# Patient Record
Sex: FEMALE | Race: BLACK OR AFRICAN AMERICAN | NOT HISPANIC OR LATINO | Employment: OTHER | URBAN - METROPOLITAN AREA
[De-identification: names, ages, dates, MRNs, and addresses within clinical notes are randomized per-mention and may not be internally consistent; named-entity substitution may affect disease eponyms.]

---

## 2017-01-16 ENCOUNTER — OFFICE VISIT (OUTPATIENT)
Dept: PULMONOLOGY | Facility: CLINIC | Age: 67
End: 2017-01-16
Payer: MEDICARE

## 2017-01-16 ENCOUNTER — ANTI-COAG VISIT (OUTPATIENT)
Dept: CARDIOLOGY | Facility: CLINIC | Age: 67
End: 2017-01-16
Payer: MEDICARE

## 2017-01-16 ENCOUNTER — LAB VISIT (OUTPATIENT)
Dept: LAB | Facility: HOSPITAL | Age: 67
End: 2017-01-16
Attending: INTERNAL MEDICINE
Payer: MEDICARE

## 2017-01-16 VITALS
SYSTOLIC BLOOD PRESSURE: 164 MMHG | HEART RATE: 50 BPM | OXYGEN SATURATION: 99 % | WEIGHT: 187.63 LBS | HEIGHT: 66 IN | RESPIRATION RATE: 20 BRPM | DIASTOLIC BLOOD PRESSURE: 82 MMHG | BODY MASS INDEX: 30.16 KG/M2

## 2017-01-16 DIAGNOSIS — E78.5 HYPERLIPIDEMIA, UNSPECIFIED HYPERLIPIDEMIA TYPE: ICD-10-CM

## 2017-01-16 DIAGNOSIS — Z79.01 LONG TERM (CURRENT) USE OF ANTICOAGULANTS: Primary | ICD-10-CM

## 2017-01-16 DIAGNOSIS — G47.33 OSA ON CPAP: Primary | ICD-10-CM

## 2017-01-16 DIAGNOSIS — Z79.01 ANTICOAGULATED ON COUMADIN: ICD-10-CM

## 2017-01-16 LAB
ALBUMIN SERPL BCP-MCNC: 3.6 G/DL
ALP SERPL-CCNC: 43 U/L
ALT SERPL W/O P-5'-P-CCNC: 13 U/L
ANION GAP SERPL CALC-SCNC: 6 MMOL/L
AST SERPL-CCNC: 17 U/L
BILIRUB SERPL-MCNC: 0.6 MG/DL
BUN SERPL-MCNC: 23 MG/DL
CALCIUM SERPL-MCNC: 9.3 MG/DL
CHLORIDE SERPL-SCNC: 105 MMOL/L
CHOLEST/HDLC SERPL: 2.8 {RATIO}
CO2 SERPL-SCNC: 31 MMOL/L
CREAT SERPL-MCNC: 0.8 MG/DL
CTP QC/QA: ABNORMAL
EST. GFR  (AFRICAN AMERICAN): >60 ML/MIN/1.73 M^2
EST. GFR  (NON AFRICAN AMERICAN): >60 ML/MIN/1.73 M^2
GLUCOSE SERPL-MCNC: 106 MG/DL
HDL/CHOLESTEROL RATIO: 35.8 %
HDLC SERPL-MCNC: 162 MG/DL
HDLC SERPL-MCNC: 58 MG/DL
INR PPP: 1.3 (ref 2–3)
LDLC SERPL CALC-MCNC: 91.4 MG/DL
NONHDLC SERPL-MCNC: 104 MG/DL
POTASSIUM SERPL-SCNC: 4.4 MMOL/L
PROT SERPL-MCNC: 7 G/DL
SODIUM SERPL-SCNC: 142 MMOL/L
TRIGL SERPL-MCNC: 63 MG/DL

## 2017-01-16 PROCEDURE — 99999 PR PBB SHADOW E&M-EST. PATIENT-LVL III: CPT | Mod: PBBFAC,,, | Performed by: INTERNAL MEDICINE

## 2017-01-16 PROCEDURE — 3077F SYST BP >= 140 MM HG: CPT | Mod: S$GLB,,, | Performed by: INTERNAL MEDICINE

## 2017-01-16 PROCEDURE — 36415 COLL VENOUS BLD VENIPUNCTURE: CPT | Mod: PO

## 2017-01-16 PROCEDURE — 3079F DIAST BP 80-89 MM HG: CPT | Mod: S$GLB,,, | Performed by: INTERNAL MEDICINE

## 2017-01-16 PROCEDURE — 99211 OFF/OP EST MAY X REQ PHY/QHP: CPT | Mod: 25,S$GLB,,

## 2017-01-16 PROCEDURE — 80053 COMPREHEN METABOLIC PANEL: CPT

## 2017-01-16 PROCEDURE — 85610 PROTHROMBIN TIME: CPT | Mod: QW,S$GLB,,

## 2017-01-16 PROCEDURE — 80061 LIPID PANEL: CPT

## 2017-01-16 PROCEDURE — 99214 OFFICE O/P EST MOD 30 MIN: CPT | Mod: S$GLB,,, | Performed by: INTERNAL MEDICINE

## 2017-01-16 PROCEDURE — 1159F MED LIST DOCD IN RCRD: CPT | Mod: S$GLB,,, | Performed by: INTERNAL MEDICINE

## 2017-01-16 NOTE — MR AVS SNAPSHOT
[facility]  [address] [facility] [place] [address]  Phone: [phone]  Fax: [phone]                  Alyx Weir   2017 10:00 AM   Anti-coag visit    Description:  Female : 1950   Provider:  Fatou Ferguson PharmD   Department:  [facility] - Coumadin           Diagnoses this Visit        Comments    Long term (current) use of anticoagulants    -  Primary     Anticoagulated on Coumadin                To Do List           Future Appointments        Provider Department Dept Phone    2017 8:40 AM LABORATORY, [facility] [phone]    2017 9:00 AM Kyler Tillman MD [facility] Pul[facility] [phone]    2017 10:00 AM Fatou Ferguson PharmD [facility] [phone]    2017 9:15 AM Fatou Ferguson PharmD [facility] [phone]    3/30/2017 8:00 AM Carl Clemons MD [facility] [phone]      Goals (5 Years of Data)     None      [facility]     [facility] -  Assistance  Registered nurses in the [facility] provide clinical advisement, health education, appointment booking, and other advisory services.  Call for this free service at [phone].             Medications           Message regarding Medications     Verify the changes and/or additions to your medication regime listed below are the same as discussed with your clinician today.  If any of these changes or additions are incorrect, please notify your healthcare provider.             Verify that the below list of medications is an accurate representation of the medications you are currently taking.  If none reported, the list may be blank. If incorrect, please contact your healthcare provider. Carry this list with you in case of emergency.           Current Medications     biotin 300 mcg Tab Take 1 tablet by mouth once daily.    blood sugar diagnostic (ACCU-CHEK SMARTVIEW TEST STRIP) Strp 1  strip by Misc.(Non-Drug; Combo Route) route once daily.    calcium-magnesium 300-300 mg Tab Take 1 tablet by mouth Once daily as needed.    carvedilol (COREG) 6.25 MG tablet Take 1 tablet (6.25 mg total) by mouth 2 (two) times daily with meals.    diclofenac sodium 1 % Gel Apply 2 g topically daily as needed.    gabapentin (NEURONTIN) 100 MG capsule Take 1 capsule (100 mg total) by mouth 3 (three) times daily.    hydrALAZINE (APRESOLINE) 25 MG tablet Take 1 tablet (25 mg total) by mouth 3 (three) times daily.    lancets 31 gauge Misc 1 lancet by Misc.(Non-Drug; Combo Route) route once daily.    olmesartan-amlodipin-hcthiazid (TRIBENZOR) 40-10-25 mg Tab Take 1 tablet by mouth once daily.    pravastatin (PRAVACHOL) 40 MG tablet Take 1 tablet (40 mg total) by mouth once daily.    tizanidine (ZANAFLEX) 2 MG tablet Take 1-2 tablets (2-4 mg total) by mouth nightly as needed.    tramadol (ULTRAM) 50 mg tablet Take 2 tablets by mouth 4 (four) times daily as needed.    vitamin D 1000 units Tab Take 185 mg by mouth once daily.    warfarin (COUMADIN) 10 MG tablet TAKE ONE TABLET BY MOUTH IN THE EVENING ON  MONDAY,  WED,  AND  FRIDAY  AND  ONE-HALF  TABLET  ON  TUE,THURS,  SAT,  AND  SUNDAY           Clinical Reference Information           Allergies as of 1/16/2017     Erythromycin    Amlodipine    Diazepam    Iodine    Meperidine    Morphine    Primidone      Immunizations Administered on Date of Encounter - 1/16/2017     None      Orders Placed During Today's Visit      Normal Orders This Visit    POCT PT/INR          1/16/2017  8:33 AM - Jabier BirminghamD      Component Results     Component Value Flag Ref Range Units Status    INR 1.3 (A) 2.0 - 3.0  Final     Acceptable           December 2016 Details    Sun Mon Tue Wed Thu Fri Sat         1               2               3                 4               5               6               7               8               9               10                  11               12               13               14               15               16               17      5 mg           18      5 mg         19   4.3   Hold   See details      20      Hold         21      5 mg         22      5 mg         23      5 mg         24      5 mg           25      5 mg         26      5 mg         27      5 mg         28   1.9   5 mg   See details      29      5 mg         30      5 mg         31      5 mg          Date Details   12/19 INR: 4.3      12/28 Last INR check   INR: 1.9                 January 2017 Details    Sun Mon Tue Wed Thu Fri Sat     1      5 mg         2      5 mg         3      5 mg         4      5 mg         5      5 mg         6      5 mg         7      5 mg           8      5 mg         9      5 mg         10      5 mg         11      5 mg         12      5 mg         13      5 mg         14      5 mg           15      5 mg         16   1.3   10 mg   See details      17      5 mg         18      5 mg         19      5 mg         20      5 mg         21      5 mg           22      5 mg         23            24               25               26               27               28                 29               30               31                    Date Details   01/16 This INR check   INR: 1.3       Date of next INR:  1/23/2017               How to take your warfarin dose     To take:  5 mg Take 0.5 of a 10 mg tablet.    To take:  10 mg Take 1 of the 10 mg tablets.           Anticoagulation Summary as of 1/16/2017     Maintenance plan 10 mg (10 mg x 1) on Mon; 5 mg (10 mg x 0.5) all other days    Full instructions 10 mg on Mon; 5 mg all other days    Next INR check 1/23/2017      Anticoagulation Episode Summary     Comments       Patient Findings     Negatives Signs/symptoms of thrombosis, Signs/symptoms of bleeding, Laboratory test error suspected, Change in health, Change in alcohol use, Change in activity, Upcoming invasive procedure, Emergency department  visit, Upcoming dental procedure, Missed doses, Extra doses, Change in medications, Change in diet/appetite, Hospital admission, Bruising, Other complaints

## 2017-01-16 NOTE — MR AVS SNAPSHOT
[facility] - [facility]  [address] [place] [address]  Phone: [phone]  Fax: [phone]                  Alyx Weir   2017 9:00 AM   Office Visit    Description:  Female : 1950   Provider:  Kyler Tillman MD   Department:  [facility] - Pul[facility]           Reason for Visit     Sleep Apnea           Diagnoses this Visit        Comments    VIRGIL on CPAP    -  Primary            To Do List           Future Appointments        Provider Department Dept Phone    2017 10:00 AM Jabier BirminghamD [facility] - [facility] [phone]    2017 9:15 AM [facility] [phone]    3/30/2017 8:00 AM Carl Clemons MD [facility] [phone]      Goals (5 Years of Data)     None      Follow-Up and Disposition     Return in about 1 year (around 2018) for CPAP download.      [facility]     [facility] On Call Nurse [facility] - 24/7 Assistance  Registered nurses in the [facility] On Call Center provide clinical advisement, health education, appointment booking, and other advisory services.  Call for this free service at [phone].             Medications           Message regarding Medications     Verify the changes and/or additions to your medication regime listed below are the same as discussed with your clinician today.  If any of these changes or additions are incorrect, please notify your healthcare provider.             Verify that the below list of medications is an accurate representation of the medications you are currently taking.  If none reported, the list may be blank. If incorrect, please contact your healthcare provider. Carry this list with you in case of emergency.           Current Medications     biotin 300 mcg Tab Take 1 tablet by mouth once daily.    blood sugar diagnostic (ACCU-CHEK SMARTVIEW TEST STRIP) Strp 1 strip by Misc.(Non-Drug; Combo Route) route once daily.    calcium-magnesium  "300-300 mg Tab Take 1 tablet by mouth Once daily as needed.    carvedilol (COREG) 6.25 MG tablet Take 1 tablet (6.25 mg total) by mouth 2 (two) times daily with meals.    diclofenac sodium 1 % Gel Apply 2 g topically daily as needed.    gabapentin (NEURONTIN) 100 MG capsule Take 1 capsule (100 mg total) by mouth 3 (three) times daily.    hydrALAZINE (APRESOLINE) 25 MG tablet Take 1 tablet (25 mg total) by mouth 3 (three) times daily.    lancets 31 gauge Misc 1 lancet by Misc.(Non-Drug; Combo Route) route once daily.    olmesartan-amlodipin-hcthiazid (TRIBENZOR) 40-10-25 mg Tab Take 1 tablet by mouth once daily.    pravastatin (PRAVACHOL) 40 MG tablet Take 1 tablet (40 mg total) by mouth once daily.    tizanidine (ZANAFLEX) 2 MG tablet Take 1-2 tablets (2-4 mg total) by mouth nightly as needed.    tramadol (ULTRAM) 50 mg tablet Take 2 tablets by mouth 4 (four) times daily as needed.    vitamin D 1000 units Tab Take 185 mg by mouth once daily.    warfarin (COUMADIN) 10 MG tablet TAKE ONE TABLET BY MOUTH IN THE EVENING ON  MONDAY,  WED,  AND  FRIDAY  AND  ONE-HALF  TABLET  ON  TUE,THURS,  SAT,  AND  SUNDAY           Clinical Reference Information           Vital Signs - Last Recorded  Most recent update: 1/16/2017  9:03 AM by Tish Doran LPN    BP Pulse Resp Ht Wt SpO2    (!) 164/82 (!) 50 20 5' 6" (1.676 m) 85.1 kg (187 lb 9.8 oz) 99%    BMI                30.28 kg/m2          Blood Pressure          Most Recent Value    BP  (!)  164/82      Allergies as of 1/16/2017     Erythromycin    Amlodipine    Diazepam    Iodine    Meperidine    Morphine    Primidone      Immunizations Administered on Date of Encounter - 1/16/2017     None      Orders Placed During Today's Visit      Normal Orders This Visit    CPAP/BIPAP SUPPLIES       "

## 2017-01-16 NOTE — PROGRESS NOTES
INR is sub-therapeutic. Patient denies missed doses. Reports high vitamin k on yesterday but small serving. Will increase total weekly dose to 10mg on Mondays and 5mg all other days.

## 2017-01-26 ENCOUNTER — TELEPHONE (OUTPATIENT)
Dept: FAMILY MEDICINE | Facility: CLINIC | Age: 67
End: 2017-01-26

## 2017-01-26 NOTE — TELEPHONE ENCOUNTER
----- Message from Aby Mathis sent at 1/26/2017 11:32 AM CST -----  Would like to speak to nurse about diabetic supplies. Please call back at [phone]. Thanks//cdb

## 2017-01-26 NOTE — TELEPHONE ENCOUNTER
----- Message from Mariela Khan sent at 1/26/2017  3:53 PM CST -----  Patient is returning nurse's call/please call patient back at [phone]

## 2017-01-30 ENCOUNTER — ANTI-COAG VISIT (OUTPATIENT)
Dept: CARDIOLOGY | Facility: CLINIC | Age: 67
End: 2017-01-30
Payer: MEDICARE

## 2017-01-30 DIAGNOSIS — Z79.01 ANTICOAGULATED ON COUMADIN: ICD-10-CM

## 2017-01-30 DIAGNOSIS — Z79.01 LONG TERM (CURRENT) USE OF ANTICOAGULANTS: Primary | ICD-10-CM

## 2017-01-30 LAB
CTP QC/QA: YES
INR PPP: 2.3 (ref 2–3)

## 2017-01-30 PROCEDURE — 85610 PROTHROMBIN TIME: CPT | Mod: QW,S$GLB,,

## 2017-01-30 NOTE — MR AVS SNAPSHOT
[facility] - [facility]  [address] [place] [address]  Phone: [phone]  Fax: [phone]                  Alyx Weir   2017 9:15 AM   Anti-coag visit    Description:  Female : 1950   Provider:  Fatou Ferguson PharmD   Department:  [facility] - [facility]           Diagnoses this Visit        Comments    Long term (current) use of anticoagulants    -  Primary     Anticoagulated on Coumadin                To Do List           Future Appointments        Provider Department Dept Phone    2017 9:15 AM Fatou Ferguson, [facility] - [facility] [phone]    3/30/2017 8:00 AM Carl Clemons MD [facility] [phone]    1/15/2018 10:40 AM Sunshine Luther NP '[facility] - [facility] Services [phone]      Goals (5 Years of Data)     None      [facility]     [facility] On Call Nurse Care Line -  Assistance  Registered nurses in the [facility] On Call Center provide clinical advisement, health education, appointment booking, and other advisory services.  Call for this free service at [phone].             Medications           Message regarding Medications     Verify the changes and/or additions to your medication regime listed below are the same as discussed with your clinician today.  If any of these changes or additions are incorrect, please notify your healthcare provider.             Verify that the below list of medications is an accurate representation of the medications you are currently taking.  If none reported, the list may be blank. If incorrect, please contact your healthcare provider. Carry this list with you in case of emergency.           Current Medications     biotin 300 mcg Tab Take 1 tablet by mouth once daily.    blood sugar diagnostic (ACCU-CHEK SMARTVIEW TEST STRIP) Strp 1 strip by Misc.(Non-Drug; Combo Route) route once daily.    calcium-magnesium 300-300 mg Tab Take 1 tablet by mouth Once daily as needed.    carvedilol (COREG)  6.25 MG tablet Take 1 tablet (6.25 mg total) by mouth 2 (two) times daily with meals.    diclofenac sodium 1 % Gel Apply 2 g topically daily as needed.    gabapentin (NEURONTIN) 100 MG capsule Take 1 capsule (100 mg total) by mouth 3 (three) times daily.    hydrALAZINE (APRESOLINE) 25 MG tablet Take 1 tablet (25 mg total) by mouth 3 (three) times daily.    lancets 31 gauge Misc 1 lancet by Misc.(Non-Drug; Combo Route) route once daily.    olmesartan-amlodipin-hcthiazid (TRIBENZOR) 40-10-25 mg Tab Take 1 tablet by mouth once daily.    pravastatin (PRAVACHOL) 40 MG tablet Take 1 tablet (40 mg total) by mouth once daily.    tizanidine (ZANAFLEX) 2 MG tablet Take 1-2 tablets (2-4 mg total) by mouth nightly as needed.    tramadol (ULTRAM) 50 mg tablet Take 2 tablets by mouth 4 (four) times daily as needed.    vitamin D 1000 units Tab Take 185 mg by mouth once daily.    warfarin (COUMADIN) 10 MG tablet TAKE ONE TABLET BY MOUTH IN THE EVENING ON  MONDAY,  WED,  AND  FRIDAY  AND  ONE-HALF  TABLET  ON  TUE,THURS,  SAT,  AND  SUNDAY           Clinical Reference Information           Allergies as of 1/30/2017     Erythromycin    Amlodipine    Diazepam    Iodine    Meperidine    Morphine    Primidone      Immunizations Administered on Date of Encounter - 1/30/2017     None      Orders Placed During Today's Visit      Normal Orders This Visit    POCT INR          1/30/2017  9:17 AM - Jabier BirminghamD      Component Results     Component Value Flag Ref Range Units Status    INR 2.3  2.0 - 3.0  Final     Acceptable Yes    Final      December 2016 Details    Sun Mon Tue Wed Thu Fri Sat         1               2               3                 4               5               6               7               8               9               10                 11               12               13               14               15               16               17                 18               19                20               21               22               23               24                 25               26               27               28               29               30               31      5 mg          Date Details   No additional details              January 2017 Details    Sun Mon Tue Wed Thu Fri Sat     1      5 mg         2      5 mg         3      5 mg         4      5 mg         5      5 mg         6      5 mg         7      5 mg           8      5 mg         9      5 mg         10      5 mg         11      5 mg         12      5 mg         13      5 mg         14      5 mg           15      5 mg         16   1.3   10 mg   See details      17      5 mg         18      5 mg         19      5 mg         20      5 mg         21      5 mg           22      5 mg         23      10 mg         24      5 mg         25      5 mg         26      5 mg         27      5 mg         28      5 mg           29      5 mg         30   2.3   10 mg   See details      31      5 mg              Date Details   01/16 Last INR check   INR: 1.3      01/30 This INR check   INR: 2.3                     How to take your warfarin dose     To take:  5 mg Take 0.5 of a 10 mg tablet.    To take:  10 mg Take 1 of the 10 mg tablets.           February 2017 Details    Sun Mon Tue Wed Thu Fri Sat        1      5 mg         2      5 mg         3      5 mg         4      5 mg           5      5 mg         6      10 mg         7      5 mg         8      5 mg         9      5 mg         10      5 mg         11      5 mg           12      5 mg         13      10 mg         14      5 mg         15      5 mg         16      5 mg         17      5 mg         18      5 mg           19      5 mg         20            21               22               23               24               25                 26               27               28                    Date Details   No additional details    Date of next INR:  2/20/2017         How to take  your warfarin dose     To take:  5 mg Take 0.5 of a 10 mg tablet.    To take:  10 mg Take 1 of the 10 mg tablets.           Anticoagulation Summary as of 1/30/2017     Maintenance plan 10 mg (10 mg x 1) on Mon; 5 mg (10 mg x 0.5) all other days    Full instructions 10 mg on Mon; 5 mg all other days    Next INR check 2/20/2017      Anticoagulation Episode Summary     Comments       Patient Findings     Negatives Signs/symptoms of thrombosis, Signs/symptoms of bleeding, Laboratory test error suspected, Change in health, Change in alcohol use, Change in activity, Upcoming invasive procedure, Emergency department visit, Upcoming dental procedure, Missed doses, Extra doses, Change in medications, Change in diet/appetite, Hospital admission, Bruising, Other complaints

## 2017-02-09 ENCOUNTER — OFFICE VISIT (OUTPATIENT)
Dept: FAMILY MEDICINE | Facility: CLINIC | Age: 67
End: 2017-02-09
Payer: MEDICARE

## 2017-02-09 ENCOUNTER — HOSPITAL ENCOUNTER (OUTPATIENT)
Dept: RADIOLOGY | Facility: HOSPITAL | Age: 67
Discharge: HOME | End: 2017-02-09
Attending: INTERNAL MEDICINE
Payer: MEDICARE

## 2017-02-09 VITALS
DIASTOLIC BLOOD PRESSURE: 74 MMHG | HEART RATE: 70 BPM | OXYGEN SATURATION: 100 % | TEMPERATURE: 97 F | WEIGHT: 190.69 LBS | SYSTOLIC BLOOD PRESSURE: 116 MMHG | HEIGHT: 65 IN | BODY MASS INDEX: 31.77 KG/M2

## 2017-02-09 DIAGNOSIS — R51 HEADACHE(784.0): Primary | ICD-10-CM

## 2017-02-09 DIAGNOSIS — E11.65 TYPE 2 DIABETES MELLITUS WITH HYPERGLYCEMIA, WITHOUT LONG-TERM CURRENT USE OF INSULIN: Chronic | ICD-10-CM

## 2017-02-09 DIAGNOSIS — R51 HEADACHE(784.0): ICD-10-CM

## 2017-02-09 DIAGNOSIS — R51.9 SEVERE FRONTAL HEADACHES: ICD-10-CM

## 2017-02-09 PROCEDURE — 3044F HG A1C LEVEL LT 7.0%: CPT | Mod: S$GLB,,, | Performed by: INTERNAL MEDICINE

## 2017-02-09 PROCEDURE — 99999 PR PBB SHADOW E&M-EST. PATIENT-LVL III: CPT | Mod: PBBFAC,,, | Performed by: INTERNAL MEDICINE

## 2017-02-09 PROCEDURE — 70210 X-RAY EXAM OF SINUSES: CPT | Mod: TC,PO

## 2017-02-09 PROCEDURE — 3078F DIAST BP <80 MM HG: CPT | Mod: S$GLB,,, | Performed by: INTERNAL MEDICINE

## 2017-02-09 PROCEDURE — 70210 X-RAY EXAM OF SINUSES: CPT | Mod: 26,,, | Performed by: RADIOLOGY

## 2017-02-09 PROCEDURE — 3060F POS MICROALBUMINURIA REV: CPT | Mod: S$GLB,,, | Performed by: INTERNAL MEDICINE

## 2017-02-09 PROCEDURE — 99213 OFFICE O/P EST LOW 20 MIN: CPT | Mod: S$GLB,,, | Performed by: INTERNAL MEDICINE

## 2017-02-09 PROCEDURE — 3074F SYST BP LT 130 MM HG: CPT | Mod: S$GLB,,, | Performed by: INTERNAL MEDICINE

## 2017-02-09 NOTE — PROGRESS NOTES
Subjective:       Patient ID: Alyx Weir is a 66 y.o. female.    Chief Complaint: Headache and Medication Refill  -headache for 2 days-------frontal and across nose-------better today----------no sinus congestion------   HPI  Review of Systems   Constitutional: Negative for chills and fever.   HENT: Negative for congestion, postnasal drip, rhinorrhea, sinus pressure and sore throat.    Respiratory: Negative for apnea, cough, choking, chest tightness, shortness of breath, wheezing and stridor.    Cardiovascular: Negative for chest pain, palpitations and leg swelling.   Gastrointestinal: Negative for abdominal pain, nausea and vomiting.   Genitourinary: Negative.    Musculoskeletal: Negative for back pain.   Neurological: Negative.    Psychiatric/Behavioral: Negative for agitation, behavioral problems and confusion.       Objective:      Physical Exam   Constitutional: She is oriented to person, place, and time. She appears well-developed and well-nourished. No distress.   HENT:   Head: Normocephalic and atraumatic.   Neck: Normal range of motion. Neck supple. Carotid bruit is not present.   Cardiovascular: Normal rate, regular rhythm, normal heart sounds and intact distal pulses.    Pulmonary/Chest: Effort normal and breath sounds normal. No respiratory distress. She has no wheezes. She has no rales. She exhibits no tenderness.   Abdominal: Soft. Bowel sounds are normal. She exhibits no distension. There is no tenderness. There is no rebound and no guarding.   Musculoskeletal: Normal range of motion. She exhibits no edema or tenderness.   Neurological: She is alert and oriented to person, place, and time.   Skin: Skin is warm and dry. No rash noted. She is not diaphoretic. No erythema. No pallor.   Psychiatric: She has a normal mood and affect. Her behavior is normal. Judgment and thought content normal.   Nursing note and vitals reviewed.      Assessment:       1. Headache(784.0)        Plan:         xray sinus.     Tramadol  mg bid prn-call if persists.

## 2017-02-09 NOTE — MR AVS SNAPSHOT
[facility]  [address] [facility] [address]  Phone: [phone]                  Alyx Weir   2017 2:00 PM   Office Visit    Description:  Female : 1950   Provider:  Carl Clemons MD   Department:  [facility]           Reason for Visit     Headache     Medication Refill           Diagnoses this Visit        Comments    Headache(784.0)    -  Primary            To Do List           Future Appointments        Provider Department Dept Phone    2017  2:45 PM J[facility] XR1 [facility] [phone]    2017 9:15 AM Fatou Ferguson, PharmD [facility] [phone]    3/30/2017 8:00 AM Carl Clemons MD [facility] [phone]    1/15/2018 10:40 AM Sunshine Luther NP [facility] [phone]      Goals (5 Years of Data)     None      Follow-Up and Disposition     Return if symptoms worsen or fail to improve.       These Medications        Disp Refills Start End    lancets 31 gauge Misc 100 each 3 2017     1 lancet by Misc.(Non-Drug; Combo Route) route once daily. - Misc.(Non-Drug; Combo Route)    Pharmacy: [facility] [address] [place] [address] [address] [facility] Ph #: [phone]       blood sugar diagnostic (ACCU-CHEK SMARTVIEW TEST STRIP) Strp 100 each 3 2017     1 strip by Misc.(Non-Drug; Combo Route) route once daily. - Misc.(Non-Drug; Combo Route)    Pharmacy: [facility] [address] [place] [address] [address] [facility] Ph #: [phone]         [facility] On Call     [facility] On Call Nurse Care Line -  Assistance  Registered nurses in the [facility] provide clinical advisement, health education, appointment booking, and other advisory services.  Call for this free service at [phone].             Medications           Message regarding Medications     Verify the changes and/or additions to your medication  regime listed below are the same as discussed with your clinician today.  If any of these changes or additions are incorrect, please notify your healthcare provider.             Verify that the below list of medications is an accurate representation of the medications you are currently taking.  If none reported, the list may be blank. If incorrect, please contact your healthcare provider. Carry this list with you in case of emergency.           Current Medications     biotin 300 mcg Tab Take 1 tablet by mouth once daily.    blood sugar diagnostic (ACCU-CHEK SMARTVIEW TEST STRIP) Strp 1 strip by Misc.(Non-Drug; Combo Route) route once daily.    calcium-magnesium 300-300 mg Tab Take 1 tablet by mouth Once daily as needed.    carvedilol (COREG) 6.25 MG tablet Take 1 tablet (6.25 mg total) by mouth 2 (two) times daily with meals.    diclofenac sodium 1 % Gel Apply 2 g topically daily as needed.    gabapentin (NEURONTIN) 100 MG capsule Take 1 capsule (100 mg total) by mouth 3 (three) times daily.    hydrALAZINE (APRESOLINE) 25 MG tablet Take 1 tablet (25 mg total) by mouth 3 (three) times daily.    lancets 31 gauge Misc 1 lancet by Misc.(Non-Drug; Combo Route) route once daily.    olmesartan-amlodipin-hcthiazid (TRIBENZOR) 40-10-25 mg Tab Take 1 tablet by mouth once daily.    pravastatin (PRAVACHOL) 40 MG tablet Take 1 tablet (40 mg total) by mouth once daily.    tizanidine (ZANAFLEX) 2 MG tablet Take 1-2 tablets (2-4 mg total) by mouth nightly as needed.    tramadol (ULTRAM) 50 mg tablet Take 2 tablets by mouth 4 (four) times daily as needed.    vitamin D 1000 units Tab Take 185 mg by mouth once daily.    warfarin (COUMADIN) 10 MG tablet TAKE ONE TABLET BY MOUTH IN THE EVENING ON  MONDAY,  WED,  AND  FRIDAY  AND  ONE-HALF  TABLET  ON  TUE,THURS,  SAT,  AND  SUNDAY           Clinical Reference Information           Your Vitals Were     BP Pulse Temp Height    116/74 (BP Location: Left arm, Patient Position: Sitting, BP  "Method: Manual) 70 97.2 °F (36.2 °C) (Tympanic) 5' 5" (1.651 m)    Weight SpO2 BMI    86.5 kg (190 lb 11.2 oz) 100% 31.73 kg/m2      Blood Pressure          Most Recent Value    BP  116/74      Allergies as of 2/9/2017     Erythromycin    Amlodipine    Diazepam    Iodine    Meperidine    Morphine    Primidone      Immunizations Administered on Date of Encounter - 2/9/2017     None      Orders Placed During Today's Visit     Future Labs/Procedures Expected by Expires    X-Ray Sinuses Ltd Less Than 3 Views  2/9/2017 2/9/2018      Language Assistance Services     ATTENTION: Language assistance services are available, free of charge. Please call [phone].      ATENCIÓN: Si taylorla neda, tiene a lainez disposición servicios gratuitos de asistencia lingüística. Llame al [phone].     BELKIS Ý: N?u b?n nói Ti?ng Vi?t, có các d?ch v? h? tr? ngôn ng? mi?n phí dành cho b?n. G?i s? [phone].         [facility] complies with applicable Federal civil rights laws and does not discriminate on the basis of race, color, national origin, age, disability, or sex.        "

## 2017-02-20 ENCOUNTER — ANTI-COAG VISIT (OUTPATIENT)
Dept: CARDIOLOGY | Facility: CLINIC | Age: 67
End: 2017-02-20
Payer: MEDICARE

## 2017-02-20 DIAGNOSIS — Z79.01 ANTICOAGULATED ON COUMADIN: ICD-10-CM

## 2017-02-20 DIAGNOSIS — Z79.01 LONG TERM (CURRENT) USE OF ANTICOAGULANTS: Primary | ICD-10-CM

## 2017-02-20 LAB — INR PPP: 2.5 (ref 2–3)

## 2017-02-20 PROCEDURE — 99211 OFF/OP EST MAY X REQ PHY/QHP: CPT | Mod: 25,S$GLB,,

## 2017-02-20 PROCEDURE — 85610 PROTHROMBIN TIME: CPT | Mod: QW,S$GLB,,

## 2017-02-20 NOTE — PROGRESS NOTES
INR remains therapeutic. Patient recently prescribed Tramadol  mg bid prn. No other changes noted. Continue dose and diet until follow-up.

## 2017-02-20 NOTE — MR AVS SNAPSHOT
[facility]  [address] [address]  Phone: [phone]  Fax: [phone]                  Alyx Weir   2017 9:15 AM   Anti-coag visit    Description:  Female : 1950   Provider:  Fatou Ferguson PharmD   Department:  [facility]           Diagnoses this Visit        Comments    Long term (current) use of anticoagulants    -  Primary     Anticoagulated on Coumadin                To Do List           Future Appointments        Provider Department Dept Phone    3/20/2017 9:00 AM Fatou Ferguson, PharmD [facility] [phone]    3/30/2017 8:00 AM Carl Clemons MD [facility] [phone]    1/15/2018 10:40 AM Sunshine Luther NP [facility] [phone]      Goals (5 Years of Data)     None      [facility]     [facility] On Call Nurse Care Line -  Assistance  Registered nurses in the [facility] On Call Center provide clinical advisement, health education, appointment booking, and other advisory services.  Call for this free service at [phone].             Medications           Message regarding Medications     Verify the changes and/or additions to your medication regime listed below are the same as discussed with your clinician today.  If any of these changes or additions are incorrect, please notify your healthcare provider.             Verify that the below list of medications is an accurate representation of the medications you are currently taking.  If none reported, the list may be blank. If incorrect, please contact your healthcare provider. Carry this list with you in case of emergency.           Current Medications     biotin 300 mcg Tab Take 1 tablet by mouth once daily.    blood sugar diagnostic (ACCU-CHEK SMARTVIEW TEST STRIP) Strp 1 strip by Misc.(Non-Drug; Combo Route) route once daily.    blood sugar diagnostic Strp 1 each by Misc.(Non-Drug; Combo Route) route 2 (two) times daily as  needed.    calcium-magnesium 300-300 mg Tab Take 1 tablet by mouth Once daily as needed.    carvedilol (COREG) 6.25 MG tablet Take 1 tablet (6.25 mg total) by mouth 2 (two) times daily with meals.    diclofenac sodium 1 % Gel Apply 2 g topically daily as needed.    gabapentin (NEURONTIN) 100 MG capsule Take 1 capsule (100 mg total) by mouth 3 (three) times daily.    hydrALAZINE (APRESOLINE) 25 MG tablet Take 1 tablet (25 mg total) by mouth 3 (three) times daily.    lancets 31 gauge Misc 1 lancet by Misc.(Non-Drug; Combo Route) route once daily.    olmesartan-amlodipin-hcthiazid (TRIBENZOR) 40-10-25 mg Tab Take 1 tablet by mouth once daily.    pravastatin (PRAVACHOL) 40 MG tablet Take 1 tablet (40 mg total) by mouth once daily.    tizanidine (ZANAFLEX) 2 MG tablet Take 1-2 tablets (2-4 mg total) by mouth nightly as needed.    tramadol (ULTRAM) 50 mg tablet Take 2 tablets by mouth 4 (four) times daily as needed.    vitamin D 1000 units Tab Take 185 mg by mouth once daily.    warfarin (COUMADIN) 10 MG tablet TAKE ONE TABLET BY MOUTH IN THE EVENING ON  MONDAY,  WED,  AND  FRIDAY  AND  ONE-HALF  TABLET  ON  TUE,THURS,  SAT,  AND  SUNDAY           Clinical Reference Information           Allergies as of 2/20/2017     Erythromycin    Amlodipine    Diazepam    Iodine    Meperidine    Morphine    Primidone      Immunizations Administered on Date of Encounter - 2/20/2017     None      Orders Placed During Today's Visit      Normal Orders This Visit    POCT INR          2/20/2017  9:14 AM - Fatou Ferguson, PharmD      Component Results     Component Value Flag Ref Range Units Status    INR 2.5  2.0 - 3.0  Final      January 2017 Details    Sun Mon Tue Wed Thu Fri Sat     1               2               3               4               5               6               7                 8               9               10               11               12               13               14                 15               16                17               18               19               20               21      5 mg           22      5 mg         23      10 mg         24      5 mg         25      5 mg         26      5 mg         27      5 mg         28      5 mg           29      5 mg         30   2.3   10 mg   See details      31      5 mg              Date Details   01/30 Last INR check   INR: 2.3                 February 2017 Details    Sun Mon Tue Wed Thu Fri Sat        1      5 mg         2      5 mg         3      5 mg         4      5 mg           5      5 mg         6      10 mg         7      5 mg         8      5 mg         9      5 mg         10      5 mg         11      5 mg           12      5 mg         13      10 mg         14      5 mg         15      5 mg         16      5 mg         17      5 mg         18      5 mg           19      5 mg         20   2.5   10 mg   See details      21      5 mg         22      5 mg         23      5 mg         24      5 mg         25      5 mg           26      5 mg         27      10 mg         28      5 mg              Date Details   02/20 This INR check   INR: 2.5                     How to take your warfarin dose     To take:  5 mg Take 0.5 of a 10 mg tablet.    To take:  10 mg Take 1 of the 10 mg tablets.           March 2017 Details    Sun Mon Tue Wed Thu Fri Sat        1      5 mg         2      5 mg         3      5 mg         4      5 mg           5      5 mg         6      10 mg         7      5 mg         8      5 mg         9      5 mg         10      5 mg         11      5 mg           12      5 mg         13      10 mg         14      5 mg         15      5 mg         16      5 mg         17      5 mg         18      5 mg           19      5 mg         20            21               22               23               24               25                 26               27               28               29               30               31                 Date Details   No  additional details    Date of next INR:  3/20/2017         How to take your warfarin dose     To take:  5 mg Take 0.5 of a 10 mg tablet.    To take:  10 mg Take 1 of the 10 mg tablets.           Anticoagulation Summary as of 2/20/2017     Maintenance plan 10 mg (10 mg x 1) on Mon; 5 mg (10 mg x 0.5) all other days    Full instructions 10 mg on Mon; 5 mg all other days    Next INR check 3/20/2017      Anticoagulation Episode Summary     Comments       Patient Findings     Negatives Signs/symptoms of thrombosis, Signs/symptoms of bleeding, Laboratory test error suspected, Change in health, Change in alcohol use, Change in activity, Upcoming invasive procedure, Emergency department visit, Upcoming dental procedure, Missed doses, Extra doses, Change in medications, Change in diet/appetite, Hospital admission, Bruising, Other complaints      Language Assistance Services     ATTENTION: Language assistance services are available, free of charge. Please call [phone].      ATENCIÓN: Si habla neda, tiene a lainez disposición servicios gratuitos de asistencia lingüística. Llame al [phone].     CHÚ Ý: N?u b?n nói Ti?ng Vi?t, có các d?ch v? h? tr? ngôn ng? mi?n phí dành cho b?n. G?i s? [phone].         [facility] complies with applicable Federal civil rights laws and does not discriminate on the basis of race, color, national origin, age, disability, or sex.

## 2017-03-09 RX ORDER — LANCETS
1 EACH MISCELLANEOUS 2 TIMES DAILY PRN
Qty: 110 EACH | Refills: 12 | Status: SHIPPED | OUTPATIENT
Start: 2017-03-09 | End: 2018-01-12

## 2017-03-09 NOTE — TELEPHONE ENCOUNTER
----- Message from Katie Thacker sent at 3/9/2017  3:52 PM CST -----  Contact: Pt   Pt called and stated she needed to speak to the nurse. She stated she received the wrong diabetic supplies for her machine. She can be reached at [phone].    Thanks,  TF

## 2017-03-09 NOTE — TELEPHONE ENCOUNTER
Patient has a One Touch Verio Machine. She needs strips and lancets to [employer] Mail order 90 day supply.

## 2017-03-20 ENCOUNTER — ANTI-COAG VISIT (OUTPATIENT)
Dept: CARDIOLOGY | Facility: CLINIC | Age: 67
End: 2017-03-20
Payer: MEDICARE

## 2017-03-20 DIAGNOSIS — Z79.01 LONG TERM (CURRENT) USE OF ANTICOAGULANTS: Primary | ICD-10-CM

## 2017-03-20 LAB — INR PPP: 1.6 (ref 2–3)

## 2017-03-20 PROCEDURE — 99211 OFF/OP EST MAY X REQ PHY/QHP: CPT | Mod: 25,S$GLB,,

## 2017-03-20 PROCEDURE — 85610 PROTHROMBIN TIME: CPT | Mod: QW,S$GLB,,

## 2017-03-20 NOTE — PROGRESS NOTES
INR is sub-therapeutic. Patient reports possible missed doses. Will increase this week's dose then re-challenge dose until follow-up.

## 2017-03-20 NOTE — MR AVS SNAPSHOT
[facility]  [address] [address]  Phone: [phone]  Fax: [phone]                  Alyx Weir   3/20/2017 9:00 AM   Anti-coag visit    Description:  Female : 1950   Provider:  Fatou Ferguson, PharmBLANQUITA   Department:  [facility]           Diagnoses this Visit        Comments    Long term (current) use of anticoagulants    -  Primary            To Do List           Future Appointments        Provider Department Dept Phone    3/30/2017 8:00 AM Carl Clemons MD [facility] [phone]    4/3/2017 9:30 AM Fatou Ferguson PharmD [facility] [place] [phone]    2017 4:00 PM Patrick Mayo Sr., MD [facility] [phone]    1/15/2018 10:40 AM Sunshine Luther NP [facility] [phone]      Goals (5 Years of Data)     None      [facility] On [facility]     [facility] On Call Nurse Care Line -  Assistance  Registered nurses in the [facility] On Call Center provide clinical advisement, health education, appointment booking, and other advisory services.  Call for this free service at [phone].             Medications           Message regarding Medications     Verify the changes and/or additions to your medication regime listed below are the same as discussed with your clinician today.  If any of these changes or additions are incorrect, please notify your healthcare provider.             Verify that the below list of medications is an accurate representation of the medications you are currently taking.  If none reported, the list may be blank. If incorrect, please contact your healthcare provider. Carry this list with you in case of emergency.           Current Medications     biotin 300 mcg Tab Take 1 tablet by mouth once daily.    blood sugar diagnostic Strp 1 each by Misc.(Non-Drug; Combo Route) route 2 (two) times daily as needed.    blood sugar diagnostic Strp 1 each by Misc.(Non-Drug; Combo Route) route 2  (two) times daily as needed. ONE TOUCH VERIO MACHINE    calcium-magnesium 300-300 mg Tab Take 1 tablet by mouth Once daily as needed.    carvedilol (COREG) 6.25 MG tablet Take 1 tablet (6.25 mg total) by mouth 2 (two) times daily with meals.    diclofenac sodium 1 % Gel Apply 2 g topically daily as needed.    gabapentin (NEURONTIN) 100 MG capsule Take 1 capsule (100 mg total) by mouth 3 (three) times daily.    hydrALAZINE (APRESOLINE) 25 MG tablet Take 1 tablet (25 mg total) by mouth 3 (three) times daily.    lancets Misc 1 each by Misc.(Non-Drug; Combo Route) route 2 (two) times daily as needed.    olmesartan-amlodipin-hcthiazid (TRIBENZOR) 40-10-25 mg Tab Take 1 tablet by mouth once daily.    pravastatin (PRAVACHOL) 40 MG tablet Take 1 tablet (40 mg total) by mouth once daily.    tizanidine (ZANAFLEX) 2 MG tablet Take 1-2 tablets (2-4 mg total) by mouth nightly as needed.    tramadol (ULTRAM) 50 mg tablet Take 2 tablets by mouth 4 (four) times daily as needed.    vitamin D 1000 units Tab Take 185 mg by mouth once daily.    warfarin (COUMADIN) 10 MG tablet TAKE ONE TABLET BY MOUTH IN THE EVENING ON  MONDAY,  WED,  AND  FRIDAY  AND  ONE-HALF  TABLET  ON  TUE,THURS,  SAT,  AND  SUNDAY           Clinical Reference Information           Allergies as of 3/20/2017     Erythromycin    Amlodipine    Diazepam    Iodine    Meperidine    Morphine    Primidone      Immunizations Administered on Date of Encounter - 3/20/2017     None      Orders Placed During Today's Visit      Normal Orders This Visit    POCT INR          3/20/2017  9:11 AM - Fatou Ferguson, PharmD      Component Results     Component Value Flag Ref Range Units Status    INR 1.6 (A) 2.0 - 3.0  Final      February 2017 Details    Sun Mon Tue Wed Thu Fri Sat        1               2               3               4                 5               6               7               8               9               10               11                 12                13               14               15               16               17               18      5 mg           19      5 mg         20   2.5   10 mg   See details      21      5 mg         22      5 mg         23      5 mg         24      5 mg         25      5 mg           26      5 mg         27      10 mg         28      5 mg              Date Details   02/20 Last INR check   INR: 2.5                 March 2017 Details    Sun Mon Tue Wed Thu Fri Sat        1      5 mg         2      5 mg         3      5 mg         4      5 mg           5      5 mg         6      10 mg         7      5 mg         8      5 mg         9      5 mg         10      5 mg         11      5 mg           12      5 mg         13      10 mg         14      5 mg         15      5 mg         16      5 mg         17      5 mg         18      5 mg           19      5 mg         20   1.6   10 mg   See details      21      10 mg         22      5 mg         23      5 mg         24      5 mg         25      5 mg           26      5 mg         27      10 mg         28      5 mg         29      5 mg         30      5 mg         31      5 mg           Date Details   03/20 This INR check   INR: 1.6                     How to take your warfarin dose     To take:  5 mg Take 0.5 of a 10 mg tablet.    To take:  10 mg Take 1 of the 10 mg tablets.           April 2017 Details    Sun Mon Tue Wed Thu Fri Sat           1      5 mg           2      5 mg         3            4               5               6               7               8                 9               10               11               12               13               14               15                 16               17               18               19               20               21               22                 23               24               25               26               27               28               29                 30                      Date Details   No additional  details    Date of next INR:  4/3/2017         How to take your warfarin dose     To take:  5 mg Take 0.5 of a 10 mg tablet.    To take:  10 mg Take 1 of the 10 mg tablets.           Anticoagulation Summary as of 3/20/2017     Maintenance plan 10 mg (10 mg x 1) on Mon; 5 mg (10 mg x 0.5) all other days    Full instructions 3/21: 10 mg; Otherwise 10 mg on Mon; 5 mg all other days    Next INR check 4/3/2017      Anticoagulation Episode Summary     Comments       Patient Findings     Negatives Signs/symptoms of thrombosis, Signs/symptoms of bleeding, Laboratory test error suspected, Change in health, Change in alcohol use, Change in activity, Upcoming invasive procedure, Emergency department visit, Upcoming dental procedure, Missed doses, Extra doses, Change in medications, Change in diet/appetite, Hospital admission, Bruising, Other complaints      Language Assistance Services     ATTENTION: Language assistance services are available, free of charge. Please call [phone].      ATENCIÓN: Si habla neda, tiene a lainez disposición servicios gratuitos de asistencia lingüística. Llame al [phone].     CHÚ Ý: N?u b?n nói Ti?ng Vi?t, có các d?ch v? h? tr? ngôn ng? mi?n phí dành cho b?n. G?i s? [phone].         [facility] complies with applicable Federal civil rights laws and does not discriminate on the basis of race, color, national origin, age, disability, or sex.

## 2017-04-03 ENCOUNTER — LAB VISIT (OUTPATIENT)
Dept: LAB | Facility: HOSPITAL | Age: 67
End: 2017-04-03
Payer: MEDICARE

## 2017-04-03 ENCOUNTER — OFFICE VISIT (OUTPATIENT)
Dept: FAMILY MEDICINE | Facility: CLINIC | Age: 67
End: 2017-04-03
Payer: MEDICARE

## 2017-04-03 VITALS
BODY MASS INDEX: 31.77 KG/M2 | HEIGHT: 65 IN | SYSTOLIC BLOOD PRESSURE: 138 MMHG | RESPIRATION RATE: 18 BRPM | TEMPERATURE: 96 F | OXYGEN SATURATION: 99 % | WEIGHT: 190.69 LBS | DIASTOLIC BLOOD PRESSURE: 88 MMHG | HEART RATE: 50 BPM

## 2017-04-03 DIAGNOSIS — D68.51 HETEROZYGOUS FACTOR V LEIDEN MUTATION: Chronic | ICD-10-CM

## 2017-04-03 DIAGNOSIS — Z12.31 ENCOUNTER FOR SCREENING MAMMOGRAM FOR BREAST CANCER: ICD-10-CM

## 2017-04-03 DIAGNOSIS — I10 ESSENTIAL HYPERTENSION: Chronic | ICD-10-CM

## 2017-04-03 DIAGNOSIS — E11.65 TYPE 2 DIABETES MELLITUS WITH HYPERGLYCEMIA, WITHOUT LONG-TERM CURRENT USE OF INSULIN: Chronic | ICD-10-CM

## 2017-04-03 DIAGNOSIS — E78.00 PURE HYPERCHOLESTEROLEMIA WITH TARGET LOW DENSITY LIPOPROTEIN (LDL) CHOLESTEROL LESS THAN 130 MG/DL: Chronic | ICD-10-CM

## 2017-04-03 DIAGNOSIS — G47.33 OSA ON CPAP: Chronic | ICD-10-CM

## 2017-04-03 DIAGNOSIS — Z79.01 ANTICOAGULATED ON COUMADIN: Chronic | ICD-10-CM

## 2017-04-03 DIAGNOSIS — Z79.01 ANTICOAGULATED ON COUMADIN: Primary | Chronic | ICD-10-CM

## 2017-04-03 PROCEDURE — 3044F HG A1C LEVEL LT 7.0%: CPT | Mod: S$GLB,,, | Performed by: INTERNAL MEDICINE

## 2017-04-03 PROCEDURE — 96372 THER/PROPH/DIAG INJ SC/IM: CPT | Mod: S$GLB,,, | Performed by: INTERNAL MEDICINE

## 2017-04-03 PROCEDURE — 1160F RVW MEDS BY RX/DR IN RCRD: CPT | Mod: S$GLB,,, | Performed by: INTERNAL MEDICINE

## 2017-04-03 PROCEDURE — 83036 HEMOGLOBIN GLYCOSYLATED A1C: CPT

## 2017-04-03 PROCEDURE — 3075F SYST BP GE 130 - 139MM HG: CPT | Mod: S$GLB,,, | Performed by: INTERNAL MEDICINE

## 2017-04-03 PROCEDURE — 99215 OFFICE O/P EST HI 40 MIN: CPT | Mod: 25,S$GLB,, | Performed by: INTERNAL MEDICINE

## 2017-04-03 PROCEDURE — 36415 COLL VENOUS BLD VENIPUNCTURE: CPT | Mod: PO

## 2017-04-03 PROCEDURE — 99999 PR PBB SHADOW E&M-EST. PATIENT-LVL IV: CPT | Mod: PBBFAC,,, | Performed by: INTERNAL MEDICINE

## 2017-04-03 PROCEDURE — 3060F POS MICROALBUMINURIA REV: CPT | Mod: S$GLB,,, | Performed by: INTERNAL MEDICINE

## 2017-04-03 PROCEDURE — 3079F DIAST BP 80-89 MM HG: CPT | Mod: S$GLB,,, | Performed by: INTERNAL MEDICINE

## 2017-04-03 RX ORDER — BETAMETHASONE SODIUM PHOSPHATE AND BETAMETHASONE ACETATE 3; 3 MG/ML; MG/ML
6 INJECTION, SUSPENSION INTRA-ARTICULAR; INTRALESIONAL; INTRAMUSCULAR; SOFT TISSUE
Status: COMPLETED | OUTPATIENT
Start: 2017-04-03 | End: 2017-04-03

## 2017-04-03 RX ADMIN — BETAMETHASONE SODIUM PHOSPHATE AND BETAMETHASONE ACETATE 6 MG: 3; 3 INJECTION, SUSPENSION INTRA-ARTICULAR; INTRALESIONAL; INTRAMUSCULAR; SOFT TISSUE at 09:04

## 2017-04-03 NOTE — MR AVS SNAPSHOT
[facility]  [address] [facility] [address]  Phone: [phone]                  Alyx Weir   4/3/2017 9:00 AM   Office Visit    Description:  Female : 1950   Provider:  Carl Clemons MD   Department:  [facility]           Reason for Visit     Follow-up     Sinus Problem     Hypertension     Hyperlipidemia     Diabetes     Sleep Apnea     Anticoagulation           Diagnoses this Visit        Comments    Anticoagulated on Coumadin    -  Primary     Essential hypertension         Type 2 diabetes mellitus with hyperglycemia, without long-term current use of insulin         Heterozygous factor V Leiden mutation         VIRGIL on CPAP         Pure hypercholesterolemia with target low density lipoprotein (LDL) cholesterol less than 130 mg/dL         Encounter for screening mammogram for breast cancer                To Do List           Future Appointments        Provider Department Dept Phone    4/3/2017 11:10 AM LABORATORY, [address] [facility] [phone]    2017 9:15 AM Jabier BirminghamD [facility] - Coumadin [phone]    2017 4:00 PM Patrick Mayo Sr., MD [facility] - Orthopedics [phone]    2017 9:45 AM [facility] MAMMO1-SCR [facility] [phone]    2017 9:00 AM Carl Clemons MD [facility] [phone]      Goals (5 Years of Data)     None      Follow-Up and Disposition     Return in about 4 months (around 8/3/2017).      [facility]     [facility] -  Assistance  Unless otherwise directed by your provider, please contact [facility], our nurse care line that is available for  assistance.     Registered nurses in the [facility] provide: appointment scheduling, clinical advisement, health education, and other advisory services.  Call: [phone] (toll free)               Medications            Message regarding Medications     Verify the changes and/or additions to your medication regime listed below are the same as discussed with your clinician today.  If any of these changes or additions are incorrect, please notify your healthcare provider.        These medications were administered today        Dose Freq    betamethasone acetate-betamethasone sodium phosphate injection 6 mg 6 mg [facility] 1 time    Sig: Inject 1 mL (6 mg total) into the muscle one time.    Class: Normal    Route: Intramuscular           Verify that the below list of medications is an accurate representation of the medications you are currently taking.  If none reported, the list may be blank. If incorrect, please contact your healthcare provider. Carry this list with you in case of emergency.           Current Medications     biotin 300 mcg Tab Take 1 tablet by mouth once daily.    blood sugar diagnostic Strp 1 each by Misc.(Non-Drug; Combo Route) route 2 (two) times daily as needed.    blood sugar diagnostic Strp 1 each by Misc.(Non-Drug; Combo Route) route 2 (two) times daily as needed. ONE TOUCH VERIO MACHINE    calcium-magnesium 300-300 mg Tab Take 1 tablet by mouth Once daily as needed.    carvedilol (COREG) 6.25 MG tablet Take 1 tablet (6.25 mg total) by mouth 2 (two) times daily with meals.    diclofenac sodium 1 % Gel Apply 2 g topically daily as needed.    gabapentin (NEURONTIN) 100 MG capsule Take 1 capsule (100 mg total) by mouth 3 (three) times daily.    hydrALAZINE (APRESOLINE) 25 MG tablet Take 1 tablet (25 mg total) by mouth 3 (three) times daily.    lancets Misc 1 each by Misc.(Non-Drug; Combo Route) route 2 (two) times daily as needed.    olmesartan-amlodipin-hcthiazid (TRIBENZOR) 40-10-25 mg Tab Take 1 tablet by mouth once daily.    pravastatin (PRAVACHOL) 40 MG tablet Take 1 tablet (40 mg total) by mouth once daily.    tizanidine (ZANAFLEX) 2 MG tablet Take 1-2 tablets (2-4 mg total) by mouth nightly as needed.  "   tramadol (ULTRAM) 50 mg tablet Take 2 tablets by mouth 4 (four) times daily as needed.    vitamin D 1000 units Tab Take 185 mg by mouth once daily.    warfarin (COUMADIN) 10 MG tablet TAKE ONE TABLET BY MOUTH IN THE EVENING ON  MONDAY,  WED,  AND  FRIDAY  AND  ONE-HALF  TABLET  ON  TUE,THURS,  SAT,  AND  SUNDAY           Clinical Reference Information           Your Vitals Were     BP Pulse Temp Resp    138/88 (BP Location: Right arm, Patient Position: Sitting, BP Method: Manual) 50 96.3 °F (35.7 °C) (Tympanic) 18    Height Weight SpO2 BMI    5' 5" (1.651 m) 86.5 kg (190 lb 11.2 oz) 99% 31.73 kg/m2      Blood Pressure          Most Recent Value    BP  138/88      Allergies as of 4/3/2017     Erythromycin    Amlodipine    Diazepam    Iodine    Meperidine    Morphine    Primidone      Immunizations Administered on Date of Encounter - 4/3/2017     None      Orders Placed During Today's Visit     Future Labs/Procedures Expected by Expires    Hemoglobin A1c  4/3/2017 6/2/2018    Mammo Digital Screening Bilat with CAD  4/3/2017 6/4/2018      Administrations This Visit     betamethasone acetate-betamethasone sodium phosphate injection 6 mg     Admin Date Action Dose Route Administered By             04/03/2017 Given 6 mg Intramuscular Katelynn Chavez LPN                      Language Assistance Services     ATTENTION: Language assistance services are available, free of charge. Please call [phone].      ATENCIÓN: Si habla español, tiene a lainez disposición servicios gratuitos de asistencia lingüística. Llame al [phone].     [facility] Ý: N?u b?n nói Ti?ng Vi?t, có các d?ch v? h? tr? ngôn ng? mi?n phí dành cho b?n. G?i s? [phone].         [facility] complies with applicable Federal civil rights laws and does not discriminate on the basis of race, color, national origin, age, disability, or sex.        "

## 2017-04-03 NOTE — PROGRESS NOTES
Subjective:       Patient ID: Alyx Weir is a 67 y.o. female.    Chief Complaint: Follow-up; Sinus Problem; Hypertension; Hyperlipidemia; Diabetes; Sleep Apnea; and Anticoagulation    Sinus Problem   Associated symptoms include congestion. Pertinent negatives include no chills, coughing, diaphoresis, ear pain, headaches, neck pain, shortness of breath, sinus pressure, sneezing or sore throat.   Hypertension   Pertinent negatives include no chest pain, headaches, neck pain, palpitations or shortness of breath. Past treatments include beta blockers, central alpha agonists, calcium channel blockers, angiotensin blockers and diuretics. The current treatment provides significant improvement.   Hyperlipidemia   Pertinent negatives include no chest pain, myalgias or shortness of breath. Current antihyperlipidemic treatment includes statins, exercise and diet change. The current treatment provides significant improvement of lipids.   Diabetes   She presents for her follow-up diabetic visit. She has type 2 diabetes mellitus. Her disease course has been stable. Pertinent negatives for hypoglycemia include no confusion, dizziness, headaches, nervousness/anxiousness, pallor, seizures, speech difficulty or tremors. Pertinent negatives for diabetes include no chest pain, no fatigue, no polydipsia, no polyphagia, no polyuria and no weakness. Symptoms are stable. Current diabetic treatment includes diet. She is compliant with treatment most of the time.     Review of Systems   Constitutional: Negative for activity change, appetite change, chills, diaphoresis, fatigue, fever and unexpected weight change.   HENT: Positive for congestion, postnasal drip and rhinorrhea. Negative for drooling, ear discharge, ear pain, facial swelling, hearing loss, mouth sores, nosebleeds, sinus pressure, sneezing, sore throat, tinnitus, trouble swallowing and voice change.    Eyes: Negative for photophobia, redness and visual disturbance.    Respiratory: Negative for apnea, cough, choking, chest tightness, shortness of breath and wheezing.    Cardiovascular: Negative for chest pain, palpitations and leg swelling.   Gastrointestinal: Negative for abdominal distention, abdominal pain, blood in stool, constipation, diarrhea, nausea and vomiting.   Endocrine: Negative for cold intolerance, heat intolerance, polydipsia, polyphagia and polyuria.   Genitourinary: Negative for decreased urine volume, difficulty urinating, dysuria, flank pain, frequency, genital sores, hematuria and urgency.   Musculoskeletal: Negative for arthralgias, back pain, gait problem, joint swelling, myalgias, neck pain and neck stiffness.   Skin: Negative for color change, pallor, rash and wound.   Allergic/Immunologic: Negative for food allergies and immunocompromised state.   Neurological: Negative for dizziness, tremors, seizures, syncope, speech difficulty, weakness, light-headedness, numbness and headaches.   Hematological: Negative for adenopathy. Does not bruise/bleed easily.   Psychiatric/Behavioral: Negative for agitation, behavioral problems, confusion, decreased concentration, dysphoric mood, hallucinations, self-injury, sleep disturbance and suicidal ideas. The patient is not nervous/anxious and is not hyperactive.    All other systems reviewed and are negative.      Objective:      Physical Exam   Constitutional: She is oriented to person, place, and time. She appears well-developed and well-nourished. No distress.   HENT:   Head: Normocephalic and atraumatic.   Eyes: No scleral icterus.   Neck: Normal range of motion. Neck supple. No JVD present. Carotid bruit is not present. No tracheal deviation present. No thyromegaly present.   Cardiovascular: Normal rate, regular rhythm, normal heart sounds and intact distal pulses.    Pulmonary/Chest: Effort normal and breath sounds normal. No respiratory distress. She has no wheezes. She has no rales. She exhibits no tenderness.    Abdominal: Soft. Bowel sounds are normal. She exhibits no distension. There is no tenderness. There is no rebound and no guarding.   Musculoskeletal: Normal range of motion. She exhibits no edema or tenderness.   Lymphadenopathy:     She has no cervical adenopathy.   Neurological: She is alert and oriented to person, place, and time. No cranial nerve deficit. Coordination normal.   Skin: Skin is warm and dry. No rash noted. She is not diaphoretic. No erythema. No pallor.   Psychiatric: She has a normal mood and affect. Her behavior is normal. Judgment and thought content normal.   Nursing note and vitals reviewed.      Assessment:       1. Anticoagulated on Coumadin    2. Essential hypertension    3. Type 2 diabetes mellitus with hyperglycemia, without long-term current use of insulin    4. Heterozygous factor V Leiden mutation    5. VIRGIL on CPAP    6. Pure hypercholesterolemia with target low density lipoprotein (LDL) cholesterol less than 130 mg/dL    7. Encounter for screening mammogram for breast cancer        Plan:        stable-continue meds.            Notes/labs reviewed.                     Check hga1c,mmg.                  6 mg celestone im now-call if persists.            F/u prn or 4 months.

## 2017-04-04 LAB
ESTIMATED AVG GLUCOSE: 140 MG/DL
HBA1C MFR BLD HPLC: 6.5 %

## 2017-04-05 ENCOUNTER — ANTI-COAG VISIT (OUTPATIENT)
Dept: CARDIOLOGY | Facility: CLINIC | Age: 67
End: 2017-04-05
Payer: MEDICARE

## 2017-04-05 DIAGNOSIS — Z79.01 LONG TERM (CURRENT) USE OF ANTICOAGULANTS: Primary | ICD-10-CM

## 2017-04-05 LAB — INR PPP: 2.8 (ref 2–3)

## 2017-04-05 PROCEDURE — 99211 OFF/OP EST MAY X REQ PHY/QHP: CPT | Mod: 25,S$GLB,,

## 2017-04-05 PROCEDURE — 85610 PROTHROMBIN TIME: CPT | Mod: QW,S$GLB,,

## 2017-04-05 NOTE — MR AVS SNAPSHOT
[facility]  [address] [place] [place] [address]  Phone: [phone]  Fax: [phone]                  Alyx Weir   2017 9:15 AM   Anti-coag visit    Description:  Female : 1950   Provider:  Fatou Ferguson PharmD   Department:  [facility]           Diagnoses this Visit        Comments    Long term (current) use of anticoagulants    -  Primary            To Do List           Future Appointments        Provider Department Dept Phone    2017 9:15 AM Fatou Ferguson PharmD [facility] - Coumadin [phone]    2017 4:00 PM Patrick Mayo Sr., MD [facility] Orthopedics [phone]    2017 9:45 AM RADHA HARRISO1-SCR [facility] [phone]    2017 9:00 AM Fat[facility] Ferguson PharmD [facility] [phone]    2017 9:00 AM Carl Clemons MD [facility] [phone]      Goals (5 Years of Data)     None      [facility]     [facility] Care [facility] -  Assistance  Unless otherwise directed by your provider, please contact [facility], our nurse care line that is available for  assistance.     Registered nurses in the [facility] provide: appointment scheduling, clinical advisement, health education, and other advisory services.  Call: [phone] (toll free)               Medications           Message regarding Medications     Verify the changes and/or additions to your medication regime listed below are the same as discussed with your clinician today.  If any of these changes or additions are incorrect, please notify your healthcare provider.             Verify that the below list of medications is an accurate representation of the medications you are currently taking.  If none reported, the list may be blank. If incorrect, please contact your healthcare provider. Carry this list with you in case of emergency.           Current Medications     biotin 300 mcg Tab Take 1 tablet by  mouth once daily.    blood sugar diagnostic Strp 1 each by Misc.(Non-Drug; Combo Route) route 2 (two) times daily as needed.    blood sugar diagnostic Strp 1 each by Misc.(Non-Drug; Combo Route) route 2 (two) times daily as needed. ONE TOUCH VERIO MACHINE    calcium-magnesium 300-300 mg Tab Take 1 tablet by mouth Once daily as needed.    carvedilol (COREG) 6.25 MG tablet Take 1 tablet (6.25 mg total) by mouth 2 (two) times daily with meals.    diclofenac sodium 1 % Gel Apply 2 g topically daily as needed.    gabapentin (NEURONTIN) 100 MG capsule Take 1 capsule (100 mg total) by mouth 3 (three) times daily.    hydrALAZINE (APRESOLINE) 25 MG tablet Take 1 tablet (25 mg total) by mouth 3 (three) times daily.    lancets Misc 1 each by Misc.(Non-Drug; Combo Route) route 2 (two) times daily as needed.    olmesartan-amlodipin-hcthiazid (TRIBENZOR) 40-10-25 mg Tab Take 1 tablet by mouth once daily.    pravastatin (PRAVACHOL) 40 MG tablet Take 1 tablet (40 mg total) by mouth once daily.    tizanidine (ZANAFLEX) 2 MG tablet Take 1-2 tablets (2-4 mg total) by mouth nightly as needed.    tramadol (ULTRAM) 50 mg tablet Take 2 tablets by mouth 4 (four) times daily as needed.    vitamin D 1000 units Tab Take 185 mg by mouth once daily.    warfarin (COUMADIN) 10 MG tablet TAKE ONE TABLET BY MOUTH IN THE EVENING ON  MONDAY,  WED,  AND  FRIDAY  AND  ONE-HALF  TABLET  ON  TUE,THURS,  SAT,  AND  SUNDAY           Clinical Reference Information           Allergies as of 4/5/2017     Erythromycin    Amlodipine    Diazepam    Iodine    Meperidine    Morphine    Primidone      Immunizations Administered on Date of Encounter - 4/5/2017     None      Orders Placed During Today's Visit      Normal Orders This Visit    POCT INR          4/5/2017  9:00 AM - Fatou Ferguson, PharmD      Component Results     Component Value Flag Ref Range Units Status    INR 2.8  2.0 - 3.0  Final      March 2017 Details    Sun Mon Tue Wed Thu Fri Sat        1                2               3               4                 5               6      10 mg         7      5 mg         8      5 mg         9      5 mg         10      5 mg         11      5 mg           12      5 mg         13      10 mg         14      5 mg         15      5 mg         16      5 mg         17      5 mg         18      5 mg           19      5 mg         20   1.6   10 mg   See details      21      10 mg         22      5 mg         23      5 mg         24      5 mg         25      5 mg           26      5 mg         27      10 mg         28      5 mg         29      5 mg         30      5 mg         31      5 mg           Date Details   03/20 Last INR check   INR: 1.6                 April 2017 Details    Sun Mon Tue Wed Thu Fri Sat           1      5 mg           2      5 mg         3      10 mg         4      5 mg         5   2.8   5 mg   See details      6      5 mg         7      5 mg         8      5 mg           9      5 mg         10      10 mg         11      5 mg         12      5 mg         13      5 mg         14      5 mg         15      5 mg           16      5 mg         17      10 mg         18      5 mg         19      5 mg         20      5 mg         21      5 mg         22      5 mg           23      5 mg         24      10 mg         25      5 mg         26      5 mg         27      5 mg         28      5 mg         29      5 mg           30      5 mg                Date Details   04/05 This INR check   INR: 2.8                     How to take your warfarin dose     To take:  5 mg Take 0.5 of a 10 mg tablet.    To take:  10 mg Take 1 of the 10 mg tablets.           May 2017 Details    Sun Mon Tue Wed Thu Fri Sat      1      10 mg         2      5 mg         3            4               5               6                 7               8               9               10               11               12               13                 14               15               16                17               18               19               20                 21               22               23               24               25               26               27                 28               29               30               31                   Date Details   No additional details    Date of next INR:  5/3/2017         How to take your warfarin dose     To take:  5 mg Take 0.5 of a 10 mg tablet.    To take:  10 mg Take 1 of the 10 mg tablets.           Anticoagulation Summary as of 4/5/2017     Maintenance plan 10 mg (10 mg x 1) on Mon; 5 mg (10 mg x 0.5) all other days    Full instructions 10 mg on Mon; 5 mg all other days    Next INR check 5/3/2017      Anticoagulation Episode Summary     Comments       Patient Findings     Positives Change in health, Change in medications    Negatives Signs/symptoms of thrombosis, Signs/symptoms of bleeding, Laboratory test error suspected, Change in alcohol use, Change in activity, Upcoming invasive procedure, Emergency department visit, Upcoming dental procedure, Missed doses, Extra doses, Change in diet/appetite, Hospital admission, Bruising, Other complaints      Language Assistance Services     ATTENTION: Language assistance services are available, free of charge. Please call [phone].      ATENCIÓN: Si habla neda, tiene a lainez disposición servicios gratuitos de asistencia lingüística. Llame al [phone].     CHÚ Ý: N?u b?n nói Ti?ng Vi?t, có các d?ch v? h? tr? ngôn ng? mi?n phí dành cho b?n. G?i s? [phone].         [facility] complies with applicable Federal civil rights laws and does not discriminate on the basis of race, color, national origin, age, disability, or sex.

## 2017-04-06 ENCOUNTER — OFFICE VISIT (OUTPATIENT)
Dept: ORTHOPEDICS | Facility: CLINIC | Age: 67
End: 2017-04-06
Payer: MEDICARE

## 2017-04-06 ENCOUNTER — HOSPITAL ENCOUNTER (OUTPATIENT)
Dept: RADIOLOGY | Facility: HOSPITAL | Age: 67
Discharge: HOME | End: 2017-04-06
Attending: ORTHOPAEDIC SURGERY
Payer: MEDICARE

## 2017-04-06 VITALS — WEIGHT: 190.69 LBS | BODY MASS INDEX: 31.77 KG/M2 | HEIGHT: 65 IN

## 2017-04-06 DIAGNOSIS — M25.461 KNEE EFFUSION, RIGHT: ICD-10-CM

## 2017-04-06 DIAGNOSIS — M94.261 CHONDROMALACIA, RIGHT KNEE: Primary | ICD-10-CM

## 2017-04-06 DIAGNOSIS — M25.561 ACUTE PAIN OF BOTH KNEES: Primary | ICD-10-CM

## 2017-04-06 DIAGNOSIS — M67.361 TRANSIENT SYNOVITIS, RIGHT KNEE: ICD-10-CM

## 2017-04-06 DIAGNOSIS — M25.562 ACUTE PAIN OF BOTH KNEES: Primary | ICD-10-CM

## 2017-04-06 DIAGNOSIS — M94.261 CHONDROMALACIA, RIGHT KNEE: ICD-10-CM

## 2017-04-06 PROCEDURE — 1160F RVW MEDS BY RX/DR IN RCRD: CPT | Mod: S$GLB,,, | Performed by: ORTHOPAEDIC SURGERY

## 2017-04-06 PROCEDURE — 99999 PR PBB SHADOW E&M-EST. PATIENT-LVL II: CPT | Mod: PBBFAC,,, | Performed by: ORTHOPAEDIC SURGERY

## 2017-04-06 PROCEDURE — 99204 OFFICE O/P NEW MOD 45 MIN: CPT | Mod: S$GLB,,, | Performed by: ORTHOPAEDIC SURGERY

## 2017-04-06 PROCEDURE — 73560 X-RAY EXAM OF KNEE 1 OR 2: CPT | Mod: TC,PO,RT

## 2017-04-06 PROCEDURE — 73560 X-RAY EXAM OF KNEE 1 OR 2: CPT | Mod: 26,59,RT, | Performed by: RADIOLOGY

## 2017-04-06 PROCEDURE — 73562 X-RAY EXAM OF KNEE 3: CPT | Mod: 26,59,RT, | Performed by: RADIOLOGY

## 2017-04-06 RX ORDER — METHYLPREDNISOLONE ACETATE 80 MG/ML
80 INJECTION, SUSPENSION INTRA-ARTICULAR; INTRALESIONAL; INTRAMUSCULAR; SOFT TISSUE
Status: COMPLETED | OUTPATIENT
Start: 2017-04-06 | End: 2017-04-06

## 2017-04-06 RX ADMIN — METHYLPREDNISOLONE ACETATE 80 MG: 80 INJECTION, SUSPENSION INTRA-ARTICULAR; INTRALESIONAL; INTRAMUSCULAR; SOFT TISSUE at 06:04

## 2017-04-06 NOTE — PROGRESS NOTES
CC:This is a 67-year-old female that complains of right knee pain.    HPI:The patient states that her right knee has been giving out on her for 2 months.             The pain is rated a 5 out of 10 and interferes with activities of daily living.    PMH:    Past Medical History:   Diagnosis Date    Arthritis     Asthma     Clotting disorder     Coronary artery disease     Deep vein thrombosis     Degenerative disc disease     Diabetes mellitus type I 2011    BS last tested x 1 month not sure what it was.    Heterozygous factor V Leiden mutation     Hx of colonic polyps 11/13/2015    Hyperlipidemia     Hypertension     VIRGIL (obstructive sleep apnea)     Stenosis of right carotid artery 12/13/2016       PSH:    Past Surgical History:   Procedure Laterality Date    BACK SURGERY      bladder cyst removal      BLADDER SURGERY      CARDIAC CATHETERIZATION  03/2013    mild CAD    COLONOSCOPY N/A 11/13/2015    Procedure: COLONOSCOPY;  Surgeon: Jas Umanzor III, MD;  Location: [facility];  Service: Endoscopy;  Laterality: N/A;    HYSTERECTOMY      LUMBAR SPINE SURGERY      bone spurs removed    OOPHORECTOMY         Family Hx:    Family History   Problem Relation Age of Onset    Heart disease Mother     Hypertension Mother     Cataracts Mother     Hypertension Sister     Glaucoma Sister     Hypertension Brother     Diabetes Father     Diabetes Paternal Uncle     Hypertension Sister     Diabetes Sister     Hypertension Sister     Diabetes Sister     Breast cancer Neg Hx     Colon cancer Neg Hx     Ovarian cancer Neg Hx        Allergy:    Review of patient's allergies indicates:   Allergen Reactions    Erythromycin Edema     Angioedema to throat    Amlodipine Other (See Comments)     Headaches    Diazepam Hives    Iodine Hives    Meperidine Hives    Morphine Itching    Primidone Other (See Comments)       Medication:    Current Outpatient Prescriptions:     biotin 300 mcg Tab, Take 1  tablet by mouth once daily., Disp: , Rfl:     blood sugar diagnostic Strp, 1 each by Misc.(Non-Drug; Combo Route) route 2 (two) times daily as needed., Disp: 300 strip, Rfl: 2    blood sugar diagnostic Strp, 1 each by Misc.(Non-Drug; Combo Route) route 2 (two) times daily as needed. ONE TOUCH VERIO MACHINE, Disp: 100 each, Rfl: 12    calcium-magnesium 300-300 mg Tab, Take 1 tablet by mouth Once daily as needed., Disp: , Rfl:     carvedilol (COREG) 6.25 MG tablet, Take 1 tablet (6.25 mg total) by mouth 2 (two) times daily with meals., Disp: 180 tablet, Rfl: 2    diclofenac sodium 1 % Gel, Apply 2 g topically daily as needed., Disp: 3 Tube, Rfl: 2    gabapentin (NEURONTIN) 100 MG capsule, Take 1 capsule (100 mg total) by mouth 3 (three) times daily., Disp: 90 capsule, Rfl: 2    hydrALAZINE (APRESOLINE) 25 MG tablet, Take 1 tablet (25 mg total) by mouth 3 (three) times daily., Disp: 270 tablet, Rfl: 2    lancets Misc, 1 each by Misc.(Non-Drug; Combo Route) route 2 (two) times daily as needed., Disp: 110 each, Rfl: 12    olmesartan-amlodipin-hcthiazid (TRIBENZOR) 40-10-25 mg Tab, Take 1 tablet by mouth once daily., Disp: 90 tablet, Rfl: 2    pravastatin (PRAVACHOL) 40 MG tablet, Take 1 tablet (40 mg total) by mouth once daily., Disp: 90 tablet, Rfl: 2    vitamin D 1000 units Tab, Take 185 mg by mouth once daily., Disp: , Rfl:     warfarin (COUMADIN) 10 MG tablet, TAKE ONE TABLET BY MOUTH IN THE EVENING ON  MONDAY,  WED,  AND  FRIDAY  AND  ONE-HALF  TABLET  ON  TUE,THURS,  SAT,  AND  SUNDAY, Disp: 90 tablet, Rfl: 3    tizanidine (ZANAFLEX) 2 MG tablet, Take 1-2 tablets (2-4 mg total) by mouth nightly as needed., Disp: 60 tablet, Rfl: 2    tramadol (ULTRAM) 50 mg tablet, Take 2 tablets by mouth 4 (four) times daily as needed., Disp: , Rfl:     Social History:    Social History     Social History    Marital status:      Spouse name: N/A    Number of children: N/A    Years of education: N/A  "    Occupational History    Not on file.     Social History Main Topics    Smoking status: Never Smoker    Smokeless tobacco: Never Used    Alcohol use No    Drug use: No    Sexual activity: No     Other Topics Concern    Not on file     Social History Narrative       Vitals:   Ht 5' 5" (1.651 m)  Wt 86.5 kg (190 lb 11.2 oz)  BMI 31.73 kg/m2     ROS:  GENERAL: No fever, chills, fatigability or weight loss.  SKIN: No rashes, itching or changes in color or texture of skin.  HEAD: No headaches or recent head trauma.  EYES: Visual acuity fine. No photophobia, ocular pain or diplopia.  EARS: Denies ear pain, discharge or vertigo.  NOSE: No loss of smell, no epistaxis or postnasal drip.  MOUTH & THROAT: No hoarseness or change in voice. No excessive gum bleeding.  NODES: Denies swollen glands.  CHEST: Denies AVALOS, cyanosis, wheezing, cough and sputum production.  CARDIOVASCULAR: Denies chest pain, PND, orthopnea or reduced exercise tolerance.  ABDOMEN: Appetite fine. No weight loss. Denies diarrhea, abdominal pain, hematemesis or blood in stool.  URINARY: No flank pain, dysuria or hematuria.  PERIPHERAL VASCULAR: No claudication or cyanosis.  NEUROLOGIC: No history of seizures, paralysis, alteration of gait or coordination.  MUSCULOSKELETAL: See HPI    PE:  APPEARANCE: Well nourished, well developed, in no acute distress.   HEAD: Normocephalic, atraumatic.  EYES: PERRL. EOMI.   EARS: TM's intact. Light reflex normal. No retraction or perforation.   NOSE: Mucosa pink. Airway clear.  MOUTH & THROAT: No tonsillar enlargement. No pharyngeal erythema or exudate. No stridor.  NECK: Supple.   NODES: No cervical, axillary or inguinal lymph node enlargement.  CHEST: Lungs clear to auscultation.  CARDIOVASCULAR: Normal S1, S2. No rubs, murmurs or gallops.  ABDOMEN: Bowel sounds normal. Not distended. Soft. No tenderness or masses.  NEUROLOGIC: Cranial Nerves: II-XII grossly intact, also see MUSCULOSKELETAL  MUSCULOSKELETAL: "            Right  Knee Exam-abnormal    Gait-abnormal  Muscle Appearance:abnormal  Grooming:normal  Spine Alignment-normal  Muscle Atrophy-Positive  Deformities-Negative  Tenderness-Positive  Paresthesias-Negative  Range of Motion         Ext-normal, 0 degrees         Flex-abnormal  Muscle Strength-abnormal  Sensation-normal  Reflexes-normal  Crepitus-Positive                                Swelling-Negative  Effusion- Positive                                Edema-Negative  Lachman-Negative                                Erythema-Negative  [facility]'s-Positive                              Apley Grind-Positive  Patellar Comp-Positive                         Alignment-normal/symmetric  Patellar Apprehension-Negative              Synovial fullness-Positive  Passive Patellar Tilt-normal  Patellar Tracking-normal   Patellar Glide-normal  Q-Angle at 90 degrees-normal  Patellar Grind-abnormal  Q-Avth-Mgilvofx  Fatigue-Negative                                     HS Tightness-Negative  Tests on Exam, No ligamentous laxity  Neurovascular Status-normal+2 DP and PT artery pulses  Skin-normal    Assessment:           Diagnosis:              1.Knee chondromalacia, synovitis and effusion                   Diagnostic Studies  MRI-No  X-Ray-Yes, Right knee  EMG/NCV-No  Arthrogram-No  Bone Scan-No  CT Scan-No  Doppler-No  ESR-No  CRP-No  CBC with Diff-No   Rheumatoid/Arthritis Panel-No      Plan:                                                 1. PT-yes                                                 2.OT-no                                          3.NSAID-yes                                        4. Narcotics-no                                     5. Wound care-No                                 6. Rest-no                                           7. Surgery-no                                         8. CURLY Hose-no                                    9. Anticoagulation therapy-no               10. Elevation-no                                      11. Crutches-no                                    12. Walker-no             13. Cane no                        14. Referral-no                                     15.Injection-no                            16. Splint   /    Cast   /   Cast Shoe-Yes              17. RICE-none            18. Follow up- 3 weeks

## 2017-04-06 NOTE — PATIENT INSTRUCTIONS
How Your Knee Works  A healthy knee bends easily and rotates slightly. The joint absorbs stress and moves smoothly. This allows you to walk, squat, and turn without pain.    A healthy knee  The knee is a hinge joint, formed where the thighbone (femur) and the shinbone (tibia) meet. It is the largest joint in the body. The joint is covered with smooth tissue and powered by large muscles. When all the parts listed below are healthy, a knee should move easily:  · Cartilage is a layer of smooth tissue. It covers the ends of the thighbone and shinbone. It also lines the back side of the kneecap. Healthy cartilage absorbs stress and allows the knee to bend easily.  · Muscles power the knee and leg for movement.  · Tendons attach the muscles to the bones.  · Ligaments are bands of tissue that connect bones and brace the joint.  · Bones that make up your knee joint include your thighbone (femur), shinbone (tibia), and kneecap (patella).  · Menisci are 2 wedge shaped pieces of cartilage that absorb shock between the thighbone and shinbone.  Date Last Reviewed: 9/20/2015  © 9233-4303 [employer]. [address] [place] [address], [place] [address]. All rights reserved. This information is not intended as a substitute for professional medical care. Always follow your healthcare professional's instructions.

## 2017-04-06 NOTE — MR AVS SNAPSHOT
[facility]  [address] [place] [address]  Phone: [phone]  Fax: [phone]                  Alyx Weir   2017 4:00 PM   Office Visit    Description:  Female : 1950   Provider:  Patrick Mayo Sr., MD   Department[facility]  [facility]           Reason for Visit     Right Knee - Pain           Diagnoses this Visit        Comments    Chondromalacia, right knee    -  Primary     Knee effusion, right         Transient synovitis, right knee                To Do List           Future Appointments        Provider Department Dept Phone    2017  6:15 PM SUM XR2 [facility] [phone]    2017 9:45 AM SUM [id number]-SCR [facility] [phone]    2017 9:00 AM Fatou Ferguson, PharmD [facility] [phone]    2017 11:15 AM Patrick Mayo Sr., MD [facility] [phone]    2017 9:00 AM Carl Clemons MD [facility] [phone]      Goals (5 Years of Data)     None      Follow-Up and Disposition     Return in about 3 months (around 2017) for Right knee.    Follow-up and Disposition History      [facility]     [facility] -  Assistance  Unless otherwise directed by your provider, please contact [facility], our nurse care line that is available for  assistance.     Registered nurses in the [facility] provide: appointment scheduling, clinical advisement, health education, and other advisory services.  Call: [phone] (toll free)               Medications           Message regarding Medications     Verify the changes and/or additions to your medication regime listed below are the same as discussed with your clinician today.  If any of these changes or additions are incorrect, please notify your healthcare provider.        These medications were administered today        Dose Freq    methylPREDNISolone acetate injection 80 mg 80  mg Clinic/HOD 1 time    Sig: Inject 1 mL (80 mg total) into the muscle one time.    Class: Normal    Route: Intramuscular           Verify that the below list of medications is an accurate representation of the medications you are currently taking.  If none reported, the list may be blank. If incorrect, please contact your healthcare provider. Carry this list with you in case of emergency.           Current Medications     biotin 300 mcg Tab Take 1 tablet by mouth once daily.    blood sugar diagnostic Strp 1 each by Misc.(Non-Drug; Combo Route) route 2 (two) times daily as needed.    blood sugar diagnostic Strp 1 each by Misc.(Non-Drug; Combo Route) route 2 (two) times daily as needed. ONE TOUCH VERIO MACHINE    calcium-magnesium 300-300 mg Tab Take 1 tablet by mouth Once daily as needed.    carvedilol (COREG) 6.25 MG tablet Take 1 tablet (6.25 mg total) by mouth 2 (two) times daily with meals.    diclofenac sodium 1 % Gel Apply 2 g topically daily as needed.    gabapentin (NEURONTIN) 100 MG capsule Take 1 capsule (100 mg total) by mouth 3 (three) times daily.    hydrALAZINE (APRESOLINE) 25 MG tablet Take 1 tablet (25 mg total) by mouth 3 (three) times daily.    lancets Misc 1 each by Misc.(Non-Drug; Combo Route) route 2 (two) times daily as needed.    olmesartan-amlodipin-hcthiazid (TRIBENZOR) 40-10-25 mg Tab Take 1 tablet by mouth once daily.    pravastatin (PRAVACHOL) 40 MG tablet Take 1 tablet (40 mg total) by mouth once daily.    vitamin D 1000 units Tab Take 185 mg by mouth once daily.    warfarin (COUMADIN) 10 MG tablet TAKE ONE TABLET BY MOUTH IN THE EVENING ON  MONDAY,  WED,  AND  FRIDAY  AND  ONE-HALF  TABLET  ON  TUE,THURS,  SAT,  AND  SUNDAY    tizanidine (ZANAFLEX) 2 MG tablet Take 1-2 tablets (2-4 mg total) by mouth nightly as needed.    tramadol (ULTRAM) 50 mg tablet Take 2 tablets by mouth 4 (four) times daily as needed.           Clinical Reference Information           Your Vitals Were     Height  "Weight BMI          5' 5" (1.651 m) 86.5 kg (190 lb 11.2 oz) 31.73 kg/m2        Allergies as of 4/6/2017     Erythromycin    Amlodipine    Diazepam    Iodine    Meperidine    Morphine    Primidone      Immunizations Administered on Date of Encounter - 4/6/2017     None      Orders Placed During Today's Visit     Future Labs/Procedures Expected by Expires    X-ray Knee Ortho Right  4/6/2017 4/6/2018      Administrations This Visit     methylPREDNISolone acetate injection 80 mg     Admin Date Action Dose Route Administered By             04/06/2017 Given 80 mg Intramuscular Patrick Mayo Sr., MD                      Instructions      How Your Knee Works  A healthy knee bends easily and rotates slightly. The joint absorbs stress and moves smoothly. This allows you to walk, squat, and turn without pain.    A healthy knee  The knee is a hinge joint, formed where the thighbone (femur) and the shinbone (tibia) meet. It is the largest joint in the body. The joint is covered with smooth tissue and powered by large muscles. When all the parts listed below are healthy, a knee should move easily:  · Cartilage is a layer of smooth tissue. It covers the ends of the thighbone and shinbone. It also lines the back side of the kneecap. Healthy cartilage absorbs stress and allows the knee to bend easily.  · Muscles power the knee and leg for movement.  · Tendons attach the muscles to the bones.  · Ligaments are bands of tissue that connect bones and brace the joint.  · Bones that make up your knee joint include your thighbone (femur), shinbone (tibia), and kneecap (patella).  · Menisci are 2 wedge shaped pieces of cartilage that absorb shock between the thighbone and shinbone.  Date Last Reviewed: 9/20/2015  © 0395-2144 [employer]. [address] [place] [address], [place] [address]. All rights reserved. This information is not intended as a substitute for professional medical care. Always follow your healthcare professional's " instructions.             Language Assistance Services     ATTENTION: Language assistance services are available, free of charge. Please call [phone].      ATENCIÓN: Si habla neda, tiene a lainez disposición servicios gratuitos de asistencia lingüística. Llame al [phone].     CHÚ Ý: N?u b?n nói Ti?ng Vi?t, có các d?ch v? h? tr? ngôn ng? mi?n phí dành cho b?n. G?i s? [phone].         [facility] complies with applicable Federal civil rights laws and does not discriminate on the basis of race, color, national origin, age, disability, or sex.

## 2017-04-17 DIAGNOSIS — M79.2 NEURITIS: ICD-10-CM

## 2017-04-20 RX ORDER — GABAPENTIN 100 MG/1
CAPSULE ORAL
Qty: 90 CAPSULE | Refills: 11 | Status: SHIPPED | OUTPATIENT
Start: 2017-04-20 | End: 2017-09-20 | Stop reason: SDUPTHER

## 2017-05-01 ENCOUNTER — HOSPITAL ENCOUNTER (OUTPATIENT)
Dept: RADIOLOGY | Facility: HOSPITAL | Age: 67
Discharge: HOME | End: 2017-05-01
Attending: INTERNAL MEDICINE
Payer: MEDICARE

## 2017-05-01 ENCOUNTER — ANTI-COAG VISIT (OUTPATIENT)
Dept: CARDIOLOGY | Facility: CLINIC | Age: 67
End: 2017-05-01
Payer: MEDICARE

## 2017-05-01 DIAGNOSIS — Z12.31 ENCOUNTER FOR SCREENING MAMMOGRAM FOR BREAST CANCER: ICD-10-CM

## 2017-05-01 DIAGNOSIS — I10 ESSENTIAL HYPERTENSION: Chronic | ICD-10-CM

## 2017-05-01 DIAGNOSIS — E78.00 PURE HYPERCHOLESTEROLEMIA WITH TARGET LOW DENSITY LIPOPROTEIN (LDL) CHOLESTEROL LESS THAN 130 MG/DL: Chronic | ICD-10-CM

## 2017-05-01 DIAGNOSIS — D68.51 HETEROZYGOUS FACTOR V LEIDEN MUTATION: Chronic | ICD-10-CM

## 2017-05-01 DIAGNOSIS — G47.33 OSA ON CPAP: Chronic | ICD-10-CM

## 2017-05-01 DIAGNOSIS — Z79.01 ANTICOAGULATED ON COUMADIN: Chronic | ICD-10-CM

## 2017-05-01 DIAGNOSIS — Z79.01 LONG TERM (CURRENT) USE OF ANTICOAGULANTS: Primary | ICD-10-CM

## 2017-05-01 DIAGNOSIS — E11.65 TYPE 2 DIABETES MELLITUS WITH HYPERGLYCEMIA, WITHOUT LONG-TERM CURRENT USE OF INSULIN: Chronic | ICD-10-CM

## 2017-05-01 LAB — INR PPP: 1.4 (ref 2–3)

## 2017-05-01 PROCEDURE — 77063 BREAST TOMOSYNTHESIS BI: CPT | Mod: 26,,, | Performed by: RADIOLOGY

## 2017-05-01 PROCEDURE — 85610 PROTHROMBIN TIME: CPT | Mod: QW,S$GLB,,

## 2017-05-01 PROCEDURE — 77067 SCR MAMMO BI INCL CAD: CPT | Mod: 26,,, | Performed by: RADIOLOGY

## 2017-05-01 PROCEDURE — 99211 OFF/OP EST MAY X REQ PHY/QHP: CPT | Mod: 25,S$GLB,,

## 2017-05-01 NOTE — MR AVS SNAPSHOT
[facility]  [address] [place] [address]  Phone: [phone]  Fax: [phone]                  Alyx Weir   2017 9:00 AM   Anti-coag visit    Description:  Female : 1950   Provider:  Fatou Ferguson PharmD   Department:  [facility]           Diagnoses this Visit        Comments    Long term (current) use of anticoagulants    -  Primary            To Do List           Future Appointments        Provider Department Dept Phone    2017 9:15 AM Fatou Ferguson PharmD [facility] [phone]    2017 10:40 AM Mauro Knutson MD [facility] [phone]    2017 11:15 AM Patrick Mayo Sr., MD [facility] [phone]    2017 9:00 AM Carl Clemons MD [facility] [phone]    1/15/2018 10:40 AM Sunshine Luther NP [facility] [phone]      Goals (5 Years of Data)     None      [facility]     [facility] -  Assistance  Unless otherwise directed by your provider, please contact [facility], our nurse care line that is available for  assistance.     Registered nurses in the [facility] provide: appointment scheduling, clinical advisement, health education, and other advisory services.  Call: [phone] (toll free)               Medications           Message regarding Medications     Verify the changes and/or additions to your medication regime listed below are the same as discussed with your clinician today.  If any of these changes or additions are incorrect, please notify your healthcare provider.             Verify that the below list of medications is an accurate representation of the medications you are currently taking.  If none reported, the list may be blank. If incorrect, please contact your healthcare provider. Carry this list with you in case of emergency.           Current Medications     biotin 300 mcg Tab Take 1 tablet by  mouth once daily.    blood sugar diagnostic Strp 1 each by Misc.(Non-Drug; Combo Route) route 2 (two) times daily as needed.    blood sugar diagnostic Strp 1 each by Misc.(Non-Drug; Combo Route) route 2 (two) times daily as needed. ONE TOUCH VERIO MACHINE    calcium-magnesium 300-300 mg Tab Take 1 tablet by mouth Once daily as needed.    carvedilol (COREG) 6.25 MG tablet Take 1 tablet (6.25 mg total) by mouth 2 (two) times daily with meals.    diclofenac sodium 1 % Gel Apply 2 g topically daily as needed.    gabapentin (NEURONTIN) 100 MG capsule TAKE ONE CAPSULE BY MOUTH THREE TIMES DAILY    hydrALAZINE (APRESOLINE) 25 MG tablet Take 1 tablet (25 mg total) by mouth 3 (three) times daily.    lancets Misc 1 each by Misc.(Non-Drug; Combo Route) route 2 (two) times daily as needed.    olmesartan-amlodipin-hcthiazid (TRIBENZOR) 40-10-25 mg Tab Take 1 tablet by mouth once daily.    pravastatin (PRAVACHOL) 40 MG tablet Take 1 tablet (40 mg total) by mouth once daily.    tizanidine (ZANAFLEX) 2 MG tablet Take 1-2 tablets (2-4 mg total) by mouth nightly as needed.    tramadol (ULTRAM) 50 mg tablet Take 2 tablets by mouth 4 (four) times daily as needed.    vitamin D 1000 units Tab Take 185 mg by mouth once daily.    warfarin (COUMADIN) 10 MG tablet TAKE ONE TABLET BY MOUTH IN THE EVENING ON  MONDAY,  WED,  AND  FRIDAY  AND  ONE-HALF  TABLET  ON  TUE,THURS,  SAT,  AND  SUNDAY           Clinical Reference Information           Allergies as of 5/1/2017     Erythromycin    Amlodipine    Diazepam    Iodine    Meperidine    Morphine    Primidone      Immunizations Administered on Date of Encounter - 5/1/2017     None      Orders Placed During Today's Visit      Normal Orders This Visit    POCT INR          5/1/2017  8:57 AM - Jabier BirminghamD      Component Results     Component Value Flag Ref Range Units Status    INR 1.4 (A) 2.0 - 3.0  Final      April 2017 Details    Sun Mon Tue Wed Thu Fri Sat           1      5 mg            2      5 mg         3      10 mg         4      5 mg         5   2.8   5 mg   See details      6      5 mg         7      5 mg         8      5 mg           9      5 mg         10      10 mg         11      5 mg         12      5 mg         13      5 mg         14      5 mg         15      5 mg           16      5 mg         17      10 mg         18      5 mg         19      5 mg         20      5 mg         21      5 mg         22      5 mg           23      5 mg         24      10 mg         25      5 mg         26      5 mg         27      5 mg         28      5 mg         29      5 mg           30      5 mg                Date Details   04/05 Last INR check   INR: 2.8                 May 2017 Details    Sun Mon Tue Wed Thu Fri Sat      1   1.4   10 mg   See details      2      10 mg         3      5 mg         4      5 mg         5      5 mg         6      5 mg           7      5 mg         8            9               10               11               12               13                 14               15               16               17               18               19               20                 21               22               23               24               25               26               27                 28               29               30               31                   Date Details   05/01 This INR check   INR: 1.4       Date of next INR:  5/8/2017               How to take your warfarin dose     To take:  5 mg Take 0.5 of a 10 mg tablet.    To take:  10 mg Take 1 of the 10 mg tablets.           Anticoagulation Summary as of 5/1/2017     Maintenance plan 10 mg (10 mg x 1) on Mon; 5 mg (10 mg x 0.5) all other days    Full instructions 5/2: 10 mg; Otherwise 10 mg on Mon; 5 mg all other days    Next INR check 5/8/2017      Anticoagulation Episode Summary     Comments       Language Assistance Services     ATTENTION: Language assistance services are available, free of charge. Please  call [phone].      ATENCIÓN: Si habla español, tiene a lainez disposición servicios gratuitos de asistencia lingüística. Llame al [phone].     CHÚ Ý: N?u b?n nói Ti?ng Vi?t, có các d?ch v? h? tr? ngôn ng? mi?n phí dành cho b?n. G?i s? [phone].         [facility] complies with applicable Federal civil rights laws and does not discriminate on the basis of race, color, national origin, age, disability, or sex.

## 2017-05-01 NOTE — PROGRESS NOTES
INR is sub-therapeutic. High vitamin k on last week, possible missed dose per patient. Will increase this week's dose then resume dose and diet until follow-up. Repeat INR in 1 week.

## 2017-05-08 ENCOUNTER — ANTI-COAG VISIT (OUTPATIENT)
Dept: CARDIOLOGY | Facility: CLINIC | Age: 67
End: 2017-05-08
Payer: MEDICARE

## 2017-05-08 ENCOUNTER — TELEPHONE (OUTPATIENT)
Dept: FAMILY MEDICINE | Facility: CLINIC | Age: 67
End: 2017-05-08

## 2017-05-08 DIAGNOSIS — Z79.01 LONG TERM (CURRENT) USE OF ANTICOAGULANTS: Primary | ICD-10-CM

## 2017-05-08 LAB — INR PPP: 1.6 (ref 2–3)

## 2017-05-08 PROCEDURE — 99211 OFF/OP EST MAY X REQ PHY/QHP: CPT | Mod: 25,S$GLB,,

## 2017-05-08 PROCEDURE — 85610 PROTHROMBIN TIME: CPT | Mod: QW,S$GLB,,

## 2017-05-08 NOTE — MR AVS SNAPSHOT
[facility]  [address] [place] [address]  Phone: [phone]  Fax: [phone]                  Alyx Weir   2017 9:15 AM   Anti-coag visit    Description:  Female : 1950   Provider:  Fatou Ferguson PharmD   Department:  [facility]           Diagnoses this Visit        Comments    Long term (current) use of anticoagulants    -  Primary            To Do List           Future Appointments        Provider Department Dept Phone    2017 10:00 AM Jabier BirminghamD [facility] [phone]    2017 10:40 AM Mauro Knutson MD [facility] Cardiology [phone]    2017 11:15 AM Patrick Mayo Sr., MD [facility] Orthopedics [phone]    2017 9:00 AM Carl Clemons MD [facility] [phone]    1/15/2018 10:40 AM Sunshine Luther NP [facility] [phone]      Goals (5 Years of Data)     None      [facility]     [facility] Care [facility] -  Assistance  Unless otherwise directed by your provider, please contact [facility], our nurse care line that is available for  assistance.     Registered nurses in the [facility] provide: appointment scheduling, clinical advisement, health education, and other advisory services.  Call: [phone] (toll free)               Medications           Message regarding Medications     Verify the changes and/or additions to your medication regime listed below are the same as discussed with your clinician today.  If any of these changes or additions are incorrect, please notify your healthcare provider.             Verify that the below list of medications is an accurate representation of the medications you are currently taking.  If none reported, the list may be blank. If incorrect, please contact your healthcare provider. Carry this list with you in case of emergency.           Current Medications     biotin 300 mcg Tab Take 1 tablet by  mouth once daily.    blood sugar diagnostic Strp 1 each by Misc.(Non-Drug; Combo Route) route 2 (two) times daily as needed.    blood sugar diagnostic Strp 1 each by Misc.(Non-Drug; Combo Route) route 2 (two) times daily as needed. ONE TOUCH VERIO MACHINE    calcium-magnesium 300-300 mg Tab Take 1 tablet by mouth Once daily as needed.    carvedilol (COREG) 6.25 MG tablet Take 1 tablet (6.25 mg total) by mouth 2 (two) times daily with meals.    diclofenac sodium 1 % Gel Apply 2 g topically daily as needed.    gabapentin (NEURONTIN) 100 MG capsule TAKE ONE CAPSULE BY MOUTH THREE TIMES DAILY    hydrALAZINE (APRESOLINE) 25 MG tablet Take 1 tablet (25 mg total) by mouth 3 (three) times daily.    lancets Misc 1 each by Misc.(Non-Drug; Combo Route) route 2 (two) times daily as needed.    olmesartan-amlodipin-hcthiazid (TRIBENZOR) 40-10-25 mg Tab Take 1 tablet by mouth once daily.    pravastatin (PRAVACHOL) 40 MG tablet Take 1 tablet (40 mg total) by mouth once daily.    tizanidine (ZANAFLEX) 2 MG tablet Take 1-2 tablets (2-4 mg total) by mouth nightly as needed.    tramadol (ULTRAM) 50 mg tablet Take 2 tablets by mouth 4 (four) times daily as needed.    vitamin D 1000 units Tab Take 185 mg by mouth once daily.    warfarin (COUMADIN) 10 MG tablet TAKE ONE TABLET BY MOUTH IN THE EVENING ON  MONDAY,  WED,  AND  FRIDAY  AND  ONE-HALF  TABLET  ON  TUE,THURS,  SAT,  AND  SUNDAY           Clinical Reference Information           Allergies as of 5/8/2017     Erythromycin    Amlodipine    Diazepam    Iodine    Meperidine    Morphine    Primidone      Immunizations Administered on Date of Encounter - 5/8/2017     None      Orders Placed During Today's Visit      Normal Orders This Visit    POCT INR          5/8/2017  9:28 AM - Jabier BirminghamD      Component Results     Component Value Flag Ref Range Units Status    INR 1.6 (A) 2.0 - 3.0  Final      April 2017 Details    Sun Mon Tue Wed Thu Fri Sat           1                  2               3               4               5               6               7               8      5 mg           9      5 mg         10      10 mg         11      5 mg         12      5 mg         13      5 mg         14      5 mg         15      5 mg           16      5 mg         17      10 mg         18      5 mg         19      5 mg         20      5 mg         21      5 mg         22      5 mg           23      5 mg         24      10 mg         25      5 mg         26      5 mg         27      5 mg         28      5 mg         29      5 mg           30      5 mg                Date Details   No additional details              May 2017 Details    Sun Mon Tue Wed Thu Fri Sat      1   1.4   10 mg   See details      2      10 mg         3      5 mg         4      5 mg         5      5 mg         6      5 mg           7      5 mg         8   1.6   10 mg   See details      9      5 mg         10      10 mg         11      5 mg         12      5 mg         13      5 mg           14      5 mg         15      10 mg         16      5 mg         17      10 mg         18      5 mg         19            20                 21               22               23               24               25               26               27                 28               29               30               31                   Date Details   05/01 Last INR check   INR: 1.4      05/08 This INR check   INR: 1.6       Date of next INR:  5/19/2017               How to take your warfarin dose     To take:  5 mg Take 0.5 of a 10 mg tablet.    To take:  10 mg Take 1 of the 10 mg tablets.           Anticoagulation Summary as of 5/8/2017     Maintenance plan 10 mg (10 mg x 1) on Mon, Wed; 5 mg (10 mg x 0.5) all other days    Full instructions 10 mg on Mon, Wed; 5 mg all other days    Next INR check 5/19/2017      Anticoagulation Episode Summary     Comments       Language Assistance Services     ATTENTION: Language assistance services are  available, free of charge. Please call [phone].      ATENCIÓN: Si habla neda, tiene a lainez disposición servicios gratuitos de asistencia lingüística. Llame al [phone].     CHÚ Ý: N?u b?n nói Ti?ng Vi?t, có các d?ch v? h? tr? ngôn ng? mi?n phí dành cho b?n. G?i s? [phone].         [facility] complies with applicable Federal civil rights laws and does not discriminate on the basis of race, color, national origin, age, disability, or sex.

## 2017-05-08 NOTE — PROGRESS NOTES
INR remains sub-therapeutic. Patient confirms dose and compliance but has history of non-compliance. Will begin 10mg on Mondays and Wednesdays and 5mg all other days. Repeat INR in 2 weeks.

## 2017-05-08 NOTE — TELEPHONE ENCOUNTER
----- Message from Nita Correa MA sent at 5/8/2017  1:44 PM CDT -----  Contact: pt      ----- Message -----     From: Shavonne Vargas     Sent: 5/8/2017   1:20 PM       To: [facility] Staff    Pt requests nurse to call her at [phone] (home) regarding explanation of results from mammogram.

## 2017-05-15 ENCOUNTER — OFFICE VISIT (OUTPATIENT)
Dept: FAMILY MEDICINE | Facility: CLINIC | Age: 67
End: 2017-05-15
Payer: MEDICARE

## 2017-05-15 ENCOUNTER — HOSPITAL ENCOUNTER (OUTPATIENT)
Dept: RADIOLOGY | Facility: HOSPITAL | Age: 67
Discharge: HOME | End: 2017-05-15
Attending: INTERNAL MEDICINE
Payer: MEDICARE

## 2017-05-15 VITALS
RESPIRATION RATE: 18 BRPM | HEART RATE: 74 BPM | TEMPERATURE: 98 F | BODY MASS INDEX: 30.23 KG/M2 | OXYGEN SATURATION: 98 % | HEIGHT: 65 IN | WEIGHT: 181.44 LBS | DIASTOLIC BLOOD PRESSURE: 88 MMHG | SYSTOLIC BLOOD PRESSURE: 126 MMHG

## 2017-05-15 DIAGNOSIS — R05.9 COUGH: ICD-10-CM

## 2017-05-15 DIAGNOSIS — R05.9 COUGH: Primary | ICD-10-CM

## 2017-05-15 PROCEDURE — 3079F DIAST BP 80-89 MM HG: CPT | Mod: S$GLB,,, | Performed by: INTERNAL MEDICINE

## 2017-05-15 PROCEDURE — 1160F RVW MEDS BY RX/DR IN RCRD: CPT | Mod: S$GLB,,, | Performed by: INTERNAL MEDICINE

## 2017-05-15 PROCEDURE — 99999 PR PBB SHADOW E&M-EST. PATIENT-LVL IV: CPT | Mod: PBBFAC,,, | Performed by: INTERNAL MEDICINE

## 2017-05-15 PROCEDURE — 71020 XR CHEST PA AND LATERAL: CPT | Mod: 26,,, | Performed by: RADIOLOGY

## 2017-05-15 PROCEDURE — 99213 OFFICE O/P EST LOW 20 MIN: CPT | Mod: 25,S$GLB,, | Performed by: INTERNAL MEDICINE

## 2017-05-15 PROCEDURE — 96372 THER/PROPH/DIAG INJ SC/IM: CPT | Mod: S$GLB,,, | Performed by: INTERNAL MEDICINE

## 2017-05-15 PROCEDURE — 71020 XR CHEST PA AND LATERAL: CPT | Mod: TC,PO

## 2017-05-15 PROCEDURE — 3074F SYST BP LT 130 MM HG: CPT | Mod: S$GLB,,, | Performed by: INTERNAL MEDICINE

## 2017-05-15 RX ORDER — METHYLPREDNISOLONE ACETATE 80 MG/ML
40 INJECTION, SUSPENSION INTRA-ARTICULAR; INTRALESIONAL; INTRAMUSCULAR; SOFT TISSUE
Status: COMPLETED | OUTPATIENT
Start: 2017-05-15 | End: 2017-05-15

## 2017-05-15 RX ORDER — BETAMETHASONE SODIUM PHOSPHATE AND BETAMETHASONE ACETATE 3; 3 MG/ML; MG/ML
6 INJECTION, SUSPENSION INTRA-ARTICULAR; INTRALESIONAL; INTRAMUSCULAR; SOFT TISSUE
Status: DISCONTINUED | OUTPATIENT
Start: 2017-05-15 | End: 2017-05-15

## 2017-05-15 RX ORDER — HYDROCODONE BITARTRATE AND HOMATROPINE METHYLBROMIDE ORAL SOLUTION 5; 1.5 MG/5ML; MG/5ML
5 LIQUID ORAL EVERY 4 HOURS PRN
Qty: 120 ML | Refills: 0 | Status: SHIPPED | OUTPATIENT
Start: 2017-05-15 | End: 2017-05-22

## 2017-05-15 RX ADMIN — METHYLPREDNISOLONE ACETATE 40 MG: 80 INJECTION, SUSPENSION INTRA-ARTICULAR; INTRALESIONAL; INTRAMUSCULAR; SOFT TISSUE at 02:05

## 2017-05-15 NOTE — PROGRESS NOTES
Subjective:       Patient ID: Alyx Weir is a 67 y.o. female.    Chief Complaint: Cough; Headache; and Itching  -3 days cough-------headache-=-=  HPI  Review of Systems   Constitutional: Negative for chills and fever.   HENT: Positive for congestion and rhinorrhea. Negative for sore throat.    Respiratory: Positive for cough. Negative for apnea, choking, chest tightness, shortness of breath, wheezing and stridor.    Cardiovascular: Negative for chest pain, palpitations and leg swelling.   Gastrointestinal: Negative for abdominal pain and nausea.   Genitourinary: Negative.    Neurological: Negative.    Psychiatric/Behavioral: Negative for agitation, behavioral problems and confusion.       Objective:      Physical Exam   Constitutional: She is oriented to person, place, and time. She appears well-developed and well-nourished. No distress.   HENT:   Head: Normocephalic and atraumatic.   Mouth/Throat: No oropharyngeal exudate.   Neck: Normal range of motion. Neck supple. Carotid bruit is not present.   Cardiovascular: Normal rate, regular rhythm, normal heart sounds and intact distal pulses.    Pulmonary/Chest: Effort normal and breath sounds normal. No respiratory distress. She has no wheezes. She has no rales. She exhibits no tenderness.   Abdominal: Soft. Bowel sounds are normal. She exhibits no distension. There is no tenderness. There is no rebound and no guarding.   Musculoskeletal: Normal range of motion. She exhibits no edema or tenderness.   Neurological: She is alert and oriented to person, place, and time.   Skin: Skin is warm and dry. No rash noted. She is not diaphoretic. No erythema. No pallor.   Psychiatric: She has a normal mood and affect. Her behavior is normal. Judgment and thought content normal.   Nursing note and vitals reviewed.      Assessment:       1. Cough        Plan:        cxr.           40 mg depo medrol im now, hycodan cough med prn---call if persists.

## 2017-05-15 NOTE — MR AVS SNAPSHOT
[facility]  [address] [address]  Phone: [phone]                  Alyx Weir   5/15/2017 1:30 PM   Office Visit    Description:  Female : 1950   Provider:  Carl Clemons MD   Department:  [facility]           Reason for Visit     Cough     Headache     Itching           Diagnoses this Visit        Comments    Cough    -  Primary            To Do List           Future Appointments        Provider Department Dept Phone    5/15/2017  3:00 PM [facility] XR1 [facility] [phone]    2017 10:00 AM Fatou Ferguson, PharmD [facility] [phone]    2017 10:40 AM Mauro Knutson MD [facility] - Cardiology [phone]    2017 11:15 AM Patrick Mayo Sr., MD [facility] - Orthopedics [phone]    2017 9:00 AM Carl Clemons MD [facility] [phone]      Goals (5 Years of Data)     None      Follow-Up and Disposition     Return if symptoms worsen or fail to improve.       These Medications        Disp Refills Start End    hydrocodone-homatropine 5-1.5 mg/5 ml (HYCODAN) 5-1.5 mg/5 mL Syrp 120 mL 0 5/15/2017     Take 5 mLs by mouth every 4 (four) hours as needed. - Oral    Pharmacy: [facility] [address] [place] [address] [address] [facility] #: [phone]         [facility]     [facility] Care [facility] -  Assistance  Unless otherwise directed by your provider, please contact [facility], our nurse care line that is available for  assistance.     Registered nurses in the [facility] provide: appointment scheduling, clinical advisement, health education, and other advisory services.  Call: [phone] (toll free)               Medications           Message regarding Medications     Verify the changes and/or additions to your medication regime listed below are the same as discussed with your clinician today.  If any of  these changes or additions are incorrect, please notify your healthcare provider.        START taking these NEW medications        Refills    hydrocodone-homatropine 5-1.5 mg/5 ml (HYCODAN) 5-1.5 mg/5 mL Syrp 0    Sig: Take 5 mLs by mouth every 4 (four) hours as needed.    Class: Normal    Route: Oral      These medications were administered today        Dose Freq    methylPREDNISolone acetate injection 40 mg 40 mg Clinic/HOD 1 time    Sig: Inject 0.5 mLs (40 mg total) into the muscle one time.    Class: Normal    Route: Intramuscular           Verify that the below list of medications is an accurate representation of the medications you are currently taking.  If none reported, the list may be blank. If incorrect, please contact your healthcare provider. Carry this list with you in case of emergency.           Current Medications     biotin 300 mcg Tab Take 1 tablet by mouth once daily.    blood sugar diagnostic Strp 1 each by Misc.(Non-Drug; Combo Route) route 2 (two) times daily as needed.    blood sugar diagnostic Strp 1 each by Misc.(Non-Drug; Combo Route) route 2 (two) times daily as needed. ONE TOUCH VERIO MACHINE    calcium-magnesium 300-300 mg Tab Take 1 tablet by mouth Once daily as needed.    carvedilol (COREG) 6.25 MG tablet Take 1 tablet (6.25 mg total) by mouth 2 (two) times daily with meals.    diclofenac sodium 1 % Gel Apply 2 g topically daily as needed.    gabapentin (NEURONTIN) 100 MG capsule TAKE ONE CAPSULE BY MOUTH THREE TIMES DAILY    hydrALAZINE (APRESOLINE) 25 MG tablet Take 1 tablet (25 mg total) by mouth 3 (three) times daily.    lancets Misc 1 each by Misc.(Non-Drug; Combo Route) route 2 (two) times daily as needed.    olmesartan-amlodipin-hcthiazid (TRIBENZOR) 40-10-25 mg Tab Take 1 tablet by mouth once daily.    pravastatin (PRAVACHOL) 40 MG tablet Take 1 tablet (40 mg total) by mouth once daily.    tizanidine (ZANAFLEX) 2 MG tablet Take 1-2 tablets (2-4 mg total) by mouth nightly as  "needed.    tramadol (ULTRAM) 50 mg tablet Take 2 tablets by mouth 4 (four) times daily as needed.    vitamin D 1000 units Tab Take 185 mg by mouth once daily.    warfarin (COUMADIN) 10 MG tablet TAKE ONE TABLET BY MOUTH IN THE EVENING ON  MONDAY,  WED,  AND  FRIDAY  AND  ONE-HALF  TABLET  ON  TUE,THURS,  SAT,  AND  SUNDAY    hydrocodone-homatropine 5-1.5 mg/5 ml (HYCODAN) 5-1.5 mg/5 mL Syrp Take 5 mLs by mouth every 4 (four) hours as needed.           Clinical Reference Information           Your Vitals Were     BP Pulse Temp Resp Height Weight    126/88 (BP Location: Right arm, Patient Position: Sitting, BP Method: Manual) 74 98.1 °F (36.7 °C) (Tympanic) 18 5' 5" (1.651 m) 82.3 kg (181 lb 7 oz)    SpO2 BMI             98% 30.19 kg/m2         Blood Pressure          Most Recent Value    BP  126/88      Allergies as of 5/15/2017     Erythromycin    Amlodipine    Diazepam    Iodine    Meperidine    Morphine    Primidone      Immunizations Administered on Date of Encounter - 5/15/2017     None      Orders Placed During Today's Visit     Future Labs/Procedures Expected by Expires    X-Ray Chest PA And Lateral  5/15/2017 5/15/2018      Administrations This Visit     methylPREDNISolone acetate injection 40 mg     Admin Date Action Dose Route Administered By             05/15/2017 Given 40 mg Intramuscular Katelynn Chavez LPN                      Language Assistance Services     ATTENTION: Language assistance services are available, free of charge. Please call [phone].      ATENCIÓN: Si habla jammieañol, tiene a lainez disposición servicios gratuitos de asistencia lingüística. Llame al [phone].     [facility] Ý: N?u b?n nói Ti?ng Vi?t, có các d?ch v? h? tr? ngôn ng? mi?n phí dành cho b?n. G?i s? [phone].         [facility] complies with applicable Federal civil rights laws and does not discriminate on the basis of race, color, national origin, age, disability, or sex.        "

## 2017-05-19 ENCOUNTER — ANTI-COAG VISIT (OUTPATIENT)
Dept: CARDIOLOGY | Facility: CLINIC | Age: 67
End: 2017-05-19
Payer: MEDICARE

## 2017-05-19 DIAGNOSIS — Z79.01 LONG TERM (CURRENT) USE OF ANTICOAGULANTS: Primary | ICD-10-CM

## 2017-05-19 LAB — INR PPP: 2 (ref 2–3)

## 2017-05-19 PROCEDURE — 85610 PROTHROMBIN TIME: CPT | Mod: QW,S$GLB,,

## 2017-05-19 PROCEDURE — 99211 OFF/OP EST MAY X REQ PHY/QHP: CPT | Mod: 25,S$GLB,,

## 2017-05-19 NOTE — MR AVS SNAPSHOT
[facility]  [address] [address]  Phone: [phone]  Fax: [phone]                  Alyx Weir   2017 10:00 AM   Anti-coag visit    Description:  Female : 1950   Provider:  Fatou Ferguson PharmD   Department:  [facility]           Diagnoses this Visit        Comments    Long term (current) use of anticoagulants    -  Primary            To Do List           Future Appointments        Provider Department Dept Phone    2017 10:40 AM Mauro Knutson MD [facility] - Cardiology [phone]    2017 9:15 AM Fatou Ferguson PharmD [facility] - Coumadin [phone]    2017 11:15 AM Patrick Mayo Sr., MD [facility] - Orthopedics [phone]    2017 9:00 AM Carl Clemons MD [facility] [phone]    1/15/2018 10:40 AM Sunshine Luther NP [facility] Pulmonary Services [phone]      Goals (5 Years of Data)     None      [facility] On [facility]     [facility] On Call Nurse Care Line -  Assistance  Unless otherwise directed by your provider, please contact [facility], our nurse care line that is available for  assistance.     Registered nurses in the [facility] provide: appointment scheduling, clinical advisement, health education, and other advisory services.  Call: [phone] (toll free)               Medications           Message regarding Medications     Verify the changes and/or additions to your medication regime listed below are the same as discussed with your clinician today.  If any of these changes or additions are incorrect, please notify your healthcare provider.             Verify that the below list of medications is an accurate representation of the medications you are currently taking.  If none reported, the list may be blank. If incorrect, please contact your healthcare provider. Carry this list with you in case of emergency.           Current Medications     biotin 300 mcg Tab Take  1 tablet by mouth once daily.    blood sugar diagnostic Strp 1 each by Misc.(Non-Drug; Combo Route) route 2 (two) times daily as needed.    blood sugar diagnostic Strp 1 each by Misc.(Non-Drug; Combo Route) route 2 (two) times daily as needed. ONE TOUCH VERIO MACHINE    calcium-magnesium 300-300 mg Tab Take 1 tablet by mouth Once daily as needed.    carvedilol (COREG) 6.25 MG tablet Take 1 tablet (6.25 mg total) by mouth 2 (two) times daily with meals.    diclofenac sodium 1 % Gel Apply 2 g topically daily as needed.    gabapentin (NEURONTIN) 100 MG capsule TAKE ONE CAPSULE BY MOUTH THREE TIMES DAILY    hydrALAZINE (APRESOLINE) 25 MG tablet Take 1 tablet (25 mg total) by mouth 3 (three) times daily.    hydrocodone-homatropine 5-1.5 mg/5 ml (HYCODAN) 5-1.5 mg/5 mL Syrp Take 5 mLs by mouth every 4 (four) hours as needed.    lancets Misc 1 each by Misc.(Non-Drug; Combo Route) route 2 (two) times daily as needed.    olmesartan-amlodipin-hcthiazid (TRIBENZOR) 40-10-25 mg Tab Take 1 tablet by mouth once daily.    pravastatin (PRAVACHOL) 40 MG tablet Take 1 tablet (40 mg total) by mouth once daily.    tizanidine (ZANAFLEX) 2 MG tablet Take 1-2 tablets (2-4 mg total) by mouth nightly as needed.    tramadol (ULTRAM) 50 mg tablet Take 2 tablets by mouth 4 (four) times daily as needed.    vitamin D 1000 units Tab Take 185 mg by mouth once daily.    warfarin (COUMADIN) 10 MG tablet TAKE ONE TABLET BY MOUTH IN THE EVENING ON  MONDAY,  WED,  AND  FRIDAY  AND  ONE-HALF  TABLET  ON  TUE,THURS,  SAT,  AND  SUNDAY           Clinical Reference Information           Allergies as of 5/19/2017     Erythromycin    Amlodipine    Diazepam    Iodine    Meperidine    Morphine    Primidone      Immunizations Administered on Date of Encounter - 5/19/2017     None      Orders Placed During Today's Visit      Normal Orders This Visit    POCT INR          5/19/2017 10:02 AM - Jabier BirminghamD      Component Results     Component Value  Flag Ref Range Units Status    INR 2.0  2.0 - 3.0  Final      April 2017 Details    Sun Mon Tue Wed Thu Fri Sat           1                 2               3               4               5               6               7               8                 9               10               11               12               13               14               15                 16               17               18               19      5 mg         20      5 mg         21      5 mg         22      5 mg           23      5 mg         24      10 mg         25      5 mg         26      5 mg         27      5 mg         28      5 mg         29      5 mg           30      5 mg                Date Details   No additional details              May 2017 Details    Sun Mon Tue Wed Thu Fri Sat      1   1.4   10 mg   See details      2      10 mg         3      5 mg         4      5 mg         5      5 mg         6      5 mg           7      5 mg         8   1.6   10 mg   See details      9      5 mg         10      10 mg         11      5 mg         12      5 mg         13      5 mg           14      5 mg         15      10 mg         16      5 mg         17      10 mg         18      5 mg         19   2.0   5 mg   See details      20      5 mg           21      5 mg         22      10 mg         23      5 mg         24      10 mg         25      5 mg         26      5 mg         27      5 mg           28      5 mg         29      10 mg         30      5 mg         31      10 mg             Date Details   05/01 INR: 1.4      05/08 Last INR check   INR: 1.6      05/19 This INR check   INR: 2.0                     How to take your warfarin dose     To take:  5 mg Take 0.5 of a 10 mg tablet.    To take:  10 mg Take 1 of the 10 mg tablets.           June 2017 Details    Sun Mon Tue Wed Thu Fri Sat         1      5 mg         2      5 mg         3      5 mg           4      5 mg         5      10 mg         6      5 mg         7      10  mg         8      5 mg         9      5 mg         10      5 mg           11      5 mg         12            13               14               15               16               17                 18               19               20               21               22               23               24                 25               26               27               28               29               30                 Date Details   No additional details    Date of next INR:  6/12/2017         How to take your warfarin dose     To take:  5 mg Take 0.5 of a 10 mg tablet.    To take:  10 mg Take 1 of the 10 mg tablets.           Anticoagulation Summary as of 5/19/2017     Maintenance plan 10 mg (10 mg x 1) on Mon, Wed; 5 mg (10 mg x 0.5) all other days    Full instructions 10 mg on Mon, Wed; 5 mg all other days    Next INR check 6/12/2017      Anticoagulation Episode Summary     Comments       Language Assistance Services     ATTENTION: Language assistance services are available, free of charge. Please call [phone].      ATENCIÓN: Si win red, tiene a lainez disposición servicios gratuitos de asistencia lingüística. Llame al [phone].     [facility] Ý: N?u b?n nói Ti?ng Vi?t, có các d?ch v? h? tr? ngôn ng? mi?n phí dành cho b?n. G?i s? [phone].         [facility] complies with applicable Federal civil rights laws and does not discriminate on the basis of race, color, national origin, age, disability, or sex.

## 2017-05-19 NOTE — PROGRESS NOTES
INR is now therapeutic. Patient received depo medrol IM on 5/15 and now taking hycodan for cough. Will continue dose and diet until follow-up.

## 2017-05-22 ENCOUNTER — TELEPHONE (OUTPATIENT)
Dept: FAMILY MEDICINE | Facility: CLINIC | Age: 67
End: 2017-05-22

## 2017-05-22 RX ORDER — HYDROCODONE POLISTIREX AND CHLORPHENIRAMINE POLISTIREX 10; 8 MG/5ML; MG/5ML
5 SUSPENSION, EXTENDED RELEASE ORAL EVERY 12 HOURS PRN
Qty: 115 ML | Refills: 0 | Status: SHIPPED | OUTPATIENT
Start: 2017-05-22 | End: 2017-05-22 | Stop reason: SDUPTHER

## 2017-05-22 RX ORDER — HYDROCODONE POLISTIREX AND CHLORPHENIRAMINE POLISTIREX 10; 8 MG/5ML; MG/5ML
5 SUSPENSION, EXTENDED RELEASE ORAL EVERY 12 HOURS PRN
Qty: 115 ML | Refills: 0 | Status: SHIPPED | OUTPATIENT
Start: 2017-05-22 | End: 2017-06-01

## 2017-05-22 NOTE — TELEPHONE ENCOUNTER
Patient states that medication is making her coughing the cough medication is just making her tired and has not energy, and weak. Patient states has not be to work in over a week. Patient states is coughing bur nothing is coming up. Would like to get a cough medicine with the codeine. Please advise

## 2017-05-22 NOTE — TELEPHONE ENCOUNTER
----- Message from Una Phillips sent at 5/22/2017  7:07 AM CDT -----  Contact: pt   Pt was seen on 05/15, and is still having coughing issues,, medication is making her weak,, pt says she doesn't think the cough medication is working,, please call to discuss [phone]

## 2017-05-22 NOTE — TELEPHONE ENCOUNTER
----- Message from Keila Mckeon sent at 5/22/2017  4:10 PM CDT -----  Contact: [facility] pharmacy   Call caller regarding the TUSSIONEX that was move up to a C2 drug and it need to be E script or hand deliver.      ...[phone]

## 2017-05-23 ENCOUNTER — TELEPHONE (OUTPATIENT)
Dept: FAMILY MEDICINE | Facility: CLINIC | Age: 67
End: 2017-05-23

## 2017-05-23 NOTE — TELEPHONE ENCOUNTER
States she can't take a multi vitamin because of the coumadin she's on, due to the vitamin K. She's on a vitamin d. States she was taking B12 but was told to stop.

## 2017-05-23 NOTE — TELEPHONE ENCOUNTER
----- Message from Joann Pace sent at 5/23/2017 10:27 AM CDT -----  Contact: Alyx Weir   Patient requesting a vitamin, states she has no energy. [facility]. Last office visit 05/15/17.  05/23/17elr

## 2017-06-12 ENCOUNTER — ANTI-COAG VISIT (OUTPATIENT)
Dept: CARDIOLOGY | Facility: CLINIC | Age: 67
End: 2017-06-12
Payer: MEDICARE

## 2017-06-12 DIAGNOSIS — Z79.01 LONG TERM (CURRENT) USE OF ANTICOAGULANTS: Primary | ICD-10-CM

## 2017-06-12 LAB — INR PPP: 3 (ref 2–3)

## 2017-06-12 PROCEDURE — 99211 OFF/OP EST MAY X REQ PHY/QHP: CPT | Mod: 25,S$GLB,,

## 2017-06-12 PROCEDURE — 85610 PROTHROMBIN TIME: CPT | Mod: QW,S$GLB,,

## 2017-06-12 NOTE — PROGRESS NOTES
INR remains therapeutic. Using tussionex prn for cough symptoms. No other changes noted.  Continue dose and diet until follow-up.

## 2017-07-10 ENCOUNTER — ANTI-COAG VISIT (OUTPATIENT)
Dept: CARDIOLOGY | Facility: CLINIC | Age: 67
End: 2017-07-10
Payer: MEDICARE

## 2017-07-10 DIAGNOSIS — Z79.01 LONG TERM (CURRENT) USE OF ANTICOAGULANTS: Primary | ICD-10-CM

## 2017-07-10 LAB — INR PPP: 2.7 (ref 2–3)

## 2017-07-10 PROCEDURE — 85610 PROTHROMBIN TIME: CPT | Mod: QW,S$GLB,,

## 2017-08-07 ENCOUNTER — ANTI-COAG VISIT (OUTPATIENT)
Dept: CARDIOLOGY | Facility: CLINIC | Age: 67
End: 2017-08-07
Payer: MEDICARE

## 2017-08-07 DIAGNOSIS — Z79.01 LONG TERM (CURRENT) USE OF ANTICOAGULANTS: Primary | ICD-10-CM

## 2017-08-07 LAB — INR PPP: 3.2 (ref 2–3)

## 2017-08-07 PROCEDURE — 99211 OFF/OP EST MAY X REQ PHY/QHP: CPT | Mod: 25,S$GLB,,

## 2017-08-07 PROCEDURE — 85610 PROTHROMBIN TIME: CPT | Mod: QW,S$GLB,,

## 2017-08-07 NOTE — PROGRESS NOTES
INR is supra-therapeutic. New bruising, no other signs/symptoms of bleeding. Patient reports work stress, no other changes noted. Will lower tonight's dose only then re-challenge dose and diet until follow-up.

## 2017-08-30 ENCOUNTER — ANTI-COAG VISIT (OUTPATIENT)
Dept: CARDIOLOGY | Facility: CLINIC | Age: 67
End: 2017-08-30
Payer: MEDICARE

## 2017-08-30 DIAGNOSIS — Z79.01 LONG TERM (CURRENT) USE OF ANTICOAGULANTS: Primary | ICD-10-CM

## 2017-08-30 LAB — INR PPP: 2.9 (ref 2–3)

## 2017-08-30 PROCEDURE — 85610 PROTHROMBIN TIME: CPT | Mod: QW,S$GLB,,

## 2017-09-19 DIAGNOSIS — Z79.01 MONITORING FOR ANTICOAGULANT USE: ICD-10-CM

## 2017-09-19 DIAGNOSIS — Z51.81 MONITORING FOR ANTICOAGULANT USE: ICD-10-CM

## 2017-09-19 RX ORDER — WARFARIN 10 MG/1
TABLET ORAL
Qty: 90 TABLET | Refills: 3 | Status: SHIPPED | OUTPATIENT
Start: 2017-09-19 | End: 2017-09-20 | Stop reason: SDUPTHER

## 2017-09-19 NOTE — TELEPHONE ENCOUNTER
----- Message from Antonina Kennedy sent at 9/19/2017 10:55 AM CDT -----  Contact: Patient  Patient is letting staff know that the pharmacy will be faxing a refill request for Warfarin,please call  Patient back if needed at [phone]. Thank you

## 2017-09-20 DIAGNOSIS — M79.2 NEURITIS: ICD-10-CM

## 2017-09-20 DIAGNOSIS — Z51.81 MONITORING FOR ANTICOAGULANT USE: ICD-10-CM

## 2017-09-20 DIAGNOSIS — Z79.01 MONITORING FOR ANTICOAGULANT USE: ICD-10-CM

## 2017-09-20 RX ORDER — GABAPENTIN 100 MG/1
100 CAPSULE ORAL 3 TIMES DAILY
Qty: 270 CAPSULE | Refills: 3 | Status: SHIPPED | OUTPATIENT
Start: 2017-09-20 | End: 2018-05-08

## 2017-09-20 RX ORDER — WARFARIN 10 MG/1
TABLET ORAL
Qty: 90 TABLET | Refills: 3 | Status: SHIPPED | OUTPATIENT
Start: 2017-09-20 | End: 2018-01-10 | Stop reason: DRUGHIGH

## 2017-09-25 ENCOUNTER — ANTI-COAG VISIT (OUTPATIENT)
Dept: CARDIOLOGY | Facility: CLINIC | Age: 67
End: 2017-09-25
Payer: MEDICARE

## 2017-09-25 DIAGNOSIS — Z79.01 LONG TERM (CURRENT) USE OF ANTICOAGULANTS: Primary | ICD-10-CM

## 2017-09-25 LAB — INR PPP: 3.1 (ref 2–3)

## 2017-09-25 PROCEDURE — 99211 OFF/OP EST MAY X REQ PHY/QHP: CPT | Mod: 25,S$GLB,,

## 2017-09-25 PROCEDURE — 85610 PROTHROMBIN TIME: CPT | Mod: QW,S$GLB,,

## 2017-09-25 NOTE — PROGRESS NOTES
INR today is supra-therapeutic and was on higher end of target range on last month. C/o hip pain. Taking tylenol extra strength and gabapentin for pain management. No other changes noted. Will lower total weekly dose until follow-up.

## 2017-10-03 ENCOUNTER — OFFICE VISIT (OUTPATIENT)
Dept: CARDIOLOGY | Facility: CLINIC | Age: 67
End: 2017-10-03
Payer: MEDICARE

## 2017-10-03 VITALS
SYSTOLIC BLOOD PRESSURE: 120 MMHG | DIASTOLIC BLOOD PRESSURE: 78 MMHG | HEIGHT: 65 IN | HEART RATE: 48 BPM | WEIGHT: 187.38 LBS | BODY MASS INDEX: 31.22 KG/M2

## 2017-10-03 DIAGNOSIS — Z79.01 ANTICOAGULATED ON COUMADIN: Chronic | ICD-10-CM

## 2017-10-03 DIAGNOSIS — I10 ESSENTIAL HYPERTENSION: Chronic | ICD-10-CM

## 2017-10-03 DIAGNOSIS — D68.51 HETEROZYGOUS FACTOR V LEIDEN MUTATION: Chronic | ICD-10-CM

## 2017-10-03 DIAGNOSIS — E78.00 PURE HYPERCHOLESTEROLEMIA WITH TARGET LOW DENSITY LIPOPROTEIN (LDL) CHOLESTEROL LESS THAN 130 MG/DL: Chronic | ICD-10-CM

## 2017-10-03 DIAGNOSIS — I25.10 CORONARY ARTERY DISEASE INVOLVING NATIVE CORONARY ARTERY OF NATIVE HEART WITHOUT ANGINA PECTORIS: Primary | ICD-10-CM

## 2017-10-03 DIAGNOSIS — E13.641: Chronic | ICD-10-CM

## 2017-10-03 PROCEDURE — 99999 PR PBB SHADOW E&M-EST. PATIENT-LVL III: CPT | Mod: PBBFAC,,, | Performed by: INTERNAL MEDICINE

## 2017-10-03 PROCEDURE — 99214 OFFICE O/P EST MOD 30 MIN: CPT | Mod: S$GLB,,, | Performed by: INTERNAL MEDICINE

## 2017-10-03 RX ORDER — ASPIRIN 81 MG/1
81 TABLET ORAL DAILY
Qty: 30 TABLET | Refills: 0
Start: 2017-10-03 | End: 2020-11-10

## 2017-10-03 NOTE — PROGRESS NOTES
Subjective:   Patient ID:  Alyx Weir is a 67 y.o. female who presents for follow up of Hypertension      66 yo female, came in for routine f/u. Saw Dr. Jeter before, and part time work.  Hx of mild CAD, nonobstructive s/p C 03/2013. HTN, HLP, NIDDM, obesity, sleep apnea needs new masl.  Denied chest pain, no sob, no leg swelling, no syncope, no CNS symptoms.  Denied h/o MI.  No smoking/drinking.  No regular exercise.  Patient is compliant with medications.        Past Medical History:   Diagnosis Date    Arthritis     Asthma     Clotting disorder     Coronary artery disease     Deep vein thrombosis     Degenerative disc disease     Diabetes mellitus type I 2011    BS last tested x 1 month not sure what it was.    Heterozygous factor V Leiden mutation     Hx of colonic polyps 11/13/2015    Hyperlipidemia     Hypertension     VIRGIL (obstructive sleep apnea)     Stenosis of right carotid artery 12/13/2016       Past Surgical History:   Procedure Laterality Date    BACK SURGERY      bladder cyst removal      BLADDER SURGERY      CARDIAC CATHETERIZATION  03/2013    mild CAD    COLONOSCOPY N/A 11/13/2015    Procedure: COLONOSCOPY;  Surgeon: Jas Umanzor III, MD;  Location: [facility];  Service: Endoscopy;  Laterality: N/A;    HYSTERECTOMY      LUMBAR SPINE SURGERY      bone spurs removed    OOPHORECTOMY         Social History   Substance Use Topics    Smoking status: Never Smoker    Smokeless tobacco: Never Used    Alcohol use No       Family History   Problem Relation Age of Onset    Heart disease Mother     Hypertension Mother     Cataracts Mother     Hypertension Sister     Glaucoma Sister     Hypertension Brother     Diabetes Father     Diabetes Paternal Uncle     Hypertension Sister     Diabetes Sister     Hypertension Sister     Diabetes Sister     Breast cancer Neg Hx     Colon cancer Neg Hx     Ovarian cancer Neg Hx          Review of Systems   Constitution: Negative for  decreased appetite, diaphoresis, fever, weakness, malaise/fatigue and night sweats.   HENT: Negative for nosebleeds.    Eyes: Negative for blurred vision and double vision.   Cardiovascular: Negative for chest pain, claudication, dyspnea on exertion, irregular heartbeat, leg swelling, near-syncope, orthopnea, palpitations, paroxysmal nocturnal dyspnea and syncope.   Respiratory: Negative for cough, shortness of breath, sleep disturbances due to breathing, snoring, sputum production and wheezing.    Endocrine: Negative for cold intolerance and polyuria.   Hematologic/Lymphatic: Does not bruise/bleed easily.   Skin: Negative for rash.   Musculoskeletal: Negative for back pain, falls, joint pain, joint swelling and neck pain.   Gastrointestinal: Negative for abdominal pain, heartburn, nausea and vomiting.   Genitourinary: Negative for dysuria, frequency and hematuria.   Neurological: Negative for difficulty with concentration, dizziness, focal weakness, headaches, light-headedness, numbness and seizures.   Psychiatric/Behavioral: Negative for depression, memory loss and substance abuse. The patient does not have insomnia.    Allergic/Immunologic: Negative for HIV exposure and hives.       Objective:   Physical Exam   Constitutional: She is oriented to person, place, and time. She appears well-nourished.   HENT:   Head: Normocephalic.   Eyes: Pupils are equal, round, and reactive to light.   Neck: Normal carotid pulses and no JVD present. Carotid bruit is not present. No thyromegaly present.   Cardiovascular: Regular rhythm, normal heart sounds and normal pulses.   No extrasystoles are present. Bradycardia present.  PMI is not displaced.  Exam reveals no gallop and no S3.    No murmur heard.  Pulmonary/Chest: Breath sounds normal. No stridor. No respiratory distress.   Abdominal: Soft. Bowel sounds are normal. There is no tenderness. There is no rebound.   Musculoskeletal: Normal range of motion.   Neurological: She is  alert and oriented to person, place, and time.   Skin: Skin is intact. No rash noted.   Psychiatric: Her behavior is normal.       Lab Results   Component Value Date    CHOL 162 01/16/2017    CHOL 157 05/09/2016    CHOL 166 09/04/2015     Lab Results   Component Value Date    HDL 58 01/16/2017    HDL 64 05/09/2016    HDL 60 09/04/2015     Lab Results   Component Value Date    LDLCALC 91.4 01/16/2017    LDLCALC 84.4 05/09/2016    LDLCALC 98.4 09/04/2015     Lab Results   Component Value Date    TRIG 63 01/16/2017    TRIG 43 05/09/2016    TRIG 38 09/04/2015     Lab Results   Component Value Date    CHOLHDL 35.8 01/16/2017    CHOLHDL 40.8 05/09/2016    CHOLHDL 36.1 09/04/2015       Chemistry        Component Value Date/Time     01/16/2017 0849    K 4.4 01/16/2017 0849     01/16/2017 0849    CO2 31 (H) 01/16/2017 0849    BUN 23 01/16/2017 0849    CREATININE 0.8 01/16/2017 0849     01/16/2017 0849        Component Value Date/Time    CALCIUM 9.3 01/16/2017 0849    ALKPHOS 43 (L) 01/16/2017 0849    AST 17 01/16/2017 0849    ALT 13 01/16/2017 0849    BILITOT 0.6 01/16/2017 0849    ESTGFRAFRICA >60.0 01/16/2017 0849    EGFRNONAA >60.0 01/16/2017 0849          Lab Results   Component Value Date    TSH 0.588 09/09/2015     Lab Results   Component Value Date    INR 3.1 (A) 09/25/2017    INR 2.9 08/30/2017    INR 3.2 (A) 08/07/2017     Lab Results   Component Value Date    WBC 8.27 11/22/2016    HGB 13.0 11/22/2016    HCT 41.1 11/22/2016    MCV 87 11/22/2016     (H) 11/22/2016     BMP  Sodium   Date Value Ref Range Status   01/16/2017 142 136 - 145 mmol/L Final     Potassium   Date Value Ref Range Status   01/16/2017 4.4 3.5 - 5.1 mmol/L Final     Chloride   Date Value Ref Range Status   01/16/2017 105 95 - 110 mmol/L Final     CO2   Date Value Ref Range Status   01/16/2017 31 (H) 23 - 29 mmol/L Final     BUN, Bld   Date Value Ref Range Status   01/16/2017 23 8 - 23 mg/dL Final     Creatinine   Date  Value Ref Range Status   01/16/2017 0.8 0.5 - 1.4 mg/dL Final     Calcium   Date Value Ref Range Status   01/16/2017 9.3 8.7 - 10.5 mg/dL Final     Anion Gap   Date Value Ref Range Status   01/16/2017 6 (L) 8 - 16 mmol/L Final     eGFR if    Date Value Ref Range Status   01/16/2017 >60.0 >60 mL/min/1.73 m^2 Final     eGFR if non    Date Value Ref Range Status   01/16/2017 >60.0 >60 mL/min/1.73 m^2 Final     Comment:     Calculation used to obtain the estimated glomerular filtration  rate (eGFR) is the CKD-EPI equation. Since race is unknown   in our information system, the eGFR values for   -American and Non--American patients are given   for each creatinine result.       CrCl cannot be calculated (Patient's most recent lab result is older than the maximum 7 days allowed.).     Assessment:      1. Coronary artery disease involving native coronary artery of native heart without angina pectoris    2. Essential hypertension    3. Pure hypercholesterolemia with target low density lipoprotein (LDL) cholesterol less than 130 mg/dL    4. Other specified diabetes mellitus with hypoglycemia and coma, without long-term current use of insulin    5. Heterozygous factor V Leiden mutation    6. Anticoagulated on Coumadin      [place], asymptomatic    Plan:   Continue coumadin, coreg, acei, hydralazine and statin  Advise to add ASA 81 mg daily  Continue current meds.  Recommend heart-healthy diet, weight control and regular exercise.  Mine. Risk modification.   RTC in 1 yr    I have reviewed all pertinent labs and cardiac studies. Plans and recommendations have been formulated under my direct supervision. All questions answered and patient voiced understanding. Patient to continue current medications.

## 2017-10-13 DIAGNOSIS — E11.9 TYPE 2 DIABETES MELLITUS WITHOUT COMPLICATION: ICD-10-CM

## 2017-10-18 DIAGNOSIS — I10 ESSENTIAL HYPERTENSION: Chronic | ICD-10-CM

## 2017-10-18 DIAGNOSIS — E78.5 HYPERLIPIDEMIA, UNSPECIFIED HYPERLIPIDEMIA TYPE: ICD-10-CM

## 2017-10-18 RX ORDER — OLMESARTAN MEDOXOMIL, AMLODIPINE AND HYDROCHLOROTHIAZIDE TABLET 40/10/25 MG 40; 10; 25 MG/1; MG/1; MG/1
1 TABLET ORAL DAILY
Qty: 90 TABLET | Refills: 3 | Status: SHIPPED | OUTPATIENT
Start: 2017-10-18 | End: 2019-04-22 | Stop reason: SDUPTHER

## 2017-10-18 RX ORDER — PRAVASTATIN SODIUM 40 MG/1
40 TABLET ORAL DAILY
Qty: 90 TABLET | Refills: 3 | Status: SHIPPED | OUTPATIENT
Start: 2017-10-18 | End: 2020-06-09 | Stop reason: SDUPTHER

## 2017-10-23 ENCOUNTER — ANTI-COAG VISIT (OUTPATIENT)
Dept: CARDIOLOGY | Facility: CLINIC | Age: 67
End: 2017-10-23
Payer: MEDICARE

## 2017-10-23 DIAGNOSIS — Z79.01 LONG-TERM (CURRENT) USE OF ANTICOAGULANTS: Primary | ICD-10-CM

## 2017-10-23 LAB — INR PPP: 1.5 (ref 2–3)

## 2017-10-23 PROCEDURE — 85610 PROTHROMBIN TIME: CPT | Mod: QW,S$GLB,,

## 2017-10-23 PROCEDURE — 99211 OFF/OP EST MAY X REQ PHY/QHP: CPT | Mod: 25,S$GLB,,

## 2017-10-23 NOTE — PROGRESS NOTES
INR is sub-therapeutic today. Reports high vitamin k this weekend. Prescribed aspirin 81mg daily. Will begin today per patient.  No other changes since last visit. Will boost tonight's dose only then re-challenge dose until follow-up. Repeat INR in 2 weeks.

## 2017-11-06 ENCOUNTER — ANTI-COAG VISIT (OUTPATIENT)
Dept: CARDIOLOGY | Facility: CLINIC | Age: 67
End: 2017-11-06
Payer: MEDICARE

## 2017-11-06 DIAGNOSIS — Z79.01 LONG-TERM (CURRENT) USE OF ANTICOAGULANTS: Primary | ICD-10-CM

## 2017-11-06 LAB — INR PPP: 2.1 (ref 2–3)

## 2017-11-06 PROCEDURE — 85610 PROTHROMBIN TIME: CPT | Mod: QW,S$GLB,,

## 2017-11-06 PROCEDURE — 99211 OFF/OP EST MAY X REQ PHY/QHP: CPT | Mod: 25,S$GLB,,

## 2017-11-06 NOTE — PROGRESS NOTES
INR is now therapeutic. This is a quick follow-up after a sub-therapeutic INR on 10/23. Continue dose and diet until follow-up. Repeat INR in 4 weeks.

## 2017-11-27 ENCOUNTER — HOSPITAL ENCOUNTER (OUTPATIENT)
Dept: RADIOLOGY | Facility: HOSPITAL | Age: 67
Discharge: HOME | End: 2017-11-27
Attending: PHYSICIAN ASSISTANT
Payer: MEDICARE

## 2017-11-27 ENCOUNTER — OFFICE VISIT (OUTPATIENT)
Dept: ORTHOPEDICS | Facility: CLINIC | Age: 67
End: 2017-11-27
Payer: MEDICARE

## 2017-11-27 VITALS
HEART RATE: 65 BPM | WEIGHT: 181.19 LBS | DIASTOLIC BLOOD PRESSURE: 79 MMHG | HEIGHT: 65 IN | RESPIRATION RATE: 12 BRPM | SYSTOLIC BLOOD PRESSURE: 135 MMHG | BODY MASS INDEX: 30.19 KG/M2

## 2017-11-27 DIAGNOSIS — E11.8 TYPE 2 DIABETES MELLITUS WITH COMPLICATION, WITHOUT LONG-TERM CURRENT USE OF INSULIN: Chronic | ICD-10-CM

## 2017-11-27 DIAGNOSIS — M25.562 ACUTE PAIN OF BOTH KNEES: ICD-10-CM

## 2017-11-27 DIAGNOSIS — M25.562 ACUTE PAIN OF BOTH KNEES: Primary | ICD-10-CM

## 2017-11-27 DIAGNOSIS — M25.561 ACUTE PAIN OF BOTH KNEES: ICD-10-CM

## 2017-11-27 DIAGNOSIS — M25.561 ACUTE PAIN OF BOTH KNEES: Primary | ICD-10-CM

## 2017-11-27 DIAGNOSIS — M25.561 ACUTE PAIN OF RIGHT KNEE: Primary | ICD-10-CM

## 2017-11-27 DIAGNOSIS — M94.261 CHONDROMALACIA, RIGHT KNEE: ICD-10-CM

## 2017-11-27 PROCEDURE — 20610 DRAIN/INJ JOINT/BURSA W/O US: CPT | Mod: RT,S$GLB,, | Performed by: PHYSICIAN ASSISTANT

## 2017-11-27 PROCEDURE — 99999 PR PBB SHADOW E&M-EST. PATIENT-LVL IV: CPT | Mod: PBBFAC,,, | Performed by: PHYSICIAN ASSISTANT

## 2017-11-27 PROCEDURE — 73564 X-RAY EXAM KNEE 4 OR MORE: CPT | Mod: TC,50,PO

## 2017-11-27 PROCEDURE — 73564 X-RAY EXAM KNEE 4 OR MORE: CPT | Mod: 26,50,, | Performed by: RADIOLOGY

## 2017-11-27 PROCEDURE — 99499 UNLISTED E&M SERVICE: CPT | Mod: S$GLB,,, | Performed by: PHYSICIAN ASSISTANT

## 2017-11-27 PROCEDURE — 99214 OFFICE O/P EST MOD 30 MIN: CPT | Mod: 25,S$GLB,, | Performed by: PHYSICIAN ASSISTANT

## 2017-11-27 RX ORDER — METHYLPREDNISOLONE ACETATE 80 MG/ML
80 INJECTION, SUSPENSION INTRA-ARTICULAR; INTRALESIONAL; INTRAMUSCULAR; SOFT TISSUE
Status: COMPLETED | OUTPATIENT
Start: 2017-11-27 | End: 2017-11-27

## 2017-11-27 RX ADMIN — METHYLPREDNISOLONE ACETATE 80 MG: 80 INJECTION, SUSPENSION INTRA-ARTICULAR; INTRALESIONAL; INTRAMUSCULAR; SOFT TISSUE at 02:11

## 2017-11-27 NOTE — PROGRESS NOTES
Subjective:      Patient ID: Alyx Weir is a 67 y.o. female.    Chief Complaint: Pain of the Right Knee and Pain of the Left Knee      HPI: Alyx Wier  is a 67 y.o. female who c/o Pain of the Right Knee and Pain of the Left Knee   for duration of out one week.  She tells me she helped with inventory at school time.  When she did this, she did a lot of stairs and a lot of walking.  She denies an actual inciting injury, however.  Pain level is 9 out of 10 in severity.  The right knee is most bothersome.  She tells me the left knee is not really giving her too much trouble..  She feels that the right knee gives out on her.  Quality is aching and constant.  Alleviating factors include over-the-counter medications and rest.  Aggravating factors include walking as well as going up and down stairs.  She has intermittent sharp pain.    Review of Systems   Constitution: Negative for fever.   Cardiovascular: Negative for chest pain.   Respiratory: Negative for cough and shortness of breath.    Skin: Negative for rash.   Musculoskeletal: Positive for joint pain and stiffness. Negative for joint swelling.   Gastrointestinal: Positive for heartburn.   Neurological: Negative for headaches and numbness.         Objective:        General    Nursing note and vitals reviewed.  Constitutional: She is oriented to person, place, and time. She appears well-developed and well-nourished.   HENT:   Head: Normocephalic and atraumatic.   Eyes: EOM are normal.   Cardiovascular: Normal rate and regular rhythm.    Pulmonary/Chest: Effort normal.   Abdominal: Soft.   Neurological: She is alert and oriented to person, place, and time.   Psychiatric: She has a normal mood and affect. Her behavior is normal.     General Musculoskeletal Exam   Gait: normal       Right Knee Exam     Inspection   Erythema: absent  Swelling: absent  Effusion: effusion  Deformity: deformity  Bruising: absent    Tenderness   The patient is tender to palpation of the  medial joint line and lateral joint line.    Crepitus   The patient has crepitus of the patella.    Range of Motion   Extension: normal   Flexion:  100 abnormal     Tests   Meniscus   Valerie:  Medial - negative Lateral - negative  Ligament Examination Lachman: normal (-1 to 2mm) PCL-Posterior Drawer: normal (0 to 2mm)     MCL - Valgus: normal (0 to 2mm)  LCL - Varus: normal  Patella   Patellar Apprehension: negative  Patellar Tracking: normal  Patellar Grind: positive    Other   Meniscal Cyst: absent  Popliteal (Baker's) Cyst: absent  Sensation: normal    Comments:  Comp soft, cap refill < 2 sec.    Muscle Strength   Right Lower Extremity   Quadriceps:  5/5   Hamstrin/5     Vascular Exam       Edema  Right Lower Leg: absent            Xray:   Bilateral knees from today images and report were reviewed today.  I agree with the radiologist's interpretation.  Right knee: There is no radiographic evidence of acute osseous, articular, or soft tissue abnormality. Small patellofemoral and medial tibiofemoral compartment marginal osteophytes are present with possible slight joint space loss.  Chondrocalcinosis again noted in the lateral tibiofemoral compartment.  No appreciable change from prior.  Left knee:  There is no radiographic evidence of acute osseous, articular, or soft tissue abnormality. Small patellofemoral and medial tibiofemoral compartment marginal osteophytes present with possible slight joint space loss in the medial tibiofemoral compartment.  No appreciable change from prior.    Assessment:       Encounter Diagnoses   Name Primary?    Acute pain of right knee Yes    Chondromalacia, right knee     Type 2 diabetes mellitus with complication, without long-term current use of insulin           Plan:       Alyx was seen today for pain and pain.    Diagnoses and all orders for this visit:    Acute pain of right knee  -     methylPREDNISolone acetate injection 80 mg; Inject 1 mL (80 mg total) into the  articular space one time.  -     Ambulatory Referral to Physical/Occupational Therapy    Chondromalacia, right knee  -     methylPREDNISolone acetate injection 80 mg; Inject 1 mL (80 mg total) into the articular space one time.  -     Ambulatory Referral to Physical/Occupational Therapy    Type 2 diabetes mellitus with complication, without long-term current use of insulin    Ms. Wisdom comes in today for new problem of the right knee as above.  Although she has been seen in the department in the past, this is a new patient for me.  I have discussed conservative treatment options.  At this time, I recommend a steroid injection as well as physical therapy.  We discussed risks and benefits of a steroid injection.  She wishes to proceed with that today.  Physical therapy will evaluate and treat her knee pain as well.  She should work on VMO strengthening, as well as modalities for pain.  She is on long-term anticoagulation, so she is not a candidate for oral anti-inflammatory medication.  She already has a prescription Voltaren gel.  She can continue to use that.  I will see her back in the office in 1 month.  If she has problems before then, she will notify the office.  She understands that the steroid injection may cause her blood sugars to increase.  Should she have any trouble controlling her blood sugar, she should notify her primary care doctor.  Blood sugar should return to normal for her in 1-2 weeks.  Patient verbalizes understanding and agrees with the above plan.    No Follow-up on file.    Right Knee Injection Report:  After verbal consent was obtained for right knee injection, patient ID, site, and side were verified.  The  right  knee was sterilly prepped in the standard fashion.  A 22-gauge needle was introduced into right knee joint from an anali-lateral site without complication. The right knee was then injected with 20 mg lidocaine plain and 80 mg depomedrol.  A sterile bandaid was applied.  The  patient was informed to apply an ice pack approximately 10min once arriving home and not to do anything strenuous for 24hours.  She was instructed to call if there were any problems. The patient was discharged in stable condition.    The patient understands, chooses and consents to this plan and accepts all   the risks which include but are not limited to the risks mentioned above.     Disclaimer: This note was prepared using a voice recognition system and is likely to have sound alike errors within the text.

## 2017-12-04 ENCOUNTER — ANTI-COAG VISIT (OUTPATIENT)
Dept: CARDIOLOGY | Facility: CLINIC | Age: 67
End: 2017-12-04
Payer: MEDICARE

## 2017-12-04 DIAGNOSIS — Z79.01 LONG-TERM (CURRENT) USE OF ANTICOAGULANTS: Primary | ICD-10-CM

## 2017-12-04 LAB — INR PPP: 3.7 (ref 2–3)

## 2017-12-04 PROCEDURE — 99211 OFF/OP EST MAY X REQ PHY/QHP: CPT | Mod: 25,S$GLB,,

## 2017-12-04 PROCEDURE — 85610 PROTHROMBIN TIME: CPT | Mod: QW,S$GLB,,

## 2017-12-04 NOTE — PROGRESS NOTES
INR is supra-therapeutic. No bleeding issues noted. Recent steroid injection for pain management. No other changes noted. Will hold x1 dose then resume dose and diet until follow-up. Repeat INR in 3 weeks.

## 2017-12-22 ENCOUNTER — OFFICE VISIT (OUTPATIENT)
Dept: URGENT CARE | Facility: CLINIC | Age: 67
End: 2017-12-22
Payer: MEDICARE

## 2017-12-22 VITALS
TEMPERATURE: 98 F | DIASTOLIC BLOOD PRESSURE: 80 MMHG | WEIGHT: 182.88 LBS | SYSTOLIC BLOOD PRESSURE: 130 MMHG | BODY MASS INDEX: 30.43 KG/M2

## 2017-12-22 DIAGNOSIS — R09.82 PND (POST-NASAL DRIP): ICD-10-CM

## 2017-12-22 DIAGNOSIS — J02.9 SORE THROAT: Primary | ICD-10-CM

## 2017-12-22 PROCEDURE — 99214 OFFICE O/P EST MOD 30 MIN: CPT | Mod: S$GLB,,, | Performed by: PHYSICIAN ASSISTANT

## 2017-12-22 PROCEDURE — 99999 PR PBB SHADOW E&M-EST. PATIENT-LVL III: CPT | Mod: PBBFAC,,, | Performed by: PHYSICIAN ASSISTANT

## 2017-12-22 NOTE — PROGRESS NOTES
Subjective:      Patient ID: Alyx Weir is a 67 y.o. female.    Chief Complaint: Sore Throat    Sore Throat    This is a new problem. The current episode started yesterday. Associated symptoms include coughing (dry, tickle). Pertinent negatives include no abdominal pain, congestion, diarrhea, ear pain, headaches, shortness of breath or vomiting.     Review of Systems   Constitutional: Negative for chills, diaphoresis and fever.   HENT: Positive for sore throat (scratchy). Negative for congestion, ear pain, postnasal drip and rhinorrhea.    Respiratory: Positive for cough (dry, tickle). Negative for shortness of breath and wheezing.    Gastrointestinal: Negative for abdominal pain, constipation, diarrhea, nausea and vomiting.   Skin: Negative for rash.   Neurological: Negative for dizziness, light-headedness and headaches.       Objective:   /80 (BP Location: Right arm, Patient Position: Sitting, BP Method: Large (Manual))   Temp 98.2 °F (36.8 °C) (Tympanic)   Wt 83 kg (182 lb 14 oz)   BMI 30.43 kg/m²   Physical Exam   Constitutional: She appears well-developed and well-nourished. She does not appear ill. No distress.   HENT:   Head: Normocephalic and atraumatic.   Right Ear: Tympanic membrane and ear canal normal. Tympanic membrane is not erythematous. No middle ear effusion.   Left Ear: Tympanic membrane and ear canal normal. Tympanic membrane is not erythematous.  No middle ear effusion.   Nose: Nose normal. Right sinus exhibits no maxillary sinus tenderness and no frontal sinus tenderness. Left sinus exhibits no maxillary sinus tenderness and no frontal sinus tenderness.   Mouth/Throat: Uvula is midline and oropharynx is clear and moist. No posterior oropharyngeal erythema.   Signs of PND   Cardiovascular: Normal rate, regular rhythm and normal heart sounds.    No murmur heard.  Pulmonary/Chest: Effort normal and breath sounds normal. No respiratory distress. She has no decreased breath sounds. She  has no wheezes. She has no rhonchi. She has no rales.   Skin: Skin is warm and dry. No rash noted. She is not diaphoretic.   Psychiatric: She has a normal mood and affect. Her speech is normal and behavior is normal. Thought content normal.     Assessment:      1. Sore throat    2. PND (post-nasal drip)       Plan:   Sore throat  -     (Magic mouthwash) 1:1:1 Benadryl 12.5mg/5ml liq, aluminum & magnesium hydroxide-simehticone (Maalox), lidocaine viscous 2%; Swish and spit 15 mLs every 4 (four) hours as needed (throat pain).  Dispense: 240 mL; Refill: 0    PND (post-nasal drip)    Gave handout on sore throat.  Printed AVS and reviewed treatment plan in detail.    Discussed worsening signs/symptoms and when to return to clinic or go to ED.   Patient expresses understanding and agrees with treatment plan.

## 2017-12-22 NOTE — PATIENT INSTRUCTIONS

## 2017-12-27 ENCOUNTER — ANTI-COAG VISIT (OUTPATIENT)
Dept: CARDIOLOGY | Facility: CLINIC | Age: 67
End: 2017-12-27
Payer: MEDICARE

## 2017-12-27 DIAGNOSIS — Z79.01 LONG-TERM (CURRENT) USE OF ANTICOAGULANTS: Primary | ICD-10-CM

## 2017-12-27 LAB — INR PPP: 4.6 (ref 2–3)

## 2017-12-27 PROCEDURE — 85610 PROTHROMBIN TIME: CPT | Mod: QW,S$GLB,,

## 2017-12-27 PROCEDURE — 99211 OFF/OP EST MAY X REQ PHY/QHP: CPT | Mod: 25,S$GLB,,

## 2017-12-27 NOTE — PROGRESS NOTES
INR remains supra-therapeutic and has trended up. No bleeding issues noted. Recently seen in urgent care for sore throat. Using majic mouthwash and cepacol for relief. Will hold x1 dose then lower total weekly dose. Repeat INR in 2 weeks.

## 2018-01-04 ENCOUNTER — PES CALL (OUTPATIENT)
Dept: ADMINISTRATIVE | Facility: CLINIC | Age: 68
End: 2018-01-04

## 2018-01-10 ENCOUNTER — ANTI-COAG VISIT (OUTPATIENT)
Dept: CARDIOLOGY | Facility: CLINIC | Age: 68
End: 2018-01-10
Payer: MEDICARE

## 2018-01-10 DIAGNOSIS — Z79.01 LONG TERM (CURRENT) USE OF ANTICOAGULANTS: Primary | ICD-10-CM

## 2018-01-10 LAB — INR PPP: 3.4 (ref 2–3)

## 2018-01-10 PROCEDURE — 99211 OFF/OP EST MAY X REQ PHY/QHP: CPT | Mod: 25,S$GLB,,

## 2018-01-10 PROCEDURE — 85610 PROTHROMBIN TIME: CPT | Mod: QW,S$GLB,,

## 2018-01-10 RX ORDER — WARFARIN SODIUM 5 MG/1
TABLET ORAL
Qty: 14 TABLET | Refills: 0 | Status: SHIPPED | OUTPATIENT
Start: 2018-01-10 | End: 2018-01-19 | Stop reason: SDUPTHER

## 2018-01-10 NOTE — PROGRESS NOTES
INR remains supra-therapeutic. No bleeding issues noted. Will hold x1 dose then lower total weekly dose. Repeat INR in 3 weeks.

## 2018-01-12 ENCOUNTER — OFFICE VISIT (OUTPATIENT)
Dept: INTERNAL MEDICINE | Facility: CLINIC | Age: 68
End: 2018-01-12
Payer: MEDICARE

## 2018-01-12 VITALS
TEMPERATURE: 98 F | BODY MASS INDEX: 30.49 KG/M2 | HEIGHT: 65 IN | WEIGHT: 183 LBS | SYSTOLIC BLOOD PRESSURE: 166 MMHG | DIASTOLIC BLOOD PRESSURE: 90 MMHG | OXYGEN SATURATION: 98 % | HEART RATE: 54 BPM

## 2018-01-12 DIAGNOSIS — M54.16 CHRONIC LUMBAR RADICULOPATHY: ICD-10-CM

## 2018-01-12 DIAGNOSIS — J44.9 CHRONIC OBSTRUCTIVE PULMONARY DISEASE, UNSPECIFIED COPD TYPE: Chronic | ICD-10-CM

## 2018-01-12 DIAGNOSIS — Z00.00 ENCOUNTER FOR PREVENTIVE HEALTH EXAMINATION: Primary | ICD-10-CM

## 2018-01-12 DIAGNOSIS — D68.51 HETEROZYGOUS FACTOR V LEIDEN MUTATION: Chronic | ICD-10-CM

## 2018-01-12 DIAGNOSIS — I25.10 CORONARY ARTERY DISEASE INVOLVING NATIVE CORONARY ARTERY OF NATIVE HEART WITHOUT ANGINA PECTORIS: ICD-10-CM

## 2018-01-12 DIAGNOSIS — E78.00 PURE HYPERCHOLESTEROLEMIA WITH TARGET LOW DENSITY LIPOPROTEIN (LDL) CHOLESTEROL LESS THAN 130 MG/DL: Chronic | ICD-10-CM

## 2018-01-12 DIAGNOSIS — I65.23 CALCIFICATION OF BOTH CAROTID ARTERIES: ICD-10-CM

## 2018-01-12 DIAGNOSIS — Z79.01 ANTICOAGULATED ON COUMADIN: Chronic | ICD-10-CM

## 2018-01-12 DIAGNOSIS — E66.9 OBESITY (BMI 30.0-34.9): Chronic | ICD-10-CM

## 2018-01-12 DIAGNOSIS — G47.33 OSA ON CPAP: Chronic | ICD-10-CM

## 2018-01-12 DIAGNOSIS — E11.9 TYPE 2 DIABETES MELLITUS WITHOUT COMPLICATION, WITHOUT LONG-TERM CURRENT USE OF INSULIN: ICD-10-CM

## 2018-01-12 DIAGNOSIS — M94.261 CHONDROMALACIA, RIGHT KNEE: ICD-10-CM

## 2018-01-12 DIAGNOSIS — I10 ESSENTIAL HYPERTENSION: Chronic | ICD-10-CM

## 2018-01-12 DIAGNOSIS — I77.1 TORTUOUS AORTA: ICD-10-CM

## 2018-01-12 DIAGNOSIS — I51.89 LEFT VENTRICULAR DIASTOLIC DYSFUNCTION WITH PRESERVED SYSTOLIC FUNCTION: Chronic | ICD-10-CM

## 2018-01-12 PROBLEM — M17.11 PRIMARY OSTEOARTHRITIS OF RIGHT KNEE: Status: ACTIVE | Noted: 2017-04-06

## 2018-01-12 PROCEDURE — 99499 UNLISTED E&M SERVICE: CPT | Mod: S$GLB,,, | Performed by: NURSE PRACTITIONER

## 2018-01-12 PROCEDURE — 99999 PR PBB SHADOW E&M-EST. PATIENT-LVL IV: CPT | Mod: PBBFAC,,, | Performed by: NURSE PRACTITIONER

## 2018-01-12 PROCEDURE — G0439 PPPS, SUBSEQ VISIT: HCPCS | Mod: S$GLB,,, | Performed by: NURSE PRACTITIONER

## 2018-01-12 NOTE — PROGRESS NOTES
"Alyx Weir presented for a  Medicare AWV and comprehensive Health Risk Assessment today. The following components were reviewed and updated:    · Medical history  · Family History  · Social history  · Allergies and Current Medications  · Health Risk Assessment  · Health Maintenance  · Care Team     ** See Completed Assessments for Annual Wellness Visit within the encounter summary.**       The following assessments were completed:  · Living Situation  · CAGE  · Depression Screening  · Timed Get Up and Go  · Whisper Test  · Cognitive Function Screening  · Nutrition Screening  · ADL Screening  · PAQ Screening    Vitals:    01/12/18 1016   BP: (!) 166/90   BP Location: Right arm   Patient Position: Sitting   Pulse: (!) 54   Temp: 97.9 °F (36.6 °C)   TempSrc: Tympanic   SpO2: 98%   Weight: 83 kg (182 lb 15.7 oz)   Height: 5' 5" (1.651 m)     Body mass index is 30.45 kg/m².  Physical Exam   Constitutional: She appears well-developed.   HENT:   Head: Normocephalic and atraumatic.   Eyes: Pupils are equal, round, and reactive to light.   Neck: Carotid bruit is not present.   Cardiovascular: Normal rate, regular rhythm, normal heart sounds, intact distal pulses and normal pulses.  Exam reveals no gallop.    No murmur heard.  Pulmonary/Chest: Effort normal and breath sounds normal.   Abdominal: Soft. Normal appearance and bowel sounds are normal. She exhibits no distension. There is no tenderness.   Musculoskeletal: Normal range of motion. She exhibits no edema or tenderness.   Neurological: She is alert. She exhibits normal muscle tone. Gait normal.   Sensory exam of the feet is normal, tested with the monofilament.      Skin: Skin is warm, dry and intact.   Psychiatric: She has a normal mood and affect. Her speech is normal and behavior is normal. Judgment and thought content normal. Cognition and memory are normal.   Nursing note and vitals reviewed.        Diagnoses and health risks identified today and associated " recommendations/orders:    1. Encounter for preventive health examination    2. Essential hypertension  This problem is currently not controlled. BP elevated today. Pt states compliance on Apresoline,Tribenzior and Coreg daily  .states no dizzy and headaches  Please follow up with your PCP or cardiologist as plan to recheck blood pressure. Pt has appointment with PCP on 1/19/2018    3. Type 2 diabetes mellitus without complication, without long-term current use of insulin  Component      Latest Ref Rng & Units 4/3/2017   Hemoglobin A1C      4.5 - 6.2 % 6.5 (H)   Stable and controlled  Blood sugars- Watching diet and active in PT. Continue current treatment plan as previously prescribed with your PCP    4. Coronary artery disease involving native coronary artery of native heart without angina pectoris  Stable and controlled Denied chest pain, no sob, no leg swelling, no syncope, no CNS symptoms.   10/17/2008  [facility] - Non obstructive CAD.  3/8/2013 [facility] - Non obstructive CAD: The mid LAD has a 30% stenosis. There is SHARRON 3 flow. 30% x   Continue current treatment plan as previously prescribed with your cardiologist     5. Obesity (BMI 30.0-34.9)  Chronic and Ongoing.  BMI 30.45 kg today Per chart 8 lbs loss in the last year. States she is watching diet Continue current treatment plan as previously prescribed with your PCP    6. Left ventricular diastolic dysfunction with preserved systolic function  Echo 3 /8/ 2013 Mild left atrial enlargement.     2 - Eccentric hypertrophy.     3 - Normal left ventricular function (EF 60%).     4 - Diastolic dysfunction.     5 - Intermediate central venous pressure.     6 - Trivial mitral regurgitation.   Stable and controlled on  hypertension medications. Denies chest pain or SOB  Continue current treatment plan as previously prescribed with your cardiologist    7. Anticoagulated on Coumadin  Component      Latest Ref Rng & Units 1/10/2018   Coumadin Monitoring INR      2.0 - 3.0 3.4  (A)   Stable and controlled- hx of Factor V disorder. Slightly elevated INR.  Pt denies acute bleeding, Continue current treatment plan as previously prescribed with the coumadin  Clinic and cardiologist    8. Tortuous aorta  12/13/11  Chest xray showed THE AORTA IS MILDLY TORTUOUS.  Stable and controlled blood pressure and cholesterol. Continue current treatment plan as previously prescribed with your PCP and cardiologist     9. Heterozygous factor V Leiden mutation  Stable and controlled on Coumadin daily. Pt reports history of right leg DVT. Continue current treatment plan as previously prescribed with your hematologist- Dr. Sinha and coumadin clinic    10. Pure hypercholesterolemia with target low density lipoprotein (LDL) cholesterol less than 130 mg/dL  Component      Latest Ref Rng & Units 1/16/2017   Triglycerides      30 - 150 mg/dL 63   HDL      40 - 75 mg/dL 58   LDL Cholesterol      63.0 - 159.0 mg/dL 91.4   HDL/Chol Ratio      20.0 - 50.0 % 35.8   Total Cholesterol/HDL Ratio      2.0 - 5.0 2.8   Non-HDL Cholesterol      mg/dL 104   Stable and controlled on Pravachol. Continue current treatment plan as previously prescribed with your PCP.     11. Calcification of both carotid arteries  11/12/ 2014 There is 0 - 19% right Internal Carotid stenosis. There is 0 - 19% left Internal Carotid stenosis.  Chronic and Stable. Pt asymptomatic  Continue current treatment plan as previously prescribed with your PCP    12. VIRGIL on CPAP  Chronic and Ongoing.  Last sleep study done 5/21/ 2013  Pt states  She not on the mask due to an  unproper fit. Scheduled to see pulmonologist to discuss on 1/15/ 2018    13. Chronic obstructive pulmonary disease, unspecified COPD type  Stable and controlled. No flare up or no current treatment . Pt has appointment with pulmonologist  1/15/ 2018    14. Chronic lumbar radiculopathy  Pt report Hx of lumbar spurs removal Continue current treatment plan as previously prescribed with  physical medicine  Stable and controlled on Gabapentin. Pt states she has started PT x  3 months ago- currently going  2 x week for 1 hour  with pain relief .Continue current treatment plan as previously prescribed with your  pyschial therapy and physical medicine    15. Chondromalacia, right knee  Stable and controlled. Pt states she has started PT x  3 months ago- currently going  2 x week for a  1 hour with pain relief .Continue current treatment plan as previously prescribed with your  physical therapy and physical medicine      _X_Patient is interested, I provided paper work and offered to discuss.I offered to discuss end of life issues, including information on how to make advance directives that the patient could use to name someone who would make medical decisions on their behalf if they became too ill to make themselves.      Pt decline flu and  Prevnar today  Scheduled for a DM eye exam  3/8/2018      Provided Alyx with a 5-10 year written screening schedule and personal prevention plan. Recommendations were developed using the USPSTF age appropriate recommendations. Education, counseling, and referrals were provided as needed. After Visit Summary printed and given to patient which includes a list of additional screenings\tests needed.     1 year follow  Up    Amanda Feliciano NP

## 2018-01-12 NOTE — Clinical Note
Dr. Clemons,your patient was seen today for a HRA visit.  Please review # 1. Pt has an appointment on 1/19/ 2018  I have included a copy of my visit note, feel free to contact me with any questions.  Thank you for allowing me to participate in the care of your patients.  Amanda Feliciano NP

## 2018-01-19 DIAGNOSIS — E11.9 TYPE 2 DIABETES MELLITUS WITHOUT COMPLICATION: ICD-10-CM

## 2018-01-19 RX ORDER — WARFARIN SODIUM 5 MG/1
TABLET ORAL
Qty: 90 TABLET | Refills: 0 | Status: SHIPPED | OUTPATIENT
Start: 2018-01-19 | End: 2018-02-05 | Stop reason: SDUPTHER

## 2018-02-05 ENCOUNTER — ANTI-COAG VISIT (OUTPATIENT)
Dept: CARDIOLOGY | Facility: CLINIC | Age: 68
End: 2018-02-05
Payer: MEDICARE

## 2018-02-05 DIAGNOSIS — Z79.01 LONG TERM (CURRENT) USE OF ANTICOAGULANTS: Primary | ICD-10-CM

## 2018-02-05 LAB — INR PPP: 1.9 (ref 2–3)

## 2018-02-05 PROCEDURE — 85610 PROTHROMBIN TIME: CPT | Mod: QW,S$GLB,,

## 2018-02-05 RX ORDER — WARFARIN SODIUM 5 MG/1
TABLET ORAL
Qty: 90 TABLET | Refills: 0 | Status: SHIPPED | OUTPATIENT
Start: 2018-02-05 | End: 2018-05-18 | Stop reason: SDUPTHER

## 2018-02-05 NOTE — PROGRESS NOTES
INR is slightly sub-therapeutic. Will re-challenge current dosage until follow-up. Patient reports no bleeding or bruising, no new medications and no diet changes.  I reminded the patient to call with any problems, changes or questions before the next visit.

## 2018-02-23 NOTE — PROGRESS NOTES
INR today is therapeutic. Patient reports allergy symptoms and received steroid injection earlier this week and taking zyrtec. Will continue dose and diet until follow-up.  
1-2 drinks

## 2018-03-02 ENCOUNTER — OFFICE VISIT (OUTPATIENT)
Dept: PULMONOLOGY | Facility: CLINIC | Age: 68
End: 2018-03-02
Payer: MEDICARE

## 2018-03-02 VITALS
BODY MASS INDEX: 31.04 KG/M2 | HEIGHT: 65 IN | WEIGHT: 186.31 LBS | HEART RATE: 56 BPM | RESPIRATION RATE: 18 BRPM | OXYGEN SATURATION: 98 % | DIASTOLIC BLOOD PRESSURE: 72 MMHG | SYSTOLIC BLOOD PRESSURE: 128 MMHG

## 2018-03-02 DIAGNOSIS — E66.9 OBESITY (BMI 30.0-34.9): Chronic | ICD-10-CM

## 2018-03-02 DIAGNOSIS — G47.33 OSA ON CPAP: Primary | ICD-10-CM

## 2018-03-02 PROCEDURE — 3074F SYST BP LT 130 MM HG: CPT | Mod: S$GLB,,, | Performed by: NURSE PRACTITIONER

## 2018-03-02 PROCEDURE — 3078F DIAST BP <80 MM HG: CPT | Mod: S$GLB,,, | Performed by: NURSE PRACTITIONER

## 2018-03-02 PROCEDURE — 99999 PR PBB SHADOW E&M-EST. PATIENT-LVL III: CPT | Mod: PBBFAC,,, | Performed by: NURSE PRACTITIONER

## 2018-03-02 PROCEDURE — 99213 OFFICE O/P EST LOW 20 MIN: CPT | Mod: S$GLB,,, | Performed by: NURSE PRACTITIONER
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
## 2018-03-02 NOTE — ASSESSMENT & PLAN NOTE
Benefits, not compliant.  Adherence   Instructed on use of ramp button.  Follow up in 6 weeks for compliance download

## 2018-03-05 ENCOUNTER — ANTI-COAG VISIT (OUTPATIENT)
Dept: CARDIOLOGY | Facility: CLINIC | Age: 68
End: 2018-03-05
Payer: MEDICARE

## 2018-03-05 DIAGNOSIS — Z79.01 LONG TERM (CURRENT) USE OF ANTICOAGULANTS: Primary | ICD-10-CM

## 2018-03-05 LAB — INR PPP: 1.1 (ref 2–3)

## 2018-03-05 PROCEDURE — 99211 OFF/OP EST MAY X REQ PHY/QHP: CPT | Mod: 25,S$GLB,,

## 2018-03-05 PROCEDURE — 85610 PROTHROMBIN TIME: CPT | Mod: QW,S$GLB,,

## 2018-03-05 NOTE — PROGRESS NOTES
INR is very low. Patient confirms dose and compliance. Denies changes in diet. Denies changes in medications. Will increase total weekly dose. Repeat INR in 1 week.

## 2018-03-12 ENCOUNTER — ANTI-COAG VISIT (OUTPATIENT)
Dept: CARDIOLOGY | Facility: CLINIC | Age: 68
End: 2018-03-12
Payer: MEDICARE

## 2018-03-12 DIAGNOSIS — Z79.01 LONG TERM (CURRENT) USE OF ANTICOAGULANTS: Primary | ICD-10-CM

## 2018-03-12 LAB — INR PPP: 1.8 (ref 2–3)

## 2018-03-12 PROCEDURE — 99211 OFF/OP EST MAY X REQ PHY/QHP: CPT | Mod: 25,S$GLB,,

## 2018-03-12 PROCEDURE — 85610 PROTHROMBIN TIME: CPT | Mod: QW,S$GLB,,

## 2018-03-12 NOTE — PROGRESS NOTES
INR remains sub-therapeutic but is trending up. Will begin new dose. Repeat INR in 3 weeks. Patient voiced understanding.

## 2018-04-04 ENCOUNTER — ANTI-COAG VISIT (OUTPATIENT)
Dept: CARDIOLOGY | Facility: CLINIC | Age: 68
End: 2018-04-04
Payer: MEDICARE

## 2018-04-04 DIAGNOSIS — Z79.01 LONG TERM (CURRENT) USE OF ANTICOAGULANTS: Primary | ICD-10-CM

## 2018-04-04 LAB — INR PPP: 1.2 (ref 2–3)

## 2018-04-04 PROCEDURE — 85610 PROTHROMBIN TIME: CPT | Mod: QW,S$GLB,, | Performed by: INTERNAL MEDICINE

## 2018-04-04 PROCEDURE — 99211 OFF/OP EST MAY X REQ PHY/QHP: CPT | Mod: QW,25,S$GLB, | Performed by: INTERNAL MEDICINE

## 2018-04-04 NOTE — PROGRESS NOTES
Patient's INR remains subtherapeutic.  Reports taking 1/2 tablet (2.5 mg) on Wednesday by error.  Instructed patient to take 10 mg today (4/04/2018), then resume schedule of 10 mg on Mondays and 5 mg on other days per dose calendar.  Recheck in 1 week.

## 2018-04-11 ENCOUNTER — ANTI-COAG VISIT (OUTPATIENT)
Dept: CARDIOLOGY | Facility: CLINIC | Age: 68
End: 2018-04-11
Payer: MEDICARE

## 2018-04-11 DIAGNOSIS — Z79.01 LONG TERM (CURRENT) USE OF ANTICOAGULANTS: Primary | ICD-10-CM

## 2018-04-11 LAB — INR PPP: 1.4 (ref 2–3)

## 2018-04-11 PROCEDURE — 99211 OFF/OP EST MAY X REQ PHY/QHP: CPT | Mod: 25,S$GLB,ICN, | Performed by: INTERNAL MEDICINE

## 2018-04-11 PROCEDURE — 85610 PROTHROMBIN TIME: CPT | Mod: QW,S$GLB,, | Performed by: INTERNAL MEDICINE

## 2018-04-11 RX ORDER — WARFARIN 10 MG/1
10 TABLET ORAL
COMMUNITY
End: 2018-05-18 | Stop reason: SDUPTHER

## 2018-04-11 NOTE — PROGRESS NOTES
Patient's INR remain sub therapeutic at 1.4.  Reports no missed doses or diet changes.  Instructed patient to start new dosage of 10 mg on Mon, Wed and Fri, then 5 mg on Tues, Thurs, Sat and Sun.  Recheck in 1 week.

## 2018-04-19 ENCOUNTER — OFFICE VISIT (OUTPATIENT)
Dept: PULMONOLOGY | Facility: CLINIC | Age: 68
End: 2018-04-19
Payer: MEDICARE

## 2018-04-19 ENCOUNTER — ANTI-COAG VISIT (OUTPATIENT)
Dept: CARDIOLOGY | Facility: CLINIC | Age: 68
End: 2018-04-19
Payer: MEDICARE

## 2018-04-19 VITALS
WEIGHT: 191.81 LBS | RESPIRATION RATE: 18 BRPM | HEIGHT: 65 IN | DIASTOLIC BLOOD PRESSURE: 80 MMHG | HEART RATE: 62 BPM | OXYGEN SATURATION: 99 % | BODY MASS INDEX: 31.96 KG/M2 | SYSTOLIC BLOOD PRESSURE: 152 MMHG

## 2018-04-19 DIAGNOSIS — I10 ESSENTIAL HYPERTENSION: Chronic | ICD-10-CM

## 2018-04-19 DIAGNOSIS — Z79.01 LONG TERM (CURRENT) USE OF ANTICOAGULANTS: Primary | ICD-10-CM

## 2018-04-19 DIAGNOSIS — E66.9 OBESITY (BMI 30.0-34.9): ICD-10-CM

## 2018-04-19 DIAGNOSIS — G47.33 OSA ON CPAP: Primary | ICD-10-CM

## 2018-04-19 LAB — INR PPP: 2.1 (ref 2–3)

## 2018-04-19 PROCEDURE — 85610 PROTHROMBIN TIME: CPT | Mod: QW,S$GLB,, | Performed by: INTERNAL MEDICINE

## 2018-04-19 PROCEDURE — 99499 UNLISTED E&M SERVICE: CPT | Mod: S$GLB,,, | Performed by: NURSE PRACTITIONER

## 2018-04-19 PROCEDURE — 99999 PR PBB SHADOW E&M-EST. PATIENT-LVL III: CPT | Mod: PBBFAC,,, | Performed by: NURSE PRACTITIONER

## 2018-04-19 PROCEDURE — 99213 OFFICE O/P EST LOW 20 MIN: CPT | Mod: S$GLB,,, | Performed by: NURSE PRACTITIONER

## 2018-04-19 NOTE — PROGRESS NOTES
Subjective:      Patient ID: Alyx Weir is a 68 y.o. female.    Chief Complaint: kaylee on cpap    HPI: Alyx Weir is here for follow up for KAYLEE and CPAP complaince assessment related to not compliant at yearly follow up on 3/2/2018.    She is on CPAP of 10 cmH2O pressure.  She is compliant with CPAP use. Complaince download today reveals 83.3% of days with greater than 4 hours of device use.   Patient reports benefit from CPAP use and denies snoring and excessive daytime sleepiness.  Patient reports no complaints    Patient present CPAP machine was obtained >=5 years, she is ready for new machine, old machine is heavy and makes a lot of noise. She travels often and would like lighter machine than present.     Previous Report Reviewed: lab reports and office notes     Past Medical History: The following portions of the patient's history were reviewed and updated as appropriate:   She  has a past surgical history that includes Hysterectomy; Back surgery; Bladder surgery; Cardiac catheterization (03/2013); Colonoscopy (N/A, 11/13/2015); Oophorectomy; bladder cyst removal; and Lumbar spine surgery.  Her family history includes Cataracts in her mother; Diabetes in her father, paternal uncle, sister, and sister; Glaucoma in her sister; Heart disease in her mother; Hypertension in her brother, mother, sister, sister, and sister.  She  reports that she has never smoked. She has never used smokeless tobacco. She reports that she does not drink alcohol or use drugs.  She has a current medication list which includes the following prescription(s): aspirin, diclofenac sodium, gabapentin, olmesartan-amlodipin-hcthiazid, pravastatin, tramadol hcl, vitamin d, warfarin, warfarin, carvedilol, and hydralazine.  She is allergic to erythromycin; amlodipine; diazepam; iodine; meperidine; morphine; and primidone..    The following portions of the patient's history were reviewed and updated as appropriate: allergies, current  "medications, past family history, past medical history, past social history, past surgical history and problem list.    Review of Systems   Constitutional: Negative for fever, chills, weight loss, weight gain, activity change, appetite change, fatigue and night sweats.   HENT: Negative for postnasal drip, rhinorrhea, sinus pressure, voice change and congestion.    Eyes: Negative for redness and itching.   Respiratory: Negative for snoring, cough, sputum production, chest tightness, shortness of breath, wheezing, orthopnea, asthma nighttime symptoms, dyspnea on extertion, use of rescue inhaler and somnolence.    Cardiovascular: Negative.  Negative for chest pain, palpitations and leg swelling.   Genitourinary: Negative for difficulty urinating and hematuria.   Endocrine: Negative for cold intolerance and heat intolerance.    Musculoskeletal: Negative for arthralgias, gait problem, joint swelling and myalgias.   Skin: Negative.    Gastrointestinal: Negative for nausea, vomiting, abdominal pain and acid reflux.   Neurological: Negative for dizziness, weakness, light-headedness and headaches.   Hematological: Negative for adenopathy. No excessive bruising.   All other systems reviewed and are negative.     Objective:   BP (!) 152/80 (BP Location: Right arm, Patient Position: Sitting, BP Method: Large (Manual))   Pulse 62   Resp 18   Ht 5' 5" (1.651 m)   Wt 87 kg (191 lb 12.8 oz)   SpO2 99%   BMI 31.92 kg/m²   Physical Exam   Constitutional: She is oriented to person, place, and time. Vital signs are normal. She appears well-developed and well-nourished. She is active and cooperative.  Non-toxic appearance. She does not appear ill. No distress.   HENT:   Head: Normocephalic.   Right Ear: External ear normal.   Left Ear: External ear normal.   Nose: Nose normal.   Mouth/Throat: Oropharynx is clear and moist. No oropharyngeal exudate.   Eyes: Conjunctivae are normal.   Neck: Normal range of motion. Neck supple. "   Cardiovascular: Normal rate, regular rhythm, normal heart sounds and intact distal pulses.    Pulmonary/Chest: Effort normal and breath sounds normal. No stridor.   Abdominal: Soft.   Musculoskeletal: Normal range of motion.   Lymphadenopathy:     She has no cervical adenopathy.   Neurological: She is alert and oriented to person, place, and time.   Skin: Skin is warm and dry.   Psychiatric: She has a normal mood and affect. Her behavior is normal. Judgment and thought content normal.   Vitals reviewed.    Personal Diagnostic Review  CPAP download  CPAP 10 cm  Compliance Summary  3/20/2018 - 4/18/2018 (30 days)  Days with Device Usage 27 days  Days without Device Usage 3 days  Percent Days with Device Usage 90.0%  Cumulative Usage 7 days 2 hrs. 50 mins. 31 secs.  Maximum Usage (1 Day) 9 hrs. 14 mins. 36 secs.  Average Usage (All Days) 5 hrs. 41 mins. 41 secs.  Average Usage (Days Used) 6 hrs. 19 mins. 38 secs.  Minimum Usage (1 Day) 3 hrs. 6 mins. 53 secs.  Percent of Days with Usage >= 4 Hours 83.3%  Percent of Days with Usage < 4 Hours 16.7%  Date Range  Total Blower Time 7 days 3 hrs. 14 mins. 16 secs.  CPAP Summary ([facility])  Average Time in Large Leak Per Day 1 mins. 2 secs.  Average AHI 1.2  CPAP 10.0 cmH2O    Assessment:     1. VIRGIL on CPAP    2. Obesity (BMI 30.0-34.9)    3. Essential hypertension      Orders Placed This Encounter   Procedures    CPAP/BIPAP SUPPLIES     Benefits and compliant.  Yearly supply order  90 day supply. 4 refills.   [facility]: [facility]     Order Specific Question:   Type of mask:     Answer:   Nasal     Order Specific Question:   Headgear?     Answer:   Yes     Order Specific Question:   Tubing?     Answer:   Yes     Order Specific Question:   Humidifier chamber?     Answer:   Yes     Order Specific Question:   Chin strap?     Answer:   Yes     Order Specific Question:   Filters?     Answer:   Yes     Order Specific Question:   Length of need (1-99 months):     Answer:   99     CPAP FOR HOME USE     Present CPAP machine over 5 years old, provided new CPAP machine.    [facility]: [facility]     Order Specific Question:   Type:     Answer:   CPAP     Order Specific Question:   CPAP setting (cmH20):     Answer:   10     Order Specific Question:   Length of need (1-99 months):     Answer:   99     Order Specific Question:   Humidification:     Answer:   Heated     Order Specific Question:   Type of mask:     Answer:   Nasal     Order Specific Question:   Headgear?     Answer:   Yes     Order Specific Question:   Tubing?     Answer:   Yes     Order Specific Question:   Humidifier chamber?     Answer:   Yes     Order Specific Question:   Chin strap?     Answer:   Yes     Order Specific Question:   Filters?     Answer:   Yes     Plan:     Problem List Items Addressed This Visit     Essential hypertension (Chronic)     Not controlled bp reading at this visit with know diagnosis hypertension, followed by Primary Care Provider.  Forgot to take bp medication this morning  Monitor and home over next week daily bring in readings to Primary Care Provider's office.  Advised to schedule follow up for bp recheck in 7-10 days.          Obesity (BMI 30.0-34.9) (Chronic)     Continue to encourage exercise and dietary modification to obtain normal weight.            VIRGIL on CPAP - Primary (Chronic)     Benefits and compliant with CPAP 10 cm  Adherence  Yearly supply order   Order new CPAP machine present over 5 years old.          Relevant Orders    CPAP/BIPAP SUPPLIES    CPAP FOR HOME USE         ([facility] - [facility]  Reviewed therapeutic goals for positive airway pressure therapy CPAP  Ideal is usage 100% of nights for 6 - 8 hours per night. Minimum usage is 70% of night for at least 4 hours per night used. Pateint expressed understanding.     Follow-up in about 9 weeks (around 6/21/2018) for CPAP compliance download after obtaining replacement cpap machine. .

## 2018-04-19 NOTE — ASSESSMENT & PLAN NOTE
Benefits and compliant with CPAP 10 cm  Adherence  Yearly supply order   Order new CPAP machine present over 5 years old.

## 2018-04-19 NOTE — ASSESSMENT & PLAN NOTE
Not controlled bp reading at this visit with know diagnosis hypertension, followed by Primary Care Provider.  Forgot to take bp medication this morning  Monitor and home over next week daily bring in readings to Primary Care Provider's office.  Advised to schedule follow up for bp recheck in 7-10 days.

## 2018-04-19 NOTE — PROGRESS NOTES
Patient's INR is therapeutic at 2.1.  No changes in dose.  Recheck in 3 weeks.  Please call should you have any questions or concerns at [phone] or [phone].

## 2018-05-07 ENCOUNTER — ANTI-COAG VISIT (OUTPATIENT)
Dept: CARDIOLOGY | Facility: CLINIC | Age: 68
End: 2018-05-07
Payer: MEDICARE

## 2018-05-07 ENCOUNTER — OFFICE VISIT (OUTPATIENT)
Dept: OPHTHALMOLOGY | Facility: CLINIC | Age: 68
End: 2018-05-07
Payer: MEDICARE

## 2018-05-07 DIAGNOSIS — H52.4 MYOPIA WITH PRESBYOPIA OF BOTH EYES: ICD-10-CM

## 2018-05-07 DIAGNOSIS — Z13.5 SCREENING FOR GLAUCOMA: ICD-10-CM

## 2018-05-07 DIAGNOSIS — E11.9 TYPE 2 DIABETES MELLITUS WITHOUT RETINOPATHY: Primary | ICD-10-CM

## 2018-05-07 DIAGNOSIS — I10 ESSENTIAL HYPERTENSION: ICD-10-CM

## 2018-05-07 DIAGNOSIS — H52.13 MYOPIA WITH PRESBYOPIA OF BOTH EYES: ICD-10-CM

## 2018-05-07 DIAGNOSIS — Z79.01 LONG TERM (CURRENT) USE OF ANTICOAGULANTS: Primary | ICD-10-CM

## 2018-05-07 LAB — INR PPP: 1.5 (ref 2–3)

## 2018-05-07 PROCEDURE — 85610 PROTHROMBIN TIME: CPT | Mod: QW,S$GLB,, | Performed by: NUCLEAR MEDICINE

## 2018-05-07 PROCEDURE — 92015 DETERMINE REFRACTIVE STATE: CPT | Mod: S$GLB,,, | Performed by: OPTOMETRIST

## 2018-05-07 PROCEDURE — 92014 COMPRE OPH EXAM EST PT 1/>: CPT | Mod: S$GLB,,, | Performed by: OPTOMETRIST

## 2018-05-07 PROCEDURE — 99999 PR PBB SHADOW E&M-EST. PATIENT-LVL II: CPT | Mod: PBBFAC,,, | Performed by: OPTOMETRIST

## 2018-05-07 PROCEDURE — 99499 UNLISTED E&M SERVICE: CPT | Mod: S$GLB,,, | Performed by: OPTOMETRIST

## 2018-05-07 NOTE — PROGRESS NOTES
HPI     Last [facility] exam 07/11/2016  Diabetic/NIDDM  Screening for glaucoma  RE  Hx of Trichiasis  Decreased distance vision  Removes glasses at near     Last edited by Bishop Mckeon MA on 5/7/2018  9:22 AM. (History)            Assessment /Plan     For exam results, see Encounter Report.    Type 2 diabetes mellitus without retinopathy    Essential hypertension    Screening for glaucoma    Myopia with presbyopia of both eyes      No diabetic retinopathy in either eye.  Ho hypertensive retinopathy..  Glaucoma screening negative both eyes.  Updated glasses prescription.  Return to clinic 1 yr.

## 2018-05-07 NOTE — PROGRESS NOTES
Patient's INR is sub therapeutic at 1.5.  Reports no missed dose.  Instructed to decrease Vitamin K in diet.  Patient will maintain regular scheduled dose with an increase of 10 mg on Tuesday - only.  Recheck in 2 weeks.

## 2018-05-08 ENCOUNTER — OFFICE VISIT (OUTPATIENT)
Dept: FAMILY MEDICINE | Facility: CLINIC | Age: 68
End: 2018-05-08
Payer: MEDICARE

## 2018-05-08 ENCOUNTER — LAB VISIT (OUTPATIENT)
Dept: LAB | Facility: HOSPITAL | Age: 68
End: 2018-05-08
Attending: INTERNAL MEDICINE
Payer: MEDICARE

## 2018-05-08 VITALS
SYSTOLIC BLOOD PRESSURE: 138 MMHG | DIASTOLIC BLOOD PRESSURE: 86 MMHG | HEIGHT: 65 IN | TEMPERATURE: 99 F | OXYGEN SATURATION: 99 % | WEIGHT: 186.31 LBS | BODY MASS INDEX: 31.04 KG/M2 | HEART RATE: 56 BPM | RESPIRATION RATE: 18 BRPM

## 2018-05-08 DIAGNOSIS — I10 ESSENTIAL HYPERTENSION: Chronic | ICD-10-CM

## 2018-05-08 DIAGNOSIS — E78.00 PURE HYPERCHOLESTEROLEMIA WITH TARGET LOW DENSITY LIPOPROTEIN (LDL) CHOLESTEROL LESS THAN 130 MG/DL: Chronic | ICD-10-CM

## 2018-05-08 DIAGNOSIS — Z12.31 ENCOUNTER FOR SCREENING MAMMOGRAM FOR BREAST CANCER: ICD-10-CM

## 2018-05-08 DIAGNOSIS — M79.2 NEURITIS: ICD-10-CM

## 2018-05-08 DIAGNOSIS — Z79.01 ANTICOAGULATED ON COUMADIN: Chronic | ICD-10-CM

## 2018-05-08 DIAGNOSIS — G47.33 OSA ON CPAP: Chronic | ICD-10-CM

## 2018-05-08 DIAGNOSIS — M54.16 CHRONIC LUMBAR RADICULOPATHY: ICD-10-CM

## 2018-05-08 DIAGNOSIS — E11.9 TYPE 2 DIABETES MELLITUS WITHOUT COMPLICATION, WITHOUT LONG-TERM CURRENT USE OF INSULIN: Primary | ICD-10-CM

## 2018-05-08 DIAGNOSIS — E11.9 TYPE 2 DIABETES MELLITUS WITHOUT COMPLICATION, WITHOUT LONG-TERM CURRENT USE OF INSULIN: ICD-10-CM

## 2018-05-08 DIAGNOSIS — D68.51 HETEROZYGOUS FACTOR V LEIDEN MUTATION: Chronic | ICD-10-CM

## 2018-05-08 DIAGNOSIS — I25.10 CORONARY ARTERY DISEASE INVOLVING NATIVE CORONARY ARTERY OF NATIVE HEART WITHOUT ANGINA PECTORIS: ICD-10-CM

## 2018-05-08 LAB
ALBUMIN SERPL BCP-MCNC: 3.9 G/DL
ALP SERPL-CCNC: 49 U/L
ALT SERPL W/O P-5'-P-CCNC: 19 U/L
ANION GAP SERPL CALC-SCNC: 11 MMOL/L
ANISOCYTOSIS BLD QL SMEAR: SLIGHT
AST SERPL-CCNC: 21 U/L
BASOPHILS # BLD AUTO: 0.06 K/UL
BASOPHILS NFR BLD: 0.9 %
BILIRUB SERPL-MCNC: 0.9 MG/DL
BUN SERPL-MCNC: 19 MG/DL
BURR CELLS BLD QL SMEAR: ABNORMAL
CALCIUM SERPL-MCNC: 10 MG/DL
CHLORIDE SERPL-SCNC: 104 MMOL/L
CHOLEST SERPL-MCNC: 197 MG/DL
CHOLEST/HDLC SERPL: 2.8 {RATIO}
CO2 SERPL-SCNC: 25 MMOL/L
CREAT SERPL-MCNC: 0.8 MG/DL
DIFFERENTIAL METHOD: ABNORMAL
EOSINOPHIL # BLD AUTO: 0.2 K/UL
EOSINOPHIL NFR BLD: 3.5 %
ERYTHROCYTE [DISTWIDTH] IN BLOOD BY AUTOMATED COUNT: 15.7 %
EST. GFR  (AFRICAN AMERICAN): >60 ML/MIN/1.73 M^2
EST. GFR  (NON AFRICAN AMERICAN): >60 ML/MIN/1.73 M^2
ESTIMATED AVG GLUCOSE: 134 MG/DL
GLUCOSE SERPL-MCNC: 94 MG/DL
HBA1C MFR BLD HPLC: 6.3 %
HCT VFR BLD AUTO: 43.5 %
HDLC SERPL-MCNC: 71 MG/DL
HDLC SERPL: 36 %
HGB BLD-MCNC: 13.7 G/DL
IMM GRANULOCYTES # BLD AUTO: 0.03 K/UL
IMM GRANULOCYTES NFR BLD AUTO: 0.5 %
LDLC SERPL CALC-MCNC: 113 MG/DL
LYMPHOCYTES # BLD AUTO: 1.6 K/UL
LYMPHOCYTES NFR BLD: 24.7 %
MCH RBC QN AUTO: 28.2 PG
MCHC RBC AUTO-ENTMCNC: 31.5 G/DL
MCV RBC AUTO: 90 FL
MONOCYTES # BLD AUTO: 0.7 K/UL
MONOCYTES NFR BLD: 11.2 %
NEUTROPHILS # BLD AUTO: 3.9 K/UL
NEUTROPHILS NFR BLD: 59.2 %
NONHDLC SERPL-MCNC: 126 MG/DL
NRBC BLD-RTO: 0 /100 WBC
OVALOCYTES BLD QL SMEAR: ABNORMAL
PLATELET # BLD AUTO: 287 K/UL
PLATELET BLD QL SMEAR: ABNORMAL
PMV BLD AUTO: 10.1 FL
POIKILOCYTOSIS BLD QL SMEAR: SLIGHT
POLYCHROMASIA BLD QL SMEAR: ABNORMAL
POTASSIUM SERPL-SCNC: 4.5 MMOL/L
PROT SERPL-MCNC: 7.8 G/DL
RBC # BLD AUTO: 4.85 M/UL
SODIUM SERPL-SCNC: 140 MMOL/L
TARGETS BLD QL SMEAR: ABNORMAL
TRIGL SERPL-MCNC: 65 MG/DL
WBC # BLD AUTO: 6.51 K/UL

## 2018-05-08 PROCEDURE — 83036 HEMOGLOBIN GLYCOSYLATED A1C: CPT

## 2018-05-08 PROCEDURE — 3075F SYST BP GE 130 - 139MM HG: CPT | Mod: CPTII,S$GLB,, | Performed by: INTERNAL MEDICINE

## 2018-05-08 PROCEDURE — 80061 LIPID PANEL: CPT

## 2018-05-08 PROCEDURE — 99499 UNLISTED E&M SERVICE: CPT | Mod: S$GLB,,, | Performed by: INTERNAL MEDICINE

## 2018-05-08 PROCEDURE — 99215 OFFICE O/P EST HI 40 MIN: CPT | Mod: S$GLB,,, | Performed by: INTERNAL MEDICINE

## 2018-05-08 PROCEDURE — 36415 COLL VENOUS BLD VENIPUNCTURE: CPT | Mod: PO

## 2018-05-08 PROCEDURE — 99999 PR PBB SHADOW E&M-EST. PATIENT-LVL IV: CPT | Mod: PBBFAC,,, | Performed by: INTERNAL MEDICINE

## 2018-05-08 PROCEDURE — 80053 COMPREHEN METABOLIC PANEL: CPT

## 2018-05-08 PROCEDURE — 3079F DIAST BP 80-89 MM HG: CPT | Mod: CPTII,S$GLB,, | Performed by: INTERNAL MEDICINE

## 2018-05-08 PROCEDURE — 85025 COMPLETE CBC W/AUTO DIFF WBC: CPT

## 2018-05-08 RX ORDER — GABAPENTIN 100 MG/1
100 CAPSULE ORAL 3 TIMES DAILY PRN
Qty: 270 CAPSULE | Refills: 3 | Status: SHIPPED | OUTPATIENT
Start: 2018-05-08 | End: 2020-01-28 | Stop reason: SDUPTHER

## 2018-05-08 NOTE — PROGRESS NOTES
Subjective:       Patient ID: Alyx Weir is a 68 y.o. female.    Chief Complaint: Hypertension; Hyperlipidemia; Diabetes; Coronary Artery Disease; and Sleep Apnea    Hypertension   This is a chronic problem. The problem is resistant. Pertinent negatives include no chest pain, headaches, neck pain, palpitations or shortness of breath.   Hyperlipidemia   Associated symptoms include myalgias. Pertinent negatives include no chest pain or shortness of breath. Current antihyperlipidemic treatment includes statins. The current treatment provides significant improvement of lipids.   Diabetes   She presents for her follow-up diabetic visit. She has type 2 diabetes mellitus. Her disease course has been stable. Pertinent negatives for hypoglycemia include no confusion, dizziness, headaches, nervousness/anxiousness, pallor, seizures, speech difficulty or tremors. Pertinent negatives for diabetes include no chest pain, no fatigue, no polydipsia, no polyphagia, no polyuria and no weakness.   Coronary Artery Disease   Presents for follow-up visit. Pertinent negatives include no chest pain, chest tightness, dizziness, leg swelling, palpitations or shortness of breath. Risk factors include hyperlipidemia. The symptoms have been stable.     Past Medical History:   Diagnosis Date    Arm weakness-rotator cuff weakness 11/2/2015    Arthritis     Asthma     Clotting disorder     Coronary artery disease     Deep vein thrombosis     Degenerative disc disease     Diabetes mellitus type I 2011    BS last tested x 1 month not sure what it was.    Heterozygous factor V Leiden mutation     Hx of colonic polyps 11/13/2015    Hyperlipidemia     Hypertension     VIRGIL (obstructive sleep apnea)     Stenosis of right carotid artery 12/13/2016     Past Surgical History:   Procedure Laterality Date    BACK SURGERY      bladder cyst removal      BLADDER SURGERY      CARDIAC CATHETERIZATION  03/2013    mild CAD    COLONOSCOPY N/A  11/13/2015    Procedure: COLONOSCOPY;  Surgeon: Jas Umanzor III, MD;  Location: [facility];  Service: Endoscopy;  Laterality: N/A;    HYSTERECTOMY      LUMBAR SPINE SURGERY      bone spurs removed    OOPHORECTOMY       Family History   Problem Relation Age of Onset    Heart disease Mother     Hypertension Mother     Cataracts Mother     Hypertension Sister     Glaucoma Sister     Hypertension Brother     Diabetes Father     Diabetes Paternal Uncle     Hypertension Sister     Diabetes Sister     Hypertension Sister     Diabetes Sister     Breast cancer Neg Hx     Colon cancer Neg Hx     Ovarian cancer Neg Hx      Social History     Social History    Marital status:      Spouse name: N/A    Number of children: N/A    Years of education: N/A     Occupational History    Not on file.     Social History Main Topics    Smoking status: Never Smoker    Smokeless tobacco: Never Used    Alcohol use No    Drug use: No    Sexual activity: No     Other Topics Concern    Not on file     Social History Narrative    No narrative on file     Review of Systems   Constitutional: Negative for activity change, appetite change, chills, diaphoresis, fatigue, fever and unexpected weight change.   HENT: Negative for congestion, drooling, ear discharge, ear pain, facial swelling, hearing loss, mouth sores, nosebleeds, postnasal drip, rhinorrhea, sinus pressure, sneezing, sore throat, tinnitus, trouble swallowing and voice change.    Eyes: Negative for photophobia, redness and visual disturbance.   Respiratory: Negative for apnea, cough, choking, chest tightness, shortness of breath and wheezing.    Cardiovascular: Negative for chest pain, palpitations and leg swelling.   Gastrointestinal: Negative for abdominal distention, abdominal pain, blood in stool, constipation, diarrhea, nausea and vomiting.   Endocrine: Negative for cold intolerance, heat intolerance, polydipsia, polyphagia and polyuria.    Genitourinary: Negative for decreased urine volume, difficulty urinating, dysuria, flank pain, frequency, hematuria and urgency.   Musculoskeletal: Positive for arthralgias, back pain and myalgias. Negative for gait problem, joint swelling, neck pain and neck stiffness.   Skin: Negative for color change, pallor, rash and wound.   Allergic/Immunologic: Negative for food allergies and immunocompromised state.   Neurological: Negative for dizziness, tremors, seizures, syncope, speech difficulty, weakness, light-headedness, numbness and headaches.   Hematological: Negative for adenopathy. Does not bruise/bleed easily.   Psychiatric/Behavioral: Negative for agitation, behavioral problems, confusion, decreased concentration, dysphoric mood, hallucinations, self-injury, sleep disturbance and suicidal ideas. The patient is not nervous/anxious and is not hyperactive.    All other systems reviewed and are negative.      Objective:      Physical Exam   Constitutional: She is oriented to person, place, and time. She appears well-developed and well-nourished. No distress.   HENT:   Head: Normocephalic and atraumatic.   Eyes: No scleral icterus.   Neck: Normal range of motion. Neck supple. No JVD present. Carotid bruit is not present. No tracheal deviation present. No thyromegaly present.   Cardiovascular: Normal rate, regular rhythm, normal heart sounds and intact distal pulses.    Pulmonary/Chest: Effort normal and breath sounds normal. No respiratory distress. She has no wheezes. She has no rales. She exhibits no tenderness.   Abdominal: Soft. Bowel sounds are normal. She exhibits no distension. There is no tenderness. There is no rebound and no guarding.   Musculoskeletal: Normal range of motion. She exhibits no edema or tenderness.   Lymphadenopathy:     She has no cervical adenopathy.   Neurological: She is alert and oriented to person, place, and time.   Skin: Skin is warm and dry. No rash noted. She is not diaphoretic.  No erythema. No pallor.   Psychiatric: She has a normal mood and affect. Her behavior is normal. Judgment and thought content normal.   Nursing note and vitals reviewed.      CMP  Sodium   Date Value Ref Range Status   01/16/2017 142 136 - 145 mmol/L Final     Potassium   Date Value Ref Range Status   01/16/2017 4.4 3.5 - 5.1 mmol/L Final     Chloride   Date Value Ref Range Status   01/16/2017 105 95 - 110 mmol/L Final     CO2   Date Value Ref Range Status   01/16/2017 31 (H) 23 - 29 mmol/L Final     Glucose   Date Value Ref Range Status   01/16/2017 106 70 - 110 mg/dL Final     BUN, Bld   Date Value Ref Range Status   01/16/2017 23 8 - 23 mg/dL Final     Creatinine   Date Value Ref Range Status   01/16/2017 0.8 0.5 - 1.4 mg/dL Final     Calcium   Date Value Ref Range Status   01/16/2017 9.3 8.7 - 10.5 mg/dL Final     Total Protein   Date Value Ref Range Status   01/16/2017 7.0 6.0 - 8.4 g/dL Final     Albumin   Date Value Ref Range Status   01/16/2017 3.6 3.5 - 5.2 g/dL Final     Total Bilirubin   Date Value Ref Range Status   01/16/2017 0.6 0.1 - 1.0 mg/dL Final     Comment:     For infants and newborns, interpretation of results should be based  on gestational age, weight and in agreement with clinical  observations.  Premature Infant recommended reference ranges:  Up to 24 hours.............<8.0 mg/dL  Up to 48 hours............<12.0 mg/dL  3-5 days..................<15.0 mg/dL  6-29 days.................<15.0 mg/dL       Alkaline Phosphatase   Date Value Ref Range Status   01/16/2017 43 (L) 55 - 135 U/L Final     AST   Date Value Ref Range Status   01/16/2017 17 10 - 40 U/L Final     ALT   Date Value Ref Range Status   01/16/2017 13 10 - 44 U/L Final     Anion Gap   Date Value Ref Range Status   01/16/2017 6 (L) 8 - 16 mmol/L Final     eGFR if    Date Value Ref Range Status   01/16/2017 >60.0 >60 mL/min/1.73 m^2 Final     eGFR if non    Date Value Ref Range Status   01/16/2017  >60.0 >60 mL/min/1.73 m^2 Final     Comment:     Calculation used to obtain the estimated glomerular filtration  rate (eGFR) is the CKD-EPI equation. Since race is unknown   in our information system, the eGFR values for   -American and Non--American patients are given   for each creatinine result.       Lab Results   Component Value Date    WBC 8.27 11/22/2016    HGB 13.0 11/22/2016    HCT 41.1 11/22/2016    MCV 87 11/22/2016     (H) 11/22/2016     Lab Results   Component Value Date    CHOL 162 01/16/2017     Lab Results   Component Value Date    HDL 58 01/16/2017     Lab Results   Component Value Date    LDLCALC 91.4 01/16/2017     Lab Results   Component Value Date    TRIG 63 01/16/2017     Lab Results   Component Value Date    CHOLHDL 35.8 01/16/2017     Lab Results   Component Value Date    TSH 0.588 09/09/2015     Lab Results   Component Value Date    HGBA1C 6.5 (H) 04/03/2017     Assessment:       1. Type 2 diabetes mellitus without complication, without long-term current use of insulin    2. Pure hypercholesterolemia with target low density lipoprotein (LDL) cholesterol less than 130 mg/dL    3. VIRGIL on CPAP    4. Heterozygous factor V Leiden mutation    5. Essential hypertension    6. Coronary artery disease involving native coronary artery of native heart without angina pectoris    7. Chronic lumbar radiculopathy    8. Anticoagulated on Coumadin    9. Neuritis    10. Encounter for screening mammogram for breast cancer        Plan:   Type 2 diabetes mellitus without complication, without long-term current use of insulin  -     Comprehensive metabolic panel; Future; Expected date: 05/08/2018  -     CBC auto differential; Future; Expected date: 05/08/2018  -     Microalbumin/creatinine urine ratio; Future; Expected date: 05/08/2018  -     Hemoglobin A1c; Future; Expected date: 05/08/2018    Pure hypercholesterolemia with target low density lipoprotein (LDL) cholesterol less than 130  mg/dL  -     Lipid panel; Future; Expected date: 05/08/2018    VIRGIL on CPAP    Heterozygous factor V Leiden mutation    Essential hypertension    Coronary artery disease involving native coronary artery of native heart without angina pectoris    Chronic lumbar radiculopathy    Anticoagulated on Coumadin    Neuritis  -     gabapentin (NEURONTIN) 100 MG capsule; Take 1 capsule (100 mg total) by mouth 3 (three) times daily as needed.  Dispense: 270 capsule; Refill: 3    Encounter for screening mammogram for breast cancer  -     Mammo Digital Screening Bilat with CAD; Future; Expected date: 05/08/2018                F/u 6 months.

## 2018-05-18 ENCOUNTER — TELEPHONE (OUTPATIENT)
Dept: CARDIOLOGY | Facility: CLINIC | Age: 68
End: 2018-05-18

## 2018-05-18 DIAGNOSIS — I10 ESSENTIAL HYPERTENSION: Chronic | ICD-10-CM

## 2018-05-18 RX ORDER — DICLOFENAC SODIUM 10 MG/G
2 GEL TOPICAL DAILY PRN
Qty: 3 TUBE | Refills: 2 | Status: SHIPPED | OUTPATIENT
Start: 2018-05-18 | End: 2019-10-07 | Stop reason: SDUPTHER

## 2018-05-18 RX ORDER — CARVEDILOL 6.25 MG/1
6.25 TABLET ORAL 2 TIMES DAILY WITH MEALS
Qty: 180 TABLET | Refills: 2 | Status: SHIPPED | OUTPATIENT
Start: 2018-05-18 | End: 2020-06-09 | Stop reason: SDUPTHER

## 2018-05-18 RX ORDER — WARFARIN SODIUM 5 MG/1
5 TABLET ORAL DAILY
Qty: 90 TABLET | Refills: 3 | Status: SHIPPED | OUTPATIENT
Start: 2018-05-18 | End: 2019-07-31 | Stop reason: SDUPTHER

## 2018-05-18 RX ORDER — WARFARIN 10 MG/1
10 TABLET ORAL
Qty: 36 TABLET | Refills: 3 | Status: SHIPPED | OUTPATIENT
Start: 2018-05-18 | End: 2020-06-22 | Stop reason: SDUPTHER

## 2018-05-18 NOTE — TELEPHONE ENCOUNTER
----- Message from Alondra WILDE Mauricio sent at 5/18/2018 11:17 AM CDT -----  Contact: pt   States she's calling rg needing to have her medicine refilled and called in to her pharm and pt can be reached at [phone]//thanks/dbw     1. What is the name of the medication you are requesting? wafarin   2. What is the dose? 10mg , 5 mg   3. How do you take the medication? Orally, topically, etc? Orally   4. How often do you take this medication? Mon, wed and Fri and 5mg all other days  5. Do you need a 30 day or 90 day supply? 90 days  6. How many refills are you requesting?   7. What is your preferred pharmacy and location of the pharmacy?   8. Who can we contact with further questions? Pt       Fax # to fax the refill - [phone]  [facility] - [place], [place] - [address]   [address]  [facility] [address]  Phone: [phone] Fax: [phone]

## 2018-05-18 NOTE — TELEPHONE ENCOUNTER
----- Message from Alondra Martínez sent at 5/18/2018 11:20 AM CDT -----  Contact: Pt   States she's calling for a refill and can be reached at [phone]//thanks/dbw     1. What is the name of the medication you are requesting? zoltaren gel  2. What is the dose?   3. How do you take the medication? Orally, topically, etc? gel  4. How often do you take this medication? 3 times daily   5. Do you need a 30 day or 90 day supply? 90 days  6. How many refills are you requesting?   7. What is your preferred pharmacy and location of the pharmacy?   8. Who can we contact with further questions? Pt     1. What is the name of the medication you are requesting? coreg  2. What is the dose? Unknown to pt   3. How do you take the medication? Orally, topically, etc? Orally   4. How often do you take this medication? Twice  Daily   5. Do you need a 30 day or 90 day supply? 90 ays  6. How many refills are you requesting?   7. What is your preferred pharmacy and location of the pharmacy?   8. Who can we contact with further questions? Pt     [facility] - [place], [place] - [address]  [address] [address]  Phone: [phone] Fax: [phone]

## 2018-05-21 ENCOUNTER — ANTI-COAG VISIT (OUTPATIENT)
Dept: CARDIOLOGY | Facility: CLINIC | Age: 68
End: 2018-05-21
Payer: MEDICARE

## 2018-05-21 DIAGNOSIS — Z79.01 LONG TERM (CURRENT) USE OF ANTICOAGULANTS: Primary | ICD-10-CM

## 2018-05-21 LAB — INR PPP: 4 (ref 2–3)

## 2018-05-21 PROCEDURE — 85610 PROTHROMBIN TIME: CPT | Mod: QW,S$GLB,, | Performed by: INTERNAL MEDICINE

## 2018-05-21 NOTE — PROGRESS NOTES
Patient's INR is supra therapeutic at 4.0.   Reports no active bleeding at present.  Instructed to hold dose today - only, then start new dose of 10 mg on Wednesdays and Fridays, then 5 mg on all other days.  Recheck in 2 weeks.

## 2018-06-01 ENCOUNTER — HOSPITAL ENCOUNTER (OUTPATIENT)
Dept: RADIOLOGY | Facility: HOSPITAL | Age: 68
Discharge: HOME | End: 2018-06-01
Attending: INTERNAL MEDICINE
Payer: MEDICARE

## 2018-06-01 VITALS — WEIGHT: 186 LBS | HEIGHT: 65 IN | BODY MASS INDEX: 30.99 KG/M2

## 2018-06-01 DIAGNOSIS — Z12.31 ENCOUNTER FOR SCREENING MAMMOGRAM FOR BREAST CANCER: ICD-10-CM

## 2018-06-01 PROCEDURE — 77067 SCR MAMMO BI INCL CAD: CPT | Mod: TC,PO

## 2018-06-01 PROCEDURE — 77063 BREAST TOMOSYNTHESIS BI: CPT | Mod: 26,,, | Performed by: RADIOLOGY

## 2018-06-01 PROCEDURE — 77067 SCR MAMMO BI INCL CAD: CPT | Mod: 26,,, | Performed by: RADIOLOGY

## 2018-06-04 ENCOUNTER — HOSPITAL ENCOUNTER (OUTPATIENT)
Dept: RADIOLOGY | Facility: HOSPITAL | Age: 68
Discharge: HOME | End: 2018-06-04
Attending: PHYSICIAN ASSISTANT
Payer: MEDICARE

## 2018-06-04 ENCOUNTER — ANTI-COAG VISIT (OUTPATIENT)
Dept: CARDIOLOGY | Facility: CLINIC | Age: 68
End: 2018-06-04
Payer: MEDICARE

## 2018-06-04 ENCOUNTER — OFFICE VISIT (OUTPATIENT)
Dept: INTERNAL MEDICINE | Facility: CLINIC | Age: 68
End: 2018-06-04
Payer: MEDICARE

## 2018-06-04 VITALS
BODY MASS INDEX: 31.7 KG/M2 | OXYGEN SATURATION: 99 % | DIASTOLIC BLOOD PRESSURE: 98 MMHG | WEIGHT: 190.25 LBS | HEIGHT: 65 IN | SYSTOLIC BLOOD PRESSURE: 136 MMHG | RESPIRATION RATE: 18 BRPM | HEART RATE: 55 BPM | TEMPERATURE: 98 F

## 2018-06-04 DIAGNOSIS — S93.491A SPRAIN OF ANTERIOR TALOFIBULAR LIGAMENT OF RIGHT ANKLE, INITIAL ENCOUNTER: ICD-10-CM

## 2018-06-04 DIAGNOSIS — S92.354A CLOSED NONDISPLACED FRACTURE OF FIFTH METATARSAL BONE OF RIGHT FOOT, INITIAL ENCOUNTER: Primary | ICD-10-CM

## 2018-06-04 DIAGNOSIS — Z79.01 LONG TERM (CURRENT) USE OF ANTICOAGULANTS: Primary | ICD-10-CM

## 2018-06-04 LAB — INR PPP: 2.4 (ref 2–3)

## 2018-06-04 PROCEDURE — 3075F SYST BP GE 130 - 139MM HG: CPT | Mod: CPTII,S$GLB,, | Performed by: PHYSICIAN ASSISTANT

## 2018-06-04 PROCEDURE — 73630 X-RAY EXAM OF FOOT: CPT | Mod: TC,FY,PO,RT

## 2018-06-04 PROCEDURE — 85610 PROTHROMBIN TIME: CPT | Mod: QW,S$GLB,, | Performed by: INTERNAL MEDICINE

## 2018-06-04 PROCEDURE — 73630 X-RAY EXAM OF FOOT: CPT | Mod: 26,RT,, | Performed by: RADIOLOGY

## 2018-06-04 PROCEDURE — 3080F DIAST BP >= 90 MM HG: CPT | Mod: CPTII,S$GLB,, | Performed by: PHYSICIAN ASSISTANT

## 2018-06-04 PROCEDURE — 99213 OFFICE O/P EST LOW 20 MIN: CPT | Mod: S$GLB,,, | Performed by: PHYSICIAN ASSISTANT

## 2018-06-04 PROCEDURE — 73610 X-RAY EXAM OF ANKLE: CPT | Mod: TC,FY,PO,RT

## 2018-06-04 PROCEDURE — 99999 PR PBB SHADOW E&M-EST. PATIENT-LVL V: CPT | Mod: PBBFAC,,, | Performed by: PHYSICIAN ASSISTANT

## 2018-06-04 PROCEDURE — 73610 X-RAY EXAM OF ANKLE: CPT | Mod: 26,RT,, | Performed by: RADIOLOGY

## 2018-06-04 NOTE — PROGRESS NOTES
Subjective:       Patient ID: Alyx Weir is a 68 y.o.B/ female.    Chief Complaint: Fall (06/01/18 & 06/03/18); Foot Pain (R); and Leg Pain (R)    HPI         She comes in today by herself and has the above problem.  She is ambulatory this morning but is using a 4 footed cane for assistance.  Friday afternoon and evening she was stepping out her back door at her home some remodeling workers had left a block of concrete close to her step.  She ended up spraining her right ankle with medial inversion but did not fall to the ground.  She ended up catching herself on some windows and frames standing up against the house.  Her right ankle swelled almost immediately and she took the weekend elevating her foot and putting an ice pack on it.  She didn't want to go to the ER and was unaware that we had after-hours here.  She's not sure whether she's ever sprained her fractured this ankle before.    Review of Systems    Otherwise negative concerning the ORTHOPEDIC, MUSCULOSKELETAL, RHEUMATOLOGIC system review.    Objective:      Physical Exam    RIGHT FOOT: She has some edema present on the lateral aspect of her foot.  She also points to the area of her fourth and fifth metatarsal head is pain centers.  She doesn't have any ecchymoses but there is a small abrasion present on the lower aspect of her right leg just above the lateral malleolus.  It's about 2 inches long but has no scab just redness.  RIGHT ANKLE: FROM but she has pain with a talofibular ligament in the anterior aspect with medial inversion.  It also hurts with dorsiflexion.  X RAY RIGHT ANKLE: Appears normal with exception of spurs present on the Achilles and also the plantar fascial insertion on her calcaneus.    X RAY RIGHT FOOT: She has a fracture of her fifth metatarsal distally.    Assessment:       1. Sprain of anterior talofibular ligament of right ankle, initial encounter        Plan:     1.  Will provide a postop shoe for her and get her scheduled  him to see Dr. Chu in podiatry.  She has seen her before and likes her.

## 2018-06-04 NOTE — PROGRESS NOTES
Patient's INR is therapeutic at 2.4.  No changes in dose.  Recheck in 3 weeks.  Please call should you have any questions or concerns at [phone] or [phone].

## 2018-06-08 ENCOUNTER — OFFICE VISIT (OUTPATIENT)
Dept: PODIATRY | Facility: CLINIC | Age: 68
End: 2018-06-08
Payer: MEDICARE

## 2018-06-08 VITALS
BODY MASS INDEX: 31.7 KG/M2 | SYSTOLIC BLOOD PRESSURE: 149 MMHG | HEART RATE: 47 BPM | DIASTOLIC BLOOD PRESSURE: 86 MMHG | HEIGHT: 65 IN | WEIGHT: 190.25 LBS

## 2018-06-08 DIAGNOSIS — S92.355A NONDISPLACED FRACTURE OF FIFTH METATARSAL BONE, LEFT FOOT, INITIAL ENCOUNTER FOR CLOSED FRACTURE: Primary | ICD-10-CM

## 2018-06-08 DIAGNOSIS — R60.0 EDEMA OF RIGHT FOOT: ICD-10-CM

## 2018-06-08 PROCEDURE — 3079F DIAST BP 80-89 MM HG: CPT | Mod: CPTII,S$GLB,, | Performed by: PODIATRIST

## 2018-06-08 PROCEDURE — 99999 PR PBB SHADOW E&M-EST. PATIENT-LVL III: CPT | Mod: PBBFAC,,, | Performed by: PODIATRIST

## 2018-06-08 PROCEDURE — 99214 OFFICE O/P EST MOD 30 MIN: CPT | Mod: 25,S$GLB,, | Performed by: PODIATRIST

## 2018-06-08 PROCEDURE — 99499 UNLISTED E&M SERVICE: CPT | Mod: S$GLB,,, | Performed by: PODIATRIST

## 2018-06-08 PROCEDURE — 29580 STRAPPING UNNA BOOT: CPT | Mod: RT,S$GLB,, | Performed by: PODIATRIST

## 2018-06-08 PROCEDURE — 3077F SYST BP >= 140 MM HG: CPT | Mod: CPTII,S$GLB,, | Performed by: PODIATRIST

## 2018-06-08 NOTE — LETTER
June 17, 2018      Tenzin Roper PA-C  [address] [place] [address]           [facility]  [address] [place] [address]  Phone: [phone]  Fax: [phone]          Patient: Alyx Weir   MR Number: [id number]   YOB: 1950   Date of Visit: 6/8/2018       Dear Tenzin Roper:    Thank you for referring Alyx Weir to me for evaluation. Attached you will find relevant portions of my assessment and plan of care.    If you have questions, please do not hesitate to call me. I look forward to following Alyx Weir along with you.    Sincerely,    Bradley Chu DPM    Enclosure  CC:  No Recipients    If you would like to receive this communication electronically, please contact [email or web address] or [phone] to request more information on [employer] Link access.    For providers and/or their staff who would like to refer a patient to [facility], please contact us through our one-stop-shop provider referral line, Darren Mosley, at [phone].    If you feel you have received this communication in error or would no longer like to receive these types of communications, please e-mail [email or web address]

## 2018-06-15 ENCOUNTER — CLINICAL SUPPORT (OUTPATIENT)
Dept: PODIATRY | Facility: CLINIC | Age: 68
End: 2018-06-15
Payer: MEDICARE

## 2018-06-15 VITALS — SYSTOLIC BLOOD PRESSURE: 126 MMHG | HEIGHT: 65 IN | HEART RATE: 65 BPM | DIASTOLIC BLOOD PRESSURE: 74 MMHG

## 2018-06-15 PROCEDURE — 99999 PR PBB SHADOW E&M-EST. PATIENT-LVL II: CPT | Mod: PBBFAC,,,

## 2018-06-18 NOTE — PROGRESS NOTES
Subjective:     Patient ID: Alyx Weir is a 68 y.o. female.    Chief Complaint: Foot Injury (diabetic patient, June 2, 2018 patient stated she fell and a brick possibly fell on her right foot, current pain rated at a 8 )    Alyx is a 68 y.o. female who presents to the podiatry clinic  with complaint of  right foot pain. Onset of the symptoms was a week ago. Precipitating event: stepped wrong and brick landed on foot. Current symptoms include: inability to bear weight. Aggravating factors: any weight bearing. Symptoms have gradually improved. Patient has had no prior foot problems. Evaluation to date: plain films: fracture. Treatment to date: avoidance of offending activity. Patients rates pain 8/10 on pain scale.            Patient Active Problem List   Diagnosis    Heterozygous factor V Leiden mutation    Essential hypertension    Pure hypercholesterolemia with target low density lipoprotein (LDL) cholesterol less than 130 mg/dL    VIRGIL on CPAP    Anticoagulated on Coumadin    Type 2 diabetes mellitus without complication    Obesity (BMI 30.0-34.9)    Chronic lumbar radiculopathy    Coronary artery disease involving native coronary artery without angina pectoris    Left ventricular diastolic dysfunction with preserved systolic function    COPD (chronic obstructive pulmonary disease)    Tortuous aorta    Calcification of both carotid arteries    Chondromalacia, right knee       Medication List with Changes/Refills   Current Medications    ACETAMINOPHEN (TYLENOL ARTHRITIS ORAL)    Take by mouth.    ASPIRIN (ECOTRIN) 81 MG EC TABLET    Take 1 tablet (81 mg total) by mouth once daily.    CALCIUM CARBONATE (TUMS ORAL)    Take by mouth.    CARVEDILOL (COREG) 6.25 MG TABLET    Take 1 tablet (6.25 mg total) by mouth 2 (two) times daily with meals.    DICLOFENAC SODIUM 1 % GEL    Apply 2 g topically daily as needed.    GABAPENTIN (NEURONTIN) 100 MG CAPSULE    Take 1 capsule (100 mg total) by mouth 3 (three)  times daily as needed.    OLMESARTAN-AMLODIPIN-HCTHIAZID (TRIBENZOR) 40-10-25 MG TAB    Take 1 tablet by mouth once daily.    PRAVASTATIN (PRAVACHOL) 40 MG TABLET    Take 1 tablet (40 mg total) by mouth once daily.    TRAMADOL HCL (TRAMADOL ORAL)    Take 50 mg by mouth as needed.    VITAMIN D 1000 UNITS TAB    Take 185 mg by mouth once daily.    WARFARIN (COUMADIN) 10 MG TABLET    Take 1 tablet (10 mg total) by mouth every Mon, Wed, Fri. Or As directed by coumadin clinic    WARFARIN (COUMADIN) 5 MG TABLET    Take 1 tablet (5 mg total) by mouth Daily. As directed by coumadin clinic       Review of patient's allergies indicates:   Allergen Reactions    Erythromycin Edema     Angioedema to throat    Amlodipine Other (See Comments)     Headaches    Diazepam Hives    Iodine Hives    Meperidine Hives    Morphine Itching    Primidone Other (See Comments)       Past Surgical History:   Procedure Laterality Date    BACK SURGERY      bladder cyst removal      BLADDER SURGERY      CARDIAC CATHETERIZATION  03/2013    mild CAD    COLONOSCOPY N/A 11/13/2015    Procedure: COLONOSCOPY;  Surgeon: Jas Umanzor III, MD;  Location: [facility];  Service: Endoscopy;  Laterality: N/A;    HYSTERECTOMY      28 yrs old    LUMBAR SPINE SURGERY      bone spurs removed    OOPHORECTOMY         Family History   Problem Relation Age of Onset    Heart disease Mother     Hypertension Mother     Cataracts Mother     Hypertension Sister     Glaucoma Sister     Hypertension Brother     Diabetes Father     Diabetes Paternal Uncle     Hypertension Sister     Diabetes Sister     Hypertension Sister     Diabetes Sister     Breast cancer Neg Hx     Colon cancer Neg Hx     Ovarian cancer Neg Hx        Social History     Social History    Marital status:      Spouse name: N/A    Number of children: N/A    Years of education: N/A     Occupational History    Not on file.     Social History Main Topics    Smoking  "status: Never Smoker    Smokeless tobacco: Never Used    Alcohol use No    Drug use: No    Sexual activity: No     Other Topics Concern    Not on file     Social History Narrative    Dogs in household, no smokers.       Vitals:    06/08/18 0931   BP: (!) 149/86   Pulse: (!) 47   Weight: 86.3 kg (190 lb 4.1 oz)   Height: 5' 5" (1.651 m)   PainSc:   8       Hemoglobin A1C   Date Value Ref Range Status   05/08/2018 6.3 (H) 4.0 - 5.6 % Final     Comment:     According to ADA guidelines, hemoglobin A1c <7.0% represents  optimal control in non-pregnant diabetic patients. Different  metrics may apply to specific patient populations.   Standards of Medical Care in Diabetes-2016.  For the purpose of screening for the presence of diabetes:  <5.7%     Consistent with the absence of diabetes  5.7-6.4%  Consistent with increasing risk for diabetes   (prediabetes)  >or=6.5%  Consistent with diabetes  Currently, no consensus exists for use of hemoglobin A1c  for diagnosis of diabetes for children.  This Hemoglobin A1c assay has significant interference with fetal   hemoglobin   (HbF). The results are invalid for patients with abnormal amounts of   HbF,   including those with known Hereditary Persistence   of Fetal Hemoglobin. Heterozygous hemoglobin variants (HbAS, HbAC,   HbAD, HbAE, HbA2) do not significantly interfere with this assay;   however, presence of multiple variants in a sample may impact the %   interference.     04/03/2017 6.5 (H) 4.5 - 6.2 % Final     Comment:     According to ADA guidelines, hemoglobin A1C <7.0% represents  optimal control in non-pregnant diabetic patients.  Different  metrics may apply to specific populations.   Standards of Medical Care in Diabetes - 2016.  For the purpose of screening for the presence of diabetes:  <5.7%     Consistent with the absence of diabetes  5.7-6.4%  Consistent with increasing risk for diabetes   (prediabetes)  >or=6.5%  Consistent with diabetes  Currently no " consensus exists for use of hemoglobin A1C  for diagnosis of diabetes for children.     11/22/2016 6.6 (H) 4.5 - 6.2 % Final     Comment:     According to ADA guidelines, hemoglobin A1C <7.0% represents  optimal control in non-pregnant diabetic patients.  Different  metrics may apply to specific populations.   Standards of Medical Care in Diabetes - 2016.  For the purpose of screening for the presence of diabetes:  <5.7%     Consistent with the absence of diabetes  5.7-6.4%  Consistent with increasing risk for diabetes   (prediabetes)  >or=6.5%  Consistent with diabetes  Currently no consensus exists for use of hemoglobin A1C  for diagnosis of diabetes for children.         Review of Systems   Constitutional: Negative for chills and fever.   Respiratory: Negative for shortness of breath.    Cardiovascular: Negative for chest pain, palpitations, orthopnea, claudication and leg swelling.   Gastrointestinal: Negative for diarrhea, nausea and vomiting.   Musculoskeletal: Negative for joint pain.   Skin: Negative for rash.   Neurological: Negative for dizziness, tingling, sensory change, focal weakness and weakness.   Psychiatric/Behavioral: Negative.              Objective:      PHYSICAL EXAM: Apperance: Alert and orient in no distress,well developed, and with good attention to grooming and body habits  Lower Extremity Exam   VASCULAR: Dorsalis pedis pulses 2/4 right and Posterior Tibial pulses 2/4 right. Capillary fill time <4 seconds right. Mild edema observed right. Varicosities absent right. Skin temperature of the lower extremities is warm to warm, proximal to distal. Hair growth WNL right.  DERMATOLOGICAL: No skin rashes, subcutaneous nodules, lesions, or ulcers observed bilateral.  NEUROLOGICAL: Light touch, sharp-dull, proprioception all present and equal bilaterally.    MUSCULOSKELETAL: Muscle strength is 5/5 for foot inverters, everters, plantarflexors, and dorsiflexors. Muscle tone is normal. (+) pain on  palpation of right 5th metatarsal.        TEST RESULTS: Radiographs of right foot taken 6/4/18 reveals there is an acute obliquely oriented fracture involving the distal shaft of the 5th metatarsal.  No significant displacement of the fracture fragments.  The joint spaces appear to be well maintained.  Prominent dorsal and plantar calcaneal enthesophytes noted.          Assessment:       Encounter Diagnoses   Name Primary?    Nondisplaced fracture of fifth metatarsal bone, left foot, initial encounter for closed fracture Yes    Edema of right foot          Plan:   Nondisplaced fracture of fifth metatarsal bone, left foot, initial encounter for closed fracture  -     X-Ray Foot Complete Right; Future; Expected date: 06/08/2018    Edema of right foot      I counseled the patient on her conditions, regarding findings of my examination, my impressions, and usual treatment plan.   Reviewed x-rays from 6/4/18 in exam room with patient.   With patient's permission, unna boot was applied by myself to the right foot and lower leg. Patient instructed to keep dressing dry, clean, and intact.   Patient was fitted with short walking boot and instructed on proper usage. This should be worn daily at all times when ambulating. Patient instructed not to drive with walking boot.   Patient to return in 1 week for unna boot removal.               Bradley Chu DPM  [facility]

## 2018-06-24 NOTE — PROGRESS NOTES
This 69 y/o female presents for left foot soft cast removal. With patient's permission, unna boot from left foot was removed. Minimal swelling noted. Decreased pain on palpation of left ankle/foot. Patient instructed to continue to be heel weightbearing only to left foot. Patient to return in 3 weeks or sooner if needed.

## 2018-06-25 ENCOUNTER — ANTI-COAG VISIT (OUTPATIENT)
Dept: CARDIOLOGY | Facility: CLINIC | Age: 68
End: 2018-06-25
Payer: MEDICARE

## 2018-06-25 DIAGNOSIS — Z79.01 LONG TERM (CURRENT) USE OF ANTICOAGULANTS: Primary | ICD-10-CM

## 2018-06-25 LAB — INR PPP: 5.6 (ref 2–3)

## 2018-06-25 PROCEDURE — 85610 PROTHROMBIN TIME: CPT | Mod: QW,S$GLB,, | Performed by: NUCLEAR MEDICINE

## 2018-06-25 NOTE — PROGRESS NOTES
"Patient's INR is supra therapeutic at 5.6.  Reports a recent right foot injury - "pain".  No signs of any abnormal bleeding at present.  Instructed patient to hold Warfarin dose today and tomorrow, then on Wednesday, 7/27/2018 resume on regular scheduled dose.  Eat a small portion of a dark leafy vegetable today.  Recheck in 1 week.  Advised patient to seek medical attention (ED) for any signs of abnormal bleeding, if needed.  Patient repeated back instructions and voiced understanding.  "

## 2018-07-02 ENCOUNTER — ANTI-COAG VISIT (OUTPATIENT)
Dept: CARDIOLOGY | Facility: CLINIC | Age: 68
End: 2018-07-02
Payer: MEDICARE

## 2018-07-02 DIAGNOSIS — Z79.01 LONG TERM (CURRENT) USE OF ANTICOAGULANTS: Primary | ICD-10-CM

## 2018-07-02 LAB — INR PPP: 3 (ref 2–3)

## 2018-07-02 PROCEDURE — 85610 PROTHROMBIN TIME: CPT | Mod: QW,S$GLB,, | Performed by: NUCLEAR MEDICINE

## 2018-07-02 NOTE — PROGRESS NOTES
Patient's INR is therapeutic at 3.0.   Held Warfarin for two days last week as instructed from a supra therapeutic INR 5.6.  Instructed patient to start new dose of 10 mg on Wednesdays and 5 mg on all other days.  Recheck in 2 weeks.

## 2018-07-12 ENCOUNTER — HOSPITAL ENCOUNTER (OUTPATIENT)
Dept: RADIOLOGY | Facility: HOSPITAL | Age: 68
Discharge: HOME | End: 2018-07-12
Attending: PODIATRIST
Payer: MEDICARE

## 2018-07-12 ENCOUNTER — OFFICE VISIT (OUTPATIENT)
Dept: PODIATRY | Facility: CLINIC | Age: 68
End: 2018-07-12
Payer: MEDICARE

## 2018-07-12 VITALS — SYSTOLIC BLOOD PRESSURE: 154 MMHG | HEART RATE: 49 BPM | DIASTOLIC BLOOD PRESSURE: 89 MMHG | HEIGHT: 65 IN

## 2018-07-12 DIAGNOSIS — S92.354D CLOSED NONDISPLACED FRACTURE OF FIFTH METATARSAL BONE OF RIGHT FOOT WITH ROUTINE HEALING, SUBSEQUENT ENCOUNTER: Primary | ICD-10-CM

## 2018-07-12 DIAGNOSIS — S92.355A NONDISPLACED FRACTURE OF FIFTH METATARSAL BONE, LEFT FOOT, INITIAL ENCOUNTER FOR CLOSED FRACTURE: ICD-10-CM

## 2018-07-12 PROCEDURE — 73630 X-RAY EXAM OF FOOT: CPT | Mod: TC,FY,PO,RT

## 2018-07-12 PROCEDURE — 3079F DIAST BP 80-89 MM HG: CPT | Mod: CPTII,S$GLB,, | Performed by: PODIATRIST

## 2018-07-12 PROCEDURE — 99999 PR PBB SHADOW E&M-EST. PATIENT-LVL III: CPT | Mod: PBBFAC,,, | Performed by: PODIATRIST

## 2018-07-12 PROCEDURE — 73630 X-RAY EXAM OF FOOT: CPT | Mod: 26,RT,, | Performed by: RADIOLOGY

## 2018-07-12 PROCEDURE — 99213 OFFICE O/P EST LOW 20 MIN: CPT | Mod: S$GLB,,, | Performed by: PODIATRIST

## 2018-07-12 PROCEDURE — 3077F SYST BP >= 140 MM HG: CPT | Mod: CPTII,S$GLB,, | Performed by: PODIATRIST

## 2018-07-16 ENCOUNTER — ANTI-COAG VISIT (OUTPATIENT)
Dept: CARDIOLOGY | Facility: CLINIC | Age: 68
End: 2018-07-16
Payer: MEDICARE

## 2018-07-16 DIAGNOSIS — Z79.01 LONG TERM (CURRENT) USE OF ANTICOAGULANTS: Primary | ICD-10-CM

## 2018-07-16 LAB — INR PPP: 2 (ref 2–3)

## 2018-07-16 PROCEDURE — 85610 PROTHROMBIN TIME: CPT | Mod: QW,S$GLB,, | Performed by: INTERNAL MEDICINE

## 2018-07-16 NOTE — PROGRESS NOTES
Patient's INR is therapeutic at 2.0.  No changes in dose.  Recheck in 1 month.  Please call should you have any questions or concerns at [phone] or [phone].

## 2018-07-23 NOTE — PROGRESS NOTES
"Subjective:     Patient ID: Alyx Weir is a 68 y.o. female.    Chief Complaint: Foot Injury (left foot, patient c/o tenderness in her feet but "not as bad as it was")    Alyx is a 68 y.o. female who presents to the podiatry clinic  with complaint of  right foot fracture. Patient states the foot feels better but she still has itching pain at the small toe. Patient states she has been in the boot as instructed.  Precipitating event: stepped wrong and brick landed on foot. Current symptoms include: inability to bear weight. Aggravating factors: any weight bearing. Symptoms have gradually improved. Patient has had no prior foot problems. Evaluation to date: plain films: fracture. Treatment to date: avoidance of offending activity. Patients rates pain 4/10 on pain scale.      Patient Active Problem List   Diagnosis    Heterozygous factor V Leiden mutation    Essential hypertension    Pure hypercholesterolemia with target low density lipoprotein (LDL) cholesterol less than 130 mg/dL    VIRGIL on CPAP    Anticoagulated on Coumadin    Type 2 diabetes mellitus without complication    Obesity (BMI 30.0-34.9)    Chronic lumbar radiculopathy    Coronary artery disease involving native coronary artery without angina pectoris    Left ventricular diastolic dysfunction with preserved systolic function    COPD (chronic obstructive pulmonary disease)    Tortuous aorta    Calcification of both carotid arteries    Chondromalacia, right knee       Medication List with Changes/Refills   Current Medications    ACETAMINOPHEN (TYLENOL ARTHRITIS ORAL)    Take by mouth.    ASPIRIN (ECOTRIN) 81 MG EC TABLET    Take 1 tablet (81 mg total) by mouth once daily.    CALCIUM CARBONATE (TUMS ORAL)    Take by mouth.    CARVEDILOL (COREG) 6.25 MG TABLET    Take 1 tablet (6.25 mg total) by mouth 2 (two) times daily with meals.    DICLOFENAC SODIUM 1 % GEL    Apply 2 g topically daily as needed.    GABAPENTIN (NEURONTIN) 100 MG CAPSULE    " Take 1 capsule (100 mg total) by mouth 3 (three) times daily as needed.    OLMESARTAN-AMLODIPIN-HCTHIAZID (TRIBENZOR) 40-10-25 MG TAB    Take 1 tablet by mouth once daily.    PRAVASTATIN (PRAVACHOL) 40 MG TABLET    Take 1 tablet (40 mg total) by mouth once daily.    TRAMADOL HCL (TRAMADOL ORAL)    Take 50 mg by mouth as needed.    VITAMIN D 1000 UNITS TAB    Take 185 mg by mouth once daily.    WARFARIN (COUMADIN) 10 MG TABLET    Take 1 tablet (10 mg total) by mouth every Mon, Wed, Fri. Or As directed by coumadin clinic    WARFARIN (COUMADIN) 5 MG TABLET    Take 1 tablet (5 mg total) by mouth Daily. As directed by coumadin clinic       Review of patient's allergies indicates:   Allergen Reactions    Erythromycin Edema     Angioedema to throat    Amlodipine Other (See Comments)     Headaches    Diazepam Hives    Iodine Hives    Meperidine Hives    Morphine Itching    Primidone Other (See Comments)       Past Surgical History:   Procedure Laterality Date    BACK SURGERY      bladder cyst removal      BLADDER SURGERY      CARDIAC CATHETERIZATION  03/2013    mild CAD    COLONOSCOPY N/A 11/13/2015    Procedure: COLONOSCOPY;  Surgeon: Jas Umanzor III, MD;  Location: [facility];  Service: Endoscopy;  Laterality: N/A;    HYSTERECTOMY      28 yrs old    LUMBAR SPINE SURGERY      bone spurs removed    OOPHORECTOMY         Family History   Problem Relation Age of Onset    Heart disease Mother     Hypertension Mother     Cataracts Mother     Hypertension Sister     Glaucoma Sister     Hypertension Brother     Diabetes Father     Diabetes Paternal Uncle     Hypertension Sister     Diabetes Sister     Hypertension Sister     Diabetes Sister     Breast cancer Neg Hx     Colon cancer Neg Hx     Ovarian cancer Neg Hx        Social History     Social History    Marital status:      Spouse name: N/A    Number of children: N/A    Years of education: N/A     Occupational History    Not on  "file.     Social History Main Topics    Smoking status: Never Smoker    Smokeless tobacco: Never Used    Alcohol use No    Drug use: No    Sexual activity: No     Other Topics Concern    Not on file     Social History Narrative    Dogs in household, no smokers.       Vitals:    07/12/18 1137   BP: (!) 154/89   Pulse: (!) 49   Height: 5' 5" (1.651 m)   PainSc: 0-No pain       Hemoglobin A1C   Date Value Ref Range Status   05/08/2018 6.3 (H) 4.0 - 5.6 % Final     Comment:     According to ADA guidelines, hemoglobin A1c <7.0% represents  optimal control in non-pregnant diabetic patients. Different  metrics may apply to specific patient populations.   Standards of Medical Care in Diabetes-2016.  For the purpose of screening for the presence of diabetes:  <5.7%     Consistent with the absence of diabetes  5.7-6.4%  Consistent with increasing risk for diabetes   (prediabetes)  >or=6.5%  Consistent with diabetes  Currently, no consensus exists for use of hemoglobin A1c  for diagnosis of diabetes for children.  This Hemoglobin A1c assay has significant interference with fetal   hemoglobin   (HbF). The results are invalid for patients with abnormal amounts of   HbF,   including those with known Hereditary Persistence   of Fetal Hemoglobin. Heterozygous hemoglobin variants (HbAS, HbAC,   HbAD, HbAE, HbA2) do not significantly interfere with this assay;   however, presence of multiple variants in a sample may impact the %   interference.     04/03/2017 6.5 (H) 4.5 - 6.2 % Final     Comment:     According to ADA guidelines, hemoglobin A1C <7.0% represents  optimal control in non-pregnant diabetic patients.  Different  metrics may apply to specific populations.   Standards of Medical Care in Diabetes - 2016.  For the purpose of screening for the presence of diabetes:  <5.7%     Consistent with the absence of diabetes  5.7-6.4%  Consistent with increasing risk for diabetes   (prediabetes)  >or=6.5%  Consistent with " diabetes  Currently no consensus exists for use of hemoglobin A1C  for diagnosis of diabetes for children.     11/22/2016 6.6 (H) 4.5 - 6.2 % Final     Comment:     According to ADA guidelines, hemoglobin A1C <7.0% represents  optimal control in non-pregnant diabetic patients.  Different  metrics may apply to specific populations.   Standards of Medical Care in Diabetes - 2016.  For the purpose of screening for the presence of diabetes:  <5.7%     Consistent with the absence of diabetes  5.7-6.4%  Consistent with increasing risk for diabetes   (prediabetes)  >or=6.5%  Consistent with diabetes  Currently no consensus exists for use of hemoglobin A1C  for diagnosis of diabetes for children.         Review of Systems   Constitutional: Negative for chills and fever.   Respiratory: Negative for shortness of breath.    Cardiovascular: Negative for chest pain, palpitations, orthopnea, claudication and leg swelling.   Gastrointestinal: Negative for diarrhea, nausea and vomiting.   Musculoskeletal: Negative for joint pain.   Skin: Negative for rash.   Neurological: Negative for dizziness, tingling, sensory change, focal weakness and weakness.   Psychiatric/Behavioral: Negative.              Objective:      PHYSICAL EXAM: Apperance: Alert and orient in no distress,well developed, and with good attention to grooming and body habits  Lower Extremity Exam   VASCULAR: Dorsalis pedis pulses 2/4 right and Posterior Tibial pulses 2/4 right. Capillary fill time <4 seconds right. Mild edema observed right. Varicosities absent right. Skin temperature of the lower extremities is warm to warm, proximal to distal. Hair growth WNL right.  DERMATOLOGICAL: No skin rashes, subcutaneous nodules, lesions, or ulcers observed bilateral.  NEUROLOGICAL: Light touch, sharp-dull, proprioception all present and equal bilaterally.    MUSCULOSKELETAL: Muscle strength is 5/5 for foot inverters, everters, plantarflexors, and dorsiflexors. Muscle tone is  normal. (+) pain on palpation of right 5th metatarsal.        TEST RESULTS: Radiographs of right foot reveals again noted is an obliquely oriented and mildly comminuted fracture of the distal 5th metatarsal for shaft.  Fragment positioning and alignment is stable.  Some blurring of the fracture planes and adjacent callus noted consistent with interval healing response.  Prominent dorsal and plantar calcaneal spurs.  No new fracture or dislocation.  Radiographs of right foot taken 6/4/18 reveals there is an acute obliquely oriented fracture involving the distal shaft of the 5th metatarsal.  No significant displacement of the fracture fragments.  The joint spaces appear to be well maintained.  Prominent dorsal and plantar calcaneal enthesophytes noted.          Assessment:       Encounter Diagnosis   Name Primary?    Closed nondisplaced fracture of fifth metatarsal bone of right foot with routine healing, subsequent encounter Yes         Plan:   Closed nondisplaced fracture of fifth metatarsal bone of right foot with routine healing, subsequent encounter  -     X-Ray Foot Complete Right; Future; Expected date: 07/12/2018      I counseled the patient on her conditions, regarding findings of my examination, my impressions, and usual treatment plan.   Reviewed x-rays from today in exam room with patient.   Patient instructed to continue with short walking boot and instructed on proper usage. This should be worn daily at all times when ambulating. Patient instructed not to drive with walking boot.   Patient to return in 1 moth.               Bradley Chu DPM  [facility]

## 2018-08-13 ENCOUNTER — ANTI-COAG VISIT (OUTPATIENT)
Dept: CARDIOLOGY | Facility: CLINIC | Age: 68
End: 2018-08-13
Payer: MEDICARE

## 2018-08-13 DIAGNOSIS — Z79.01 LONG TERM (CURRENT) USE OF ANTICOAGULANTS: Primary | ICD-10-CM

## 2018-08-13 LAB — INR PPP: 1.8 (ref 2–3)

## 2018-08-13 PROCEDURE — 85610 PROTHROMBIN TIME: CPT | Mod: QW,S$GLB,, | Performed by: INTERNAL MEDICINE

## 2018-08-17 ENCOUNTER — OFFICE VISIT (OUTPATIENT)
Dept: PODIATRY | Facility: CLINIC | Age: 68
End: 2018-08-17
Payer: MEDICARE

## 2018-08-17 ENCOUNTER — HOSPITAL ENCOUNTER (OUTPATIENT)
Dept: RADIOLOGY | Facility: HOSPITAL | Age: 68
Discharge: HOME | End: 2018-08-17
Attending: PODIATRIST
Payer: MEDICARE

## 2018-08-17 VITALS
SYSTOLIC BLOOD PRESSURE: 178 MMHG | BODY MASS INDEX: 31.66 KG/M2 | HEART RATE: 46 BPM | DIASTOLIC BLOOD PRESSURE: 76 MMHG | HEIGHT: 65 IN

## 2018-08-17 DIAGNOSIS — E11.49 TYPE II DIABETES MELLITUS WITH NEUROLOGICAL MANIFESTATIONS: ICD-10-CM

## 2018-08-17 DIAGNOSIS — S92.351G DISPLACED FRACTURE OF FIFTH METATARSAL BONE, RIGHT FOOT, SUBSEQUENT ENCOUNTER FOR FRACTURE WITH DELAYED HEALING: Primary | ICD-10-CM

## 2018-08-17 DIAGNOSIS — S92.354D CLOSED NONDISPLACED FRACTURE OF FIFTH METATARSAL BONE OF RIGHT FOOT WITH ROUTINE HEALING, SUBSEQUENT ENCOUNTER: ICD-10-CM

## 2018-08-17 DIAGNOSIS — L84 CORN OR CALLUS: ICD-10-CM

## 2018-08-17 PROCEDURE — 99213 OFFICE O/P EST LOW 20 MIN: CPT | Mod: S$GLB,,, | Performed by: PODIATRIST

## 2018-08-17 PROCEDURE — 3077F SYST BP >= 140 MM HG: CPT | Mod: CPTII,S$GLB,, | Performed by: PODIATRIST

## 2018-08-17 PROCEDURE — 99999 PR PBB SHADOW E&M-EST. PATIENT-LVL III: CPT | Mod: PBBFAC,,, | Performed by: PODIATRIST

## 2018-08-17 PROCEDURE — 3078F DIAST BP <80 MM HG: CPT | Mod: CPTII,S$GLB,, | Performed by: PODIATRIST

## 2018-08-17 PROCEDURE — 73630 X-RAY EXAM OF FOOT: CPT | Mod: 26,RT,, | Performed by: RADIOLOGY

## 2018-08-17 PROCEDURE — 3044F HG A1C LEVEL LT 7.0%: CPT | Mod: CPTII,S$GLB,, | Performed by: PODIATRIST

## 2018-08-17 PROCEDURE — 73630 X-RAY EXAM OF FOOT: CPT | Mod: TC,FY,PO,RT

## 2018-08-17 PROCEDURE — 99499 UNLISTED E&M SERVICE: CPT | Mod: HCNC,S$GLB,, | Performed by: PODIATRIST

## 2018-08-17 NOTE — PROGRESS NOTES
Subjective:     Patient ID: Alyx Weir is a 68 y.o. female.    Chief Complaint: Foot Injury (right foot, patient rated current pain at a 5)    Alyx is a 68 y.o. female who presents to the podiatry clinic  with complaint of  right foot fracture. Patient states the foot feels better but she still has itching pain at the small toe. Patient states she has been in the boot as instructed.  Precipitating event: stepped wrong and brick landed on foot. Current symptoms include: inability to bear weight. Aggravating factors: any weight bearing. Symptoms have gradually improved. Patient has had no prior foot problems. Evaluation to date: plain films: fracture. Treatment to date: avoidance of offending activity. Patients rates pain 5/10 on pain scale.      Patient Active Problem List   Diagnosis    Heterozygous factor V Leiden mutation    Essential hypertension    Pure hypercholesterolemia with target low density lipoprotein (LDL) cholesterol less than 130 mg/dL    VIRGIL on CPAP    Anticoagulated on Coumadin    Type 2 diabetes mellitus without complication    Obesity (BMI 30.0-34.9)    Chronic lumbar radiculopathy    Coronary artery disease involving native coronary artery without angina pectoris    Left ventricular diastolic dysfunction with preserved systolic function    COPD (chronic obstructive pulmonary disease)    Tortuous aorta    Calcification of both carotid arteries    Chondromalacia, right knee       Medication List with Changes/Refills   Current Medications    ACETAMINOPHEN (TYLENOL ARTHRITIS ORAL)    Take by mouth.    ASPIRIN (ECOTRIN) 81 MG EC TABLET    Take 1 tablet (81 mg total) by mouth once daily.    CALCIUM CARBONATE (TUMS ORAL)    Take by mouth.    CARVEDILOL (COREG) 6.25 MG TABLET    Take 1 tablet (6.25 mg total) by mouth 2 (two) times daily with meals.    DICLOFENAC SODIUM 1 % GEL    Apply 2 g topically daily as needed.    GABAPENTIN (NEURONTIN) 100 MG CAPSULE    Take 1 capsule (100 mg  total) by mouth 3 (three) times daily as needed.    OLMESARTAN-AMLODIPIN-HCTHIAZID (TRIBENZOR) 40-10-25 MG TAB    Take 1 tablet by mouth once daily.    PRAVASTATIN (PRAVACHOL) 40 MG TABLET    Take 1 tablet (40 mg total) by mouth once daily.    TRAMADOL HCL (TRAMADOL ORAL)    Take 50 mg by mouth as needed.    VITAMIN D 1000 UNITS TAB    Take 185 mg by mouth once daily.    WARFARIN (COUMADIN) 10 MG TABLET    Take 1 tablet (10 mg total) by mouth every Mon, Wed, Fri. Or As directed by coumadin clinic    WARFARIN (COUMADIN) 5 MG TABLET    Take 1 tablet (5 mg total) by mouth Daily. As directed by coumadin clinic       Review of patient's allergies indicates:   Allergen Reactions    Erythromycin Edema     Angioedema to throat    Amlodipine Other (See Comments)     Headaches    Diazepam Hives    Iodine Hives    Meperidine Hives    Morphine Itching    Primidone Other (See Comments)       Past Surgical History:   Procedure Laterality Date    BACK SURGERY      bladder cyst removal      BLADDER SURGERY      CARDIAC CATHETERIZATION  03/2013    mild CAD    HYSTERECTOMY      28 yrs old    LUMBAR SPINE SURGERY      bone spurs removed    OOPHORECTOMY         Family History   Problem Relation Age of Onset    Heart disease Mother     Hypertension Mother     Cataracts Mother     Hypertension Sister     Glaucoma Sister     Hypertension Brother     Diabetes Father     Diabetes Paternal Uncle     Hypertension Sister     Diabetes Sister     Hypertension Sister     Diabetes Sister     Breast cancer Neg Hx     Colon cancer Neg Hx     Ovarian cancer Neg Hx        Social History     Socioeconomic History    Marital status:      Spouse name: Not on file    Number of children: Not on file    Years of education: Not on file    Highest education level: Not on file   Social Needs    Financial resource strain: Not on file    Food insecurity - worry: Not on file    Food insecurity - inability: Not on file  "   Transportation needs - medical: Not on file    Transportation needs - non-medical: Not on file   Occupational History    Not on file   Tobacco Use    Smoking status: Never Smoker    Smokeless tobacco: Never Used   Substance and Sexual Activity    Alcohol use: No    Drug use: No    Sexual activity: No     Birth control/protection: None   Other Topics Concern    Not on file   Social History Narrative    Dogs in household, no smokers.       Vitals:    08/17/18 1110 08/17/18 1118   BP: (!) 184/87 (!) 178/76   Pulse: (!) 50 (!) 46   Height: 5' 5" (1.651 m)    PainSc:   5        Hemoglobin A1C   Date Value Ref Range Status   05/08/2018 6.3 (H) 4.0 - 5.6 % Final     Comment:     According to ADA guidelines, hemoglobin A1c <7.0% represents  optimal control in non-pregnant diabetic patients. Different  metrics may apply to specific patient populations.   Standards of Medical Care in Diabetes-2016.  For the purpose of screening for the presence of diabetes:  <5.7%     Consistent with the absence of diabetes  5.7-6.4%  Consistent with increasing risk for diabetes   (prediabetes)  >or=6.5%  Consistent with diabetes  Currently, no consensus exists for use of hemoglobin A1c  for diagnosis of diabetes for children.  This Hemoglobin A1c assay has significant interference with fetal   hemoglobin   (HbF). The results are invalid for patients with abnormal amounts of   HbF,   including those with known Hereditary Persistence   of Fetal Hemoglobin. Heterozygous hemoglobin variants (HbAS, HbAC,   HbAD, HbAE, HbA2) do not significantly interfere with this assay;   however, presence of multiple variants in a sample may impact the %   interference.     04/03/2017 6.5 (H) 4.5 - 6.2 % Final     Comment:     According to ADA guidelines, hemoglobin A1C <7.0% represents  optimal control in non-pregnant diabetic patients.  Different  metrics may apply to specific populations.   Standards of Medical Care in Diabetes - 2016.  For the " purpose of screening for the presence of diabetes:  <5.7%     Consistent with the absence of diabetes  5.7-6.4%  Consistent with increasing risk for diabetes   (prediabetes)  >or=6.5%  Consistent with diabetes  Currently no consensus exists for use of hemoglobin A1C  for diagnosis of diabetes for children.     11/22/2016 6.6 (H) 4.5 - 6.2 % Final     Comment:     According to ADA guidelines, hemoglobin A1C <7.0% represents  optimal control in non-pregnant diabetic patients.  Different  metrics may apply to specific populations.   Standards of Medical Care in Diabetes - 2016.  For the purpose of screening for the presence of diabetes:  <5.7%     Consistent with the absence of diabetes  5.7-6.4%  Consistent with increasing risk for diabetes   (prediabetes)  >or=6.5%  Consistent with diabetes  Currently no consensus exists for use of hemoglobin A1C  for diagnosis of diabetes for children.         Review of Systems   Constitutional: Negative for chills and fever.   Respiratory: Negative for shortness of breath.    Cardiovascular: Negative for chest pain, palpitations, orthopnea, claudication and leg swelling.   Gastrointestinal: Negative for diarrhea, nausea and vomiting.   Musculoskeletal: Negative for joint pain.   Skin: Negative for rash.   Neurological: Negative for dizziness, tingling, sensory change, focal weakness and weakness.   Psychiatric/Behavioral: Negative.              Objective:      PHYSICAL EXAM: Apperance: Alert and orient in no distress,well developed, and with good attention to grooming and body habits  Lower Extremity Exam   VASCULAR: Dorsalis pedis pulses 2/4 right and Posterior Tibial pulses 2/4 right. Capillary fill time <4 seconds right. Mild edema observed right. Varicosities absent right. Skin temperature of the lower extremities is warm to warm, proximal to distal. Hair growth WNL right.  DERMATOLOGICAL: No skin rashes, subcutaneous nodules, lesions, or ulcers observed  bilateral.  NEUROLOGICAL: Light touch, sharp-dull, proprioception all present and equal bilaterally.    MUSCULOSKELETAL: Muscle strength is 5/5 for foot inverters, everters, plantarflexors, and dorsiflexors. Muscle tone is normal. (+) pain on palpation of right 5th metatarsal.        TEST RESULTS: Radiographs of right foot reveals comminuted fracture again seen involving the distal shaft of the 5th metatarsal.  There is mild increase in callus when compared to the prior study.  The alignment is stable when compared to the prior exam.  Remaining findings are stable  Radiographs of right foot reveals again noted is an obliquely oriented and mildly comminuted fracture of the distal 5th metatarsal for shaft.  Fragment positioning and alignment is stable.  Some blurring of the fracture planes and adjacent callus noted consistent with interval healing response.  Prominent dorsal and plantar calcaneal spurs.  No new fracture or dislocation.  Radiographs of right foot taken 6/4/18 reveals there is an acute obliquely oriented fracture involving the distal shaft of the 5th metatarsal.  No significant displacement of the fracture fragments.  The joint spaces appear to be well maintained.  Prominent dorsal and plantar calcaneal enthesophytes noted.          Assessment:       Encounter Diagnoses   Name Primary?    Type II diabetes mellitus with neurological manifestations     Corn or callus - Right Foot     Displaced fracture of fifth metatarsal bone, right foot, subsequent encounter for fracture with delayed healing Yes         Plan:   Displaced fracture of fifth metatarsal bone, right foot, subsequent encounter for fracture with delayed healing  -     X-Ray Foot Complete Right; Future; Expected date: 08/17/2018    Type II diabetes mellitus with neurological manifestations  -     DIABETIC SHOES FOR HOME USE    Corn or callus - Right Foot  -     DIABETIC SHOES FOR HOME USE      I counseled the patient on her conditions,  regarding findings of my examination, my impressions, and usual treatment plan.   Reviewed x-rays from today in exam room with patient.   Patient instructed to continue with short walking boot and instructed on proper usage. This should be worn daily at all times when ambulating. Patient instructed not to drive with walking boot.   Prescription written for Diabetic shoes and inserts.   Patient to return in 1 moth.               Bradley Chu DPM  [facility]

## 2018-08-17 NOTE — PATIENT INSTRUCTIONS
[facility]  [address] [place] [address]   Phone: [phone]  Fax: [phone] [address] [phone]

## 2018-09-10 ENCOUNTER — ANTI-COAG VISIT (OUTPATIENT)
Dept: CARDIOLOGY | Facility: CLINIC | Age: 68
End: 2018-09-10
Payer: MEDICARE

## 2018-09-10 DIAGNOSIS — Z79.01 LONG TERM (CURRENT) USE OF ANTICOAGULANTS: Primary | ICD-10-CM

## 2018-09-10 LAB — INR PPP: 1.9 (ref 2–3)

## 2018-09-10 PROCEDURE — 85610 PROTHROMBIN TIME: CPT | Mod: PBBFAC,PO

## 2018-09-10 NOTE — PROGRESS NOTES
Patient's INR is subtherapeutic at 1.9.  Reports no missed doses or diet changes.  Instructed to start new dose of 10 mg on Monday and Wednesday; and 5 mg on all other days per dos calendar given.  Recheck in 4 weeks.

## 2018-09-21 ENCOUNTER — OFFICE VISIT (OUTPATIENT)
Dept: PODIATRY | Facility: CLINIC | Age: 68
End: 2018-09-21
Payer: MEDICARE

## 2018-09-21 ENCOUNTER — HOSPITAL ENCOUNTER (OUTPATIENT)
Dept: RADIOLOGY | Facility: HOSPITAL | Age: 68
Discharge: HOME | End: 2018-09-21
Attending: PODIATRIST
Payer: MEDICARE

## 2018-09-21 VITALS
SYSTOLIC BLOOD PRESSURE: 150 MMHG | BODY MASS INDEX: 31.11 KG/M2 | HEIGHT: 65 IN | DIASTOLIC BLOOD PRESSURE: 81 MMHG | WEIGHT: 186.75 LBS | RESPIRATION RATE: 20 BRPM | HEART RATE: 63 BPM

## 2018-09-21 DIAGNOSIS — S92.351G DISPLACED FRACTURE OF FIFTH METATARSAL BONE, RIGHT FOOT, SUBSEQUENT ENCOUNTER FOR FRACTURE WITH DELAYED HEALING: ICD-10-CM

## 2018-09-21 DIAGNOSIS — S92.354D CLOSED NONDISPLACED FRACTURE OF FIFTH METATARSAL BONE OF RIGHT FOOT WITH ROUTINE HEALING, SUBSEQUENT ENCOUNTER: Primary | ICD-10-CM

## 2018-09-21 DIAGNOSIS — E11.49 TYPE II DIABETES MELLITUS WITH NEUROLOGICAL MANIFESTATIONS: ICD-10-CM

## 2018-09-21 PROCEDURE — 1101F PT FALLS ASSESS-DOCD LE1/YR: CPT | Mod: CPTII,,, | Performed by: PODIATRIST

## 2018-09-21 PROCEDURE — 73630 X-RAY EXAM OF FOOT: CPT | Mod: TC,FY,PO,RT

## 2018-09-21 PROCEDURE — 3079F DIAST BP 80-89 MM HG: CPT | Mod: CPTII,,, | Performed by: PODIATRIST

## 2018-09-21 PROCEDURE — 3077F SYST BP >= 140 MM HG: CPT | Mod: CPTII,,, | Performed by: PODIATRIST

## 2018-09-21 PROCEDURE — 3044F HG A1C LEVEL LT 7.0%: CPT | Mod: CPTII,,, | Performed by: PODIATRIST

## 2018-09-21 PROCEDURE — 99213 OFFICE O/P EST LOW 20 MIN: CPT | Mod: PBBFAC,25,PO | Performed by: PODIATRIST

## 2018-09-21 PROCEDURE — 99499 UNLISTED E&M SERVICE: CPT | Mod: HCNC,S$GLB,, | Performed by: PODIATRIST

## 2018-09-21 PROCEDURE — 99213 OFFICE O/P EST LOW 20 MIN: CPT | Mod: S$PBB,,, | Performed by: PODIATRIST

## 2018-09-21 PROCEDURE — 99999 PR PBB SHADOW E&M-EST. PATIENT-LVL III: CPT | Mod: PBBFAC,,, | Performed by: PODIATRIST

## 2018-09-21 PROCEDURE — 73630 X-RAY EXAM OF FOOT: CPT | Mod: 26,RT,, | Performed by: RADIOLOGY

## 2018-09-21 NOTE — PATIENT INSTRUCTIONS
[facility]  [address] [facility] [place] [address]   Phone: [phone]  Fax: [phone] [address] [phone]

## 2018-09-21 NOTE — PROGRESS NOTES
Subjective:     Patient ID: Alyx Weir is a 68 y.o. female.    Chief Complaint: Follow-up (displaced fracture of 5th matatarsal bone of right foot)    Alyx is a 68 y.o. female who presents to the podiatry clinic  with complaint of  right foot fracture. Patient states the foot feels better but she still has itching pain at the small toe. Patient states she has been in the surgical shoe as instructed.  Precipitating event: stepped wrong and brick landed on foot. Current symptoms include: inability to bear weight. Aggravating factors: any weight bearing. Symptoms have gradually improved. Patient has had no prior foot problems. Evaluation to date: plain films: fracture. Treatment to date: avoidance of offending activity. Patients rates pain 2/10 on pain scale.      Patient Active Problem List   Diagnosis    Heterozygous factor V Leiden mutation    Essential hypertension    Pure hypercholesterolemia with target low density lipoprotein (LDL) cholesterol less than 130 mg/dL    VIRGIL on CPAP    Anticoagulated on Coumadin    Type 2 diabetes mellitus without complication    Obesity (BMI 30.0-34.9)    Chronic lumbar radiculopathy    Coronary artery disease involving native coronary artery without angina pectoris    Left ventricular diastolic dysfunction with preserved systolic function    COPD (chronic obstructive pulmonary disease)    Tortuous aorta    Calcification of both carotid arteries    Chondromalacia, right knee          Medication List           Accurate as of 9/21/18  5:53 PM. If you have any questions, ask your nurse or doctor.               CHANGE how you take these medications    * warfarin 10 MG tablet  Commonly known as:  COUMADIN  Take 1 tablet (10 mg total) by mouth every Mon, Wed, Fri. Or As directed by coumadin clinic  What changed:    · when to take this  · additional instructions     * warfarin 5 MG tablet  Commonly known as:  COUMADIN  Take 1 tablet (5 mg total) by mouth Daily. As  directed by coumadin clinic  What changed:  Another medication with the same name was changed. Make sure you understand how and when to take each.         * This list has 2 medication(s) that are the same as other medications prescribed for you. Read the directions carefully, and ask your doctor or other care provider to review them with you.            CONTINUE taking these medications    aspirin 81 MG EC tablet  Commonly known as:  ECOTRIN  Take 1 tablet (81 mg total) by mouth once daily.     carvedilol 6.25 MG tablet  Commonly known as:  COREG  Take 1 tablet (6.25 mg total) by mouth 2 (two) times daily with meals.     diclofenac sodium 1 % Gel  Commonly known as:  VOLTAREN  Apply 2 g topically daily as needed.     gabapentin 100 MG capsule  Commonly known as:  NEURONTIN  Take 1 capsule (100 mg total) by mouth 3 (three) times daily as needed.     olmesartan-amLODIPin-hcthiazid 40-10-25 mg Tab  Commonly known as:  TRIBENZOR  Take 1 tablet by mouth once daily.     pravastatin 40 MG tablet  Commonly known as:  PRAVACHOL  Take 1 tablet (40 mg total) by mouth once daily.     TRAMADOL ORAL     TUMS ORAL     TYLENOL ARTHRITIS ORAL     vitamin D 1000 units Tab  Commonly known as:  VITAMIN D3            Review of patient's allergies indicates:   Allergen Reactions    Erythromycin Edema     Angioedema to throat    Amlodipine Other (See Comments)     Headaches    Diazepam Hives    Iodine Hives    Meperidine Hives    Morphine Itching    Primidone Other (See Comments)       Past Surgical History:   Procedure Laterality Date    BACK SURGERY      bladder cyst removal      BLADDER SURGERY      CARDIAC CATHETERIZATION  03/2013    mild CAD    COLONOSCOPY N/A 11/13/2015    Procedure: COLONOSCOPY;  Surgeon: Jas Umanzor III, MD;  Location: [facility];  Service: Endoscopy;  Laterality: N/A;    COLONOSCOPY N/A 11/13/2015    Performed by Jas U[facility]zor III, MD at [facility] ENDO    HYSTERECTOMY      28 yrs old    LUMBAR  "SPINE SURGERY      bone spurs removed    OOPHORECTOMY         Family History   Problem Relation Age of Onset    Heart disease Mother     Hypertension Mother     Cataracts Mother     Hypertension Sister     Glaucoma Sister     Hypertension Brother     Diabetes Father     Diabetes Paternal Uncle     Hypertension Sister     Diabetes Sister     Hypertension Sister     Diabetes Sister     Breast cancer Neg Hx     Colon cancer Neg Hx     Ovarian cancer Neg Hx        Social History     Socioeconomic History    Marital status:      Spouse name: Not on file    Number of children: Not on file    Years of education: Not on file    Highest education level: Not on file   Social Needs    Financial resource strain: Not on file    Food insecurity - worry: Not on file    Food insecurity - inability: Not on file    Transportation needs - medical: Not on file    Transportation needs - non-medical: Not on file   Occupational History    Not on file   Tobacco Use    Smoking status: Never Smoker    Smokeless tobacco: Never Used   Substance and Sexual Activity    Alcohol use: No    Drug use: No    Sexual activity: No     Birth control/protection: None   Other Topics Concern    Not on file   Social History Narrative    Dogs in household, no smokers.       Vitals:    09/21/18 0914   BP: (!) 150/81   Pulse: 63   Resp: 20   Weight: 84.7 kg (186 lb 11.7 oz)   Height: 5' 5" (1.651 m)   PainSc: 0-No pain       Hemoglobin A1C   Date Value Ref Range Status   05/08/2018 6.3 (H) 4.0 - 5.6 % Final     Comment:     According to ADA guidelines, hemoglobin A1c <7.0% represents  optimal control in non-pregnant diabetic patients. Different  metrics may apply to specific patient populations.   Standards of Medical Care in Diabetes-2016.  For the purpose of screening for the presence of diabetes:  <5.7%     Consistent with the absence of diabetes  5.7-6.4%  Consistent with increasing risk for diabetes "   (prediabetes)  >or=6.5%  Consistent with diabetes  Currently, no consensus exists for use of hemoglobin A1c  for diagnosis of diabetes for children.  This Hemoglobin A1c assay has significant interference with fetal   hemoglobin   (HbF). The results are invalid for patients with abnormal amounts of   HbF,   including those with known Hereditary Persistence   of Fetal Hemoglobin. Heterozygous hemoglobin variants (HbAS, HbAC,   HbAD, HbAE, HbA2) do not significantly interfere with this assay;   however, presence of multiple variants in a sample may impact the %   interference.     04/03/2017 6.5 (H) 4.5 - 6.2 % Final     Comment:     According to ADA guidelines, hemoglobin A1C <7.0% represents  optimal control in non-pregnant diabetic patients.  Different  metrics may apply to specific populations.   Standards of Medical Care in Diabetes - 2016.  For the purpose of screening for the presence of diabetes:  <5.7%     Consistent with the absence of diabetes  5.7-6.4%  Consistent with increasing risk for diabetes   (prediabetes)  >or=6.5%  Consistent with diabetes  Currently no consensus exists for use of hemoglobin A1C  for diagnosis of diabetes for children.     11/22/2016 6.6 (H) 4.5 - 6.2 % Final     Comment:     According to ADA guidelines, hemoglobin A1C <7.0% represents  optimal control in non-pregnant diabetic patients.  Different  metrics may apply to specific populations.   Standards of Medical Care in Diabetes - 2016.  For the purpose of screening for the presence of diabetes:  <5.7%     Consistent with the absence of diabetes  5.7-6.4%  Consistent with increasing risk for diabetes   (prediabetes)  >or=6.5%  Consistent with diabetes  Currently no consensus exists for use of hemoglobin A1C  for diagnosis of diabetes for children.         Review of Systems   Constitutional: Negative for chills and fever.   Respiratory: Negative for shortness of breath.    Cardiovascular: Negative for chest pain, palpitations,  orthopnea, claudication and leg swelling.   Gastrointestinal: Negative for diarrhea, nausea and vomiting.   Musculoskeletal: Negative for joint pain.   Skin: Negative for rash.   Neurological: Negative for dizziness, tingling, sensory change, focal weakness and weakness.   Psychiatric/Behavioral: Negative.              Objective:      PHYSICAL EXAM: Apperance: Alert and orient in no distress,well developed, and with good attention to grooming and body habits  Lower Extremity Exam   VASCULAR: Dorsalis pedis pulses 2/4 right and Posterior Tibial pulses 2/4 right. Capillary fill time <4 seconds right. Mild edema observed right. Varicosities absent right. Skin temperature of the lower extremities is warm to warm, proximal to distal. Hair growth WNL right.  DERMATOLOGICAL: No skin rashes, subcutaneous nodules, lesions, or ulcers observed bilateral.  NEUROLOGICAL: Light touch, sharp-dull, proprioception all present and equal bilaterally.    MUSCULOSKELETAL: Muscle strength is 5/5 for foot inverters, everters, plantarflexors, and dorsiflexors. Muscle tone is normal. (+) decreased pain on palpation of right 5th metatarsal.        TEST RESULTS: TEST RESULTS: Radiographs of right foot/ankle taken reveals mild interval healing changes present at the distal 5th metatarsal fracture site with overall alignment stable.  No detrimental changes.  Radiographs of right foot reveals comminuted fracture again seen involving the distal shaft of the 5th metatarsal.  There is mild increase in callus when compared to the prior study.  The alignment is stable when compared to the prior exam.  Remaining findings are stable  Radiographs of right foot reveals again noted is an obliquely oriented and mildly comminuted fracture of the distal 5th metatarsal for shaft.  Fragment positioning and alignment is stable.  Some blurring of the fracture planes and adjacent callus noted consistent with interval healing response.  Prominent dorsal and  plantar calcaneal spurs.  No new fracture or dislocation.  Radiographs of right foot taken 6/4/18 reveals there is an acute obliquely oriented fracture involving the distal shaft of the 5th metatarsal.  No significant displacement of the fracture fragments.  The joint spaces appear to be well maintained.  Prominent dorsal and plantar calcaneal enthesophytes noted.          Assessment:       Encounter Diagnoses   Name Primary?    Closed nondisplaced fracture of fifth metatarsal bone of right foot with routine healing, subsequent encounter Yes    Type II diabetes mellitus with neurological manifestations          Plan:   Closed nondisplaced fracture of fifth metatarsal bone of right foot with routine healing, subsequent encounter    Type II diabetes mellitus with neurological manifestations      I counseled the patient on her conditions, regarding findings of my examination, my impressions, and usual treatment plan.   Reviewed x-rays from today in exam room with patient.   Patient instructed to gradually return to tennis shoes to tolerance.    Prescription written for Diabetic shoes and inserts.   Patient to return in 1 month.               Bradley Chu DPM  [facility]

## 2018-09-27 ENCOUNTER — HOSPITAL ENCOUNTER (OUTPATIENT)
Dept: RADIOLOGY | Facility: HOSPITAL | Age: 68
Discharge: HOME | End: 2018-09-27
Attending: INTERNAL MEDICINE
Payer: MEDICARE

## 2018-09-27 ENCOUNTER — OFFICE VISIT (OUTPATIENT)
Dept: FAMILY MEDICINE | Facility: CLINIC | Age: 68
End: 2018-09-27
Payer: MEDICARE

## 2018-09-27 VITALS
BODY MASS INDEX: 30.98 KG/M2 | DIASTOLIC BLOOD PRESSURE: 100 MMHG | TEMPERATURE: 99 F | WEIGHT: 185.94 LBS | OXYGEN SATURATION: 98 % | HEIGHT: 65 IN | HEART RATE: 63 BPM | SYSTOLIC BLOOD PRESSURE: 160 MMHG

## 2018-09-27 DIAGNOSIS — M54.6 ACUTE LEFT-SIDED THORACIC BACK PAIN: ICD-10-CM

## 2018-09-27 DIAGNOSIS — M94.0 COSTOCHONDRITIS: ICD-10-CM

## 2018-09-27 DIAGNOSIS — E11.9 TYPE 2 DIABETES MELLITUS WITHOUT COMPLICATION, WITHOUT LONG-TERM CURRENT USE OF INSULIN: ICD-10-CM

## 2018-09-27 DIAGNOSIS — Z79.01 ANTICOAGULATED ON COUMADIN: Chronic | ICD-10-CM

## 2018-09-27 DIAGNOSIS — D68.51 HETEROZYGOUS FACTOR V LEIDEN MUTATION: Chronic | ICD-10-CM

## 2018-09-27 DIAGNOSIS — I10 ESSENTIAL HYPERTENSION: Primary | Chronic | ICD-10-CM

## 2018-09-27 DIAGNOSIS — E78.00 PURE HYPERCHOLESTEROLEMIA WITH TARGET LOW DENSITY LIPOPROTEIN (LDL) CHOLESTEROL LESS THAN 130 MG/DL: Chronic | ICD-10-CM

## 2018-09-27 PROCEDURE — 99215 OFFICE O/P EST HI 40 MIN: CPT | Mod: PBBFAC,25,PO | Performed by: INTERNAL MEDICINE

## 2018-09-27 PROCEDURE — 71046 X-RAY EXAM CHEST 2 VIEWS: CPT | Mod: TC,FY,PO

## 2018-09-27 PROCEDURE — 1100F PTFALLS ASSESS-DOCD GE2>/YR: CPT | Mod: CPTII,,, | Performed by: INTERNAL MEDICINE

## 2018-09-27 PROCEDURE — 3044F HG A1C LEVEL LT 7.0%: CPT | Mod: CPTII,,, | Performed by: INTERNAL MEDICINE

## 2018-09-27 PROCEDURE — 71046 X-RAY EXAM CHEST 2 VIEWS: CPT | Mod: 26,,, | Performed by: RADIOLOGY

## 2018-09-27 PROCEDURE — 99214 OFFICE O/P EST MOD 30 MIN: CPT | Mod: S$PBB,,, | Performed by: INTERNAL MEDICINE

## 2018-09-27 PROCEDURE — 93010 ELECTROCARDIOGRAM REPORT: CPT | Mod: ,,, | Performed by: INTERNAL MEDICINE

## 2018-09-27 PROCEDURE — 3288F FALL RISK ASSESSMENT DOCD: CPT | Mod: CPTII,,, | Performed by: INTERNAL MEDICINE

## 2018-09-27 PROCEDURE — 99999 PR PBB SHADOW E&M-EST. PATIENT-LVL V: CPT | Mod: PBBFAC,,, | Performed by: INTERNAL MEDICINE

## 2018-09-27 PROCEDURE — 3080F DIAST BP >= 90 MM HG: CPT | Mod: CPTII,,, | Performed by: INTERNAL MEDICINE

## 2018-09-27 PROCEDURE — 3077F SYST BP >= 140 MM HG: CPT | Mod: CPTII,,, | Performed by: INTERNAL MEDICINE

## 2018-09-27 PROCEDURE — 93005 ELECTROCARDIOGRAM TRACING: CPT | Mod: PBBFAC,PO | Performed by: INTERNAL MEDICINE

## 2018-09-27 NOTE — PROGRESS NOTES
Subjective:       Patient ID: Alyx Weir is a 68 y.o. female.    Chief Complaint: Gas    ------1 week-------left back pain under scapula-------now with left anterior chest wall pain------------positional and hurts to touch -------------no rash.      Past Medical History:   Diagnosis Date    Anticoagulated on Coumadin     Arm weakness-rotator cuff weakness 11/2/2015    Arthritis     Asthma     Clotting disorder     Coronary artery disease     Deep vein thrombosis     Degenerative disc disease     Diabetes mellitus type I 2011    BS last tested x 1 month not sure what it was.    Heterozygous factor V Leiden mutation     Hx of colonic polyps 11/13/2015    Hyperlipidemia     Hypertension     VIRGIL (obstructive sleep apnea)     Stenosis of right carotid artery 12/13/2016     Past Surgical History:   Procedure Laterality Date    BACK SURGERY      bladder cyst removal      BLADDER SURGERY      CARDIAC CATHETERIZATION  03/2013    mild CAD    COLONOSCOPY N/A 11/13/2015    Procedure: COLONOSCOPY;  Surgeon: Jas Umanzor III, MD;  Location: [facility];  Service: Endoscopy;  Laterality: N/A;    COLONOSCOPY N/A 11/13/2015    Performed by Jas Umanzor III, MD at [facility] ENDO    HYSTERECTOMY      28 yrs old    LUMBAR SPINE SURGERY      bone spurs removed    OOPHORECTOMY       Family History   Problem Relation Age of Onset    Heart disease Mother     Hypertension Mother     Cataracts Mother     Hypertension Sister     Glaucoma Sister     Hypertension Brother     Diabetes Father     Diabetes Paternal Uncle     Hypertension Sister     Diabetes Sister     Hypertension Sister     Diabetes Sister     Breast cancer Neg Hx     Colon cancer Neg Hx     Ovarian cancer Neg Hx      Social History     Socioeconomic History    Marital status:      Spouse name: Not on file    Number of children: Not on file    Years of education: Not on file    Highest education level: Not on file   Social  Needs    Financial resource strain: Not on file    Food insecurity - worry: Not on file    Food insecurity - inability: Not on file    Transportation needs - medical: Not on file    Transportation needs - non-medical: Not on file   Occupational History    Not on file   Tobacco Use    Smoking status: Never Smoker    Smokeless tobacco: Never Used   Substance and Sexual Activity    Alcohol use: No    Drug use: No    Sexual activity: No     Birth control/protection: None   Other Topics Concern    Not on file   Social History Narrative    Dogs in household, no smokers.     Review of Systems   Constitutional: Negative for activity change, appetite change, chills, diaphoresis, fatigue, fever and unexpected weight change.   HENT: Negative for congestion, drooling, ear discharge, ear pain, facial swelling, hearing loss, mouth sores, nosebleeds, postnasal drip, rhinorrhea, sinus pressure, sneezing, sore throat, tinnitus, trouble swallowing and voice change.    Eyes: Negative for photophobia, redness and visual disturbance.   Respiratory: Negative for apnea, cough, choking, chest tightness, shortness of breath and wheezing.    Cardiovascular: Positive for chest pain. Negative for palpitations and leg swelling.   Gastrointestinal: Negative for abdominal distention, abdominal pain, blood in stool, constipation, diarrhea, nausea and vomiting.   Endocrine: Negative for cold intolerance, heat intolerance, polydipsia, polyphagia and polyuria.   Genitourinary: Negative for decreased urine volume, difficulty urinating, dysuria, flank pain, frequency, hematuria and urgency.   Musculoskeletal: Positive for back pain. Negative for arthralgias, gait problem, joint swelling, myalgias, neck pain and neck stiffness.   Skin: Negative for color change, pallor, rash and wound.   Allergic/Immunologic: Negative for food allergies and immunocompromised state.   Neurological: Negative for dizziness, tremors, seizures, syncope, speech  difficulty, weakness, light-headedness, numbness and headaches.   Hematological: Negative for adenopathy. Does not bruise/bleed easily.   Psychiatric/Behavioral: Negative for agitation, behavioral problems, confusion, decreased concentration, dysphoric mood, hallucinations, self-injury, sleep disturbance and suicidal ideas. The patient is not nervous/anxious and is not hyperactive.    All other systems reviewed and are negative.      Objective:      Physical Exam   Constitutional: She is oriented to person, place, and time. She appears well-developed and well-nourished. No distress.   HENT:   Head: Normocephalic and atraumatic.   Eyes: No scleral icterus.   Neck: Normal range of motion. Neck supple. No JVD present. Carotid bruit is not present. No tracheal deviation present. No thyromegaly present.   Cardiovascular: Normal rate, regular rhythm, normal heart sounds and intact distal pulses.   Pulmonary/Chest: Effort normal and breath sounds normal. No respiratory distress. She has no wheezes. She has no rales. She exhibits no tenderness.   Abdominal: Soft. Bowel sounds are normal. She exhibits no distension. There is no tenderness. There is no rebound and no guarding.   Musculoskeletal: Normal range of motion. She exhibits no edema or tenderness.   Lymphadenopathy:     She has no cervical adenopathy.   Neurological: She is alert and oriented to person, place, and time.   Skin: Skin is warm and dry. No rash noted. She is not diaphoretic. No erythema. No pallor.   Psychiatric: She has a normal mood and affect. Her behavior is normal. Judgment and thought content normal.   Nursing note and vitals reviewed.      CMP  Sodium   Date Value Ref Range Status   05/08/2018 140 136 - 145 mmol/L Final     Potassium   Date Value Ref Range Status   05/08/2018 4.5 3.5 - 5.1 mmol/L Final     Chloride   Date Value Ref Range Status   05/08/2018 104 95 - 110 mmol/L Final     CO2   Date Value Ref Range Status   05/08/2018 25 23 - 29  mmol/L Final     Glucose   Date Value Ref Range Status   05/08/2018 94 70 - 110 mg/dL Final     BUN, Bld   Date Value Ref Range Status   05/08/2018 19 8 - 23 mg/dL Final     Creatinine   Date Value Ref Range Status   05/08/2018 0.8 0.5 - 1.4 mg/dL Final     Calcium   Date Value Ref Range Status   05/08/2018 10.0 8.7 - 10.5 mg/dL Final     Total Protein   Date Value Ref Range Status   05/08/2018 7.8 6.0 - 8.4 g/dL Final     Albumin   Date Value Ref Range Status   05/08/2018 3.9 3.5 - 5.2 g/dL Final     Total Bilirubin   Date Value Ref Range Status   05/08/2018 0.9 0.1 - 1.0 mg/dL Final     Comment:     For infants and newborns, interpretation of results should be based  on gestational age, weight and in agreement with clinical  observations.  Premature Infant recommended reference ranges:  Up to 24 hours.............<8.0 mg/dL  Up to 48 hours............<12.0 mg/dL  3-5 days..................<15.0 mg/dL  6-29 days.................<15.0 mg/dL       Alkaline Phosphatase   Date Value Ref Range Status   05/08/2018 49 (L) 55 - 135 U/L Final     AST   Date Value Ref Range Status   05/08/2018 21 10 - 40 U/L Final     ALT   Date Value Ref Range Status   05/08/2018 19 10 - 44 U/L Final     Anion Gap   Date Value Ref Range Status   05/08/2018 11 8 - 16 mmol/L Final     eGFR if    Date Value Ref Range Status   05/08/2018 >60.0 >60 mL/min/1.73 m^2 Final     eGFR if non    Date Value Ref Range Status   05/08/2018 >60.0 >60 mL/min/1.73 m^2 Final     Comment:     Calculation used to obtain the estimated glomerular filtration  rate (eGFR) is the CKD-EPI equation.        Lab Results   Component Value Date    WBC 6.51 05/08/2018    HGB 13.7 05/08/2018    HCT 43.5 05/08/2018    MCV 90 05/08/2018     05/08/2018     Lab Results   Component Value Date    CHOL 197 05/08/2018     Lab Results   Component Value Date    HDL 71 05/08/2018     Lab Results   Component Value Date    LDLCALC 113.0 05/08/2018      Lab Results   Component Value Date    TRIG 65 05/08/2018     Lab Results   Component Value Date    CHOLHDL 36.0 05/08/2018     Lab Results   Component Value Date    TSH 0.588 09/09/2015     Lab Results   Component Value Date    HGBA1C 6.3 (H) 05/08/2018     Assessment:       1. Essential hypertension    2. Type 2 diabetes mellitus without complication, without long-term current use of insulin    3. Pure hypercholesterolemia with target low density lipoprotein (LDL) cholesterol less than 130 mg/dL    4. Heterozygous factor V Leiden mutation    5. Anticoagulated on Coumadin    6. Acute left-sided thoracic back pain    7. Costochondritis        Plan:   Essential hypertension  -     Comprehensive metabolic panel; Future; Expected date: 09/27/2018  -     CBC auto differential; Future; Expected date: 09/27/2018  -     TSH; Future; Expected date: 09/27/2018    Type 2 diabetes mellitus without complication, without long-term current use of insulin  -     Hemoglobin A1c; Future; Expected date: 09/27/2018    Pure hypercholesterolemia with target low density lipoprotein (LDL) cholesterol less than 130 mg/dL  -     Lipid panel; Future; Expected date: 09/27/2018    Heterozygous factor V Leiden mutation    Anticoagulated on Coumadin    Acute left-sided thoracic back pain  -     EKG 12-lead  -     X-Ray Chest PA And Lateral; Future; Expected date: 09/27/2018  -     Ambulatory consult to Physical Therapy  -     Ambulatory consult to Physical Therapy    Costochondritis  -     EKG 12-lead  -     X-Ray Chest PA And Lateral; Future; Expected date: 09/27/2018  -     Ambulatory consult to Physical Therapy  -     Ambulatory consult to Physical Therapy                 Tramadol prn--------------P.T.-----------------call if persists.                 F/u 1 month.

## 2018-10-08 ENCOUNTER — ANTI-COAG VISIT (OUTPATIENT)
Dept: CARDIOLOGY | Facility: CLINIC | Age: 68
End: 2018-10-08
Payer: MEDICARE

## 2018-10-08 DIAGNOSIS — Z79.01 LONG TERM (CURRENT) USE OF ANTICOAGULANTS: Primary | ICD-10-CM

## 2018-10-08 LAB — INR PPP: 1.4 (ref 2–3)

## 2018-10-08 PROCEDURE — 85610 PROTHROMBIN TIME: CPT | Mod: PBBFAC,PO

## 2018-10-08 PROCEDURE — 99999 PR PBB SHADOW E&M-EST. PATIENT-LVL I: CPT | Mod: PBBFAC,,,

## 2018-10-08 PROCEDURE — 99211 OFF/OP EST MAY X REQ PHY/QHP: CPT | Mod: PBBFAC,PO

## 2018-10-08 NOTE — PROGRESS NOTES
Patient's INR is sub-therapeutic on today at 1.4 . Admits to missing dose x 1.  Patient agrees to increase of 12.5 mg on today, 10 mg on tomorrow (10/9/18), then resume normal dose per dosing calendar. No signs of swelling/clots reported  Follow-up scheduled next Monday, 10/15/18.

## 2018-10-15 ENCOUNTER — ANTI-COAG VISIT (OUTPATIENT)
Dept: CARDIOLOGY | Facility: CLINIC | Age: 68
End: 2018-10-15
Payer: MEDICARE

## 2018-10-15 DIAGNOSIS — Z79.01 LONG TERM (CURRENT) USE OF ANTICOAGULANTS: Primary | ICD-10-CM

## 2018-10-15 LAB — INR PPP: 2.9 (ref 2–3)

## 2018-10-15 PROCEDURE — 85610 PROTHROMBIN TIME: CPT | Mod: PBBFAC,PO

## 2018-11-05 ENCOUNTER — ANTI-COAG VISIT (OUTPATIENT)
Dept: CARDIOLOGY | Facility: CLINIC | Age: 68
End: 2018-11-05

## 2018-11-05 ENCOUNTER — LAB VISIT (OUTPATIENT)
Dept: LAB | Facility: HOSPITAL | Age: 68
End: 2018-11-05
Attending: INTERNAL MEDICINE
Payer: MEDICARE

## 2018-11-05 DIAGNOSIS — Z79.01 LONG TERM (CURRENT) USE OF ANTICOAGULANTS: ICD-10-CM

## 2018-11-05 DIAGNOSIS — Z79.01 LONG TERM (CURRENT) USE OF ANTICOAGULANTS: Primary | ICD-10-CM

## 2018-11-05 LAB
INR PPP: 2.8
PROTHROMBIN TIME: 29.2 SEC

## 2018-11-05 PROCEDURE — 85610 PROTHROMBIN TIME: CPT | Mod: HCNC,PO

## 2018-11-05 PROCEDURE — 36415 COLL VENOUS BLD VENIPUNCTURE: CPT | Mod: HCNC,PO

## 2018-11-05 NOTE — PROGRESS NOTES
Patient's INR is therapeutic at 2.8.  No changes in dose.  Continue 10mg on Mon/Wed and 5mg on all other days of the week.  Recheck in 1 month.  Please call should you have any questions or concerns at [phone] or [phone].

## 2018-11-08 ENCOUNTER — OFFICE VISIT (OUTPATIENT)
Dept: FAMILY MEDICINE | Facility: CLINIC | Age: 68
End: 2018-11-08
Payer: MEDICARE

## 2018-11-08 VITALS
TEMPERATURE: 98 F | RESPIRATION RATE: 16 BRPM | WEIGHT: 188.63 LBS | SYSTOLIC BLOOD PRESSURE: 138 MMHG | DIASTOLIC BLOOD PRESSURE: 86 MMHG | HEIGHT: 65 IN | HEART RATE: 90 BPM | OXYGEN SATURATION: 99 % | BODY MASS INDEX: 31.43 KG/M2

## 2018-11-08 DIAGNOSIS — E78.00 PURE HYPERCHOLESTEROLEMIA WITH TARGET LOW DENSITY LIPOPROTEIN (LDL) CHOLESTEROL LESS THAN 130 MG/DL: Chronic | ICD-10-CM

## 2018-11-08 DIAGNOSIS — E11.9 TYPE 2 DIABETES MELLITUS WITHOUT COMPLICATION, WITHOUT LONG-TERM CURRENT USE OF INSULIN: Primary | ICD-10-CM

## 2018-11-08 DIAGNOSIS — D68.51 HETEROZYGOUS FACTOR V LEIDEN MUTATION: Chronic | ICD-10-CM

## 2018-11-08 DIAGNOSIS — G47.33 OSA ON CPAP: Chronic | ICD-10-CM

## 2018-11-08 DIAGNOSIS — Z79.01 ANTICOAGULATED ON COUMADIN: Chronic | ICD-10-CM

## 2018-11-08 DIAGNOSIS — I25.10 CORONARY ARTERY DISEASE INVOLVING NATIVE CORONARY ARTERY OF NATIVE HEART WITHOUT ANGINA PECTORIS: ICD-10-CM

## 2018-11-08 DIAGNOSIS — I10 ESSENTIAL HYPERTENSION: Chronic | ICD-10-CM

## 2018-11-08 DIAGNOSIS — J02.9 SORE THROAT: ICD-10-CM

## 2018-11-08 LAB
CTP QC/QA: YES
S PYO RRNA THROAT QL PROBE: NEGATIVE

## 2018-11-08 PROCEDURE — 3044F HG A1C LEVEL LT 7.0%: CPT | Mod: CPTII,HCNC,S$GLB, | Performed by: INTERNAL MEDICINE

## 2018-11-08 PROCEDURE — 99999 PR PBB SHADOW E&M-EST. PATIENT-LVL IV: CPT | Mod: PBBFAC,HCNC,, | Performed by: INTERNAL MEDICINE

## 2018-11-08 PROCEDURE — 87880 STREP A ASSAY W/OPTIC: CPT | Mod: QW,HCNC,S$GLB, | Performed by: INTERNAL MEDICINE

## 2018-11-08 PROCEDURE — 1101F PT FALLS ASSESS-DOCD LE1/YR: CPT | Mod: CPTII,HCNC,S$GLB, | Performed by: INTERNAL MEDICINE

## 2018-11-08 PROCEDURE — 3079F DIAST BP 80-89 MM HG: CPT | Mod: CPTII,HCNC,S$GLB, | Performed by: INTERNAL MEDICINE

## 2018-11-08 PROCEDURE — 99215 OFFICE O/P EST HI 40 MIN: CPT | Mod: HCNC,S$GLB,, | Performed by: INTERNAL MEDICINE

## 2018-11-08 PROCEDURE — 3075F SYST BP GE 130 - 139MM HG: CPT | Mod: CPTII,HCNC,S$GLB, | Performed by: INTERNAL MEDICINE

## 2018-11-08 RX ORDER — TRAMADOL HYDROCHLORIDE 50 MG/1
50 TABLET ORAL 2 TIMES DAILY PRN
Qty: 60 TABLET | Refills: 0 | Status: SHIPPED | OUTPATIENT
Start: 2018-11-08 | End: 2020-06-22

## 2018-11-08 NOTE — PROGRESS NOTES
Subjective:       Patient ID: Alyx Weir is a 68 y.o. female.    Chief Complaint: Follow-up; Hypertension; Hyperlipidemia; Diabetes; Anticoagulation; Coronary Artery Disease; and Sleep Apnea    Hypertension   Pertinent negatives include no chest pain, headaches, neck pain, palpitations or shortness of breath.   Hyperlipidemia   Associated symptoms include myalgias. Pertinent negatives include no chest pain or shortness of breath.   Diabetes   Pertinent negatives for hypoglycemia include no confusion, dizziness, headaches, nervousness/anxiousness, pallor, seizures, speech difficulty or tremors. Pertinent negatives for diabetes include no chest pain, no fatigue, no polydipsia, no polyphagia, no polyuria and no weakness.   Coronary Artery Disease   Pertinent negatives include no chest pain, chest tightness, dizziness, leg swelling, palpitations or shortness of breath. Risk factors include hyperlipidemia.     Past Medical History:   Diagnosis Date    Anticoagulated on Coumadin     Arm weakness-rotator cuff weakness 11/2/2015    Arthritis     Asthma     Clotting disorder     Coronary artery disease     Deep vein thrombosis     Degenerative disc disease     Diabetes mellitus type I 2011    BS last tested x 1 month not sure what it was.    Heterozygous factor V Leiden mutation     Hx of colonic polyps 11/13/2015    Hyperlipidemia     Hypertension     VIRGIL (obstructive sleep apnea)     Stenosis of right carotid artery 12/13/2016     Past Surgical History:   Procedure Laterality Date    BACK SURGERY      bladder cyst removal      BLADDER SURGERY      CARDIAC CATHETERIZATION  03/2013    mild CAD    COLONOSCOPY N/A 11/13/2015    Procedure: COLONOSCOPY;  Surgeon: Jas Umanzor III, MD;  Location: [facility];  Service: Endoscopy;  Laterality: N/A;    COLONOSCOPY N/A 11/13/2015    Performed by J[facility]or III, MD at [facility] ENDO    HYSTERECTOMY      28 yrs old    LUMBAR SPINE SURGERY      bone spurs  removed    OOPHORECTOMY       Family History   Problem Relation Age of Onset    Heart disease Mother     Hypertension Mother     Cataracts Mother     Hypertension Sister     Glaucoma Sister     Hypertension Brother     Diabetes Father     Diabetes Paternal Uncle     Hypertension Sister     Diabetes Sister     Hypertension Sister     Diabetes Sister     Breast cancer Neg Hx     Colon cancer Neg Hx     Ovarian cancer Neg Hx      Social History     Socioeconomic History    Marital status:      Spouse name: Not on file    Number of children: Not on file    Years of education: Not on file    Highest education level: Not on file   Social Needs    Financial resource strain: Not on file    Food insecurity - worry: Not on file    Food insecurity - inability: Not on file    Transportation needs - medical: Not on file    Transportation needs - non-medical: Not on file   Occupational History    Not on file   Tobacco Use    Smoking status: Never Smoker    Smokeless tobacco: Never Used   Substance and Sexual Activity    Alcohol use: No    Drug use: No    Sexual activity: No     Birth control/protection: None   Other Topics Concern    Not on file   Social History Narrative    Dogs in household, no smokers.     Review of Systems   Constitutional: Negative for activity change, appetite change, chills, diaphoresis, fatigue, fever and unexpected weight change.   HENT: Positive for sore throat. Negative for congestion, drooling, ear discharge, ear pain, facial swelling, hearing loss, mouth sores, nosebleeds, postnasal drip, rhinorrhea, sinus pressure, sneezing, tinnitus, trouble swallowing and voice change.    Eyes: Negative for photophobia, redness and visual disturbance.   Respiratory: Negative for apnea, cough, choking, chest tightness, shortness of breath and wheezing.    Cardiovascular: Negative for chest pain, palpitations and leg swelling.   Gastrointestinal: Negative for abdominal  distention, abdominal pain, blood in stool, constipation, diarrhea, nausea and vomiting.   Endocrine: Negative for cold intolerance, heat intolerance, polydipsia, polyphagia and polyuria.   Genitourinary: Negative for decreased urine volume, difficulty urinating, dysuria, flank pain, frequency, hematuria and urgency.   Musculoskeletal: Positive for arthralgias, back pain and myalgias. Negative for gait problem, joint swelling, neck pain and neck stiffness.   Skin: Negative for color change, pallor, rash and wound.   Allergic/Immunologic: Negative for food allergies and immunocompromised state.   Neurological: Negative for dizziness, tremors, seizures, syncope, speech difficulty, weakness, light-headedness, numbness and headaches.   Hematological: Negative for adenopathy. Does not bruise/bleed easily.   Psychiatric/Behavioral: Negative for agitation, behavioral problems, confusion, decreased concentration, dysphoric mood, hallucinations, self-injury, sleep disturbance and suicidal ideas. The patient is not nervous/anxious and is not hyperactive.    All other systems reviewed and are negative.      Objective:      Physical Exam   Constitutional: She is oriented to person, place, and time. She appears well-developed and well-nourished. No distress.   HENT:   Head: Normocephalic and atraumatic.   Mouth/Throat: No oropharyngeal exudate.   Neck: Normal range of motion. Neck supple. No JVD present. Carotid bruit is not present. No tracheal deviation present. No thyromegaly present.   Cardiovascular: Normal rate, regular rhythm, normal heart sounds and intact distal pulses.   Pulmonary/Chest: Effort normal and breath sounds normal. No respiratory distress. She has no wheezes. She has no rales. She exhibits no tenderness.   Abdominal: Soft. Bowel sounds are normal. She exhibits no distension. There is no tenderness. There is no rebound and no guarding.   Musculoskeletal: Normal range of motion. She exhibits no edema or  tenderness.   Lymphadenopathy:     She has no cervical adenopathy.   Neurological: She is alert and oriented to person, place, and time.   Skin: Skin is warm and dry. No rash noted. She is not diaphoretic. No erythema. No pallor.   Psychiatric: She has a normal mood and affect. Her behavior is normal. Judgment and thought content normal.   Nursing note and vitals reviewed.      CMP  Sodium   Date Value Ref Range Status   09/27/2018 139 136 - 145 mmol/L Final     Potassium   Date Value Ref Range Status   09/27/2018 4.3 3.5 - 5.1 mmol/L Final     Chloride   Date Value Ref Range Status   09/27/2018 99 95 - 110 mmol/L Final     CO2   Date Value Ref Range Status   09/27/2018 32 (H) 23 - 29 mmol/L Final     Glucose   Date Value Ref Range Status   09/27/2018 95 70 - 110 mg/dL Final     BUN, Bld   Date Value Ref Range Status   09/27/2018 21 8 - 23 mg/dL Final     Creatinine   Date Value Ref Range Status   09/27/2018 0.9 0.5 - 1.4 mg/dL Final     Calcium   Date Value Ref Range Status   09/27/2018 10.2 8.7 - 10.5 mg/dL Final     Total Protein   Date Value Ref Range Status   09/27/2018 8.0 6.0 - 8.4 g/dL Final     Albumin   Date Value Ref Range Status   09/27/2018 4.0 3.5 - 5.2 g/dL Final     Total Bilirubin   Date Value Ref Range Status   09/27/2018 0.8 0.1 - 1.0 mg/dL Final     Comment:     For infants and newborns, interpretation of results should be based  on gestational age, weight and in agreement with clinical  observations.  Premature Infant recommended reference ranges:  Up to 24 hours.............<8.0 mg/dL  Up to 48 hours............<12.0 mg/dL  3-5 days..................<15.0 mg/dL  6-29 days.................<15.0 mg/dL       Alkaline Phosphatase   Date Value Ref Range Status   09/27/2018 50 (L) 55 - 135 U/L Final     AST   Date Value Ref Range Status   09/27/2018 19 10 - 40 U/L Final     ALT   Date Value Ref Range Status   09/27/2018 16 10 - 44 U/L Final     Anion Gap   Date Value Ref Range Status   09/27/2018 8 8  - 16 mmol/L Final     eGFR if    Date Value Ref Range Status   09/27/2018 >60.0 >60 mL/min/1.73 m^2 Final     eGFR if non    Date Value Ref Range Status   09/27/2018 >60.0 >60 mL/min/1.73 m^2 Final     Comment:     Calculation used to obtain the estimated glomerular filtration  rate (eGFR) is the CKD-EPI equation.        Lab Results   Component Value Date    WBC 6.57 09/27/2018    HGB 13.6 09/27/2018    HCT 42.7 09/27/2018    MCV 89 09/27/2018     09/27/2018     Lab Results   Component Value Date    CHOL 220 (H) 09/27/2018     Lab Results   Component Value Date    HDL 80 (H) 09/27/2018     Lab Results   Component Value Date    LDLCALC 124.4 09/27/2018     Lab Results   Component Value Date    TRIG 78 09/27/2018     Lab Results   Component Value Date    CHOLHDL 36.4 09/27/2018     Lab Results   Component Value Date    TSH 0.681 09/27/2018     Lab Results   Component Value Date    HGBA1C 6.0 (H) 09/27/2018     Assessment:       1. Type 2 diabetes mellitus without complication, without long-term current use of insulin    2. Pure hypercholesterolemia with target low density lipoprotein (LDL) cholesterol less than 130 mg/dL    3. VIRGIL on CPAP    4. Heterozygous factor V Leiden mutation    5. Essential hypertension    6. Coronary artery disease involving native coronary artery of native heart without angina pectoris    7. Anticoagulated on Coumadin    8. Sore throat        Plan:   Type 2 diabetes mellitus without complication, without long-term current use of insulin-stable.    Pure hypercholesterolemia with target low density lipoprotein (LDL) cholesterol less than 130 mg/dL-stable.    VIRGIL on CPAP-stable.    Heterozygous factor V Leiden mutation    Essential hypertension-stable.    Coronary artery disease involving native coronary artery of native heart without angina pectoris-stable.    Anticoagulated on Coumadin    Sore throat-call if persists.  -     POCT rapid strep  A------------neg.    Other orders  -     traMADol (ULTRAM) 50 mg tablet; Take 1 tablet (50 mg total) by mouth 2 (two) times daily as needed for Pain.  Dispense: 60 tablet; Refill: 0    F/u 6 months.

## 2018-12-03 ENCOUNTER — TELEPHONE (OUTPATIENT)
Dept: CARDIOLOGY | Facility: CLINIC | Age: 68
End: 2018-12-03

## 2018-12-03 ENCOUNTER — ANTI-COAG VISIT (OUTPATIENT)
Dept: CARDIOLOGY | Facility: CLINIC | Age: 68
End: 2018-12-03
Payer: MEDICARE

## 2018-12-03 DIAGNOSIS — Z79.01 LONG TERM (CURRENT) USE OF ANTICOAGULANTS: Primary | ICD-10-CM

## 2018-12-03 LAB — INR PPP: 1.6 (ref 2–3)

## 2018-12-03 PROCEDURE — 85610 PROTHROMBIN TIME: CPT | Mod: QW,HCNC,S$GLB, | Performed by: INTERNAL MEDICINE

## 2018-12-03 PROCEDURE — 99211 OFF/OP EST MAY X REQ PHY/QHP: CPT | Mod: 25,HCNC,S$GLB, | Performed by: INTERNAL MEDICINE

## 2018-12-03 NOTE — PROGRESS NOTES
Patient's INR is sub-therapeutic at 1.6.  Reports eating a small serving of cabbage on yesterday; normally avoids all foods high in Vitamin K.  Discussed importance of maintaining a diet consistent in Vitamin K intake. No abnormal pain/swelling, weakness, or numbness noted.  Instructions given for patient to 15mg on today; then resume current dose of 10mg on Mondays and Wednesdays and 5mg on all other days of the week.  Patient voiced understanding.  Follow-up in 2 weeks.

## 2018-12-19 ENCOUNTER — ANTI-COAG VISIT (OUTPATIENT)
Dept: CARDIOLOGY | Facility: CLINIC | Age: 68
End: 2018-12-19
Payer: MEDICARE

## 2018-12-19 DIAGNOSIS — Z79.01 LONG TERM (CURRENT) USE OF ANTICOAGULANTS: Primary | ICD-10-CM

## 2018-12-19 LAB — INR PPP: 2.3 (ref 2–3)

## 2018-12-19 PROCEDURE — 85610 PROTHROMBIN TIME: CPT | Mod: QW,HCNC,S$GLB, | Performed by: INTERNAL MEDICINE

## 2018-12-19 NOTE — PROGRESS NOTES
Patient's INR is therapeutic at 2.3.  Reports no current concerns at the time of visit.  Instructed to maintain current dose of Warfarin 10 mg every Monday and Wednesday; and 5 mg on all other days per dosing calendar given.  Recheck on 1/11/2019 - [facility].

## 2019-01-17 ENCOUNTER — OFFICE VISIT (OUTPATIENT)
Dept: INTERNAL MEDICINE | Facility: CLINIC | Age: 69
End: 2019-01-17
Payer: MEDICARE

## 2019-01-17 VITALS
HEART RATE: 64 BPM | TEMPERATURE: 99 F | DIASTOLIC BLOOD PRESSURE: 74 MMHG | BODY MASS INDEX: 30.74 KG/M2 | HEIGHT: 65 IN | SYSTOLIC BLOOD PRESSURE: 106 MMHG | WEIGHT: 184.5 LBS | OXYGEN SATURATION: 97 %

## 2019-01-17 DIAGNOSIS — J06.9 UPPER RESPIRATORY TRACT INFECTION, UNSPECIFIED TYPE: Primary | ICD-10-CM

## 2019-01-17 DIAGNOSIS — R68.89 FLU-LIKE SYMPTOMS: ICD-10-CM

## 2019-01-17 LAB
INFLUENZA A, MOLECULAR: NEGATIVE
INFLUENZA B, MOLECULAR: NEGATIVE
SPECIMEN SOURCE: NORMAL

## 2019-01-17 PROCEDURE — 87502 INFLUENZA DNA AMP PROBE: CPT | Mod: HCNC

## 2019-01-17 PROCEDURE — 99999 PR PBB SHADOW E&M-EST. PATIENT-LVL V: ICD-10-PCS | Mod: PBBFAC,HCNC,, | Performed by: NURSE PRACTITIONER

## 2019-01-17 PROCEDURE — 3078F DIAST BP <80 MM HG: CPT | Mod: CPTII,HCNC,S$GLB, | Performed by: NURSE PRACTITIONER

## 2019-01-17 PROCEDURE — 3074F SYST BP LT 130 MM HG: CPT | Mod: CPTII,HCNC,S$GLB, | Performed by: NURSE PRACTITIONER

## 2019-01-17 PROCEDURE — 99999 PR PBB SHADOW E&M-EST. PATIENT-LVL V: CPT | Mod: PBBFAC,HCNC,, | Performed by: NURSE PRACTITIONER

## 2019-01-17 PROCEDURE — 1101F PT FALLS ASSESS-DOCD LE1/YR: CPT | Mod: CPTII,HCNC,S$GLB, | Performed by: NURSE PRACTITIONER

## 2019-01-17 PROCEDURE — 99214 PR OFFICE/OUTPT VISIT, EST, LEVL IV, 30-39 MIN: ICD-10-PCS | Mod: HCNC,S$GLB,, | Performed by: NURSE PRACTITIONER

## 2019-01-17 PROCEDURE — 1101F PR PT FALLS ASSESS DOC 0-1 FALLS W/OUT INJ PAST YR: ICD-10-PCS | Mod: CPTII,HCNC,S$GLB, | Performed by: NURSE PRACTITIONER

## 2019-01-17 PROCEDURE — 3078F PR MOST RECENT DIASTOLIC BLOOD PRESSURE < 80 MM HG: ICD-10-PCS | Mod: CPTII,HCNC,S$GLB, | Performed by: NURSE PRACTITIONER

## 2019-01-17 PROCEDURE — 99214 OFFICE O/P EST MOD 30 MIN: CPT | Mod: HCNC,S$GLB,, | Performed by: NURSE PRACTITIONER

## 2019-01-17 PROCEDURE — 3074F PR MOST RECENT SYSTOLIC BLOOD PRESSURE < 130 MM HG: ICD-10-PCS | Mod: CPTII,HCNC,S$GLB, | Performed by: NURSE PRACTITIONER

## 2019-01-17 RX ORDER — OSELTAMIVIR PHOSPHATE 75 MG/1
75 CAPSULE ORAL 2 TIMES DAILY
Qty: 10 CAPSULE | Refills: 0 | Status: SHIPPED | OUTPATIENT
Start: 2019-01-17 | End: 2019-01-22

## 2019-01-17 RX ORDER — LORATADINE 10 MG/1
10 TABLET ORAL DAILY
Refills: 0 | COMMUNITY
Start: 2019-01-17 | End: 2020-06-22

## 2019-01-17 RX ORDER — BENZONATATE 100 MG/1
100 CAPSULE ORAL 3 TIMES DAILY PRN
Qty: 30 CAPSULE | Refills: 0 | Status: SHIPPED | OUTPATIENT
Start: 2019-01-17 | End: 2019-01-23

## 2019-01-17 NOTE — PROGRESS NOTES
CC:   Chief Complaint   Patient presents with    Cough    Abdominal Pain    Back Pain     mid when coughing     HPI: This is a new problem.   Alyx Weir is a 68 y.o. female with a complaint of URI.  The current episode started in the past 4 days.   The problem has been gradually worsening.   Associated symptoms included fever, chills, rhinorrhea, cough, body aches, sore throat.    Pertinent negatives include chest pain, dyspnea, wheezing   Treatments tried: aspirin and warm salt/water gargles has been used and this has provided no relief.     Review of patient's allergies indicates:   Allergen Reactions    Erythromycin Edema     Angioedema to throat    Amlodipine Other (See Comments)     Headaches    Diazepam Hives    Iodine Hives    Meperidine Hives    Morphine Itching    Primidone Other (See Comments)       Outpatient Medications Prior to Visit   Medication Sig Dispense Refill    acetaminophen (TYLENOL ARTHRITIS ORAL) Take by mouth.      aspirin (ECOTRIN) 81 MG EC tablet Take 1 tablet (81 mg total) by mouth once daily. 30 tablet 0    calcium carbonate (TUMS ORAL) Take by mouth.      carvedilol (COREG) 6.25 MG tablet Take 1 tablet (6.25 mg total) by mouth 2 (two) times daily with meals. 180 tablet 2    diclofenac sodium 1 % Gel Apply 2 g topically daily as needed. 3 Tube 2    gabapentin (NEURONTIN) 100 MG capsule Take 1 capsule (100 mg total) by mouth 3 (three) times daily as needed. 270 capsule 3    olmesartan-amlodipin-hcthiazid (TRIBENZOR) 40-10-25 mg Tab Take 1 tablet by mouth once daily. 90 tablet 3    pravastatin (PRAVACHOL) 40 MG tablet Take 1 tablet (40 mg total) by mouth once daily. 90 tablet 3    traMADol (ULTRAM) 50 mg tablet Take 1 tablet (50 mg total) by mouth 2 (two) times daily as needed for Pain. 60 tablet 0    vitamin D 1000 units Tab Take 185 mg by mouth once daily.      warfarin (COUMADIN) 10 MG tablet Take 1 tablet (10 mg total) by mouth every Mon, Wed, Fri. Or As  "directed by coumadin clinic (Patient taking differently: Take 10 mg by mouth. On Monday and Wednesday as directed by coumadin clinic) 36 tablet 3    warfarin (COUMADIN) 5 MG tablet Take 1 tablet (5 mg total) by mouth Daily. As directed by coumadin clinic (Patient taking differently: Take 5 mg by mouth Daily. Take 5mg on Sunday, Tuesday, Thursday, Friday, and Saturday as directed by coumadin clinic.) 90 tablet 3     No facility-administered medications prior to visit.         Physical Exam   /74 (BP Location: Left arm, Patient Position: Sitting, BP Method: Large (Manual))   Pulse 64   Temp 99.4 °F (37.4 °C) (Oral)   Ht 5' 5" (1.651 m)   Wt 83.7 kg (184 lb 8.4 oz)   SpO2 97%   BMI 30.71 kg/m²   Constitutional: The patient appears well-developed and well-nourished.   Head: Normocephalic and atraumatic.   Right Ear: Tympanic membrane and ear canal normal. No drainage, swelling or tenderness. Tympanic membrane is not injected, not erythematous and not bulging.   Left Ear: Ear canal normal. No drainage, swelling or tenderness. Tympanic membrane is not injected, not erythematous and not bulging.   Nose: Mucosal edema and rhinorrhea present.   Mouth/Throat: Uvula is midline. Posterior oropharyngeal erythema present. No oropharyngeal exudate.        THE MUCOSA IS BOGGY AND ERYTHEMATOUS.     Cardiovascular: Normal rate, regular rhythm, S1 normal, S2 normal and normal heart sounds.  Exam reveals no gallop, no S3, no S4 and no friction rub.    No murmur heard.  Pulmonary/Chest: Effort normal and breath sounds normal. No stridor. Not tachypneic. No respiratory distress. The patient has no wheezes. The patient has no rhonchi. The patient has no rales.   Skin: The patient is not diaphoretic.     Encounter Diagnoses   Name Primary?    Upper respiratory tract infection, unspecified type Yes    Flu-like symptoms        PLAN:    Alyx was seen today for cough, abdominal pain and back pain.    Diagnoses and all orders " for this visit:    Upper respiratory tract infection, unspecified type  -     benzonatate (TESSALON) 100 MG capsule; Take 1 capsule (100 mg total) by mouth 3 (three) times daily as needed for Cough.  -     loratadine (CLARITIN) 10 mg tablet; Take 1 tablet (10 mg total) by mouth once daily.    Flu-like symptoms  -     oseltamivir (TAMIFLU) 75 MG capsule; Take 1 capsule (75 mg total) by mouth 2 (two) times daily. for 5 days  -     Influenza A & B by Molecular      Medications Ordered This Encounter   Medications    benzonatate (TESSALON) 100 MG capsule     Sig: Take 1 capsule (100 mg total) by mouth 3 (three) times daily as needed for Cough.     Dispense:  30 capsule     Refill:  0    loratadine (CLARITIN) 10 mg tablet     Sig: Take 1 tablet (10 mg total) by mouth once daily.     Refill:  0    oseltamivir (TAMIFLU) 75 MG capsule     Sig: Take 1 capsule (75 mg total) by mouth 2 (two) times daily. for 5 days     Dispense:  10 capsule     Refill:  0     Orders Placed This Encounter   Procedures    Influenza A & B by Molecular     Hydrate and rest.  Take medications as advised.  RTC if symptoms are worsening or changing significantly or if not improved by the end of therapy.

## 2019-01-23 ENCOUNTER — TELEPHONE (OUTPATIENT)
Dept: URGENT CARE | Facility: CLINIC | Age: 69
End: 2019-01-23

## 2019-01-23 DIAGNOSIS — J06.9 UPPER RESPIRATORY TRACT INFECTION, UNSPECIFIED TYPE: Primary | ICD-10-CM

## 2019-01-23 RX ORDER — PROMETHAZINE HYDROCHLORIDE AND DEXTROMETHORPHAN HYDROBROMIDE 6.25; 15 MG/5ML; MG/5ML
5 SYRUP ORAL EVERY 8 HOURS PRN
Qty: 118 ML | Refills: 0 | Status: SHIPPED | OUTPATIENT
Start: 2019-01-23 | End: 2019-02-02

## 2019-01-23 NOTE — TELEPHONE ENCOUNTER
I called and informed the pt that the provider Aida Nguyễn has switched her cough syrup and to stop the Tessalon aslo to not take this cough syrup while taking tramadol . She verbalized understanding and stated she will take tylenol instead for pain.//kah

## 2019-01-23 NOTE — TELEPHONE ENCOUNTER
Pts visit on 1/17/19 she is still having the cough non stop and drainage from nasal cavity . Would like to know if there is anything else you would recommend for her to take ? If so please send Rx to [facility] . Thanks//kah

## 2019-01-23 NOTE — TELEPHONE ENCOUNTER
----- Message from Sindhu Dave sent at 1/23/2019  8:39 AM CST -----  Pt is requesting a call from nurse to f/u on a previous visit.          Please call pt back at [phone]      [facility] on [address]

## 2019-02-04 ENCOUNTER — ANTI-COAG VISIT (OUTPATIENT)
Dept: CARDIOLOGY | Facility: CLINIC | Age: 69
End: 2019-02-04
Payer: MEDICARE

## 2019-02-04 DIAGNOSIS — Z79.01 LONG TERM (CURRENT) USE OF ANTICOAGULANTS: Primary | ICD-10-CM

## 2019-02-04 LAB — INR PPP: 3 (ref 2–3)

## 2019-02-04 PROCEDURE — 85610 PROTHROMBIN TIME: CPT | Mod: QW,HCNC,S$GLB, | Performed by: NUCLEAR MEDICINE

## 2019-02-04 PROCEDURE — 85610 POCT INR: ICD-10-PCS | Mod: QW,HCNC,S$GLB, | Performed by: NUCLEAR MEDICINE

## 2019-02-04 NOTE — PROGRESS NOTES
Patient's INR is therapeutic at 3.0.  Recently taking an OTC cough medication.  Will maintain current dose of Warfarin 10 mg every Monday and Wednesday; and 5 mg on all other days per week.  Dose calendar - given.  Recheck in 1 month.

## 2019-02-09 ENCOUNTER — OFFICE VISIT (OUTPATIENT)
Dept: URGENT CARE | Facility: CLINIC | Age: 69
End: 2019-02-09
Payer: MEDICARE

## 2019-02-09 ENCOUNTER — LAB VISIT (OUTPATIENT)
Dept: LAB | Facility: HOSPITAL | Age: 69
End: 2019-02-09
Attending: PHYSICIAN ASSISTANT
Payer: MEDICARE

## 2019-02-09 VITALS
OXYGEN SATURATION: 99 % | BODY MASS INDEX: 30.74 KG/M2 | HEART RATE: 52 BPM | SYSTOLIC BLOOD PRESSURE: 148 MMHG | WEIGHT: 184.5 LBS | HEIGHT: 65 IN | DIASTOLIC BLOOD PRESSURE: 84 MMHG | TEMPERATURE: 98 F

## 2019-02-09 DIAGNOSIS — M10.9 ACUTE GOUT INVOLVING TOE OF RIGHT FOOT, UNSPECIFIED CAUSE: ICD-10-CM

## 2019-02-09 DIAGNOSIS — M10.9 ACUTE GOUT INVOLVING TOE OF RIGHT FOOT, UNSPECIFIED CAUSE: Primary | ICD-10-CM

## 2019-02-09 LAB — URATE SERPL-MCNC: 7.3 MG/DL

## 2019-02-09 PROCEDURE — 1101F PT FALLS ASSESS-DOCD LE1/YR: CPT | Mod: HCNC,CPTII,S$GLB, | Performed by: PHYSICIAN ASSISTANT

## 2019-02-09 PROCEDURE — 3079F PR MOST RECENT DIASTOLIC BLOOD PRESSURE 80-89 MM HG: ICD-10-PCS | Mod: HCNC,CPTII,S$GLB, | Performed by: PHYSICIAN ASSISTANT

## 2019-02-09 PROCEDURE — 99999 PR PBB SHADOW E&M-EST. PATIENT-LVL V: ICD-10-PCS | Mod: PBBFAC,HCNC,, | Performed by: PHYSICIAN ASSISTANT

## 2019-02-09 PROCEDURE — 99214 PR OFFICE/OUTPT VISIT, EST, LEVL IV, 30-39 MIN: ICD-10-PCS | Mod: HCNC,S$GLB,, | Performed by: PHYSICIAN ASSISTANT

## 2019-02-09 PROCEDURE — 3077F PR MOST RECENT SYSTOLIC BLOOD PRESSURE >= 140 MM HG: ICD-10-PCS | Mod: HCNC,CPTII,S$GLB, | Performed by: PHYSICIAN ASSISTANT

## 2019-02-09 PROCEDURE — 99214 OFFICE O/P EST MOD 30 MIN: CPT | Mod: HCNC,S$GLB,, | Performed by: PHYSICIAN ASSISTANT

## 2019-02-09 PROCEDURE — 3079F DIAST BP 80-89 MM HG: CPT | Mod: HCNC,CPTII,S$GLB, | Performed by: PHYSICIAN ASSISTANT

## 2019-02-09 PROCEDURE — 36415 COLL VENOUS BLD VENIPUNCTURE: CPT | Mod: HCNC

## 2019-02-09 PROCEDURE — 84550 ASSAY OF BLOOD/URIC ACID: CPT | Mod: HCNC

## 2019-02-09 PROCEDURE — 1101F PR PT FALLS ASSESS DOC 0-1 FALLS W/OUT INJ PAST YR: ICD-10-PCS | Mod: HCNC,CPTII,S$GLB, | Performed by: PHYSICIAN ASSISTANT

## 2019-02-09 PROCEDURE — 99999 PR PBB SHADOW E&M-EST. PATIENT-LVL V: CPT | Mod: PBBFAC,HCNC,, | Performed by: PHYSICIAN ASSISTANT

## 2019-02-09 PROCEDURE — 3077F SYST BP >= 140 MM HG: CPT | Mod: HCNC,CPTII,S$GLB, | Performed by: PHYSICIAN ASSISTANT

## 2019-02-09 RX ORDER — COLCHICINE 0.6 MG/1
0.6 TABLET ORAL DAILY
Qty: 3 TABLET | Refills: 0 | Status: SHIPPED | OUTPATIENT
Start: 2019-02-09 | End: 2019-02-11 | Stop reason: SDUPTHER

## 2019-02-09 NOTE — PROGRESS NOTES
"Subjective:      Patient ID: Alyx Weir is a 68 y.o. female.    Chief Complaint: Joint Swelling (left )    Alyx is a 68 year old female who presents to urgent care with redness, pain and stiffness of her left big toe that she first noticed yesterday afternoon.  She denies history of injury.  She has good feeling in her feet.  She has never been diagnosed with gout.  She hasn't recently started any new medications.  She denies fever, chills, nausea, vomiting, shortness of breath.      Review of Systems   Constitutional: Negative for chills and fever.   Respiratory: Negative for cough, shortness of breath and wheezing.    Gastrointestinal: Negative for abdominal pain, diarrhea, nausea and vomiting.   Skin: Positive for rash (redness, tenderness at base of left big toe ).   Neurological: Negative for weakness and numbness.       Objective:   BP (!) 148/84 (BP Location: Right arm, Patient Position: Sitting)   Pulse (!) 52   Temp 98.1 °F (36.7 °C)   Ht 5' 5" (1.651 m)   Wt 83.7 kg (184 lb 8.4 oz)   SpO2 99%   BMI 30.71 kg/m²   Physical Exam   Constitutional: She appears well-developed and well-nourished. No distress.   Cardiovascular: Normal rate and regular rhythm.   Pulmonary/Chest: Effort normal. No respiratory distress.   Musculoskeletal:        Feet:    Erythema and tenderness at base of big toe - no warmth - pain with plantarflexion/dorsiflexion, sensation intact    Skin: Skin is warm and dry. Capillary refill takes less than 2 seconds. She is not diaphoretic.   Psychiatric: She has a normal mood and affect. Her behavior is normal.   Vitals reviewed.    Assessment:      1. Acute gout involving toe of right foot, unspecified cause       Plan:   Acute gout involving toe of right foot, unspecified cause  -     Uric acid; Future; Expected date: 02/09/2019  -     colchicine (COLCRYS) 0.6 mg tablet; Take 1 tablet (0.6 mg total) by mouth once daily. for 1 day  Dispense: 3 tablet; Refill: 0    Gout    -  Uric " acid elevated at 7.3   -  Colchicine 1.2 mg at once, then . 6 mg an hour later for acute flare     -  Follow-up with primary care for long term management     AVS provided and instructions reviewed with patient. Patient was counseled on supportive care and instructed to return or contact primary care provider if condition does not improve or for any new or worsening symptoms.    Tash Levy PA-C   Physician Assistant   [facility]

## 2019-02-09 NOTE — PATIENT INSTRUCTIONS

## 2019-02-11 ENCOUNTER — OFFICE VISIT (OUTPATIENT)
Dept: FAMILY MEDICINE | Facility: CLINIC | Age: 69
End: 2019-02-11
Payer: MEDICARE

## 2019-02-11 ENCOUNTER — TELEPHONE (OUTPATIENT)
Dept: FAMILY MEDICINE | Facility: CLINIC | Age: 69
End: 2019-02-11

## 2019-02-11 VITALS
HEART RATE: 58 BPM | OXYGEN SATURATION: 98 % | BODY MASS INDEX: 30.71 KG/M2 | TEMPERATURE: 98 F | SYSTOLIC BLOOD PRESSURE: 148 MMHG | DIASTOLIC BLOOD PRESSURE: 92 MMHG | WEIGHT: 184.5 LBS

## 2019-02-11 DIAGNOSIS — E79.0 HYPERURICEMIA: ICD-10-CM

## 2019-02-11 DIAGNOSIS — M10.9 ACUTE GOUTY ARTHROPATHY: Primary | ICD-10-CM

## 2019-02-11 PROCEDURE — 99999 PR PBB SHADOW E&M-EST. PATIENT-LVL III: CPT | Mod: PBBFAC,HCNC,, | Performed by: FAMILY MEDICINE

## 2019-02-11 PROCEDURE — 99999 PR PBB SHADOW E&M-EST. PATIENT-LVL III: ICD-10-PCS | Mod: PBBFAC,HCNC,, | Performed by: FAMILY MEDICINE

## 2019-02-11 PROCEDURE — 99499 RISK ADDL DX/OHS AUDIT: ICD-10-PCS | Mod: HCNC,S$GLB,, | Performed by: FAMILY MEDICINE

## 2019-02-11 PROCEDURE — 3077F SYST BP >= 140 MM HG: CPT | Mod: HCNC,CPTII,S$GLB, | Performed by: FAMILY MEDICINE

## 2019-02-11 PROCEDURE — 1101F PT FALLS ASSESS-DOCD LE1/YR: CPT | Mod: HCNC,CPTII,S$GLB, | Performed by: FAMILY MEDICINE

## 2019-02-11 PROCEDURE — 3080F PR MOST RECENT DIASTOLIC BLOOD PRESSURE >= 90 MM HG: ICD-10-PCS | Mod: HCNC,CPTII,S$GLB, | Performed by: FAMILY MEDICINE

## 2019-02-11 PROCEDURE — 96372 THER/PROPH/DIAG INJ SC/IM: CPT | Mod: HCNC,S$GLB,, | Performed by: FAMILY MEDICINE

## 2019-02-11 PROCEDURE — 99213 OFFICE O/P EST LOW 20 MIN: CPT | Mod: 25,HCNC,S$GLB, | Performed by: FAMILY MEDICINE

## 2019-02-11 PROCEDURE — 1101F PR PT FALLS ASSESS DOC 0-1 FALLS W/OUT INJ PAST YR: ICD-10-PCS | Mod: HCNC,CPTII,S$GLB, | Performed by: FAMILY MEDICINE

## 2019-02-11 PROCEDURE — 3080F DIAST BP >= 90 MM HG: CPT | Mod: HCNC,CPTII,S$GLB, | Performed by: FAMILY MEDICINE

## 2019-02-11 PROCEDURE — 99213 PR OFFICE/OUTPT VISIT, EST, LEVL III, 20-29 MIN: ICD-10-PCS | Mod: 25,HCNC,S$GLB, | Performed by: FAMILY MEDICINE

## 2019-02-11 PROCEDURE — 99499 UNLISTED E&M SERVICE: CPT | Mod: HCNC,S$GLB,, | Performed by: FAMILY MEDICINE

## 2019-02-11 PROCEDURE — 96372 PR INJECTION,THERAP/PROPH/DIAG2ST, IM OR SUBCUT: ICD-10-PCS | Mod: HCNC,S$GLB,, | Performed by: FAMILY MEDICINE

## 2019-02-11 PROCEDURE — 3077F PR MOST RECENT SYSTOLIC BLOOD PRESSURE >= 140 MM HG: ICD-10-PCS | Mod: HCNC,CPTII,S$GLB, | Performed by: FAMILY MEDICINE

## 2019-02-11 RX ORDER — PREDNISONE 20 MG/1
20 TABLET ORAL DAILY
Qty: 3 TABLET | Refills: 0 | Status: SHIPPED | OUTPATIENT
Start: 2019-02-11 | End: 2019-02-11

## 2019-02-11 RX ORDER — METHYLPREDNISOLONE ACETATE 80 MG/ML
80 INJECTION, SUSPENSION INTRA-ARTICULAR; INTRALESIONAL; INTRAMUSCULAR; SOFT TISSUE
Status: COMPLETED | OUTPATIENT
Start: 2019-02-11 | End: 2019-02-11

## 2019-02-11 RX ORDER — ALLOPURINOL 100 MG/1
100 TABLET ORAL DAILY
Qty: 30 TABLET | Refills: 5 | Status: SHIPPED | OUTPATIENT
Start: 2019-02-11 | End: 2020-06-15

## 2019-02-11 RX ORDER — COLCHICINE 0.6 MG/1
0.6 TABLET ORAL 2 TIMES DAILY
Qty: 20 TABLET | Refills: 0 | Status: SHIPPED | OUTPATIENT
Start: 2019-02-11 | End: 2019-02-15

## 2019-02-11 RX ADMIN — METHYLPREDNISOLONE ACETATE 80 MG: 80 INJECTION, SUSPENSION INTRA-ARTICULAR; INTRALESIONAL; INTRAMUSCULAR; SOFT TISSUE at 11:02

## 2019-02-11 NOTE — PROGRESS NOTES
CHIEF COMPLAINT:  This is a 69-year-old female complaining of gout in toe of left foot.    SUBJECTIVE:  Patient reports acute onset of swelling and pain in left great toe 2-3 days ago.  She was seen at urgent care clinic on Saturday and told that she had gout.  She was given a prescription for Colcrys 0.6 mg 3 tablets and told to take 2 tablets immediately and 1 tablet in 1 hr.  This is provided her with no relief.  She complains of swelling, redness and warmth.  She has difficulty with ambulation.  Uric acid was drawn and was elevated at 7.3.  Patient has been on diuretic for years for treatment of hypertension.  Patient has type 2 diabetes which is well controlled.  Last A1c was 6% 4 months ago.    ROS:  GENERAL: Patient denies fever, chills, night sweats.  Patient denies weight gain or loss. Patient denies anorexia, fatigue, weakness or swollen glands.  SKIN: Patient denies rash.  LUNGS: Patient denies cough, wheeze or hemoptysis.  CARDIOVASCULAR: Patient denies chest pain, shortness of breath, palpitations, syncope or lower extremity edema.  MUSCULOSKELETAL:  Positive for joint pain, swelling, redness and warmth.  Patient denies neck or back pain.  NEUROLOGIC: Patient denies headache, vertigo, paresthesias, weakness in limb or abnormality of gait    OBJECTIVE:   GENERAL: Well-developed well-nourished overweight black female alert and oriented x3 in no acute distress.  Memory, judgment and cognition without deficit.  SKIN: Clear without rash.  Normal color and tone.  HEENT: Eyes: Clear conjunctivae.  No scleral icterus.    NECK: Supple, normal range of motion.  No lymphadenopathy.  No masses or enlarged thyroid.  No JVD.  Carotids 2+ and equal.  No bruits.  LUNGS: Clear to auscultation.  Normal respiratory effort.  CARDIOVASCULAR: Regular rhythm, normal S1, S2 without murmur, gallop or rub.  EXTREMITIES: Without cyanosis, or clubbing.  Distal pulses 2+ and equal.  Marked tenderness, edema, erythema and warmth of  left first MTP joint   With decreased range of motion due to pain.  NEUROLOGIC:  Motor strength equal bilaterally.  Sensation normal to touch.  Deep tendon reflexes 2+ and equal.  Gait antalgic.  No tremor.      ASSESSMENT:  1. Acute gouty arthropathy    2. Hyperuricemia      PLAN:   1.  Depo-Medrol 80 mg IM.  2.  Take oral prednisone as directed by Dr. Clemons starting tomorrow.  3.  Colcrys 0.6 mg twice daily for 10 days.  4.  Allopurinol 100 mg daily.  5.  Follow-up with PCP in 4-6 weeks with uric acid level.

## 2019-02-11 NOTE — PATIENT INSTRUCTIONS
Eating to Prevent Gout  Gout is a painful form of arthritis caused by an excess of uric acid. This is a waste product made by the body. It builds up in the body and forms crystals that collect in the joints, bringing on a gout attack. Alcohol and certain foods can trigger a gout attack. Below are some guidelines for changing your diet to help you manage gout. Your healthcare provider can work with you to determine the best eating plan for you. Know that diet is only one part of managing gout. Take your medicines as prescribed and follow the other guidelines your healthcare provider has given you.  Foods to limit  Eating too many foods containing purines may increase the levels of uric acid in your body and increase your risk for a gout attack. It may be best to limit these high-purine foods:  · Alcohol (beer, red wine). You may be told to avoid alcohol completely.  · Certain fish (anchovies, sardines, fish roes, herring, tuna, mussels, codfish, scallops, trout, and himanshu)  · Certain meats (red meat, processed meat, rubio, turkey, wild game, and goose)  · Sauces and gravies made with meat  · Organ meats (such as liver, kidneys, sweetbreads, and tripe)  · Legumes (such as dried beans, peas)  · Mushrooms, spinach, asparagus, and cauliflower  · Yeast and yeast extract supplements  Foods to try  Some foods may be helpful for people with gout. You may want to try adding some of the following foods to your diet:  · Dark berries: These include blueberries, blackberries, and cherries. These berries contain chemicals that may lower uric acid.  · Tofu: Tofu, which is made from soy, is a good source of protein. Studies have shown that it may be a better choice than meat for people with gout.  · Omega fatty acids: These acids are found in fatty fish (such as salmon), certain oils (such as flax, olive, or nut oils), or nuts. They may help prevent inflammation due to gout.  The following guidelines are recommended by the  American Medical Association for people with gout. Your diet should be:  · High in fiber, whole grains, fruits, and vegetables.  · Low in protein (15% of calories should come from protein. Choose lean sources such as soy, lean meats, and poultry).  · Low in fat (no more than 30% of calories should come from fat, with only 10% coming from animal fat).   Date Last Reviewed: 6/17/2015  © 4083-7607 [employer], [place]. [address] [place] [address], [place] [address]. All rights reserved. This information is not intended as a substitute for professional medical care. Always follow your healthcare professional's instructions.

## 2019-02-11 NOTE — TELEPHONE ENCOUNTER
Pt states that she went in to the urgent care on Saturday and was told that she has Gout and was giving 3 pills to take and her foot is still swollen and giving her problems. Pt would like to be advised.

## 2019-02-11 NOTE — TELEPHONE ENCOUNTER
Pt informed, pt had appointment 2/11/2019 with  Dr.K Salguero and was told to start prednisone on 2/12/2019

## 2019-02-11 NOTE — TELEPHONE ENCOUNTER
----- Message from Keila Mckeon sent at 2/11/2019  7:46 AM CST -----  Contact: PT   Pt states that she went in to the urgent care on Saturday and was told that she has Gout and was giving 3 pills to take and her foot is still swollen and giving her the blues. Please call pt regarding this matter @ [phone] (home)

## 2019-02-15 ENCOUNTER — TELEPHONE (OUTPATIENT)
Dept: FAMILY MEDICINE | Facility: CLINIC | Age: 69
End: 2019-02-15

## 2019-02-15 RX ORDER — COLCHICINE 0.6 MG/1
0.6 TABLET ORAL DAILY
Qty: 30 TABLET | Refills: 11 | Status: SHIPPED | OUTPATIENT
Start: 2019-02-15 | End: 2019-04-22 | Stop reason: SDUPTHER

## 2019-03-04 ENCOUNTER — ANTI-COAG VISIT (OUTPATIENT)
Dept: CARDIOLOGY | Facility: CLINIC | Age: 69
End: 2019-03-04
Payer: MEDICARE

## 2019-03-04 DIAGNOSIS — Z79.01 LONG TERM (CURRENT) USE OF ANTICOAGULANTS: Primary | ICD-10-CM

## 2019-03-04 LAB — INR PPP: 1.8 (ref 2–3)

## 2019-03-04 PROCEDURE — 85610 POCT INR: ICD-10-PCS | Mod: QW,HCNC,S$GLB, | Performed by: NUCLEAR MEDICINE

## 2019-03-04 PROCEDURE — 85610 PROTHROMBIN TIME: CPT | Mod: QW,HCNC,S$GLB, | Performed by: NUCLEAR MEDICINE

## 2019-03-04 NOTE — PROGRESS NOTES
Patient's INR is sub-therapeutic at 1.8.  Allopurinol discontinued and colchicine started per patient.  No abnormal pain, swelling, SOB, or numbness noted.  Will re-challenge current dose.  Instructions given for patient to continue current dose of 10mg on Monday, Wednesday and 5mg on all other days of the week.  Patient voiced understanding.  Follow-up in 3 weeks.

## 2019-03-25 ENCOUNTER — ANTI-COAG VISIT (OUTPATIENT)
Dept: CARDIOLOGY | Facility: CLINIC | Age: 69
End: 2019-03-25
Payer: MEDICARE

## 2019-03-25 DIAGNOSIS — Z79.01 LONG TERM (CURRENT) USE OF ANTICOAGULANTS: Primary | ICD-10-CM

## 2019-03-25 LAB — INR PPP: 1.9 (ref 2–3)

## 2019-03-25 PROCEDURE — 99211 OFF/OP EST MAY X REQ PHY/QHP: CPT | Mod: 25,HCNC,S$GLB,

## 2019-03-25 PROCEDURE — 99211 PR OFFICE/OUTPT VISIT, EST, LEVL I: ICD-10-PCS | Mod: 25,HCNC,S$GLB,

## 2019-03-25 PROCEDURE — 85610 POCT INR: ICD-10-PCS | Mod: QW,HCNC,S$GLB, | Performed by: INTERNAL MEDICINE

## 2019-03-25 PROCEDURE — 85610 PROTHROMBIN TIME: CPT | Mod: QW,HCNC,S$GLB, | Performed by: INTERNAL MEDICINE

## 2019-03-25 NOTE — PROGRESS NOTES
Patient's INR is sub-therapeutic at 1.9.  No significant changes or missed doses.  No abnormal pain/swelling, SOB or numbness noted.  Instructions given for patient to increase dose of coumadin to 10mg on Mondays, Wednesdays, Fridays and 5mg on all other days of the week.  Patient voiced understanding.  Follow-up in 3 weeks.

## 2019-04-05 DIAGNOSIS — E11.9 TYPE 2 DIABETES MELLITUS WITHOUT COMPLICATION: ICD-10-CM

## 2019-04-15 ENCOUNTER — ANTI-COAG VISIT (OUTPATIENT)
Dept: CARDIOLOGY | Facility: CLINIC | Age: 69
End: 2019-04-15
Payer: MEDICARE

## 2019-04-15 DIAGNOSIS — Z79.01 ANTICOAGULATED ON COUMADIN: Chronic | ICD-10-CM

## 2019-04-15 DIAGNOSIS — Z79.01 LONG TERM (CURRENT) USE OF ANTICOAGULANTS: Primary | ICD-10-CM

## 2019-04-15 LAB — INR PPP: 3.4 (ref 2–3)

## 2019-04-15 PROCEDURE — 93793 ANTICOAG MGMT PT WARFARIN: CPT | Mod: HCNC,S$GLB,,

## 2019-04-15 PROCEDURE — 85610 POCT INR: ICD-10-PCS | Mod: QW,HCNC,S$GLB, | Performed by: INTERNAL MEDICINE

## 2019-04-15 PROCEDURE — 85610 PROTHROMBIN TIME: CPT | Mod: QW,HCNC,S$GLB, | Performed by: INTERNAL MEDICINE

## 2019-04-15 PROCEDURE — 93793 PR ANTICOAGULANT MGMT FOR PT TAKING WARFARIN: ICD-10-PCS | Mod: HCNC,S$GLB,,

## 2019-04-15 NOTE — PROGRESS NOTES
Patient's INR is supra-therapeutic at 3.4.  Reports taking tramadol a few times this past week; educated patient on potential increase in INR. No signs of bleeding noted.  Instructions given for patient to take coumadin 5mg on today; then resume current dose of 10mg on Mondays, Wednesdays, Fridays and 5mg on all other days of the week.  Patient voiced understanding.  Follow-up in 2 weeks.

## 2019-04-22 DIAGNOSIS — I10 ESSENTIAL HYPERTENSION: Chronic | ICD-10-CM

## 2019-04-22 RX ORDER — OLMESARTAN MEDOXOMIL, AMLODIPINE AND HYDROCHLOROTHIAZIDE TABLET 40/10/25 MG 40; 10; 25 MG/1; MG/1; MG/1
1 TABLET ORAL DAILY
Qty: 90 TABLET | Refills: 1 | Status: SHIPPED | OUTPATIENT
Start: 2019-04-22 | End: 2019-07-26

## 2019-04-22 RX ORDER — COLCHICINE 0.6 MG/1
0.6 TABLET ORAL DAILY
Qty: 30 TABLET | Refills: 11 | Status: SHIPPED | OUTPATIENT
Start: 2019-04-22 | End: 2020-06-22

## 2019-04-22 NOTE — TELEPHONE ENCOUNTER
----- Message from Gisella Carlos sent at 4/22/2019  3:47 PM CDT -----  Contact: self  Type:  RX Refill Request    Who Called: Alyx  Refill or New Rx:refill  RX Name and Strength:colchicine (COLCRYS) 0.6 mg tablet  How is the patient currently taking it? (ex. 1XDay):2xday  Is this a 30 day or 90 day RX:30  Preferred Pharmacy with phone number:  [facility] [address]  [address] [facility] [address]  Phone: [phone] Fax: [phone]    Local or Mail Order:local  Ordering Provider:Deonte  Would the patient rather a call back or a response via [facility]? call  Best Call Back Number:[phone]  Additional Information:

## 2019-04-22 NOTE — TELEPHONE ENCOUNTER
----- Message from Gisella Carlos sent at 4/22/2019  3:51 PM CDT -----  Contact: self  Type:  RX Refill Request    Who Called: Alyx  Refill or New Rx:refill  RX Name and Strength:olmesartan-amlodipin-hcthiazid (TRIBENZOR) 40-10-25 mg Tab  How is the patient currently taking it? (ex. 1XDay):1zday  Is this a 30 day or 90 day RX:90  Preferred Pharmacy with phone number:  [facility] - [place], [place] - [address]  [address] [facility] [address]  Phone: [phone] Fax: [phone]    Local or Mail Order:local  Ordering Provider:Eamon  Would the patient rather a call back or a response via [facility]? call  Best Call Back Number:[phone]  Additional Information:

## 2019-04-29 ENCOUNTER — ANTI-COAG VISIT (OUTPATIENT)
Dept: CARDIOLOGY | Facility: CLINIC | Age: 69
End: 2019-04-29
Payer: MEDICARE

## 2019-04-29 DIAGNOSIS — Z79.01 LONG TERM (CURRENT) USE OF ANTICOAGULANTS: Primary | ICD-10-CM

## 2019-04-29 LAB — INR PPP: 1.9 (ref 2–3)

## 2019-04-29 PROCEDURE — 85610 POCT INR: ICD-10-PCS | Mod: QW,HCNC,S$GLB, | Performed by: INTERNAL MEDICINE

## 2019-04-29 PROCEDURE — 85610 PROTHROMBIN TIME: CPT | Mod: QW,HCNC,S$GLB, | Performed by: INTERNAL MEDICINE

## 2019-04-29 PROCEDURE — 93793 PR ANTICOAGULANT MGMT FOR PT TAKING WARFARIN: ICD-10-PCS | Mod: HCNC,S$GLB,,

## 2019-04-29 PROCEDURE — 93793 ANTICOAG MGMT PT WARFARIN: CPT | Mod: HCNC,S$GLB,,

## 2019-04-29 NOTE — PROGRESS NOTES
Patient's INR is sub-therapeutic at 1.9.  No missed doses or significant changes.   No abnormal pain, swelling, SOB  Or weakness noted. Will re-challenge current dose.   Instructions given for patient to take coumadin 10mg on Mondays, Wednesdays, Fridays and 5mg on all other days of the week.  Patient voiced understanding.  Follow-up in 3 weeks.

## 2019-05-20 ENCOUNTER — ANTI-COAG VISIT (OUTPATIENT)
Dept: CARDIOLOGY | Facility: CLINIC | Age: 69
End: 2019-05-20
Payer: MEDICARE

## 2019-05-20 DIAGNOSIS — D68.51 HETEROZYGOUS FACTOR V LEIDEN MUTATION: Chronic | ICD-10-CM

## 2019-05-20 DIAGNOSIS — Z79.01 LONG TERM (CURRENT) USE OF ANTICOAGULANTS: Primary | ICD-10-CM

## 2019-05-20 LAB — INR PPP: 3.7 (ref 2–3)

## 2019-05-20 PROCEDURE — 85610 PROTHROMBIN TIME: CPT | Mod: QW,HCNC,S$GLB, | Performed by: INTERNAL MEDICINE

## 2019-05-20 PROCEDURE — 93793 PR ANTICOAGULANT MGMT FOR PT TAKING WARFARIN: ICD-10-PCS | Mod: HCNC,S$GLB,,

## 2019-05-20 PROCEDURE — 93793 ANTICOAG MGMT PT WARFARIN: CPT | Mod: HCNC,S$GLB,,

## 2019-05-20 PROCEDURE — 85610 POCT INR: ICD-10-PCS | Mod: QW,HCNC,S$GLB, | Performed by: INTERNAL MEDICINE

## 2019-05-20 NOTE — PROGRESS NOTES
Patient's INR is supra-therapeutic at 3.7.  No significant changes or extra doses reported.  No signs of bleeding noted.  Instructions given for patient to hold dose of coumadin on today; then resume current dose of 10mg on Mondays, Wednesdays, Fridays and 5mg on all other days of the week.  Patient voiced understanding.  Follow-up in 3 weeks.

## 2019-05-24 ENCOUNTER — PES CALL (OUTPATIENT)
Dept: ADMINISTRATIVE | Facility: CLINIC | Age: 69
End: 2019-05-24

## 2019-05-28 ENCOUNTER — HOSPITAL ENCOUNTER (OUTPATIENT)
Dept: RADIOLOGY | Facility: HOSPITAL | Age: 69
Discharge: HOME | End: 2019-05-28
Attending: PHYSICIAN ASSISTANT
Payer: MEDICARE

## 2019-05-28 ENCOUNTER — OFFICE VISIT (OUTPATIENT)
Dept: ORTHOPEDICS | Facility: CLINIC | Age: 69
End: 2019-05-28
Payer: MEDICARE

## 2019-05-28 VITALS
BODY MASS INDEX: 30.66 KG/M2 | RESPIRATION RATE: 18 BRPM | HEART RATE: 46 BPM | DIASTOLIC BLOOD PRESSURE: 82 MMHG | WEIGHT: 184 LBS | SYSTOLIC BLOOD PRESSURE: 169 MMHG | HEIGHT: 65 IN

## 2019-05-28 DIAGNOSIS — M25.562 PAIN IN BOTH KNEES, UNSPECIFIED CHRONICITY: Primary | ICD-10-CM

## 2019-05-28 DIAGNOSIS — M94.261 CHONDROMALACIA, RIGHT KNEE: ICD-10-CM

## 2019-05-28 DIAGNOSIS — M25.561 ACUTE PAIN OF RIGHT KNEE: Primary | ICD-10-CM

## 2019-05-28 DIAGNOSIS — M25.561 PAIN IN BOTH KNEES, UNSPECIFIED CHRONICITY: Primary | ICD-10-CM

## 2019-05-28 DIAGNOSIS — M25.562 PAIN IN BOTH KNEES, UNSPECIFIED CHRONICITY: ICD-10-CM

## 2019-05-28 DIAGNOSIS — M25.561 PAIN IN BOTH KNEES, UNSPECIFIED CHRONICITY: ICD-10-CM

## 2019-05-28 PROCEDURE — 3079F DIAST BP 80-89 MM HG: CPT | Mod: HCNC,CPTII,S$GLB, | Performed by: PHYSICIAN ASSISTANT

## 2019-05-28 PROCEDURE — 20610 PR DRAIN/INJECT LARGE JOINT/BURSA: ICD-10-PCS | Mod: HCNC,RT,S$GLB, | Performed by: PHYSICIAN ASSISTANT

## 2019-05-28 PROCEDURE — 99999 PR PBB SHADOW E&M-EST. PATIENT-LVL IV: CPT | Mod: PBBFAC,HCNC,, | Performed by: PHYSICIAN ASSISTANT

## 2019-05-28 PROCEDURE — 99214 OFFICE O/P EST MOD 30 MIN: CPT | Mod: 25,HCNC,S$GLB, | Performed by: PHYSICIAN ASSISTANT

## 2019-05-28 PROCEDURE — 1101F PT FALLS ASSESS-DOCD LE1/YR: CPT | Mod: HCNC,CPTII,S$GLB, | Performed by: PHYSICIAN ASSISTANT

## 2019-05-28 PROCEDURE — 20610 DRAIN/INJ JOINT/BURSA W/O US: CPT | Mod: HCNC,RT,S$GLB, | Performed by: PHYSICIAN ASSISTANT

## 2019-05-28 PROCEDURE — 73564 X-RAY EXAM KNEE 4 OR MORE: CPT | Mod: TC,50,HCNC

## 2019-05-28 PROCEDURE — 99214 PR OFFICE/OUTPT VISIT, EST, LEVL IV, 30-39 MIN: ICD-10-PCS | Mod: 25,HCNC,S$GLB, | Performed by: PHYSICIAN ASSISTANT

## 2019-05-28 PROCEDURE — 99999 PR PBB SHADOW E&M-EST. PATIENT-LVL IV: ICD-10-PCS | Mod: PBBFAC,HCNC,, | Performed by: PHYSICIAN ASSISTANT

## 2019-05-28 PROCEDURE — 3077F SYST BP >= 140 MM HG: CPT | Mod: HCNC,CPTII,S$GLB, | Performed by: PHYSICIAN ASSISTANT

## 2019-05-28 PROCEDURE — 3077F PR MOST RECENT SYSTOLIC BLOOD PRESSURE >= 140 MM HG: ICD-10-PCS | Mod: HCNC,CPTII,S$GLB, | Performed by: PHYSICIAN ASSISTANT

## 2019-05-28 PROCEDURE — 1101F PR PT FALLS ASSESS DOC 0-1 FALLS W/OUT INJ PAST YR: ICD-10-PCS | Mod: HCNC,CPTII,S$GLB, | Performed by: PHYSICIAN ASSISTANT

## 2019-05-28 PROCEDURE — 3079F PR MOST RECENT DIASTOLIC BLOOD PRESSURE 80-89 MM HG: ICD-10-PCS | Mod: HCNC,CPTII,S$GLB, | Performed by: PHYSICIAN ASSISTANT

## 2019-05-28 PROCEDURE — 73564 XR KNEE ORTHO BILAT WITH FLEXION: ICD-10-PCS | Mod: 26,50,HCNC, | Performed by: RADIOLOGY

## 2019-05-28 PROCEDURE — 73564 X-RAY EXAM KNEE 4 OR MORE: CPT | Mod: 26,50,HCNC, | Performed by: RADIOLOGY

## 2019-05-28 RX ORDER — METHYLPREDNISOLONE ACETATE 80 MG/ML
80 INJECTION, SUSPENSION INTRA-ARTICULAR; INTRALESIONAL; INTRAMUSCULAR; SOFT TISSUE ONCE
Status: COMPLETED | OUTPATIENT
Start: 2019-05-28 | End: 2019-05-28

## 2019-05-28 RX ADMIN — METHYLPREDNISOLONE ACETATE 80 MG: 80 INJECTION, SUSPENSION INTRA-ARTICULAR; INTRALESIONAL; INTRAMUSCULAR; SOFT TISSUE at 12:05

## 2019-05-28 NOTE — PROGRESS NOTES
Patient ID: Alyx Weir is a 69 y.o. female.    Chief Complaint: Pain and Swelling of the Right Knee      HPI: Alyx Weir  is a 69 y.o. female who c/o Pain and Swelling of the Right Knee   for duration of years but worse in the last week.  I saw her about a year and half ago for right knee pain. At that time, we had done an intra-articular steroid injection as well as physical therapy.  She responded well to both of those.  Today the pain level is currently 0/10.  However it increases with prolonged weight-bearing and deep flexion. It was particularly worsened while cooking in the kitchen last week.  Quality is a constant ache.  Alleviating factors as above.  Aggravating factors as above. She has tried to old goats cream in the past without relief of symptoms.  She has a trip planned for [place] in about a week and a half.    Past Medical History:   Diagnosis Date    Anticoagulated on Coumadin     Arm weakness-rotator cuff weakness 11/2/2015    Arthritis     Asthma     Clotting disorder     Coronary artery disease     Deep vein thrombosis     Degenerative disc disease     Diabetes mellitus type I 2011    BS last tested x 1 month not sure what it was.    Heterozygous factor V Leiden mutation     Hx of colonic polyps 11/13/2015    Hyperlipidemia     Hypertension     VIRGIL (obstructive sleep apnea)     Stenosis of right carotid artery 12/13/2016     Past Surgical History:   Procedure Laterality Date    BACK SURGERY      bladder cyst removal      BLADDER SURGERY      CARDIAC CATHETERIZATION  03/2013    mild CAD    COLONOSCOPY N/A 11/13/2015    Performed by J[facility] Umanzor [facility], [facility] at [facility] ENDO    HYSTERECTOMY      28 yrs old    LUMBAR SPINE SURGERY      bone spurs removed    OOPHORECTOMY       Family History   Problem Relation Age of Onset    Heart disease Mother     Hypertension Mother     Cataracts Mother     Hypertension Sister     Glaucoma Sister     Hypertension Brother      Diabetes Father     Diabetes Paternal Uncle     Hypertension Sister     Diabetes Sister     Hypertension Sister     Diabetes Sister     Breast cancer Neg Hx     Colon cancer Neg Hx     Ovarian cancer Neg Hx      Social History     Socioeconomic History    Marital status:      Spouse name: Not on file    Number of children: Not on file    Years of education: Not on file    Highest education level: Not on file   Occupational History    Not on file   Social Needs    Financial resource strain: Not on file    Food insecurity:     Worry: Not on file     Inability: Not on file    Transportation needs:     Medical: Not on file     Non-medical: Not on file   Tobacco Use    Smoking status: Never Smoker    Smokeless tobacco: Never Used   Substance and Sexual Activity    Alcohol use: No    Drug use: No    Sexual activity: Never     Birth control/protection: None   Lifestyle    Physical activity:     Days per week: Not on file     Minutes per session: Not on file    Stress: Not on file   Relationships    Social connections:     Talks on phone: Not on file     Gets together: Not on file     Attends Congregational service: Not on file     Active member of club or organization: Not on file     Attends meetings of clubs or organizations: Not on file     Relationship status: Not on file   Other Topics Concern    Not on file   Social History Narrative    Dogs in household, no smokers.     Medication List with Changes/Refills   Current Medications    ACETAMINOPHEN (TYLENOL ARTHRITIS ORAL)    Take by mouth.    ALLOPURINOL (ZYLOPRIM) 100 MG TABLET    Take 1 tablet (100 mg total) by mouth once daily.    ASPIRIN (ECOTRIN) 81 MG EC TABLET    Take 1 tablet (81 mg total) by mouth once daily.    CALCIUM CARBONATE (TUMS ORAL)    Take by mouth.    CARVEDILOL (COREG) 6.25 MG TABLET    Take 1 tablet (6.25 mg total) by mouth 2 (two) times daily with meals.    COLCHICINE (COLCRYS) 0.6 MG TABLET    Take 1 tablet (0.6 mg  total) by mouth once daily.    DICLOFENAC SODIUM 1 % GEL    Apply 2 g topically daily as needed.    GABAPENTIN (NEURONTIN) 100 MG CAPSULE    Take 1 capsule (100 mg total) by mouth 3 (three) times daily as needed.    LORATADINE (CLARITIN) 10 MG TABLET    Take 1 tablet (10 mg total) by mouth once daily.    OLMESARTAN-AMLODIPIN-HCTHIAZID (TRIBENZOR) 40-10-25 MG TAB    Take 1 tablet by mouth once daily.    PRAVASTATIN (PRAVACHOL) 40 MG TABLET    Take 1 tablet (40 mg total) by mouth once daily.    TRAMADOL (ULTRAM) 50 MG TABLET    Take 1 tablet (50 mg total) by mouth 2 (two) times daily as needed for Pain.    VITAMIN D 1000 UNITS TAB    Take 185 mg by mouth once daily.    WARFARIN (COUMADIN) 10 MG TABLET    Take 1 tablet (10 mg total) by mouth every Mon, Wed, Fri. Or As directed by coumadin clinic    WARFARIN (COUMADIN) 5 MG TABLET    Take 1 tablet (5 mg total) by mouth Daily. As directed by coumadin clinic     Review of patient's allergies indicates:   Allergen Reactions    Erythromycin Edema     Angioedema to throat    Amlodipine Other (See Comments)     Headaches    Diazepam Hives    Iodine Hives    Meperidine Hives    Morphine Itching    Primidone Other (See Comments)           Objective:        General    Nursing note and vitals reviewed.  Constitutional: She is oriented to person, place, and time. She appears well-developed and well-nourished.   HENT:   Head: Normocephalic and atraumatic.   Eyes: EOM are normal.   Cardiovascular: Normal rate and regular rhythm.    Pulmonary/Chest: Effort normal.   Abdominal: Soft.   Neurological: She is alert and oriented to person, place, and time.   Psychiatric: She has a normal mood and affect. Her behavior is normal.     General Musculoskeletal Exam   Gait: normal       Right Knee Exam     Inspection   Erythema: absent  Swelling: absent  Effusion: absent  Deformity: absent  Bruising: absent    Tenderness   The patient is tender to palpation of the medial joint line and  lateral joint line.    Crepitus   The patient has crepitus of the patella.    Range of Motion   Extension: normal   Flexion:  120 abnormal     Tests   Meniscus   Valerie:  Medial - negative Lateral - negative  Ligament Examination Lachman: normal (-1 to 2mm) PCL-Posterior Drawer: normal (0 to 2mm)     MCL - Valgus: normal (0 to 2mm)  LCL - Varus: normal  Patella   Patellar apprehension: negative  Patellar Tracking: normal  Patellar Grind: positive    Other   Meniscal Cyst: absent  Popliteal (Baker's) Cyst: absent  Sensation: normal    Comments:  Comp soft, cap refill < 2 sec.    Muscle Strength   Right Lower Extremity   Quadriceps:  5/5   Hamstrin/5     Vascular Exam       Edema  Right Lower Leg: absent            Xray:   Bilateral knees from today images and report were reviewed today.  I agree with the radiologist's interpretation.  Right knee: There is chondrocalcinosis in the lateral compartment.  Medial compartment joint space narrowing is noted.  No effusion.  Tiny patellofemoral osteophytes are seen.  No fracture or dislocation.    Left knee: Medial compartment joint space narrowing is noted.  No fracture or dislocation or joint effusion is seen.  Soft tissues are within normal limits.    Assessment:       Encounter Diagnoses   Name Primary?    Acute pain of right knee Yes    Chondromalacia, right knee           Plan:       Alyx was seen today for pain and swelling.    Diagnoses and all orders for this visit:    Acute pain of right knee  -     methylPREDNISolone acetate injection 80 mg    Chondromalacia, right knee  -     methylPREDNISolone acetate injection 80 mg        Alyx Weir comes in today with the above problems.  This is an exacerbation of an existing problem.  She responded well to physical therapy and an injection in the past.  I will hold off on physical therapy for now in lieu of a home exercise program.  If she does not have significant improvement over the next 2-4 weeks, we can  always reconsider formal therapy.  We have discussed risks and benefits of an injection today.  She wishes to proceed with that.  She may also continue Tylenol over-the-counter.  We will get her fitted with a neoprene hinged knee brace for the right knee which she can use when she is up in active for long periods of time. She verbalizes understanding and agrees with the above plan.    Follow up in about 1 month (around 6/28/2019).    Right Knee Injection Report:  After verbal consent was obtained for right knee injection, patient ID, site, and side were verified.  The  right  knee was sterilly prepped in the standard fashion.  A 22-gauge needle was introduced into right knee joint from an anali-lateral site without complication. The right knee was then injected with 20 mg lidocaine plain and 80 mg depomedrol.  A sterile bandaid was applied.  The patient was informed to apply an ice pack approximately 10min once arriving home and not to do anything strenuous for 24hours.  She was instructed to call if there were any problems. The patient was discharged in stable condition.    The patient understands, chooses and consents to this plan and accepts all   the risks which include but are not limited to the risks mentioned above.     Disclaimer: This note was prepared using a voice recognition system and is likely to have sound alike errors within the text.

## 2019-06-12 ENCOUNTER — ANTI-COAG VISIT (OUTPATIENT)
Dept: CARDIOLOGY | Facility: CLINIC | Age: 69
End: 2019-06-12
Payer: MEDICARE

## 2019-06-12 DIAGNOSIS — Z79.01 LONG TERM (CURRENT) USE OF ANTICOAGULANTS: Primary | ICD-10-CM

## 2019-06-12 DIAGNOSIS — D68.51 HETEROZYGOUS FACTOR V LEIDEN MUTATION: Chronic | ICD-10-CM

## 2019-06-12 LAB — INR PPP: 3.3 (ref 2–3)

## 2019-06-12 PROCEDURE — 85610 PROTHROMBIN TIME: CPT | Mod: QW,HCNC,S$GLB, | Performed by: INTERNAL MEDICINE

## 2019-06-12 PROCEDURE — 85610 POCT INR: ICD-10-PCS | Mod: QW,HCNC,S$GLB, | Performed by: INTERNAL MEDICINE

## 2019-06-12 PROCEDURE — 93793 PR ANTICOAGULANT MGMT FOR PT TAKING WARFARIN: ICD-10-PCS | Mod: HCNC,S$GLB,,

## 2019-06-12 PROCEDURE — 93793 ANTICOAG MGMT PT WARFARIN: CPT | Mod: HCNC,S$GLB,,

## 2019-06-12 NOTE — PROGRESS NOTES
Patient's INR is supra-therapeutic at 3.3.  Mrs. Weir received a methylprednisolone injection on 5/28; no other changes reported.  No signs of bleeding noted; advised patient to seek immediate medical attention if she notices any abnormal bleeding.  Instructions given for patient to lower dose of warfarin to 10mg on Mondays and Fridays; and 5mg on all other days of the week.  Patient voiced understanding.  Recheck on 7/1/19.

## 2019-07-01 ENCOUNTER — ANTI-COAG VISIT (OUTPATIENT)
Dept: CARDIOLOGY | Facility: CLINIC | Age: 69
End: 2019-07-01
Payer: MEDICARE

## 2019-07-01 DIAGNOSIS — D68.51 HETEROZYGOUS FACTOR V LEIDEN MUTATION: Chronic | ICD-10-CM

## 2019-07-01 DIAGNOSIS — Z79.01 LONG TERM (CURRENT) USE OF ANTICOAGULANTS: Primary | ICD-10-CM

## 2019-07-01 LAB — INR PPP: 1.5 (ref 2–3)

## 2019-07-01 PROCEDURE — 93793 PR ANTICOAGULANT MGMT FOR PT TAKING WARFARIN: ICD-10-PCS | Mod: HCNC,S$GLB,,

## 2019-07-01 PROCEDURE — 85610 POCT INR: ICD-10-PCS | Mod: QW,HCNC,S$GLB, | Performed by: INTERNAL MEDICINE

## 2019-07-01 PROCEDURE — 85610 PROTHROMBIN TIME: CPT | Mod: QW,HCNC,S$GLB, | Performed by: INTERNAL MEDICINE

## 2019-07-01 PROCEDURE — 93793 ANTICOAG MGMT PT WARFARIN: CPT | Mod: HCNC,S$GLB,,

## 2019-07-01 NOTE — PROGRESS NOTES
Patient's INR is sub-therapeutic at 1.5.  Mrs. Weir states she missed a dose of warfarin on Saturday as she was caring for her .  Importance of taking medications as prescribed discussed.  No abnormal pain, swelling or weakness noted.  Instructions given for patient to take warfarin 15mg on today; then resume current dose of 10mg on Mondays, Fridays and 5mg on all other days of the week.  Patient voiced understanding.  Follow-up in 2 weeks.

## 2019-07-12 ENCOUNTER — PATIENT OUTREACH (OUTPATIENT)
Dept: ADMINISTRATIVE | Facility: HOSPITAL | Age: 69
End: 2019-07-12

## 2019-07-12 DIAGNOSIS — E11.9 TYPE 2 DIABETES MELLITUS WITHOUT COMPLICATION, WITHOUT LONG-TERM CURRENT USE OF INSULIN: Primary | ICD-10-CM

## 2019-07-12 NOTE — PROGRESS NOTES
PreVisit Chart Audit Perfomed       Tangela BURLESON LPN Care Coordinator  Care Coordination Department  [facility]  [phone]

## 2019-07-19 ENCOUNTER — ANTI-COAG VISIT (OUTPATIENT)
Dept: CARDIOLOGY | Facility: CLINIC | Age: 69
End: 2019-07-19
Payer: MEDICARE

## 2019-07-19 DIAGNOSIS — Z79.01 LONG TERM (CURRENT) USE OF ANTICOAGULANTS: Primary | ICD-10-CM

## 2019-07-19 DIAGNOSIS — D68.51 HETEROZYGOUS FACTOR V LEIDEN MUTATION: Chronic | ICD-10-CM

## 2019-07-19 LAB — INR PPP: 2.2 (ref 2–3)

## 2019-07-19 PROCEDURE — 93793 PR ANTICOAGULANT MGMT FOR PT TAKING WARFARIN: ICD-10-PCS | Mod: HCNC,S$GLB,,

## 2019-07-19 PROCEDURE — 93793 ANTICOAG MGMT PT WARFARIN: CPT | Mod: HCNC,S$GLB,,

## 2019-07-19 PROCEDURE — 85610 PROTHROMBIN TIME: CPT | Mod: QW,HCNC,S$GLB, | Performed by: INTERNAL MEDICINE

## 2019-07-19 PROCEDURE — 85610 POCT INR: ICD-10-PCS | Mod: QW,HCNC,S$GLB, | Performed by: INTERNAL MEDICINE

## 2019-07-19 NOTE — PROGRESS NOTES
Patient's INR is therapeutic at 2.2.  Reports no recent changes.  Instructed to maintain current dose of Warfarin 10 mg every Monday and Friday; and 5 mg on all other days per week.  Dose calendar - given and reviewed.  Recheck in 2 weeks at [facility].

## 2019-07-26 ENCOUNTER — OFFICE VISIT (OUTPATIENT)
Dept: FAMILY MEDICINE | Facility: CLINIC | Age: 69
End: 2019-07-26
Payer: MEDICARE

## 2019-07-26 VITALS
SYSTOLIC BLOOD PRESSURE: 146 MMHG | HEIGHT: 65 IN | DIASTOLIC BLOOD PRESSURE: 81 MMHG | TEMPERATURE: 98 F | HEART RATE: 55 BPM | OXYGEN SATURATION: 97 % | WEIGHT: 185.31 LBS | BODY MASS INDEX: 30.88 KG/M2

## 2019-07-26 DIAGNOSIS — R22.0 SWELLING OF BOTH LIPS: Primary | ICD-10-CM

## 2019-07-26 PROCEDURE — 99213 OFFICE O/P EST LOW 20 MIN: CPT | Mod: HCNC,S$GLB,, | Performed by: INTERNAL MEDICINE

## 2019-07-26 PROCEDURE — 99999 PR PBB SHADOW E&M-EST. PATIENT-LVL IV: CPT | Mod: PBBFAC,HCNC,, | Performed by: INTERNAL MEDICINE

## 2019-07-26 PROCEDURE — 99999 PR PBB SHADOW E&M-EST. PATIENT-LVL IV: ICD-10-PCS | Mod: PBBFAC,HCNC,, | Performed by: INTERNAL MEDICINE

## 2019-07-26 PROCEDURE — 3079F PR MOST RECENT DIASTOLIC BLOOD PRESSURE 80-89 MM HG: ICD-10-PCS | Mod: HCNC,CPTII,S$GLB, | Performed by: INTERNAL MEDICINE

## 2019-07-26 PROCEDURE — 1101F PT FALLS ASSESS-DOCD LE1/YR: CPT | Mod: HCNC,CPTII,S$GLB, | Performed by: INTERNAL MEDICINE

## 2019-07-26 PROCEDURE — 1101F PR PT FALLS ASSESS DOC 0-1 FALLS W/OUT INJ PAST YR: ICD-10-PCS | Mod: HCNC,CPTII,S$GLB, | Performed by: INTERNAL MEDICINE

## 2019-07-26 PROCEDURE — 3077F PR MOST RECENT SYSTOLIC BLOOD PRESSURE >= 140 MM HG: ICD-10-PCS | Mod: HCNC,CPTII,S$GLB, | Performed by: INTERNAL MEDICINE

## 2019-07-26 PROCEDURE — 3077F SYST BP >= 140 MM HG: CPT | Mod: HCNC,CPTII,S$GLB, | Performed by: INTERNAL MEDICINE

## 2019-07-26 PROCEDURE — 99213 PR OFFICE/OUTPT VISIT, EST, LEVL III, 20-29 MIN: ICD-10-PCS | Mod: HCNC,S$GLB,, | Performed by: INTERNAL MEDICINE

## 2019-07-26 PROCEDURE — 3079F DIAST BP 80-89 MM HG: CPT | Mod: HCNC,CPTII,S$GLB, | Performed by: INTERNAL MEDICINE

## 2019-07-26 RX ORDER — AMLODIPINE BESYLATE 10 MG/1
10 TABLET ORAL DAILY
Qty: 90 TABLET | Refills: 3 | Status: SHIPPED | OUTPATIENT
Start: 2019-07-26 | End: 2019-10-01

## 2019-07-26 RX ORDER — HYDROCHLOROTHIAZIDE 25 MG/1
25 TABLET ORAL DAILY
Qty: 90 TABLET | Refills: 3 | Status: SHIPPED | OUTPATIENT
Start: 2019-07-26 | End: 2019-10-01

## 2019-07-26 NOTE — PROGRESS NOTES
Subjective:       Patient ID: Alyx Weir is a 69 y.o. female.    Chief Complaint: Oral Swelling    Swelling of upper and lower lip started yesterday-------with itchy rash------------no chest pin or SOB.-----------took benadryl --better today---------but lips still swollen.    Past Medical History:   Diagnosis Date    Anticoagulated on Coumadin     Arm weakness-rotator cuff weakness 11/2/2015    Arthritis     Asthma     Clotting disorder     Coronary artery disease     Deep vein thrombosis     Degenerative disc disease     Diabetes mellitus type I 2011    BS last tested x 1 month not sure what it was.    Heterozygous factor V Leiden mutation     Hx of colonic polyps 11/13/2015    Hyperlipidemia     Hypertension     VIRGIL (obstructive sleep apnea)     Stenosis of right carotid artery 12/13/2016     Past Surgical History:   Procedure Laterality Date    BACK SURGERY      bladder cyst removal      BLADDER SURGERY      CARDIAC CATHETERIZATION  03/2013    mild CAD    COLONOSCOPY N/A 11/13/2015    Performed by [facility]zor [facility] at [facility] ENDO    HYSTERECTOMY      28 yrs old    LUMBAR SPINE SURGERY      bone spurs removed    OOPHORECTOMY       Family History   Problem Relation Age of Onset    Heart disease Mother     Hypertension Mother     Cataracts Mother     Hypertension Sister     Glaucoma Sister     Hypertension Brother     Diabetes Father     Diabetes Paternal Uncle     Hypertension Sister     Diabetes Sister     Hypertension Sister     Diabetes Sister     Breast cancer Neg Hx     Colon cancer Neg Hx     Ovarian cancer Neg Hx      Social History     Socioeconomic History    Marital status:      Spouse name: Not on file    Number of children: Not on file    Years of education: Not on file    Highest education level: Not on file   Occupational History    Not on file   Social Needs    Financial resource strain: Not on file    Food insecurity:     Worry: Not on file      Inability: Not on file    Transportation needs:     Medical: Not on file     Non-medical: Not on file   Tobacco Use    Smoking status: Never Smoker    Smokeless tobacco: Never Used   Substance and Sexual Activity    Alcohol use: No    Drug use: No    Sexual activity: Never     Birth control/protection: None   Lifestyle    Physical activity:     Days per week: Not on file     Minutes per session: Not on file    Stress: Not on file   Relationships    Social connections:     Talks on phone: Not on file     Gets together: Not on file     Attends Zoroastrianism service: Not on file     Active member of club or organization: Not on file     Attends meetings of clubs or organizations: Not on file     Relationship status: Not on file   Other Topics Concern    Not on file   Social History Narrative    Dogs in household, no smokers.     Review of Systems   Constitutional: Negative for activity change, appetite change, chills, diaphoresis, fatigue, fever and unexpected weight change.   HENT: Negative for congestion, drooling, ear discharge, ear pain, facial swelling, hearing loss, mouth sores, nosebleeds, postnasal drip, rhinorrhea, sinus pressure, sneezing, sore throat, tinnitus, trouble swallowing and voice change.    Eyes: Negative for photophobia, redness and visual disturbance.   Respiratory: Negative for apnea, cough, choking, chest tightness, shortness of breath and wheezing.    Cardiovascular: Negative for chest pain, palpitations and leg swelling.   Gastrointestinal: Negative for abdominal distention, abdominal pain, blood in stool, constipation, diarrhea, nausea and vomiting.   Endocrine: Negative for cold intolerance, heat intolerance, polydipsia, polyphagia and polyuria.   Genitourinary: Negative for decreased urine volume, difficulty urinating, dysuria, frequency, genital sores, hematuria and urgency.   Musculoskeletal: Negative for arthralgias, back pain, gait problem, joint swelling, myalgias, neck pain  and neck stiffness.   Skin: Negative for color change, pallor, rash and wound.   Allergic/Immunologic: Negative for food allergies and immunocompromised state.   Neurological: Negative for dizziness, tremors, seizures, syncope, speech difficulty, weakness, light-headedness, numbness and headaches.   Hematological: Negative for adenopathy. Does not bruise/bleed easily.   Psychiatric/Behavioral: Negative for agitation, behavioral problems, confusion, decreased concentration, dysphoric mood, hallucinations, self-injury, sleep disturbance and suicidal ideas. The patient is not nervous/anxious and is not hyperactive.    All other systems reviewed and are negative.      Objective:      Physical Exam   Constitutional: She is oriented to person, place, and time. She appears well-developed and well-nourished. No distress.   HENT:   Head: Normocephalic and atraumatic.   Mouth/Throat: No oropharyngeal exudate.   Swollen lips--upper and lower   Eyes: No scleral icterus.   Neck: Normal range of motion. Neck supple. No JVD present. Carotid bruit is not present. No tracheal deviation present. No thyromegaly present.   Cardiovascular: Normal rate, regular rhythm, normal heart sounds and intact distal pulses.   Pulmonary/Chest: Effort normal and breath sounds normal. No respiratory distress. She has no wheezes. She has no rales. She exhibits no tenderness.   Abdominal: Soft. Bowel sounds are normal.   Musculoskeletal: Normal range of motion. She exhibits no edema or tenderness.   Lymphadenopathy:     She has no cervical adenopathy.   Neurological: She is alert and oriented to person, place, and time.   Skin: Skin is warm and dry. No rash noted. She is not diaphoretic. No erythema. No pallor.   Psychiatric: She has a normal mood and affect. Her behavior is normal. Judgment and thought content normal.   Nursing note and vitals reviewed.      CMP  Sodium   Date Value Ref Range Status   09/27/2018 139 136 - 145 mmol/L Final      Potassium   Date Value Ref Range Status   09/27/2018 4.3 3.5 - 5.1 mmol/L Final     Chloride   Date Value Ref Range Status   09/27/2018 99 95 - 110 mmol/L Final     CO2   Date Value Ref Range Status   09/27/2018 32 (H) 23 - 29 mmol/L Final     Glucose   Date Value Ref Range Status   09/27/2018 95 70 - 110 mg/dL Final     BUN, Bld   Date Value Ref Range Status   09/27/2018 21 8 - 23 mg/dL Final     Creatinine   Date Value Ref Range Status   09/27/2018 0.9 0.5 - 1.4 mg/dL Final     Calcium   Date Value Ref Range Status   09/27/2018 10.2 8.7 - 10.5 mg/dL Final     Total Protein   Date Value Ref Range Status   09/27/2018 8.0 6.0 - 8.4 g/dL Final     Albumin   Date Value Ref Range Status   09/27/2018 4.0 3.5 - 5.2 g/dL Final     Total Bilirubin   Date Value Ref Range Status   09/27/2018 0.8 0.1 - 1.0 mg/dL Final     Comment:     For infants and newborns, interpretation of results should be based  on gestational age, weight and in agreement with clinical  observations.  Premature Infant recommended reference ranges:  Up to 24 hours.............<8.0 mg/dL  Up to 48 hours............<12.0 mg/dL  3-5 days..................<15.0 mg/dL  6-29 days.................<15.0 mg/dL       Alkaline Phosphatase   Date Value Ref Range Status   09/27/2018 50 (L) 55 - 135 U/L Final     AST   Date Value Ref Range Status   09/27/2018 19 10 - 40 U/L Final     ALT   Date Value Ref Range Status   09/27/2018 16 10 - 44 U/L Final     Anion Gap   Date Value Ref Range Status   09/27/2018 8 8 - 16 mmol/L Final     eGFR if    Date Value Ref Range Status   09/27/2018 >60.0 >60 mL/min/1.73 m^2 Final     eGFR if non    Date Value Ref Range Status   09/27/2018 >60.0 >60 mL/min/1.73 m^2 Final     Comment:     Calculation used to obtain the estimated glomerular filtration  rate (eGFR) is the CKD-EPI equation.        Lab Results   Component Value Date    WBC 6.57 09/27/2018    HGB 13.6 09/27/2018    HCT 42.7 09/27/2018     MCV 89 09/27/2018     09/27/2018     Lab Results   Component Value Date    CHOL 220 (H) 09/27/2018     Lab Results   Component Value Date    HDL 80 (H) 09/27/2018     Lab Results   Component Value Date    LDLCALC 124.4 09/27/2018     Lab Results   Component Value Date    TRIG 78 09/27/2018     Lab Results   Component Value Date    CHOLHDL 36.4 09/27/2018     Lab Results   Component Value Date    TSH 0.681 09/27/2018     Lab Results   Component Value Date    HGBA1C 6.0 (H) 09/27/2018     Assessment:       1. Swelling of both lips        Plan:   Swelling of both lips-----------?2nd ARB-------d/c benicar---------------take benadryl ----------continue amlodipine, hctz, coreg--------follow-    Other orders  -     amLODIPine (NORVASC) 10 MG tablet; Take 1 tablet (10 mg total) by mouth once daily.  Dispense: 90 tablet; Refill: 3  -     hydroCHLOROthiazide (HYDRODIURIL) 25 MG tablet; Take 1 tablet (25 mg total) by mouth once daily.  Dispense: 90 tablet; Refill: 3    F/u 1 week.

## 2019-07-31 ENCOUNTER — ANTI-COAG VISIT (OUTPATIENT)
Dept: CARDIOLOGY | Facility: CLINIC | Age: 69
End: 2019-07-31
Payer: MEDICARE

## 2019-07-31 DIAGNOSIS — Z79.01 LONG TERM (CURRENT) USE OF ANTICOAGULANTS: Primary | ICD-10-CM

## 2019-07-31 DIAGNOSIS — D68.51 HETEROZYGOUS FACTOR V LEIDEN MUTATION: Chronic | ICD-10-CM

## 2019-07-31 LAB — INR PPP: 1.9 (ref 2–3)

## 2019-07-31 PROCEDURE — 93793 ANTICOAG MGMT PT WARFARIN: CPT | Mod: HCNC,S$GLB,,

## 2019-07-31 PROCEDURE — 85610 POCT INR: ICD-10-PCS | Mod: QW,HCNC,S$GLB, | Performed by: NUCLEAR MEDICINE

## 2019-07-31 PROCEDURE — 85610 PROTHROMBIN TIME: CPT | Mod: QW,HCNC,S$GLB, | Performed by: NUCLEAR MEDICINE

## 2019-07-31 PROCEDURE — 93793 PR ANTICOAGULANT MGMT FOR PT TAKING WARFARIN: ICD-10-PCS | Mod: HCNC,S$GLB,,

## 2019-07-31 RX ORDER — WARFARIN SODIUM 5 MG/1
TABLET ORAL
Qty: 90 TABLET | Refills: 3 | Status: SHIPPED | OUTPATIENT
Start: 2019-07-31 | End: 2019-09-25 | Stop reason: SDUPTHER

## 2019-07-31 NOTE — PROGRESS NOTES
Patient's INR is sub-therapeutic at 1.9.  No missed doses or dietary changes reported.  No abnormal pain, swelling or weakness noted.  Instructions given for patient to take warfarin 7.5mg on today; then resume current dose of 10mg on Mondays, Fridays and 5mg on all other days of the week.  Patient voiced understanding.  Follow-up in 2 weeks.

## 2019-08-14 ENCOUNTER — ANTI-COAG VISIT (OUTPATIENT)
Dept: CARDIOLOGY | Facility: CLINIC | Age: 69
End: 2019-08-14
Payer: MEDICARE

## 2019-08-14 DIAGNOSIS — Z79.01 LONG TERM (CURRENT) USE OF ANTICOAGULANTS: Primary | ICD-10-CM

## 2019-08-14 DIAGNOSIS — D68.51 HETEROZYGOUS FACTOR V LEIDEN MUTATION: Chronic | ICD-10-CM

## 2019-08-14 LAB — INR PPP: 1.7 (ref 2–3)

## 2019-08-14 PROCEDURE — 93793 ANTICOAG MGMT PT WARFARIN: CPT | Mod: HCNC,S$GLB,,

## 2019-08-14 PROCEDURE — 93793 PR ANTICOAGULANT MGMT FOR PT TAKING WARFARIN: ICD-10-PCS | Mod: HCNC,S$GLB,,

## 2019-08-14 PROCEDURE — 85610 PROTHROMBIN TIME: CPT | Mod: QW,HCNC,S$GLB, | Performed by: INTERNAL MEDICINE

## 2019-08-14 PROCEDURE — 85610 POCT INR: ICD-10-PCS | Mod: QW,HCNC,S$GLB, | Performed by: INTERNAL MEDICINE

## 2019-08-14 NOTE — PROGRESS NOTES
Patient's INR is sub-therapeutic at 1.7.  No missed doses or dietary changes reported.  No abnormal pain, swelling or weakness noted.  Instructions given for patient to increase dose of warfarin to 10mg on Mondays, Wednesdays and Fridays and 5mg on all other days of the week.  Patient voiced understanding.  Follow-up in 2 weeks.

## 2019-08-28 ENCOUNTER — ANTI-COAG VISIT (OUTPATIENT)
Dept: CARDIOLOGY | Facility: CLINIC | Age: 69
End: 2019-08-28
Payer: MEDICARE

## 2019-08-28 DIAGNOSIS — Z79.01 LONG TERM (CURRENT) USE OF ANTICOAGULANTS: Primary | ICD-10-CM

## 2019-08-28 DIAGNOSIS — D68.51 HETEROZYGOUS FACTOR V LEIDEN MUTATION: Chronic | ICD-10-CM

## 2019-08-28 LAB — INR PPP: 4.4 (ref 2–3)

## 2019-08-28 PROCEDURE — 93793 PR ANTICOAGULANT MGMT FOR PT TAKING WARFARIN: ICD-10-PCS | Mod: HCNC,S$GLB,,

## 2019-08-28 PROCEDURE — 93793 ANTICOAG MGMT PT WARFARIN: CPT | Mod: HCNC,S$GLB,,

## 2019-08-28 PROCEDURE — 85610 PROTHROMBIN TIME: CPT | Mod: QW,HCNC,S$GLB, | Performed by: INTERNAL MEDICINE

## 2019-08-28 PROCEDURE — 85610 POCT INR: ICD-10-PCS | Mod: QW,HCNC,S$GLB, | Performed by: INTERNAL MEDICINE

## 2019-08-28 NOTE — PROGRESS NOTES
Patient's INR is supra-therapeutic at 4.4.  No significant changes or extra doses reported.  No signs of bleeding noted; advised patient to seek immediate medical attention if she notices any abnormal bleeding.  Instructions given for patient to hold dose of warfarin on today; then resume current dose of 10mg on Mondays, Wednesdays, Fridays and 5mg on all other days of the week. Patient voiced understanding.  Recheck on 9/13/19 at [facility].

## 2019-09-11 ENCOUNTER — ANTI-COAG VISIT (OUTPATIENT)
Dept: CARDIOLOGY | Facility: CLINIC | Age: 69
End: 2019-09-11
Payer: MEDICARE

## 2019-09-11 ENCOUNTER — OFFICE VISIT (OUTPATIENT)
Dept: PHYSICAL MEDICINE AND REHAB | Facility: CLINIC | Age: 69
End: 2019-09-11
Payer: MEDICARE

## 2019-09-11 VITALS
SYSTOLIC BLOOD PRESSURE: 159 MMHG | RESPIRATION RATE: 12 BRPM | DIASTOLIC BLOOD PRESSURE: 86 MMHG | HEART RATE: 55 BPM | HEIGHT: 65 IN | WEIGHT: 185 LBS | BODY MASS INDEX: 30.82 KG/M2

## 2019-09-11 DIAGNOSIS — Z79.01 LONG TERM (CURRENT) USE OF ANTICOAGULANTS: Primary | ICD-10-CM

## 2019-09-11 DIAGNOSIS — D68.51 HETEROZYGOUS FACTOR V LEIDEN MUTATION: Chronic | ICD-10-CM

## 2019-09-11 DIAGNOSIS — G57.01 PIRIFORMIS SYNDROME, RIGHT: Primary | ICD-10-CM

## 2019-09-11 LAB — INR PPP: 3.7 (ref 2–3)

## 2019-09-11 PROCEDURE — 99999 PR PBB SHADOW E&M-EST. PATIENT-LVL III: CPT | Mod: PBBFAC,HCNC,, | Performed by: PHYSICAL MEDICINE & REHABILITATION

## 2019-09-11 PROCEDURE — 85610 POCT INR: ICD-10-PCS | Mod: QW,HCNC,S$GLB, | Performed by: INTERNAL MEDICINE

## 2019-09-11 PROCEDURE — 93793 PR ANTICOAGULANT MGMT FOR PT TAKING WARFARIN: ICD-10-PCS | Mod: HCNC,S$GLB,,

## 2019-09-11 PROCEDURE — 3077F SYST BP >= 140 MM HG: CPT | Mod: HCNC,CPTII,S$GLB, | Performed by: PHYSICAL MEDICINE & REHABILITATION

## 2019-09-11 PROCEDURE — 99214 OFFICE O/P EST MOD 30 MIN: CPT | Mod: HCNC,S$GLB,, | Performed by: PHYSICAL MEDICINE & REHABILITATION

## 2019-09-11 PROCEDURE — 3077F PR MOST RECENT SYSTOLIC BLOOD PRESSURE >= 140 MM HG: ICD-10-PCS | Mod: HCNC,CPTII,S$GLB, | Performed by: PHYSICAL MEDICINE & REHABILITATION

## 2019-09-11 PROCEDURE — 3079F PR MOST RECENT DIASTOLIC BLOOD PRESSURE 80-89 MM HG: ICD-10-PCS | Mod: HCNC,CPTII,S$GLB, | Performed by: PHYSICAL MEDICINE & REHABILITATION

## 2019-09-11 PROCEDURE — 99214 PR OFFICE/OUTPT VISIT, EST, LEVL IV, 30-39 MIN: ICD-10-PCS | Mod: HCNC,S$GLB,, | Performed by: PHYSICAL MEDICINE & REHABILITATION

## 2019-09-11 PROCEDURE — 85610 PROTHROMBIN TIME: CPT | Mod: QW,HCNC,S$GLB, | Performed by: INTERNAL MEDICINE

## 2019-09-11 PROCEDURE — 3079F DIAST BP 80-89 MM HG: CPT | Mod: HCNC,CPTII,S$GLB, | Performed by: PHYSICAL MEDICINE & REHABILITATION

## 2019-09-11 PROCEDURE — 99999 PR PBB SHADOW E&M-EST. PATIENT-LVL III: ICD-10-PCS | Mod: PBBFAC,HCNC,, | Performed by: PHYSICAL MEDICINE & REHABILITATION

## 2019-09-11 PROCEDURE — 93793 ANTICOAG MGMT PT WARFARIN: CPT | Mod: HCNC,S$GLB,,

## 2019-09-11 PROCEDURE — 1101F PR PT FALLS ASSESS DOC 0-1 FALLS W/OUT INJ PAST YR: ICD-10-PCS | Mod: HCNC,CPTII,S$GLB, | Performed by: PHYSICAL MEDICINE & REHABILITATION

## 2019-09-11 PROCEDURE — 1101F PT FALLS ASSESS-DOCD LE1/YR: CPT | Mod: HCNC,CPTII,S$GLB, | Performed by: PHYSICAL MEDICINE & REHABILITATION

## 2019-09-11 RX ORDER — METHYLPREDNISOLONE 4 MG/1
TABLET ORAL
Qty: 1 PACKAGE | Refills: 0 | Status: SHIPPED | OUTPATIENT
Start: 2019-09-11 | End: 2019-09-18

## 2019-09-11 NOTE — PATIENT INSTRUCTIONS
Exercises to Strengthen Your Lower Back  Strong lower back and abdominal muscles work together to support your spine. The exercises below will help strengthen the lower back. It is important that you begin exercising slowly and increase levels gradually.  Always begin any exercise program with stretching. If you feel pain while doing any of these exercises, stop and talk to your doctor about a more specific exercise program that better suits your condition.   Low back stretch  The point of stretching is to make you more flexible and increase your range of motion. Stretch only as much as you are able. Stretch slowly. Do not push your stretch to the limit. If at any point you feel pain while stretching, this is your (temporary) limit.  · Lie on your back with your knees bent and both feet on the ground.  · Slowly raise your left knee to your chest as you flatten your lower back against the floor. Hold for 5 seconds.  · Relax and repeat the exercise with your right knee.  · Do 10 of these exercises for each leg.  · Repeat hugging both knees to your chest at the same time.  Building lower back strength  Start your exercise routine with 10 to 30 minutes a day, 1 to 3 times a day.  Initial exercises  Lying on your back:  1. Ankle pumps: Move your foot up and down, towards your head, and then away. Repeat 10 times with each foot.  2. Heel slides: Slowly bend your knee, drawing the heel of your foot towards you. Then slide your heel/foot from you, straightening your knee. Do not lift your foot off the floor (this is not a leg lift).  3. Abdominal contraction: Bend your knees and put your hands on your stomach. Tighten your stomach muscles. Hold for 5 seconds, then relax. Repeat 10 times.  4. Straight leg raise: Bend one leg at the knee and keep the other leg straight. Tighten your stomach muscles. Slowly lift your straight leg 6 to 12 inches off the floor and hold for up to 5 seconds. Repeat 10 times on each  side.  Standin. Wall squats: Stand with your back against the wall. Move your feet about 12 inches away from the wall. Tighten your stomach muscles, and slowly bend your knees until they are at about a 45 degree angle. Do not go down too far. Hold about 5 seconds. Then slowly return to your starting position. Repeat 10 times.  2. Heel raises: Stand facing the wall. Slowly raise the heels of your feet up and down, while keeping your toes on the floor. If you have trouble balancing, you can touch the wall with your hands. Repeat 10 times.  More advanced exercises  When you feel comfortable enough, try these exercises.  1. Kneeling lumbar extension: Begin on your hands and knees. At the same time, raise and straighten your right arm and left leg until they are parallel to the ground. Hold for 2 seconds and come back slowly to a starting position. Repeat with left arm and right leg, alternating 10 times.  2. Prone lumbar extension: Lie face down, arms extended overhead, palms on the floor. At the same time, raise your right arm and left leg as high as comfortably possible. Hold for 10 seconds and slowly return to start. Repeat with left arm and right leg, alternating 10 times. Gradually build up to 20 times. (Advanced: Repeat this exercise raising both arms and both legs a few inches off the floor at the same time. Hold for 5 seconds and release.)  3. Pelvic tilt: Lie on the floor on your back with your knees bent at 90 degrees. Your feet should be flat on the floor. Inhale, exhale, then slowly contract your abdominal muscles bringing your navel toward your spine. Let your pelvis rock back until your lower back is flat on the floor. Hold for 10 seconds while breathing smoothly.  4. Abdominal crunch: Perform a pelvic tilt (above) flattening your lower back against the floor. Holding the tension in your abdominal muscles, take another breath and raise your shoulder blades off the ground (this is not a full sit-up).  Keep your head in line with your body (dont bend your neck forward). Hold for 2 seconds, then slowly lower.  Date Last Reviewed: 6/1/2016  © 4937-6857 [employer]. [address] [place] [address]. All rights reserved. This information is not intended as a substitute for professional medical care. Always follow your healthcare professional's instructions.        Back Safety: Poor Posture Hurts  An unhealthy spine often starts with bad habits. Poor movement patterns and posture problems are common causes of back pain. Disk, bone, nerve, and soft tissue problems can all be affected by poor posture. They can lead to pain, stiffness, and other symptoms.    Poor posture backfires  Poor posture can cause pain. Too much slouching puts increased pressure on the disks. An excessive lumbar curve can overload and inflame the vertebrae. As a result, the back muscles may tighten or spasm to splint and protect the spine. This adds to the pain you feel.    Proper posture: The key to safe movement  Your spine bears your weight throughout the day. This is true whether youre sleeping, standing, or bending. Certain positions strain your spine more than others. But by maintaining proper posture in all positions, you can reduce the stress on your spine.       To improve your standing posture, follow these steps:  · Breathe deeply.  · Relax your shoulders, hips, and ankles. · Think of the ears, shoulders, hips, and ankles as a series of dots. Now, adjust your body to connect the dots in a straight line.  · Tuck your buttocks in just a bit if you need to.      Date Last Reviewed: 10/28/2015  © 4940-0221 [employer]. [address] [place] [address]. All rights reserved. This information is not intended as a substitute for professional medical care. Always follow your healthcare professional's instructions.        Relieving Tension in Your Back  Being relaxed helps keep your mind healthy  and your back ready to move. Take short breaks often. Walk around. Stretch. Switch tasks. Also give the following a try.  Make time to relax. Start by setting aside 5 minutes daily.   Deep breathing    Deep breathing is a simple way to reduce stress. You can do it almost any time you need to relax.  · Inhale slowly through your nose. Let your lungs and stomach expand.  · Hold your breath for 2 to 3 seconds.  · Exhale slowly through your mouth until your lungs feel empty. Repeat 3 to 4 times.  Relieve tension  Muscle tension can create tender spots called trigger points. The tips below may help relieve muscle tension.  · Press the trigger point if you can reach it. If not, lie on a soft tennis ball, or ask a friend to press the spot. Use steady pressure for 10 to 15 seconds. Breathe deeply. Repeat a few times.  · Massage trigger points with ice for 2 to 5 minutes. Press lightly at first. Slowly increase firmness.  Date Last Reviewed: 10/18/2015  © 2802-6403 [employer]. [address] [place] [address]. All rights reserved. This information is not intended as a substitute for professional medical care. Always follow your healthcare professional's instructions.        Caring for Your Back Throughout the Day  Take care of your back throughout the day. You will likely have fewer back problems if you do. Try to warm up before you move. Shift positions often. Also do your best to form healthy habits.    Warm up for the day  Do a few slow, catlike stretches before starting your day. This simple warmup can soften your disks, stretch your back muscles, and help prevent injuries.  Shift positions often  At work and at home, change positions often. This helps keep your body from getting stiff. Stand up or lean back while you sit. If you can, get up and move every 1/2 hour.  Form healthy habits  Here are some suggestions:   · Keep a healthy weight. When you weigh too much, your back is under excess  strain. But losing just a few extra pounds can help a lot.  · Try not to overeat. Learn about serving sizes. The size of a serving depends on the food and the food group. Many foods list serving sizes on the labels.  · Handle minor aches with cold and heat. Apply cold the first 24 to 48 hours. Use heat after that. Always place a thin cloth between your skin and the source of cold or heat.  · Take medicines as directed. This helps keep pain under control. Always read labels, and call your healthcare provider or pharmacist if you have any questions.  Walk each day  A daily walk keeps your back and thigh muscles stretched and strong. This gives your back better support. Be sure to walk with your spines three curves aligned, by keeping your head, hips, and toes connected by a vertical line.   Date Last Reviewed: 10/18/2015  © 7710-8729 [employer]. [address] [place] [address], [place] [address]. All rights reserved. This information is not intended as a substitute for professional medical care. Always follow your healthcare professional's instructions.        Relieving Tension in Your Back  Being relaxed helps keep your mind healthy and your back ready to move. Take short breaks often. Walk around. Stretch. Switch tasks. Also give the following a try.  Make time to relax. Start by setting aside 5 minutes daily.   Deep breathing    Deep breathing is a simple way to reduce stress. You can do it almost any time you need to relax.  · Inhale slowly through your nose. Let your lungs and stomach expand.  · Hold your breath for 2 to 3 seconds.  · Exhale slowly through your mouth until your lungs feel empty. Repeat 3 to 4 times.  Relieve tension  Muscle tension can create tender spots called trigger points. The tips below may help relieve muscle tension.  · Press the trigger point if you can reach it. If not, lie on a soft tennis ball, or ask a friend to press the spot. Use steady pressure for 10 to 15 seconds. Breathe  deeply. Repeat a few times.  · Massage trigger points with ice for 2 to 5 minutes. Press lightly at first. Slowly increase firmness.  Date Last Reviewed: 10/18/2015  © 4971-1246 [employer]. [address] [place] [address]. All rights reserved. This information is not intended as a substitute for professional medical care. Always follow your healthcare professional's instructions.        Back Exercises: Hip Rotator Stretch    To start, lie on your back with your knees bent and feet flat on the floor. Dont press your neck or lower back to the floor. Breathe deeply. You should feel comfortable and relaxed in this position.  · Rest your right ankle on your left knee.  · Place a towel behind your left thigh, and use it to pull the knee toward your chest. Feel the stretch in your buttocks.  · Hold for 30 to 60 seconds. Release.  · Repeat 2 times.  · Switch legs.   · If there is any pain other than stretch in the knee or buttock, stop and contact your healthcare provider.  For your safety, check with your healthcare provider before starting an exercise program.   Date Last Reviewed: 8/16/2015  © 7482-7872 [employer]. [address] [place] [address]. All rights reserved. This information is not intended as a substitute for professional medical care. Always follow your healthcare professional's instructions.        Lumbar Stretch (Flexibility)    1. Lie on your back on the floor, with your knees bent and your feet flat on the floor. Dont press your neck or lower back to the floor.  2. Pull one knee up toward your chest. Clasp your hands under your thigh to help pull.  3. Hold for 30 to 60 seconds. Lower your leg back down to the floor.  4. Repeat 2 times, or as instructed.  5. Switch legs and repeat.  Date Last Reviewed: 3/10/2016  © 2516-8000 [employer]. [address] [place] [address]. All rights reserved. This information is not intended as a  substitute for professional medical care. Always follow your healthcare professional's instructions.        Back Exercises: Hip Lift    To start, lie on your back with your knees bent and feet flat on the floor. Dont press your neck or lower back to the floor. Breathe deeply. You should feel comfortable and relaxed in this position:  · Tighten your abdomen and buttocks.  · Slowly raise your hips upward. Be careful not to arch your back.  · Hold for 5 seconds. Lower your hips to the floor.  · Repeat 10 times.  For your safety, check with your healthcare provider before starting an exercise program.   Date Last Reviewed: 8/16/2015  © 9557-0133 [employer]. [address] [place] [address]. All rights reserved. This information is not intended as a substitute for professional medical care. Always follow your healthcare professional's instructions.        Back Exercises: Lower Back Rotation    To start, lie on your back with your knees bent and feet flat on the floor. Dont press your neck or lower back to the floor. Breathe deeply. You should feel comfortable and relaxed in this position.  · Drop both knees to one side. Turn your head to the other side. Keep your shoulders flat on the floor.  · Do not push through pain.  · Hold for 20 seconds.  · Slowly switch sides.  · Repeat 2 to 5 times.  Date Last Reviewed: 10/11/2015  © 6988-1179 [employer]. [address] [place] [address]. All rights reserved. This information is not intended as a substitute for professional medical care. Always follow your healthcare professional's instructions.        Back Exercises: Side Stretch      To start, sit in a chair with your feet flat on the floor. Shift your weight slightly forward to avoid rounding your back. Relax. Keep your ears, shoulders, and hips aligned:  · Stretch your right arm overhead.  · Slowly bend to the left. Dont twist your torso. Stay within your pain limits.  · Hold  for 20 seconds. Return to starting position.  · Repeat 2 to 5 times. Then, switch to the other side.  Date Last Reviewed: 10/13/2015  © 5269-4309 [employer]. [address] [place] [address], [place] [address]. All rights reserved. This information is not intended as a substitute for professional medical care. Always follow your healthcare professional's instructions.

## 2019-09-11 NOTE — PROGRESS NOTES
Patient's INR continues to be supra-therapeutic at 3.7.  Mrs. Weir has been suffering with sciatica/piriformis.  Patient will be starting a medrol marian on today.  No signs of bleeding noted; advised patient to seek immediate medical attention if she notices any abnormal bleeding.  Warfarin dose will be lowered due to previous elevated readings.  Instructions given for patient to lower dose of warfarin to 5mg on Tuesdays, Thursdays and 7.5mg on all other days of the week.  Patient voiced understanding. Recheck in 1 week.

## 2019-09-11 NOTE — PROGRESS NOTES
PM&R CLINIC NOTE    Chief Complaint   Patient presents with    Back Pain    Hip Pain     HPI: This is a 69 y.o.  female being seen in clinic today for follow up back/right gluteus pain.  She has achy pain that has increased over the past few days with tingling into her legs. She has been doing increased activity at work and notices discomfort sitting for prolonged time    History obtained from patient    Functional History:  Walking: limited  Transfers: Independent  Assistive devices: No  Power mobility: No  Falls: None    Needs help with:  Nothing - all ADLS normal      Review of Systems:     General- denies lethargy, weight change, fever, chills  Head/neck- denies swallowing difficulties  ENT- denies hearing changes  Cardiovascular-denies chest pain  Pulmonary- denies shortness of breath  GI- denies constipation or bowel incontinence  - denies bladder incontinence  Skin- denies wounds or rashes  Musculoskeletal- +weakness, +pain  Neurologic- +numbness and tingling  Psychiatric- denies depressive or psychotic features, denies anxiety  Lymphatic-denies swelling  Endocrine- denies hypoglycemic symptoms/DM history    Physical Examination:  General: Well developed, well nourished female, NAD  HEENT: NCAT EOMI  Pulmonary: Normal respirations    Spinal Examination: CERVICAL  Active ROM is within normal limits   Inspection: No deformity of spinal alignment.  Palpation:   ttp trapezius with tight bands bilaterally    Spinal Examination: LUMBAR or THORACIC  Active ROM is limited in all planes  Inspection: No deformity of spinal alignment.  No palpable olisthesis.  Palpation:  Still Tight paraspinals, ttp at si joints VERY ttp at right Piriformis     Bilateral Upper and Lower Extremities:  Shoulder/Elbow/Wrist/Hand ROM rot cuff weakness bilaterally  Hip/Knee/Ankle ROM wnl   Bilateral Extremities show normal capillary refill.  No signs of cyanosis, rubor, edema, skin changes, or dysvascular changes of appendages.  Nails  appear intact.    Neurological Exam:  Cranial Nerves:  II-XII grossly intact    Manual Muscle Testing: (Motor 5=normal)  RIGHT Lower extremity: Hip flexion 4+/5, Hip Abduction 4+/5, Knee extension 5/5, Knee flexion 5/5, Ankle dorsiflexion 5/5, Extensor hallucis longus 5/5, Ankle plantarflexion 5/5  LEFT Lower extremity:  Hip flexion 4+/5, Hip Abduction 4+/5, Knee extension 5/5, Knee flexion 5/5, Ankle dorsiflexion 5/5, Extensor hallucis longus 5/5, Ankle plantarflexion 5/5    No focal atrophy is noted of either upper or lower extremity.    Sensation: tested to light touch    IMPRESSION/PLAN: This is a 69 y.o.  female with myofascial pain, lumbar DJD/DDD, right piriformis    1. Cont HEP/stretch/exercise-reprinted core and hip strength, stretch  2. Cont gabapentin 100mg bid and increase to 200mg QHS, medrol dose pack  3. Ice/heat modalities, topical agents  4. Fu prn- if not improving, will consider piriformis injection, PT, refer to DANIELLE Tyler M.D.  [facility]

## 2019-09-18 ENCOUNTER — OFFICE VISIT (OUTPATIENT)
Dept: PHYSICAL MEDICINE AND REHAB | Facility: CLINIC | Age: 69
End: 2019-09-18
Payer: MEDICARE

## 2019-09-18 ENCOUNTER — ANTI-COAG VISIT (OUTPATIENT)
Dept: CARDIOLOGY | Facility: CLINIC | Age: 69
End: 2019-09-18
Payer: MEDICARE

## 2019-09-18 VITALS
RESPIRATION RATE: 14 BRPM | BODY MASS INDEX: 30.82 KG/M2 | WEIGHT: 185 LBS | HEART RATE: 70 BPM | DIASTOLIC BLOOD PRESSURE: 76 MMHG | HEIGHT: 65 IN | SYSTOLIC BLOOD PRESSURE: 133 MMHG

## 2019-09-18 DIAGNOSIS — Z79.01 LONG TERM (CURRENT) USE OF ANTICOAGULANTS: Primary | ICD-10-CM

## 2019-09-18 DIAGNOSIS — D68.51 HETEROZYGOUS FACTOR V LEIDEN MUTATION: Chronic | ICD-10-CM

## 2019-09-18 DIAGNOSIS — G57.01 PIRIFORMIS SYNDROME, RIGHT: Primary | ICD-10-CM

## 2019-09-18 LAB — INR PPP: 4.9 (ref 2–3)

## 2019-09-18 PROCEDURE — 99214 OFFICE O/P EST MOD 30 MIN: CPT | Mod: 25,HCNC,S$GLB, | Performed by: PHYSICAL MEDICINE & REHABILITATION

## 2019-09-18 PROCEDURE — 99999 PR PBB SHADOW E&M-EST. PATIENT-LVL III: ICD-10-PCS | Mod: PBBFAC,HCNC,, | Performed by: PHYSICAL MEDICINE & REHABILITATION

## 2019-09-18 PROCEDURE — 3078F PR MOST RECENT DIASTOLIC BLOOD PRESSURE < 80 MM HG: ICD-10-PCS | Mod: HCNC,CPTII,S$GLB, | Performed by: PHYSICAL MEDICINE & REHABILITATION

## 2019-09-18 PROCEDURE — 99214 PR OFFICE/OUTPT VISIT, EST, LEVL IV, 30-39 MIN: ICD-10-PCS | Mod: 25,HCNC,S$GLB, | Performed by: PHYSICAL MEDICINE & REHABILITATION

## 2019-09-18 PROCEDURE — 99999 PR PBB SHADOW E&M-EST. PATIENT-LVL III: CPT | Mod: PBBFAC,HCNC,, | Performed by: PHYSICAL MEDICINE & REHABILITATION

## 2019-09-18 PROCEDURE — 3075F SYST BP GE 130 - 139MM HG: CPT | Mod: HCNC,CPTII,S$GLB, | Performed by: PHYSICAL MEDICINE & REHABILITATION

## 2019-09-18 PROCEDURE — 1101F PT FALLS ASSESS-DOCD LE1/YR: CPT | Mod: HCNC,CPTII,S$GLB, | Performed by: PHYSICAL MEDICINE & REHABILITATION

## 2019-09-18 PROCEDURE — 85610 PROTHROMBIN TIME: CPT | Mod: QW,HCNC,S$GLB, | Performed by: INTERNAL MEDICINE

## 2019-09-18 PROCEDURE — 20552 PR INJECT TRIGGER POINT, 1 OR 2: ICD-10-PCS | Mod: HCNC,S$GLB,, | Performed by: PHYSICAL MEDICINE & REHABILITATION

## 2019-09-18 PROCEDURE — 3075F PR MOST RECENT SYSTOLIC BLOOD PRESS GE 130-139MM HG: ICD-10-PCS | Mod: HCNC,CPTII,S$GLB, | Performed by: PHYSICAL MEDICINE & REHABILITATION

## 2019-09-18 PROCEDURE — 3078F DIAST BP <80 MM HG: CPT | Mod: HCNC,CPTII,S$GLB, | Performed by: PHYSICAL MEDICINE & REHABILITATION

## 2019-09-18 PROCEDURE — 93793 ANTICOAG MGMT PT WARFARIN: CPT | Mod: HCNC,S$GLB,,

## 2019-09-18 PROCEDURE — 93793 PR ANTICOAGULANT MGMT FOR PT TAKING WARFARIN: ICD-10-PCS | Mod: HCNC,S$GLB,,

## 2019-09-18 PROCEDURE — 85610 POCT INR: ICD-10-PCS | Mod: QW,HCNC,S$GLB, | Performed by: INTERNAL MEDICINE

## 2019-09-18 PROCEDURE — 1101F PR PT FALLS ASSESS DOC 0-1 FALLS W/OUT INJ PAST YR: ICD-10-PCS | Mod: HCNC,CPTII,S$GLB, | Performed by: PHYSICAL MEDICINE & REHABILITATION

## 2019-09-18 PROCEDURE — 20552 NJX 1/MLT TRIGGER POINT 1/2: CPT | Mod: HCNC,S$GLB,, | Performed by: PHYSICAL MEDICINE & REHABILITATION

## 2019-09-18 RX ORDER — METHYLPREDNISOLONE ACETATE 40 MG/ML
40 INJECTION, SUSPENSION INTRA-ARTICULAR; INTRALESIONAL; INTRAMUSCULAR; SOFT TISSUE
Status: COMPLETED | OUTPATIENT
Start: 2019-09-18 | End: 2019-09-18

## 2019-09-18 RX ADMIN — METHYLPREDNISOLONE ACETATE 40 MG: 40 INJECTION, SUSPENSION INTRA-ARTICULAR; INTRALESIONAL; INTRAMUSCULAR; SOFT TISSUE at 12:09

## 2019-09-18 NOTE — PROGRESS NOTES
PM&R CLINIC NOTE    Chief Complaint   Patient presents with    Back Pain     lower back pain, to the legs; right side is the worst     HPI: This is a 69 y.o.  female being seen in clinic today for follow up back/right gluteus pain.  She reports some improvement with meds but still having pain in her piriformis.  She would like to try the injection.     History obtained from patient    Functional History:  Walking: limited  Transfers: Independent  Assistive devices: No  Power mobility: No  Falls: None    Needs help with:  Nothing - all ADLS normal      Review of Systems:     General- denies lethargy, weight change, fever, chills  Head/neck- denies swallowing difficulties  ENT- denies hearing changes  Cardiovascular-denies chest pain  Pulmonary- denies shortness of breath  GI- denies constipation or bowel incontinence  - denies bladder incontinence  Skin- denies wounds or rashes  Musculoskeletal- +weakness, +pain  Neurologic- +numbness and tingling  Psychiatric- denies depressive or psychotic features, denies anxiety  Lymphatic-denies swelling  Endocrine- denies hypoglycemic symptoms/DM history    Physical Examination:  General: Well developed, well nourished female, NAD  HEENT: NCAT EOMI  Pulmonary: Normal respirations    Spinal Examination: CERVICAL  Active ROM is within normal limits   Inspection: No deformity of spinal alignment.  Palpation:   ttp trapezius with tight bands bilaterally    Spinal Examination: LUMBAR or THORACIC  Active ROM is limited in all planes  Inspection: No deformity of spinal alignment.  No palpable olisthesis.  Palpation:  Still Tight paraspinals, ttp at si joints still ttp at right Piriformis     Bilateral Upper and Lower Extremities:  Shoulder/Elbow/Wrist/Hand ROM rot cuff weakness bilaterally  Hip/Knee/Ankle ROM wnl   Bilateral Extremities show normal capillary refill.  No signs of cyanosis, rubor, edema, skin changes, or dysvascular changes of appendages.  Nails appear  intact.    Neurological Exam:  Cranial Nerves:  II-XII grossly intact    Manual Muscle Testing: (Motor 5=normal)  RIGHT Lower extremity: Hip flexion 4+/5, Hip Abduction 4+/5, Knee extension 5/5, Knee flexion 5/5, Ankle dorsiflexion 5/5, Extensor hallucis longus 5/5, Ankle plantarflexion 5/5  LEFT Lower extremity:  Hip flexion 4+/5, Hip Abduction 4+/5, Knee extension 5/5, Knee flexion 5/5, Ankle dorsiflexion 5/5, Extensor hallucis longus 5/5, Ankle plantarflexion 5/5    No focal atrophy is noted of either lower extremity.    Sensation: tested to light touch    IMPRESSION/PLAN: This is a 69 y.o.  female with myofascial pain, lumbar DJD/DDD, right piriformis syndrome    1. Cont HEP/stretch/exercise-reprinted core and hip strength, stretch  2. Cont gabapentin 100mg bid and increase to 200mg QHS  3. Ice/heat modalities, topical agents  4. Piriformis injection  5. if not improving, will refer to PT and IPM    Irena Tyler M.D.  [facility]      PROCEDURE NOTE    Diagnosis: Piriformis syndrome  Procedure: Piriformis injection-right     Risks and benefits of procedure explained to patient including risks of infection, bleeding, pain, or damage to surrounding tissues. All questions answered. Informed consent obtained prior to proceeding. Areas marked and prepped in sterile fashion. Using a 25 G 3.5  inch needle, a 3cc mixture of depomedrol 1cc (40mg)and 1% lidocaine was injected into the piriformis muscle. Minimal to no bleeding noted. ER and post injection instructions given.    Irena Tyler M.D.

## 2019-09-18 NOTE — PROGRESS NOTES
Patient's INR remains supra-therapeutic at 4.9.  Medrol completed earlier this week.  No signs of bleeding noted; advised patient to seek immediate medical attention if she notices any abnormal bleeding.  Instructions given for patient to hold dose of warfarin on today; then lower dose to 5mg on Tuesdays, Thursdays, Saturdays and 7.5mg on all other days of the week. Patient voiced understanding.  Recheck in 1 week.

## 2019-09-25 ENCOUNTER — ANTI-COAG VISIT (OUTPATIENT)
Dept: CARDIOLOGY | Facility: CLINIC | Age: 69
End: 2019-09-25
Payer: MEDICARE

## 2019-09-25 DIAGNOSIS — Z79.01 LONG TERM (CURRENT) USE OF ANTICOAGULANTS: Primary | ICD-10-CM

## 2019-09-25 DIAGNOSIS — D68.51 HETEROZYGOUS FACTOR V LEIDEN MUTATION: Chronic | ICD-10-CM

## 2019-09-25 LAB — INR PPP: 3.1 (ref 2–3)

## 2019-09-25 PROCEDURE — 85610 POCT INR: ICD-10-PCS | Mod: QW,HCNC,S$GLB, | Performed by: INTERNAL MEDICINE

## 2019-09-25 PROCEDURE — 93793 ANTICOAG MGMT PT WARFARIN: CPT | Mod: HCNC,S$GLB,,

## 2019-09-25 PROCEDURE — 93793 PR ANTICOAGULANT MGMT FOR PT TAKING WARFARIN: ICD-10-PCS | Mod: HCNC,S$GLB,,

## 2019-09-25 PROCEDURE — 85610 PROTHROMBIN TIME: CPT | Mod: QW,HCNC,S$GLB, | Performed by: INTERNAL MEDICINE

## 2019-09-25 RX ORDER — WARFARIN SODIUM 5 MG/1
TABLET ORAL
Qty: 108 TABLET | Refills: 3 | Status: SHIPPED | OUTPATIENT
Start: 2019-09-25 | End: 2020-08-27

## 2019-09-25 NOTE — PROGRESS NOTES
Patient's INR is supra-therapeutic at 3.1.  Mrs. Weir received a methylprednisolone 40mg injection on 9/18.   No signs of bleeding noted; advised patient to seek immediate medical attention if she notices any abnormal bleeding. Will lower dose slightly as INR has been elevated recently.   Instructions given for patient to lower dose of warfarin to 7.5mg on Mondays, Wednesdays and Fridays; and 5mg on all other days of the week.  Patient voiced understanding.  Recheck INR in 2 weeks.

## 2019-10-01 RX ORDER — OLMESARTAN MEDOXOMIL, AMLODIPINE AND HYDROCHLOROTHIAZIDE TABLET 40/10/25 MG 40; 10; 25 MG/1; MG/1; MG/1
TABLET ORAL
Qty: 90 TABLET | Refills: 3 | Status: SHIPPED | OUTPATIENT
Start: 2019-10-01 | End: 2021-09-03 | Stop reason: SDUPTHER

## 2019-10-07 RX ORDER — DICLOFENAC SODIUM 10 MG/G
GEL TOPICAL
Qty: 200 G | Refills: 2 | Status: SHIPPED | OUTPATIENT
Start: 2019-10-07 | End: 2020-01-28 | Stop reason: SDUPTHER

## 2019-10-09 ENCOUNTER — ANTI-COAG VISIT (OUTPATIENT)
Dept: CARDIOLOGY | Facility: CLINIC | Age: 69
End: 2019-10-09
Payer: MEDICARE

## 2019-10-09 DIAGNOSIS — D68.51 HETEROZYGOUS FACTOR V LEIDEN MUTATION: Chronic | ICD-10-CM

## 2019-10-09 DIAGNOSIS — Z79.01 LONG TERM (CURRENT) USE OF ANTICOAGULANTS: Primary | ICD-10-CM

## 2019-10-09 LAB — INR PPP: 1.7 (ref 2–3)

## 2019-10-09 PROCEDURE — 85610 POCT INR: ICD-10-PCS | Mod: QW,HCNC,S$GLB, | Performed by: INTERNAL MEDICINE

## 2019-10-09 PROCEDURE — 93793 ANTICOAG MGMT PT WARFARIN: CPT | Mod: HCNC,S$GLB,,

## 2019-10-09 PROCEDURE — 85610 PROTHROMBIN TIME: CPT | Mod: QW,HCNC,S$GLB, | Performed by: INTERNAL MEDICINE

## 2019-10-09 PROCEDURE — 93793 PR ANTICOAGULANT MGMT FOR PT TAKING WARFARIN: ICD-10-PCS | Mod: HCNC,S$GLB,,

## 2019-10-09 NOTE — PROGRESS NOTES
Patient's INR is sub-therapeutic at 1.7.  Mrs. Weir reports eating a serving of okra and coleslaw this week.  Consistent discussed, patient agrees to maintain diet of 1 serving of greens per week.  No abnormal pain, swelling or weakness noted.  Instructions given for patient to take warfarin 10mg on today; then increase dose of warfarin to 5mg on Tuesdays, Thursdays, Saturdays and 7.5mg on all other days of the week.  Patient voiced understanding.  Follow-up in 2 weeks.

## 2019-10-17 DIAGNOSIS — E11.9 TYPE 2 DIABETES MELLITUS WITHOUT COMPLICATION: ICD-10-CM

## 2019-10-23 ENCOUNTER — ANTI-COAG VISIT (OUTPATIENT)
Dept: CARDIOLOGY | Facility: CLINIC | Age: 69
End: 2019-10-23
Payer: MEDICARE

## 2019-10-23 DIAGNOSIS — D68.51 HETEROZYGOUS FACTOR V LEIDEN MUTATION: Chronic | ICD-10-CM

## 2019-10-23 DIAGNOSIS — Z79.01 LONG TERM (CURRENT) USE OF ANTICOAGULANTS: Primary | ICD-10-CM

## 2019-10-23 LAB — INR PPP: 2.1 (ref 2–3)

## 2019-10-23 PROCEDURE — 93793 ANTICOAG MGMT PT WARFARIN: CPT | Mod: HCNC,S$GLB,,

## 2019-10-23 PROCEDURE — 93793 PR ANTICOAGULANT MGMT FOR PT TAKING WARFARIN: ICD-10-PCS | Mod: HCNC,S$GLB,,

## 2019-10-23 PROCEDURE — 85610 POCT INR: ICD-10-PCS | Mod: QW,HCNC,S$GLB, | Performed by: INTERNAL MEDICINE

## 2019-10-23 PROCEDURE — 85610 PROTHROMBIN TIME: CPT | Mod: QW,HCNC,S$GLB, | Performed by: INTERNAL MEDICINE

## 2019-11-04 ENCOUNTER — ANTI-COAG VISIT (OUTPATIENT)
Dept: CARDIOLOGY | Facility: CLINIC | Age: 69
End: 2019-11-04
Payer: MEDICARE

## 2019-11-04 DIAGNOSIS — D68.51 HETEROZYGOUS FACTOR V LEIDEN MUTATION: Chronic | ICD-10-CM

## 2019-11-04 DIAGNOSIS — Z79.01 LONG TERM (CURRENT) USE OF ANTICOAGULANTS: Primary | ICD-10-CM

## 2019-11-04 LAB — INR PPP: 1.8 (ref 2–3)

## 2019-11-04 PROCEDURE — 93793 ANTICOAG MGMT PT WARFARIN: CPT | Mod: HCNC,S$GLB,,

## 2019-11-04 PROCEDURE — 85610 POCT INR: ICD-10-PCS | Mod: QW,HCNC,S$GLB, | Performed by: INTERNAL MEDICINE

## 2019-11-04 PROCEDURE — 85610 PROTHROMBIN TIME: CPT | Mod: QW,HCNC,S$GLB, | Performed by: INTERNAL MEDICINE

## 2019-11-04 PROCEDURE — 93793 PR ANTICOAGULANT MGMT FOR PT TAKING WARFARIN: ICD-10-PCS | Mod: HCNC,S$GLB,,

## 2019-11-04 NOTE — PROGRESS NOTES
Patient's INR is sub-therapeutic at 1.8.  No missed doses or dietary changes reported.   No abnormal pain, swelling or weakness noted.  Instructions given for patient to increase dose of warfarin to 5mg on Tuesdays and Thursdays; and 7.5mg on all other days of the week.  Patient voiced understanding.  Follow-up in 2 weeks.

## 2019-11-11 ENCOUNTER — OFFICE VISIT (OUTPATIENT)
Dept: OPHTHALMOLOGY | Facility: CLINIC | Age: 69
End: 2019-11-11
Payer: MEDICARE

## 2019-11-11 DIAGNOSIS — E11.9 TYPE 2 DIABETES MELLITUS WITHOUT RETINOPATHY: Primary | ICD-10-CM

## 2019-11-11 DIAGNOSIS — H52.4 MYOPIA WITH PRESBYOPIA OF BOTH EYES: ICD-10-CM

## 2019-11-11 DIAGNOSIS — Z13.5 SCREENING FOR GLAUCOMA: ICD-10-CM

## 2019-11-11 DIAGNOSIS — H52.13 MYOPIA WITH PRESBYOPIA OF BOTH EYES: ICD-10-CM

## 2019-11-11 DIAGNOSIS — I10 ESSENTIAL HYPERTENSION: ICD-10-CM

## 2019-11-11 PROCEDURE — 99999 PR PBB SHADOW E&M-EST. PATIENT-LVL II: CPT | Mod: PBBFAC,HCNC,, | Performed by: OPTOMETRIST

## 2019-11-11 PROCEDURE — 92014 PR EYE EXAM, EST PATIENT,COMPREHESV: ICD-10-PCS | Mod: HCNC,S$GLB,, | Performed by: OPTOMETRIST

## 2019-11-11 PROCEDURE — 92015 DETERMINE REFRACTIVE STATE: CPT | Mod: HCNC,S$GLB,, | Performed by: OPTOMETRIST

## 2019-11-11 PROCEDURE — 92014 COMPRE OPH EXAM EST PT 1/>: CPT | Mod: HCNC,S$GLB,, | Performed by: OPTOMETRIST

## 2019-11-11 PROCEDURE — 99999 PR PBB SHADOW E&M-EST. PATIENT-LVL II: ICD-10-PCS | Mod: PBBFAC,HCNC,, | Performed by: OPTOMETRIST

## 2019-11-11 PROCEDURE — 92015 PR REFRACTION: ICD-10-PCS | Mod: HCNC,S$GLB,, | Performed by: OPTOMETRIST

## 2019-11-11 NOTE — PROGRESS NOTES
HPI     Diabetic Eye Exam      Additional comments: Yearly              Comments     Last seen by [facility] on 5/7/18 for yearly DM eye exam  Patient here today for yearly eye exam  Has noticeable changes in distance vision since last eye exam  Wears PAL glasses full-time updated 2 years ago  No other complaints  No drops  Lab Results       Component                Value               Date                       HGBA1C                   6.0 (H)             09/27/2018                    Last edited by Corinne Fitzpatrick, PCT on 11/11/2019 10:32 AM.   (History)            Assessment /Plan     For exam results, see Encounter Report.    Type 2 diabetes mellitus without retinopathy    Essential hypertension    Screening for glaucoma    Myopia with presbyopia of both eyes      No diabetic retinopathy in either eye.  Glaucoma screening negative in both eyes.  Updated glasses prescription.  Return to clinic 1 yr.

## 2019-11-18 ENCOUNTER — ANTI-COAG VISIT (OUTPATIENT)
Dept: CARDIOLOGY | Facility: CLINIC | Age: 69
End: 2019-11-18
Payer: MEDICARE

## 2019-11-18 DIAGNOSIS — Z79.01 LONG TERM (CURRENT) USE OF ANTICOAGULANTS: Primary | ICD-10-CM

## 2019-11-18 DIAGNOSIS — D68.51 HETEROZYGOUS FACTOR V LEIDEN MUTATION: Chronic | ICD-10-CM

## 2019-11-18 LAB — INR PPP: 2.2 (ref 2–3)

## 2019-11-18 PROCEDURE — 85610 POCT INR: ICD-10-PCS | Mod: QW,HCNC,S$GLB, | Performed by: INTERNAL MEDICINE

## 2019-11-18 PROCEDURE — 93793 PR ANTICOAGULANT MGMT FOR PT TAKING WARFARIN: ICD-10-PCS | Mod: HCNC,S$GLB,,

## 2019-11-18 PROCEDURE — 85610 PROTHROMBIN TIME: CPT | Mod: QW,HCNC,S$GLB, | Performed by: INTERNAL MEDICINE

## 2019-11-18 PROCEDURE — 93793 ANTICOAG MGMT PT WARFARIN: CPT | Mod: HCNC,S$GLB,,

## 2019-11-18 NOTE — PROGRESS NOTES
Patient's INR is therapeutic at 2.2.  Patient has taken medication as previously instructed.  No other changes reported.  Instructions given to maintain current dose of Warfarin 5 mg every Tuesday and Thursday; and 7.5 mg on all other days per week.  Dose calendar given and reviewed with patient.  Recheck in 1 month.  Patient voiced understanding.

## 2019-11-20 ENCOUNTER — PATIENT OUTREACH (OUTPATIENT)
Dept: ADMINISTRATIVE | Facility: HOSPITAL | Age: 69
End: 2019-11-20

## 2019-11-20 NOTE — PROGRESS NOTES
Pt Scheduled 12/13/2019       Tangela BURLESON LPN Care Coordinator  Care Coordination Department  [facility]  [phone]

## 2019-11-21 ENCOUNTER — HOSPITAL ENCOUNTER (OUTPATIENT)
Dept: RADIOLOGY | Facility: HOSPITAL | Age: 69
Discharge: HOME | End: 2019-11-21
Attending: INTERNAL MEDICINE
Payer: MEDICARE

## 2019-11-21 ENCOUNTER — OFFICE VISIT (OUTPATIENT)
Dept: FAMILY MEDICINE | Facility: CLINIC | Age: 69
End: 2019-11-21
Payer: MEDICARE

## 2019-11-21 VITALS
OXYGEN SATURATION: 98 % | WEIGHT: 180.75 LBS | TEMPERATURE: 98 F | DIASTOLIC BLOOD PRESSURE: 87 MMHG | BODY MASS INDEX: 30.08 KG/M2 | HEART RATE: 60 BPM | SYSTOLIC BLOOD PRESSURE: 160 MMHG

## 2019-11-21 DIAGNOSIS — Z12.31 ENCOUNTER FOR SCREENING MAMMOGRAM FOR BREAST CANCER: ICD-10-CM

## 2019-11-21 DIAGNOSIS — R10.30 LOWER ABDOMINAL PAIN: ICD-10-CM

## 2019-11-21 DIAGNOSIS — E11.9 TYPE 2 DIABETES MELLITUS WITHOUT COMPLICATION, WITHOUT LONG-TERM CURRENT USE OF INSULIN: Primary | ICD-10-CM

## 2019-11-21 DIAGNOSIS — I10 ESSENTIAL HYPERTENSION: Chronic | ICD-10-CM

## 2019-11-21 DIAGNOSIS — E78.00 PURE HYPERCHOLESTEROLEMIA WITH TARGET LOW DENSITY LIPOPROTEIN (LDL) CHOLESTEROL LESS THAN 130 MG/DL: Chronic | ICD-10-CM

## 2019-11-21 DIAGNOSIS — D68.51 HETEROZYGOUS FACTOR V LEIDEN MUTATION: Chronic | ICD-10-CM

## 2019-11-21 DIAGNOSIS — I25.10 CORONARY ARTERY DISEASE INVOLVING NATIVE CORONARY ARTERY OF NATIVE HEART WITHOUT ANGINA PECTORIS: ICD-10-CM

## 2019-11-21 DIAGNOSIS — Z79.01 ANTICOAGULATED ON COUMADIN: Chronic | ICD-10-CM

## 2019-11-21 PROCEDURE — 3077F PR MOST RECENT SYSTOLIC BLOOD PRESSURE >= 140 MM HG: ICD-10-PCS | Mod: HCNC,CPTII,S$GLB, | Performed by: INTERNAL MEDICINE

## 2019-11-21 PROCEDURE — 74019 XR ABDOMEN FLAT AND ERECT: ICD-10-PCS | Mod: 26,HCNC,, | Performed by: RADIOLOGY

## 2019-11-21 PROCEDURE — 99999 PR PBB SHADOW E&M-EST. PATIENT-LVL IV: CPT | Mod: PBBFAC,HCNC,, | Performed by: INTERNAL MEDICINE

## 2019-11-21 PROCEDURE — 74019 RADEX ABDOMEN 2 VIEWS: CPT | Mod: 26,HCNC,, | Performed by: RADIOLOGY

## 2019-11-21 PROCEDURE — 1159F MED LIST DOCD IN RCRD: CPT | Mod: HCNC,S$GLB,, | Performed by: INTERNAL MEDICINE

## 2019-11-21 PROCEDURE — 1101F PT FALLS ASSESS-DOCD LE1/YR: CPT | Mod: HCNC,CPTII,S$GLB, | Performed by: INTERNAL MEDICINE

## 2019-11-21 PROCEDURE — 3079F DIAST BP 80-89 MM HG: CPT | Mod: HCNC,CPTII,S$GLB, | Performed by: INTERNAL MEDICINE

## 2019-11-21 PROCEDURE — 99215 PR OFFICE/OUTPT VISIT, EST, LEVL V, 40-54 MIN: ICD-10-PCS | Mod: HCNC,S$GLB,, | Performed by: INTERNAL MEDICINE

## 2019-11-21 PROCEDURE — 1159F PR MEDICATION LIST DOCUMENTED IN MEDICAL RECORD: ICD-10-PCS | Mod: HCNC,S$GLB,, | Performed by: INTERNAL MEDICINE

## 2019-11-21 PROCEDURE — 99999 PR PBB SHADOW E&M-EST. PATIENT-LVL IV: ICD-10-PCS | Mod: PBBFAC,HCNC,, | Performed by: INTERNAL MEDICINE

## 2019-11-21 PROCEDURE — 3079F PR MOST RECENT DIASTOLIC BLOOD PRESSURE 80-89 MM HG: ICD-10-PCS | Mod: HCNC,CPTII,S$GLB, | Performed by: INTERNAL MEDICINE

## 2019-11-21 PROCEDURE — 74019 RADEX ABDOMEN 2 VIEWS: CPT | Mod: TC,HCNC,FY,PO

## 2019-11-21 PROCEDURE — 1101F PR PT FALLS ASSESS DOC 0-1 FALLS W/OUT INJ PAST YR: ICD-10-PCS | Mod: HCNC,CPTII,S$GLB, | Performed by: INTERNAL MEDICINE

## 2019-11-21 PROCEDURE — 3077F SYST BP >= 140 MM HG: CPT | Mod: HCNC,CPTII,S$GLB, | Performed by: INTERNAL MEDICINE

## 2019-11-21 PROCEDURE — 99215 OFFICE O/P EST HI 40 MIN: CPT | Mod: HCNC,S$GLB,, | Performed by: INTERNAL MEDICINE

## 2019-12-16 ENCOUNTER — ANTI-COAG VISIT (OUTPATIENT)
Dept: CARDIOLOGY | Facility: CLINIC | Age: 69
End: 2019-12-16
Payer: MEDICARE

## 2019-12-16 DIAGNOSIS — Z79.01 LONG TERM (CURRENT) USE OF ANTICOAGULANTS: Primary | ICD-10-CM

## 2019-12-16 DIAGNOSIS — D68.51 HETEROZYGOUS FACTOR V LEIDEN MUTATION: Chronic | ICD-10-CM

## 2019-12-16 LAB — INR PPP: 2.7 (ref 2–3)

## 2019-12-16 PROCEDURE — 93793 ANTICOAG MGMT PT WARFARIN: CPT | Mod: HCNC,S$GLB,,

## 2019-12-16 PROCEDURE — 85610 PROTHROMBIN TIME: CPT | Mod: QW,HCNC,S$GLB, | Performed by: INTERNAL MEDICINE

## 2019-12-16 PROCEDURE — 93793 PR ANTICOAGULANT MGMT FOR PT TAKING WARFARIN: ICD-10-PCS | Mod: HCNC,S$GLB,,

## 2019-12-16 PROCEDURE — 85610 POCT INR: ICD-10-PCS | Mod: QW,HCNC,S$GLB, | Performed by: INTERNAL MEDICINE

## 2019-12-16 NOTE — PROGRESS NOTES
Patient's INR is therapeutic at 2.7.  No recent changes reported.  Instructions were given to maintain current dose of Warfarin 5 mg every Tuesday and Thursday; and 7.5 mg on all other days per week.  Dose calendar given and reviewed with patient.  Recheck in 1 month.  Patient voiced understanding.

## 2019-12-19 ENCOUNTER — HOSPITAL ENCOUNTER (OUTPATIENT)
Dept: RADIOLOGY | Facility: HOSPITAL | Age: 69
Discharge: HOME | End: 2019-12-19
Attending: INTERNAL MEDICINE
Payer: MEDICARE

## 2019-12-19 VITALS — HEIGHT: 65 IN | BODY MASS INDEX: 30.12 KG/M2 | WEIGHT: 180.75 LBS

## 2019-12-19 DIAGNOSIS — Z12.31 ENCOUNTER FOR SCREENING MAMMOGRAM FOR BREAST CANCER: ICD-10-CM

## 2019-12-19 PROCEDURE — 77067 SCR MAMMO BI INCL CAD: CPT | Mod: TC,HCNC

## 2019-12-19 PROCEDURE — 77063 BREAST TOMOSYNTHESIS BI: CPT | Mod: 26,HCNC,, | Performed by: RADIOLOGY

## 2019-12-19 PROCEDURE — 77067 MAMMO DIGITAL SCREENING BILAT WITH TOMOSYNTHESIS_CAD: ICD-10-PCS | Mod: 26,HCNC,, | Performed by: RADIOLOGY

## 2019-12-19 PROCEDURE — 77067 SCR MAMMO BI INCL CAD: CPT | Mod: 26,HCNC,, | Performed by: RADIOLOGY

## 2019-12-19 PROCEDURE — 77063 MAMMO DIGITAL SCREENING BILAT WITH TOMOSYNTHESIS_CAD: ICD-10-PCS | Mod: 26,HCNC,, | Performed by: RADIOLOGY

## 2019-12-23 ENCOUNTER — OFFICE VISIT (OUTPATIENT)
Dept: FAMILY MEDICINE | Facility: CLINIC | Age: 69
End: 2019-12-23
Payer: MEDICARE

## 2019-12-23 VITALS
TEMPERATURE: 98 F | DIASTOLIC BLOOD PRESSURE: 76 MMHG | SYSTOLIC BLOOD PRESSURE: 138 MMHG | HEIGHT: 65 IN | HEART RATE: 58 BPM | WEIGHT: 183.56 LBS | OXYGEN SATURATION: 98 % | BODY MASS INDEX: 30.58 KG/M2 | RESPIRATION RATE: 16 BRPM

## 2019-12-23 DIAGNOSIS — D68.51 HETEROZYGOUS FACTOR V LEIDEN MUTATION: Chronic | ICD-10-CM

## 2019-12-23 DIAGNOSIS — E78.5 DYSLIPIDEMIA: ICD-10-CM

## 2019-12-23 DIAGNOSIS — I10 ESSENTIAL HYPERTENSION: Primary | Chronic | ICD-10-CM

## 2019-12-23 DIAGNOSIS — E11.9 TYPE 2 DIABETES MELLITUS WITHOUT COMPLICATION, WITHOUT LONG-TERM CURRENT USE OF INSULIN: ICD-10-CM

## 2019-12-23 DIAGNOSIS — Z79.01 ANTICOAGULATED ON COUMADIN: Chronic | ICD-10-CM

## 2019-12-23 PROCEDURE — 3044F HG A1C LEVEL LT 7.0%: CPT | Mod: HCNC,CPTII,S$GLB, | Performed by: INTERNAL MEDICINE

## 2019-12-23 PROCEDURE — 99214 OFFICE O/P EST MOD 30 MIN: CPT | Mod: HCNC,S$GLB,, | Performed by: INTERNAL MEDICINE

## 2019-12-23 PROCEDURE — 1125F PR PAIN SEVERITY QUANTIFIED, PAIN PRESENT: ICD-10-PCS | Mod: HCNC,S$GLB,, | Performed by: INTERNAL MEDICINE

## 2019-12-23 PROCEDURE — 1101F PT FALLS ASSESS-DOCD LE1/YR: CPT | Mod: HCNC,CPTII,S$GLB, | Performed by: INTERNAL MEDICINE

## 2019-12-23 PROCEDURE — 3044F PR MOST RECENT HEMOGLOBIN A1C LEVEL <7.0%: ICD-10-PCS | Mod: HCNC,CPTII,S$GLB, | Performed by: INTERNAL MEDICINE

## 2019-12-23 PROCEDURE — 99999 PR PBB SHADOW E&M-EST. PATIENT-LVL IV: ICD-10-PCS | Mod: PBBFAC,HCNC,, | Performed by: INTERNAL MEDICINE

## 2019-12-23 PROCEDURE — 3075F SYST BP GE 130 - 139MM HG: CPT | Mod: HCNC,CPTII,S$GLB, | Performed by: INTERNAL MEDICINE

## 2019-12-23 PROCEDURE — 99999 PR PBB SHADOW E&M-EST. PATIENT-LVL IV: CPT | Mod: PBBFAC,HCNC,, | Performed by: INTERNAL MEDICINE

## 2019-12-23 PROCEDURE — 1125F AMNT PAIN NOTED PAIN PRSNT: CPT | Mod: HCNC,S$GLB,, | Performed by: INTERNAL MEDICINE

## 2019-12-23 PROCEDURE — 99499 UNLISTED E&M SERVICE: CPT | Mod: HCNC,S$GLB,, | Performed by: INTERNAL MEDICINE

## 2019-12-23 PROCEDURE — 3078F DIAST BP <80 MM HG: CPT | Mod: HCNC,CPTII,S$GLB, | Performed by: INTERNAL MEDICINE

## 2019-12-23 PROCEDURE — 3078F PR MOST RECENT DIASTOLIC BLOOD PRESSURE < 80 MM HG: ICD-10-PCS | Mod: HCNC,CPTII,S$GLB, | Performed by: INTERNAL MEDICINE

## 2019-12-23 PROCEDURE — 99214 PR OFFICE/OUTPT VISIT, EST, LEVL IV, 30-39 MIN: ICD-10-PCS | Mod: HCNC,S$GLB,, | Performed by: INTERNAL MEDICINE

## 2019-12-23 PROCEDURE — 1101F PR PT FALLS ASSESS DOC 0-1 FALLS W/OUT INJ PAST YR: ICD-10-PCS | Mod: HCNC,CPTII,S$GLB, | Performed by: INTERNAL MEDICINE

## 2019-12-23 PROCEDURE — 1159F MED LIST DOCD IN RCRD: CPT | Mod: HCNC,S$GLB,, | Performed by: INTERNAL MEDICINE

## 2019-12-23 PROCEDURE — 1159F PR MEDICATION LIST DOCUMENTED IN MEDICAL RECORD: ICD-10-PCS | Mod: HCNC,S$GLB,, | Performed by: INTERNAL MEDICINE

## 2019-12-23 PROCEDURE — 3075F PR MOST RECENT SYSTOLIC BLOOD PRESS GE 130-139MM HG: ICD-10-PCS | Mod: HCNC,CPTII,S$GLB, | Performed by: INTERNAL MEDICINE

## 2019-12-23 PROCEDURE — 99499 RISK ADDL DX/OHS AUDIT: ICD-10-PCS | Mod: HCNC,S$GLB,, | Performed by: INTERNAL MEDICINE

## 2019-12-23 NOTE — PROGRESS NOTES
Subjective:       Patient ID: Alyx Weir is a 69 y.o. female.    Chief Complaint: Follow-up; Hypertension; Hyperlipidemia; and Diabetes    Follow-up   Associated symptoms include arthralgias and myalgias. Pertinent negatives include no abdominal pain, chest pain, chills, congestion, coughing, diaphoresis, fatigue, fever, headaches, joint swelling, nausea, neck pain, numbness, rash, sore throat, vomiting or weakness.   Hypertension   Pertinent negatives include no chest pain, headaches, neck pain, palpitations or shortness of breath.   Hyperlipidemia   Associated symptoms include myalgias. Pertinent negatives include no chest pain or shortness of breath.   Diabetes   Pertinent negatives for hypoglycemia include no confusion, dizziness, headaches, nervousness/anxiousness, pallor, seizures, speech difficulty or tremors. Pertinent negatives for diabetes include no chest pain, no fatigue, no polydipsia, no polyphagia, no polyuria and no weakness.     Past Medical History:   Diagnosis Date    Anticoagulated on Coumadin     Arm weakness-rotator cuff weakness 11/2/2015    Arthritis     Asthma     Clotting disorder     Coronary artery disease     Deep vein thrombosis     Degenerative disc disease     Diabetes mellitus type I 2011    BS last tested x 1 month not sure what it was.    Heterozygous factor V Leiden mutation     Hx of colonic polyps 11/13/2015    Hyperlipidemia     Hypertension     VIRGIL (obstructive sleep apnea)     Stenosis of right carotid artery 12/13/2016     Past Surgical History:   Procedure Laterality Date    BACK SURGERY      bladder cyst removal      BLADDER SURGERY      CARDIAC CATHETERIZATION  03/2013    mild CAD    COLONOSCOPY N/A 11/13/2015    Procedure: COLONOSCOPY;  Surgeon: Jas Umanzor III, MD;  Location: [facility];  Service: Endoscopy;  Laterality: N/A;    HYSTERECTOMY      28 yrs old    LUMBAR SPINE SURGERY      bone spurs removed    OOPHORECTOMY       Family History    Problem Relation Age of Onset    Heart disease Mother     Hypertension Mother     Cataracts Mother     Hypertension Sister     Glaucoma Sister     Hypertension Brother     Diabetes Father     Diabetes Paternal Uncle     Hypertension Sister     Diabetes Sister     Hypertension Sister     Diabetes Sister     Breast cancer Neg Hx     Colon cancer Neg Hx     Ovarian cancer Neg Hx      Social History     Socioeconomic History    Marital status:      Spouse name: Not on file    Number of children: Not on file    Years of education: Not on file    Highest education level: Not on file   Occupational History    Not on file   Social Needs    Financial resource strain: Not on file    Food insecurity:     Worry: Not on file     Inability: Not on file    Transportation needs:     Medical: Not on file     Non-medical: Not on file   Tobacco Use    Smoking status: Never Smoker    Smokeless tobacco: Never Used   Substance and Sexual Activity    Alcohol use: No    Drug use: No    Sexual activity: Never     Birth control/protection: None   Lifestyle    Physical activity:     Days per week: Not on file     Minutes per session: Not on file    Stress: Not on file   Relationships    Social connections:     Talks on phone: Not on file     Gets together: Not on file     Attends [employer] service: Not on file     Active member of club or organization: Not on file     Attends meetings of clubs or organizations: Not on file     Relationship status: Not on file   Other Topics Concern    Not on file   Social History Narrative    Dogs in household, no smokers.     Review of Systems   Constitutional: Negative for activity change, appetite change, chills, diaphoresis, fatigue, fever and unexpected weight change.   HENT: Negative for congestion, drooling, ear discharge, ear pain, facial swelling, hearing loss, mouth sores, nosebleeds, postnasal drip, rhinorrhea, sinus pressure, sneezing, sore throat,  tinnitus, trouble swallowing and voice change.    Eyes: Negative for photophobia, redness and visual disturbance.   Respiratory: Negative for apnea, cough, choking, chest tightness, shortness of breath and wheezing.    Cardiovascular: Negative for chest pain, palpitations and leg swelling.   Gastrointestinal: Negative for abdominal distention, abdominal pain, blood in stool, constipation, diarrhea, nausea and vomiting.   Endocrine: Negative for cold intolerance, heat intolerance, polydipsia, polyphagia and polyuria.   Genitourinary: Negative for decreased urine volume, difficulty urinating, dysuria, flank pain, frequency, genital sores, hematuria and urgency.   Musculoskeletal: Positive for arthralgias, back pain and myalgias. Negative for gait problem, joint swelling, neck pain and neck stiffness.   Skin: Negative for color change, pallor, rash and wound.   Allergic/Immunologic: Negative for food allergies and immunocompromised state.   Neurological: Negative for dizziness, tremors, seizures, syncope, speech difficulty, weakness, light-headedness, numbness and headaches.   Hematological: Negative for adenopathy. Does not bruise/bleed easily.   Psychiatric/Behavioral: Negative for agitation, behavioral problems, confusion, decreased concentration, dysphoric mood, hallucinations, self-injury, sleep disturbance and suicidal ideas. The patient is not nervous/anxious and is not hyperactive.    All other systems reviewed and are negative.      Objective:      Physical Exam   Constitutional: She is oriented to person, place, and time. She appears well-developed and well-nourished. No distress.   HENT:   Head: Normocephalic and atraumatic.   Neck: Normal range of motion. Neck supple. No JVD present. Carotid bruit is not present. No tracheal deviation present. No thyromegaly present.   Cardiovascular: Normal rate, regular rhythm, normal heart sounds and intact distal pulses.   Pulmonary/Chest: Effort normal and breath  sounds normal. No respiratory distress. She has no wheezes. She has no rales. She exhibits no tenderness.   Abdominal: Soft. Bowel sounds are normal. She exhibits no distension. There is no tenderness. There is no rebound and no guarding.   Musculoskeletal: Normal range of motion. She exhibits no edema or tenderness.   Lymphadenopathy:     She has no cervical adenopathy.   Neurological: She is alert and oriented to person, place, and time. Coordination normal.   Skin: Skin is warm and dry. No rash noted. She is not diaphoretic. No erythema. No pallor.   Psychiatric: She has a normal mood and affect. Her behavior is normal. Judgment and thought content normal.   Nursing note and vitals reviewed.      CMP  Sodium   Date Value Ref Range Status   11/21/2019 139 136 - 145 mmol/L Final     Potassium   Date Value Ref Range Status   11/21/2019 3.8 3.5 - 5.1 mmol/L Final     Chloride   Date Value Ref Range Status   11/21/2019 100 95 - 110 mmol/L Final     CO2   Date Value Ref Range Status   11/21/2019 30 (H) 23 - 29 mmol/L Final     Glucose   Date Value Ref Range Status   11/21/2019 87 70 - 110 mg/dL Final     BUN, Bld   Date Value Ref Range Status   11/21/2019 12 8 - 23 mg/dL Final     Creatinine   Date Value Ref Range Status   11/21/2019 0.8 0.5 - 1.4 mg/dL Final     Calcium   Date Value Ref Range Status   11/21/2019 10.1 8.7 - 10.5 mg/dL Final     Total Protein   Date Value Ref Range Status   11/21/2019 7.7 6.0 - 8.4 g/dL Final     Albumin   Date Value Ref Range Status   11/21/2019 4.0 3.5 - 5.2 g/dL Final     Total Bilirubin   Date Value Ref Range Status   11/21/2019 0.9 0.1 - 1.0 mg/dL Final     Comment:     For infants and newborns, interpretation of results should be based  on gestational age, weight and in agreement with clinical  observations.  Premature Infant recommended reference ranges:  Up to 24 hours.............<8.0 mg/dL  Up to 48 hours............<12.0 mg/dL  3-5 days..................<15.0 mg/dL  6-29  days.................<15.0 mg/dL       Alkaline Phosphatase   Date Value Ref Range Status   11/21/2019 49 (L) 55 - 135 U/L Final     AST   Date Value Ref Range Status   11/21/2019 16 10 - 40 U/L Final     ALT   Date Value Ref Range Status   11/21/2019 14 10 - 44 U/L Final     Anion Gap   Date Value Ref Range Status   11/21/2019 9 8 - 16 mmol/L Final     eGFR if    Date Value Ref Range Status   11/21/2019 >60.0 >60 mL/min/1.73 m^2 Final     eGFR if non    Date Value Ref Range Status   11/21/2019 >60.0 >60 mL/min/1.73 m^2 Final     Comment:     Calculation used to obtain the estimated glomerular filtration  rate (eGFR) is the CKD-EPI equation.        Lab Results   Component Value Date    WBC 9.39 11/21/2019    HGB 13.2 11/21/2019    HCT 41.8 11/21/2019    MCV 89 11/21/2019     11/21/2019     Lab Results   Component Value Date    CHOL 206 (H) 11/21/2019     Lab Results   Component Value Date    HDL 74 11/21/2019     Lab Results   Component Value Date    LDLCALC 122.0 11/21/2019     Lab Results   Component Value Date    TRIG 50 11/21/2019     Lab Results   Component Value Date    CHOLHDL 35.9 11/21/2019     Lab Results   Component Value Date    TSH 0.681 09/27/2018     Lab Results   Component Value Date    HGBA1C 6.2 (H) 11/21/2019     Assessment:       1. Essential hypertension    2. Anticoagulated on Coumadin    3. Heterozygous factor V Leiden mutation    4. Type 2 diabetes mellitus without complication, without long-term current use of insulin    5. Dyslipidemia        Plan:   Essential hypertension    Anticoagulated on Coumadin    Heterozygous factor V Leiden mutation    Type 2 diabetes mellitus without complication, without long-term current use of insulin    Dyslipidemia    Other orders  -     Diabetes Digital Medicine (DDMP) Enrollment Order  -     Diabetes Digital Medicine (DDMP): Assign Onboarding Questionnaires  -     Hypertension Digital Medicine (HDMP) Enrollment  Order  -     Hypertension Digital Medicine (HDMP): Assign Onboarding Questionnaires    Stable-----------------------continue current meds, watch diet,exercise.                   F/u 6 months------------------

## 2020-01-03 ENCOUNTER — TELEPHONE (OUTPATIENT)
Dept: PODIATRY | Facility: CLINIC | Age: 70
End: 2020-01-03

## 2020-01-03 NOTE — TELEPHONE ENCOUNTER
Spoke with pt about pain she is having in her ankle I informed her that she has a appt on Monday and make sure she makes it to the appt also to keep icing and elevate her ankle.      Alex Kendrick MA  Podiatry Surgical Department  [facility]               ----- Message from Adali Camarena sent at 1/3/2020 12:59 PM CST -----  Contact: pT  Type:  Patient Returning Call    Who Called:PT  Who Left Message for Patient:ALEX  Does the patient know what this is regarding?:YES  Would the patient rather a call back or a response via [facility]? CALL BACK  Best Call Back Number:[phone]  Additional Information:

## 2020-01-03 NOTE — TELEPHONE ENCOUNTER
Contacted pt was not able to leave a voicemail.Voicemail box not set up.    Faye Kendrick MA  Podiatry Surgical Department  [facility]              ----- Message from Gisella Carlos sent at 1/3/2020 12:43 PM CST -----  Contact: Self  Type:  Needs Medical Advice    Who Called: Alyx  Symptoms (please be specific): (R) foot and ankle pain//bone spurs//gout//can't put weight on foot   How long has patient had these symptoms:  12/30/19  Pharmacy name and phone #:      [id number] - [place] - [address] AT [facility] & [place]  [address] [address]  Phone: [phone] Fax: [phone]      Would the patient rather a call back or a response via [facility]? call  Best Call Back Number: [phone]  Additional Information: patient states that she has been taking her medications but they aren't helping.

## 2020-01-06 ENCOUNTER — HOSPITAL ENCOUNTER (OUTPATIENT)
Dept: RADIOLOGY | Facility: HOSPITAL | Age: 70
Discharge: HOME | End: 2020-01-06
Attending: PODIATRIST
Payer: MEDICARE

## 2020-01-06 ENCOUNTER — OFFICE VISIT (OUTPATIENT)
Dept: PODIATRY | Facility: CLINIC | Age: 70
End: 2020-01-06
Payer: MEDICARE

## 2020-01-06 VITALS
DIASTOLIC BLOOD PRESSURE: 83 MMHG | WEIGHT: 183 LBS | SYSTOLIC BLOOD PRESSURE: 152 MMHG | HEART RATE: 53 BPM | HEIGHT: 65 IN | BODY MASS INDEX: 30.49 KG/M2

## 2020-01-06 DIAGNOSIS — M10.9 ACUTE GOUT OF RIGHT FOOT, UNSPECIFIED CAUSE: Primary | ICD-10-CM

## 2020-01-06 DIAGNOSIS — M79.671 FOOT PAIN, RIGHT: ICD-10-CM

## 2020-01-06 PROCEDURE — 1125F PR PAIN SEVERITY QUANTIFIED, PAIN PRESENT: ICD-10-PCS | Mod: HCNC,S$GLB,, | Performed by: PODIATRIST

## 2020-01-06 PROCEDURE — 73630 X-RAY EXAM OF FOOT: CPT | Mod: TC,HCNC,RT

## 2020-01-06 PROCEDURE — 1125F AMNT PAIN NOTED PAIN PRSNT: CPT | Mod: HCNC,S$GLB,, | Performed by: PODIATRIST

## 2020-01-06 PROCEDURE — 99214 PR OFFICE/OUTPT VISIT, EST, LEVL IV, 30-39 MIN: ICD-10-PCS | Mod: HCNC,S$GLB,, | Performed by: PODIATRIST

## 2020-01-06 PROCEDURE — 3077F SYST BP >= 140 MM HG: CPT | Mod: HCNC,CPTII,S$GLB, | Performed by: PODIATRIST

## 2020-01-06 PROCEDURE — 99999 PR PBB SHADOW E&M-EST. PATIENT-LVL III: CPT | Mod: PBBFAC,HCNC,, | Performed by: PODIATRIST

## 2020-01-06 PROCEDURE — 73630 XR FOOT COMPLETE 3 VIEW RIGHT: ICD-10-PCS | Mod: 26,HCNC,RT, | Performed by: RADIOLOGY

## 2020-01-06 PROCEDURE — 1101F PR PT FALLS ASSESS DOC 0-1 FALLS W/OUT INJ PAST YR: ICD-10-PCS | Mod: HCNC,CPTII,S$GLB, | Performed by: PODIATRIST

## 2020-01-06 PROCEDURE — 3077F PR MOST RECENT SYSTOLIC BLOOD PRESSURE >= 140 MM HG: ICD-10-PCS | Mod: HCNC,CPTII,S$GLB, | Performed by: PODIATRIST

## 2020-01-06 PROCEDURE — 99999 PR PBB SHADOW E&M-EST. PATIENT-LVL III: ICD-10-PCS | Mod: PBBFAC,HCNC,, | Performed by: PODIATRIST

## 2020-01-06 PROCEDURE — 99214 OFFICE O/P EST MOD 30 MIN: CPT | Mod: HCNC,S$GLB,, | Performed by: PODIATRIST

## 2020-01-06 PROCEDURE — 1101F PT FALLS ASSESS-DOCD LE1/YR: CPT | Mod: HCNC,CPTII,S$GLB, | Performed by: PODIATRIST

## 2020-01-06 PROCEDURE — 1159F PR MEDICATION LIST DOCUMENTED IN MEDICAL RECORD: ICD-10-PCS | Mod: HCNC,S$GLB,, | Performed by: PODIATRIST

## 2020-01-06 PROCEDURE — 73630 X-RAY EXAM OF FOOT: CPT | Mod: 26,HCNC,RT, | Performed by: RADIOLOGY

## 2020-01-06 PROCEDURE — 3079F PR MOST RECENT DIASTOLIC BLOOD PRESSURE 80-89 MM HG: ICD-10-PCS | Mod: HCNC,CPTII,S$GLB, | Performed by: PODIATRIST

## 2020-01-06 PROCEDURE — 3079F DIAST BP 80-89 MM HG: CPT | Mod: HCNC,CPTII,S$GLB, | Performed by: PODIATRIST

## 2020-01-06 PROCEDURE — 1159F MED LIST DOCD IN RCRD: CPT | Mod: HCNC,S$GLB,, | Performed by: PODIATRIST

## 2020-01-06 RX ORDER — METHYLPREDNISOLONE 4 MG/1
4 TABLET ORAL DAILY
Qty: 1 PACKAGE | Refills: 0 | Status: SHIPPED | OUTPATIENT
Start: 2020-01-06 | End: 2020-06-22

## 2020-01-06 NOTE — PROGRESS NOTES
Subjective:     Patient ID: Alyx Weir is a 69 y.o. female.    Chief Complaint: Foot Pain (c/o right ankle and heel pain. swelling to right foot. rates pain 8/10. wears slippers. diabetic Pt. last seen 12/24/19 with PCP Dr. Clemons)    HPI: This 69 year old female presents today complaing of painful swollen right  foot.  Patient states pain and swelling just started all of a sudden 1 week ago. Patient denies any injury to the foot. When asked were to indicate where the pain is, patient points to right lateral ankle and midfoot. Patient has no other pedial complaints at this time.     Patient Active Problem List   Diagnosis    Heterozygous factor V Leiden mutation    Essential hypertension    Pure hypercholesterolemia with target low density lipoprotein (LDL) cholesterol less than 130 mg/dL    VIRGIL on CPAP    Anticoagulated on Coumadin    Type 2 diabetes mellitus without complication    Obesity (BMI 30.0-34.9)    Chronic lumbar radiculopathy    Coronary artery disease involving native coronary artery without angina pectoris    Left ventricular diastolic dysfunction with preserved systolic function    COPD (chronic obstructive pulmonary disease)    Tortuous aorta    Calcification of both carotid arteries    Chondromalacia, right knee    Dyslipidemia       Medication List with Changes/Refills   New Medications    METHYLPREDNISOLONE (MEDROL DOSEPACK) 4 MG TABLET    Take 1 tablet (4 mg total) by mouth once daily. Use as instructed on dose pack   Current Medications    ACETAMINOPHEN (TYLENOL ARTHRITIS ORAL)    Take by mouth.    ALLOPURINOL (ZYLOPRIM) 100 MG TABLET    Take 1 tablet (100 mg total) by mouth once daily.    ASPIRIN (ECOTRIN) 81 MG EC TABLET    Take 1 tablet (81 mg total) by mouth once daily.    CALCIUM CARBONATE (TUMS ORAL)    Take by mouth.    CARVEDILOL (COREG) 6.25 MG TABLET    Take 1 tablet (6.25 mg total) by mouth 2 (two) times daily with meals.    COLCHICINE (COLCRYS) 0.6 MG TABLET     Take 1 tablet (0.6 mg total) by mouth once daily.    GABAPENTIN (NEURONTIN) 100 MG CAPSULE    Take 1 capsule (100 mg total) by mouth 3 (three) times daily as needed.    LORATADINE (CLARITIN) 10 MG TABLET    Take 1 tablet (10 mg total) by mouth once daily.    OLMESARTAN-AMLODIPIN-HCTHIAZID 40-10-25 MG TAB    TAKE 1 TABLET EVERY DAY    PRAVASTATIN (PRAVACHOL) 40 MG TABLET    Take 1 tablet (40 mg total) by mouth once daily.    TRAMADOL (ULTRAM) 50 MG TABLET    Take 1 tablet (50 mg total) by mouth 2 (two) times daily as needed for Pain.    VITAMIN D 1000 UNITS TAB    Take 185 mg by mouth once daily.    VOLTAREN 1 % GEL    APPLY 2 GRAMS TOPICALLY DAILY AS NEEDED    WARFARIN (COUMADIN) 10 MG TABLET    Take 1 tablet (10 mg total) by mouth every Mon, Wed, Fri. Or As directed by coumadin clinic    WARFARIN (COUMADIN) 5 MG TABLET    Take 1 and 1/2 tablets (7.5mg) by mouth on Mondays, Wednesdays and Fridays; and 1 tablet (5mg) on all other days of the week.       Review of patient's allergies indicates:   Allergen Reactions    Erythromycin Edema     Angioedema to throat    Amlodipine Other (See Comments)     Headaches    Diazepam Hives    Iodine Hives    Meperidine Hives    Morphine Itching    Primidone Other (See Comments)       Past Surgical History:   Procedure Laterality Date    BACK SURGERY      bladder cyst removal      BLADDER SURGERY      CARDIAC CATHETERIZATION  03/2013    mild CAD    COLONOSCOPY N/A 11/13/2015    Procedure: COLONOSCOPY;  Surgeon: Jas Umanzor III, MD;  Location: [facility];  Service: Endoscopy;  Laterality: N/A;    HYSTERECTOMY      28 yrs old    LUMBAR SPINE SURGERY      bone spurs removed    OOPHORECTOMY         Family History   Problem Relation Age of Onset    Heart disease Mother     Hypertension Mother     Cataracts Mother     Hypertension Sister     Glaucoma Sister     Hypertension Brother     Diabetes Father     Diabetes Paternal Uncle     Hypertension Sister      "Diabetes Sister     Hypertension Sister     Diabetes Sister     Breast cancer Neg Hx     Colon cancer Neg Hx     Ovarian cancer Neg Hx        Social History     Socioeconomic History    Marital status:      Spouse name: Not on file    Number of children: Not on file    Years of education: Not on file    Highest education level: Not on file   Occupational History    Not on file   Social Needs    Financial resource strain: Not on file    Food insecurity:     Worry: Not on file     Inability: Not on file    Transportation needs:     Medical: Not on file     Non-medical: Not on file   Tobacco Use    Smoking status: Never Smoker    Smokeless tobacco: Never Used   Substance and Sexual Activity    Alcohol use: No    Drug use: No    Sexual activity: Never     Birth control/protection: None   Lifestyle    Physical activity:     Days per week: Not on file     Minutes per session: Not on file    Stress: Not on file   Relationships    Social connections:     Talks on phone: Not on file     Gets together: Not on file     Attends [employer] service: Not on file     Active member of club or organization: Not on file     Attends meetings of clubs or organizations: Not on file     Relationship status: Not on file   Other Topics Concern    Not on file   Social History Narrative    Dogs in household, no smokers.       Vitals:    01/06/20 1624   BP: (!) 152/83   Pulse: (!) 53   Weight: 83 kg (183 lb)   Height: 5' 5" (1.651 m)   PainSc:   8   PainLoc: Foot         Review of Systems   Constitutional: Negative for chills and fever.   Respiratory: Negative for shortness of breath.    Cardiovascular: Negative for chest pain, palpitations, orthopnea, claudication and leg swelling.   Gastrointestinal: Negative for diarrhea, nausea and vomiting.   Musculoskeletal: Positive for joint pain (right ankle).   Skin: Negative for rash.   Neurological: Negative for dizziness, tingling, sensory change, focal weakness and " weakness.   Psychiatric/Behavioral: Negative.            Objective:      PHYSICAL EXAM: Apperance: Alert and orient in no distress,well developed, and with good attention to grooming and body habits  Patient presents ambulating in slippers.   LOWER EXTREMITIES EXAM:   VASCULAR: Dorsalis pedis pulses 2/4 bilateral and Posterior Tibial pulses 2/4 bilateral. Capillary fill time <4 seconds bilateral. Moderate edema observed right. Varicosities absent bilateral. Skin temperature of the lower extremities is warm to warm, proximal to distal. Hair growth dim bilateral.  DERMATOLOGICAL: No skin rashes, subcutaneous nodules, lesions, or ulcers observed bilateral. (+) edema, (+) erythema, (+) increased temperature noted to right lateral ankle/posterior heel. Webspaces 1,2,3,4 clean, dry and without evidence of break in skin integrity bilateral.   NEUROLOGICAL: Light touch, sharp-dull, proprioception all present and equal bilaterally.  MUSCULOSKELETAL: Muscle strength is 5/5 for foot inverters, everters, plantarflexors, and dorsiflexors. Muscle tone is normal. Positive pain on palpation of right lateral foot and ankle.         Assessment:       Encounter Diagnoses   Name Primary?    Acute gout of right foot, unspecified cause Yes    Foot pain, right          Plan:   Acute gout of right foot, unspecified cause  -     Cancel: Sedimentation rate; Future; Expected date: 01/06/2020  -     C-reactive protein; Future; Expected date: 01/06/2020  -     Uric acid; Future; Expected date: 01/06/2020    Foot pain, right  -     X-Ray Foot Complete Right; Future; Expected date: 01/06/2020    Other orders  -     methylPREDNISolone (MEDROL DOSEPACK) 4 mg tablet; Take 1 tablet (4 mg total) by mouth once daily. Use as instructed on dose pack  Dispense: 1 Package; Refill: 0      I counseled the patient on her conditions, regarding findings of my examination, my impressions, and usual treatment plan.   I explained to the patient that etiologies  and treatment options for gout including rest, elevation, diet control, NSAID's, long term gout medication, and/or injection therapy.    Prescription written for Colchicine 0.6mg to be taken as prescribed.  Prescription written for Medrol Dosepak to be taken as directed on package. Discussed possible increase in blood sugar with taking steroid medication. Patient advised on the possible elevation of blood pressure sugar and caution to take pills as prescribed and to discontinue use if symptoms arise, patient agreed.  Prescription written for Allopurinol 100mg to be taken as prescribed.   Ordered right foot x-rays, uric acid, crp, and esr testing to be completed today.   Patient was fitted crutches and instructed on proper usage. This should be used daily for a minimum of 2 weeks at all times when ambulating.  Patient to return in 2 weeks or sooner if needed.                 Bradley Chu DPM  [facility]

## 2020-01-07 ENCOUNTER — TELEPHONE (OUTPATIENT)
Dept: PODIATRY | Facility: CLINIC | Age: 70
End: 2020-01-07

## 2020-01-07 DIAGNOSIS — M10.9 ACUTE GOUT OF RIGHT FOOT, UNSPECIFIED CAUSE: Primary | ICD-10-CM

## 2020-01-07 NOTE — TELEPHONE ENCOUNTER
Returned patient phone call. There was no answer. I could not leave a message, there was no voicemail .    ----- Message from Jennyfer Cuenca sent at 1/7/2020 10:26 AM CST -----  Contact: patient  Type:  Needs Medical Advice    Who Called: patient  Symptoms (please be specific): pain gout   How long has patient had these symptoms:  Post yesterday  Pharmacy name and phone #:      [id number] - [place] - [address] [facility]  [address] [address]  Phone: [phone] Fax: [phone]    Would the patient rather a call back or a response via [facility]? Call  Best Call Back Number: [phone]  Additional Information: ALSO CALLING FOR TEST RESULTS. PLEASE CALL TODAY

## 2020-01-13 ENCOUNTER — ANTI-COAG VISIT (OUTPATIENT)
Dept: CARDIOLOGY | Facility: CLINIC | Age: 70
End: 2020-01-13
Payer: MEDICARE

## 2020-01-13 DIAGNOSIS — D68.51 HETEROZYGOUS FACTOR V LEIDEN MUTATION: Chronic | ICD-10-CM

## 2020-01-13 DIAGNOSIS — Z79.01 LONG TERM (CURRENT) USE OF ANTICOAGULANTS: Primary | ICD-10-CM

## 2020-01-13 LAB — INR PPP: 3.5 (ref 2–3)

## 2020-01-13 PROCEDURE — 85610 PROTHROMBIN TIME: CPT | Mod: QW,HCNC,S$GLB, | Performed by: INTERNAL MEDICINE

## 2020-01-13 PROCEDURE — 85610 POCT INR: ICD-10-PCS | Mod: QW,HCNC,S$GLB, | Performed by: INTERNAL MEDICINE

## 2020-01-13 PROCEDURE — 93793 PR ANTICOAGULANT MGMT FOR PT TAKING WARFARIN: ICD-10-PCS | Mod: HCNC,S$GLB,,

## 2020-01-13 PROCEDURE — 93793 ANTICOAG MGMT PT WARFARIN: CPT | Mod: HCNC,S$GLB,,

## 2020-01-13 NOTE — PROGRESS NOTES
"Patient's INR is supra-therapeutic at 3.5.  Mrs. Weir will be completing a medrol marian on tomorrow; and restarted allopurinol 100mg daily and colchicine 0.6mg on 1/6.  She also states she "may" have taken less warfarin than prescribed this past week - she lost her calendar but agrees to follow dosing this week.   No signs of bleeding noted; advised patient to seek immediate medical attention if she notices any abnormal bleeding.  Instructions given for patient to hold dose of warfarin on today; then resume current dose of warfarin 5mg on Tuesdays, Thursdays and 7.5mg on all other days of the week.  Patient will be monitored closely and reminded to contact coumadin clinic with any medication changes.  Patient voiced understanding.  Recheck INR in 1 week.   "

## 2020-01-20 ENCOUNTER — OFFICE VISIT (OUTPATIENT)
Dept: PODIATRY | Facility: CLINIC | Age: 70
End: 2020-01-20
Payer: MEDICARE

## 2020-01-20 ENCOUNTER — ANTI-COAG VISIT (OUTPATIENT)
Dept: CARDIOLOGY | Facility: CLINIC | Age: 70
End: 2020-01-20
Payer: MEDICARE

## 2020-01-20 VITALS
HEIGHT: 65 IN | SYSTOLIC BLOOD PRESSURE: 183 MMHG | WEIGHT: 186.06 LBS | HEART RATE: 50 BPM | DIASTOLIC BLOOD PRESSURE: 88 MMHG | BODY MASS INDEX: 31 KG/M2

## 2020-01-20 DIAGNOSIS — M10.9 ACUTE GOUT OF RIGHT FOOT, UNSPECIFIED CAUSE: Primary | ICD-10-CM

## 2020-01-20 DIAGNOSIS — Z79.01 LONG TERM (CURRENT) USE OF ANTICOAGULANTS: Primary | ICD-10-CM

## 2020-01-20 DIAGNOSIS — E11.49 TYPE II DIABETES MELLITUS WITH NEUROLOGICAL MANIFESTATIONS: ICD-10-CM

## 2020-01-20 DIAGNOSIS — D68.51 HETEROZYGOUS FACTOR V LEIDEN MUTATION: Chronic | ICD-10-CM

## 2020-01-20 LAB — INR PPP: 3.1 (ref 2–3)

## 2020-01-20 PROCEDURE — 99499 UNLISTED E&M SERVICE: CPT | Mod: HCNC,S$GLB,, | Performed by: PODIATRIST

## 2020-01-20 PROCEDURE — 1125F AMNT PAIN NOTED PAIN PRSNT: CPT | Mod: HCNC,S$GLB,, | Performed by: PODIATRIST

## 2020-01-20 PROCEDURE — 3079F PR MOST RECENT DIASTOLIC BLOOD PRESSURE 80-89 MM HG: ICD-10-PCS | Mod: HCNC,CPTII,S$GLB, | Performed by: PODIATRIST

## 2020-01-20 PROCEDURE — 93793 ANTICOAG MGMT PT WARFARIN: CPT | Mod: HCNC,S$GLB,,

## 2020-01-20 PROCEDURE — 3077F PR MOST RECENT SYSTOLIC BLOOD PRESSURE >= 140 MM HG: ICD-10-PCS | Mod: HCNC,CPTII,S$GLB, | Performed by: PODIATRIST

## 2020-01-20 PROCEDURE — 99999 PR PBB SHADOW E&M-EST. PATIENT-LVL III: ICD-10-PCS | Mod: PBBFAC,HCNC,, | Performed by: PODIATRIST

## 2020-01-20 PROCEDURE — 85610 PROTHROMBIN TIME: CPT | Mod: QW,HCNC,S$GLB, | Performed by: NUCLEAR MEDICINE

## 2020-01-20 PROCEDURE — 1125F PR PAIN SEVERITY QUANTIFIED, PAIN PRESENT: ICD-10-PCS | Mod: HCNC,S$GLB,, | Performed by: PODIATRIST

## 2020-01-20 PROCEDURE — 99999 PR PBB SHADOW E&M-EST. PATIENT-LVL III: CPT | Mod: PBBFAC,HCNC,, | Performed by: PODIATRIST

## 2020-01-20 PROCEDURE — 3079F DIAST BP 80-89 MM HG: CPT | Mod: HCNC,CPTII,S$GLB, | Performed by: PODIATRIST

## 2020-01-20 PROCEDURE — 99213 OFFICE O/P EST LOW 20 MIN: CPT | Mod: HCNC,S$GLB,, | Performed by: PODIATRIST

## 2020-01-20 PROCEDURE — 1101F PT FALLS ASSESS-DOCD LE1/YR: CPT | Mod: HCNC,CPTII,S$GLB, | Performed by: PODIATRIST

## 2020-01-20 PROCEDURE — 93793 PR ANTICOAGULANT MGMT FOR PT TAKING WARFARIN: ICD-10-PCS | Mod: HCNC,S$GLB,,

## 2020-01-20 PROCEDURE — 3044F HG A1C LEVEL LT 7.0%: CPT | Mod: HCNC,CPTII,S$GLB, | Performed by: PODIATRIST

## 2020-01-20 PROCEDURE — 1159F MED LIST DOCD IN RCRD: CPT | Mod: HCNC,S$GLB,, | Performed by: PODIATRIST

## 2020-01-20 PROCEDURE — 99499 RISK ADDL DX/OHS AUDIT: ICD-10-PCS | Mod: HCNC,S$GLB,, | Performed by: PODIATRIST

## 2020-01-20 PROCEDURE — 3077F SYST BP >= 140 MM HG: CPT | Mod: HCNC,CPTII,S$GLB, | Performed by: PODIATRIST

## 2020-01-20 PROCEDURE — 3044F PR MOST RECENT HEMOGLOBIN A1C LEVEL <7.0%: ICD-10-PCS | Mod: HCNC,CPTII,S$GLB, | Performed by: PODIATRIST

## 2020-01-20 PROCEDURE — 1159F PR MEDICATION LIST DOCUMENTED IN MEDICAL RECORD: ICD-10-PCS | Mod: HCNC,S$GLB,, | Performed by: PODIATRIST

## 2020-01-20 PROCEDURE — 99213 PR OFFICE/OUTPT VISIT, EST, LEVL III, 20-29 MIN: ICD-10-PCS | Mod: HCNC,S$GLB,, | Performed by: PODIATRIST

## 2020-01-20 PROCEDURE — 85610 POCT INR: ICD-10-PCS | Mod: QW,HCNC,S$GLB, | Performed by: NUCLEAR MEDICINE

## 2020-01-20 PROCEDURE — 1101F PR PT FALLS ASSESS DOC 0-1 FALLS W/OUT INJ PAST YR: ICD-10-PCS | Mod: HCNC,CPTII,S$GLB, | Performed by: PODIATRIST

## 2020-01-20 NOTE — PROGRESS NOTES
Patient's INR has improved but remains supra-therapeutic at 3.1.  Medrol dosepak completed.    No signs of bleeding noted; advised patient to seek immediate medical attention if she notices any abnormal bleeding.  Dose of warfarin will be lowered due to patient reported taking a lower dose previously and recent medication changes.  Patient will be monitored closely.  Instructions given for patient to take warfarin 2.5mg on today; then lower dose of warfarin to 7.5mg on Mondays, Wednesdays, Fridays and 5mg on all other days of the week.  Patient voiced understanding.  Recheck INR on Wednesday, 1/29/2020.

## 2020-01-27 NOTE — PROGRESS NOTES
Subjective:     Patient ID: Alyx Weir is a 69 y.o. female.    Chief Complaint: Follow-up (2 week acute gout follow up, right foot; pt reports right heel pain at 7/10 at present; pt presents wearing tennis shoes. )    Alyx is a 69 y.o. female who presents to the podiatry clinic for follow up right foot pain. Patient states pain is much better but still present in the ankle. Patient states she  Is taking the gout medications as prescribed. Current symptoms include: ability to bear weight, but with some pain. Aggravating factors: walking. Symptoms have gradually improved. Treatment to date: gout medications. Patients rates pain 5/10 on pain scale. Patient has no other pedal complaints at this time.     Patient Active Problem List   Diagnosis    Heterozygous factor V Leiden mutation    Essential hypertension    Pure hypercholesterolemia with target low density lipoprotein (LDL) cholesterol less than 130 mg/dL    VIRGIL on CPAP    Anticoagulated on Coumadin    Type 2 diabetes mellitus without complication    Obesity (BMI 30.0-34.9)    Chronic lumbar radiculopathy    Coronary artery disease involving native coronary artery without angina pectoris    Left ventricular diastolic dysfunction with preserved systolic function    COPD (chronic obstructive pulmonary disease)    Tortuous aorta    Calcification of both carotid arteries    Chondromalacia, right knee    Dyslipidemia       Medication List with Changes/Refills   Current Medications    ACETAMINOPHEN (TYLENOL ARTHRITIS ORAL)    Take by mouth.    ALLOPURINOL (ZYLOPRIM) 100 MG TABLET    Take 1 tablet (100 mg total) by mouth once daily.    ASPIRIN (ECOTRIN) 81 MG EC TABLET    Take 1 tablet (81 mg total) by mouth once daily.    CALCIUM CARBONATE (TUMS ORAL)    Take by mouth.    CARVEDILOL (COREG) 6.25 MG TABLET    Take 1 tablet (6.25 mg total) by mouth 2 (two) times daily with meals.    COLCHICINE (COLCRYS) 0.6 MG TABLET    Take 1 tablet (0.6 mg total) by  mouth once daily.    GABAPENTIN (NEURONTIN) 100 MG CAPSULE    Take 1 capsule (100 mg total) by mouth 3 (three) times daily as needed.    LORATADINE (CLARITIN) 10 MG TABLET    Take 1 tablet (10 mg total) by mouth once daily.    METHYLPREDNISOLONE (MEDROL DOSEPACK) 4 MG TABLET    Take 1 tablet (4 mg total) by mouth once daily. Use as instructed on dose pack    OLMESARTAN-AMLODIPIN-HCTHIAZID 40-10-25 MG TAB    TAKE 1 TABLET EVERY DAY    PRAVASTATIN (PRAVACHOL) 40 MG TABLET    Take 1 tablet (40 mg total) by mouth once daily.    TRAMADOL (ULTRAM) 50 MG TABLET    Take 1 tablet (50 mg total) by mouth 2 (two) times daily as needed for Pain.    VITAMIN D 1000 UNITS TAB    Take 185 mg by mouth once daily.    VOLTAREN 1 % GEL    APPLY 2 GRAMS TOPICALLY DAILY AS NEEDED    WARFARIN (COUMADIN) 10 MG TABLET    Take 1 tablet (10 mg total) by mouth every Mon, Wed, Fri. Or As directed by coumadin clinic    WARFARIN (COUMADIN) 5 MG TABLET    Take 1 and 1/2 tablets (7.5mg) by mouth on Mondays, Wednesdays and Fridays; and 1 tablet (5mg) on all other days of the week.       Review of patient's allergies indicates:   Allergen Reactions    Erythromycin Edema     Angioedema to throat    Amlodipine Other (See Comments)     Headaches    Diazepam Hives    Iodine Hives    Meperidine Hives    Morphine Itching    Primidone Other (See Comments)       Past Surgical History:   Procedure Laterality Date    BACK SURGERY      bladder cyst removal      BLADDER SURGERY      CARDIAC CATHETERIZATION  03/2013    mild CAD    COLONOSCOPY N/A 11/13/2015    Procedure: COLONOSCOPY;  Surgeon: Jas Umanzor III, MD;  Location: [facility];  Service: Endoscopy;  Laterality: N/A;    HYSTERECTOMY      28 yrs old    LUMBAR SPINE SURGERY      bone spurs removed    OOPHORECTOMY         Family History   Problem Relation Age of Onset    Heart disease Mother     Hypertension Mother     Cataracts Mother     Hypertension Sister     Glaucoma Sister      "Hypertension Brother     Diabetes Father     Diabetes Paternal Uncle     Hypertension Sister     Diabetes Sister     Hypertension Sister     Diabetes Sister     Breast cancer Neg Hx     Colon cancer Neg Hx     Ovarian cancer Neg Hx        Social History     Socioeconomic History    Marital status:      Spouse name: Not on file    Number of children: Not on file    Years of education: Not on file    Highest education level: Not on file   Occupational History    Not on file   Social Needs    Financial resource strain: Not on file    Food insecurity:     Worry: Not on file     Inability: Not on file    Transportation needs:     Medical: Not on file     Non-medical: Not on file   Tobacco Use    Smoking status: Never Smoker    Smokeless tobacco: Never Used   Substance and Sexual Activity    Alcohol use: No    Drug use: No    Sexual activity: Never     Birth control/protection: None   Lifestyle    Physical activity:     Days per week: Not on file     Minutes per session: Not on file    Stress: Not on file   Relationships    Social connections:     Talks on phone: Not on file     Gets together: Not on file     Attends [employer]ism service: Not on file     Active member of club or organization: Not on file     Attends meetings of clubs or organizations: Not on file     Relationship status: Not on file   Other Topics Concern    Not on file   Social History Narrative    Dogs in household, no smokers.       Vitals:    01/20/20 0956   BP: (!) 183/88   Pulse: (!) 50   Weight: 84.4 kg (186 lb 1.1 oz)   Height: 5' 5" (1.651 m)   PainSc:   7         Review of Systems   Constitutional: Negative for chills and fever.   Respiratory: Negative for shortness of breath.    Cardiovascular: Negative for chest pain, palpitations, orthopnea, claudication and leg swelling.   Gastrointestinal: Negative for diarrhea, nausea and vomiting.   Musculoskeletal: Negative for joint pain.   Skin: Negative for rash. "   Neurological: Positive for tingling and sensory change.   Psychiatric/Behavioral: Negative.              Objective:      PHYSICAL EXAM: Apperance: Alert and orient in no distress,well developed, and with good attention to grooming and body habits  Patient presents ambulating in tennis shoes.    LOWER EXTREMITIES EXAM:   VASCULAR: Dorsalis pedis pulses 2/4 bilateral and Posterior Tibial pulses 2/4 bilateral. Capillary fill time <4 seconds bilateral. Moderate edema observed right. Varicosities absent bilateral. Skin temperature of the lower extremities is warm to warm, proximal to distal. Hair growth dim bilateral.  DERMATOLOGICAL: No skin rashes, subcutaneous nodules, lesions, or ulcers observed bilateral. (+) decreased edema, (-) erythema, (-) increased temperature noted to right lateral ankle/posterior heel. Webspaces 1,2,3,4 clean, dry and without evidence of break in skin integrity bilateral.   NEUROLOGICAL: Light touch, sharp-dull, proprioception all present and equal bilaterally.  MUSCULOSKELETAL: Muscle strength is 5/5 for foot inverters, everters, plantarflexors, and dorsiflexors. Muscle tone is normal. Negative pain on palpation of right lateral foot and ankle.           Assessment:       Encounter Diagnoses   Name Primary?    Acute gout of right foot, unspecified cause Yes    Type II diabetes mellitus with neurological manifestations          Plan:   Acute gout of right foot, unspecified cause    Type II diabetes mellitus with neurological manifestations      I counseled the patient on her conditions, regarding findings of my examination, my impressions, and usual treatment plan.   Reviewed ankle x-rays and labs with exam room with patient.   Patient to continue gout medication as prescribed.   Patient to return in 2-3 months or sooner as needed.             Bradley Chu DPM  [facility]

## 2020-01-28 ENCOUNTER — OFFICE VISIT (OUTPATIENT)
Dept: PAIN MEDICINE | Facility: CLINIC | Age: 70
End: 2020-01-28
Payer: MEDICARE

## 2020-01-28 ENCOUNTER — HOSPITAL ENCOUNTER (OUTPATIENT)
Dept: RADIOLOGY | Facility: HOSPITAL | Age: 70
Discharge: HOME | End: 2020-01-28
Attending: PAIN MEDICINE
Payer: MEDICARE

## 2020-01-28 VITALS
WEIGHT: 186 LBS | SYSTOLIC BLOOD PRESSURE: 158 MMHG | HEART RATE: 70 BPM | HEIGHT: 65 IN | DIASTOLIC BLOOD PRESSURE: 77 MMHG | BODY MASS INDEX: 30.99 KG/M2

## 2020-01-28 DIAGNOSIS — M43.16 SPONDYLOLISTHESIS OF LUMBAR REGION: ICD-10-CM

## 2020-01-28 DIAGNOSIS — M79.2 NEURITIS: ICD-10-CM

## 2020-01-28 DIAGNOSIS — M54.16 LUMBAR RADICULOPATHY: Primary | ICD-10-CM

## 2020-01-28 PROCEDURE — 99204 PR OFFICE/OUTPT VISIT, NEW, LEVL IV, 45-59 MIN: ICD-10-PCS | Mod: HCNC,S$GLB,, | Performed by: PAIN MEDICINE

## 2020-01-28 PROCEDURE — 72110 X-RAY EXAM L-2 SPINE 4/>VWS: CPT | Mod: 26,HCNC,, | Performed by: RADIOLOGY

## 2020-01-28 PROCEDURE — 3077F PR MOST RECENT SYSTOLIC BLOOD PRESSURE >= 140 MM HG: ICD-10-PCS | Mod: HCNC,CPTII,S$GLB, | Performed by: PAIN MEDICINE

## 2020-01-28 PROCEDURE — 72110 XR LUMBAR SPINE 5 VIEW WITH FLEX AND EXT: ICD-10-PCS | Mod: 26,HCNC,, | Performed by: RADIOLOGY

## 2020-01-28 PROCEDURE — 1159F MED LIST DOCD IN RCRD: CPT | Mod: HCNC,S$GLB,, | Performed by: PAIN MEDICINE

## 2020-01-28 PROCEDURE — 3078F DIAST BP <80 MM HG: CPT | Mod: HCNC,CPTII,S$GLB, | Performed by: PAIN MEDICINE

## 2020-01-28 PROCEDURE — 1101F PR PT FALLS ASSESS DOC 0-1 FALLS W/OUT INJ PAST YR: ICD-10-PCS | Mod: HCNC,CPTII,S$GLB, | Performed by: PAIN MEDICINE

## 2020-01-28 PROCEDURE — 99204 OFFICE O/P NEW MOD 45 MIN: CPT | Mod: HCNC,S$GLB,, | Performed by: PAIN MEDICINE

## 2020-01-28 PROCEDURE — 1101F PT FALLS ASSESS-DOCD LE1/YR: CPT | Mod: HCNC,CPTII,S$GLB, | Performed by: PAIN MEDICINE

## 2020-01-28 PROCEDURE — 3078F PR MOST RECENT DIASTOLIC BLOOD PRESSURE < 80 MM HG: ICD-10-PCS | Mod: HCNC,CPTII,S$GLB, | Performed by: PAIN MEDICINE

## 2020-01-28 PROCEDURE — 72110 X-RAY EXAM L-2 SPINE 4/>VWS: CPT | Mod: TC,HCNC

## 2020-01-28 PROCEDURE — 1125F PR PAIN SEVERITY QUANTIFIED, PAIN PRESENT: ICD-10-PCS | Mod: HCNC,S$GLB,, | Performed by: PAIN MEDICINE

## 2020-01-28 PROCEDURE — 1125F AMNT PAIN NOTED PAIN PRSNT: CPT | Mod: HCNC,S$GLB,, | Performed by: PAIN MEDICINE

## 2020-01-28 PROCEDURE — 1159F PR MEDICATION LIST DOCUMENTED IN MEDICAL RECORD: ICD-10-PCS | Mod: HCNC,S$GLB,, | Performed by: PAIN MEDICINE

## 2020-01-28 PROCEDURE — 99999 PR PBB SHADOW E&M-EST. PATIENT-LVL IV: CPT | Mod: PBBFAC,HCNC,, | Performed by: PAIN MEDICINE

## 2020-01-28 PROCEDURE — 99999 PR PBB SHADOW E&M-EST. PATIENT-LVL IV: ICD-10-PCS | Mod: PBBFAC,HCNC,, | Performed by: PAIN MEDICINE

## 2020-01-28 PROCEDURE — 3077F SYST BP >= 140 MM HG: CPT | Mod: HCNC,CPTII,S$GLB, | Performed by: PAIN MEDICINE

## 2020-01-28 RX ORDER — GABAPENTIN 100 MG/1
CAPSULE ORAL
Qty: 270 CAPSULE | Refills: 3 | Status: SHIPPED | OUTPATIENT
Start: 2020-01-28 | End: 2021-07-29 | Stop reason: SDUPTHER

## 2020-01-28 RX ORDER — DICLOFENAC SODIUM 10 MG/G
GEL TOPICAL
Qty: 200 G | Refills: 2 | Status: SHIPPED | OUTPATIENT
Start: 2020-01-28 | End: 2020-01-29 | Stop reason: SDUPTHER

## 2020-01-28 RX ORDER — BACLOFEN 10 MG/1
10 TABLET ORAL 3 TIMES DAILY PRN
Qty: 30 TABLET | Refills: 3 | Status: SHIPPED | OUTPATIENT
Start: 2020-01-28 | End: 2020-06-09 | Stop reason: SDUPTHER

## 2020-01-28 NOTE — PATIENT INSTRUCTIONS
Will start baclofen 10 mg 3 times daily as needed   -Provide a refill on Voltaren gel   -Provide a  Refill on gabapentin   -provide a referral physical therapy  -Will obtain lumbar flexion-extension x-ray  -Provided referral to physiatry for EMG testing  -Follow up in clinic 4 weeks after the EMG

## 2020-01-28 NOTE — PROGRESS NOTES
Chief Pain Complaint:  Chief Complaint   Patient presents with    Back Pain     This note was created using voice recognition, there may be errors that were missed during proofreading.    History of Present Illness:   This patient is a 69 y.o. female who presents today complaining of the above noted pain/s. The patient describes the pain as follows.  Ms. Weir is new patient clinic with complaints of  Low back pain which radiates into the right lower extremity.  She has been having symptoms for greater than 10 years and she reports falling down a set of stairs approximately 30-40 years ago which started off her symptoms.  Today she rates her pain as an 8/10 describes a throbbing and numbness sensation.  Her pain is worse with sitting for long periods and is improved with heat most for complaints were centered in her axial lumbar spine, bilateral buttocks, right lower leg and foot in the S1 distribution.  She reports having muscle spasms throughout the night which cause significant pain.  She has been physical therapy the past which she has found to be helpful and is currently taking gabapentin 100 mg in the morning and 200 mg at night.  She also uses Voltaren gel which she does find to be helpful.  She denies having bowel bladder difficulties.  She does report having some symptoms in the left leg however they pail in comparison to the right leg. She has undergone 1 epidural steroid injection in the past which was not helpful.    Previous Therapy:  Medications:  Gabapentin, Voltaren gel, tramadol, Tylenol  Injections: Lumbar ASUNCION  Surgeries: None  Physical Therapy: Completed in the Past    Past Surgical History:   Procedure Laterality Date    BACK SURGERY      bladder cyst removal      BLADDER SURGERY      CARDIAC CATHETERIZATION  03/2013    mild CAD    COLONOSCOPY N/A 11/13/2015    Procedure: COLONOSCOPY;  Surgeon: Jas Umanzor III, MD;  Location: [facility];  Service: Endoscopy;  Laterality: N/A;     HYSTERECTOMY      28 yrs old    LUMBAR SPINE SURGERY      bone spurs removed    OOPHORECTOMY         Family History   Problem Relation Age of Onset    Heart disease Mother     Hypertension Mother     Cataracts Mother     Hypertension Sister     Glaucoma Sister     Hypertension Brother     Diabetes Father     Diabetes Paternal Uncle     Hypertension Sister     Diabetes Sister     Hypertension Sister     Diabetes Sister     Breast cancer Neg Hx     Colon cancer Neg Hx     Ovarian cancer Neg Hx        Social History     Socioeconomic History    Marital status:      Spouse name: Not on file    Number of children: Not on file    Years of education: Not on file    Highest education level: Not on file   Occupational History    Not on file   Social Needs    Financial resource strain: Not on file    Food insecurity:     Worry: Not on file     Inability: Not on file    Transportation needs:     Medical: Not on file     Non-medical: Not on file   Tobacco Use    Smoking status: Never Smoker    Smokeless tobacco: Never Used   Substance and Sexual Activity    Alcohol use: No    Drug use: No    Sexual activity: Never     Birth control/protection: None   Lifestyle    Physical activity:     Days per week: Not on file     Minutes per session: Not on file    Stress: Not on file   Relationships    Social connections:     Talks on phone: Not on file     Gets together: Not on file     Attends Che[employer] service: Not on file     Active member of club or organization: Not on file     Attends meetings of clubs or organizations: Not on file     Relationship status: Not on file   Other Topics Concern    Not on file   Social History Narrative    Dogs in household, no smokers.      Imaging / Labs / Studies (reviewed on 1/28/2020):    No results found for this or any previous visit.  Results for orders placed during the hospital encounter of 01/25/16   MRI Lumbar Spine W WO Contrast    Narrative Findings:  No priors.  Multiplanar sequences were obtained of the lumbar spine.  There is grade 1 spondylolisthesis of L4 and L5 with loss of disc height at this level and discogenic changes in the endplates.  The conus ends above the level of L1.  There is desiccation of all lumbar intervertebral discs.  No compression fractures or acute osseous abnormalities are identified.  Sagittal and axial T1-weighted images were acquired before and after intravenous administration of 8 cc of Multihance.  Axial images are acquired through the disc spaces from L1-2 through L5-S1.  The L1-2 disc space is unremarkable.  The L2-3 disc space is likewise unremarkable.  At L3-4 there is facet arthropathy and diffuse bulging of the disc which is creating very mild bilateral foraminal narrowing.  No central canal stenosis.  At L4-5 there is unroofing of the disc due to spondylolisthesis with bilateral pars defects.  There is mild canal and moderate bilateral foraminal narrowing as well as some right-sided lateral recess stenosis due to facet arthropathy.  At L5-S1 there is central bulging of the disc and facet arthropathy with mild canal and moderate bilateral foraminal narrowing.  Following intravenous administration of contrast material, there does not appear to be any abnormal enhancement identified.  There is some patient motion artifact on the postcontrast T1-weighted axial images.    Impression  Grade 1 spondylolisthesis of L4 on L5.  Multilevel degenerative change with areas of acquired canal and foraminal stenosis as discussed above.  Please correlate above findings with the patient's clinical symptoms.     Results for orders placed during the hospital encounter of 06/12/13   X-Ray Lumbar Complete With Flex And Ext    Narrative Findings: Grade 1 spondylolisthesis of L4 on L5 with loss of disk height at L4-5 and L5-S1.  Alignment is stable on flexion-extension views.  Patient is rotated on the oblique views.  No compression fractures or  acute osseous abnormalities are seen.  No   pars defects are identified.    Impression  Grade 1 spondylolisthesis of L4 on L5     Review of Systems:  Review of Systems   Constitutional: Negative for fever.   Eyes: Negative for blurred vision.   Respiratory: Negative for cough and wheezing.    Cardiovascular: Negative for chest pain and orthopnea.   Gastrointestinal: Negative for constipation, diarrhea, nausea and vomiting.   Genitourinary: Negative for dysuria.   Musculoskeletal: Positive for back pain.   Skin: Negative for itching and rash.   Neurological: Negative for weakness.   Endo/Heme/Allergies: Does not bruise/bleed easily.       Physical Exam:  There were no vitals taken for this visit. (reviewed on 2020)\  General    Constitutional: She is oriented to person, place, and time. She appears well-developed and well-nourished.   HENT:   Head: Normocephalic and atraumatic.   Eyes: EOM are normal.   Neck: Neck supple.   Cardiovascular: Normal rate.    Pulmonary/Chest: Effort normal.   Abdominal: She exhibits no distension.   Neurological: She is alert and oriented to person, place, and time. No cranial nerve deficit.   Psychiatric: She has a normal mood and affect.     General Musculoskeletal Exam   Gait: normal     Back (L-Spine & T-Spine) / Neck (C-Spine) Exam     Back (L-Spine & T-Spine) Range of Motion   Extension: normal   Flexion: normal     Spinal Sensation   Right Side Sensation  L-Spine Level: normal  Left Side Sensation  L-Spine Level: normal    Comments:   Positive straight leg raise bilaterally; sensation intact to light touch bilateral lower extremities      Muscle Strength   Right Lower Extremity   Hip Flexion: 5/5   Quadriceps:  5/5   Hamstrin/5   Left Lower Extremity   Hip Flexion: 5/5   Quadriceps:  5/5   Hamstrin/5     Reflexes     Left Side  Quadriceps:  2+  Achilles:  2+  Ankle Clonus:  absent    Right Side   Quadriceps:  2+  Achilles:  2+  Ankle Clonus:   absent      Assessment  Spondylolisthesis  Lumbar Radiculopathy    1. 69 y.o. year old patient with PMH of   Past Medical History:   Diagnosis Date    Anticoagulated on Coumadin     Arm weakness-rotator cuff weakness 11/2/2015    Arthritis     Asthma     Clotting disorder     Coronary artery disease     Deep vein thrombosis     Degenerative disc disease     Diabetes mellitus type I 2011    BS last tested x 1 month not sure what it was.    Heterozygous factor V Leiden mutation     Hx of colonic polyps 11/13/2015    Hyperlipidemia     Hypertension     VIRGIL (obstructive sleep apnea)     Stenosis of right carotid artery 12/13/2016      presenting with pain located  Lumbar spine, right lower extremity  2. Pain Generators / Etiology:   Lumbar radiculopathy, lumbar spondylolisthesis, lumbar spondylosis  3. Failed Meds (E- Effective, NE- Not Effective): Voltaren gel-effective, gabapentin-effective  4. Physical Therapy - Completed in the Past  5. Psychological comorbidities - None  6. Anticoagulants / Antiplatelets: Aspirin 81mg     PLAN:  1. Medications: will increase gabapentin to 100mg in the morning and 300mg in the evening;  Refilled Voltaren gel today and refill gabapentin   2. PT -   Provide a referral for physical therapy  3. Psychological -  none  4. Labs - obtain  none  5. Imaging - obtain  Lumbar flexion-extension x-ray today  6. Interventions - schedule none ; consider right S1 transforaminal epidural steroid injection the future  7. Referrals -  Provide a referral to physiatry for bilateral lower extremity EMG testing  8. Records - none  9. Follow up visit -  Follow up in clinic 4 weeks after the EMG  10. Patient Questions - answered all of the patient's questions regarding diagnosis, therapy, and treatment  11.  This condition does not require this patient to take time off of work    SARAH BETH Pressley MD  Interventional Pain  [facility] [place]

## 2020-01-29 ENCOUNTER — ANTI-COAG VISIT (OUTPATIENT)
Dept: CARDIOLOGY | Facility: CLINIC | Age: 70
End: 2020-01-29
Payer: MEDICARE

## 2020-01-29 DIAGNOSIS — D68.51 HETEROZYGOUS FACTOR V LEIDEN MUTATION: Chronic | ICD-10-CM

## 2020-01-29 DIAGNOSIS — Z79.01 LONG TERM (CURRENT) USE OF ANTICOAGULANTS: Primary | ICD-10-CM

## 2020-01-29 LAB — INR PPP: 3.6 (ref 2–3)

## 2020-01-29 PROCEDURE — 93793 PR ANTICOAGULANT MGMT FOR PT TAKING WARFARIN: ICD-10-PCS | Mod: HCNC,S$GLB,,

## 2020-01-29 PROCEDURE — 93793 ANTICOAG MGMT PT WARFARIN: CPT | Mod: HCNC,S$GLB,,

## 2020-01-29 PROCEDURE — 85610 POCT INR: ICD-10-PCS | Mod: QW,HCNC,S$GLB, | Performed by: INTERNAL MEDICINE

## 2020-01-29 PROCEDURE — 85610 PROTHROMBIN TIME: CPT | Mod: QW,HCNC,S$GLB, | Performed by: INTERNAL MEDICINE

## 2020-01-29 RX ORDER — DICLOFENAC SODIUM 10 MG/G
GEL TOPICAL
Qty: 200 G | Refills: 2 | Status: SHIPPED | OUTPATIENT
Start: 2020-01-29 | End: 2024-03-25

## 2020-01-29 NOTE — PROGRESS NOTES
Patient's INR remains supra-therapeutic at 3.6.  Mrs. Weir states she may have taken an extra 2.5mg of warfarin on Sunday.   She seems unsure of dosing, advised patient to keep calendar handy and julissa off each day to ensure proper dosing instructions are followed.  She agrees to bring calendar during next visit.  No signs of bleeding noted; advised patient to seek immediate medical attention if she notices any abnormal bleeding.  Instructions given for patient to hold dose of warfarin on today; then lower dose of warfarin to 7.5mg on Tuesdays, Thursdays and 5mg on all other days of the week.  Patient voiced understanding to all matters discussed.  Recheck in 1 week.

## 2020-02-05 ENCOUNTER — ANTI-COAG VISIT (OUTPATIENT)
Dept: CARDIOLOGY | Facility: CLINIC | Age: 70
End: 2020-02-05
Payer: MEDICARE

## 2020-02-05 DIAGNOSIS — Z79.01 LONG TERM (CURRENT) USE OF ANTICOAGULANTS: Primary | ICD-10-CM

## 2020-02-05 DIAGNOSIS — D68.51 HETEROZYGOUS FACTOR V LEIDEN MUTATION: Chronic | ICD-10-CM

## 2020-02-05 LAB — INR PPP: 2.1 (ref 2–3)

## 2020-02-05 PROCEDURE — 85610 POCT INR: ICD-10-PCS | Mod: QW,HCNC,S$GLB, | Performed by: INTERNAL MEDICINE

## 2020-02-05 PROCEDURE — 93793 PR ANTICOAGULANT MGMT FOR PT TAKING WARFARIN: ICD-10-PCS | Mod: HCNC,S$GLB,,

## 2020-02-05 PROCEDURE — 93793 ANTICOAG MGMT PT WARFARIN: CPT | Mod: HCNC,S$GLB,,

## 2020-02-05 PROCEDURE — 85610 PROTHROMBIN TIME: CPT | Mod: QW,HCNC,S$GLB, | Performed by: INTERNAL MEDICINE

## 2020-02-05 NOTE — PROGRESS NOTES
Patient's INR is therapeutic at 2.1.  Previous instructions followed - Mrs. Weir brought in last week calendar to verify accurate dosing was taken.  No upcoming procedures or changes reported.  No changes in dose.  Continue current dose of warfarin 7.5mg on Tuesdays and Thursdays; and 5mg on all other days of the week.  Patient voiced understanding.  Recheck INR on 2/11/2020 with labs.  Please call should you have any questions or concerns at [phone] or [phone].

## 2020-02-06 ENCOUNTER — PATIENT MESSAGE (OUTPATIENT)
Dept: PAIN MEDICINE | Facility: CLINIC | Age: 70
End: 2020-02-06

## 2020-02-10 ENCOUNTER — TELEPHONE (OUTPATIENT)
Dept: PHYSICAL MEDICINE AND REHAB | Facility: CLINIC | Age: 70
End: 2020-02-10

## 2020-02-10 NOTE — TELEPHONE ENCOUNTER
----- Message from Alyx Marie sent at 2/10/2020 12:55 PM CST -----  Type:  Needs Medical Advice    Who Called:  Pt  Alyx  Symptoms (please be specific):  Pt is having an EMG tomorrow//has questions   How long has patient had these symptoms:    Pharmacy name and phone #:    Would the patient rather a call back or a response via [facility]?  Call back  Best Call Back Number:  [phone]  Additional Information:  Please call to discuss//diamond/cally

## 2020-02-10 NOTE — TELEPHONE ENCOUNTER
Returned patients phone call regarding questions that she had for her EMG procedure tomorrow. Patient wanted to know if she needed someone with her for the procedure. Advised patient that she could bring someone with her if she wants but she will be able to drive home after the procedure. Patient verbalized understanding.

## 2020-02-11 ENCOUNTER — OFFICE VISIT (OUTPATIENT)
Dept: PHYSICAL MEDICINE AND REHAB | Facility: CLINIC | Age: 70
End: 2020-02-11
Payer: MEDICARE

## 2020-02-11 ENCOUNTER — LAB VISIT (OUTPATIENT)
Dept: LAB | Facility: HOSPITAL | Age: 70
End: 2020-02-11
Attending: INTERNAL MEDICINE
Payer: MEDICARE

## 2020-02-11 ENCOUNTER — ANTI-COAG VISIT (OUTPATIENT)
Dept: CARDIOLOGY | Facility: CLINIC | Age: 70
End: 2020-02-11
Payer: MEDICARE

## 2020-02-11 VITALS
HEART RATE: 68 BPM | SYSTOLIC BLOOD PRESSURE: 156 MMHG | WEIGHT: 186.06 LBS | DIASTOLIC BLOOD PRESSURE: 89 MMHG | HEIGHT: 65 IN | BODY MASS INDEX: 31 KG/M2

## 2020-02-11 DIAGNOSIS — Z79.01 LONG TERM (CURRENT) USE OF ANTICOAGULANTS: ICD-10-CM

## 2020-02-11 DIAGNOSIS — D68.51 HETEROZYGOUS FACTOR V LEIDEN MUTATION: Chronic | ICD-10-CM

## 2020-02-11 DIAGNOSIS — M54.16 LUMBAR RADICULOPATHY: ICD-10-CM

## 2020-02-11 LAB
INR PPP: 2 (ref 0.8–1.2)
PROTHROMBIN TIME: 21.2 SEC (ref 9–12.5)

## 2020-02-11 PROCEDURE — 95886 MUSC TEST DONE W/N TEST COMP: CPT | Mod: HCNC,S$GLB,, | Performed by: PHYSICAL MEDICINE & REHABILITATION

## 2020-02-11 PROCEDURE — 1159F MED LIST DOCD IN RCRD: CPT | Mod: HCNC,S$GLB,, | Performed by: PHYSICAL MEDICINE & REHABILITATION

## 2020-02-11 PROCEDURE — 3079F PR MOST RECENT DIASTOLIC BLOOD PRESSURE 80-89 MM HG: ICD-10-PCS | Mod: HCNC,CPTII,S$GLB, | Performed by: PHYSICAL MEDICINE & REHABILITATION

## 2020-02-11 PROCEDURE — 1101F PR PT FALLS ASSESS DOC 0-1 FALLS W/OUT INJ PAST YR: ICD-10-PCS | Mod: HCNC,CPTII,S$GLB, | Performed by: PHYSICAL MEDICINE & REHABILITATION

## 2020-02-11 PROCEDURE — 99999 PR PBB SHADOW E&M-EST. PATIENT-LVL III: CPT | Mod: PBBFAC,HCNC,, | Performed by: PHYSICAL MEDICINE & REHABILITATION

## 2020-02-11 PROCEDURE — 95909 PR NERVE CONDUCTION STUDY; 5-6 STUDIES: ICD-10-PCS | Mod: HCNC,S$GLB,, | Performed by: PHYSICAL MEDICINE & REHABILITATION

## 2020-02-11 PROCEDURE — 85610 PROTHROMBIN TIME: CPT | Mod: HCNC

## 2020-02-11 PROCEDURE — 36415 COLL VENOUS BLD VENIPUNCTURE: CPT | Mod: HCNC

## 2020-02-11 PROCEDURE — 3077F PR MOST RECENT SYSTOLIC BLOOD PRESSURE >= 140 MM HG: ICD-10-PCS | Mod: HCNC,CPTII,S$GLB, | Performed by: PHYSICAL MEDICINE & REHABILITATION

## 2020-02-11 PROCEDURE — 95886 PR EMG COMPLETE, W/ NERVE CONDUCTION STUDIES, 5+ MUSCLES: ICD-10-PCS | Mod: HCNC,S$GLB,, | Performed by: PHYSICAL MEDICINE & REHABILITATION

## 2020-02-11 PROCEDURE — 1159F PR MEDICATION LIST DOCUMENTED IN MEDICAL RECORD: ICD-10-PCS | Mod: HCNC,S$GLB,, | Performed by: PHYSICAL MEDICINE & REHABILITATION

## 2020-02-11 PROCEDURE — 93793 ANTICOAG MGMT PT WARFARIN: CPT | Mod: S$GLB,,,

## 2020-02-11 PROCEDURE — 99214 PR OFFICE/OUTPT VISIT, EST, LEVL IV, 30-39 MIN: ICD-10-PCS | Mod: HCNC,S$GLB,, | Performed by: PHYSICAL MEDICINE & REHABILITATION

## 2020-02-11 PROCEDURE — 95909 NRV CNDJ TST 5-6 STUDIES: CPT | Mod: HCNC,S$GLB,, | Performed by: PHYSICAL MEDICINE & REHABILITATION

## 2020-02-11 PROCEDURE — 1125F AMNT PAIN NOTED PAIN PRSNT: CPT | Mod: HCNC,S$GLB,, | Performed by: PHYSICAL MEDICINE & REHABILITATION

## 2020-02-11 PROCEDURE — 99999 PR PBB SHADOW E&M-EST. PATIENT-LVL III: ICD-10-PCS | Mod: PBBFAC,HCNC,, | Performed by: PHYSICAL MEDICINE & REHABILITATION

## 2020-02-11 PROCEDURE — 93793 PR ANTICOAGULANT MGMT FOR PT TAKING WARFARIN: ICD-10-PCS | Mod: S$GLB,,,

## 2020-02-11 PROCEDURE — 99214 OFFICE O/P EST MOD 30 MIN: CPT | Mod: HCNC,S$GLB,, | Performed by: PHYSICAL MEDICINE & REHABILITATION

## 2020-02-11 PROCEDURE — 1101F PT FALLS ASSESS-DOCD LE1/YR: CPT | Mod: HCNC,CPTII,S$GLB, | Performed by: PHYSICAL MEDICINE & REHABILITATION

## 2020-02-11 PROCEDURE — 1125F PR PAIN SEVERITY QUANTIFIED, PAIN PRESENT: ICD-10-PCS | Mod: HCNC,S$GLB,, | Performed by: PHYSICAL MEDICINE & REHABILITATION

## 2020-02-11 PROCEDURE — 3077F SYST BP >= 140 MM HG: CPT | Mod: HCNC,CPTII,S$GLB, | Performed by: PHYSICAL MEDICINE & REHABILITATION

## 2020-02-11 PROCEDURE — 3079F DIAST BP 80-89 MM HG: CPT | Mod: HCNC,CPTII,S$GLB, | Performed by: PHYSICAL MEDICINE & REHABILITATION

## 2020-02-11 NOTE — PROGRESS NOTES
PM&R ELECTRODIAGNOSTIC HISTORY & PHYSICAL:    Full Name: Alyx Weir Gender: Female  Patient ID: [id number] YOB: 1950      Visit Date: 2/11/2020 13:20  Age: 70 Years 0 Months Old  Examining Physician: Samantha Bella MD  Referring Physician: Luis Pressley MD  Reason for Referral: Lumbar radiculopathy    HPI: This is a 70 y.o.  female being seen in clinic today for evaluation of Back Pain (Low back pain ) and Foot Pain (Right foot pain)   She is seen as a consult from Dr. Luis Pressley and will receive these results electronically. The problem first began many years ago. Had back surgery approximately 40 years ago. Lately, has increasing back pain with radiation down the right leg and into foot. She initially denies numbness / tingling, but later says it feels like foot has been in ice. She is sent to evaluate for lumbar radiculopathy.     History obtained from patient.    Past family, medical, social, surgical history, and vital signs reviewed in chart.    Review of Systems   Constitutional: Negative for chills, fever and weight loss.   HENT: Negative for hearing loss and sore throat.    Eyes: Negative for blurred vision, photophobia and pain.   Respiratory: Negative for shortness of breath.    Cardiovascular: Negative for chest pain.   Gastrointestinal: Negative for abdominal pain.   Genitourinary: Negative for dysuria.   Skin: Negative for rash.   Neurological: Negative for tingling and headaches.   Endo/Heme/Allergies: Does not bruise/bleed easily.   Psychiatric/Behavioral: Negative for depression.       General    Nursing note and vitals reviewed.  Constitutional: She is oriented to person, place, and time. She appears well-developed and well-nourished.   HENT:   Head: Normocephalic and atraumatic.   Eyes: Conjunctivae and EOM are normal. Pupils are equal, round, and reactive to light.   Neck: Neck supple.   Cardiovascular: Intact distal pulses.    Pulmonary/Chest: Effort normal. No respiratory  distress.   Abdominal: She exhibits no distension.   Neurological: She is alert and oriented to person, place, and time.   Psychiatric: She has a normal mood and affect.         Back (L-Spine & T-Spine) / Neck (C-Spine) Exam     Spinal Sensation   Right Side Sensation  L-Spine Level: normal  Left Side Sensation  L-Spine Level: normal    Back (L-Spine & T-Spine) Tests   Right Side Tests  Straight leg raise:      Sitting SLR: > 70 degrees      Left Side Tests  Straight leg raise:     Sitting SLR: > 70 degrees            Muscle Strength   Right Lower Extremity   Hip Flexion: 5/5   Hip Extensors: 4/5  Quadriceps:  5/5   Hamstrin/5   Anterior tibial:  5/5/5  Gastrocsoleus:  5/5/5  EHL:  5/5  Left Lower Extremity   Hip Flexion: 5/5   Hip Extensors: 4/5  Quadriceps:  5/5   Hamstrin/5   Anterior tibial:  5/5/5   Gastrocsoleus:  5/5/5  EHL:  5/5    Reflexes     Left Side  Quadriceps:  0  Achilles:  0  Ankle Clonus:  absent    Right Side   Quadriceps:  0  Achilles:  0  Ankle Clonus:  absent    Vascular Exam     Right Pulses  Dorsalis Pedis:      2+          Left Pulses  Dorsalis Pedis:      2+                Findings:    SNC      Nerve / Sites Rec. Site Onset Lat Peak Lat Amp Segments Distance Velocity     ms ms µV  mm m/s   R Sural - Ankle (Calf)      Calf Ankle 2.3 2.9 5.9 Calf - Ankle 140 61   R Superficial peroneal - Ankle      1  NR NR NR 1 - G1     L Superficial peroneal - Ankle      1  NR NR NR 1 - G1 100 NR       MNC      Nerve / Sites Muscle Latency Amplitude Duration Rel Amp Segments Distance Lat Diff Velocity     ms mV ms %  mm ms m/s   R Peroneal - EDB      Ankle EDB 4.2 4.1 5.2 100 Ankle - EDB 80        Fib head EDB 11.1 2.0 5.4 50.5 Fib head - Ankle 293 7.0 42      Pop fossa EDB 12.6 1.4 5.8 68.6 Pop fossa - Fib head 70 1.5 48         Pop fossa - Ankle  8.4    R Tibial - AH      Ankle AH 5.9 2.4 3.9 100 Ankle - AH 80     L Tibial - AH      Ankle AH 3.9 5.2 5.8 100 Ankle - AH 80         EMG         EMG  Summary Table     Spontaneous MUAP Recruitment   Muscle IA Fib PSW Fasc Other Dur. Dur Amp Dur Polys Pattern Effort   R. Tibialis anterior N None None None . _NFT_ _NFT_ N N N N .   R. Gastrocnemius (Medial head) N None None None . _NFT_ _NFT_ N N N N .   R. Tibialis posterior N None None None . _NFT_ _NFT_ N N N N .   R. Peroneus longus N None None None . _NFT_ _NFT_ N N N N .   R. Biceps femoris (short head) N None None None . _NFT_ _NFT_ N N N N .   R. Lumbar paraspinals (low) Incr 1+ None None CRD _NFT_ _NFT_            Results:    - Right peroneal motor response was normal.  - Bilateral peroneal sensory responses could not be obtained, likely secondary to swelling around her ankles.  - Bilateral tibial motor responses were normal.   - right sural sensory response was normal.  - Needle EMG examination of the right lower extremity and lumbar paraspinals was normal except for active on chronic denervation changes in the paraspinals. It is possible the lumbar findings were secondary to prior procedures, but an acute process cannot be ruled out.     Interpretation:    1. Possible right lower lumbar radiculopathy, but without lower extremity findings and with previous surgery and procedures, difficult to say if this is an acute process. Recommend correlation with advanced imaging.     2. No evidence of right sural, peroneal or tibial neuropathy. No evidence of diffuse polyneuropathy.       IMPRESSION/PLAN: Alyx is a 70 y.o.  female with:    1. Lumbar radiculopathy  - Nerve conduction test       The findings were discussed with Alyx in detail. We discussed that she at least doesn't likely have significant nerve damage given the normal exam of the lower extremity muscles. She will follow-up with Dr. Pressley for further evaluation and treatment. All of her questions were answered.     Samantha Bella M.D.  Physical Medicine and Rehab

## 2020-02-11 NOTE — LETTER
February 11, 2020      Luis Pressley MD  [id number] [place] [address] [address] [place] [id number]           [facility]  [address] [facility]  [place] [address]  Phone: [phone]  Fax: [phone]          Patient: Alyx Weir   MR Number: [id number]   YOB: 1950   Date of Visit: 2/11/2020       Dear Dr. Luis Pressley:    Thank you for referring Alyx Weir to me for evaluation. Attached you will find relevant portions of my assessment and plan of care.    If you have questions, please do not hesitate to call me. I look forward to following Alyx Weir along with you.    Sincerely,    Samantha Bella MD    Enclosure  CC:  No Recipients    If you would like to receive this communication electronically, please contact [email or web address] or [phone] to request more information on [facility] access.    For providers and/or their staff who would like to refer a patient to [facility], please contact us through our one-stop-shop provider referral line, [facility] , at [phone].    If you feel you have received this communication in error or would no longer like to receive these types of communications, please e-mail [email or web address]

## 2020-02-11 NOTE — PROGRESS NOTES
Patient contacted:  INR is therapeutic at 2.0.  No recent changes reported.  No change in dose.  Instructions were given to maintain current dose of warfarin 7.5 mg every Tuesday and Thursday; and 5 mg on all other days per week.  Recheck on 3/02/2020 @ 0930a in the [facility] ([facility]).  Patient repeated back instructions and voiced understanding.

## 2020-02-28 ENCOUNTER — TELEPHONE (OUTPATIENT)
Dept: PODIATRY | Facility: CLINIC | Age: 70
End: 2020-02-28

## 2020-02-28 NOTE — TELEPHONE ENCOUNTER
Attempted to get patient rescheduled for next available appt.Pt has no voicemail set up, will try again at a later time.

## 2020-03-02 ENCOUNTER — ANTI-COAG VISIT (OUTPATIENT)
Dept: CARDIOLOGY | Facility: CLINIC | Age: 70
End: 2020-03-02
Payer: MEDICARE

## 2020-03-02 DIAGNOSIS — D68.51 HETEROZYGOUS FACTOR V LEIDEN MUTATION: Chronic | ICD-10-CM

## 2020-03-02 DIAGNOSIS — Z79.01 LONG TERM (CURRENT) USE OF ANTICOAGULANTS: Primary | ICD-10-CM

## 2020-03-02 LAB — INR PPP: 1.6 (ref 2–3)

## 2020-03-02 PROCEDURE — 85610 PROTHROMBIN TIME: CPT | Mod: QW,HCNC,S$GLB, | Performed by: INTERNAL MEDICINE

## 2020-03-02 PROCEDURE — 93793 PR ANTICOAGULANT MGMT FOR PT TAKING WARFARIN: ICD-10-PCS | Mod: HCNC,S$GLB,,

## 2020-03-02 PROCEDURE — 85610 POCT INR: ICD-10-PCS | Mod: QW,HCNC,S$GLB, | Performed by: INTERNAL MEDICINE

## 2020-03-02 PROCEDURE — 93793 ANTICOAG MGMT PT WARFARIN: CPT | Mod: HCNC,S$GLB,,

## 2020-03-02 NOTE — PROGRESS NOTES
Patient's INR is sub-therapeutic at 1.6.  No missed doses or significant changes reported.   No abnormal pain, swelling or weakness noted.  Instructions given for patient to take warfarin 7.5mg on today; then resume current dose of warfarin 7.5mg on Tuesdays, Thursdays and 5mg on all other days of the week.  Patient voiced understanding.  Follow-up on 3/11/2020.

## 2020-03-10 ENCOUNTER — PATIENT OUTREACH (OUTPATIENT)
Dept: ADMINISTRATIVE | Facility: OTHER | Age: 70
End: 2020-03-10

## 2020-03-11 ENCOUNTER — OFFICE VISIT (OUTPATIENT)
Dept: PAIN MEDICINE | Facility: CLINIC | Age: 70
End: 2020-03-11
Payer: MEDICARE

## 2020-03-11 ENCOUNTER — ANTI-COAG VISIT (OUTPATIENT)
Dept: CARDIOLOGY | Facility: CLINIC | Age: 70
End: 2020-03-11
Payer: MEDICARE

## 2020-03-11 VITALS
BODY MASS INDEX: 30.34 KG/M2 | SYSTOLIC BLOOD PRESSURE: 126 MMHG | HEIGHT: 65 IN | HEART RATE: 64 BPM | WEIGHT: 182.13 LBS | DIASTOLIC BLOOD PRESSURE: 76 MMHG

## 2020-03-11 DIAGNOSIS — M53.3 SACROILIAC JOINT DYSFUNCTION: ICD-10-CM

## 2020-03-11 DIAGNOSIS — M47.816 LUMBAR SPONDYLOSIS: Primary | ICD-10-CM

## 2020-03-11 DIAGNOSIS — D68.51 HETEROZYGOUS FACTOR V LEIDEN MUTATION: Chronic | ICD-10-CM

## 2020-03-11 DIAGNOSIS — Z79.01 LONG TERM (CURRENT) USE OF ANTICOAGULANTS: Primary | ICD-10-CM

## 2020-03-11 LAB — INR PPP: 2.8 (ref 2–3)

## 2020-03-11 PROCEDURE — 93793 ANTICOAG MGMT PT WARFARIN: CPT | Mod: HCNC,S$GLB,,

## 2020-03-11 PROCEDURE — 99999 PR PBB SHADOW E&M-EST. PATIENT-LVL III: CPT | Mod: PBBFAC,HCNC,, | Performed by: PAIN MEDICINE

## 2020-03-11 PROCEDURE — 85610 POCT INR: ICD-10-PCS | Mod: QW,HCNC,S$GLB, | Performed by: INTERNAL MEDICINE

## 2020-03-11 PROCEDURE — 1101F PT FALLS ASSESS-DOCD LE1/YR: CPT | Mod: HCNC,CPTII,S$GLB, | Performed by: PAIN MEDICINE

## 2020-03-11 PROCEDURE — 1159F PR MEDICATION LIST DOCUMENTED IN MEDICAL RECORD: ICD-10-PCS | Mod: HCNC,S$GLB,, | Performed by: PAIN MEDICINE

## 2020-03-11 PROCEDURE — 1125F AMNT PAIN NOTED PAIN PRSNT: CPT | Mod: HCNC,S$GLB,, | Performed by: PAIN MEDICINE

## 2020-03-11 PROCEDURE — 93793 PR ANTICOAGULANT MGMT FOR PT TAKING WARFARIN: ICD-10-PCS | Mod: HCNC,S$GLB,,

## 2020-03-11 PROCEDURE — 85610 PROTHROMBIN TIME: CPT | Mod: QW,HCNC,S$GLB, | Performed by: INTERNAL MEDICINE

## 2020-03-11 PROCEDURE — 99214 OFFICE O/P EST MOD 30 MIN: CPT | Mod: HCNC,S$GLB,, | Performed by: PAIN MEDICINE

## 2020-03-11 PROCEDURE — 99214 PR OFFICE/OUTPT VISIT, EST, LEVL IV, 30-39 MIN: ICD-10-PCS | Mod: HCNC,S$GLB,, | Performed by: PAIN MEDICINE

## 2020-03-11 PROCEDURE — 1159F MED LIST DOCD IN RCRD: CPT | Mod: HCNC,S$GLB,, | Performed by: PAIN MEDICINE

## 2020-03-11 PROCEDURE — 3074F PR MOST RECENT SYSTOLIC BLOOD PRESSURE < 130 MM HG: ICD-10-PCS | Mod: HCNC,CPTII,S$GLB, | Performed by: PAIN MEDICINE

## 2020-03-11 PROCEDURE — 99999 PR PBB SHADOW E&M-EST. PATIENT-LVL III: ICD-10-PCS | Mod: PBBFAC,HCNC,, | Performed by: PAIN MEDICINE

## 2020-03-11 PROCEDURE — 1125F PR PAIN SEVERITY QUANTIFIED, PAIN PRESENT: ICD-10-PCS | Mod: HCNC,S$GLB,, | Performed by: PAIN MEDICINE

## 2020-03-11 PROCEDURE — 1101F PR PT FALLS ASSESS DOC 0-1 FALLS W/OUT INJ PAST YR: ICD-10-PCS | Mod: HCNC,CPTII,S$GLB, | Performed by: PAIN MEDICINE

## 2020-03-11 PROCEDURE — 3078F PR MOST RECENT DIASTOLIC BLOOD PRESSURE < 80 MM HG: ICD-10-PCS | Mod: HCNC,CPTII,S$GLB, | Performed by: PAIN MEDICINE

## 2020-03-11 PROCEDURE — 3074F SYST BP LT 130 MM HG: CPT | Mod: HCNC,CPTII,S$GLB, | Performed by: PAIN MEDICINE

## 2020-03-11 PROCEDURE — 3078F DIAST BP <80 MM HG: CPT | Mod: HCNC,CPTII,S$GLB, | Performed by: PAIN MEDICINE

## 2020-03-11 NOTE — PATIENT INSTRUCTIONS
-continue physical therapy  -continue gabapentin as prescribed  -continue Voltaren gel massage therapy as tolerated  -follow up in clinic in 8 weeks

## 2020-03-11 NOTE — PROGRESS NOTES
Chief Pain Complaint:  Chief Complaint   Patient presents with    Follow-up     EMG review    Low-back Pain     This note was created using voice recognition, there may be errors that were missed during proofreading.    History of Present Illness:   Ms. Weir returns to clinic for follow-up.  She has been taking gabapentin 100 mg in the morning and 300 mg at night in addition participating physical therapy.  She reports that she started the medications and therapy she his improved 40-50% from where she was previously.  She continues to have right buttock pain which radiates into the right thigh occasionally below the knee.  She has had an epidural steroid injection in the past approximately February 2016 and reports that this was not helpful.  She also uses Voltaren gel in addition to massage therapy and heating pads and ice packs which have been helpful.  She is pleased with the progress that she is making currently.  She denies having symptoms on the left side.    Initial HPI:  This patient is a 70 y.o. female who presents today complaining of the above noted pain/s. The patient describes the pain as follows.  Ms. Weir is new patient clinic with complaints of  Low back pain which radiates into the right lower extremity.  She has been having symptoms for greater than 10 years and she reports falling down a set of stairs approximately 30-40 years ago which started off her symptoms.  Today she rates her pain as an 8/10 describes a throbbing and numbness sensation.  Her pain is worse with sitting for long periods and is improved with heat most for complaints were centered in her axial lumbar spine, bilateral buttocks, right lower leg and foot in the S1 distribution.  She reports having muscle spasms throughout the night which cause significant pain.  She has been physical therapy the past which she has found to be helpful and is currently taking gabapentin 100 mg in the morning and 200 mg at night.  She also uses  Voltaren gel which she does find to be helpful.  She denies having bowel bladder difficulties.  She does report having some symptoms in the left leg however they pail in comparison to the right leg. She has undergone 1 epidural steroid injection in the past which was not helpful.    Previous Therapy:  Medications:  Gabapentin, Voltaren gel, tramadol, Tylenol  Injections: Lumbar ASUNCION  Surgeries: None  Physical Therapy: Completed in the Past    Past Surgical History:   Procedure Laterality Date    BACK SURGERY      bladder cyst removal      BLADDER SURGERY      CARDIAC CATHETERIZATION  03/2013    mild CAD    COLONOSCOPY N/A 11/13/2015    Procedure: COLONOSCOPY;  Surgeon: Jas Umanzor III, MD;  Location: [facility] ENDO;  Service: Endoscopy;  Laterality: N/A;    HYSTERECTOMY      28 yrs old    LUMBAR SPINE SURGERY      bone spurs removed    OOPHORECTOMY         Family History   Problem Relation Age of Onset    Heart disease Mother     Hypertension Mother     Cataracts Mother     Hypertension Sister     Glaucoma Sister     Hypertension Brother     Diabetes Father     Diabetes Paternal Uncle     Hypertension Sister     Diabetes Sister     Hypertension Sister     Diabetes Sister     Breast cancer Neg Hx     Colon cancer Neg Hx     Ovarian cancer Neg Hx        Social History     Socioeconomic History    Marital status:      Spouse name: Not on file    Number of children: Not on file    Years of education: Not on file    Highest education level: Not on file   Occupational History    Not on file   Social Needs    Financial resource strain: Not on file    Food insecurity:     Worry: Not on file     Inability: Not on file    Transportation needs:     Medical: Not on file     Non-medical: Not on file   Tobacco Use    Smoking status: Never Smoker    Smokeless tobacco: Never Used   Substance and Sexual Activity    Alcohol use: No    Drug use: No    Sexual activity: Never     Birth  control/protection: None   Lifestyle    Physical activity:     Days per week: Not on file     Minutes per session: Not on file    Stress: Not on file   Relationships    Social connections:     Talks on phone: Not on file     Gets together: Not on file     Attends [employer] service: Not on file     Active member of club or organization: Not on file     Attends meetings of clubs or organizations: Not on file     Relationship status: Not on file   Other Topics Concern    Not on file   Social History Narrative    Dogs in household, no smokers.      Imaging / Labs / Studies (reviewed on 3/11/2020):    No results found for this or any previous visit.  Results for orders placed during the hospital encounter of 01/25/16   MRI Lumbar Spine W WO Contrast    Narrative Findings: No priors.  Multiplanar sequences were obtained of the lumbar spine.  There is grade 1 spondylolisthesis of L4 and L5 with loss of disc height at this level and discogenic changes in the endplates.  The conus ends above the level of L1.  There is desiccation of all lumbar intervertebral discs.  No compression fractures or acute osseous abnormalities are identified.  Sagittal and axial T1-weighted images were acquired before and after intravenous administration of 8 cc of Multihance.  Axial images are acquired through the disc spaces from L1-2 through L5-S1.  The L1-2 disc space is unremarkable.  The L2-3 disc space is likewise unremarkable.  At L3-4 there is facet arthropathy and diffuse bulging of the disc which is creating very mild bilateral foraminal narrowing.  No central canal stenosis.  At L4-5 there is unroofing of the disc due to spondylolisthesis with bilateral pars defects.  There is mild canal and moderate bilateral foraminal narrowing as well as some right-sided lateral recess stenosis due to facet arthropathy.  At L5-S1 there is central bulging of the disc and facet arthropathy with mild canal and moderate bilateral foraminal  "narrowing.  Following intravenous administration of contrast material, there does not appear to be any abnormal enhancement identified.  There is some patient motion artifact on the postcontrast T1-weighted axial images.    Impression  Grade 1 spondylolisthesis of L4 on L5.  Multilevel degenerative change with areas of acquired canal and foraminal stenosis as discussed above.  Please correlate above findings with the patient's clinical symptoms.     Results for orders placed during the hospital encounter of 06/12/13   X-Ray Lumbar Complete With Flex And Ext    Narrative Findings: Grade 1 spondylolisthesis of L4 on L5 with loss of disk height at L4-5 and L5-S1.  Alignment is stable on flexion-extension views.  Patient is rotated on the oblique views.  No compression fractures or acute osseous abnormalities are seen.  No   pars defects are identified.    Impression  Grade 1 spondylolisthesis of L4 on L5     Review of Systems:  Review of Systems   Constitutional: Negative for fever.   Eyes: Negative for blurred vision.   Respiratory: Negative for cough and wheezing.    Cardiovascular: Negative for chest pain and orthopnea.   Gastrointestinal: Negative for constipation, diarrhea, nausea and vomiting.   Genitourinary: Negative for dysuria.   Musculoskeletal: Positive for back pain.        Right leg pain   Skin: Negative for itching and rash.   Neurological: Negative for weakness.   Endo/Heme/Allergies: Does not bruise/bleed easily.       Physical Exam:  /76   Pulse 64   Ht 5' 5" (1.651 m)   Wt 82.6 kg (182 lb 1.6 oz)   BMI 30.30 kg/m²  (reviewed on 3/11/2020)\  General    Constitutional: She is oriented to person, place, and time. She appears well-developed and well-nourished.   HENT:   Head: Normocephalic and atraumatic.   Eyes: EOM are normal.   Neck: Neck supple.   Cardiovascular: Normal rate.    Pulmonary/Chest: Effort normal.   Abdominal: She exhibits no distension.   Neurological: She is alert and " oriented to person, place, and time. No cranial nerve deficit.   Psychiatric: She has a normal mood and affect.     General Musculoskeletal Exam   Gait: normal     Back (L-Spine & T-Spine) / Neck (C-Spine) Exam     Back (L-Spine & T-Spine) Range of Motion   Extension: normal   Flexion: normal     Spinal Sensation   Right Side Sensation  L-Spine Level: normal  Left Side Sensation  L-Spine Level: normal    Comments:  Positive straight leg raise on the right; negative on the left; minimal tenderness to palpation over bilateral lower lumbar facets; tender palpation over right PSIS; negative tenderness to left PSIS; positive compression, positive Radha's on the right      Muscle Strength   Right Lower Extremity   Hip Flexion: 5/5   Quadriceps:  5/5   Hamstrin/5   Left Lower Extremity   Hip Flexion: 5/5   Quadriceps:  5/5   Hamstrin/5     Reflexes     Left Side  Quadriceps:  2+  Achilles:  2+  Ankle Clonus:  absent    Right Side   Quadriceps:  2+  Achilles:  2+  Ankle Clonus:  absent      Assessment  Spondylolisthesis  Lumbar Radiculopathy    1. 70 y.o. year old patient with PMH of   Past Medical History:   Diagnosis Date    Anticoagulated on Coumadin     Arm weakness-rotator cuff weakness 2015    Arthritis     Asthma     Clotting disorder     Coronary artery disease     Deep vein thrombosis     Degenerative disc disease     Diabetes mellitus type I     BS last tested x 1 month not sure what it was.    Heterozygous factor V Leiden mutation     Hx of colonic polyps 2015    Hyperlipidemia     Hypertension     VIRGIL (obstructive sleep apnea)     Stenosis of right carotid artery 2016      presenting with pain located  Lumbar spine, right lower extremity  2. Pain Generators / Etiology:   Lumbar radiculopathy, lumbar spondylolisthesis, lumbar spondylosis  3. Failed Meds (E- Effective, NE- Not Effective): Voltaren gel-effective, gabapentin-effective  4. Physical Therapy - Completed in  the Past  5. Psychological comorbidities - None  6. Anticoagulants / Antiplatelets: Aspirin 81mg     PLAN:  1. Medications:  Will continue gabapentin 100 mg in the morning and 300 mg at night; continue Voltaren gel  2. PT -  continue physical therapy exercises  3. Psychological -  none  4. Labs - obtain  none  5. Imaging - none; reviewed lumbar x-ray patient today in clinic  6. Interventions - schedule none ; consider right SI joint injection in the future  7. Referrals -  none  8. Records - none  9. Follow up visit -  follow up in clinic in 8 weeks  10. Patient Questions - answered all of the patient's questions regarding diagnosis, therapy, and treatment  11.  This condition does not require this patient to take time off of work    SARAH BETH Pressley MD  Interventional Pain  [facility] [place]

## 2020-03-11 NOTE — PROGRESS NOTES
Patient's INR is therapeutic at 2.8.  Previous instructions followed.  Mrs. Weir states she rarely eats her 1 serving of vitamin K foods - now only eating iceberg salads.  Consistent intake discussed.   No upcoming procedures or other changes reported.  No changes in dose at this time.  Continue current dose of warfarin 7.5mg on Tuesdays and Thursdays; and 5mg on all other days of the week.  Patient voiced understanding.   Recheck INR on 3/23/2020.  Please call should you have any questions or concerns at [phone] or [phone].

## 2020-03-23 ENCOUNTER — LAB VISIT (OUTPATIENT)
Dept: LAB | Facility: HOSPITAL | Age: 70
End: 2020-03-23
Attending: INTERNAL MEDICINE
Payer: MEDICARE

## 2020-03-23 ENCOUNTER — ANTI-COAG VISIT (OUTPATIENT)
Dept: CARDIOLOGY | Facility: CLINIC | Age: 70
End: 2020-03-23
Payer: MEDICARE

## 2020-03-23 DIAGNOSIS — Z79.01 LONG TERM (CURRENT) USE OF ANTICOAGULANTS: ICD-10-CM

## 2020-03-23 DIAGNOSIS — D68.51 HETEROZYGOUS FACTOR V LEIDEN MUTATION: Chronic | ICD-10-CM

## 2020-03-23 LAB
INR PPP: 2.2 (ref 0.8–1.2)
PROTHROMBIN TIME: 22.9 SEC (ref 9–12.5)

## 2020-03-23 PROCEDURE — 93793 ANTICOAG MGMT PT WARFARIN: CPT | Mod: S$GLB,,,

## 2020-03-23 PROCEDURE — 85610 PROTHROMBIN TIME: CPT | Mod: HCNC

## 2020-03-23 PROCEDURE — 93793 PR ANTICOAGULANT MGMT FOR PT TAKING WARFARIN: ICD-10-PCS | Mod: S$GLB,,,

## 2020-03-23 PROCEDURE — 36415 COLL VENOUS BLD VENIPUNCTURE: CPT | Mod: HCNC

## 2020-03-23 NOTE — PROGRESS NOTES
Patient contacted:  INR is therapeutic at 2.2 (Lab).  No recent changes reported.  Instructions were given to maintain current dose of warfarin 7.5 mg every Tuesday and Thursday; and 5 mg on all other days per week.  Advise to keep diet consistent.  Recheck on 4/20/2020 ([facility]).  Patient repeated back instructions and voiced understanding.

## 2020-03-26 ENCOUNTER — TELEPHONE (OUTPATIENT)
Dept: PULMONOLOGY | Facility: CLINIC | Age: 70
End: 2020-03-26

## 2020-03-26 NOTE — TELEPHONE ENCOUNTER
----- Message from Karlos Rios sent at 3/26/2020 11:00 AM CDT -----  Contact: Pt  Type:  Patient Returning Call    Who Called: Alyx Weir  Who Left Message for Patient: ELINA CASTRO   Does the patient know what this is regarding?: Appointment   Would the patient rather a call back or a response via [facility]? Call Back  Best Call Back Number: [phone] (home).

## 2020-03-30 ENCOUNTER — OFFICE VISIT (OUTPATIENT)
Dept: PULMONOLOGY | Facility: CLINIC | Age: 70
End: 2020-03-30
Payer: MEDICARE

## 2020-03-30 ENCOUNTER — PATIENT OUTREACH (OUTPATIENT)
Dept: ADMINISTRATIVE | Facility: OTHER | Age: 70
End: 2020-03-30

## 2020-03-30 VITALS
RESPIRATION RATE: 18 BRPM | OXYGEN SATURATION: 98 % | HEIGHT: 65 IN | HEART RATE: 73 BPM | WEIGHT: 182.75 LBS | BODY MASS INDEX: 30.45 KG/M2

## 2020-03-30 DIAGNOSIS — I10 ESSENTIAL HYPERTENSION: Chronic | ICD-10-CM

## 2020-03-30 DIAGNOSIS — G47.33 OSA ON CPAP: Primary | Chronic | ICD-10-CM

## 2020-03-30 DIAGNOSIS — E66.9 OBESITY (BMI 30.0-34.9): Chronic | ICD-10-CM

## 2020-03-30 DIAGNOSIS — I77.1 TORTUOUS AORTA: ICD-10-CM

## 2020-03-30 DIAGNOSIS — J44.9 CHRONIC OBSTRUCTIVE PULMONARY DISEASE, UNSPECIFIED COPD TYPE: Chronic | ICD-10-CM

## 2020-03-30 PROCEDURE — 99214 PR OFFICE/OUTPT VISIT, EST, LEVL IV, 30-39 MIN: ICD-10-PCS | Mod: HCNC,S$GLB,, | Performed by: NURSE PRACTITIONER

## 2020-03-30 PROCEDURE — 1101F PT FALLS ASSESS-DOCD LE1/YR: CPT | Mod: HCNC,CPTII,S$GLB, | Performed by: NURSE PRACTITIONER

## 2020-03-30 PROCEDURE — 1101F PR PT FALLS ASSESS DOC 0-1 FALLS W/OUT INJ PAST YR: ICD-10-PCS | Mod: HCNC,CPTII,S$GLB, | Performed by: NURSE PRACTITIONER

## 2020-03-30 PROCEDURE — 99999 PR PBB SHADOW E&M-EST. PATIENT-LVL IV: CPT | Mod: PBBFAC,HCNC,, | Performed by: NURSE PRACTITIONER

## 2020-03-30 PROCEDURE — 99999 PR PBB SHADOW E&M-EST. PATIENT-LVL IV: ICD-10-PCS | Mod: PBBFAC,HCNC,, | Performed by: NURSE PRACTITIONER

## 2020-03-30 PROCEDURE — 1159F PR MEDICATION LIST DOCUMENTED IN MEDICAL RECORD: ICD-10-PCS | Mod: HCNC,S$GLB,, | Performed by: NURSE PRACTITIONER

## 2020-03-30 PROCEDURE — 99214 OFFICE O/P EST MOD 30 MIN: CPT | Mod: HCNC,S$GLB,, | Performed by: NURSE PRACTITIONER

## 2020-03-30 PROCEDURE — 1159F MED LIST DOCD IN RCRD: CPT | Mod: HCNC,S$GLB,, | Performed by: NURSE PRACTITIONER

## 2020-03-30 NOTE — ASSESSMENT & PLAN NOTE
Benefits with CPAP 10 cm  Improve Adherence  Yearly supply order   Never obtained new machine  Last used December, unable to fix water reservoir, helped patient and fit old lid on new bottom and now fits in machine, advised to contact [facility] for correct water reservoir.  She does not want new machine, since current is working.   Follow up in 3 months determine if improved compliance

## 2020-03-30 NOTE — PATIENT INSTRUCTIONS
Key components of the Mediterranean diet     The Mediterranean diet emphasizes:     Eating primarily plant-based foods, such as fruits and vegetables, whole grains, legumes and nuts  Replacing butter with healthy fats such as olive oil and canola oil  Using herbs and spices instead of salt to flavor foods  Limiting red meat to no more than a few times a month  Eating fish and poultry at least twice a week  Enjoying meals with family and friends  Drinking red wine in moderation (optional)  Getting plenty of exercise        The Mediterranean diet pyramid            Low-Salt Diet  This diet removes foods that are high in salt. It also limits the amount of salt you use when cooking. It is most often used for people with high blood pressure, edema (fluid retention), and kidney, liver, or heart disease.  Table salt contains the mineral sodium. Your body needs sodium to work normally. But too much sodium can make your health problems worse. Your healthcare provider is recommending a low-salt (also called low-sodium) diet for you. Your total daily allowance of salt is 1,500 to 2,300 milligrams (mg). It is less than 1 teaspoon of table salt. This means you can have only about 500 to 700 mg of sodium at each meal. People with certain health problems should limit salt intake to the lower end of the recommended range.    When you cook, dont add much salt. If you can cook without using salt, even better. Dont add salt to your food at the table.  When shopping, read food labels. Salt is often called sodium on the label. Choose foods that are salt-free, low salt, or very low salt. Note that foods with reduced salt may not lower your salt intake enough.    Beans, potatoes, and pasta  Ok: Dry beans, split peas, lentils, potatoes, rice, macaroni, pasta, spaghetti without added salt  Avoid: Potato chips, tortilla chips, and similar products  Breads and cereals  Ok: Low-sodium breads, rolls, cereals, and cakes; low-salt crackers,  matzo crackers  Avoid: Salted crackers, pretzels, popcorn, Somali toast, pancakes, muffins  Dairy  Ok: Milk, chocolate milk, hot chocolate mix, low-salt cheeses, and yogurt  Avoid: Processed cheese and cheese spreads; Roquefort, Camembert, and cottage cheese; buttermilk, instant breakfast drink  Desserts  Ok: Ice cream, frozen yogurt, juice bars, gelatin, cookies and pies, sugar, honey, jelly, hard candy  Avoid: Most pies, cakes and cookies prepared or processed with salt; instant pudding  Drinks  Ok: Tea, coffee, fizzy (carbonated) drinks, juices  Avoid: Flavored coffees, electrolyte replacement drinks, sports drinks  Meats  Ok: All fresh meat, fish, poultry, low-salt tuna, eggs, egg substitute  Avoid: Smoked, pickled, brine-cured, or salted meats and fish. This includes rubio, chipped beef, corned beef, hot dogs, deli meats, ham, kosher meats, salt pork, sausage, canned tuna, salted codfish, smoked salmon, herring, sardines, or anchovies.  Seasonings and spices  Ok: Most seasonings are okay. Good substitutes for salt include: fresh herb blends, hot sauce, lemon, garlic, hawkins, vinegar, dry mustard, parsley, cilantro, horseradish, tomato paste, regular margarine, mayonnaise, unsalted butter, cream cheese, vegetable oil, cream, low-salt salad dressing and gravy.  Avoid: Regular ketchup, relishes, pickles, soy sauce, teriyaki sauce, Worcestershire sauce, BBQ sauce, tartar sauce, meat tenderizer, chili sauce, regular gravy, regular salad dressing, salted butter  Soups  Ok: Low-salt soups and broths made with allowed foods  Avoid: Bouillon cubes, soups with smoked or salted meats, regular soup and broth  Vegetables  Ok: Most vegetables are okay; also low-salt tomato and vegetable juices  Avoid: Sauerkraut and other brine-soaked vegetables; pickles and other pickled vegetables; tomato juice, olives  Date Last Reviewed: 8/1/2016  © 5551-2699 [employer]. [address] [place] [address], [place] [address]. All rights  reserved. This information is not intended as a substitute for professional medical care. Always follow your healthcare professional's instructions.       DASH Diet: Care Instructions  Your Care Instructions    The DASH diet is an eating plan that can help lower your blood pressure. DASH stands for Dietary Approaches to Stop Hypertension. Hypertension is high blood pressure.  The DASH diet focuses on eating foods that are high in calcium, potassium, and magnesium. These nutrients can lower blood pressure. The foods that are highest in these nutrients are fruits, vegetables, low-fat dairy products, nuts, seeds, and legumes. But taking calcium, potassium, and magnesium supplements instead of eating foods that are high in those nutrients does not have the same effect. The DASH diet also includes whole grains, fish, and poultry.  The DASH diet is one of several lifestyle changes your doctor may recommend to lower your high blood pressure. Your doctor may also want you to decrease the amount of sodium in your diet. Lowering sodium while following the DASH diet can lower blood pressure even further than just the DASH diet alone.  Follow-up care is a key part of your treatment and safety. Be sure to make and go to all appointments, and call your doctor if you are having problems. It's also a good idea to know your test results and keep a list of the medicines you take.  How can you care for yourself at home?  Following the DASH diet  · Eat 4 to 5 servings of fruit each day. A serving is 1 medium-sized piece of fruit, ½ cup chopped or canned fruit, 1/4 cup dried fruit, or 4 ounces (½ cup) of fruit juice. Choose fruit more often than fruit juice.  · Eat 4 to 5 servings of vegetables each day. A serving is 1 cup of lettuce or raw leafy vegetables, ½ cup of chopped or cooked vegetables, or 4 ounces (½ cup) of vegetable juice. Choose vegetables more often than vegetable juice.  · Get 2 to 3 servings of low-fat and fat-free  dairy each day. A serving is 8 ounces of milk, 1 cup of yogurt, or 1 ½ ounces of cheese.  · Eat 6 to 8 servings of grains each day. A serving is 1 slice of bread, 1 ounce of dry cereal, or ½ cup of cooked rice, pasta, or cooked cereal. Try to choose whole-grain products as much as possible.  · Limit lean meat, poultry, and fish to 2 servings each day. A serving is 3 ounces, about the size of a deck of cards.  · Eat 4 to 5 servings of nuts, seeds, and legumes (cooked dried beans, lentils, and split peas) each week. A serving is 1/3 cup of nuts, 2 tablespoons of seeds, or ½ cup of cooked beans or peas.  · Limit fats and oils to 2 to 3 servings each day. A serving is 1 teaspoon of vegetable oil or 2 tablespoons of salad dressing.  · Limit sweets and added sugars to 5 servings or less a week. A serving is 1 tablespoon jelly or jam, ½ cup sorbet, or 1 cup of lemonade.  · Eat less than 2,300 milligrams (mg) of sodium a day. If you limit your sodium to 1,500 mg a day, you can lower your blood pressure even more.  Tips for success  · Start small. Do not try to make dramatic changes to your diet all at once. You might feel that you are missing out on your favorite foods and then be more likely to not follow the plan. Make small changes, and stick with them. Once those changes become habit, add a few more changes.  · Try some of the following:  ¨ Make it a goal to eat a fruit or vegetable at every meal and at snacks. This will make it easy to get the recommended amount of fruits and vegetables each day.  ¨ Try yogurt topped with fruit and nuts for a snack or healthy dessert.  ¨ Add lettuce, tomato, cucumber, and onion to sandwiches.  ¨ Combine a ready-made pizza crust with low-fat mozzarella cheese and lots of vegetable toppings. Try using tomatoes, squash, spinach, broccoli, carrots, cauliflower, and onions.  ¨ Have a variety of cut-up vegetables with a low-fat dip as an appetizer instead of chips and dip.  ¨ Sprinkle  sunflower seeds or chopped almonds over salads. Or try adding chopped walnuts or almonds to cooked vegetables.  ¨ Try some vegetarian meals using beans and peas. Add garbanzo or kidney beans to salads. Make burritos and tacos with mashed lamar beans or black beans.

## 2020-03-30 NOTE — PROGRESS NOTES
Subjective:      Patient ID: Alyx Weir is a 70 y.o. female.    Chief Complaint: Sleep Apnea    HPI: Alyx Weir is here for follow up for VIRGIL with two year CPAP complaince assessment.  She is on CPAP of 10 cmH2O pressure which is optimally controlling sleep apnea with apneic index (AHI) 0.0 events an hour.   She is NOT compliant with CPAP use. Complaince download today reveals 0.0% of days with greater than 4 hours of device use.   Patient reports benefit from CPAP use and denies snoring or excessive daytime sleepiness.  Patient reports complaint of water reservior not fitting in machine. worked with pt in office and exchanged old lid w/bottom of new reservior . Nasal pillows mask is tolerated.   [place] 3    Previous Report Reviewed: lab reports and office notes     Past Medical History: The following portions of the patient's history were reviewed and updated as appropriate:   She  has a past surgical history that includes Back surgery; Bladder surgery; Cardiac catheterization (03/2013); Colonoscopy (N/A, 11/13/2015); Oophorectomy; bladder cyst removal; Lumbar spine surgery; and Hysterectomy.  Her family history includes Cataracts in her mother; Diabetes in her father, paternal uncle, sister, and sister; Glaucoma in her sister; Heart disease in her mother; Hypertension in her brother, mother, sister, sister, and sister.  She  reports that she has never smoked. She has never used smokeless tobacco. She reports that she does not drink alcohol or use drugs.  She has a current medication list which includes the following prescription(s): acetaminophen, allopurinol, calcium carbonate, colchicine, diclofenac sodium, gabapentin, olmesartan-amlodipin-hcthiazid, om 3/e/linol/ala/oleic/gla/lip, pravastatin, tramadol, vitamin d, warfarin, warfarin, aspirin, baclofen, carvedilol, loratadine, and methylprednisolone.  She is allergic to erythromycin; amlodipine; diazepam; iodine; meperidine; morphine; and  "primidone.    The following portions of the patient's history were reviewed and updated as appropriate: allergies, current medications, past family history, past medical history, past social history, past surgical history and problem list.    Review of Systems   Constitutional: Negative for fever, chills, weight loss, weight gain, activity change, appetite change, fatigue and night sweats.   HENT: Negative for postnasal drip, rhinorrhea, sinus pressure, voice change and congestion.    Eyes: Negative for redness and itching.   Respiratory: Negative for snoring, cough, sputum production, chest tightness, shortness of breath, wheezing, orthopnea, asthma nighttime symptoms, dyspnea on extertion, use of rescue inhaler and somnolence.    Cardiovascular: Negative.  Negative for chest pain, palpitations and leg swelling.   Genitourinary: Negative for difficulty urinating and hematuria.   Endocrine: Negative for cold intolerance and heat intolerance.    Musculoskeletal: Negative for arthralgias, gait problem, joint swelling and myalgias.   Skin: Negative.    Gastrointestinal: Negative for nausea, vomiting, abdominal pain and acid reflux.   Neurological: Negative for dizziness, weakness, light-headedness and headaches.   Hematological: Negative for adenopathy. No excessive bruising.   All other systems reviewed and are negative.     Objective:   Pulse 73   Resp 18   Ht 5' 5" (1.651 m)   Wt 82.9 kg (182 lb 12.2 oz)   SpO2 98%   BMI 30.41 kg/m²   Physical Exam   Constitutional: She is oriented to person, place, and time. She appears well-developed and well-nourished. She is active and cooperative.  Non-toxic appearance. She does not appear ill. No distress.   HENT:   Head: Normocephalic.   Right Ear: External ear normal.   Left Ear: External ear normal.   Nose: Nose normal.   Mouth/Throat: Oropharynx is clear and moist. No oropharyngeal exudate.   Eyes: Conjunctivae are normal.   Neck: Normal range of motion. Neck supple. "   Cardiovascular: Normal rate, regular rhythm, normal heart sounds and intact distal pulses.   Pulmonary/Chest: Effort normal and breath sounds normal. No stridor.   Abdominal: Soft.   Musculoskeletal: Normal range of motion.   Lymphadenopathy:     She has no cervical adenopathy.   Neurological: She is alert and oriented to person, place, and time.   Skin: Skin is warm and dry.   Psychiatric: She has a normal mood and affect. Her behavior is normal. Judgment and thought content normal.   Vitals reviewed.      Personal Diagnostic Review  CPAP download  CPAP 10 cm  Compliance Summary  9/4/2019 - 10/3/2019 (30 days)  Days with Device Usage 1 day  Days without Device Usage 29 days  Percent Days with Device Usage 3.3%  Cumulative Usage 2 secs.  Maximum Usage (1 Day) 2 secs.  Average Usage (All Days)  Average Usage (Days Used) 2 secs.  Minimum Usage (1 Day) 2 secs.  Percent of Days with Usage >= 4 Hours 0.0%  Percent of Days with Usage < 4 Hours 100.0%  Date Range  Total Blower Time 2 secs.  CPAP Summary ([facility])  Average Time in Large Leak Per Day 0 secs.  Average AHI 0.0  CPAP 10.0 cmH2O    Assessment:     1. VIRGIL on CPAP    2. Obesity (BMI 30.0-34.9)    3. Tortuous aorta    4. Chronic obstructive pulmonary disease, unspecified COPD type    5. Essential hypertension        Orders Placed This Encounter   Procedures    CPAP/BIPAP SUPPLIES     Benefits  90 day supply. 4 refills  HME: [facility]     Order Specific Question:   Type of mask:     Answer:   Nasal     Order Specific Question:   Headgear?     Answer:   Yes     Order Specific Question:   Tubing?     Answer:   Yes     Order Specific Question:   Humidifier chamber?     Answer:   Yes     Order Specific Question:   Chin strap?     Answer:   Yes     Order Specific Question:   Filters?     Answer:   Yes     Order Specific Question:   Cushions?     Answer:   Yes     Order Specific Question:   Length of need (1-99 months):     Answer:   99     Plan:     Problem  List Items Addressed This Visit     Tortuous aorta     Seen on imaging           VIRGIL on CPAP - Primary (Chronic)     Benefits with CPAP 10 cm  Improve Adherence  Yearly supply order   Never obtained new machine  Last used December, unable to fix water reservoir, helped patient and fit old lid on new bottom and now fits in machine, advised to contact [facility] for correct water reservoir.  She does not want new machine, since current is working.   Follow up in 3 months determine if improved compliance             Relevant Orders    CPAP/BIPAP SUPPLIES    Obesity (BMI 30.0-34.9) (Chronic)     Continue to encourage exercise and dietary modification to obtain normal weight.            Essential hypertension (Chronic)     Controlled, followed by cardiology         COPD (chronic obstructive pulmonary disease) (Chronic)     No cough, no wheezing, no shortness of breath   4/2013 mild obstruction on pft  Patient declines additional testing  Never smoker  Asymptomatic  No inhaler use                (DME) - [facility]  Reviewed therapeutic goals for positive airway pressure therapy CPAP  Ideal is usage 100% of nights for 6 - 8 hours per night. Minimum usage is 70% of night for at least 4 hours per night used.     Follow up in about 3 months (around 6/30/2020) for CPAP 3 mo compliance download not adherent.

## 2020-03-30 NOTE — ASSESSMENT & PLAN NOTE
No cough, no wheezing, no shortness of breath   4/2013 mild obstruction on pft  Patient declines additional testing  Never smoker  Asymptomatic  No inhaler use

## 2020-04-20 ENCOUNTER — ANTI-COAG VISIT (OUTPATIENT)
Dept: CARDIOLOGY | Facility: CLINIC | Age: 70
End: 2020-04-20
Payer: MEDICARE

## 2020-04-20 ENCOUNTER — LAB VISIT (OUTPATIENT)
Dept: LAB | Facility: HOSPITAL | Age: 70
End: 2020-04-20
Attending: INTERNAL MEDICINE
Payer: MEDICARE

## 2020-04-20 DIAGNOSIS — D68.51 HETEROZYGOUS FACTOR V LEIDEN MUTATION: Chronic | ICD-10-CM

## 2020-04-20 DIAGNOSIS — Z79.01 LONG TERM (CURRENT) USE OF ANTICOAGULANTS: ICD-10-CM

## 2020-04-20 LAB
INR PPP: 1.7 (ref 0.8–1.2)
PROTHROMBIN TIME: 17.6 SEC (ref 9–12.5)

## 2020-04-20 PROCEDURE — 36415 COLL VENOUS BLD VENIPUNCTURE: CPT | Mod: HCNC

## 2020-04-20 PROCEDURE — 93793 ANTICOAG MGMT PT WARFARIN: CPT | Mod: S$GLB,,,

## 2020-04-20 PROCEDURE — 93793 PR ANTICOAGULANT MGMT FOR PT TAKING WARFARIN: ICD-10-PCS | Mod: S$GLB,,,

## 2020-04-20 PROCEDURE — 85610 PROTHROMBIN TIME: CPT | Mod: HCNC

## 2020-04-20 NOTE — PROGRESS NOTES
Patient contacted:  INR is subtherapeutic at 1.7.  Reports no missed doses or dietary changes.  No signs or symptoms noted.  Instructions given:  Will boost today's warfarin dose to 7.5 mg - only, then resume current maintenance dose of 7.5 mg every Tuesday and Thursday; and 5 mg on all other days per week.  Recheck on 5/11/2020 ([facility]).  Patient repeated back instructions and voiced understanding.

## 2020-05-04 ENCOUNTER — TELEPHONE (OUTPATIENT)
Dept: PAIN MEDICINE | Facility: CLINIC | Age: 70
End: 2020-05-04

## 2020-05-04 NOTE — TELEPHONE ENCOUNTER
----- Message from Karlos Rios sent at 5/4/2020  4:49 PM CDT -----  Contact: Pt   Pt called in regards to canceling appointment. Pt can be reached at [phone] (tpoc)

## 2020-05-08 ENCOUNTER — PATIENT MESSAGE (OUTPATIENT)
Dept: ADMINISTRATIVE | Facility: CLINIC | Age: 70
End: 2020-05-08

## 2020-05-11 ENCOUNTER — ANTI-COAG VISIT (OUTPATIENT)
Dept: CARDIOLOGY | Facility: CLINIC | Age: 70
End: 2020-05-11
Payer: MEDICARE

## 2020-05-11 ENCOUNTER — LAB VISIT (OUTPATIENT)
Dept: LAB | Facility: HOSPITAL | Age: 70
End: 2020-05-11
Attending: INTERNAL MEDICINE
Payer: MEDICARE

## 2020-05-11 DIAGNOSIS — Z79.01 LONG TERM (CURRENT) USE OF ANTICOAGULANTS: ICD-10-CM

## 2020-05-11 DIAGNOSIS — D68.51 HETEROZYGOUS FACTOR V LEIDEN MUTATION: Chronic | ICD-10-CM

## 2020-05-11 LAB
INR PPP: 1.8 (ref 0.8–1.2)
PROTHROMBIN TIME: 19.5 SEC (ref 9–12.5)

## 2020-05-11 PROCEDURE — 93793 PR ANTICOAGULANT MGMT FOR PT TAKING WARFARIN: ICD-10-PCS | Mod: S$GLB,,,

## 2020-05-11 PROCEDURE — 85610 PROTHROMBIN TIME: CPT | Mod: HCNC

## 2020-05-11 PROCEDURE — 36415 COLL VENOUS BLD VENIPUNCTURE: CPT | Mod: HCNC

## 2020-05-11 PROCEDURE — 93793 ANTICOAG MGMT PT WARFARIN: CPT | Mod: S$GLB,,,

## 2020-05-11 NOTE — PROGRESS NOTES
Patient contacted:  INR is subtherapeutic at 1.8, but trending up to goal.  Previous instructions reported followed.  No other changes noted.  Instructions given:  Will increase dose of warfarin to 7.5 mg every Monday, Wednesday, and Friday; and 5 mg on all other days per week.  Recheck in the [facility] on 5/27/2020 at 1000a.  Patient repeated back instructions and verbalized understanding.

## 2020-05-27 ENCOUNTER — ANTI-COAG VISIT (OUTPATIENT)
Dept: CARDIOLOGY | Facility: CLINIC | Age: 70
End: 2020-05-27
Payer: MEDICARE

## 2020-05-27 ENCOUNTER — PATIENT OUTREACH (OUTPATIENT)
Dept: ADMINISTRATIVE | Facility: OTHER | Age: 70
End: 2020-05-27

## 2020-05-27 DIAGNOSIS — Z79.01 LONG TERM (CURRENT) USE OF ANTICOAGULANTS: Primary | ICD-10-CM

## 2020-05-27 DIAGNOSIS — D68.51 HETEROZYGOUS FACTOR V LEIDEN MUTATION: Chronic | ICD-10-CM

## 2020-05-27 LAB — INR PPP: 2 (ref 2–3)

## 2020-05-27 PROCEDURE — 93793 PR ANTICOAGULANT MGMT FOR PT TAKING WARFARIN: ICD-10-PCS | Mod: HCNC,S$GLB,,

## 2020-05-27 PROCEDURE — 85610 PROTHROMBIN TIME: CPT | Mod: QW,HCNC,S$GLB, | Performed by: NUCLEAR MEDICINE

## 2020-05-27 PROCEDURE — 93793 ANTICOAG MGMT PT WARFARIN: CPT | Mod: HCNC,S$GLB,,

## 2020-05-27 PROCEDURE — 85610 POCT INR: ICD-10-PCS | Mod: QW,HCNC,S$GLB, | Performed by: NUCLEAR MEDICINE

## 2020-05-27 NOTE — PROGRESS NOTES
Patient's INR is therapeutic at 2.0.  Previous instructions were followed.  No other changes reported.  Instructions given:  Maintain current dose of warfarin 7.5 mg every Monday, Wednesday, and Friday; and 5 mg on all other days per week.  Recheck in 3 weeks.  Dose calendar given and reviewed with patient.  Patient verbalized understanding.

## 2020-06-05 ENCOUNTER — TELEPHONE (OUTPATIENT)
Dept: PULMONOLOGY | Facility: CLINIC | Age: 70
End: 2020-06-05

## 2020-06-09 DIAGNOSIS — I10 ESSENTIAL HYPERTENSION: Chronic | ICD-10-CM

## 2020-06-09 DIAGNOSIS — E78.5 HYPERLIPIDEMIA, UNSPECIFIED HYPERLIPIDEMIA TYPE: ICD-10-CM

## 2020-06-09 RX ORDER — PRAVASTATIN SODIUM 40 MG/1
40 TABLET ORAL DAILY
Qty: 90 TABLET | Refills: 3 | Status: SHIPPED | OUTPATIENT
Start: 2020-06-09 | End: 2021-01-27

## 2020-06-09 RX ORDER — BACLOFEN 10 MG/1
10 TABLET ORAL 3 TIMES DAILY PRN
Qty: 270 TABLET | Refills: 3 | Status: SHIPPED | OUTPATIENT
Start: 2020-06-09 | End: 2021-07-29 | Stop reason: SDUPTHER

## 2020-06-09 RX ORDER — CARVEDILOL 6.25 MG/1
6.25 TABLET ORAL 2 TIMES DAILY WITH MEALS
Qty: 180 TABLET | Refills: 2 | Status: SHIPPED | OUTPATIENT
Start: 2020-06-09 | End: 2021-01-14

## 2020-06-09 NOTE — TELEPHONE ENCOUNTER
----- Message from Brooke Crum sent at 6/9/2020 12:39 PM CDT -----  Contact: pt  Type:  RX Refill Request    Who Called: pt  Refill or New Rx:refill  RX Name and Strength:carvedilol 6.25 mg and pravastatin 40 mg  How is the patient currently taking it? (ex. 1XDay):2Xday and 1Xday  Is this a 30 day or 90 day RX:180 day and 90 day  Preferred Pharmacy with phone number:.  [facility] - [place], [place] - [address]  [address] [address]  Phone: [phone] Fax: [phone]  Local or Mail Order:mail order  Ordering Provider:Dr Mujica  Would the patient rather a call back or a response via [facility]? Call back  Best Call Back Number:[phone]  Additional Information: .    Thank you

## 2020-06-10 ENCOUNTER — TELEPHONE (OUTPATIENT)
Dept: PAIN MEDICINE | Facility: CLINIC | Age: 70
End: 2020-06-10

## 2020-06-10 NOTE — TELEPHONE ENCOUNTER
----- Message from Luis Pressley MD sent at 6/9/2020  6:12 PM CDT -----  Contact: pt  Done!  ----- Message -----  From: Agatha Cooney MA  Sent: 6/9/2020   1:22 PM CDT  To: Luis Pressley MD    Please advise  ----- Message -----  From: Brooke Crum  Sent: 6/9/2020  12:42 PM CDT  To: [facility] Staff    Type:  RX Refill Request    Who Called: pt  Refill or New Rx:refill  RX Name and Strength: baclofen 10 mg  How is the patient currently taking it? (ex. 1XDay):3XDay   Is this a 30 day or 90 day RX:30, she would like to get a 90 days, mail order  Preferred Pharmacy with phone number:.  [facility] - [place], [place] - [address]  [address] [address]  Phone: [phone] Fax: [phone]  Local or Mail Order:mail order   Ordering Provider:Dr Pressley  Would the patient rather a call back or a response via [facility]? Call back  Best Call Back Number:[phone]  Additional Information: .    Thank you

## 2020-06-19 ENCOUNTER — ANTI-COAG VISIT (OUTPATIENT)
Dept: CARDIOLOGY | Facility: CLINIC | Age: 70
End: 2020-06-19
Payer: MEDICARE

## 2020-06-19 DIAGNOSIS — Z79.01 LONG TERM (CURRENT) USE OF ANTICOAGULANTS: Primary | ICD-10-CM

## 2020-06-19 DIAGNOSIS — D68.51 HETEROZYGOUS FACTOR V LEIDEN MUTATION: Chronic | ICD-10-CM

## 2020-06-19 LAB — INR PPP: 2.6 (ref 2–3)

## 2020-06-19 PROCEDURE — 85610 POCT INR: ICD-10-PCS | Mod: QW,HCNC,S$GLB, | Performed by: INTERNAL MEDICINE

## 2020-06-19 PROCEDURE — 85610 PROTHROMBIN TIME: CPT | Mod: QW,HCNC,S$GLB, | Performed by: INTERNAL MEDICINE

## 2020-06-19 PROCEDURE — 93793 ANTICOAG MGMT PT WARFARIN: CPT | Mod: HCNC,S$GLB,,

## 2020-06-19 PROCEDURE — 93793 PR ANTICOAGULANT MGMT FOR PT TAKING WARFARIN: ICD-10-PCS | Mod: HCNC,S$GLB,,

## 2020-06-19 NOTE — PROGRESS NOTES
Patient's INR is therapeutic at 2.6.  Reports no recent changes.  Instructions given:  Maintain current dose of warfarin 7.5 mg every Monday, Wednesday, and Friday; and 5 mg on all other days per week.  Dose calendar given and reviewed with patient.  Recheck in 1 month.  Patient verbalized understanding.

## 2020-06-22 ENCOUNTER — LAB VISIT (OUTPATIENT)
Dept: LAB | Facility: HOSPITAL | Age: 70
End: 2020-06-22
Payer: MEDICARE

## 2020-06-22 ENCOUNTER — PATIENT OUTREACH (OUTPATIENT)
Dept: ADMINISTRATIVE | Facility: HOSPITAL | Age: 70
End: 2020-06-22

## 2020-06-22 ENCOUNTER — OFFICE VISIT (OUTPATIENT)
Dept: FAMILY MEDICINE | Facility: CLINIC | Age: 70
End: 2020-06-22
Payer: MEDICARE

## 2020-06-22 VITALS
OXYGEN SATURATION: 98 % | DIASTOLIC BLOOD PRESSURE: 88 MMHG | SYSTOLIC BLOOD PRESSURE: 130 MMHG | HEART RATE: 65 BPM | BODY MASS INDEX: 30.71 KG/M2 | HEIGHT: 65 IN | TEMPERATURE: 98 F | WEIGHT: 184.31 LBS

## 2020-06-22 DIAGNOSIS — E11.9 TYPE 2 DIABETES MELLITUS WITHOUT COMPLICATION, WITHOUT LONG-TERM CURRENT USE OF INSULIN: ICD-10-CM

## 2020-06-22 DIAGNOSIS — I10 ESSENTIAL HYPERTENSION: Primary | ICD-10-CM

## 2020-06-22 DIAGNOSIS — E11.9 TYPE 2 DIABETES MELLITUS WITHOUT COMPLICATION, WITHOUT LONG-TERM CURRENT USE OF INSULIN: Primary | ICD-10-CM

## 2020-06-22 DIAGNOSIS — I10 ESSENTIAL HYPERTENSION: ICD-10-CM

## 2020-06-22 LAB
ALBUMIN SERPL BCP-MCNC: 4 G/DL (ref 3.5–5.2)
ALP SERPL-CCNC: 46 U/L (ref 55–135)
ALT SERPL W/O P-5'-P-CCNC: 22 U/L (ref 10–44)
ANION GAP SERPL CALC-SCNC: 9 MMOL/L (ref 8–16)
AST SERPL-CCNC: 22 U/L (ref 10–40)
BASOPHILS # BLD AUTO: 0.06 K/UL (ref 0–0.2)
BASOPHILS NFR BLD: 0.9 % (ref 0–1.9)
BILIRUB SERPL-MCNC: 0.8 MG/DL (ref 0.1–1)
BUN SERPL-MCNC: 13 MG/DL (ref 8–23)
CALCIUM SERPL-MCNC: 9.5 MG/DL (ref 8.7–10.5)
CHLORIDE SERPL-SCNC: 103 MMOL/L (ref 95–110)
CO2 SERPL-SCNC: 29 MMOL/L (ref 23–29)
CREAT SERPL-MCNC: 0.8 MG/DL (ref 0.5–1.4)
DIFFERENTIAL METHOD: ABNORMAL
EOSINOPHIL # BLD AUTO: 0.3 K/UL (ref 0–0.5)
EOSINOPHIL NFR BLD: 3.7 % (ref 0–8)
ERYTHROCYTE [DISTWIDTH] IN BLOOD BY AUTOMATED COUNT: 16.1 % (ref 11.5–14.5)
EST. GFR  (AFRICAN AMERICAN): >60 ML/MIN/1.73 M^2
EST. GFR  (NON AFRICAN AMERICAN): >60 ML/MIN/1.73 M^2
GLUCOSE SERPL-MCNC: 71 MG/DL (ref 70–110)
HCT VFR BLD AUTO: 40.7 % (ref 37–48.5)
HGB BLD-MCNC: 12.4 G/DL (ref 12–16)
IMM GRANULOCYTES # BLD AUTO: 0.02 K/UL (ref 0–0.04)
IMM GRANULOCYTES NFR BLD AUTO: 0.3 % (ref 0–0.5)
LYMPHOCYTES # BLD AUTO: 2 K/UL (ref 1–4.8)
LYMPHOCYTES NFR BLD: 30 % (ref 18–48)
MCH RBC QN AUTO: 28.1 PG (ref 27–31)
MCHC RBC AUTO-ENTMCNC: 30.5 G/DL (ref 32–36)
MCV RBC AUTO: 92 FL (ref 82–98)
MONOCYTES # BLD AUTO: 0.9 K/UL (ref 0.3–1)
MONOCYTES NFR BLD: 12.8 % (ref 4–15)
NEUTROPHILS # BLD AUTO: 3.5 K/UL (ref 1.8–7.7)
NEUTROPHILS NFR BLD: 52.3 % (ref 38–73)
NRBC BLD-RTO: 0 /100 WBC
PLATELET # BLD AUTO: 252 K/UL (ref 150–350)
PMV BLD AUTO: 10 FL (ref 9.2–12.9)
POTASSIUM SERPL-SCNC: 3.9 MMOL/L (ref 3.5–5.1)
PROT SERPL-MCNC: 7.6 G/DL (ref 6–8.4)
RBC # BLD AUTO: 4.42 M/UL (ref 4–5.4)
SODIUM SERPL-SCNC: 141 MMOL/L (ref 136–145)
WBC # BLD AUTO: 6.71 K/UL (ref 3.9–12.7)

## 2020-06-22 PROCEDURE — 36415 COLL VENOUS BLD VENIPUNCTURE: CPT | Mod: HCNC,PO

## 2020-06-22 PROCEDURE — 3079F DIAST BP 80-89 MM HG: CPT | Mod: HCNC,CPTII,S$GLB, | Performed by: REGISTERED NURSE

## 2020-06-22 PROCEDURE — 82043 UR ALBUMIN QUANTITATIVE: CPT | Mod: HCNC

## 2020-06-22 PROCEDURE — 3008F PR BODY MASS INDEX (BMI) DOCUMENTED: ICD-10-PCS | Mod: HCNC,CPTII,S$GLB, | Performed by: REGISTERED NURSE

## 2020-06-22 PROCEDURE — 3075F PR MOST RECENT SYSTOLIC BLOOD PRESS GE 130-139MM HG: ICD-10-PCS | Mod: HCNC,CPTII,S$GLB, | Performed by: REGISTERED NURSE

## 2020-06-22 PROCEDURE — 3075F SYST BP GE 130 - 139MM HG: CPT | Mod: HCNC,CPTII,S$GLB, | Performed by: REGISTERED NURSE

## 2020-06-22 PROCEDURE — 3044F HG A1C LEVEL LT 7.0%: CPT | Mod: HCNC,CPTII,S$GLB, | Performed by: REGISTERED NURSE

## 2020-06-22 PROCEDURE — 99999 PR PBB SHADOW E&M-EST. PATIENT-LVL IV: CPT | Mod: PBBFAC,HCNC,, | Performed by: REGISTERED NURSE

## 2020-06-22 PROCEDURE — 3079F PR MOST RECENT DIASTOLIC BLOOD PRESSURE 80-89 MM HG: ICD-10-PCS | Mod: HCNC,CPTII,S$GLB, | Performed by: REGISTERED NURSE

## 2020-06-22 PROCEDURE — 99214 PR OFFICE/OUTPT VISIT, EST, LEVL IV, 30-39 MIN: ICD-10-PCS | Mod: HCNC,S$GLB,, | Performed by: REGISTERED NURSE

## 2020-06-22 PROCEDURE — 1159F MED LIST DOCD IN RCRD: CPT | Mod: HCNC,S$GLB,, | Performed by: REGISTERED NURSE

## 2020-06-22 PROCEDURE — 3008F BODY MASS INDEX DOCD: CPT | Mod: HCNC,CPTII,S$GLB, | Performed by: REGISTERED NURSE

## 2020-06-22 PROCEDURE — 85025 COMPLETE CBC W/AUTO DIFF WBC: CPT | Mod: HCNC

## 2020-06-22 PROCEDURE — 99214 OFFICE O/P EST MOD 30 MIN: CPT | Mod: HCNC,S$GLB,, | Performed by: REGISTERED NURSE

## 2020-06-22 PROCEDURE — 80053 COMPREHEN METABOLIC PANEL: CPT | Mod: HCNC

## 2020-06-22 PROCEDURE — 99999 PR PBB SHADOW E&M-EST. PATIENT-LVL IV: ICD-10-PCS | Mod: PBBFAC,HCNC,, | Performed by: REGISTERED NURSE

## 2020-06-22 PROCEDURE — 3044F PR MOST RECENT HEMOGLOBIN A1C LEVEL <7.0%: ICD-10-PCS | Mod: HCNC,CPTII,S$GLB, | Performed by: REGISTERED NURSE

## 2020-06-22 PROCEDURE — 1101F PT FALLS ASSESS-DOCD LE1/YR: CPT | Mod: HCNC,CPTII,S$GLB, | Performed by: REGISTERED NURSE

## 2020-06-22 PROCEDURE — 83036 HEMOGLOBIN GLYCOSYLATED A1C: CPT | Mod: HCNC

## 2020-06-22 PROCEDURE — 1159F PR MEDICATION LIST DOCUMENTED IN MEDICAL RECORD: ICD-10-PCS | Mod: HCNC,S$GLB,, | Performed by: REGISTERED NURSE

## 2020-06-22 PROCEDURE — 1101F PR PT FALLS ASSESS DOC 0-1 FALLS W/OUT INJ PAST YR: ICD-10-PCS | Mod: HCNC,CPTII,S$GLB, | Performed by: REGISTERED NURSE

## 2020-06-22 RX ORDER — LANCETS
EACH MISCELLANEOUS
Qty: 100 EACH | Refills: 2 | Status: SHIPPED | OUTPATIENT
Start: 2020-06-22 | End: 2022-11-30

## 2020-06-22 RX ORDER — INSULIN PUMP SYRINGE, 3 ML
EACH MISCELLANEOUS
Qty: 1 EACH | Refills: 0 | Status: SHIPPED | OUTPATIENT
Start: 2020-06-22 | End: 2021-06-22

## 2020-06-22 NOTE — PROGRESS NOTES
Subjective:       Patient ID: Alyx Weir is a 70 y.o. female.    Chief Complaint   Patient presents with    Follow-up       HPI    Patient here today for routine f/u of chronic medical issues.    HTN --- does not bring in her log, unsure of readings.  Healthy diet, no salt, no exercise.  Weight stable.    DM --- needs machine/supplies --- denies increased urination/thirst, good appetite.  Eye exam last done about 6 mo ago.  No neuropathy.    CAD --- on Coumadin, doing well.  Followed by [facility].  No cp, sob or edema.      She is requesting a letter to exempt her from working at this time.  Employed with [employer].  Expresses concerns due to possible exposure to COVID and in high-risk group.        BP Readings from Last 3 Encounters:   06/22/20 130/88   03/11/20 126/76   02/11/20 (!) 156/89       Lab Results   Component Value Date    HGBA1C 6.2 (H) 11/21/2019         Review of Systems   Constitutional: Negative.    HENT: Negative.    Respiratory: Negative.    Cardiovascular: Negative.    Gastrointestinal: Negative.    Endocrine: Negative for polydipsia, polyphagia and polyuria.   Genitourinary: Negative.    Musculoskeletal: Positive for arthralgias. Negative for gait problem and joint swelling.   Skin: Negative.    Neurological: Negative.          Review of patient's allergies indicates:   Allergen Reactions    Erythromycin Edema     Angioedema to throat    Amlodipine Other (See Comments)     Headaches    Diazepam Hives    Iodine Hives    Meperidine Hives    Morphine Itching    Primidone Other (See Comments)         Patient Active Problem List   Diagnosis    Heterozygous factor V Leiden mutation    Essential hypertension    Pure hypercholesterolemia with target low density lipoprotein (LDL) cholesterol less than 130 mg/dL    VIRGIL on CPAP    Anticoagulated on Coumadin    Type 2 diabetes mellitus without complication    Obesity (BMI 30.0-34.9)    Chronic lumbar radiculopathy    Coronary  artery disease involving native coronary artery without angina pectoris    Left ventricular diastolic dysfunction with preserved systolic function    COPD (chronic obstructive pulmonary disease)    Tortuous aorta    Calcification of both carotid arteries    Chondromalacia, right knee    Dyslipidemia         Current Outpatient Medications:     acetaminophen (TYLENOL ARTHRITIS ORAL), Take by mouth., Disp: , Rfl:     allopurinoL (ZYLOPRIM) 100 MG tablet, TAKE 1 TABLET(100 MG) BY MOUTH EVERY DAY, Disp: 30 tablet, Rfl: 5    aspirin (ECOTRIN) 81 MG EC tablet, Take 1 tablet (81 mg total) by mouth once daily., Disp: 30 tablet, Rfl: 0    baclofen (LIORESAL) 10 MG tablet, Take 1 tablet (10 mg total) by mouth 3 (three) times daily as needed., Disp: 270 tablet, Rfl: 3    carvediloL (COREG) 6.25 MG tablet, Take 1 tablet (6.25 mg total) by mouth 2 (two) times daily with meals., Disp: 180 tablet, Rfl: 2    colchicine (COLCRYS) 0.6 mg tablet, Take 1 tablet (0.6 mg total) by mouth once daily., Disp: 30 tablet, Rfl: 11    diclofenac sodium (VOLTAREN) 1 % Gel, APPLY 2 GRAMS TOPICALLY DAILY AS NEEDED, Disp: 200 g, Rfl: 2    gabapentin (NEURONTIN) 100 MG capsule, 1 tablet in the morning and 3 at night, Disp: 270 capsule, Rfl: 3    olmesartan-amLODIPin-hcthiazid 40-10-25 mg Tab, TAKE 1 TABLET EVERY DAY, Disp: 90 tablet, Rfl: 3    om 3/E/linol/ala/oleic/gla/lip (OMEGA 3-6-9 ORAL), Take 1 capsule by mouth once daily., Disp: , Rfl:     pravastatin (PRAVACHOL) 40 MG tablet, Take 1 tablet (40 mg total) by mouth once daily., Disp: 90 tablet, Rfl: 3    traMADol (ULTRAM) 50 mg tablet, Take 1 tablet (50 mg total) by mouth 2 (two) times daily as needed for Pain., Disp: 60 tablet, Rfl: 0    vitamin D 1000 units Tab, Take 185 mg by mouth once daily., Disp: , Rfl:     warfarin (COUMADIN) 10 MG tablet, Take 1 tablet (10 mg total) by mouth every Mon, Wed, Fri. Or As directed by coumadin clinic (Patient not taking: Reported on  "4/20/2020), Disp: 36 tablet, Rfl: 3    warfarin (COUMADIN) 5 MG tablet, Take 1 and 1/2 tablets (7.5mg) by mouth on Mondays, Wednesdays and Fridays; and 1 tablet (5mg) on all other days of the week. (Patient taking differently: Take 1 and 1/2 tablets (7.5mg) by mouth on Mondays, Wednesdays, and Fridays; and 1 tablet (5mg) on all other days of the week.), Disp: 108 tablet, Rfl: 3        Past medical, surgical, family and social histories have been reviewed today.      Objective:     Vitals:    06/22/20 1352   BP: 130/88   Pulse: 65   Temp: 98.2 °F (36.8 °C)   TempSrc: Oral   SpO2: 98%   Weight: 83.6 kg (184 lb 4.9 oz)   Height: 5' 5" (1.651 m)         Physical Exam  Constitutional:       General: She is not in acute distress.     Appearance: She is not ill-appearing.   HENT:      Head: Normocephalic and atraumatic.   Eyes:      Pupils: Pupils are equal, round, and reactive to light.   Cardiovascular:      Rate and Rhythm: Normal rate and regular rhythm.      Pulses: Normal pulses.      Heart sounds: Normal heart sounds. No murmur. No gallop.    Pulmonary:      Effort: Pulmonary effort is normal.      Breath sounds: Normal breath sounds.   Skin:     General: Skin is warm and dry.      Capillary Refill: Capillary refill takes less than 2 seconds.      Findings: No erythema or rash.   Neurological:      General: No focal deficit present.      Mental Status: She is alert and oriented to person, place, and time.      Sensory: No sensory deficit.      Motor: No weakness.      Gait: Gait normal.   Psychiatric:         Mood and Affect: Mood normal.         Behavior: Behavior normal.         Thought Content: Thought content normal.         Judgment: Judgment normal.           Diagnosis       1. Essential hypertension    2. Type 2 diabetes mellitus without complication, without long-term current use of insulin          Assessment/ Plan     Essential hypertension --- controlled today in office, routine home checks w/ log.  " Healthy diet, exercise.  -     CBC auto differential; Future; Expected date: 06/22/2020  -     Comprehensive metabolic panel; Future; Expected date: 06/22/2020  -     Hemoglobin A1C; Future; Expected date: 06/22/2020  -     Microalbumin/creatinine urine ratio    Type 2 diabetes mellitus without complication, without long-term current use of insulin --- check lab.  Current control unknown, DM supplies ordered for her today.  -     CBC auto differential; Future; Expected date: 06/22/2020  -     Comprehensive metabolic panel; Future; Expected date: 06/22/2020  -     Hemoglobin A1C; Future; Expected date: 06/22/2020  -     Microalbumin/creatinine urine ratio  -     blood-glucose meter kit; To check BG 2 times daily, to use with insurance preferred meter  Dispense: 1 each; Refill: 0  -     lancets Misc; To check BG 2 times daily, to use with insurance preferred meter  Dispense: 100 each; Refill: 2  -     blood sugar diagnostic Strp; To check BG 2 times daily, to use with insurance preferred meter  Dispense: 100 strip; Refill: 2      Letter provided to excuse her from work.  See letter tab for copy.      Follow-up:  Further treatment plan pending lab.  Return prn.      CORY Chaudhari  [facility]

## 2020-06-22 NOTE — PROGRESS NOTES
[employer] report for pt. over due on  urine micro albumin and A1c. Pt has a schedule appt today 06-. I made a note to staff to have overdue labs completed, I also notified LPN-CC to put order in for labs.

## 2020-06-23 LAB
ESTIMATED AVG GLUCOSE: 128 MG/DL (ref 68–131)
HBA1C MFR BLD HPLC: 6.1 % (ref 4–5.6)

## 2020-06-24 ENCOUNTER — PATIENT OUTREACH (OUTPATIENT)
Dept: ADMINISTRATIVE | Facility: OTHER | Age: 70
End: 2020-06-24

## 2020-06-24 LAB
ALBUMIN/CREAT UR: 22.4 UG/MG (ref 0–30)
CREAT UR-MCNC: 67 MG/DL (ref 15–325)
MICROALBUMIN UR DL<=1MG/L-MCNC: 15 UG/ML

## 2020-06-25 NOTE — ASSESSMENT & PLAN NOTE
Continue to encourage exercise and dietary modification to obtain normal weight.      Noted. Is there a projection for f/u telephone visit or repeat labs?

## 2020-06-29 ENCOUNTER — OFFICE VISIT (OUTPATIENT)
Dept: PULMONOLOGY | Facility: CLINIC | Age: 70
End: 2020-06-29
Payer: MEDICARE

## 2020-06-29 VITALS
HEIGHT: 65 IN | WEIGHT: 176 LBS | BODY MASS INDEX: 29.32 KG/M2 | OXYGEN SATURATION: 98 % | HEART RATE: 52 BPM | SYSTOLIC BLOOD PRESSURE: 132 MMHG | RESPIRATION RATE: 18 BRPM | DIASTOLIC BLOOD PRESSURE: 70 MMHG

## 2020-06-29 DIAGNOSIS — I77.1 TORTUOUS AORTA: ICD-10-CM

## 2020-06-29 DIAGNOSIS — G47.33 OSA ON CPAP: Primary | Chronic | ICD-10-CM

## 2020-06-29 DIAGNOSIS — J44.9 CHRONIC OBSTRUCTIVE PULMONARY DISEASE, UNSPECIFIED COPD TYPE: Chronic | ICD-10-CM

## 2020-06-29 DIAGNOSIS — E11.9 TYPE 2 DIABETES MELLITUS WITHOUT COMPLICATION, WITHOUT LONG-TERM CURRENT USE OF INSULIN: ICD-10-CM

## 2020-06-29 DIAGNOSIS — E66.3 OVERWEIGHT WITH BODY MASS INDEX (BMI) 25.0-29.9: ICD-10-CM

## 2020-06-29 DIAGNOSIS — I65.23 CALCIFICATION OF BOTH CAROTID ARTERIES: ICD-10-CM

## 2020-06-29 PROCEDURE — 99999 PR PBB SHADOW E&M-EST. PATIENT-LVL IV: ICD-10-PCS | Mod: PBBFAC,HCNC,, | Performed by: NURSE PRACTITIONER

## 2020-06-29 PROCEDURE — 3075F PR MOST RECENT SYSTOLIC BLOOD PRESS GE 130-139MM HG: ICD-10-PCS | Mod: HCNC,CPTII,S$GLB, | Performed by: NURSE PRACTITIONER

## 2020-06-29 PROCEDURE — 99214 PR OFFICE/OUTPT VISIT, EST, LEVL IV, 30-39 MIN: ICD-10-PCS | Mod: HCNC,S$GLB,, | Performed by: NURSE PRACTITIONER

## 2020-06-29 PROCEDURE — 3044F HG A1C LEVEL LT 7.0%: CPT | Mod: HCNC,CPTII,S$GLB, | Performed by: NURSE PRACTITIONER

## 2020-06-29 PROCEDURE — 1101F PR PT FALLS ASSESS DOC 0-1 FALLS W/OUT INJ PAST YR: ICD-10-PCS | Mod: HCNC,CPTII,S$GLB, | Performed by: NURSE PRACTITIONER

## 2020-06-29 PROCEDURE — 99999 PR PBB SHADOW E&M-EST. PATIENT-LVL IV: CPT | Mod: PBBFAC,HCNC,, | Performed by: NURSE PRACTITIONER

## 2020-06-29 PROCEDURE — 3078F DIAST BP <80 MM HG: CPT | Mod: HCNC,CPTII,S$GLB, | Performed by: NURSE PRACTITIONER

## 2020-06-29 PROCEDURE — 99499 RISK ADDL DX/OHS AUDIT: ICD-10-PCS | Mod: HCNC,S$GLB,, | Performed by: NURSE PRACTITIONER

## 2020-06-29 PROCEDURE — 3078F PR MOST RECENT DIASTOLIC BLOOD PRESSURE < 80 MM HG: ICD-10-PCS | Mod: HCNC,CPTII,S$GLB, | Performed by: NURSE PRACTITIONER

## 2020-06-29 PROCEDURE — 1159F MED LIST DOCD IN RCRD: CPT | Mod: HCNC,S$GLB,, | Performed by: NURSE PRACTITIONER

## 2020-06-29 PROCEDURE — 3008F BODY MASS INDEX DOCD: CPT | Mod: HCNC,CPTII,S$GLB, | Performed by: NURSE PRACTITIONER

## 2020-06-29 PROCEDURE — 99214 OFFICE O/P EST MOD 30 MIN: CPT | Mod: HCNC,S$GLB,, | Performed by: NURSE PRACTITIONER

## 2020-06-29 PROCEDURE — 3075F SYST BP GE 130 - 139MM HG: CPT | Mod: HCNC,CPTII,S$GLB, | Performed by: NURSE PRACTITIONER

## 2020-06-29 PROCEDURE — 1101F PT FALLS ASSESS-DOCD LE1/YR: CPT | Mod: HCNC,CPTII,S$GLB, | Performed by: NURSE PRACTITIONER

## 2020-06-29 PROCEDURE — 3008F PR BODY MASS INDEX (BMI) DOCUMENTED: ICD-10-PCS | Mod: HCNC,CPTII,S$GLB, | Performed by: NURSE PRACTITIONER

## 2020-06-29 PROCEDURE — 99499 UNLISTED E&M SERVICE: CPT | Mod: HCNC,S$GLB,, | Performed by: NURSE PRACTITIONER

## 2020-06-29 PROCEDURE — 3044F PR MOST RECENT HEMOGLOBIN A1C LEVEL <7.0%: ICD-10-PCS | Mod: HCNC,CPTII,S$GLB, | Performed by: NURSE PRACTITIONER

## 2020-06-29 PROCEDURE — 1159F PR MEDICATION LIST DOCUMENTED IN MEDICAL RECORD: ICD-10-PCS | Mod: HCNC,S$GLB,, | Performed by: NURSE PRACTITIONER

## 2020-06-29 NOTE — PATIENT INSTRUCTIONS
Key components of the Mediterranean diet     The Mediterranean diet emphasizes:     Eating primarily plant-based foods, such as fruits and vegetables, whole grains, legumes and nuts  Replacing butter with healthy fats such as olive oil and canola oil  Using herbs and spices instead of salt to flavor foods  Limiting red meat to no more than a few times a month  Eating fish and poultry at least twice a week  Enjoying meals with family and friends  Drinking red wine in moderation (optional)  Getting plenty of exercise        The Mediterranean diet pyramid            Low-Salt Diet  This diet removes foods that are high in salt. It also limits the amount of salt you use when cooking. It is most often used for people with high blood pressure, edema (fluid retention), and kidney, liver, or heart disease.  Table salt contains the mineral sodium. Your body needs sodium to work normally. But too much sodium can make your health problems worse. Your healthcare provider is recommending a low-salt (also called low-sodium) diet for you. Your total daily allowance of salt is 1,500 to 2,300 milligrams (mg). It is less than 1 teaspoon of table salt. This means you can have only about 500 to 700 mg of sodium at each meal. People with certain health problems should limit salt intake to the lower end of the recommended range.    When you cook, dont add much salt. If you can cook without using salt, even better. Dont add salt to your food at the table.  When shopping, read food labels. Salt is often called sodium on the label. Choose foods that are salt-free, low salt, or very low salt. Note that foods with reduced salt may not lower your salt intake enough.    Beans, potatoes, and pasta  Ok: Dry beans, split peas, lentils, potatoes, rice, macaroni, pasta, spaghetti without added salt  Avoid: Potato chips, tortilla chips, and similar products  Breads and cereals  Ok: Low-sodium breads, rolls, cereals, and cakes; low-salt crackers,  matzo crackers  Avoid: Salted crackers, pretzels, popcorn, Nigerien toast, pancakes, muffins  Dairy  Ok: Milk, chocolate milk, hot chocolate mix, low-salt cheeses, and yogurt  Avoid: Processed cheese and cheese spreads; Roquefort, Camembert, and cottage cheese; buttermilk, instant breakfast drink  Desserts  Ok: Ice cream, frozen yogurt, juice bars, gelatin, cookies and pies, sugar, honey, jelly, hard candy  Avoid: Most pies, cakes and cookies prepared or processed with salt; instant pudding  Drinks  Ok: Tea, coffee, fizzy (carbonated) drinks, juices  Avoid: Flavored coffees, electrolyte replacement drinks, sports drinks  Meats  Ok: All fresh meat, fish, poultry, low-salt tuna, eggs, egg substitute  Avoid: Smoked, pickled, brine-cured, or salted meats and fish. This includes rubio, chipped beef, corned beef, hot dogs, deli meats, ham, kosher meats, salt pork, sausage, canned tuna, salted codfish, smoked salmon, herring, sardines, or anchovies.  Seasonings and spices  Ok: Most seasonings are okay. Good substitutes for salt include: fresh herb blends, hot sauce, lemon, garlic, hawkins, vinegar, dry mustard, parsley, cilantro, horseradish, tomato paste, regular margarine, mayonnaise, unsalted butter, cream cheese, vegetable oil, cream, low-salt salad dressing and gravy.  Avoid: Regular ketchup, relishes, pickles, soy sauce, teriyaki sauce, Worcestershire sauce, BBQ sauce, tartar sauce, meat tenderizer, chili sauce, regular gravy, regular salad dressing, salted butter  Soups  Ok: Low-salt soups and broths made with allowed foods  Avoid: Bouillon cubes, soups with smoked or salted meats, regular soup and broth  Vegetables  Ok: Most vegetables are okay; also low-salt tomato and vegetable juices  Avoid: Sauerkraut and other brine-soaked vegetables; pickles and other pickled vegetables; tomato juice, olives  Date Last Reviewed: 8/1/2016  © 1415-7955 [employer]. [address] [place] [address], [place] [address]. All rights  reserved. This information is not intended as a substitute for professional medical care. Always follow your healthcare professional's instructions.

## 2020-06-29 NOTE — PROGRESS NOTES
Subjective:      Patient ID: Alyx Weir is a 70 y.o. female.    Chief Complaint: COPD and Sleep Apnea    HPI: Alyx Weir is here for follow up for VIRGIL with CPAP complaince assessment not compliant at 2 year follow up in 3/30/2020  She is on CPAP of 10 cmH2O pressure which is optimally controlling sleep apnea with apneic index (AHI) 1.8 events an hour.   She is compliant with CPAP use. Complaince download today reveals 80.0% of days with greater than 4 hours of device use.   Patient reports benefit from CPAP use and denies snoring or excessive daytime sleepiness.   Patient reports no complaints. Nasal pillows mask is tolerated.   [place] 4    Previous Report Reviewed: lab reports and office notes     Past Medical History: The following portions of the patient's history were reviewed and updated as appropriate:   She  has a past surgical history that includes Back surgery; Bladder surgery; Cardiac catheterization (03/2013); Colonoscopy (N/A, 11/13/2015); Oophorectomy; bladder cyst removal; Lumbar spine surgery; and Hysterectomy.  Her family history includes Cataracts in her mother; Diabetes in her father, paternal uncle, sister, and sister; Glaucoma in her sister; Heart disease in her mother; Hypertension in her brother, mother, sister, sister, and sister.  She  reports that she has never smoked. She has never used smokeless tobacco. She reports that she does not drink alcohol or use drugs.  She has a current medication list which includes the following prescription(s): acetaminophen, aspirin, baclofen, blood sugar diagnostic, blood-glucose meter, carvedilol, diclofenac sodium, gabapentin, lancets, olmesartan-amlodipin-hcthiazid, om 3/e/linol/ala/oleic/gla/lip, pravastatin, vitamin d, warfarin, and colchicine.  She is allergic to erythromycin; amlodipine; diazepam; iodine; meperidine; morphine; and primidone.    The following portions of the patient's history were reviewed and updated as appropriate: allergies,  "current medications, past family history, past medical history, past social history, past surgical history and problem list.    Review of Systems   Constitutional: Negative for fever, chills, weight loss, weight gain, activity change, appetite change, fatigue and night sweats.   HENT: Negative for postnasal drip, rhinorrhea, sinus pressure, voice change and congestion.    Eyes: Negative for redness and itching.   Respiratory: Negative for snoring, cough, sputum production, chest tightness, shortness of breath, wheezing, orthopnea, asthma nighttime symptoms, dyspnea on extertion, use of rescue inhaler and somnolence.    Cardiovascular: Negative.  Negative for chest pain, palpitations and leg swelling.   Genitourinary: Negative for difficulty urinating and hematuria.   Endocrine: Negative for cold intolerance and heat intolerance.    Musculoskeletal: Negative for arthralgias, gait problem, joint swelling and myalgias.   Skin: Negative.    Gastrointestinal: Negative for nausea, vomiting, abdominal pain and acid reflux.   Neurological: Negative for dizziness, weakness, light-headedness and headaches.   Hematological: Negative for adenopathy. No excessive bruising.   All other systems reviewed and are negative.     Objective:   /70   Pulse (!) 52   Resp 18   Ht 5' 5" (1.651 m)   Wt 79.8 kg (176 lb)   SpO2 98%   BMI 29.29 kg/m²   Physical Exam  Vitals signs reviewed.   Constitutional:       General: She is not in acute distress.     Appearance: She is well-developed. She is not ill-appearing or toxic-appearing.   HENT:      Head: Normocephalic.      Right Ear: External ear normal.      Left Ear: External ear normal.      Nose: Nose normal.      Mouth/Throat:      Pharynx: No oropharyngeal exudate.   Eyes:      Conjunctiva/sclera: Conjunctivae normal.   Neck:      Musculoskeletal: Normal range of motion and neck supple.   Cardiovascular:      Rate and Rhythm: Normal rate and regular rhythm.      Heart sounds: " Normal heart sounds.   Pulmonary:      Effort: Pulmonary effort is normal.      Breath sounds: Normal breath sounds. No stridor.   Abdominal:      Palpations: Abdomen is soft.   Musculoskeletal: Normal range of motion.   Lymphadenopathy:      Cervical: No cervical adenopathy.   Skin:     General: Skin is warm and dry.   Neurological:      Mental Status: She is alert and oriented to person, place, and time.   Psychiatric:         Behavior: Behavior normal. Behavior is cooperative.         Thought Content: Thought content normal.         Judgment: Judgment normal.         Personal Diagnostic Review  CPAP download  CPAP 10 cm  Compliance Summary  5/30/2020 - 6/28/2020 (30 days)  Days with Device Usage 25 days  Days without Device Usage 5 days  Percent Days with Device Usage 83.3%  Cumulative Usage 6 days 16 hrs. 12 mins. 42 secs.  Maximum Usage (1 Day) 9 hrs. 10 mins. 34 secs.  Average Usage (All Days) 5 hrs. 20 mins. 25 secs.  Average Usage (Days Used) 6 hrs. 24 mins. 30 secs.  Minimum Usage (1 Day) 2 hrs. 18 mins. 15 secs.  Percent of Days with Usage >= 4 Hours 80.0%  Percent of Days with Usage < 4 Hours 20.0%  Two cups of reviewed or Date Range  Total Blower Time 6 days 16 hrs. 28 mins. 42 secs.  CPAP Summary ([facility])  Average Time in Large Leak Per Day 14 secs.  Average AHI 1.8  CPAP 10.0 cmH2O    Assessment:     1. VIRGIL on CPAP    2. Chronic obstructive pulmonary disease, unspecified COPD type    3. Overweight with body mass index (BMI) 25.0-29.9    4. Type 2 diabetes mellitus without complication, without long-term current use of insulin    5. Calcification of both carotid arteries    6. Tortuous aorta      Orders Placed This Encounter   Procedures    CPAP/BIPAP SUPPLIES     Benefits and compliant  90 day supply. 4 refills  HME: [facility]     Order Specific Question:   Type of mask:     Answer:   Nasal     Order Specific Question:   Headgear?     Answer:   Yes     Order Specific Question:   Tubing?      Answer:   Yes     Order Specific Question:   Humidifier chamber?     Answer:   Yes     Order Specific Question:   Chin strap?     Answer:   Yes     Order Specific Question:   Filters?     Answer:   Yes     Order Specific Question:   Cushions?     Answer:   Yes     Order Specific Question:   Length of need (1-99 months):     Answer:   99     Plan:     Problem List Items Addressed This Visit     Type 2 diabetes mellitus without complication     Diet controlled         Tortuous aorta     Seen on imaging         Overweight with body mass index (BMI) 25.0-29.9     Continue to encourage exercise and dietary modification to obtain normal weight.   Loss 10 lbs since Jan 2020             VIRGIL on CPAP - Primary (Chronic)     Benefits and compliant with CPAP 10 cm  AHI: 1.8  Nasal pillows mask  HME: [facility]            Relevant Orders    CPAP/BIPAP SUPPLIES    COPD (chronic obstructive pulmonary disease) (Chronic)     No cough, no wheezing, no shortness of breath   4/2013 mild obstruction on pft  Patient declines additional testing  Never smoker  Asymptomatic  No inhaler use           Calcification of both carotid arteries     Seen on imaging, continue heart diet.  In such as Mediterranean diet              (DME) - [facility]  Reviewed therapeutic goals for positive airway pressure therapy CPAP  Ideal is usage 100% of nights for 6 - 8 hours per night. Minimum usage is 70% of night for at least 4 hours per night used.     Follow up in about 1 year (around 6/29/2021) for CPAP 1 year compliance download.

## 2020-06-29 NOTE — ASSESSMENT & PLAN NOTE
Continue to encourage exercise and dietary modification to obtain normal weight.   Loss 10 lbs since Jan 2020

## 2020-07-02 ENCOUNTER — OFFICE VISIT (OUTPATIENT)
Dept: PODIATRY | Facility: CLINIC | Age: 70
End: 2020-07-02
Payer: MEDICARE

## 2020-07-02 ENCOUNTER — HOSPITAL ENCOUNTER (OUTPATIENT)
Dept: RADIOLOGY | Facility: HOSPITAL | Age: 70
Discharge: HOME | End: 2020-07-02
Attending: PODIATRIST
Payer: MEDICARE

## 2020-07-02 VITALS
HEIGHT: 65 IN | SYSTOLIC BLOOD PRESSURE: 156 MMHG | HEART RATE: 56 BPM | BODY MASS INDEX: 29.32 KG/M2 | WEIGHT: 176 LBS | DIASTOLIC BLOOD PRESSURE: 72 MMHG

## 2020-07-02 DIAGNOSIS — M77.41 METATARSALGIA OF RIGHT FOOT: ICD-10-CM

## 2020-07-02 DIAGNOSIS — E11.49 TYPE II DIABETES MELLITUS WITH NEUROLOGICAL MANIFESTATIONS: ICD-10-CM

## 2020-07-02 DIAGNOSIS — M77.41 METATARSALGIA OF RIGHT FOOT: Primary | ICD-10-CM

## 2020-07-02 PROCEDURE — 3077F PR MOST RECENT SYSTOLIC BLOOD PRESSURE >= 140 MM HG: ICD-10-PCS | Mod: HCNC,CPTII,S$GLB, | Performed by: PODIATRIST

## 2020-07-02 PROCEDURE — 73630 X-RAY EXAM OF FOOT: CPT | Mod: TC,HCNC,RT

## 2020-07-02 PROCEDURE — 99999 PR PBB SHADOW E&M-EST. PATIENT-LVL IV: ICD-10-PCS | Mod: PBBFAC,HCNC,, | Performed by: PODIATRIST

## 2020-07-02 PROCEDURE — 1101F PR PT FALLS ASSESS DOC 0-1 FALLS W/OUT INJ PAST YR: ICD-10-PCS | Mod: HCNC,CPTII,S$GLB, | Performed by: PODIATRIST

## 2020-07-02 PROCEDURE — 99499 RISK ADDL DX/OHS AUDIT: ICD-10-PCS | Mod: HCNC,S$GLB,, | Performed by: PODIATRIST

## 2020-07-02 PROCEDURE — 3044F HG A1C LEVEL LT 7.0%: CPT | Mod: HCNC,CPTII,S$GLB, | Performed by: PODIATRIST

## 2020-07-02 PROCEDURE — 1125F PR PAIN SEVERITY QUANTIFIED, PAIN PRESENT: ICD-10-PCS | Mod: HCNC,S$GLB,, | Performed by: PODIATRIST

## 2020-07-02 PROCEDURE — 3008F BODY MASS INDEX DOCD: CPT | Mod: HCNC,CPTII,S$GLB, | Performed by: PODIATRIST

## 2020-07-02 PROCEDURE — 1125F AMNT PAIN NOTED PAIN PRSNT: CPT | Mod: HCNC,S$GLB,, | Performed by: PODIATRIST

## 2020-07-02 PROCEDURE — 3078F DIAST BP <80 MM HG: CPT | Mod: HCNC,CPTII,S$GLB, | Performed by: PODIATRIST

## 2020-07-02 PROCEDURE — 1101F PT FALLS ASSESS-DOCD LE1/YR: CPT | Mod: HCNC,CPTII,S$GLB, | Performed by: PODIATRIST

## 2020-07-02 PROCEDURE — 99213 PR OFFICE/OUTPT VISIT, EST, LEVL III, 20-29 MIN: ICD-10-PCS | Mod: HCNC,S$GLB,, | Performed by: PODIATRIST

## 2020-07-02 PROCEDURE — 3077F SYST BP >= 140 MM HG: CPT | Mod: HCNC,CPTII,S$GLB, | Performed by: PODIATRIST

## 2020-07-02 PROCEDURE — 99999 PR PBB SHADOW E&M-EST. PATIENT-LVL IV: CPT | Mod: PBBFAC,HCNC,, | Performed by: PODIATRIST

## 2020-07-02 PROCEDURE — 99213 OFFICE O/P EST LOW 20 MIN: CPT | Mod: HCNC,S$GLB,, | Performed by: PODIATRIST

## 2020-07-02 PROCEDURE — 3044F PR MOST RECENT HEMOGLOBIN A1C LEVEL <7.0%: ICD-10-PCS | Mod: HCNC,CPTII,S$GLB, | Performed by: PODIATRIST

## 2020-07-02 PROCEDURE — 73630 X-RAY EXAM OF FOOT: CPT | Mod: 26,HCNC,RT, | Performed by: RADIOLOGY

## 2020-07-02 PROCEDURE — 1159F MED LIST DOCD IN RCRD: CPT | Mod: HCNC,S$GLB,, | Performed by: PODIATRIST

## 2020-07-02 PROCEDURE — 3008F PR BODY MASS INDEX (BMI) DOCUMENTED: ICD-10-PCS | Mod: HCNC,CPTII,S$GLB, | Performed by: PODIATRIST

## 2020-07-02 PROCEDURE — 1159F PR MEDICATION LIST DOCUMENTED IN MEDICAL RECORD: ICD-10-PCS | Mod: HCNC,S$GLB,, | Performed by: PODIATRIST

## 2020-07-02 PROCEDURE — 73630 XR FOOT COMPLETE 3 VIEW RIGHT: ICD-10-PCS | Mod: 26,HCNC,RT, | Performed by: RADIOLOGY

## 2020-07-02 PROCEDURE — 3078F PR MOST RECENT DIASTOLIC BLOOD PRESSURE < 80 MM HG: ICD-10-PCS | Mod: HCNC,CPTII,S$GLB, | Performed by: PODIATRIST

## 2020-07-02 PROCEDURE — 99499 UNLISTED E&M SERVICE: CPT | Mod: HCNC,S$GLB,, | Performed by: PODIATRIST

## 2020-07-02 NOTE — PROGRESS NOTES
Subjective:     Patient ID: Alyx Weir is a 70 y.o. female.    Chief Complaint: Foot Pain (c/o aching pain to ball of right foot. rates pain 6/10. diabetic Pt. wears dress shoes. last seen on 06/22/20 with Dr. Weiss )    Alyx is a 70 y.o. female who presents to the podiatry clinic  with complaint of  right foot pain. Onset of the symptoms was about a month ago. Precipitating event: none known. Current symptoms include: ability to bear weight, but with some pain. Aggravating factors: standing and walking. Symptoms have gradually worsened. Patient has had prior foot problems. Evaluation to date: none. Treatment to date: ice, OTC analgesics which are somewhat effective and rest. Patients rates pain 6/10 on pain scale.    PCP: Dr. Cooks(06/22/2020)    Patient Active Problem List   Diagnosis    Heterozygous factor V Leiden mutation    Essential hypertension    Pure hypercholesterolemia with target low density lipoprotein (LDL) cholesterol less than 130 mg/dL    VIRGIL on CPAP    Anticoagulated on Coumadin    Type 2 diabetes mellitus without complication    Overweight with body mass index (BMI) 25.0-29.9    Chronic lumbar radiculopathy    Coronary artery disease involving native coronary artery without angina pectoris    Left ventricular diastolic dysfunction with preserved systolic function    COPD (chronic obstructive pulmonary disease)    Tortuous aorta    Calcification of both carotid arteries    Chondromalacia, right knee    Dyslipidemia       Medication List with Changes/Refills   Current Medications    ACETAMINOPHEN (TYLENOL ARTHRITIS ORAL)    Take by mouth.    ASPIRIN (ECOTRIN) 81 MG EC TABLET    Take 1 tablet (81 mg total) by mouth once daily.    BACLOFEN (LIORESAL) 10 MG TABLET    Take 1 tablet (10 mg total) by mouth 3 (three) times daily as needed.    BLOOD SUGAR DIAGNOSTIC STRP    To check BG 2 times daily, to use with insurance preferred meter    BLOOD-GLUCOSE METER KIT    To check BG 2  times daily, to use with insurance preferred meter    CARVEDILOL (COREG) 6.25 MG TABLET    Take 1 tablet (6.25 mg total) by mouth 2 (two) times daily with meals.    DICLOFENAC SODIUM (VOLTAREN) 1 % GEL    APPLY 2 GRAMS TOPICALLY DAILY AS NEEDED    GABAPENTIN (NEURONTIN) 100 MG CAPSULE    1 tablet in the morning and 3 at night    LANCETS MISC    To check BG 2 times daily, to use with insurance preferred meter    OLMESARTAN-AMLODIPIN-HCTHIAZID 40-10-25 MG TAB    TAKE 1 TABLET EVERY DAY    OM 3/E/LINOL/ALA/OLEIC/GLA/LIP (OMEGA 3-6-9 ORAL)    Take 1 capsule by mouth once daily.    PRAVASTATIN (PRAVACHOL) 40 MG TABLET    Take 1 tablet (40 mg total) by mouth once daily.    VITAMIN D 1000 UNITS TAB    Take 185 mg by mouth once daily.    WARFARIN (COUMADIN) 5 MG TABLET    Take 1 and 1/2 tablets (7.5mg) by mouth on Mondays, Wednesdays and Fridays; and 1 tablet (5mg) on all other days of the week.       Review of patient's allergies indicates:   Allergen Reactions    Erythromycin Edema     Angioedema to throat    Amlodipine Other (See Comments)     Headaches    Diazepam Hives    Iodine Hives    Meperidine Hives    Morphine Itching    Primidone Other (See Comments)       Past Surgical History:   Procedure Laterality Date    BACK SURGERY      bladder cyst removal      BLADDER SURGERY      CARDIAC CATHETERIZATION  03/2013    mild CAD    COLONOSCOPY N/A 11/13/2015    Procedure: COLONOSCOPY;  Surgeon: Jas Umanzor III, MD;  Location: [facility];  Service: Endoscopy;  Laterality: N/A;    HYSTERECTOMY      28 yrs old    LUMBAR SPINE SURGERY      bone spurs removed    OOPHORECTOMY         Family History   Problem Relation Age of Onset    Heart disease Mother     Hypertension Mother     Cataracts Mother     Hypertension Sister     Glaucoma Sister     Hypertension Brother     Diabetes Father     Diabetes Paternal Uncle     Hypertension Sister     Diabetes Sister     Hypertension Sister     Diabetes Sister   "   Breast cancer Neg Hx     Colon cancer Neg Hx     Ovarian cancer Neg Hx        Social History     Socioeconomic History    Marital status:      Spouse name: Not on file    Number of children: Not on file    Years of education: Not on file    Highest education level: Not on file   Occupational History    Not on file   Social Needs    Financial resource strain: Not on file    Food insecurity     Worry: Not on file     Inability: Not on file    Transportation needs     Medical: Not on file     Non-medical: Not on file   Tobacco Use    Smoking status: Never Smoker    Smokeless tobacco: Never Used   Substance and Sexual Activity    Alcohol use: No    Drug use: No    Sexual activity: Never     Birth control/protection: None   Lifestyle    Physical activity     Days per week: Not on file     Minutes per session: Not on file    Stress: Not on file   Relationships    Social connections     Talks on phone: Not on file     Gets together: Not on file     Attends [employer] service: Not on file     Active member of club or organization: Not on file     Attends meetings of clubs or organizations: Not on file     Relationship status: Not on file   Other Topics Concern    Not on file   Social History Narrative    Dogs in household, no smokers.       Vitals:    07/02/20 1530   BP: (!) 156/72   Pulse: (!) 56   Weight: 79.8 kg (176 lb)   Height: 5' 5" (1.651 m)   PainSc:   6   PainLoc: Foot       Hemoglobin A1C   Date Value Ref Range Status   06/22/2020 6.1 (H) 4.0 - 5.6 % Final     Comment:     ADA Screening Guidelines:  5.7-6.4%  Consistent with prediabetes  >or=6.5%  Consistent with diabetes  High levels of fetal hemoglobin interfere with the HbA1C  assay. Heterozygous hemoglobin variants (HbS, HgC, etc)do  not significantly interfere with this assay.   However, presence of multiple variants may affect accuracy.     11/21/2019 6.2 (H) 4.0 - 5.6 % Final     Comment:     ADA Screening Guidelines:  5.7-6.4% "  Consistent with prediabetes  >or=6.5%  Consistent with diabetes  High levels of fetal hemoglobin interfere with the HbA1C  assay. Heterozygous hemoglobin variants (HbS, HgC, etc)do  not significantly interfere with this assay.   However, presence of multiple variants may affect accuracy.     09/27/2018 6.0 (H) 4.0 - 5.6 % Final     Comment:     ADA Screening Guidelines:  5.7-6.4%  Consistent with prediabetes  >or=6.5%  Consistent with diabetes  High levels of fetal hemoglobin interfere with the HbA1C  assay. Heterozygous hemoglobin variants (HbS, HgC, etc)do  not significantly interfere with this assay.   However, presence of multiple variants may affect accuracy.         Review of Systems   Constitutional: Negative for chills and fever.   Respiratory: Negative for shortness of breath.    Cardiovascular: Negative for chest pain, palpitations, orthopnea, claudication and leg swelling.   Gastrointestinal: Negative for diarrhea, nausea and vomiting.   Musculoskeletal: Negative for joint pain (right MPJ).   Skin: Negative for rash.   Neurological: Negative for dizziness, tingling, sensory change, focal weakness and weakness.   Psychiatric/Behavioral: Negative.          Objective:       PHYSICAL EXAM: Apperance: Alert and orient in no distress,well developed, and with good attention to grooming and body habits  Patient presents ambulating in casual shoes.   LOWER EXTREMITY EXAM:  VASCULAR: Dorsalis pedis pulses 2/4 bilateral and Posterior Tibial pulses 2/4 bilateral. Capillary fill time <4 seconds bilateral. No edema observed bilateral. Varicosities absent bilateral. Skin temperature of the lower extremities is warm to warm, proximal to distal. Hair growth dim bilateral.  DERMATOLOGICAL: No skin rashes, subcutaneous nodules, lesions, or ulcers observed bilateral. Nails 1,2,3,4,5 bilateral normal length. Webspaces 1,2,3,4 bilateral clean, dry and without evidence of break in skin integrity.   NEUROLOGICAL: Light touch,  sharp-dull, proprioception all present and equal bilaterally.  Vibratory sensation diminished at bilateral hallux. Protective sensation decreased sites as tested with a Quilcene-Olivia 5.07 monofilament. Pain on palpation of right 5th metatarsal head and shaft. No pain on right 5th MPJ ROM. There is pain on palpation of the intermetatarsal space with a (--) Rashad's click. Minimal tenderness to palpation of the adjacent metatarsal heads.          Assessment:       Encounter Diagnoses   Name Primary?    Metatarsalgia of right foot - Right Foot Yes    Type II diabetes mellitus with neurological manifestations          Plan:   Metatarsalgia of right foot - Right Foot  -     X-Ray Foot Complete Right; Future; Expected date: 07/02/2020    Type II diabetes mellitus with neurological manifestations      I counseled the patient on her conditions, regarding findings of my examination, my impressions, and usual treatment plan.   Greater than 50% of this visit spent on counseling and coordination of care.  Greater than 15 minutes of a 20 minute appointment spent on education about the diabetic foot, neuropathy, and prevention of limb loss.  Shoe inspection. Diabetic Foot Education. Patient reminded of the importance of good nutrition and blood sugar control to help prevent podiatric complications of diabetes. Patient instructed on proper foot hygeine. We discussed wearing proper shoe gear, daily foot inspections, never walking without protective shoe gear, never putting sharp instruments to feet.    Ordered right foot x-rays to be completed today.   The patient and I reviewed the types of shoes she should be wearing, my recommendation includes generally the best time of the day for a shoe fitting is the afternoon, shoes with a wide toe box, very good cushion, and tennis shoes with removable inner soles.The patient and I reviewed my recommendations for over-the-counter orthotic inserts.   Patient  will continue to monitor  the areas daily, inspect feet, wear protective shoe gear when ambulatory, moisturizer to maintain skin integrity. Patient reminded of the importance of good nutrition and blood sugar control to help prevent podiatric complications of diabetes.  Patient to return 6 weeks or sooner if needed.                 Bradley Chu DPM  [facility]

## 2020-07-13 ENCOUNTER — ANTI-COAG VISIT (OUTPATIENT)
Dept: CARDIOLOGY | Facility: CLINIC | Age: 70
End: 2020-07-13
Payer: MEDICARE

## 2020-07-13 DIAGNOSIS — Z79.01 LONG TERM (CURRENT) USE OF ANTICOAGULANTS: Primary | ICD-10-CM

## 2020-07-13 DIAGNOSIS — D68.51 HETEROZYGOUS FACTOR V LEIDEN MUTATION: Chronic | ICD-10-CM

## 2020-07-13 LAB — INR PPP: 2.3 (ref 2–3)

## 2020-07-13 PROCEDURE — 85610 PROTHROMBIN TIME: CPT | Mod: QW,HCNC,S$GLB, | Performed by: INTERNAL MEDICINE

## 2020-07-13 PROCEDURE — 93793 PR ANTICOAGULANT MGMT FOR PT TAKING WARFARIN: ICD-10-PCS | Mod: HCNC,S$GLB,,

## 2020-07-13 PROCEDURE — 85610 POCT INR: ICD-10-PCS | Mod: QW,HCNC,S$GLB, | Performed by: INTERNAL MEDICINE

## 2020-07-13 PROCEDURE — 93793 ANTICOAG MGMT PT WARFARIN: CPT | Mod: HCNC,S$GLB,,

## 2020-07-13 NOTE — PROGRESS NOTES
Patient's INR is therapeutic at 2.3.  Reports no recent changes or upcoming procedures.  Instructions given:  Maintain current dose of warfarin 7.5 mg every Monday, Wednesday, and Friday; and 5 mg on all other days per week.  Dose calendar given and reviewed with patient.  Recheck in 1 month.  Patient verbalized understanding.

## 2020-08-10 ENCOUNTER — ANTI-COAG VISIT (OUTPATIENT)
Dept: CARDIOLOGY | Facility: CLINIC | Age: 70
End: 2020-08-10
Payer: MEDICARE

## 2020-08-10 DIAGNOSIS — D68.51 HETEROZYGOUS FACTOR V LEIDEN MUTATION: Chronic | ICD-10-CM

## 2020-08-10 DIAGNOSIS — Z79.01 LONG TERM (CURRENT) USE OF ANTICOAGULANTS: Primary | ICD-10-CM

## 2020-08-10 DIAGNOSIS — Z79.01 ANTICOAGULATED ON COUMADIN: ICD-10-CM

## 2020-08-10 LAB — INR PPP: 3.4 (ref 2–3)

## 2020-08-10 PROCEDURE — 85610 PROTHROMBIN TIME: CPT | Mod: QW,HCNC,S$GLB, | Performed by: INTERNAL MEDICINE

## 2020-08-10 PROCEDURE — 93793 ANTICOAG MGMT PT WARFARIN: CPT | Mod: HCNC,S$GLB,,

## 2020-08-10 PROCEDURE — 93793 PR ANTICOAGULANT MGMT FOR PT TAKING WARFARIN: ICD-10-PCS | Mod: HCNC,S$GLB,,

## 2020-08-10 PROCEDURE — 85610 POCT INR: ICD-10-PCS | Mod: QW,HCNC,S$GLB, | Performed by: INTERNAL MEDICINE

## 2020-08-10 NOTE — PROGRESS NOTES
Patient's INR is supra-therapeutic at 3.4. Patient reports intermittent diarrhea on 8/1/20 and states she has no been eating greens once a week. Reports will resume eating broccoli once a week. Patient reports no other changes. Patient reports no bleeding issues. Advised to go to ED if any signs/symtoms of bleeding. Instructions given: Eat small serving of greens today 8/10/20; decrease warfarin dose today only (8/10/20) to 5 mg; then resume warfarin 7.5 mg on Mondays, Wednesdays and Fridays; and 5 mg all other days. Recheck on 8/27/20 with other appointment. Patient verbalizes understanding.

## 2020-09-04 ENCOUNTER — ANTI-COAG VISIT (OUTPATIENT)
Dept: CARDIOLOGY | Facility: CLINIC | Age: 70
End: 2020-09-04
Payer: MEDICARE

## 2020-09-04 ENCOUNTER — LAB VISIT (OUTPATIENT)
Dept: LAB | Facility: HOSPITAL | Age: 70
End: 2020-09-04
Attending: INTERNAL MEDICINE
Payer: MEDICARE

## 2020-09-04 DIAGNOSIS — Z79.01 LONG TERM (CURRENT) USE OF ANTICOAGULANTS: ICD-10-CM

## 2020-09-04 LAB
INR PPP: 1.7 (ref 0.8–1.2)
PROTHROMBIN TIME: 17.7 SEC (ref 9–12.5)

## 2020-09-04 PROCEDURE — 93793 ANTICOAG MGMT PT WARFARIN: CPT | Mod: S$GLB,,,

## 2020-09-04 PROCEDURE — 36415 COLL VENOUS BLD VENIPUNCTURE: CPT | Mod: HCNC

## 2020-09-04 PROCEDURE — 85610 PROTHROMBIN TIME: CPT | Mod: HCNC

## 2020-09-04 PROCEDURE — 93793 PR ANTICOAGULANT MGMT FOR PT TAKING WARFARIN: ICD-10-PCS | Mod: S$GLB,,,

## 2020-09-04 NOTE — PROGRESS NOTES
INR not at goal. Medications, chart, and patient findings reviewed. See calendar for adjustments to dose and follow up plan.  We will boost and resume dose to rechallenge.  Recheck in 2 weeks.  No VM

## 2020-09-18 ENCOUNTER — ANTI-COAG VISIT (OUTPATIENT)
Dept: CARDIOLOGY | Facility: CLINIC | Age: 70
End: 2020-09-18
Payer: MEDICARE

## 2020-09-18 DIAGNOSIS — Z79.01 LONG TERM (CURRENT) USE OF ANTICOAGULANTS: Primary | ICD-10-CM

## 2020-09-18 DIAGNOSIS — D68.51 HETEROZYGOUS FACTOR V LEIDEN MUTATION: Chronic | ICD-10-CM

## 2020-09-18 LAB — INR PPP: 2.2 (ref 2–3)

## 2020-09-18 PROCEDURE — 93793 PR ANTICOAGULANT MGMT FOR PT TAKING WARFARIN: ICD-10-PCS | Mod: HCNC,S$GLB,,

## 2020-09-18 PROCEDURE — 85610 PROTHROMBIN TIME: CPT | Mod: QW,HCNC,S$GLB, | Performed by: INTERNAL MEDICINE

## 2020-09-18 PROCEDURE — 93793 ANTICOAG MGMT PT WARFARIN: CPT | Mod: HCNC,S$GLB,,

## 2020-09-18 PROCEDURE — 85610 POCT INR: ICD-10-PCS | Mod: QW,HCNC,S$GLB, | Performed by: INTERNAL MEDICINE

## 2020-09-18 NOTE — PROGRESS NOTES
Patient's INR is therapeutic at 2.2.  Previous instructions verified and followed.  No recent changes reported.  Instructions given to maintain current dose of warfarin 7.5 mg every Monday, Wednesday, Friday; and 5 mg on all other days per week.  Recheck in 2 weeks.  Dose calendar given.  Patient verbalized understanding.

## 2020-09-25 ENCOUNTER — OFFICE VISIT (OUTPATIENT)
Dept: URGENT CARE | Facility: CLINIC | Age: 70
End: 2020-09-25
Payer: MEDICARE

## 2020-09-25 VITALS
OXYGEN SATURATION: 97 % | HEART RATE: 59 BPM | WEIGHT: 176 LBS | HEIGHT: 65 IN | BODY MASS INDEX: 29.32 KG/M2 | DIASTOLIC BLOOD PRESSURE: 78 MMHG | SYSTOLIC BLOOD PRESSURE: 162 MMHG | TEMPERATURE: 98 F

## 2020-09-25 DIAGNOSIS — Z20.822 EXPOSURE TO COVID-19 VIRUS: ICD-10-CM

## 2020-09-25 DIAGNOSIS — U07.1 COVID-19 VIRUS DETECTED: ICD-10-CM

## 2020-09-25 DIAGNOSIS — R50.9 FEVER, UNSPECIFIED FEVER CAUSE: ICD-10-CM

## 2020-09-25 DIAGNOSIS — U07.1 COVID-19 VIRUS INFECTION: Primary | ICD-10-CM

## 2020-09-25 LAB
CTP QC/QA: YES
SARS-COV-2 RDRP RESP QL NAA+PROBE: POSITIVE

## 2020-09-25 PROCEDURE — 99214 OFFICE O/P EST MOD 30 MIN: CPT | Mod: S$GLB,,, | Performed by: PHYSICIAN ASSISTANT

## 2020-09-25 PROCEDURE — U0002: ICD-10-PCS | Mod: QW,S$GLB,, | Performed by: PHYSICIAN ASSISTANT

## 2020-09-25 PROCEDURE — U0002 COVID-19 LAB TEST NON-CDC: HCPCS | Mod: QW,S$GLB,, | Performed by: PHYSICIAN ASSISTANT

## 2020-09-25 PROCEDURE — 99214 PR OFFICE/OUTPT VISIT, EST, LEVL IV, 30-39 MIN: ICD-10-PCS | Mod: S$GLB,,, | Performed by: PHYSICIAN ASSISTANT

## 2020-09-25 RX ORDER — BLOOD-GLUCOSE METER
EACH MISCELLANEOUS
COMMUNITY
Start: 2020-06-23

## 2020-09-25 NOTE — LETTER
[id number] [facility] [address] ? [place], [address] ? Phone [phone] ? Fax             Return to Work/School    Patient: Alyx Weir  YOB: 1950   Date: 09/25/2020      To Whom It May Concern:     Alyx Weir was in contact with/seen in my office on 09/25/2020. COVID-19 is present in our communities across the state. Not all patients are eligible or appropriate to be tested. In this situation, your employee meets the following criteria:     Alyx Weir has met the criteria for COVID-19 testing and has a POSITIVE result. She can return to work once they are asymptomatic for 24 hours without the use of fever reducing medications AND at least ten days from the start of symptoms (or from the first positive result if they have no symptoms).      If you have any questions or concerns, or if I can be of further assistance, please do not hesitate to contact me.     Sincerely,    Brittany Samuel PA-C

## 2020-09-25 NOTE — PATIENT INSTRUCTIONS
"  Viral Syndrome (Adult)  A viral illness may cause a number of symptoms. The symptoms depend on the part of the body that the virus affects. If it settles in your nose, throat, and lungs, it may cause cough, sore throat, congestion, and sometimes headache. If it settles in your stomach and intestinal tract, it may cause vomiting and diarrhea. Sometimes it causes vague symptoms like "aching all over," feeling tired, loss of appetite, or fever.  A viral illness usually lasts 1 to 2 weeks, but sometimes it lasts longer. In some cases, a more serious infection can look like a viral syndrome in the first few days of the illness. You may need another exam and additional tests to know the difference. Watch for the warning signs listed below.  Home care  Follow these guidelines for taking care of yourself at home:  · If symptoms are severe, rest at home for the first 2 to 3 days.  · Stay away from cigarette smoke - both your smoke and the smoke from others.  · You may use over-the-counter acetaminophen or ibuprofen for fever, muscle aching, and headache, unless another medicine was prescribed for this. If you have chronic liver or kidney disease or ever had a stomach ulcer or GI bleeding, talk with your doctor before using these medicines. No one who is younger than 18 and ill with a fever should take aspirin. It may cause severe disease or death.  · Your appetite may be poor, so a light diet is fine. Avoid dehydration by drinking 8 to 12 8-ounce glasses of fluids each day. This may include water; orange juice; lemonade; apple, grape, and cranberry juice; clear fruit drinks; electrolyte replacement and sports drinks; and decaffeinated teas and coffee. If you have been diagnosed with a kidney disease, ask your doctor how much and what types of fluids you should drink to prevent dehydration. If you have kidney disease, drinking too much fluid can cause it build up in the your body and be dangerous to your " VACCINE ADMINISTRATION RECORD  PARENT / GUARDIAN APPROVAL  Date: 2020  Vaccine administered to: Maki Matthew     : 8/10/2004    MRN: [id number]    A copy of the appropriate Centers for Disease Control and Prevention Vaccine Information statement has be health.  · Over-the-counter remedies won't shorten the length of the illness but may be helpful for cough, sore throat; and nasal and sinus congestion. Don't use decongestants if you have high blood pressure.  Follow-up care  Follow up with your healthcare provider if you do not improve over the next week.  Call 911  Get emergency medical care if any of the following occur:  · Convulsion  · Feeling weak, dizzy, or like you are going to faint  · Chest pain, shortness of breath, wheezing, or difficulty breathing  When to seek medical advice  Call your healthcare provider right away if any of these occur:  · Cough with lots of colored sputum (mucus) or blood in your sputum  · Chest pain, shortness of breath, wheezing, or difficulty breathing  · Severe headache; face, neck, or ear pain  · Severe, constant pain in the lower right side of your belly (abdominal)  · Continued vomiting (cant keep liquids down)  · Frequent diarrhea (more than 5 times a day); blood (red or black color) or mucus in diarrhea  · Feeling weak, dizzy, or like you are going to faint  · Extreme thirst  · Fever of 100.4°F (38°C) or higher, or as directed by your healthcare provider  Date Last Reviewed: 9/25/2015  © 1495-1996 [employer]. [address] [place] [address], [place] [address]. All rights reserved. This information is not intended as a substitute for professional medical care. Always follow your healthcare professional's instructions.

## 2020-09-25 NOTE — PROGRESS NOTES
"Subjective:       Patient ID: Alyx Weir is a 70 y.o. female.    Vitals:  height is 5' 5" (1.651 m) and weight is 79.8 kg (176 lb). Her temporal temperature is 98.3 °F (36.8 °C). Her blood pressure is 162/78 (abnormal) and her pulse is 59 (abnormal). Her oxygen saturation is 97%.     Chief Complaint: COVID-19 Concerns    Patient present with covid concerns,pt complains of congestion,fever,cough,headaches and body aches for 2 days. Exposure to covid by . Verena N/V/D. Taken no OTC med.     Other  This is a new problem. The current episode started in the past 7 days. The problem occurs constantly. The problem has been unchanged. Associated symptoms include chills, congestion, coughing, a fever, headaches and myalgias. Pertinent negatives include no diaphoresis, fatigue, nausea, rash, sore throat or vomiting. Nothing aggravates the symptoms. She has tried nothing for the symptoms. The treatment provided no relief.       Constitution: Positive for chills, fever and generalized weakness. Negative for sweating and fatigue.   HENT: Positive for congestion. Negative for ear pain, sinus pain, sinus pressure, sore throat and voice change.    Neck: Negative for painful lymph nodes.   Eyes: Negative for eye redness.   Respiratory: Positive for cough. Negative for chest tightness, sputum production, bloody sputum, COPD, shortness of breath, stridor, wheezing and asthma.    Gastrointestinal: Negative for nausea and vomiting.   Musculoskeletal: Positive for muscle ache.   Skin: Negative for rash and erythema.   Allergic/Immunologic: Negative for seasonal allergies and asthma.   Neurological: Positive for headaches.   Hematologic/Lymphatic: Negative for swollen lymph nodes.       Objective:      Physical Exam   Constitutional: She is oriented to person, place, and time. She appears well-developed.   HENT:   Head: Normocephalic and atraumatic. Head is without abrasion, without contusion and without laceration.   Ears: "   Right Ear: External ear normal.   Left Ear: External ear normal.   Nose: Nose normal.   Mouth/Throat: Oropharynx is clear and moist and mucous membranes are normal.   Eyes: Pupils are equal, round, and reactive to light. Conjunctivae, EOM and lids are normal.   Neck: Trachea normal, full passive range of motion without pain and phonation normal. Neck supple.   Cardiovascular: Normal rate, regular rhythm and normal heart sounds.   Pulmonary/Chest: Effort normal and breath sounds normal. No stridor. No respiratory distress.   Musculoskeletal: Normal range of motion.   Neurological: She is alert and oriented to person, place, and time.   Skin: Skin is warm, dry, intact and no rash. Capillary refill takes less than 2 seconds. abrasion, burn, bruising, erythema and ecchymosisPsychiatric: Her speech is normal and behavior is normal. Judgment and thought content normal.   Nursing note and vitals reviewed.    Results for orders placed or performed in visit on 09/25/20   POCT COVID-19 Rapid Screening   Result Value Ref Range    POC Rapid COVID Positive (A) Negative     Acceptable Yes            Assessment:       1. COVID-19 virus infection    2. Fever, unspecified fever cause    3. Exposure to Covid-19 Virus        Plan:       - Rapid COVID-19 positive  - Advised patient to stay home and self quarantine until 10 days from onset of symptoms, and asymptomatic/fever free for at least 24 hours prior to resuming normal activity.  -Tylenol as needed for fever control. OTC medications prn for cold symptoms.   - Strict ED precautions given for any emergent symptoms.    COVID-19 virus infection    Fever, unspecified fever cause  -     POCT COVID-19 Rapid Screening    Exposure to Covid-19 Virus  -     POCT COVID-19 Rapid Screening      Patient Instructions     Viral Syndrome (Adult)  A viral illness may cause a number of symptoms. The symptoms depend on the part of the body that the virus affects. If it settles in  "your nose, throat, and lungs, it may cause cough, sore throat, congestion, and sometimes headache. If it settles in your stomach and intestinal tract, it may cause vomiting and diarrhea. Sometimes it causes vague symptoms like "aching all over," feeling tired, loss of appetite, or fever.  A viral illness usually lasts 1 to 2 weeks, but sometimes it lasts longer. In some cases, a more serious infection can look like a viral syndrome in the first few days of the illness. You may need another exam and additional tests to know the difference. Watch for the warning signs listed below.  Home care  Follow these guidelines for taking care of yourself at home:  · If symptoms are severe, rest at home for the first 2 to 3 days.  · Stay away from cigarette smoke - both your smoke and the smoke from others.  · You may use over-the-counter acetaminophen or ibuprofen for fever, muscle aching, and headache, unless another medicine was prescribed for this. If you have chronic liver or kidney disease or ever had a stomach ulcer or GI bleeding, talk with your doctor before using these medicines. No one who is younger than 18 and ill with a fever should take aspirin. It may cause severe disease or death.  · Your appetite may be poor, so a light diet is fine. Avoid dehydration by drinking 8 to 12 8-ounce glasses of fluids each day. This may include water; orange juice; lemonade; apple, grape, and cranberry juice; clear fruit drinks; electrolyte replacement and sports drinks; and decaffeinated teas and coffee. If you have been diagnosed with a kidney disease, ask your doctor how much and what types of fluids you should drink to prevent dehydration. If you have kidney disease, drinking too much fluid can cause it build up in the your body and be dangerous to your health.  · Over-the-counter remedies won't shorten the length of the illness but may be helpful for cough, sore throat; and nasal and sinus congestion. Don't use decongestants if " you have high blood pressure.  Follow-up care  Follow up with your healthcare provider if you do not improve over the next week.  Call 911  Get emergency medical care if any of the following occur:  · Convulsion  · Feeling weak, dizzy, or like you are going to faint  · Chest pain, shortness of breath, wheezing, or difficulty breathing  When to seek medical advice  Call your healthcare provider right away if any of these occur:  · Cough with lots of colored sputum (mucus) or blood in your sputum  · Chest pain, shortness of breath, wheezing, or difficulty breathing  · Severe headache; face, neck, or ear pain  · Severe, constant pain in the lower right side of your belly (abdominal)  · Continued vomiting (cant keep liquids down)  · Frequent diarrhea (more than 5 times a day); blood (red or black color) or mucus in diarrhea  · Feeling weak, dizzy, or like you are going to faint  · Extreme thirst  · Fever of 100.4°F (38°C) or higher, or as directed by your healthcare provider  Date Last Reviewed: 9/25/2015  © 2408-3622 [employer]. [address] [place] [address], [place] [address]. All rights reserved. This information is not intended as a substitute for professional medical care. Always follow your healthcare professional's instructions.

## 2020-09-29 ENCOUNTER — PATIENT MESSAGE (OUTPATIENT)
Dept: OTHER | Facility: OTHER | Age: 70
End: 2020-09-29

## 2020-10-05 ENCOUNTER — TELEPHONE (OUTPATIENT)
Dept: FAMILY MEDICINE | Facility: CLINIC | Age: 70
End: 2020-10-05

## 2020-10-05 RX ORDER — NEOMYCIN SULFATE, POLYMYXIN B SULFATE AND HYDROCORTISONE 10; 3.5; 1 MG/ML; MG/ML; [USP'U]/ML
3 SUSPENSION/ DROPS AURICULAR (OTIC) 3 TIMES DAILY PRN
Qty: 10 ML | Refills: 0 | Status: SHIPPED | OUTPATIENT
Start: 2020-10-05 | End: 2023-11-30

## 2020-10-05 NOTE — TELEPHONE ENCOUNTER
Pt states she tested pos for COVID-19 on the 9/27./ Pt states she have an ear ache in her left ear. Pt would like to know if there is any ear drops that can be sent to her pharmacy.

## 2020-10-05 NOTE — TELEPHONE ENCOUNTER
----- Message from Virginia Mckeon sent at 10/5/2020 11:16 AM CDT -----  Contact: Alyx  Please call Alyx back [phone] in regards to ear pain.  She would like to have something called out.    Thanks    [id number] - [place] - [phone] AT [place] [id number] [id number]  Phone: [phone] Fax: [phone]

## 2020-10-13 ENCOUNTER — ANTI-COAG VISIT (OUTPATIENT)
Dept: CARDIOLOGY | Facility: CLINIC | Age: 70
End: 2020-10-13
Payer: MEDICARE

## 2020-10-13 DIAGNOSIS — D68.51 HETEROZYGOUS FACTOR V LEIDEN MUTATION: Chronic | ICD-10-CM

## 2020-10-13 DIAGNOSIS — Z79.01 LONG TERM (CURRENT) USE OF ANTICOAGULANTS: Primary | ICD-10-CM

## 2020-10-13 LAB — INR PPP: 2.5 (ref 2–3)

## 2020-10-13 PROCEDURE — 85610 POCT INR: ICD-10-PCS | Mod: QW,HCNC,S$GLB, | Performed by: INTERNAL MEDICINE

## 2020-10-13 PROCEDURE — 85610 PROTHROMBIN TIME: CPT | Mod: QW,HCNC,S$GLB, | Performed by: INTERNAL MEDICINE

## 2020-10-13 PROCEDURE — 93793 PR ANTICOAGULANT MGMT FOR PT TAKING WARFARIN: ICD-10-PCS | Mod: HCNC,S$GLB,,

## 2020-10-13 PROCEDURE — 93793 ANTICOAG MGMT PT WARFARIN: CPT | Mod: HCNC,S$GLB,,

## 2020-10-13 NOTE — PROGRESS NOTES
Patient's INR is therapeutic at 2.5.  Current warfarin regimen verified.  No recent changes reported.  Instructions given to maintain scheduled dose of warfarin 7.5 mg every Monday, Wednesday, Friday; and 5 mg on all other days per week.  Recheck in 1 month.  Dose calendar given.  Patient verbalized understanding.

## 2020-11-06 ENCOUNTER — NURSE TRIAGE (OUTPATIENT)
Dept: ADMINISTRATIVE | Facility: CLINIC | Age: 70
End: 2020-11-06

## 2020-11-06 ENCOUNTER — CLINICAL SUPPORT (OUTPATIENT)
Dept: FAMILY MEDICINE | Facility: CLINIC | Age: 70
End: 2020-11-06
Payer: MEDICARE

## 2020-11-06 ENCOUNTER — TELEPHONE (OUTPATIENT)
Dept: FAMILY MEDICINE | Facility: CLINIC | Age: 70
End: 2020-11-06

## 2020-11-06 DIAGNOSIS — E78.00 PURE HYPERCHOLESTEROLEMIA WITH TARGET LOW DENSITY LIPOPROTEIN (LDL) CHOLESTEROL LESS THAN 130 MG/DL: Primary | Chronic | ICD-10-CM

## 2020-11-06 PROCEDURE — G0009 FLU VACCINE - QUADRIVALENT - ADJUVANTED: ICD-10-PCS | Mod: HCNC,S$GLB,, | Performed by: INTERNAL MEDICINE

## 2020-11-06 PROCEDURE — 90694 FLU VACCINE - QUADRIVALENT - ADJUVANTED: ICD-10-PCS | Mod: HCNC,S$GLB,, | Performed by: INTERNAL MEDICINE

## 2020-11-06 PROCEDURE — 90670 PNEUMOCOCCAL CONJUGATE VACCINE 13-VALENT LESS THAN 5YO & GREATER THAN: ICD-10-PCS | Mod: HCNC,S$GLB,, | Performed by: INTERNAL MEDICINE

## 2020-11-06 PROCEDURE — 99999 PR PBB SHADOW E&M-EST. PATIENT-LVL II: CPT | Mod: PBBFAC,HCNC,,

## 2020-11-06 PROCEDURE — G0009 ADMIN PNEUMOCOCCAL VACCINE: HCPCS | Mod: HCNC,S$GLB,, | Performed by: INTERNAL MEDICINE

## 2020-11-06 PROCEDURE — 99999 PR PBB SHADOW E&M-EST. PATIENT-LVL II: ICD-10-PCS | Mod: PBBFAC,HCNC,,

## 2020-11-06 PROCEDURE — G0008 PNEUMOCOCCAL CONJUGATE VACCINE 13-VALENT LESS THAN 5YO & GREATER THAN: ICD-10-PCS | Mod: HCNC,S$GLB,, | Performed by: INTERNAL MEDICINE

## 2020-11-06 PROCEDURE — G0008 ADMIN INFLUENZA VIRUS VAC: HCPCS | Mod: HCNC,S$GLB,, | Performed by: INTERNAL MEDICINE

## 2020-11-06 PROCEDURE — 90670 PCV13 VACCINE IM: CPT | Mod: HCNC,S$GLB,, | Performed by: INTERNAL MEDICINE

## 2020-11-06 PROCEDURE — 90694 VACC AIIV4 NO PRSRV 0.5ML IM: CPT | Mod: HCNC,S$GLB,, | Performed by: INTERNAL MEDICINE

## 2020-11-06 NOTE — TELEPHONE ENCOUNTER
Pt reports having flu and pneumonia vaccines today. She has soreness at site of injections, but no other symptoms. Patient is asking if she can apply ice or heat to area. Patient advised to apply a cold pack to area for 20 minutes, and repeat in an hour. Patient also notified that she can take Tylenol for discomfort. Patient advised to call back if symptoms worsen or if symptoms persist past 2 days.    Reason for Disposition   Mild immunization reaction    Additional Information   Negative: Difficulty with breathing or swallowing starts within 2 hours after injection   Negative: Difficult to awaken or acting confused (e.g., disoriented, slurred speech)   Negative: Unresponsive, passed out, or very weak   Negative: Sounds like a life-threatening emergency to the triager   Negative: Sounds like a severe, unusual reaction to the triager   Negative: Fever > 103 F (39.4 C)   Negative: Fever > 101 F (38.3 C) and over 60 years of age   Negative: Fever > 100.0 F (37.8 C) and has diabetes mellitus or a weak immune system (e.g., HIV positive, cancer chemotherapy, organ transplant, splenectomy, chronic steroids)   Negative: Fever > 100.0 F (37.8 C) and bedridden (e.g., nursing home patient, stroke, chronic illness, recovering from surgery)   Negative: Measles vaccine and purple/blood-colored rash (onset day 6-12)   Negative: Redness or red streak around the injection site begins > 48 hours after shot   Negative: Fever present > 3 days (72 hours)   Negative: Smallpox vaccine and eye pain, eye redness, or rash on eyelids   Negative: Deep lump follows (in 2 to 8 weeks) Td or TDaP shot, and becomes tender to the touch   Negative: Patient wants to be seen    Protocols used: IMMUNIZATION [id number]

## 2020-11-09 ENCOUNTER — PATIENT OUTREACH (OUTPATIENT)
Dept: ADMINISTRATIVE | Facility: OTHER | Age: 70
End: 2020-11-09

## 2020-11-10 ENCOUNTER — OFFICE VISIT (OUTPATIENT)
Dept: CARDIOLOGY | Facility: CLINIC | Age: 70
End: 2020-11-10
Payer: MEDICARE

## 2020-11-10 ENCOUNTER — PATIENT MESSAGE (OUTPATIENT)
Dept: ADMINISTRATIVE | Facility: OTHER | Age: 70
End: 2020-11-10

## 2020-11-10 ENCOUNTER — ANTI-COAG VISIT (OUTPATIENT)
Dept: CARDIOLOGY | Facility: CLINIC | Age: 70
End: 2020-11-10
Payer: MEDICARE

## 2020-11-10 ENCOUNTER — HOSPITAL ENCOUNTER (OUTPATIENT)
Dept: CARDIOLOGY | Facility: HOSPITAL | Age: 70
Discharge: HOME | End: 2020-11-10
Attending: INTERNAL MEDICINE
Payer: MEDICARE

## 2020-11-10 VITALS
SYSTOLIC BLOOD PRESSURE: 159 MMHG | WEIGHT: 176 LBS | DIASTOLIC BLOOD PRESSURE: 80 MMHG | HEART RATE: 66 BPM | BODY MASS INDEX: 29.29 KG/M2 | OXYGEN SATURATION: 98 %

## 2020-11-10 DIAGNOSIS — I65.23 CALCIFICATION OF BOTH CAROTID ARTERIES: ICD-10-CM

## 2020-11-10 DIAGNOSIS — G47.33 OSA ON CPAP: Chronic | ICD-10-CM

## 2020-11-10 DIAGNOSIS — E11.9 TYPE 2 DIABETES MELLITUS WITHOUT COMPLICATION, WITHOUT LONG-TERM CURRENT USE OF INSULIN: ICD-10-CM

## 2020-11-10 DIAGNOSIS — E78.00 PURE HYPERCHOLESTEROLEMIA WITH TARGET LOW DENSITY LIPOPROTEIN (LDL) CHOLESTEROL LESS THAN 130 MG/DL: Chronic | ICD-10-CM

## 2020-11-10 DIAGNOSIS — I25.10 CORONARY ARTERY DISEASE INVOLVING NATIVE CORONARY ARTERY OF NATIVE HEART WITHOUT ANGINA PECTORIS: ICD-10-CM

## 2020-11-10 DIAGNOSIS — I10 ESSENTIAL HYPERTENSION: Primary | Chronic | ICD-10-CM

## 2020-11-10 DIAGNOSIS — E78.5 DYSLIPIDEMIA: ICD-10-CM

## 2020-11-10 DIAGNOSIS — D68.51 HETEROZYGOUS FACTOR V LEIDEN MUTATION: Chronic | ICD-10-CM

## 2020-11-10 DIAGNOSIS — O22.30 DVT (DEEP VEIN THROMBOSIS) IN PREGNANCY: ICD-10-CM

## 2020-11-10 DIAGNOSIS — Z79.01 LONG TERM (CURRENT) USE OF ANTICOAGULANTS: Primary | ICD-10-CM

## 2020-11-10 DIAGNOSIS — I51.89 LEFT VENTRICULAR DIASTOLIC DYSFUNCTION WITH PRESERVED SYSTOLIC FUNCTION: Chronic | ICD-10-CM

## 2020-11-10 LAB — INR PPP: 1.9 (ref 2–3)

## 2020-11-10 PROCEDURE — 93010 EKG 12-LEAD: ICD-10-PCS | Mod: HCNC,,, | Performed by: INTERNAL MEDICINE

## 2020-11-10 PROCEDURE — 99214 PR OFFICE/OUTPT VISIT, EST, LEVL IV, 30-39 MIN: ICD-10-PCS | Mod: HCNC,S$GLB,, | Performed by: INTERNAL MEDICINE

## 2020-11-10 PROCEDURE — 1159F PR MEDICATION LIST DOCUMENTED IN MEDICAL RECORD: ICD-10-PCS | Mod: HCNC,S$GLB,, | Performed by: INTERNAL MEDICINE

## 2020-11-10 PROCEDURE — 99999 PR PBB SHADOW E&M-EST. PATIENT-LVL IV: CPT | Mod: PBBFAC,HCNC,, | Performed by: INTERNAL MEDICINE

## 2020-11-10 PROCEDURE — 3077F PR MOST RECENT SYSTOLIC BLOOD PRESSURE >= 140 MM HG: ICD-10-PCS | Mod: HCNC,CPTII,S$GLB, | Performed by: INTERNAL MEDICINE

## 2020-11-10 PROCEDURE — 93005 ELECTROCARDIOGRAM TRACING: CPT | Mod: HCNC

## 2020-11-10 PROCEDURE — 99499 UNLISTED E&M SERVICE: CPT | Mod: S$GLB,,, | Performed by: INTERNAL MEDICINE

## 2020-11-10 PROCEDURE — 3008F BODY MASS INDEX DOCD: CPT | Mod: HCNC,CPTII,S$GLB, | Performed by: INTERNAL MEDICINE

## 2020-11-10 PROCEDURE — 99214 OFFICE O/P EST MOD 30 MIN: CPT | Mod: HCNC,S$GLB,, | Performed by: INTERNAL MEDICINE

## 2020-11-10 PROCEDURE — 85610 POCT INR: ICD-10-PCS | Mod: QW,HCNC,S$GLB, | Performed by: INTERNAL MEDICINE

## 2020-11-10 PROCEDURE — 3044F HG A1C LEVEL LT 7.0%: CPT | Mod: HCNC,CPTII,S$GLB, | Performed by: INTERNAL MEDICINE

## 2020-11-10 PROCEDURE — 1159F MED LIST DOCD IN RCRD: CPT | Mod: HCNC,S$GLB,, | Performed by: INTERNAL MEDICINE

## 2020-11-10 PROCEDURE — 3077F SYST BP >= 140 MM HG: CPT | Mod: HCNC,CPTII,S$GLB, | Performed by: INTERNAL MEDICINE

## 2020-11-10 PROCEDURE — 3008F PR BODY MASS INDEX (BMI) DOCUMENTED: ICD-10-PCS | Mod: HCNC,CPTII,S$GLB, | Performed by: INTERNAL MEDICINE

## 2020-11-10 PROCEDURE — 93010 ELECTROCARDIOGRAM REPORT: CPT | Mod: HCNC,,, | Performed by: INTERNAL MEDICINE

## 2020-11-10 PROCEDURE — 99499 RISK ADDL DX/OHS AUDIT: ICD-10-PCS | Mod: S$GLB,,, | Performed by: INTERNAL MEDICINE

## 2020-11-10 PROCEDURE — 3079F DIAST BP 80-89 MM HG: CPT | Mod: HCNC,CPTII,S$GLB, | Performed by: INTERNAL MEDICINE

## 2020-11-10 PROCEDURE — 3072F LOW RISK FOR RETINOPATHY: CPT | Mod: HCNC,S$GLB,, | Performed by: INTERNAL MEDICINE

## 2020-11-10 PROCEDURE — 3044F PR MOST RECENT HEMOGLOBIN A1C LEVEL <7.0%: ICD-10-PCS | Mod: HCNC,CPTII,S$GLB, | Performed by: INTERNAL MEDICINE

## 2020-11-10 PROCEDURE — 99999 PR PBB SHADOW E&M-EST. PATIENT-LVL IV: ICD-10-PCS | Mod: PBBFAC,HCNC,, | Performed by: INTERNAL MEDICINE

## 2020-11-10 PROCEDURE — 3079F PR MOST RECENT DIASTOLIC BLOOD PRESSURE 80-89 MM HG: ICD-10-PCS | Mod: HCNC,CPTII,S$GLB, | Performed by: INTERNAL MEDICINE

## 2020-11-10 PROCEDURE — 3072F PR LOW RISK FOR RETINOPATHY: ICD-10-PCS | Mod: HCNC,S$GLB,, | Performed by: INTERNAL MEDICINE

## 2020-11-10 PROCEDURE — 85610 PROTHROMBIN TIME: CPT | Mod: QW,HCNC,S$GLB, | Performed by: INTERNAL MEDICINE

## 2020-11-10 NOTE — PROGRESS NOTES
Subjective:   Patient ID:  Alyx Weir is a 70 y.o. female who presents for evaluation of Consult      69 yo female, CARE REESTABLISH.  PMH of mild CAD, nonobstructive s/p LHC 2013. HTN, HLP, NIDDM, obesity, sleep apnea needs new masl.  PT WAS INFECTED covid 19 (throat itching) IN . [place]   OF COVID 19   AVALOS with fast walking  Denied chest pain, palpitation dizziness leg swelling, no syncope.  Denied h/o MI.  No smoking/drinking.  No regular exercise.  Patient is compliant with medications.   MPI no ischemia   echo normal EF and LVH    carotid US 0-19% lesion  ekg NSR and LVH  Did not take med today and BP high      Past Medical History:   Diagnosis Date    Anticoagulated on Coumadin     Arm weakness-rotator cuff weakness 2015    Arthritis     Asthma     Clotting disorder     Coronary artery disease     Deep vein thrombosis     Degenerative disc disease     Diabetes mellitus type I     BS last tested x 1 month not sure what it was.    Heterozygous factor V Leiden mutation     Hx of colonic polyps 2015    Hyperlipidemia     Hypertension     VIRGIL (obstructive sleep apnea)     Stenosis of right carotid artery 2016       Past Surgical History:   Procedure Laterality Date    BACK SURGERY      bladder cyst removal      BLADDER SURGERY      CARDIAC CATHETERIZATION  2013    mild CAD    COLONOSCOPY N/A 2015    Procedure: COLONOSCOPY;  Surgeon: Jas Umanzor III, MD;  Location: [facility];  Service: Endoscopy;  Laterality: N/A;    HYSTERECTOMY      28 yrs old    LUMBAR SPINE SURGERY      bone spurs removed    OOPHORECTOMY         Social History     Tobacco Use    Smoking status: Never Smoker    Smokeless tobacco: Never Used   Substance Use Topics    Alcohol use: No    Drug use: No       Family History   Problem Relation Age of Onset    Heart disease Mother     Hypertension Mother     Cataracts Mother     Hypertension Sister      Glaucoma Sister     Hypertension Brother     Diabetes Father     Diabetes Paternal Uncle     Hypertension Sister     Diabetes Sister     Hypertension Sister     Diabetes Sister     Breast cancer Neg Hx     Colon cancer Neg Hx     Ovarian cancer Neg Hx        Review of Systems   Constitution: Negative for decreased appetite, diaphoresis, fever, malaise/fatigue and night sweats.   HENT: Negative for nosebleeds.    Eyes: Negative for blurred vision and double vision.   Cardiovascular: Positive for dyspnea on exertion. Negative for chest pain, claudication, irregular heartbeat, leg swelling, near-syncope, orthopnea, palpitations, paroxysmal nocturnal dyspnea and syncope.   Respiratory: Negative for cough, shortness of breath, sleep disturbances due to breathing, snoring, sputum production and wheezing.    Endocrine: Negative for cold intolerance and polyuria.   Hematologic/Lymphatic: Does not bruise/bleed easily.   Skin: Negative for rash.   Musculoskeletal: Negative for back pain, falls, joint pain, joint swelling and neck pain.   Gastrointestinal: Negative for abdominal pain, heartburn, nausea and vomiting.   Genitourinary: Negative for dysuria, frequency and hematuria.   Neurological: Negative for difficulty with concentration, dizziness, focal weakness, headaches, light-headedness, numbness, seizures and weakness.   Psychiatric/Behavioral: Negative for depression, memory loss and substance abuse. The patient does not have insomnia.    Allergic/Immunologic: Negative for HIV exposure and hives.       Objective:   Physical Exam   Constitutional: She is oriented to person, place, and time. She appears well-nourished.   HENT:   Head: Normocephalic.   Eyes: Pupils are equal, round, and reactive to light.   Neck: Normal carotid pulses and no JVD present. Carotid bruit is not present. No thyromegaly present.   Cardiovascular: Regular rhythm, normal heart sounds and normal pulses.  No extrasystoles are present.  Bradycardia present. PMI is not displaced. Exam reveals no gallop and no S3.   No murmur (ESM on left chest ) heard.  Pulmonary/Chest: Breath sounds normal. No stridor. No respiratory distress.   Abdominal: Soft. Bowel sounds are normal. There is no abdominal tenderness. There is no rebound.   Musculoskeletal: Normal range of motion.   Neurological: She is alert and oriented to person, place, and time.   Skin: Skin is intact. No rash noted.   Psychiatric: Her behavior is normal.       Lab Results   Component Value Date    CHOL 206 (H) 11/21/2019    CHOL 220 (H) 09/27/2018    CHOL 197 05/08/2018     Lab Results   Component Value Date    HDL 74 11/21/2019    HDL 80 (H) 09/27/2018    HDL 71 05/08/2018     Lab Results   Component Value Date    LDLCALC 122.0 11/21/2019    LDLCALC 124.4 09/27/2018    LDLCALC 113.0 05/08/2018     Lab Results   Component Value Date    TRIG 50 11/21/2019    TRIG 78 09/27/2018    TRIG 65 05/08/2018     Lab Results   Component Value Date    CHOLHDL 35.9 11/21/2019    CHOLHDL 36.4 09/27/2018    CHOLHDL 36.0 05/08/2018       Chemistry        Component Value Date/Time     06/22/2020 1450    K 3.9 06/22/2020 1450     06/22/2020 1450    CO2 29 06/22/2020 1450    BUN 13 06/22/2020 1450    CREATININE 0.8 06/22/2020 1450    GLU 71 06/22/2020 1450        Component Value Date/Time    CALCIUM 9.5 06/22/2020 1450    ALKPHOS 46 (L) 06/22/2020 1450    AST 22 06/22/2020 1450    ALT 22 06/22/2020 1450    BILITOT 0.8 06/22/2020 1450    ESTGFRAFRICA >60.0 06/22/2020 1450    EGFRNONAA >60.0 06/22/2020 1450          Lab Results   Component Value Date    HGBA1C 6.1 (H) 06/22/2020     Lab Results   Component Value Date    TSH 0.681 09/27/2018     Lab Results   Component Value Date    INR 1.9 (A) 11/10/2020    INR 2.5 10/13/2020    INR 2.2 09/18/2020     Lab Results   Component Value Date    WBC 6.71 06/22/2020    HGB 12.4 06/22/2020    HCT 40.7 06/22/2020    MCV 92 06/22/2020     06/22/2020      BNP  @LABRCNTIP(BNP,BNPTRIAGEBLO)@  CrCl cannot be calculated (Patient's most recent lab result is older than the maximum 7 days allowed.).  No results found in the last 24 hours.  No results found in the last 24 hours.  No results found in the last 24 hours.    Assessment:      1. Essential hypertension    2. Dyslipidemia    3. Left ventricular diastolic dysfunction with preserved systolic function    4. Calcification of both carotid arteries    5. Coronary artery disease involving native coronary artery of native heart without angina pectoris    6. Heterozygous factor V Leiden mutation    7. DVT (deep vein thrombosis) in pregnancy    8. Type 2 diabetes mellitus without complication, without long-term current use of insulin    9. VIRGIL on CPAP        Plan:   Enroll digit hTN program  Continue coumadin Corgeg ARB amlodipine and HCTZ  Echo   DM Rx per PCP  Counseled DASH  Check Lipid profile in 6 months  Recommend heart-healthy diet, weight control and regular exercise.  Mine. Risk modification.   I have reviewed all pertinent labs and cardiac studies independently. Plans and recommendations have been formulated under my direct supervision. All questions answered and patient voiced understanding.   If symptoms persist go to the ED  RTC in 6 months

## 2020-11-10 NOTE — PROGRESS NOTES
Patient's INR is slightly low at 1.9.  Reports no missed doses.  Reports an recent intake of mustard greens.  Current warfarin regimen verified.  Instructions given:  Will boost today's (Tuesday) warfarin dose to 7.5 mg - only, then resume current schedule of 7.5 mg every Monday, Wednesday, Friday; and 5 mg on all other days.  Recheck in 1 month.  Advised to decrease Vitamin K in diet.  Dose calendar given.  Patient verbalized understanding.

## 2020-11-11 DIAGNOSIS — E11.9 TYPE 2 DIABETES MELLITUS: ICD-10-CM

## 2020-11-12 ENCOUNTER — PATIENT OUTREACH (OUTPATIENT)
Dept: ADMINISTRATIVE | Facility: HOSPITAL | Age: 70
End: 2020-11-12

## 2020-11-12 NOTE — PROGRESS NOTES
Working HTN report.     Seen 11/10/20 by cardio with uncontrolled bp, 159/86. Has appt for follow up with pcp 11/17/20.

## 2020-11-16 ENCOUNTER — PATIENT OUTREACH (OUTPATIENT)
Dept: OTHER | Facility: OTHER | Age: 70
End: 2020-11-16

## 2020-11-17 ENCOUNTER — LAB VISIT (OUTPATIENT)
Dept: LAB | Facility: HOSPITAL | Age: 70
End: 2020-11-17
Attending: INTERNAL MEDICINE
Payer: MEDICARE

## 2020-11-17 ENCOUNTER — OFFICE VISIT (OUTPATIENT)
Dept: FAMILY MEDICINE | Facility: CLINIC | Age: 70
End: 2020-11-17
Payer: MEDICARE

## 2020-11-17 VITALS
HEIGHT: 65 IN | BODY MASS INDEX: 29.72 KG/M2 | TEMPERATURE: 97 F | DIASTOLIC BLOOD PRESSURE: 88 MMHG | HEART RATE: 57 BPM | SYSTOLIC BLOOD PRESSURE: 138 MMHG | OXYGEN SATURATION: 98 % | WEIGHT: 178.38 LBS

## 2020-11-17 DIAGNOSIS — E78.00 PURE HYPERCHOLESTEROLEMIA WITH TARGET LOW DENSITY LIPOPROTEIN (LDL) CHOLESTEROL LESS THAN 130 MG/DL: Chronic | ICD-10-CM

## 2020-11-17 DIAGNOSIS — Z12.31 ENCOUNTER FOR SCREENING MAMMOGRAM FOR BREAST CANCER: ICD-10-CM

## 2020-11-17 DIAGNOSIS — I10 ESSENTIAL HYPERTENSION: Chronic | ICD-10-CM

## 2020-11-17 DIAGNOSIS — Z79.01 ANTICOAGULATED ON COUMADIN: Chronic | ICD-10-CM

## 2020-11-17 DIAGNOSIS — E78.5 DYSLIPIDEMIA: ICD-10-CM

## 2020-11-17 DIAGNOSIS — I25.10 CORONARY ARTERY DISEASE INVOLVING NATIVE CORONARY ARTERY OF NATIVE HEART WITHOUT ANGINA PECTORIS: ICD-10-CM

## 2020-11-17 DIAGNOSIS — M85.89 OSTEOPENIA OF MULTIPLE SITES: ICD-10-CM

## 2020-11-17 DIAGNOSIS — E11.9 TYPE 2 DIABETES MELLITUS WITHOUT COMPLICATION, WITHOUT LONG-TERM CURRENT USE OF INSULIN: ICD-10-CM

## 2020-11-17 DIAGNOSIS — D68.51 HETEROZYGOUS FACTOR V LEIDEN MUTATION: Chronic | ICD-10-CM

## 2020-11-17 DIAGNOSIS — E11.9 TYPE 2 DIABETES MELLITUS WITHOUT COMPLICATION, WITHOUT LONG-TERM CURRENT USE OF INSULIN: Primary | ICD-10-CM

## 2020-11-17 PROCEDURE — 1101F PR PT FALLS ASSESS DOC 0-1 FALLS W/OUT INJ PAST YR: ICD-10-PCS | Mod: HCNC,CPTII,S$GLB, | Performed by: INTERNAL MEDICINE

## 2020-11-17 PROCEDURE — 3288F PR FALLS RISK ASSESSMENT DOCUMENTED: ICD-10-PCS | Mod: HCNC,CPTII,S$GLB, | Performed by: INTERNAL MEDICINE

## 2020-11-17 PROCEDURE — 3008F PR BODY MASS INDEX (BMI) DOCUMENTED: ICD-10-PCS | Mod: HCNC,CPTII,S$GLB, | Performed by: INTERNAL MEDICINE

## 2020-11-17 PROCEDURE — 1159F PR MEDICATION LIST DOCUMENTED IN MEDICAL RECORD: ICD-10-PCS | Mod: HCNC,S$GLB,, | Performed by: INTERNAL MEDICINE

## 2020-11-17 PROCEDURE — 3072F PR LOW RISK FOR RETINOPATHY: ICD-10-PCS | Mod: HCNC,S$GLB,, | Performed by: INTERNAL MEDICINE

## 2020-11-17 PROCEDURE — 83036 HEMOGLOBIN GLYCOSYLATED A1C: CPT | Mod: HCNC

## 2020-11-17 PROCEDURE — 99999 PR PBB SHADOW E&M-EST. PATIENT-LVL V: CPT | Mod: PBBFAC,HCNC,, | Performed by: INTERNAL MEDICINE

## 2020-11-17 PROCEDURE — 99215 OFFICE O/P EST HI 40 MIN: CPT | Mod: HCNC,S$GLB,, | Performed by: INTERNAL MEDICINE

## 2020-11-17 PROCEDURE — 1101F PT FALLS ASSESS-DOCD LE1/YR: CPT | Mod: HCNC,CPTII,S$GLB, | Performed by: INTERNAL MEDICINE

## 2020-11-17 PROCEDURE — 3079F DIAST BP 80-89 MM HG: CPT | Mod: HCNC,CPTII,S$GLB, | Performed by: INTERNAL MEDICINE

## 2020-11-17 PROCEDURE — 3075F SYST BP GE 130 - 139MM HG: CPT | Mod: HCNC,CPTII,S$GLB, | Performed by: INTERNAL MEDICINE

## 2020-11-17 PROCEDURE — 3008F BODY MASS INDEX DOCD: CPT | Mod: HCNC,CPTII,S$GLB, | Performed by: INTERNAL MEDICINE

## 2020-11-17 PROCEDURE — 99215 PR OFFICE/OUTPT VISIT, EST, LEVL V, 40-54 MIN: ICD-10-PCS | Mod: HCNC,S$GLB,, | Performed by: INTERNAL MEDICINE

## 2020-11-17 PROCEDURE — 1159F MED LIST DOCD IN RCRD: CPT | Mod: HCNC,S$GLB,, | Performed by: INTERNAL MEDICINE

## 2020-11-17 PROCEDURE — 1126F AMNT PAIN NOTED NONE PRSNT: CPT | Mod: HCNC,S$GLB,, | Performed by: INTERNAL MEDICINE

## 2020-11-17 PROCEDURE — 3072F LOW RISK FOR RETINOPATHY: CPT | Mod: HCNC,S$GLB,, | Performed by: INTERNAL MEDICINE

## 2020-11-17 PROCEDURE — 3079F PR MOST RECENT DIASTOLIC BLOOD PRESSURE 80-89 MM HG: ICD-10-PCS | Mod: HCNC,CPTII,S$GLB, | Performed by: INTERNAL MEDICINE

## 2020-11-17 PROCEDURE — 3075F PR MOST RECENT SYSTOLIC BLOOD PRESS GE 130-139MM HG: ICD-10-PCS | Mod: HCNC,CPTII,S$GLB, | Performed by: INTERNAL MEDICINE

## 2020-11-17 PROCEDURE — 36415 COLL VENOUS BLD VENIPUNCTURE: CPT | Mod: HCNC,PO

## 2020-11-17 PROCEDURE — 99999 PR PBB SHADOW E&M-EST. PATIENT-LVL V: ICD-10-PCS | Mod: PBBFAC,HCNC,, | Performed by: INTERNAL MEDICINE

## 2020-11-17 PROCEDURE — 3288F FALL RISK ASSESSMENT DOCD: CPT | Mod: HCNC,CPTII,S$GLB, | Performed by: INTERNAL MEDICINE

## 2020-11-17 PROCEDURE — 80061 LIPID PANEL: CPT | Mod: HCNC

## 2020-11-17 PROCEDURE — 1126F PR PAIN SEVERITY QUANTIFIED, NO PAIN PRESENT: ICD-10-PCS | Mod: HCNC,S$GLB,, | Performed by: INTERNAL MEDICINE

## 2020-11-17 PROCEDURE — 3044F HG A1C LEVEL LT 7.0%: CPT | Mod: HCNC,CPTII,S$GLB, | Performed by: INTERNAL MEDICINE

## 2020-11-17 PROCEDURE — 3044F PR MOST RECENT HEMOGLOBIN A1C LEVEL <7.0%: ICD-10-PCS | Mod: HCNC,CPTII,S$GLB, | Performed by: INTERNAL MEDICINE

## 2020-11-17 NOTE — PROGRESS NOTES
Subjective:       Patient ID: Alyx Weir is a 70 y.o. female.    Chief Complaint: Hypertension, Hyperlipidemia, Diabetes, and Anticoagulation    Hypertension  Pertinent negatives include no chest pain, headaches, neck pain, palpitations or shortness of breath.   Hyperlipidemia  Associated symptoms include myalgias. Pertinent negatives include no chest pain or shortness of breath.   Diabetes  Pertinent negatives for hypoglycemia include no confusion, dizziness, headaches, nervousness/anxiousness, pallor, seizures, speech difficulty or tremors. Pertinent negatives for diabetes include no chest pain, no fatigue, no polydipsia, no polyphagia, no polyuria and no weakness.     Past Medical History:   Diagnosis Date    Anticoagulated on Coumadin     Arm weakness-rotator cuff weakness 11/2/2015    Arthritis     Asthma     Clotting disorder     Coronary artery disease     Deep vein thrombosis     Degenerative disc disease     Diabetes mellitus type I 2011    BS last tested x 1 month not sure what it was.    Heterozygous factor V Leiden mutation     Hx of colonic polyps 11/13/2015    Hyperlipidemia     Hypertension     VIRGIL (obstructive sleep apnea)     Stenosis of right carotid artery 12/13/2016     Past Surgical History:   Procedure Laterality Date    BACK SURGERY      bladder cyst removal      BLADDER SURGERY      CARDIAC CATHETERIZATION  03/2013    mild CAD    COLONOSCOPY N/A 11/13/2015    Procedure: COLONOSCOPY;  Surgeon: Jas Umanzor III, MD;  Location[facility];  Service: Endoscopy;  Laterality: N/A;    HYSTERECTOMY      28 yrs old    LUMBAR SPINE SURGERY      bone spurs removed    OOPHORECTOMY       Family History   Problem Relation Age of Onset    Heart disease Mother     Hypertension Mother     Cataracts Mother     Hypertension Sister     Glaucoma Sister     Hypertension Brother     Diabetes Father     Diabetes Paternal Uncle     Hypertension Sister     Diabetes Sister      Hypertension Sister     Diabetes Sister     Breast cancer Neg Hx     Colon cancer Neg Hx     Ovarian cancer Neg Hx      Social History     Socioeconomic History    Marital status:      Spouse name: Not on file    Number of children: Not on file    Years of education: Not on file    Highest education level: Not on file   Occupational History    Not on file   Social Needs    Financial resource strain: Not on file    Food insecurity     Worry: Not on file     Inability: Not on file    Transportation needs     Medical: Not on file     Non-medical: Not on file   Tobacco Use    Smoking status: Never Smoker    Smokeless tobacco: Never Used   Substance and Sexual Activity    Alcohol use: No    Drug use: No    Sexual activity: Never     Birth control/protection: None   Lifestyle    Physical activity     Days per week: Not on file     Minutes per session: Not on file    Stress: Not on file   Relationships    Social connections     Talks on phone: Not on file     Gets together: Not on file     Attends [employer] service: Not on file     Active member of club or organization: Not on file     Attends meetings of clubs or organizations: Not on file     Relationship status: Not on file   Other Topics Concern    Not on file   Social History Narrative    Dogs in household, no smokers.     Review of Systems   Constitutional: Negative for activity change, appetite change, chills, diaphoresis, fatigue, fever and unexpected weight change.   HENT: Negative for congestion, drooling, ear discharge, ear pain, facial swelling, hearing loss, mouth sores, nosebleeds, postnasal drip, rhinorrhea, sinus pressure, sneezing, sore throat, tinnitus, trouble swallowing and voice change.    Eyes: Negative for photophobia, redness and visual disturbance.   Respiratory: Negative for apnea, cough, choking, chest tightness, shortness of breath and wheezing.    Cardiovascular: Negative for chest pain, palpitations and leg  swelling.   Gastrointestinal: Negative for abdominal distention, abdominal pain, blood in stool, constipation, diarrhea, nausea and vomiting.   Endocrine: Negative for cold intolerance, heat intolerance, polydipsia, polyphagia and polyuria.   Genitourinary: Negative for decreased urine volume, difficulty urinating, dysuria, flank pain, frequency, genital sores, hematuria and urgency.   Musculoskeletal: Positive for arthralgias and myalgias. Negative for back pain, gait problem, joint swelling, neck pain and neck stiffness.   Skin: Negative for color change, pallor, rash and wound.   Allergic/Immunologic: Negative for food allergies and immunocompromised state.   Neurological: Negative for dizziness, tremors, seizures, syncope, speech difficulty, weakness, light-headedness, numbness and headaches.   Hematological: Negative for adenopathy. Does not bruise/bleed easily.   Psychiatric/Behavioral: Negative for agitation, behavioral problems, confusion, decreased concentration, dysphoric mood, hallucinations, self-injury, sleep disturbance and suicidal ideas. The patient is not nervous/anxious and is not hyperactive.    All other systems reviewed and are negative.      Objective:      Physical Exam  Vitals signs and nursing note reviewed.   Constitutional:       General: She is not in acute distress.     Appearance: She is well-developed. She is not diaphoretic.   HENT:      Head: Normocephalic and atraumatic.      Mouth/Throat:      Pharynx: No oropharyngeal exudate.   Eyes:      General: No scleral icterus.  Neck:      Musculoskeletal: Normal range of motion and neck supple.      Thyroid: No thyromegaly.      Vascular: No carotid bruit or JVD.      Trachea: No tracheal deviation.   Cardiovascular:      Rate and Rhythm: Normal rate and regular rhythm.      Heart sounds: Normal heart sounds.   Pulmonary:      Effort: Pulmonary effort is normal. No respiratory distress.      Breath sounds: Normal breath sounds. No wheezing  or rales.   Chest:      Chest wall: No tenderness.   Abdominal:      General: Bowel sounds are normal. There is no distension.      Palpations: Abdomen is soft.      Tenderness: There is no abdominal tenderness. There is no guarding or rebound.   Musculoskeletal: Normal range of motion.         General: No tenderness.   Lymphadenopathy:      Cervical: No cervical adenopathy.   Skin:     General: Skin is warm and dry.      Coloration: Skin is not pale.      Findings: No erythema or rash.   Neurological:      Mental Status: She is alert and oriented to person, place, and time.      Coordination: Coordination normal.   Psychiatric:         Behavior: Behavior normal.         Thought Content: Thought content normal.         Judgment: Judgment normal.         CMP  Sodium   Date Value Ref Range Status   06/22/2020 141 136 - 145 mmol/L Final     Potassium   Date Value Ref Range Status   06/22/2020 3.9 3.5 - 5.1 mmol/L Final     Chloride   Date Value Ref Range Status   06/22/2020 103 95 - 110 mmol/L Final     CO2   Date Value Ref Range Status   06/22/2020 29 23 - 29 mmol/L Final     Glucose   Date Value Ref Range Status   06/22/2020 71 70 - 110 mg/dL Final     BUN   Date Value Ref Range Status   06/22/2020 13 8 - 23 mg/dL Final     Creatinine   Date Value Ref Range Status   06/22/2020 0.8 0.5 - 1.4 mg/dL Final     Calcium   Date Value Ref Range Status   06/22/2020 9.5 8.7 - 10.5 mg/dL Final     Total Protein   Date Value Ref Range Status   06/22/2020 7.6 6.0 - 8.4 g/dL Final     Albumin   Date Value Ref Range Status   06/22/2020 4.0 3.5 - 5.2 g/dL Final     Total Bilirubin   Date Value Ref Range Status   06/22/2020 0.8 0.1 - 1.0 mg/dL Final     Comment:     For infants and newborns, interpretation of results should be based  on gestational age, weight and in agreement with clinical  observations.  Premature Infant recommended reference ranges:  Up to 24 hours.............<8.0 mg/dL  Up to 48 hours............<12.0 mg/dL  3-5  days..................<15.0 mg/dL  6-29 days.................<15.0 mg/dL       Alkaline Phosphatase   Date Value Ref Range Status   06/22/2020 46 (L) 55 - 135 U/L Final     AST   Date Value Ref Range Status   06/22/2020 22 10 - 40 U/L Final     ALT   Date Value Ref Range Status   06/22/2020 22 10 - 44 U/L Final     Anion Gap   Date Value Ref Range Status   06/22/2020 9 8 - 16 mmol/L Final     eGFR if    Date Value Ref Range Status   06/22/2020 >60.0 >60 mL/min/1.73 m^2 Final     eGFR if non    Date Value Ref Range Status   06/22/2020 >60.0 >60 mL/min/1.73 m^2 Final     Comment:     Calculation used to obtain the estimated glomerular filtration  rate (eGFR) is the CKD-EPI equation.        Lab Results   Component Value Date    WBC 6.71 06/22/2020    HGB 12.4 06/22/2020    HCT 40.7 06/22/2020    MCV 92 06/22/2020     06/22/2020     Lab Results   Component Value Date    CHOL 206 (H) 11/21/2019     Lab Results   Component Value Date    HDL 74 11/21/2019     Lab Results   Component Value Date    LDLCALC 122.0 11/21/2019     Lab Results   Component Value Date    TRIG 50 11/21/2019     Lab Results   Component Value Date    CHOLHDL 35.9 11/21/2019     Lab Results   Component Value Date    TSH 0.681 09/27/2018     Lab Results   Component Value Date    HGBA1C 6.1 (H) 06/22/2020     Assessment:       1. Type 2 diabetes mellitus without complication, without long-term current use of insulin    2. Pure hypercholesterolemia with target low density lipoprotein (LDL) cholesterol less than 130 mg/dL    3. Heterozygous factor V Leiden mutation    4. Essential hypertension    5. Dyslipidemia    6. Coronary artery disease involving native coronary artery of native heart without angina pectoris    7. Anticoagulated on Coumadin    8. Osteopenia of multiple sites    9. Encounter for screening mammogram for breast cancer        Plan:   Type 2 diabetes mellitus without complication, without long-term  current use of insulin  -     Hemoglobin A1C; Future; Expected date: 11/17/2020  -     Ambulatory referral/consult to Optometry; Future; Expected date: 11/24/2020    Pure hypercholesterolemia with target low density lipoprotein (LDL) cholesterol less than 130 mg/dL  -     Lipid Panel; Future; Expected date: 11/17/2020    Heterozygous factor V Leiden mutation    Essential hypertension    Dyslipidemia    Coronary artery disease involving native coronary artery of native heart without angina pectoris    Anticoagulated on Coumadin    Osteopenia of multiple sites  -     DXA Bone Density Spine And Hip; Future; Expected date: 11/17/2020    Encounter for screening mammogram for breast cancer  -     Mammo Digital Screening Bilat w/ Alfred; Future; Expected date: 11/17/2020    Stable----------continue meds, as above-----------f/u 4 months---------------

## 2020-11-18 LAB
CHOLEST SERPL-MCNC: 203 MG/DL (ref 120–199)
CHOLEST/HDLC SERPL: 2.7 {RATIO} (ref 2–5)
ESTIMATED AVG GLUCOSE: 126 MG/DL (ref 68–131)
HBA1C MFR BLD HPLC: 6 % (ref 4–5.6)
HDLC SERPL-MCNC: 75 MG/DL (ref 40–75)
HDLC SERPL: 36.9 % (ref 20–50)
LDLC SERPL CALC-MCNC: 110.6 MG/DL (ref 63–159)
NONHDLC SERPL-MCNC: 128 MG/DL
TRIGL SERPL-MCNC: 87 MG/DL (ref 30–150)

## 2020-12-01 ENCOUNTER — HOSPITAL ENCOUNTER (OUTPATIENT)
Dept: CARDIOLOGY | Facility: HOSPITAL | Age: 70
Discharge: HOME | End: 2020-12-01
Attending: INTERNAL MEDICINE
Payer: MEDICARE

## 2020-12-01 VITALS
HEART RATE: 54 BPM | HEIGHT: 65 IN | WEIGHT: 178 LBS | DIASTOLIC BLOOD PRESSURE: 88 MMHG | SYSTOLIC BLOOD PRESSURE: 138 MMHG | BODY MASS INDEX: 29.66 KG/M2

## 2020-12-01 DIAGNOSIS — I10 ESSENTIAL HYPERTENSION: ICD-10-CM

## 2020-12-01 LAB
AORTIC ROOT ANNULUS: 2.51 CM
BSA FOR ECHO PROCEDURE: 1.92 M2
CV ECHO LV RWT: 0.59 CM
DOP CALC LVOT AREA: 3 CM2
DOP CALC LVOT DIAMETER: 1.97 CM
DOP CALC LVOT PEAK VEL: 1.39 M/S
DOP CALC LVOT STROKE VOLUME: 100.41 CM3
DOP CALC RVOT PEAK VEL: 0.64 M/S
DOP CALC RVOT VTI: 12.85 CM
DOP CALCLVOT PEAK VEL VTI: 32.96 CM
E WAVE DECELERATION TIME: 195.87 MSEC
E/A RATIO: 0.9
E/E' RATIO: 9.88 M/S
ECHO LV POSTERIOR WALL: 1.15 CM (ref 0.6–1.1)
FRACTIONAL SHORTENING: 27 % (ref 28–44)
INTERVENTRICULAR SEPTUM: 0.92 CM (ref 0.6–1.1)
IVRT: 117.98 MSEC
LA MAJOR: 4.08 CM
LA MINOR: 4.31 CM
LA WIDTH: 3.71 CM
LEFT ATRIUM SIZE: 3.27 CM
LEFT ATRIUM VOLUME INDEX: 23 ML/M2
LEFT ATRIUM VOLUME: 43.23 CM3
LEFT INTERNAL DIMENSION IN SYSTOLE: 2.81 CM (ref 2.1–4)
LEFT VENTRICLE DIASTOLIC VOLUME INDEX: 34.38 ML/M2
LEFT VENTRICLE DIASTOLIC VOLUME: 64.72 ML
LEFT VENTRICLE MASS INDEX: 67 G/M2
LEFT VENTRICLE SYSTOLIC VOLUME INDEX: 15.9 ML/M2
LEFT VENTRICLE SYSTOLIC VOLUME: 29.9 ML
LEFT VENTRICULAR INTERNAL DIMENSION IN DIASTOLE: 3.87 CM (ref 3.5–6)
LEFT VENTRICULAR MASS: 126.75 G
LV LATERAL E/E' RATIO: 10.5 M/S
LV SEPTAL E/E' RATIO: 9.33 M/S
MV PEAK A VEL: 0.93 M/S
MV PEAK E VEL: 0.84 M/S
MV STENOSIS PRESSURE HALF TIME: 62.42 MS
MV VALVE AREA P 1/2 METHOD: 3.52 CM2
PULM VEIN S/D RATIO: 1.2
PV MEAN GRADIENT: 0.83 MMHG
PV PEAK D VEL: 0.4 M/S
PV PEAK S VEL: 0.48 M/S
RA MAJOR: 2.71 CM
RA PRESSURE: 3 MMHG
RA WIDTH: 3.03 CM
SINUS: 2.52 CM
STJ: 2.38 CM
TDI LATERAL: 0.08 M/S
TDI SEPTAL: 0.09 M/S
TDI: 0.09 M/S

## 2020-12-01 PROCEDURE — 93306 TTE W/DOPPLER COMPLETE: CPT | Mod: HCNC

## 2020-12-01 PROCEDURE — 93306 ECHO (CUPID ONLY): ICD-10-PCS | Mod: 26,HCNC,, | Performed by: INTERNAL MEDICINE

## 2020-12-01 PROCEDURE — 93306 TTE W/DOPPLER COMPLETE: CPT | Mod: 26,HCNC,, | Performed by: INTERNAL MEDICINE

## 2020-12-04 ENCOUNTER — TELEPHONE (OUTPATIENT)
Dept: CARDIOLOGY | Facility: CLINIC | Age: 70
End: 2020-12-04

## 2020-12-04 NOTE — TELEPHONE ENCOUNTER
Pt was contacted about results:       Echo showed normal EF       Pt verbalized understanding with no questions or concerns.

## 2020-12-08 ENCOUNTER — ANTI-COAG VISIT (OUTPATIENT)
Dept: CARDIOLOGY | Facility: CLINIC | Age: 70
End: 2020-12-08
Payer: MEDICARE

## 2020-12-08 DIAGNOSIS — Z79.01 LONG TERM (CURRENT) USE OF ANTICOAGULANTS: Primary | ICD-10-CM

## 2020-12-08 DIAGNOSIS — D68.51 HETEROZYGOUS FACTOR V LEIDEN MUTATION: Chronic | ICD-10-CM

## 2020-12-08 LAB — INR PPP: 1.8 (ref 2–3)

## 2020-12-08 PROCEDURE — 93793 ANTICOAG MGMT PT WARFARIN: CPT | Mod: HCNC,S$GLB,,

## 2020-12-08 PROCEDURE — 85610 PROTHROMBIN TIME: CPT | Mod: QW,HCNC,S$GLB, | Performed by: INTERNAL MEDICINE

## 2020-12-08 PROCEDURE — 85610 POCT INR: ICD-10-PCS | Mod: QW,HCNC,S$GLB, | Performed by: INTERNAL MEDICINE

## 2020-12-08 PROCEDURE — 93793 PR ANTICOAGULANT MGMT FOR PT TAKING WARFARIN: ICD-10-PCS | Mod: HCNC,S$GLB,,

## 2020-12-08 NOTE — PROGRESS NOTES
Patient's INR is 1.8 - remains subtherapeutic x 2.  Trending down.  Previous instructions were verified and followed.  No other changes reported.  No s/s reported.  Instructions given:  Will increase warfarin dose to 5 mg every Monday, Wednesday, Friday; and 7.5 mg on all other days.  Recheck in 2 weeks.  Dose calendar given and reviewed with patient.  Patient verbalized understanding.

## 2020-12-10 ENCOUNTER — PES CALL (OUTPATIENT)
Dept: ADMINISTRATIVE | Facility: CLINIC | Age: 70
End: 2020-12-10

## 2020-12-22 ENCOUNTER — PATIENT OUTREACH (OUTPATIENT)
Dept: ADMINISTRATIVE | Facility: OTHER | Age: 70
End: 2020-12-22

## 2020-12-23 ENCOUNTER — ANTI-COAG VISIT (OUTPATIENT)
Dept: CARDIOLOGY | Facility: CLINIC | Age: 70
End: 2020-12-23
Payer: MEDICARE

## 2020-12-23 ENCOUNTER — HOSPITAL ENCOUNTER (OUTPATIENT)
Dept: RADIOLOGY | Facility: HOSPITAL | Age: 70
Discharge: HOME | End: 2020-12-23
Attending: INTERNAL MEDICINE
Payer: MEDICARE

## 2020-12-23 ENCOUNTER — OFFICE VISIT (OUTPATIENT)
Dept: OPHTHALMOLOGY | Facility: CLINIC | Age: 70
End: 2020-12-23
Payer: MEDICARE

## 2020-12-23 VITALS — BODY MASS INDEX: 29.65 KG/M2 | WEIGHT: 177.94 LBS | HEIGHT: 65 IN

## 2020-12-23 DIAGNOSIS — Z12.31 ENCOUNTER FOR SCREENING MAMMOGRAM FOR BREAST CANCER: ICD-10-CM

## 2020-12-23 DIAGNOSIS — Z79.01 LONG TERM (CURRENT) USE OF ANTICOAGULANTS: Primary | ICD-10-CM

## 2020-12-23 DIAGNOSIS — H52.7 REFRACTIVE DISORDER: ICD-10-CM

## 2020-12-23 DIAGNOSIS — E11.36 DIABETIC CATARACT OF LEFT EYE: ICD-10-CM

## 2020-12-23 DIAGNOSIS — E11.9 TYPE 2 DIABETES MELLITUS WITHOUT COMPLICATION, WITHOUT LONG-TERM CURRENT USE OF INSULIN: Primary | ICD-10-CM

## 2020-12-23 DIAGNOSIS — O22.30 DVT (DEEP VEIN THROMBOSIS) IN PREGNANCY: ICD-10-CM

## 2020-12-23 DIAGNOSIS — E11.36 DIABETIC CATARACT OF RIGHT EYE: ICD-10-CM

## 2020-12-23 LAB — INR PPP: 3.1 (ref 2–3)

## 2020-12-23 PROCEDURE — 85610 PROTHROMBIN TIME: CPT | Mod: QW,HCNC,S$GLB, | Performed by: INTERNAL MEDICINE

## 2020-12-23 PROCEDURE — 92004 PR EYE EXAM, NEW PATIENT,COMPREHESV: ICD-10-PCS | Mod: HCNC,S$GLB,, | Performed by: STUDENT IN AN ORGANIZED HEALTH CARE EDUCATION/TRAINING PROGRAM

## 2020-12-23 PROCEDURE — 93793 PR ANTICOAGULANT MGMT FOR PT TAKING WARFARIN: ICD-10-PCS | Mod: HCNC,S$GLB,,

## 2020-12-23 PROCEDURE — 92004 COMPRE OPH EXAM NEW PT 1/>: CPT | Mod: HCNC,S$GLB,, | Performed by: STUDENT IN AN ORGANIZED HEALTH CARE EDUCATION/TRAINING PROGRAM

## 2020-12-23 PROCEDURE — 77063 BREAST TOMOSYNTHESIS BI: CPT | Mod: 26,HCNC,, | Performed by: RADIOLOGY

## 2020-12-23 PROCEDURE — 85610 POCT INR: ICD-10-PCS | Mod: QW,HCNC,S$GLB, | Performed by: INTERNAL MEDICINE

## 2020-12-23 PROCEDURE — 2023F PR DILATED RETINAL EXAM W/O EVID OF RETINOPATHY: ICD-10-PCS | Mod: HCNC,S$GLB,, | Performed by: STUDENT IN AN ORGANIZED HEALTH CARE EDUCATION/TRAINING PROGRAM

## 2020-12-23 PROCEDURE — 77063 MAMMO DIGITAL SCREENING BILAT WITH TOMO: ICD-10-PCS | Mod: 26,HCNC,, | Performed by: RADIOLOGY

## 2020-12-23 PROCEDURE — 2023F DILAT RTA XM W/O RTNOPTHY: CPT | Mod: HCNC,S$GLB,, | Performed by: STUDENT IN AN ORGANIZED HEALTH CARE EDUCATION/TRAINING PROGRAM

## 2020-12-23 PROCEDURE — 99999 PR PBB SHADOW E&M-EST. PATIENT-LVL III: ICD-10-PCS | Mod: PBBFAC,HCNC,, | Performed by: STUDENT IN AN ORGANIZED HEALTH CARE EDUCATION/TRAINING PROGRAM

## 2020-12-23 PROCEDURE — 77067 MAMMO DIGITAL SCREENING BILAT WITH TOMO: ICD-10-PCS | Mod: 26,HCNC,, | Performed by: RADIOLOGY

## 2020-12-23 PROCEDURE — 92015 PR REFRACTION: ICD-10-PCS | Mod: HCNC,S$GLB,, | Performed by: STUDENT IN AN ORGANIZED HEALTH CARE EDUCATION/TRAINING PROGRAM

## 2020-12-23 PROCEDURE — 92015 DETERMINE REFRACTIVE STATE: CPT | Mod: HCNC,S$GLB,, | Performed by: STUDENT IN AN ORGANIZED HEALTH CARE EDUCATION/TRAINING PROGRAM

## 2020-12-23 PROCEDURE — 77067 SCR MAMMO BI INCL CAD: CPT | Mod: TC,HCNC

## 2020-12-23 PROCEDURE — 93793 ANTICOAG MGMT PT WARFARIN: CPT | Mod: HCNC,S$GLB,,

## 2020-12-23 PROCEDURE — 99999 PR PBB SHADOW E&M-EST. PATIENT-LVL III: CPT | Mod: PBBFAC,HCNC,, | Performed by: STUDENT IN AN ORGANIZED HEALTH CARE EDUCATION/TRAINING PROGRAM

## 2020-12-23 PROCEDURE — 77067 SCR MAMMO BI INCL CAD: CPT | Mod: 26,HCNC,, | Performed by: RADIOLOGY

## 2020-12-23 NOTE — PROGRESS NOTES
HPI     Diabetic Eye Exam      Additional comments: yearly               Comments     Last exam 11/11/2019   Patient reports for yearly diabetic exam  Reports of blurred vision at distance, over the last few months  Denies any pain//irritation OU    Blood sugar: unknown, but within normal range   HGBA1C: 6.0 (H), 11/17/2020              1. NS OU  2. Pre-DM x2015          Last edited by Amadeo Nunez on 12/23/2020 10:26 AM. (History)            Assessment /Plan     For exam results, see Encounter Report.    Type 2 diabetes mellitus without complication, without long-term current use of insulin  -   - last A1c 6.0*   - Strict blood glucose control  - Annual DFE  - Follow up with PCP  - Stressed importance of DM control to preserve vision      Diabetic cataract of right eye- - NVS, monitor      Diabetic cataract of left eye- see above    Refractive disorder- MRx dispensed      Return to clinic in1 year or PRN

## 2020-12-23 NOTE — PROGRESS NOTES
Health Maintenance Due   Topic Date Due    TETANUS VACCINE  02/11/1968    Shingles Vaccine (2 of 3) 07/25/2014    DEXA SCAN  11/16/2019     Updates were requested from care everywhere.  Chart was reviewed for overdue Proactive [facility] Encounters (CELE) topics (CRS, Breast Cancer Screening, Eye exam)  Health Maintenance has been updated.  LINKS immunization registry triggered.  Immunizations were reconciled.

## 2020-12-23 NOTE — PROGRESS NOTES
Patient's INR is slightly supra-therapeutic at 3.1.  Patient reports followed previous instructions.  Patient reports no changes.  Instructions given:  Eat small serving of greens (1/3-1/2 cup) today 12/23/20;  continue warfarin 5 mg on Wednesdays, Fridays and Mondays; and 7.5 mg all other days.  Recheck in two weeks on 1/8/21 (per pt's request) at [facility]. Patient verbalizes understanding.

## 2020-12-23 NOTE — LETTER
December 23, 2020      Carl Clemons MD  [address] [place] [address]           [facility]  [address] [facility]  [place] [address]  Phone: [phone]  Fax: [phone]          Patient: Alyx Weir   MR Number: [id number]   YOB: 1950   Date of Visit: 12/23/2020       Dear Dr. Carl Clemons:    Thank you for referring Alyx Weir to me for evaluation. Attached you will find relevant portions of my assessment and plan of care.    If you have questions, please do not hesitate to call me. I look forward to following Alyx Weir along with you.    Sincerely,    Angy Crow MD    Enclosure  CC:  No Recipients    If you would like to receive this communication electronically, please contact [email or web address] or [phone] to request more information on [employer] access.    For providers and/or their staff who would like to refer a patient to [facility], please contact us through our one-stop-shop provider referral line, [facility], at [phone].    If you feel you have received this communication in error or would no longer like to receive these types of communications, please e-mail [email or web address]

## 2021-01-05 NOTE — PATIENT INSTRUCTIONS
Counseling and Referral of Other Preventative  (Italic type indicates deductible and co-insurance are waived)    Patient Name: Alyx Weir  Today's Date: 1/12/2018    Health Maintenance       Date Due Completion Date    TETANUS VACCINE 02/11/1970 ---    Eye Exam 07/11/2017 7/11/2016 (Done)    Override on 7/11/2016: Done    Override on 12/4/2014: Done    Hemoglobin A1c 10/03/2017 4/3/2017    Pneumococcal (65+) (1 of 2 - PCV13) 03/05/2018 (Originally 2/11/2017) ---    Lipid Panel 01/16/2018 1/16/2017    Foot Exam 01/12/2019 1/12/2018 (Done)    Override on 1/12/2018: Done    Mammogram 05/01/2019 5/1/2017    DEXA SCAN 11/16/2019 11/16/2016    Colonoscopy 11/13/2025 11/13/2015        No orders of the defined types were placed in this encounter.    The following information is provided to all patients.  This information is to help you find resources for any of the problems found today that may be affecting your health:                Living healthy guide: www.[facility].[place].[facility]      Understanding Diabetes: www.diabetes.org      Eating healthy: www.cdc.gov/healthyweight      CDC home safety checklist: www.cdc.gov/steadi/patient.html      Agency on Aging: www.goea.[place].[facility]      Alcoholics anonymous (AA): www.aa.org      Physical Activity: www.jannette.nih.gov/wl1ppos      Tobacco use: www.quitwithusla.org       Counseling and Referral of Other Preventative  (Italic type indicates deductible and co-insurance are waived)    Patient Name: Alyx Weir  Today's Date: 1/12/2018    Health Maintenance       Date Due Completion Date    TETANUS VACCINE 02/11/1970 ---    Eye Exam 07/11/2017 7/11/2016 (Done)    Override on 7/11/2016: Done    Override on 12/4/2014: Done    Hemoglobin A1c 10/03/2017 4/3/2017    Pneumococcal (65+) (1 of 2 - PCV13) 03/05/2018 (Originally 2/11/2017) ---    Lipid Panel 01/16/2018 1/16/2017    Foot Exam 01/12/2019 1/12/2018 (Done)    Override on 1/12/2018: Done    Mammogram 05/01/2019 5/1/2017    DEXA SCAN  "PULMONARY CRITICAL CARE Progress  NOTE      PATIENT IDENTIFICATION:  Name: Nile Chin  MRN: [id number]  :  1957     Age: 63 y.o.  Sex: male    DATE OF Note:  2021   Referring Physician: Simone Mirza MD                  Subjective:   Feeling better, no new issue, no SOB no chest pain, no nausea or vomiting, no change in bowel habit, no dysuria,  no new  skin rash or itching.      Objective:  tMax 24 hrs: Temp (24hrs), Av.2 °F (36.8 °C), Min:97.7 °F (36.5 °C), Max:98.7 °F (37.1 °C)      Vitals Ranges:   Temp:  [97.7 °F (36.5 °C)-98.7 °F (37.1 °C)] 98.6 °F (37 °C)  Heart Rate:  [70-87] 77  Resp:  [16-18] 16  BP: (139-150)/(83-99) 150/99    Intake and Output Last 3 Shifts:   I/O last 3 completed shifts:  In: 1540 [P.O.:1440; IV Piggyback:100]  Out: -     Exam:  /99 (BP Location: Left arm, Patient Position: Sitting)   Pulse 77   Temp 98.6 °F (37 °C) (Oral)   Resp 16   Ht 177.8 cm (70\")   Wt 114 kg (252 lb)   SpO2 94%   BMI 36.16 kg/m²     General Appearance:     HEENT:  Normocephalic, without obvious abnormality, Conjunctiva/corneas clear,.  Normal external ear canals, Nares normal, no drainage     Neck:  Supple, symmetrical, trachea midline. No JVD.  Lungs /Chest wall:   Bilateral basal rhonchi, respirations unlabored symmetrical wall movement.     Heart:  Regular rate and rhythm, systolic murmur PMI left sternal border  Abdomen: Soft, non-tender, no masses, no organomegaly.    Extremities: Trace edema no clubbing or Cyanosis        Medications:  No current facility-administered medications for this encounter.     Current Outpatient Medications:   •  allopurinol (ZYLOPRIM) 300 MG tablet, Take 300 mg by mouth Daily., Disp: , Rfl:   •  fluticasone (FLONASE) 50 MCG/ACT nasal spray, 1 spray into the nostril(s) as directed by provider As Needed., Disp: , Rfl:   •  lisinopril (PRINIVIL,ZESTRIL) 40 MG tablet, Take 40 mg by mouth Daily., Disp: , Rfl:   •  montelukast (SINGULAIR) 10 MG tablet, " 11/16/2019 11/16/2016    Colonoscopy 11/13/2025 11/13/2015        No orders of the defined types were placed in this encounter.    The following information is provided to all patients.  This information is to help you find resources for any of the problems found today that may be affecting your health:                Living healthy guide: www.[facility].[place].[facility]      Understanding Diabetes: www.diabetes.org      Eating healthy: www.cdc.gov/healthyweight      CDC home safety checklist: www.cdc.gov/steadi/patient.html      Agency on Aging: www.goea.[place].[facility]      Alcoholics anonymous (AA): www.aa.org      Physical Activity: www.jannette.nih.gov/mj1yxtm      Tobacco use: www.quitwithusla.org      Take 10 mg by mouth Every Night., Disp: , Rfl:   •  omeprazole (priLOSEC) 40 MG capsule, Take 40 mg by mouth Daily., Disp: , Rfl:   •  amoxicillin-clavulanate (AUGMENTIN) 875-125 MG per tablet, Take 1 tablet by mouth Every 12 (Twelve) Hours for 13 doses. Indications: Pneumonia, Disp: 13 tablet, Rfl: 0    Data Review:  All labs (24hrs):   Recent Results (from the past 24 hour(s))   Procalcitonin    Collection Time: 01/04/21  4:45 AM    Specimen: Blood   Result Value Ref Range    Procalcitonin 0.24 0.00 - 0.25 ng/mL   Lavender Top    Collection Time: 01/04/21  4:45 AM   Result Value Ref Range    Extra Tube hold for add-on    Ferritin    Collection Time: 01/04/21  4:45 AM    Specimen: Blood   Result Value Ref Range    Ferritin 477.00 (H) 30.00 - 400.00 ng/mL        Imaging:  Stress Test With Myocardial Perfusion One Day  Addendum: Indications   Chest pain     This study was performed under my direct supervision.     Resting ECG   Sinus rhythm.     The patient was injected with Lexiscan intravenously while constantly  monitoring electrocardiogram and vital signs.  Patient did not have any  chest discomfort ST abnormalities or ectopy with injection of Lexiscan.     Cardiolite was used as an imaging agent.     Cardiolite images showed uniform distribution of radionuclide without any  evidence for myocardial ischemia.     Gated SPECT images revealed normal left ventricle size and contractility  with ejection fraction of 53%     Impression   ========   Lexiscan Cardiolite test is negative for myocardial ischemia.     Gated SPECT images revealed normal left ventricular size and contractility  with ejection fraction of 53%  Narrative: Indications  Atrial fibrillation    Indications  Shortness of breath    This study was performed under my direct supervision.    Resting ECG  Atrial fibrillation  The patient was injected with Lexiscan intravenously while constantly   monitoring electrocardiogram and vital signs.  Patient did  not have any   chest discomfort ST abnormalities or ectopy with injection of Lexiscan.    Cardiolite was used as an imaging agent.    Cardiolite images showed uniform distribution of radionuclide without any   evidence for myocardial ischemia.    Gated SPECT images revealed normal left ventricle size and contractility   with ejection fraction of 53%    Impression  ========  Lexiscan Cardiolite test is negative for myocardial ischemia.    Gated SPECT images revealed normal left ventricular size and contractility   with ejection fraction of 53%  Transthoracic Echo Complete With Contrast if Necessary Per Protocol  · Estimated left ventricular EF = 60% Left ventricular systolic function   is normal.     Indications  Chest pain    Technically satisfactory study.  Mitral valve is structurally normal.  Tricuspid valve is structurally normal.  Aortic valve is structurally normal.  Pulmonic valve could not be well visualized.  No evidence for mitral tricuspid or aortic regurgitation is seen by   Doppler study.  Left atrium is normal in size.  Right atrium is normal in size.  Left ventricle is normal in size and contractility with ejection fraction   of 60%.  Right ventricle is normal in size.  Atrial septum is intact.  Aorta is normal.  No pericardial effusion or intracardiac thrombus is seen.    Impression  Structurally and functionally normal cardiac valves.  Left ventricular size and contractility is normal with ejection fraction   of 60%.       ASSESSMENT:  Possible right lower lobe pneumonia rule out aspiration  Obstructive sleep apnea  Past medical history of GERD, pyloric stenosis status post surgery as a child  Thoracic aortic aneurysm  DVT currently not on anticoagulation  Melanoma  Hypertension     COVID-19 test and viral panel negative on 1/2/2021      PLAN:  Can be on oral antibiotic discharge  To new home BiPAP  Bronchodilator  Inhaled corticosteroids  Electrolytes/ glycemic control  DVT and GI  prophylaxis.    Total Critical care time in direct medical management (   ) minutes  Kishan Griffin MD. D, ABSM.     1/4/2021  19:14 EST

## 2021-01-08 ENCOUNTER — ANTI-COAG VISIT (OUTPATIENT)
Dept: CARDIOLOGY | Facility: CLINIC | Age: 71
End: 2021-01-08
Payer: MEDICARE

## 2021-01-08 DIAGNOSIS — Z79.01 LONG TERM (CURRENT) USE OF ANTICOAGULANTS: Primary | ICD-10-CM

## 2021-01-08 DIAGNOSIS — D68.51 HETEROZYGOUS FACTOR V LEIDEN MUTATION: Chronic | ICD-10-CM

## 2021-01-08 DIAGNOSIS — O22.30 DVT (DEEP VEIN THROMBOSIS) IN PREGNANCY: ICD-10-CM

## 2021-01-08 LAB — INR PPP: 1.6 (ref 2–3)

## 2021-01-08 PROCEDURE — 93793 ANTICOAG MGMT PT WARFARIN: CPT | Mod: HCNC,S$GLB,,

## 2021-01-08 PROCEDURE — 85610 PROTHROMBIN TIME: CPT | Mod: QW,HCNC,S$GLB, | Performed by: INTERNAL MEDICINE

## 2021-01-08 PROCEDURE — 85610 POCT INR: ICD-10-PCS | Mod: QW,HCNC,S$GLB, | Performed by: INTERNAL MEDICINE

## 2021-01-08 PROCEDURE — 93793 PR ANTICOAGULANT MGMT FOR PT TAKING WARFARIN: ICD-10-PCS | Mod: HCNC,S$GLB,,

## 2021-01-22 ENCOUNTER — ANTI-COAG VISIT (OUTPATIENT)
Dept: CARDIOLOGY | Facility: CLINIC | Age: 71
End: 2021-01-22
Payer: MEDICARE

## 2021-01-22 DIAGNOSIS — D68.51 HETEROZYGOUS FACTOR V LEIDEN MUTATION: Chronic | ICD-10-CM

## 2021-01-22 DIAGNOSIS — Z79.01 LONG TERM (CURRENT) USE OF ANTICOAGULANTS: Primary | ICD-10-CM

## 2021-01-22 LAB — INR PPP: 2.2 (ref 2–3)

## 2021-01-22 PROCEDURE — 93793 ANTICOAG MGMT PT WARFARIN: CPT | Mod: HCNC,S$GLB,,

## 2021-01-22 PROCEDURE — 85610 POCT INR: ICD-10-PCS | Mod: QW,HCNC,S$GLB, | Performed by: INTERNAL MEDICINE

## 2021-01-22 PROCEDURE — 93793 PR ANTICOAGULANT MGMT FOR PT TAKING WARFARIN: ICD-10-PCS | Mod: HCNC,S$GLB,,

## 2021-01-22 PROCEDURE — 85610 PROTHROMBIN TIME: CPT | Mod: QW,HCNC,S$GLB, | Performed by: INTERNAL MEDICINE

## 2021-02-05 ENCOUNTER — ANTI-COAG VISIT (OUTPATIENT)
Dept: CARDIOLOGY | Facility: CLINIC | Age: 71
End: 2021-02-05
Payer: MEDICARE

## 2021-02-05 DIAGNOSIS — Z79.01 LONG TERM (CURRENT) USE OF ANTICOAGULANTS: Primary | ICD-10-CM

## 2021-02-05 DIAGNOSIS — D68.51 HETEROZYGOUS FACTOR V LEIDEN MUTATION: Chronic | ICD-10-CM

## 2021-02-05 LAB — INR PPP: 2 (ref 2–3)

## 2021-02-05 PROCEDURE — 93793 ANTICOAG MGMT PT WARFARIN: CPT | Mod: S$GLB,,,

## 2021-02-05 PROCEDURE — 85610 PROTHROMBIN TIME: CPT | Mod: QW,S$GLB,, | Performed by: INTERNAL MEDICINE

## 2021-02-05 PROCEDURE — 93793 PR ANTICOAGULANT MGMT FOR PT TAKING WARFARIN: ICD-10-PCS | Mod: S$GLB,,,

## 2021-02-05 PROCEDURE — 85610 POCT INR: ICD-10-PCS | Mod: QW,S$GLB,, | Performed by: INTERNAL MEDICINE

## 2021-02-11 ENCOUNTER — PES CALL (OUTPATIENT)
Dept: ADMINISTRATIVE | Facility: CLINIC | Age: 71
End: 2021-02-11

## 2021-02-12 RX ORDER — ALLOPURINOL 100 MG/1
100 TABLET ORAL DAILY
Qty: 90 TABLET | Refills: 3 | Status: SHIPPED | OUTPATIENT
Start: 2021-02-12 | End: 2021-06-30 | Stop reason: SDUPTHER

## 2021-02-19 ENCOUNTER — TELEPHONE (OUTPATIENT)
Dept: PULMONOLOGY | Facility: CLINIC | Age: 71
End: 2021-02-19

## 2021-02-25 ENCOUNTER — PES CALL (OUTPATIENT)
Dept: ADMINISTRATIVE | Facility: CLINIC | Age: 71
End: 2021-02-25

## 2021-03-02 ENCOUNTER — PATIENT MESSAGE (OUTPATIENT)
Dept: PODIATRY | Facility: CLINIC | Age: 71
End: 2021-03-02

## 2021-03-02 ENCOUNTER — OFFICE VISIT (OUTPATIENT)
Dept: PODIATRY | Facility: CLINIC | Age: 71
End: 2021-03-02
Payer: MEDICARE

## 2021-03-02 VITALS
BODY MASS INDEX: 31.11 KG/M2 | HEART RATE: 65 BPM | WEIGHT: 186.94 LBS | DIASTOLIC BLOOD PRESSURE: 83 MMHG | SYSTOLIC BLOOD PRESSURE: 152 MMHG

## 2021-03-02 DIAGNOSIS — M79.89 PAIN AND SWELLING OF LOWER LEG, LEFT: Primary | ICD-10-CM

## 2021-03-02 DIAGNOSIS — I87.2 VENOUS INSUFFICIENCY OF BOTH LOWER EXTREMITIES: ICD-10-CM

## 2021-03-02 DIAGNOSIS — M79.662 PAIN AND SWELLING OF LOWER LEG, LEFT: Primary | ICD-10-CM

## 2021-03-02 DIAGNOSIS — Z79.01 ANTICOAGULATED ON COUMADIN: ICD-10-CM

## 2021-03-02 DIAGNOSIS — E11.49 TYPE II DIABETES MELLITUS WITH NEUROLOGICAL MANIFESTATIONS: ICD-10-CM

## 2021-03-02 PROCEDURE — 3044F HG A1C LEVEL LT 7.0%: CPT | Mod: CPTII,S$GLB,, | Performed by: PODIATRIST

## 2021-03-02 PROCEDURE — 1125F AMNT PAIN NOTED PAIN PRSNT: CPT | Mod: S$GLB,,, | Performed by: PODIATRIST

## 2021-03-02 PROCEDURE — 3077F SYST BP >= 140 MM HG: CPT | Mod: CPTII,S$GLB,, | Performed by: PODIATRIST

## 2021-03-02 PROCEDURE — 99214 OFFICE O/P EST MOD 30 MIN: CPT | Mod: S$GLB,,, | Performed by: PODIATRIST

## 2021-03-02 PROCEDURE — 3072F LOW RISK FOR RETINOPATHY: CPT | Mod: S$GLB,,, | Performed by: PODIATRIST

## 2021-03-02 PROCEDURE — 99999 PR PBB SHADOW E&M-EST. PATIENT-LVL IV: CPT | Mod: PBBFAC,,, | Performed by: PODIATRIST

## 2021-03-02 PROCEDURE — 1159F PR MEDICATION LIST DOCUMENTED IN MEDICAL RECORD: ICD-10-PCS | Mod: S$GLB,,, | Performed by: PODIATRIST

## 2021-03-02 PROCEDURE — 99999 PR PBB SHADOW E&M-EST. PATIENT-LVL IV: ICD-10-PCS | Mod: PBBFAC,,, | Performed by: PODIATRIST

## 2021-03-02 PROCEDURE — 1101F PT FALLS ASSESS-DOCD LE1/YR: CPT | Mod: CPTII,S$GLB,, | Performed by: PODIATRIST

## 2021-03-02 PROCEDURE — 3044F PR MOST RECENT HEMOGLOBIN A1C LEVEL <7.0%: ICD-10-PCS | Mod: CPTII,S$GLB,, | Performed by: PODIATRIST

## 2021-03-02 PROCEDURE — 3008F PR BODY MASS INDEX (BMI) DOCUMENTED: ICD-10-PCS | Mod: CPTII,S$GLB,, | Performed by: PODIATRIST

## 2021-03-02 PROCEDURE — 3008F BODY MASS INDEX DOCD: CPT | Mod: CPTII,S$GLB,, | Performed by: PODIATRIST

## 2021-03-02 PROCEDURE — 99214 PR OFFICE/OUTPT VISIT, EST, LEVL IV, 30-39 MIN: ICD-10-PCS | Mod: S$GLB,,, | Performed by: PODIATRIST

## 2021-03-02 PROCEDURE — 99499 RISK ADDL DX/OHS AUDIT: ICD-10-PCS | Mod: HCNC,S$GLB,, | Performed by: PODIATRIST

## 2021-03-02 PROCEDURE — 1159F MED LIST DOCD IN RCRD: CPT | Mod: S$GLB,,, | Performed by: PODIATRIST

## 2021-03-02 PROCEDURE — 3288F FALL RISK ASSESSMENT DOCD: CPT | Mod: CPTII,S$GLB,, | Performed by: PODIATRIST

## 2021-03-02 PROCEDURE — 99499 UNLISTED E&M SERVICE: CPT | Mod: HCNC,S$GLB,, | Performed by: PODIATRIST

## 2021-03-02 PROCEDURE — 3079F PR MOST RECENT DIASTOLIC BLOOD PRESSURE 80-89 MM HG: ICD-10-PCS | Mod: CPTII,S$GLB,, | Performed by: PODIATRIST

## 2021-03-02 PROCEDURE — 1101F PR PT FALLS ASSESS DOC 0-1 FALLS W/OUT INJ PAST YR: ICD-10-PCS | Mod: CPTII,S$GLB,, | Performed by: PODIATRIST

## 2021-03-02 PROCEDURE — 3079F DIAST BP 80-89 MM HG: CPT | Mod: CPTII,S$GLB,, | Performed by: PODIATRIST

## 2021-03-02 PROCEDURE — 3072F PR LOW RISK FOR RETINOPATHY: ICD-10-PCS | Mod: S$GLB,,, | Performed by: PODIATRIST

## 2021-03-02 PROCEDURE — 1125F PR PAIN SEVERITY QUANTIFIED, PAIN PRESENT: ICD-10-PCS | Mod: S$GLB,,, | Performed by: PODIATRIST

## 2021-03-02 PROCEDURE — 3288F PR FALLS RISK ASSESSMENT DOCUMENTED: ICD-10-PCS | Mod: CPTII,S$GLB,, | Performed by: PODIATRIST

## 2021-03-02 PROCEDURE — 3077F PR MOST RECENT SYSTOLIC BLOOD PRESSURE >= 140 MM HG: ICD-10-PCS | Mod: CPTII,S$GLB,, | Performed by: PODIATRIST

## 2021-03-02 RX ORDER — METHYLPREDNISOLONE 4 MG/1
TABLET ORAL
Qty: 21 TABLET | Refills: 0 | Status: SHIPPED | OUTPATIENT
Start: 2021-03-02 | End: 2021-03-23

## 2021-03-03 ENCOUNTER — TELEPHONE (OUTPATIENT)
Dept: RADIOLOGY | Facility: HOSPITAL | Age: 71
End: 2021-03-03

## 2021-03-04 ENCOUNTER — ANTI-COAG VISIT (OUTPATIENT)
Dept: CARDIOLOGY | Facility: CLINIC | Age: 71
End: 2021-03-04
Payer: MEDICARE

## 2021-03-04 ENCOUNTER — HOSPITAL ENCOUNTER (OUTPATIENT)
Dept: RADIOLOGY | Facility: HOSPITAL | Age: 71
Discharge: HOME | End: 2021-03-04
Attending: PODIATRIST
Payer: MEDICARE

## 2021-03-04 DIAGNOSIS — M79.662 PAIN AND SWELLING OF LOWER LEG, LEFT: ICD-10-CM

## 2021-03-04 DIAGNOSIS — M79.89 PAIN AND SWELLING OF LOWER LEG, LEFT: ICD-10-CM

## 2021-03-04 DIAGNOSIS — Z79.01 ANTICOAGULATED ON COUMADIN: Primary | ICD-10-CM

## 2021-03-04 DIAGNOSIS — D68.51 HETEROZYGOUS FACTOR V LEIDEN MUTATION: Chronic | ICD-10-CM

## 2021-03-04 DIAGNOSIS — Z79.01 LONG TERM (CURRENT) USE OF ANTICOAGULANTS: ICD-10-CM

## 2021-03-04 PROCEDURE — 93793 PR ANTICOAGULANT MGMT FOR PT TAKING WARFARIN: ICD-10-PCS | Mod: S$GLB,,,

## 2021-03-04 PROCEDURE — 93971 EXTREMITY STUDY: CPT | Mod: TC,LT

## 2021-03-04 PROCEDURE — 93971 EXTREMITY STUDY: CPT | Mod: 26,LT,, | Performed by: RADIOLOGY

## 2021-03-04 PROCEDURE — 93971 US LOWER EXTREMITY VEINS LEFT: ICD-10-PCS | Mod: 26,LT,, | Performed by: RADIOLOGY

## 2021-03-04 PROCEDURE — 93793 ANTICOAG MGMT PT WARFARIN: CPT | Mod: S$GLB,,,

## 2021-03-09 ENCOUNTER — TELEPHONE (OUTPATIENT)
Dept: PODIATRY | Facility: CLINIC | Age: 71
End: 2021-03-09

## 2021-03-10 ENCOUNTER — ANTI-COAG VISIT (OUTPATIENT)
Dept: CARDIOLOGY | Facility: CLINIC | Age: 71
End: 2021-03-10
Payer: MEDICARE

## 2021-03-10 DIAGNOSIS — Z79.01 LONG TERM (CURRENT) USE OF ANTICOAGULANTS: Primary | ICD-10-CM

## 2021-03-10 DIAGNOSIS — D68.51 HETEROZYGOUS FACTOR V LEIDEN MUTATION: Chronic | ICD-10-CM

## 2021-03-10 LAB — INR PPP: 2.4 (ref 2–3)

## 2021-03-10 PROCEDURE — 93793 PR ANTICOAGULANT MGMT FOR PT TAKING WARFARIN: ICD-10-PCS | Mod: S$GLB,,,

## 2021-03-10 PROCEDURE — 85610 PROTHROMBIN TIME: CPT | Mod: QW,S$GLB,, | Performed by: INTERNAL MEDICINE

## 2021-03-10 PROCEDURE — 85610 POCT INR: ICD-10-PCS | Mod: QW,S$GLB,, | Performed by: INTERNAL MEDICINE

## 2021-03-10 PROCEDURE — 93793 ANTICOAG MGMT PT WARFARIN: CPT | Mod: S$GLB,,,

## 2021-03-31 PROCEDURE — 99457 RPM TX MGMT 1ST 20 MIN: CPT | Mod: S$GLB,,, | Performed by: INTERNAL MEDICINE

## 2021-03-31 PROCEDURE — 99457 PR MONITORING, PHYSIOL PARAM, REMOTE, 1ST 20 MINS, PER MONTH: ICD-10-PCS | Mod: S$GLB,,, | Performed by: INTERNAL MEDICINE

## 2021-04-07 ENCOUNTER — PES CALL (OUTPATIENT)
Dept: ADMINISTRATIVE | Facility: CLINIC | Age: 71
End: 2021-04-07

## 2021-04-09 ENCOUNTER — ANTI-COAG VISIT (OUTPATIENT)
Dept: CARDIOLOGY | Facility: CLINIC | Age: 71
End: 2021-04-09
Payer: MEDICARE

## 2021-04-09 DIAGNOSIS — D68.51 HETEROZYGOUS FACTOR V LEIDEN MUTATION: Chronic | ICD-10-CM

## 2021-04-09 DIAGNOSIS — Z79.01 LONG TERM (CURRENT) USE OF ANTICOAGULANTS: Primary | ICD-10-CM

## 2021-04-09 LAB — INR PPP: 1.6 (ref 2–3)

## 2021-04-09 PROCEDURE — 93793 ANTICOAG MGMT PT WARFARIN: CPT | Mod: S$GLB,,,

## 2021-04-09 PROCEDURE — 93793 PR ANTICOAGULANT MGMT FOR PT TAKING WARFARIN: ICD-10-PCS | Mod: S$GLB,,,

## 2021-04-09 PROCEDURE — 85610 POCT INR: ICD-10-PCS | Mod: QW,S$GLB,, | Performed by: INTERNAL MEDICINE

## 2021-04-09 PROCEDURE — 85610 PROTHROMBIN TIME: CPT | Mod: QW,S$GLB,, | Performed by: INTERNAL MEDICINE

## 2021-04-21 ENCOUNTER — ANTI-COAG VISIT (OUTPATIENT)
Dept: CARDIOLOGY | Facility: CLINIC | Age: 71
End: 2021-04-21
Payer: MEDICARE

## 2021-04-21 ENCOUNTER — LAB VISIT (OUTPATIENT)
Dept: LAB | Facility: HOSPITAL | Age: 71
End: 2021-04-21
Attending: INTERNAL MEDICINE
Payer: MEDICARE

## 2021-04-21 ENCOUNTER — OFFICE VISIT (OUTPATIENT)
Dept: FAMILY MEDICINE | Facility: CLINIC | Age: 71
End: 2021-04-21
Payer: MEDICARE

## 2021-04-21 VITALS
TEMPERATURE: 98 F | RESPIRATION RATE: 20 BRPM | DIASTOLIC BLOOD PRESSURE: 96 MMHG | WEIGHT: 184.06 LBS | HEART RATE: 72 BPM | HEIGHT: 65 IN | BODY MASS INDEX: 30.67 KG/M2 | SYSTOLIC BLOOD PRESSURE: 158 MMHG

## 2021-04-21 DIAGNOSIS — I25.10 CORONARY ARTERY DISEASE INVOLVING NATIVE CORONARY ARTERY OF NATIVE HEART WITHOUT ANGINA PECTORIS: ICD-10-CM

## 2021-04-21 DIAGNOSIS — E11.9 TYPE 2 DIABETES MELLITUS WITHOUT COMPLICATION, WITHOUT LONG-TERM CURRENT USE OF INSULIN: ICD-10-CM

## 2021-04-21 DIAGNOSIS — Z79.01 LONG TERM (CURRENT) USE OF ANTICOAGULANTS: ICD-10-CM

## 2021-04-21 DIAGNOSIS — E78.00 PURE HYPERCHOLESTEROLEMIA WITH TARGET LOW DENSITY LIPOPROTEIN (LDL) CHOLESTEROL LESS THAN 130 MG/DL: Chronic | ICD-10-CM

## 2021-04-21 DIAGNOSIS — I10 ESSENTIAL HYPERTENSION: Chronic | ICD-10-CM

## 2021-04-21 DIAGNOSIS — I51.89 LEFT VENTRICULAR DIASTOLIC DYSFUNCTION WITH PRESERVED SYSTOLIC FUNCTION: Chronic | ICD-10-CM

## 2021-04-21 DIAGNOSIS — F32.A MILD DEPRESSION: ICD-10-CM

## 2021-04-21 DIAGNOSIS — I77.1 TORTUOUS AORTA: ICD-10-CM

## 2021-04-21 DIAGNOSIS — G47.33 OSA ON CPAP: Chronic | ICD-10-CM

## 2021-04-21 DIAGNOSIS — Z00.00 ENCOUNTER FOR PREVENTIVE HEALTH EXAMINATION: Primary | ICD-10-CM

## 2021-04-21 DIAGNOSIS — O22.30 DVT (DEEP VEIN THROMBOSIS) IN PREGNANCY: ICD-10-CM

## 2021-04-21 DIAGNOSIS — D68.51 HETEROZYGOUS FACTOR V LEIDEN MUTATION: Chronic | ICD-10-CM

## 2021-04-21 DIAGNOSIS — M54.16 CHRONIC LUMBAR RADICULOPATHY: ICD-10-CM

## 2021-04-21 DIAGNOSIS — J44.9 CHRONIC OBSTRUCTIVE PULMONARY DISEASE, UNSPECIFIED COPD TYPE: Chronic | ICD-10-CM

## 2021-04-21 LAB
INR PPP: 1.7 (ref 0.8–1.2)
PROTHROMBIN TIME: 18.4 SEC (ref 9–12.5)

## 2021-04-21 PROCEDURE — 93793 ANTICOAG MGMT PT WARFARIN: CPT | Mod: S$GLB,,,

## 2021-04-21 PROCEDURE — 99499 UNLISTED E&M SERVICE: CPT | Mod: HCNC,S$GLB,, | Performed by: NURSE PRACTITIONER

## 2021-04-21 PROCEDURE — 3288F FALL RISK ASSESSMENT DOCD: CPT | Mod: CPTII,S$GLB,, | Performed by: NURSE PRACTITIONER

## 2021-04-21 PROCEDURE — 3008F BODY MASS INDEX DOCD: CPT | Mod: CPTII,S$GLB,, | Performed by: NURSE PRACTITIONER

## 2021-04-21 PROCEDURE — G0439 PR MEDICARE ANNUAL WELLNESS SUBSEQUENT VISIT: ICD-10-PCS | Mod: S$GLB,,, | Performed by: NURSE PRACTITIONER

## 2021-04-21 PROCEDURE — 1125F PR PAIN SEVERITY QUANTIFIED, PAIN PRESENT: ICD-10-PCS | Mod: S$GLB,,, | Performed by: NURSE PRACTITIONER

## 2021-04-21 PROCEDURE — 3072F LOW RISK FOR RETINOPATHY: CPT | Mod: S$GLB,,, | Performed by: NURSE PRACTITIONER

## 2021-04-21 PROCEDURE — 99999 PR PBB SHADOW E&M-EST. PATIENT-LVL V: ICD-10-PCS | Mod: PBBFAC,,, | Performed by: NURSE PRACTITIONER

## 2021-04-21 PROCEDURE — 3072F PR LOW RISK FOR RETINOPATHY: ICD-10-PCS | Mod: S$GLB,,, | Performed by: NURSE PRACTITIONER

## 2021-04-21 PROCEDURE — 1125F AMNT PAIN NOTED PAIN PRSNT: CPT | Mod: S$GLB,,, | Performed by: NURSE PRACTITIONER

## 2021-04-21 PROCEDURE — 1101F PT FALLS ASSESS-DOCD LE1/YR: CPT | Mod: CPTII,S$GLB,, | Performed by: NURSE PRACTITIONER

## 2021-04-21 PROCEDURE — 99499 RISK ADDL DX/OHS AUDIT: ICD-10-PCS | Mod: HCNC,S$GLB,, | Performed by: NURSE PRACTITIONER

## 2021-04-21 PROCEDURE — 1101F PR PT FALLS ASSESS DOC 0-1 FALLS W/OUT INJ PAST YR: ICD-10-PCS | Mod: CPTII,S$GLB,, | Performed by: NURSE PRACTITIONER

## 2021-04-21 PROCEDURE — 3288F PR FALLS RISK ASSESSMENT DOCUMENTED: ICD-10-PCS | Mod: CPTII,S$GLB,, | Performed by: NURSE PRACTITIONER

## 2021-04-21 PROCEDURE — 99999 PR PBB SHADOW E&M-EST. PATIENT-LVL V: CPT | Mod: PBBFAC,,, | Performed by: NURSE PRACTITIONER

## 2021-04-21 PROCEDURE — G0439 PPPS, SUBSEQ VISIT: HCPCS | Mod: S$GLB,,, | Performed by: NURSE PRACTITIONER

## 2021-04-21 PROCEDURE — 3008F PR BODY MASS INDEX (BMI) DOCUMENTED: ICD-10-PCS | Mod: CPTII,S$GLB,, | Performed by: NURSE PRACTITIONER

## 2021-04-21 PROCEDURE — 36415 COLL VENOUS BLD VENIPUNCTURE: CPT | Mod: PO | Performed by: INTERNAL MEDICINE

## 2021-04-21 PROCEDURE — 85610 PROTHROMBIN TIME: CPT | Performed by: INTERNAL MEDICINE

## 2021-04-21 PROCEDURE — 93793 PR ANTICOAGULANT MGMT FOR PT TAKING WARFARIN: ICD-10-PCS | Mod: S$GLB,,,

## 2021-04-23 ENCOUNTER — PATIENT OUTREACH (OUTPATIENT)
Dept: ADMINISTRATIVE | Facility: HOSPITAL | Age: 71
End: 2021-04-23

## 2021-05-10 ENCOUNTER — ANTI-COAG VISIT (OUTPATIENT)
Dept: CARDIOLOGY | Facility: CLINIC | Age: 71
End: 2021-05-10
Payer: MEDICARE

## 2021-05-10 DIAGNOSIS — Z79.01 LONG TERM (CURRENT) USE OF ANTICOAGULANTS: Primary | ICD-10-CM

## 2021-05-10 DIAGNOSIS — O22.30 DVT (DEEP VEIN THROMBOSIS) IN PREGNANCY: ICD-10-CM

## 2021-05-10 LAB — INR PPP: 3.4 (ref 2–3)

## 2021-05-10 PROCEDURE — 93793 ANTICOAG MGMT PT WARFARIN: CPT | Mod: S$GLB,,,

## 2021-05-10 PROCEDURE — 85610 PROTHROMBIN TIME: CPT | Mod: QW,S$GLB,, | Performed by: INTERNAL MEDICINE

## 2021-05-10 PROCEDURE — 93793 PR ANTICOAGULANT MGMT FOR PT TAKING WARFARIN: ICD-10-PCS | Mod: S$GLB,,,

## 2021-05-10 PROCEDURE — 85610 POCT INR: ICD-10-PCS | Mod: QW,S$GLB,, | Performed by: INTERNAL MEDICINE

## 2021-05-14 DIAGNOSIS — I10 ESSENTIAL HYPERTENSION: Primary | ICD-10-CM

## 2021-05-17 ENCOUNTER — PATIENT OUTREACH (OUTPATIENT)
Dept: ADMINISTRATIVE | Facility: OTHER | Age: 71
End: 2021-05-17

## 2021-05-27 ENCOUNTER — OFFICE VISIT (OUTPATIENT)
Dept: CARDIOLOGY | Facility: CLINIC | Age: 71
End: 2021-05-27
Payer: MEDICARE

## 2021-05-27 ENCOUNTER — HOSPITAL ENCOUNTER (OUTPATIENT)
Dept: CARDIOLOGY | Facility: HOSPITAL | Age: 71
Discharge: HOME | End: 2021-05-27
Attending: INTERNAL MEDICINE
Payer: MEDICARE

## 2021-05-27 ENCOUNTER — ANTI-COAG VISIT (OUTPATIENT)
Dept: CARDIOLOGY | Facility: CLINIC | Age: 71
End: 2021-05-27
Payer: MEDICARE

## 2021-05-27 VITALS
DIASTOLIC BLOOD PRESSURE: 84 MMHG | BODY MASS INDEX: 30.45 KG/M2 | WEIGHT: 183 LBS | SYSTOLIC BLOOD PRESSURE: 146 MMHG | OXYGEN SATURATION: 100 % | HEART RATE: 53 BPM

## 2021-05-27 DIAGNOSIS — I10 ESSENTIAL HYPERTENSION: Chronic | ICD-10-CM

## 2021-05-27 DIAGNOSIS — D68.51 HETEROZYGOUS FACTOR V LEIDEN MUTATION: Chronic | ICD-10-CM

## 2021-05-27 DIAGNOSIS — O22.30 DVT (DEEP VEIN THROMBOSIS) IN PREGNANCY: ICD-10-CM

## 2021-05-27 DIAGNOSIS — Z79.01 LONG TERM (CURRENT) USE OF ANTICOAGULANTS: ICD-10-CM

## 2021-05-27 DIAGNOSIS — E11.9 TYPE 2 DIABETES MELLITUS WITHOUT COMPLICATION, WITHOUT LONG-TERM CURRENT USE OF INSULIN: ICD-10-CM

## 2021-05-27 DIAGNOSIS — E78.00 PURE HYPERCHOLESTEROLEMIA WITH TARGET LOW DENSITY LIPOPROTEIN (LDL) CHOLESTEROL LESS THAN 130 MG/DL: Chronic | ICD-10-CM

## 2021-05-27 DIAGNOSIS — I10 ESSENTIAL HYPERTENSION: ICD-10-CM

## 2021-05-27 DIAGNOSIS — I25.10 CORONARY ARTERY DISEASE INVOLVING NATIVE CORONARY ARTERY OF NATIVE HEART WITHOUT ANGINA PECTORIS: Primary | ICD-10-CM

## 2021-05-27 DIAGNOSIS — G47.33 OSA ON CPAP: Chronic | ICD-10-CM

## 2021-05-27 PROCEDURE — 99214 OFFICE O/P EST MOD 30 MIN: CPT | Mod: 25,S$GLB,, | Performed by: INTERNAL MEDICINE

## 2021-05-27 PROCEDURE — 93010 ELECTROCARDIOGRAM REPORT: CPT | Mod: ,,, | Performed by: INTERNAL MEDICINE

## 2021-05-27 PROCEDURE — 93010 EKG 12-LEAD: ICD-10-PCS | Mod: ,,, | Performed by: INTERNAL MEDICINE

## 2021-05-27 PROCEDURE — 99214 PR OFFICE/OUTPT VISIT, EST, LEVL IV, 30-39 MIN: ICD-10-PCS | Mod: 25,S$GLB,, | Performed by: INTERNAL MEDICINE

## 2021-05-27 PROCEDURE — 93005 ELECTROCARDIOGRAM TRACING: CPT

## 2021-05-27 PROCEDURE — 1159F PR MEDICATION LIST DOCUMENTED IN MEDICAL RECORD: ICD-10-PCS | Mod: S$GLB,,, | Performed by: INTERNAL MEDICINE

## 2021-05-27 PROCEDURE — 99999 PR PBB SHADOW E&M-EST. PATIENT-LVL III: CPT | Mod: PBBFAC,,, | Performed by: INTERNAL MEDICINE

## 2021-05-27 PROCEDURE — 3008F BODY MASS INDEX DOCD: CPT | Mod: CPTII,S$GLB,, | Performed by: INTERNAL MEDICINE

## 2021-05-27 PROCEDURE — 99999 PR PBB SHADOW E&M-EST. PATIENT-LVL III: ICD-10-PCS | Mod: PBBFAC,,, | Performed by: INTERNAL MEDICINE

## 2021-05-27 PROCEDURE — 1159F MED LIST DOCD IN RCRD: CPT | Mod: S$GLB,,, | Performed by: INTERNAL MEDICINE

## 2021-05-27 PROCEDURE — 3008F PR BODY MASS INDEX (BMI) DOCUMENTED: ICD-10-PCS | Mod: CPTII,S$GLB,, | Performed by: INTERNAL MEDICINE

## 2021-05-27 PROCEDURE — 3072F LOW RISK FOR RETINOPATHY: CPT | Mod: S$GLB,,, | Performed by: INTERNAL MEDICINE

## 2021-05-27 PROCEDURE — 3072F PR LOW RISK FOR RETINOPATHY: ICD-10-PCS | Mod: S$GLB,,, | Performed by: INTERNAL MEDICINE

## 2021-05-27 RX ORDER — SPIRONOLACTONE 25 MG/1
25 TABLET ORAL DAILY
Qty: 30 TABLET | Refills: 11 | Status: SHIPPED | OUTPATIENT
Start: 2021-05-27 | End: 2022-02-18

## 2021-06-08 ENCOUNTER — TELEPHONE (OUTPATIENT)
Dept: FAMILY MEDICINE | Facility: CLINIC | Age: 71
End: 2021-06-08

## 2021-06-11 ENCOUNTER — ANTI-COAG VISIT (OUTPATIENT)
Dept: CARDIOLOGY | Facility: CLINIC | Age: 71
End: 2021-06-11
Payer: MEDICARE

## 2021-06-11 DIAGNOSIS — Z79.01 LONG TERM (CURRENT) USE OF ANTICOAGULANTS: Primary | ICD-10-CM

## 2021-06-11 DIAGNOSIS — D68.51 HETEROZYGOUS FACTOR V LEIDEN MUTATION: Chronic | ICD-10-CM

## 2021-06-11 LAB — INR PPP: 4.4 (ref 2–3)

## 2021-06-11 PROCEDURE — 93793 PR ANTICOAGULANT MGMT FOR PT TAKING WARFARIN: ICD-10-PCS | Mod: S$GLB,,,

## 2021-06-11 PROCEDURE — 85610 POCT INR: ICD-10-PCS | Mod: QW,S$GLB,, | Performed by: INTERNAL MEDICINE

## 2021-06-11 PROCEDURE — 93793 ANTICOAG MGMT PT WARFARIN: CPT | Mod: S$GLB,,,

## 2021-06-11 PROCEDURE — 85610 PROTHROMBIN TIME: CPT | Mod: QW,S$GLB,, | Performed by: INTERNAL MEDICINE

## 2021-06-29 ENCOUNTER — ANTI-COAG VISIT (OUTPATIENT)
Dept: CARDIOLOGY | Facility: CLINIC | Age: 71
End: 2021-06-29
Payer: MEDICARE

## 2021-06-29 DIAGNOSIS — Z79.01 LONG TERM (CURRENT) USE OF ANTICOAGULANTS: Primary | ICD-10-CM

## 2021-06-29 DIAGNOSIS — D68.51 HETEROZYGOUS FACTOR V LEIDEN MUTATION: Chronic | ICD-10-CM

## 2021-06-29 LAB — INR PPP: 1.7 (ref 2–3)

## 2021-06-29 PROCEDURE — 85610 PROTHROMBIN TIME: CPT | Mod: QW,S$GLB,, | Performed by: INTERNAL MEDICINE

## 2021-06-29 PROCEDURE — 93793 ANTICOAG MGMT PT WARFARIN: CPT | Mod: S$GLB,,,

## 2021-06-29 PROCEDURE — 85610 POCT INR: ICD-10-PCS | Mod: QW,S$GLB,, | Performed by: INTERNAL MEDICINE

## 2021-06-29 PROCEDURE — 93793 PR ANTICOAGULANT MGMT FOR PT TAKING WARFARIN: ICD-10-PCS | Mod: S$GLB,,,

## 2021-06-30 ENCOUNTER — OFFICE VISIT (OUTPATIENT)
Dept: FAMILY MEDICINE | Facility: CLINIC | Age: 71
End: 2021-06-30
Payer: MEDICARE

## 2021-06-30 ENCOUNTER — LAB VISIT (OUTPATIENT)
Dept: LAB | Facility: HOSPITAL | Age: 71
End: 2021-06-30
Attending: INTERNAL MEDICINE
Payer: MEDICARE

## 2021-06-30 VITALS
DIASTOLIC BLOOD PRESSURE: 82 MMHG | BODY MASS INDEX: 29.83 KG/M2 | WEIGHT: 179.25 LBS | HEART RATE: 65 BPM | RESPIRATION RATE: 16 BRPM | OXYGEN SATURATION: 99 % | SYSTOLIC BLOOD PRESSURE: 130 MMHG | TEMPERATURE: 98 F

## 2021-06-30 DIAGNOSIS — R73.02 IGT (IMPAIRED GLUCOSE TOLERANCE): ICD-10-CM

## 2021-06-30 DIAGNOSIS — M79.605 PAIN IN BOTH LOWER EXTREMITIES: ICD-10-CM

## 2021-06-30 DIAGNOSIS — I10 ESSENTIAL HYPERTENSION: Chronic | ICD-10-CM

## 2021-06-30 DIAGNOSIS — E11.9 TYPE 2 DIABETES MELLITUS WITHOUT COMPLICATION, WITHOUT LONG-TERM CURRENT USE OF INSULIN: ICD-10-CM

## 2021-06-30 DIAGNOSIS — I10 ESSENTIAL HYPERTENSION: ICD-10-CM

## 2021-06-30 DIAGNOSIS — D68.51 HETEROZYGOUS FACTOR V LEIDEN MUTATION: Chronic | ICD-10-CM

## 2021-06-30 DIAGNOSIS — Z79.01 ANTICOAGULATED ON COUMADIN: Primary | Chronic | ICD-10-CM

## 2021-06-30 DIAGNOSIS — M79.604 PAIN IN BOTH LOWER EXTREMITIES: ICD-10-CM

## 2021-06-30 DIAGNOSIS — E78.5 DYSLIPIDEMIA: ICD-10-CM

## 2021-06-30 LAB
ALBUMIN/CREAT UR: 11 UG/MG (ref 0–30)
BASOPHILS # BLD AUTO: 0.05 K/UL (ref 0–0.2)
BASOPHILS NFR BLD: 0.7 % (ref 0–1.9)
CREAT UR-MCNC: 236 MG/DL (ref 15–325)
DIFFERENTIAL METHOD: ABNORMAL
EOSINOPHIL # BLD AUTO: 0.2 K/UL (ref 0–0.5)
EOSINOPHIL NFR BLD: 3 % (ref 0–8)
ERYTHROCYTE [DISTWIDTH] IN BLOOD BY AUTOMATED COUNT: 16.8 % (ref 11.5–14.5)
HCT VFR BLD AUTO: 38.9 % (ref 37–48.5)
HGB BLD-MCNC: 12.1 G/DL (ref 12–16)
IMM GRANULOCYTES # BLD AUTO: 0.02 K/UL (ref 0–0.04)
IMM GRANULOCYTES NFR BLD AUTO: 0.3 % (ref 0–0.5)
LYMPHOCYTES # BLD AUTO: 1.8 K/UL (ref 1–4.8)
LYMPHOCYTES NFR BLD: 24.5 % (ref 18–48)
MCH RBC QN AUTO: 27.6 PG (ref 27–31)
MCHC RBC AUTO-ENTMCNC: 31.1 G/DL (ref 32–36)
MCV RBC AUTO: 89 FL (ref 82–98)
MICROALBUMIN UR DL<=1MG/L-MCNC: 26 UG/ML
MONOCYTES # BLD AUTO: 0.8 K/UL (ref 0.3–1)
MONOCYTES NFR BLD: 10.5 % (ref 4–15)
NEUTROPHILS # BLD AUTO: 4.5 K/UL (ref 1.8–7.7)
NEUTROPHILS NFR BLD: 61 % (ref 38–73)
NRBC BLD-RTO: 0 /100 WBC
PLATELET # BLD AUTO: 197 K/UL (ref 150–450)
PMV BLD AUTO: 10.7 FL (ref 9.2–12.9)
RBC # BLD AUTO: 4.39 M/UL (ref 4–5.4)
WBC # BLD AUTO: 7.4 K/UL (ref 3.9–12.7)

## 2021-06-30 PROCEDURE — 3072F PR LOW RISK FOR RETINOPATHY: ICD-10-PCS | Mod: S$GLB,,, | Performed by: INTERNAL MEDICINE

## 2021-06-30 PROCEDURE — 84443 ASSAY THYROID STIM HORMONE: CPT | Performed by: INTERNAL MEDICINE

## 2021-06-30 PROCEDURE — 1101F PT FALLS ASSESS-DOCD LE1/YR: CPT | Mod: CPTII,S$GLB,, | Performed by: INTERNAL MEDICINE

## 2021-06-30 PROCEDURE — 36415 COLL VENOUS BLD VENIPUNCTURE: CPT | Mod: PO | Performed by: INTERNAL MEDICINE

## 2021-06-30 PROCEDURE — 3288F FALL RISK ASSESSMENT DOCD: CPT | Mod: CPTII,S$GLB,, | Performed by: INTERNAL MEDICINE

## 2021-06-30 PROCEDURE — 1159F MED LIST DOCD IN RCRD: CPT | Mod: S$GLB,,, | Performed by: INTERNAL MEDICINE

## 2021-06-30 PROCEDURE — 3008F PR BODY MASS INDEX (BMI) DOCUMENTED: ICD-10-PCS | Mod: CPTII,S$GLB,, | Performed by: INTERNAL MEDICINE

## 2021-06-30 PROCEDURE — 85025 COMPLETE CBC W/AUTO DIFF WBC: CPT | Performed by: INTERNAL MEDICINE

## 2021-06-30 PROCEDURE — 99457 RPM TX MGMT 1ST 20 MIN: CPT | Mod: S$GLB,,, | Performed by: INTERNAL MEDICINE

## 2021-06-30 PROCEDURE — 83036 HEMOGLOBIN GLYCOSYLATED A1C: CPT | Performed by: INTERNAL MEDICINE

## 2021-06-30 PROCEDURE — 1125F PR PAIN SEVERITY QUANTIFIED, PAIN PRESENT: ICD-10-PCS | Mod: S$GLB,,, | Performed by: INTERNAL MEDICINE

## 2021-06-30 PROCEDURE — 1101F PR PT FALLS ASSESS DOC 0-1 FALLS W/OUT INJ PAST YR: ICD-10-PCS | Mod: CPTII,S$GLB,, | Performed by: INTERNAL MEDICINE

## 2021-06-30 PROCEDURE — 3288F PR FALLS RISK ASSESSMENT DOCUMENTED: ICD-10-PCS | Mod: CPTII,S$GLB,, | Performed by: INTERNAL MEDICINE

## 2021-06-30 PROCEDURE — 99214 OFFICE O/P EST MOD 30 MIN: CPT | Mod: S$GLB,,, | Performed by: INTERNAL MEDICINE

## 2021-06-30 PROCEDURE — 1159F PR MEDICATION LIST DOCUMENTED IN MEDICAL RECORD: ICD-10-PCS | Mod: S$GLB,,, | Performed by: INTERNAL MEDICINE

## 2021-06-30 PROCEDURE — 3072F LOW RISK FOR RETINOPATHY: CPT | Mod: S$GLB,,, | Performed by: INTERNAL MEDICINE

## 2021-06-30 PROCEDURE — 1125F AMNT PAIN NOTED PAIN PRSNT: CPT | Mod: S$GLB,,, | Performed by: INTERNAL MEDICINE

## 2021-06-30 PROCEDURE — 99999 PR PBB SHADOW E&M-EST. PATIENT-LVL IV: ICD-10-PCS | Mod: PBBFAC,,, | Performed by: INTERNAL MEDICINE

## 2021-06-30 PROCEDURE — 99214 PR OFFICE/OUTPT VISIT, EST, LEVL IV, 30-39 MIN: ICD-10-PCS | Mod: S$GLB,,, | Performed by: INTERNAL MEDICINE

## 2021-06-30 PROCEDURE — 82043 UR ALBUMIN QUANTITATIVE: CPT | Performed by: INTERNAL MEDICINE

## 2021-06-30 PROCEDURE — 82570 ASSAY OF URINE CREATININE: CPT | Performed by: INTERNAL MEDICINE

## 2021-06-30 PROCEDURE — 99457 PR MONITORING, PHYSIOL PARAM, REMOTE, 1ST 20 MINS, PER MONTH: ICD-10-PCS | Mod: S$GLB,,, | Performed by: INTERNAL MEDICINE

## 2021-06-30 PROCEDURE — 80053 COMPREHEN METABOLIC PANEL: CPT | Performed by: INTERNAL MEDICINE

## 2021-06-30 PROCEDURE — 99999 PR PBB SHADOW E&M-EST. PATIENT-LVL IV: CPT | Mod: PBBFAC,,, | Performed by: INTERNAL MEDICINE

## 2021-06-30 PROCEDURE — 3008F BODY MASS INDEX DOCD: CPT | Mod: CPTII,S$GLB,, | Performed by: INTERNAL MEDICINE

## 2021-06-30 RX ORDER — ALLOPURINOL 100 MG/1
100 TABLET ORAL DAILY
Qty: 90 TABLET | Refills: 3 | Status: SHIPPED | OUTPATIENT
Start: 2021-06-30 | End: 2022-03-03

## 2021-07-01 LAB
ALBUMIN SERPL BCP-MCNC: 3.6 G/DL (ref 3.5–5.2)
ALP SERPL-CCNC: 41 U/L (ref 55–135)
ALT SERPL W/O P-5'-P-CCNC: 17 U/L (ref 10–44)
ANION GAP SERPL CALC-SCNC: 11 MMOL/L (ref 8–16)
ANION GAP SERPL CALC-SCNC: 11 MMOL/L (ref 8–16)
AST SERPL-CCNC: 17 U/L (ref 10–40)
BILIRUB SERPL-MCNC: 0.9 MG/DL (ref 0.1–1)
BUN SERPL-MCNC: 15 MG/DL (ref 8–23)
BUN SERPL-MCNC: 15 MG/DL (ref 8–23)
CALCIUM SERPL-MCNC: 9.6 MG/DL (ref 8.7–10.5)
CALCIUM SERPL-MCNC: 9.6 MG/DL (ref 8.7–10.5)
CHLORIDE SERPL-SCNC: 104 MMOL/L (ref 95–110)
CHLORIDE SERPL-SCNC: 104 MMOL/L (ref 95–110)
CO2 SERPL-SCNC: 27 MMOL/L (ref 23–29)
CO2 SERPL-SCNC: 27 MMOL/L (ref 23–29)
CREAT SERPL-MCNC: 0.8 MG/DL (ref 0.5–1.4)
CREAT SERPL-MCNC: 0.8 MG/DL (ref 0.5–1.4)
EST. GFR  (AFRICAN AMERICAN): >60 ML/MIN/1.73 M^2
EST. GFR  (AFRICAN AMERICAN): >60 ML/MIN/1.73 M^2
EST. GFR  (NON AFRICAN AMERICAN): >60 ML/MIN/1.73 M^2
EST. GFR  (NON AFRICAN AMERICAN): >60 ML/MIN/1.73 M^2
ESTIMATED AVG GLUCOSE: 126 MG/DL (ref 68–131)
GLUCOSE SERPL-MCNC: 88 MG/DL (ref 70–110)
GLUCOSE SERPL-MCNC: 88 MG/DL (ref 70–110)
HBA1C MFR BLD: 6 % (ref 4–5.6)
POTASSIUM SERPL-SCNC: 3.8 MMOL/L (ref 3.5–5.1)
POTASSIUM SERPL-SCNC: 3.8 MMOL/L (ref 3.5–5.1)
PROT SERPL-MCNC: 7.3 G/DL (ref 6–8.4)
SODIUM SERPL-SCNC: 142 MMOL/L (ref 136–145)
SODIUM SERPL-SCNC: 142 MMOL/L (ref 136–145)
TSH SERPL DL<=0.005 MIU/L-ACNC: 0.55 UIU/ML (ref 0.4–4)

## 2021-07-02 ENCOUNTER — TELEPHONE (OUTPATIENT)
Dept: RADIOLOGY | Facility: HOSPITAL | Age: 71
End: 2021-07-02

## 2021-07-06 ENCOUNTER — HOSPITAL ENCOUNTER (OUTPATIENT)
Dept: RADIOLOGY | Facility: HOSPITAL | Age: 71
Discharge: HOME | End: 2021-07-06
Attending: INTERNAL MEDICINE
Payer: MEDICARE

## 2021-07-06 DIAGNOSIS — M79.605 PAIN IN BOTH LOWER EXTREMITIES: ICD-10-CM

## 2021-07-06 DIAGNOSIS — M79.604 PAIN IN BOTH LOWER EXTREMITIES: ICD-10-CM

## 2021-07-06 PROCEDURE — 93970 EXTREMITY STUDY: CPT | Mod: TC

## 2021-07-06 PROCEDURE — 93970 EXTREMITY STUDY: CPT | Mod: 26,,, | Performed by: RADIOLOGY

## 2021-07-06 PROCEDURE — 93970 US LOWER EXTREMITY VEINS BILATERAL: ICD-10-PCS | Mod: 26,,, | Performed by: RADIOLOGY

## 2021-07-19 ENCOUNTER — ANTI-COAG VISIT (OUTPATIENT)
Dept: CARDIOLOGY | Facility: CLINIC | Age: 71
End: 2021-07-19
Payer: MEDICARE

## 2021-07-19 DIAGNOSIS — Z79.01 LONG TERM (CURRENT) USE OF ANTICOAGULANTS: Primary | ICD-10-CM

## 2021-07-19 DIAGNOSIS — D68.51 HETEROZYGOUS FACTOR V LEIDEN MUTATION: Chronic | ICD-10-CM

## 2021-07-19 LAB — INR PPP: 1.6 (ref 2–3)

## 2021-07-19 PROCEDURE — 93793 ANTICOAG MGMT PT WARFARIN: CPT | Mod: S$GLB,,,

## 2021-07-19 PROCEDURE — 93793 PR ANTICOAGULANT MGMT FOR PT TAKING WARFARIN: ICD-10-PCS | Mod: S$GLB,,,

## 2021-07-19 PROCEDURE — 85610 POCT INR: ICD-10-PCS | Mod: QW,S$GLB,, | Performed by: INTERNAL MEDICINE

## 2021-07-19 PROCEDURE — 85610 PROTHROMBIN TIME: CPT | Mod: QW,S$GLB,, | Performed by: INTERNAL MEDICINE

## 2021-07-28 DIAGNOSIS — M79.2 NEURITIS: ICD-10-CM

## 2021-07-29 RX ORDER — GABAPENTIN 100 MG/1
CAPSULE ORAL
Qty: 270 CAPSULE | Refills: 3 | Status: SHIPPED | OUTPATIENT
Start: 2021-07-29 | End: 2022-02-22

## 2021-07-29 RX ORDER — BACLOFEN 10 MG/1
10 TABLET ORAL 3 TIMES DAILY PRN
Qty: 270 TABLET | Refills: 3 | Status: SHIPPED | OUTPATIENT
Start: 2021-07-29 | End: 2022-07-07

## 2021-07-29 RX ORDER — METOPROLOL SUCCINATE 50 MG/1
TABLET, EXTENDED RELEASE ORAL
Qty: 90 TABLET | Refills: 3 | Status: SHIPPED | OUTPATIENT
Start: 2021-07-29 | End: 2021-11-21

## 2021-08-06 ENCOUNTER — ANTI-COAG VISIT (OUTPATIENT)
Dept: CARDIOLOGY | Facility: CLINIC | Age: 71
End: 2021-08-06
Payer: MEDICARE

## 2021-08-06 DIAGNOSIS — Z79.01 LONG TERM (CURRENT) USE OF ANTICOAGULANTS: Primary | ICD-10-CM

## 2021-08-06 DIAGNOSIS — D68.51 HETEROZYGOUS FACTOR V LEIDEN MUTATION: Chronic | ICD-10-CM

## 2021-08-06 LAB — INR PPP: 2.9 (ref 2–3)

## 2021-08-06 PROCEDURE — 93793 PR ANTICOAGULANT MGMT FOR PT TAKING WARFARIN: ICD-10-PCS | Mod: S$GLB,,,

## 2021-08-06 PROCEDURE — 85610 POCT INR: ICD-10-PCS | Mod: QW,S$GLB,, | Performed by: INTERNAL MEDICINE

## 2021-08-06 PROCEDURE — 85610 PROTHROMBIN TIME: CPT | Mod: QW,S$GLB,, | Performed by: INTERNAL MEDICINE

## 2021-08-06 PROCEDURE — 93793 ANTICOAG MGMT PT WARFARIN: CPT | Mod: S$GLB,,,

## 2021-08-20 ENCOUNTER — ANTI-COAG VISIT (OUTPATIENT)
Dept: CARDIOLOGY | Facility: CLINIC | Age: 71
End: 2021-08-20
Payer: MEDICARE

## 2021-08-20 DIAGNOSIS — D68.51 HETEROZYGOUS FACTOR V LEIDEN MUTATION: Chronic | ICD-10-CM

## 2021-08-20 DIAGNOSIS — Z79.01 LONG TERM (CURRENT) USE OF ANTICOAGULANTS: Primary | ICD-10-CM

## 2021-08-20 LAB — INR PPP: 4.4 (ref 2–3)

## 2021-08-20 PROCEDURE — 85610 POCT INR: ICD-10-PCS | Mod: QW,S$GLB,, | Performed by: INTERNAL MEDICINE

## 2021-08-20 PROCEDURE — 93793 ANTICOAG MGMT PT WARFARIN: CPT | Mod: S$GLB,,,

## 2021-08-20 PROCEDURE — 85610 PROTHROMBIN TIME: CPT | Mod: QW,S$GLB,, | Performed by: INTERNAL MEDICINE

## 2021-08-20 PROCEDURE — 93793 PR ANTICOAGULANT MGMT FOR PT TAKING WARFARIN: ICD-10-PCS | Mod: S$GLB,,,

## 2021-08-27 ENCOUNTER — IMMUNIZATION (OUTPATIENT)
Dept: PHARMACY | Facility: CLINIC | Age: 71
End: 2021-08-27
Payer: MEDICARE

## 2021-08-27 ENCOUNTER — ANTI-COAG VISIT (OUTPATIENT)
Dept: CARDIOLOGY | Facility: CLINIC | Age: 71
End: 2021-08-27
Payer: MEDICARE

## 2021-08-27 DIAGNOSIS — D68.51 HETEROZYGOUS FACTOR V LEIDEN MUTATION: Chronic | ICD-10-CM

## 2021-08-27 DIAGNOSIS — Z23 NEED FOR VACCINATION: Primary | ICD-10-CM

## 2021-08-27 DIAGNOSIS — Z79.01 LONG TERM (CURRENT) USE OF ANTICOAGULANTS: Primary | ICD-10-CM

## 2021-08-27 LAB — INR PPP: 2.2 (ref 2–3)

## 2021-08-27 PROCEDURE — 93793 ANTICOAG MGMT PT WARFARIN: CPT | Mod: S$GLB,,,

## 2021-08-27 PROCEDURE — 85610 PROTHROMBIN TIME: CPT | Mod: QW,S$GLB,, | Performed by: INTERNAL MEDICINE

## 2021-08-27 PROCEDURE — 93793 PR ANTICOAGULANT MGMT FOR PT TAKING WARFARIN: ICD-10-PCS | Mod: S$GLB,,,

## 2021-08-27 PROCEDURE — 85610 POCT INR: ICD-10-PCS | Mod: QW,S$GLB,, | Performed by: INTERNAL MEDICINE

## 2021-09-03 RX ORDER — OLMESARTAN MEDOXOMIL, AMLODIPINE AND HYDROCHLOROTHIAZIDE TABLET 40/10/25 MG 40; 10; 25 MG/1; MG/1; MG/1
1 TABLET ORAL DAILY
Qty: 90 TABLET | Refills: 3 | Status: SHIPPED | OUTPATIENT
Start: 2021-09-03 | End: 2022-11-09 | Stop reason: SDUPTHER

## 2021-09-17 ENCOUNTER — ANTI-COAG VISIT (OUTPATIENT)
Dept: CARDIOLOGY | Facility: CLINIC | Age: 71
End: 2021-09-17
Payer: MEDICARE

## 2021-09-17 DIAGNOSIS — D68.51 HETEROZYGOUS FACTOR V LEIDEN MUTATION: Chronic | ICD-10-CM

## 2021-09-17 DIAGNOSIS — Z79.01 LONG TERM (CURRENT) USE OF ANTICOAGULANTS: Primary | ICD-10-CM

## 2021-09-17 LAB — INR PPP: 1.4 (ref 2–3)

## 2021-09-17 PROCEDURE — 93793 ANTICOAG MGMT PT WARFARIN: CPT | Mod: HCNC,S$GLB,,

## 2021-09-17 PROCEDURE — 85610 POCT INR: ICD-10-PCS | Mod: QW,HCNC,S$GLB, | Performed by: INTERNAL MEDICINE

## 2021-09-17 PROCEDURE — 93793 PR ANTICOAGULANT MGMT FOR PT TAKING WARFARIN: ICD-10-PCS | Mod: HCNC,S$GLB,,

## 2021-09-17 PROCEDURE — 85610 PROTHROMBIN TIME: CPT | Mod: QW,HCNC,S$GLB, | Performed by: INTERNAL MEDICINE

## 2021-09-24 ENCOUNTER — ANTI-COAG VISIT (OUTPATIENT)
Dept: CARDIOLOGY | Facility: CLINIC | Age: 71
End: 2021-09-24
Payer: MEDICARE

## 2021-09-24 DIAGNOSIS — Z79.01 LONG TERM (CURRENT) USE OF ANTICOAGULANTS: Primary | ICD-10-CM

## 2021-09-24 DIAGNOSIS — D68.51 HETEROZYGOUS FACTOR V LEIDEN MUTATION: Chronic | ICD-10-CM

## 2021-09-24 LAB — INR PPP: 1.8 (ref 2–3)

## 2021-09-24 PROCEDURE — 93793 PR ANTICOAGULANT MGMT FOR PT TAKING WARFARIN: ICD-10-PCS | Mod: HCNC,S$GLB,,

## 2021-09-24 PROCEDURE — 85610 POCT INR: ICD-10-PCS | Mod: QW,HCNC,S$GLB, | Performed by: INTERNAL MEDICINE

## 2021-09-24 PROCEDURE — 85610 PROTHROMBIN TIME: CPT | Mod: QW,HCNC,S$GLB, | Performed by: INTERNAL MEDICINE

## 2021-09-24 PROCEDURE — 93793 ANTICOAG MGMT PT WARFARIN: CPT | Mod: HCNC,S$GLB,,

## 2021-10-08 ENCOUNTER — ANTI-COAG VISIT (OUTPATIENT)
Dept: CARDIOLOGY | Facility: CLINIC | Age: 71
End: 2021-10-08
Payer: MEDICARE

## 2021-10-08 DIAGNOSIS — Z79.01 LONG TERM (CURRENT) USE OF ANTICOAGULANTS: Primary | ICD-10-CM

## 2021-10-08 DIAGNOSIS — D68.51 HETEROZYGOUS FACTOR V LEIDEN MUTATION: Chronic | ICD-10-CM

## 2021-10-08 LAB — INR PPP: 2.4 (ref 2–3)

## 2021-10-08 PROCEDURE — 93793 PR ANTICOAGULANT MGMT FOR PT TAKING WARFARIN: ICD-10-PCS | Mod: HCNC,S$GLB,,

## 2021-10-08 PROCEDURE — 85610 POCT INR: ICD-10-PCS | Mod: QW,HCNC,S$GLB, | Performed by: INTERNAL MEDICINE

## 2021-10-08 PROCEDURE — 93793 ANTICOAG MGMT PT WARFARIN: CPT | Mod: HCNC,S$GLB,,

## 2021-10-08 PROCEDURE — 85610 PROTHROMBIN TIME: CPT | Mod: QW,HCNC,S$GLB, | Performed by: INTERNAL MEDICINE

## 2021-10-21 ENCOUNTER — TELEPHONE (OUTPATIENT)
Dept: CARDIOLOGY | Facility: CLINIC | Age: 71
End: 2021-10-21

## 2021-10-26 ENCOUNTER — ANTI-COAG VISIT (OUTPATIENT)
Dept: CARDIOLOGY | Facility: CLINIC | Age: 71
End: 2021-10-26
Payer: MEDICARE

## 2021-10-26 DIAGNOSIS — Z79.01 LONG TERM (CURRENT) USE OF ANTICOAGULANTS: Primary | ICD-10-CM

## 2021-10-26 DIAGNOSIS — D68.51 HETEROZYGOUS FACTOR V LEIDEN MUTATION: Chronic | ICD-10-CM

## 2021-10-26 LAB — INR PPP: 5 (ref 2–3)

## 2021-10-26 PROCEDURE — 85610 PROTHROMBIN TIME: CPT | Mod: QW,HCNC,S$GLB, | Performed by: INTERNAL MEDICINE

## 2021-10-26 PROCEDURE — 85610 POCT INR: ICD-10-PCS | Mod: QW,HCNC,S$GLB, | Performed by: INTERNAL MEDICINE

## 2021-10-26 PROCEDURE — 93793 ANTICOAG MGMT PT WARFARIN: CPT | Mod: HCNC,S$GLB,,

## 2021-10-26 PROCEDURE — 93793 PR ANTICOAGULANT MGMT FOR PT TAKING WARFARIN: ICD-10-PCS | Mod: HCNC,S$GLB,,

## 2021-10-29 ENCOUNTER — ANTI-COAG VISIT (OUTPATIENT)
Dept: CARDIOLOGY | Facility: CLINIC | Age: 71
End: 2021-10-29
Payer: MEDICARE

## 2021-10-29 DIAGNOSIS — D68.51 HETEROZYGOUS FACTOR V LEIDEN MUTATION: Chronic | ICD-10-CM

## 2021-10-29 DIAGNOSIS — Z79.01 LONG TERM (CURRENT) USE OF ANTICOAGULANTS: Primary | ICD-10-CM

## 2021-10-29 LAB — INR PPP: 2.3 (ref 2–3)

## 2021-10-29 PROCEDURE — 85610 POCT INR: ICD-10-PCS | Mod: QW,HCNC,S$GLB, | Performed by: INTERNAL MEDICINE

## 2021-10-29 PROCEDURE — 85610 PROTHROMBIN TIME: CPT | Mod: QW,HCNC,S$GLB, | Performed by: INTERNAL MEDICINE

## 2021-10-29 PROCEDURE — 93793 ANTICOAG MGMT PT WARFARIN: CPT | Mod: HCNC,S$GLB,,

## 2021-10-29 PROCEDURE — 93793 PR ANTICOAGULANT MGMT FOR PT TAKING WARFARIN: ICD-10-PCS | Mod: HCNC,S$GLB,,

## 2021-11-05 ENCOUNTER — TELEPHONE (OUTPATIENT)
Dept: FAMILY MEDICINE | Facility: CLINIC | Age: 71
End: 2021-11-05
Payer: MEDICARE

## 2021-11-05 ENCOUNTER — ANTI-COAG VISIT (OUTPATIENT)
Dept: CARDIOLOGY | Facility: CLINIC | Age: 71
End: 2021-11-05
Payer: MEDICARE

## 2021-11-05 DIAGNOSIS — Z79.01 LONG TERM (CURRENT) USE OF ANTICOAGULANTS: Primary | ICD-10-CM

## 2021-11-05 DIAGNOSIS — D68.51 HETEROZYGOUS FACTOR V LEIDEN MUTATION: Chronic | ICD-10-CM

## 2021-11-05 LAB — INR PPP: 4.2 (ref 2–3)

## 2021-11-05 PROCEDURE — 93793 ANTICOAG MGMT PT WARFARIN: CPT | Mod: HCNC,S$GLB,,

## 2021-11-05 PROCEDURE — 85610 POCT INR: ICD-10-PCS | Mod: QW,HCNC,S$GLB, | Performed by: INTERNAL MEDICINE

## 2021-11-05 PROCEDURE — 93793 PR ANTICOAGULANT MGMT FOR PT TAKING WARFARIN: ICD-10-PCS | Mod: HCNC,S$GLB,,

## 2021-11-05 PROCEDURE — 85610 PROTHROMBIN TIME: CPT | Mod: QW,HCNC,S$GLB, | Performed by: INTERNAL MEDICINE

## 2021-11-08 ENCOUNTER — TELEPHONE (OUTPATIENT)
Dept: FAMILY MEDICINE | Facility: CLINIC | Age: 71
End: 2021-11-08
Payer: MEDICARE

## 2021-11-15 ENCOUNTER — TELEPHONE (OUTPATIENT)
Dept: FAMILY MEDICINE | Facility: CLINIC | Age: 71
End: 2021-11-15
Payer: MEDICARE

## 2021-11-15 RX ORDER — AMOXICILLIN 875 MG/1
875 TABLET, FILM COATED ORAL 2 TIMES DAILY
Qty: 14 TABLET | Refills: 0 | Status: SHIPPED | OUTPATIENT
Start: 2021-11-15 | End: 2021-11-22

## 2021-11-17 ENCOUNTER — ANTI-COAG VISIT (OUTPATIENT)
Dept: CARDIOLOGY | Facility: CLINIC | Age: 71
End: 2021-11-17
Payer: MEDICARE

## 2021-11-17 DIAGNOSIS — Z79.01 LONG TERM (CURRENT) USE OF ANTICOAGULANTS: Primary | ICD-10-CM

## 2021-11-17 DIAGNOSIS — D68.51 HETEROZYGOUS FACTOR V LEIDEN MUTATION: Chronic | ICD-10-CM

## 2021-11-17 LAB — INR PPP: 2.6 (ref 2–3)

## 2021-11-17 PROCEDURE — 85610 POCT INR: ICD-10-PCS | Mod: QW,HCNC,S$GLB, | Performed by: INTERNAL MEDICINE

## 2021-11-17 PROCEDURE — 93793 PR ANTICOAGULANT MGMT FOR PT TAKING WARFARIN: ICD-10-PCS | Mod: HCNC,S$GLB,,

## 2021-11-17 PROCEDURE — 85610 PROTHROMBIN TIME: CPT | Mod: QW,HCNC,S$GLB, | Performed by: INTERNAL MEDICINE

## 2021-11-17 PROCEDURE — 93793 ANTICOAG MGMT PT WARFARIN: CPT | Mod: HCNC,S$GLB,,

## 2021-11-24 DIAGNOSIS — I10 ESSENTIAL HYPERTENSION: Primary | ICD-10-CM

## 2021-12-03 ENCOUNTER — ANTI-COAG VISIT (OUTPATIENT)
Dept: CARDIOLOGY | Facility: CLINIC | Age: 71
End: 2021-12-03
Payer: MEDICARE

## 2021-12-03 DIAGNOSIS — I10 ESSENTIAL HYPERTENSION: Primary | ICD-10-CM

## 2021-12-03 DIAGNOSIS — D68.51 HETEROZYGOUS FACTOR V LEIDEN MUTATION: Chronic | ICD-10-CM

## 2021-12-03 DIAGNOSIS — Z79.01 LONG TERM (CURRENT) USE OF ANTICOAGULANTS: Primary | ICD-10-CM

## 2021-12-03 LAB — INR PPP: 1.7 (ref 2–3)

## 2021-12-03 PROCEDURE — 93793 PR ANTICOAGULANT MGMT FOR PT TAKING WARFARIN: ICD-10-PCS | Mod: HCNC,S$GLB,,

## 2021-12-03 PROCEDURE — 85610 PROTHROMBIN TIME: CPT | Mod: QW,HCNC,S$GLB, | Performed by: INTERNAL MEDICINE

## 2021-12-03 PROCEDURE — 85610 POCT INR: ICD-10-PCS | Mod: QW,HCNC,S$GLB, | Performed by: INTERNAL MEDICINE

## 2021-12-03 PROCEDURE — 93793 ANTICOAG MGMT PT WARFARIN: CPT | Mod: HCNC,S$GLB,,

## 2021-12-07 ENCOUNTER — PATIENT OUTREACH (OUTPATIENT)
Dept: ADMINISTRATIVE | Facility: OTHER | Age: 71
End: 2021-12-07
Payer: MEDICARE

## 2021-12-07 ENCOUNTER — OFFICE VISIT (OUTPATIENT)
Dept: CARDIOLOGY | Facility: CLINIC | Age: 71
End: 2021-12-07
Payer: MEDICARE

## 2021-12-07 ENCOUNTER — HOSPITAL ENCOUNTER (OUTPATIENT)
Dept: CARDIOLOGY | Facility: HOSPITAL | Age: 71
Discharge: HOME | End: 2021-12-07
Attending: INTERNAL MEDICINE
Payer: MEDICARE

## 2021-12-07 VITALS
DIASTOLIC BLOOD PRESSURE: 78 MMHG | HEART RATE: 53 BPM | OXYGEN SATURATION: 99 % | SYSTOLIC BLOOD PRESSURE: 132 MMHG | BODY MASS INDEX: 29.28 KG/M2 | WEIGHT: 175.94 LBS

## 2021-12-07 DIAGNOSIS — I51.89 LEFT VENTRICULAR DIASTOLIC DYSFUNCTION WITH PRESERVED SYSTOLIC FUNCTION: Chronic | ICD-10-CM

## 2021-12-07 DIAGNOSIS — R25.2 MUSCLE CRAMP: ICD-10-CM

## 2021-12-07 DIAGNOSIS — I10 ESSENTIAL HYPERTENSION: ICD-10-CM

## 2021-12-07 DIAGNOSIS — O22.30 DVT (DEEP VEIN THROMBOSIS) IN PREGNANCY: ICD-10-CM

## 2021-12-07 DIAGNOSIS — I10 ESSENTIAL HYPERTENSION: Chronic | ICD-10-CM

## 2021-12-07 DIAGNOSIS — R00.1 BRADYCARDIA: ICD-10-CM

## 2021-12-07 DIAGNOSIS — G47.33 OSA ON CPAP: Chronic | ICD-10-CM

## 2021-12-07 DIAGNOSIS — E78.00 PURE HYPERCHOLESTEROLEMIA WITH TARGET LOW DENSITY LIPOPROTEIN (LDL) CHOLESTEROL LESS THAN 130 MG/DL: Chronic | ICD-10-CM

## 2021-12-07 DIAGNOSIS — I25.10 CORONARY ARTERY DISEASE INVOLVING NATIVE CORONARY ARTERY OF NATIVE HEART WITHOUT ANGINA PECTORIS: Primary | ICD-10-CM

## 2021-12-07 PROCEDURE — 99999 PR PBB SHADOW E&M-EST. PATIENT-LVL IV: ICD-10-PCS | Mod: PBBFAC,HCNC,, | Performed by: INTERNAL MEDICINE

## 2021-12-07 PROCEDURE — 3061F PR NEG MICROALBUMINURIA RESULT DOCUMENTED/REVIEW: ICD-10-PCS | Mod: HCNC,CPTII,S$GLB, | Performed by: INTERNAL MEDICINE

## 2021-12-07 PROCEDURE — 99214 OFFICE O/P EST MOD 30 MIN: CPT | Mod: HCNC,S$GLB,, | Performed by: INTERNAL MEDICINE

## 2021-12-07 PROCEDURE — 99499 UNLISTED E&M SERVICE: CPT | Mod: S$GLB,,, | Performed by: INTERNAL MEDICINE

## 2021-12-07 PROCEDURE — 3061F NEG MICROALBUMINURIA REV: CPT | Mod: HCNC,CPTII,S$GLB, | Performed by: INTERNAL MEDICINE

## 2021-12-07 PROCEDURE — 99999 PR PBB SHADOW E&M-EST. PATIENT-LVL IV: CPT | Mod: PBBFAC,HCNC,, | Performed by: INTERNAL MEDICINE

## 2021-12-07 PROCEDURE — 99214 PR OFFICE/OUTPT VISIT, EST, LEVL IV, 30-39 MIN: ICD-10-PCS | Mod: HCNC,S$GLB,, | Performed by: INTERNAL MEDICINE

## 2021-12-07 PROCEDURE — 3066F NEPHROPATHY DOC TX: CPT | Mod: HCNC,CPTII,S$GLB, | Performed by: INTERNAL MEDICINE

## 2021-12-07 PROCEDURE — 3066F PR DOCUMENTATION OF TREATMENT FOR NEPHROPATHY: ICD-10-PCS | Mod: HCNC,CPTII,S$GLB, | Performed by: INTERNAL MEDICINE

## 2021-12-07 PROCEDURE — 93010 EKG 12-LEAD: ICD-10-PCS | Mod: HCNC,,, | Performed by: STUDENT IN AN ORGANIZED HEALTH CARE EDUCATION/TRAINING PROGRAM

## 2021-12-07 PROCEDURE — 93005 ELECTROCARDIOGRAM TRACING: CPT | Mod: HCNC

## 2021-12-07 PROCEDURE — 3072F LOW RISK FOR RETINOPATHY: CPT | Mod: HCNC,CPTII,S$GLB, | Performed by: INTERNAL MEDICINE

## 2021-12-07 PROCEDURE — 99499 RISK ADDL DX/OHS AUDIT: ICD-10-PCS | Mod: S$GLB,,, | Performed by: INTERNAL MEDICINE

## 2021-12-07 PROCEDURE — 3072F PR LOW RISK FOR RETINOPATHY: ICD-10-PCS | Mod: HCNC,CPTII,S$GLB, | Performed by: INTERNAL MEDICINE

## 2021-12-07 PROCEDURE — 93010 ELECTROCARDIOGRAM REPORT: CPT | Mod: HCNC,,, | Performed by: STUDENT IN AN ORGANIZED HEALTH CARE EDUCATION/TRAINING PROGRAM

## 2021-12-14 ENCOUNTER — HOSPITAL ENCOUNTER (OUTPATIENT)
Dept: CARDIOLOGY | Facility: HOSPITAL | Age: 71
Discharge: HOME | End: 2021-12-14
Attending: INTERNAL MEDICINE
Payer: MEDICARE

## 2021-12-14 DIAGNOSIS — R00.1 BRADYCARDIA: ICD-10-CM

## 2021-12-14 PROCEDURE — 93226 XTRNL ECG REC<48 HR SCAN A/R: CPT | Mod: HCNC

## 2021-12-14 PROCEDURE — 93227 XTRNL ECG REC<48 HR R&I: CPT | Mod: HCNC,,, | Performed by: INTERNAL MEDICINE

## 2021-12-14 PROCEDURE — 93227 HOLTER MONITOR - 48 HOUR (CUPID ONLY): ICD-10-PCS | Mod: HCNC,,, | Performed by: INTERNAL MEDICINE

## 2021-12-16 ENCOUNTER — ANTI-COAG VISIT (OUTPATIENT)
Dept: CARDIOLOGY | Facility: CLINIC | Age: 71
End: 2021-12-16
Payer: MEDICARE

## 2021-12-16 DIAGNOSIS — Z79.01 LONG TERM (CURRENT) USE OF ANTICOAGULANTS: Primary | ICD-10-CM

## 2021-12-16 DIAGNOSIS — D68.51 HETEROZYGOUS FACTOR V LEIDEN MUTATION: Chronic | ICD-10-CM

## 2021-12-16 LAB
INR PPP: 2.2 (ref 2–3)
OHS CV EVENT MONITOR DAY: 0
OHS CV HOLTER LENGTH DECIMAL HOURS: 47.98
OHS CV HOLTER LENGTH HOURS: 47
OHS CV HOLTER LENGTH MINUTES: 59
OHS CV HOLTER SINUS AVERAGE HR: 63
OHS CV HOLTER SINUS MAX HR: 93
OHS CV HOLTER SINUS MIN HR: 43

## 2021-12-16 PROCEDURE — 85610 POCT INR: ICD-10-PCS | Mod: QW,HCNC,S$GLB, | Performed by: INTERNAL MEDICINE

## 2021-12-16 PROCEDURE — 85610 PROTHROMBIN TIME: CPT | Mod: QW,HCNC,S$GLB, | Performed by: INTERNAL MEDICINE

## 2021-12-16 PROCEDURE — 93793 PR ANTICOAGULANT MGMT FOR PT TAKING WARFARIN: ICD-10-PCS | Mod: HCNC,S$GLB,,

## 2021-12-16 PROCEDURE — 93793 ANTICOAG MGMT PT WARFARIN: CPT | Mod: HCNC,S$GLB,,

## 2021-12-17 ENCOUNTER — TELEPHONE (OUTPATIENT)
Dept: CARDIOLOGY | Facility: CLINIC | Age: 71
End: 2021-12-17
Payer: MEDICARE

## 2021-12-17 ENCOUNTER — TELEPHONE (OUTPATIENT)
Dept: FAMILY MEDICINE | Facility: CLINIC | Age: 71
End: 2021-12-17
Payer: MEDICARE

## 2021-12-17 RX ORDER — TRIAMCINOLONE ACETONIDE 1 MG/G
OINTMENT TOPICAL 2 TIMES DAILY PRN
Qty: 30 G | Refills: 0 | Status: SHIPPED | OUTPATIENT
Start: 2021-12-17 | End: 2024-03-25

## 2021-12-30 ENCOUNTER — OFFICE VISIT (OUTPATIENT)
Dept: FAMILY MEDICINE | Facility: CLINIC | Age: 71
End: 2021-12-30
Payer: MEDICARE

## 2021-12-30 ENCOUNTER — PATIENT MESSAGE (OUTPATIENT)
Dept: CARDIOLOGY | Facility: CLINIC | Age: 71
End: 2021-12-30
Payer: MEDICARE

## 2021-12-30 ENCOUNTER — TELEPHONE (OUTPATIENT)
Dept: PAIN MEDICINE | Facility: CLINIC | Age: 71
End: 2021-12-30
Payer: MEDICARE

## 2021-12-30 DIAGNOSIS — D68.51 HETEROZYGOUS FACTOR V LEIDEN MUTATION: Chronic | ICD-10-CM

## 2021-12-30 DIAGNOSIS — E11.9 TYPE 2 DIABETES MELLITUS WITHOUT COMPLICATION, WITHOUT LONG-TERM CURRENT USE OF INSULIN: ICD-10-CM

## 2021-12-30 DIAGNOSIS — Z79.01 ANTICOAGULATED ON COUMADIN: Chronic | ICD-10-CM

## 2021-12-30 DIAGNOSIS — Z12.31 ENCOUNTER FOR SCREENING MAMMOGRAM FOR BREAST CANCER: ICD-10-CM

## 2021-12-30 DIAGNOSIS — I10 ESSENTIAL HYPERTENSION: Chronic | ICD-10-CM

## 2021-12-30 DIAGNOSIS — M54.16 CHRONIC LUMBAR RADICULOPATHY: Primary | ICD-10-CM

## 2021-12-30 DIAGNOSIS — R35.0 URINE FREQUENCY: ICD-10-CM

## 2021-12-30 DIAGNOSIS — E78.00 PURE HYPERCHOLESTEROLEMIA WITH TARGET LOW DENSITY LIPOPROTEIN (LDL) CHOLESTEROL LESS THAN 130 MG/DL: Chronic | ICD-10-CM

## 2021-12-30 PROCEDURE — 3061F NEG MICROALBUMINURIA REV: CPT | Mod: HCNC,CPTII,95, | Performed by: INTERNAL MEDICINE

## 2021-12-30 PROCEDURE — 3066F PR DOCUMENTATION OF TREATMENT FOR NEPHROPATHY: ICD-10-PCS | Mod: HCNC,CPTII,95, | Performed by: INTERNAL MEDICINE

## 2021-12-30 PROCEDURE — 3061F PR NEG MICROALBUMINURIA RESULT DOCUMENTED/REVIEW: ICD-10-PCS | Mod: HCNC,CPTII,95, | Performed by: INTERNAL MEDICINE

## 2021-12-30 PROCEDURE — 99442 PR PHYSICIAN TELEPHONE EVALUATION 11-20 MIN: CPT | Mod: HCNC,95,, | Performed by: INTERNAL MEDICINE

## 2021-12-30 PROCEDURE — 3066F NEPHROPATHY DOC TX: CPT | Mod: HCNC,CPTII,95, | Performed by: INTERNAL MEDICINE

## 2021-12-30 PROCEDURE — 3044F HG A1C LEVEL LT 7.0%: CPT | Mod: HCNC,CPTII,95, | Performed by: INTERNAL MEDICINE

## 2021-12-30 PROCEDURE — 3072F LOW RISK FOR RETINOPATHY: CPT | Mod: HCNC,CPTII,95, | Performed by: INTERNAL MEDICINE

## 2021-12-30 PROCEDURE — 3072F PR LOW RISK FOR RETINOPATHY: ICD-10-PCS | Mod: HCNC,CPTII,95, | Performed by: INTERNAL MEDICINE

## 2021-12-30 PROCEDURE — 3044F PR MOST RECENT HEMOGLOBIN A1C LEVEL <7.0%: ICD-10-PCS | Mod: HCNC,CPTII,95, | Performed by: INTERNAL MEDICINE

## 2021-12-30 PROCEDURE — 99442 PR PHYSICIAN TELEPHONE EVALUATION 11-20 MIN: ICD-10-PCS | Mod: HCNC,95,, | Performed by: INTERNAL MEDICINE

## 2022-01-14 ENCOUNTER — ANTI-COAG VISIT (OUTPATIENT)
Dept: CARDIOLOGY | Facility: CLINIC | Age: 72
End: 2022-01-14
Payer: MEDICARE

## 2022-01-14 DIAGNOSIS — D68.51 HETEROZYGOUS FACTOR V LEIDEN MUTATION: Chronic | ICD-10-CM

## 2022-01-14 DIAGNOSIS — Z79.01 LONG TERM (CURRENT) USE OF ANTICOAGULANTS: Primary | ICD-10-CM

## 2022-01-14 LAB — INR PPP: 2.8 (ref 2–3)

## 2022-01-14 PROCEDURE — 85610 PROTHROMBIN TIME: CPT | Mod: QW,HCNC,S$GLB, | Performed by: INTERNAL MEDICINE

## 2022-01-14 PROCEDURE — 93793 ANTICOAG MGMT PT WARFARIN: CPT | Mod: HCNC,S$GLB,,

## 2022-01-14 PROCEDURE — 85610 POCT INR: ICD-10-PCS | Mod: QW,HCNC,S$GLB, | Performed by: INTERNAL MEDICINE

## 2022-01-14 PROCEDURE — 93793 PR ANTICOAGULANT MGMT FOR PT TAKING WARFARIN: ICD-10-PCS | Mod: HCNC,S$GLB,,

## 2022-01-14 NOTE — PROGRESS NOTES
INR is therapeutic at 2.8.  Patient reports no recent changes.  Currently taking warfarin 5 mg every Friday and 7.5 mg on all other days.  We will continue same dose of warfarin.  Recheck in 1 month.  Dose calendar given.  Patient verbalized understanding.

## 2022-02-09 ENCOUNTER — CLINICAL SUPPORT (OUTPATIENT)
Dept: FAMILY MEDICINE | Facility: CLINIC | Age: 72
End: 2022-02-09
Payer: MEDICARE

## 2022-02-09 ENCOUNTER — ANTI-COAG VISIT (OUTPATIENT)
Dept: CARDIOLOGY | Facility: CLINIC | Age: 72
End: 2022-02-09
Payer: MEDICARE

## 2022-02-09 VITALS — SYSTOLIC BLOOD PRESSURE: 140 MMHG | DIASTOLIC BLOOD PRESSURE: 90 MMHG

## 2022-02-09 DIAGNOSIS — I10 ESSENTIAL HYPERTENSION: Primary | ICD-10-CM

## 2022-02-09 DIAGNOSIS — Z79.01 LONG TERM (CURRENT) USE OF ANTICOAGULANTS: Primary | ICD-10-CM

## 2022-02-09 DIAGNOSIS — D68.51 HETEROZYGOUS FACTOR V LEIDEN MUTATION: Chronic | ICD-10-CM

## 2022-02-09 LAB — INR PPP: 6.4 (ref 2–3)

## 2022-02-09 PROCEDURE — 85610 PROTHROMBIN TIME: CPT | Mod: QW,HCNC,S$GLB, | Performed by: INTERNAL MEDICINE

## 2022-02-09 PROCEDURE — 85610 POCT INR: ICD-10-PCS | Mod: QW,HCNC,S$GLB, | Performed by: INTERNAL MEDICINE

## 2022-02-09 PROCEDURE — 93793 PR ANTICOAGULANT MGMT FOR PT TAKING WARFARIN: ICD-10-PCS | Mod: HCNC,S$GLB,,

## 2022-02-09 PROCEDURE — 93793 ANTICOAG MGMT PT WARFARIN: CPT | Mod: HCNC,S$GLB,,

## 2022-02-09 NOTE — PROGRESS NOTES
"INR is supratherapeutic at 6.4.  Patient reports no medication or diet changes.  Stated, "maybe stress related - recent death in family".  No bleeding issues reported.  Current warfarin dose verified.  Instructions given:  Hold warfarin dose until next visit on Friday, 2/11/2022 for further instructions.  Eat a serving of a dark leafy vegetable for lunch today.  Bleeding precautions in place - ED for any issues.  Dose calendar given.  Patient verbalized understanding of all medical information given.  " Hernia

## 2022-02-09 NOTE — PROGRESS NOTES
Pt. Came in for requested bp check. Pt. States she took her bp medicine 15 minutes prior. States she had a headache and wanted to be safe. Bp 140/90 to left arm. Offered to recheck in 5 minutes. Pt. Refused. Pt. States shes been under a lot of stress lately and is headed to work.

## 2022-02-11 ENCOUNTER — ANTI-COAG VISIT (OUTPATIENT)
Dept: CARDIOLOGY | Facility: CLINIC | Age: 72
End: 2022-02-11
Payer: MEDICARE

## 2022-02-11 DIAGNOSIS — Z79.01 LONG TERM (CURRENT) USE OF ANTICOAGULANTS: Primary | ICD-10-CM

## 2022-02-11 DIAGNOSIS — Z79.01 ANTICOAGULATED ON COUMADIN: ICD-10-CM

## 2022-02-11 DIAGNOSIS — D68.51 HETEROZYGOUS FACTOR V LEIDEN MUTATION: Chronic | ICD-10-CM

## 2022-02-11 LAB — INR PPP: 3.6 (ref 2–3)

## 2022-02-11 PROCEDURE — 85610 POCT INR: ICD-10-PCS | Mod: QW,HCNC,S$GLB, | Performed by: INTERNAL MEDICINE

## 2022-02-11 PROCEDURE — 93793 PR ANTICOAGULANT MGMT FOR PT TAKING WARFARIN: ICD-10-PCS | Mod: HCNC,S$GLB,,

## 2022-02-11 PROCEDURE — 93793 ANTICOAG MGMT PT WARFARIN: CPT | Mod: HCNC,S$GLB,,

## 2022-02-11 PROCEDURE — 85610 PROTHROMBIN TIME: CPT | Mod: QW,HCNC,S$GLB, | Performed by: INTERNAL MEDICINE

## 2022-02-11 NOTE — PROGRESS NOTES
Patient's INR has dropped to 3.6, but remains supra-therapeutic.  Warfarin dose was held x 2 days as directed.  Remains with no bleeding episodes.  We will hold warfarin dose again today, then have patient to take 7.5 mg (2/12 - 2/14).  Recheck on Tuesday, 2/15/2022.  Bleeding precautions remain in place.  Dose calendar given and reviewed with patient.  Patient verbalized understanding.

## 2022-02-15 ENCOUNTER — ANTI-COAG VISIT (OUTPATIENT)
Dept: CARDIOLOGY | Facility: CLINIC | Age: 72
End: 2022-02-15
Payer: MEDICARE

## 2022-02-15 DIAGNOSIS — D68.51 HETEROZYGOUS FACTOR V LEIDEN MUTATION: Chronic | ICD-10-CM

## 2022-02-15 DIAGNOSIS — Z79.01 LONG TERM (CURRENT) USE OF ANTICOAGULANTS: Primary | ICD-10-CM

## 2022-02-15 LAB — INR PPP: 1.6 (ref 2–3)

## 2022-02-15 PROCEDURE — 85610 POCT INR: ICD-10-PCS | Mod: QW,HCNC,S$GLB, | Performed by: INTERNAL MEDICINE

## 2022-02-15 PROCEDURE — 85610 PROTHROMBIN TIME: CPT | Mod: QW,HCNC,S$GLB, | Performed by: INTERNAL MEDICINE

## 2022-02-15 PROCEDURE — 93793 ANTICOAG MGMT PT WARFARIN: CPT | Mod: HCNC,S$GLB,,

## 2022-02-15 PROCEDURE — 93793 PR ANTICOAGULANT MGMT FOR PT TAKING WARFARIN: ICD-10-PCS | Mod: HCNC,S$GLB,,

## 2022-02-15 NOTE — PROGRESS NOTES
INR is subtherapeutic at 1.6.  Previous warfarin instructions were verified and followed.  No diet changes reported.  No s/s reported.  Will boost today's (Tuesday) dose to 10 mg, then re-challenge current dose of 5 mg every Friday and 7.5 mg on all other days.  Recheck INR in 1 week.  Dose calendar given and reviewed with patient.  Patient verbalized understanding.

## 2022-02-16 ENCOUNTER — HOSPITAL ENCOUNTER (EMERGENCY)
Facility: HOSPITAL | Age: 72
Discharge: HOME | End: 2022-02-17
Attending: EMERGENCY MEDICINE
Payer: MEDICARE

## 2022-02-16 ENCOUNTER — TELEPHONE (OUTPATIENT)
Dept: FAMILY MEDICINE | Facility: CLINIC | Age: 72
End: 2022-02-16

## 2022-02-16 ENCOUNTER — OFFICE VISIT (OUTPATIENT)
Dept: FAMILY MEDICINE | Facility: CLINIC | Age: 72
End: 2022-02-16
Payer: MEDICARE

## 2022-02-16 VITALS
DIASTOLIC BLOOD PRESSURE: 62 MMHG | HEART RATE: 59 BPM | SYSTOLIC BLOOD PRESSURE: 118 MMHG | RESPIRATION RATE: 16 BRPM | TEMPERATURE: 98 F | BODY MASS INDEX: 28.87 KG/M2 | OXYGEN SATURATION: 98 % | WEIGHT: 173.5 LBS

## 2022-02-16 DIAGNOSIS — R10.10 UPPER ABDOMINAL PAIN: ICD-10-CM

## 2022-02-16 DIAGNOSIS — R94.31 ABNORMAL ECG: Primary | ICD-10-CM

## 2022-02-16 DIAGNOSIS — R10.13 EPIGASTRIC PAIN: Primary | ICD-10-CM

## 2022-02-16 DIAGNOSIS — R10.13 EPIGASTRIC PAIN: ICD-10-CM

## 2022-02-16 DIAGNOSIS — R07.2 SUBSTERNAL CHEST PAIN: ICD-10-CM

## 2022-02-16 LAB
ALBUMIN SERPL BCP-MCNC: 3.5 G/DL (ref 3.5–5.2)
ALP SERPL-CCNC: 48 U/L (ref 55–135)
ALT SERPL W/O P-5'-P-CCNC: 10 U/L (ref 10–44)
ANION GAP SERPL CALC-SCNC: 11 MMOL/L (ref 8–16)
AST SERPL-CCNC: 14 U/L (ref 10–40)
BASOPHILS # BLD AUTO: 0.03 K/UL (ref 0–0.2)
BASOPHILS NFR BLD: 0.4 % (ref 0–1.9)
BILIRUB SERPL-MCNC: 0.5 MG/DL (ref 0.1–1)
BILIRUB UR QL STRIP: NEGATIVE
BUN SERPL-MCNC: 16 MG/DL (ref 8–23)
CALCIUM SERPL-MCNC: 9.4 MG/DL (ref 8.7–10.5)
CHLORIDE SERPL-SCNC: 98 MMOL/L (ref 95–110)
CLARITY UR: CLEAR
CO2 SERPL-SCNC: 29 MMOL/L (ref 23–29)
COLOR UR: YELLOW
CREAT SERPL-MCNC: 0.8 MG/DL (ref 0.5–1.4)
DIFFERENTIAL METHOD: ABNORMAL
EOSINOPHIL # BLD AUTO: 0.2 K/UL (ref 0–0.5)
EOSINOPHIL NFR BLD: 2.5 % (ref 0–8)
ERYTHROCYTE [DISTWIDTH] IN BLOOD BY AUTOMATED COUNT: 15.4 % (ref 11.5–14.5)
EST. GFR  (AFRICAN AMERICAN): >60 ML/MIN/1.73 M^2
EST. GFR  (NON AFRICAN AMERICAN): >60 ML/MIN/1.73 M^2
GLUCOSE SERPL-MCNC: 104 MG/DL (ref 70–110)
GLUCOSE UR QL STRIP: NEGATIVE
HCT VFR BLD AUTO: 39.6 % (ref 37–48.5)
HGB BLD-MCNC: 12.9 G/DL (ref 12–16)
HGB UR QL STRIP: NEGATIVE
IMM GRANULOCYTES # BLD AUTO: 0.02 K/UL (ref 0–0.04)
IMM GRANULOCYTES NFR BLD AUTO: 0.3 % (ref 0–0.5)
KETONES UR QL STRIP: NEGATIVE
LEUKOCYTE ESTERASE UR QL STRIP: NEGATIVE
LIPASE SERPL-CCNC: 26 U/L (ref 4–60)
LYMPHOCYTES # BLD AUTO: 1.9 K/UL (ref 1–4.8)
LYMPHOCYTES NFR BLD: 25.4 % (ref 18–48)
MCH RBC QN AUTO: 28.2 PG (ref 27–31)
MCHC RBC AUTO-ENTMCNC: 32.6 G/DL (ref 32–36)
MCV RBC AUTO: 87 FL (ref 82–98)
MONOCYTES # BLD AUTO: 1.1 K/UL (ref 0.3–1)
MONOCYTES NFR BLD: 14.9 % (ref 4–15)
NEUTROPHILS # BLD AUTO: 4.3 K/UL (ref 1.8–7.7)
NEUTROPHILS NFR BLD: 56.5 % (ref 38–73)
NITRITE UR QL STRIP: NEGATIVE
NRBC BLD-RTO: 0 /100 WBC
PH UR STRIP: 6 [PH] (ref 5–8)
PLATELET # BLD AUTO: 330 K/UL (ref 150–450)
PMV BLD AUTO: 9 FL (ref 9.2–12.9)
POTASSIUM SERPL-SCNC: 3.5 MMOL/L (ref 3.5–5.1)
PROT SERPL-MCNC: 7.3 G/DL (ref 6–8.4)
PROT UR QL STRIP: NEGATIVE
RBC # BLD AUTO: 4.58 M/UL (ref 4–5.4)
SODIUM SERPL-SCNC: 138 MMOL/L (ref 136–145)
SP GR UR STRIP: 1.01 (ref 1–1.03)
TROPONIN I SERPL DL<=0.01 NG/ML-MCNC: <0.006 NG/ML (ref 0–0.03)
URN SPEC COLLECT METH UR: NORMAL
UROBILINOGEN UR STRIP-ACNC: NEGATIVE EU/DL
WBC # BLD AUTO: 7.63 K/UL (ref 3.9–12.7)

## 2022-02-16 PROCEDURE — 3008F BODY MASS INDEX DOCD: CPT | Mod: HCNC,CPTII,S$GLB, | Performed by: INTERNAL MEDICINE

## 2022-02-16 PROCEDURE — 99213 OFFICE O/P EST LOW 20 MIN: CPT | Mod: HCNC,S$GLB,, | Performed by: INTERNAL MEDICINE

## 2022-02-16 PROCEDURE — 99999 PR PBB SHADOW E&M-EST. PATIENT-LVL V: CPT | Mod: PBBFAC,HCNC,, | Performed by: INTERNAL MEDICINE

## 2022-02-16 PROCEDURE — 81003 URINALYSIS AUTO W/O SCOPE: CPT | Mod: HCNC | Performed by: NURSE PRACTITIONER

## 2022-02-16 PROCEDURE — 93010 EKG 12-LEAD: ICD-10-PCS | Mod: HCNC,S$GLB,, | Performed by: INTERNAL MEDICINE

## 2022-02-16 PROCEDURE — 3074F SYST BP LT 130 MM HG: CPT | Mod: HCNC,CPTII,S$GLB, | Performed by: INTERNAL MEDICINE

## 2022-02-16 PROCEDURE — 93005 ELECTROCARDIOGRAM TRACING: CPT | Mod: HCNC

## 2022-02-16 PROCEDURE — 99213 PR OFFICE/OUTPT VISIT, EST, LEVL III, 20-29 MIN: ICD-10-PCS | Mod: HCNC,S$GLB,, | Performed by: INTERNAL MEDICINE

## 2022-02-16 PROCEDURE — 83690 ASSAY OF LIPASE: CPT | Mod: HCNC | Performed by: NURSE PRACTITIONER

## 2022-02-16 PROCEDURE — 80053 COMPREHEN METABOLIC PANEL: CPT | Mod: HCNC | Performed by: NURSE PRACTITIONER

## 2022-02-16 PROCEDURE — 99284 EMERGENCY DEPT VISIT MOD MDM: CPT | Mod: 25,HCNC

## 2022-02-16 PROCEDURE — 1125F AMNT PAIN NOTED PAIN PRSNT: CPT | Mod: HCNC,CPTII,S$GLB, | Performed by: INTERNAL MEDICINE

## 2022-02-16 PROCEDURE — 85025 COMPLETE CBC W/AUTO DIFF WBC: CPT | Mod: HCNC | Performed by: NURSE PRACTITIONER

## 2022-02-16 PROCEDURE — 3078F DIAST BP <80 MM HG: CPT | Mod: HCNC,CPTII,S$GLB, | Performed by: INTERNAL MEDICINE

## 2022-02-16 PROCEDURE — 1125F PR PAIN SEVERITY QUANTIFIED, PAIN PRESENT: ICD-10-PCS | Mod: HCNC,CPTII,S$GLB, | Performed by: INTERNAL MEDICINE

## 2022-02-16 PROCEDURE — 84484 ASSAY OF TROPONIN QUANT: CPT | Mod: HCNC | Performed by: NURSE PRACTITIONER

## 2022-02-16 PROCEDURE — 3078F PR MOST RECENT DIASTOLIC BLOOD PRESSURE < 80 MM HG: ICD-10-PCS | Mod: HCNC,CPTII,S$GLB, | Performed by: INTERNAL MEDICINE

## 2022-02-16 PROCEDURE — 93010 ELECTROCARDIOGRAM REPORT: CPT | Mod: HCNC,S$GLB,, | Performed by: INTERNAL MEDICINE

## 2022-02-16 PROCEDURE — 3288F FALL RISK ASSESSMENT DOCD: CPT | Mod: HCNC,CPTII,S$GLB, | Performed by: INTERNAL MEDICINE

## 2022-02-16 PROCEDURE — 1159F MED LIST DOCD IN RCRD: CPT | Mod: HCNC,CPTII,S$GLB, | Performed by: INTERNAL MEDICINE

## 2022-02-16 PROCEDURE — 3074F PR MOST RECENT SYSTOLIC BLOOD PRESSURE < 130 MM HG: ICD-10-PCS | Mod: HCNC,CPTII,S$GLB, | Performed by: INTERNAL MEDICINE

## 2022-02-16 PROCEDURE — 3288F PR FALLS RISK ASSESSMENT DOCUMENTED: ICD-10-PCS | Mod: HCNC,CPTII,S$GLB, | Performed by: INTERNAL MEDICINE

## 2022-02-16 PROCEDURE — 25000003 PHARM REV CODE 250: Mod: HCNC | Performed by: EMERGENCY MEDICINE

## 2022-02-16 PROCEDURE — 1101F PR PT FALLS ASSESS DOC 0-1 FALLS W/OUT INJ PAST YR: ICD-10-PCS | Mod: HCNC,CPTII,S$GLB, | Performed by: INTERNAL MEDICINE

## 2022-02-16 PROCEDURE — 99999 PR PBB SHADOW E&M-EST. PATIENT-LVL V: ICD-10-PCS | Mod: PBBFAC,HCNC,, | Performed by: INTERNAL MEDICINE

## 2022-02-16 PROCEDURE — 3008F PR BODY MASS INDEX (BMI) DOCUMENTED: ICD-10-PCS | Mod: HCNC,CPTII,S$GLB, | Performed by: INTERNAL MEDICINE

## 2022-02-16 PROCEDURE — 1101F PT FALLS ASSESS-DOCD LE1/YR: CPT | Mod: HCNC,CPTII,S$GLB, | Performed by: INTERNAL MEDICINE

## 2022-02-16 PROCEDURE — 1159F PR MEDICATION LIST DOCUMENTED IN MEDICAL RECORD: ICD-10-PCS | Mod: HCNC,CPTII,S$GLB, | Performed by: INTERNAL MEDICINE

## 2022-02-16 RX ORDER — PANTOPRAZOLE SODIUM 40 MG/1
TABLET, DELAYED RELEASE ORAL
Qty: 90 TABLET | Refills: 0 | Status: SHIPPED | OUTPATIENT
Start: 2022-02-16 | End: 2023-01-03 | Stop reason: SDUPTHER

## 2022-02-16 RX ORDER — HYDROGEN PEROXIDE 3 %
20 SOLUTION, NON-ORAL MISCELLANEOUS
COMMUNITY
End: 2022-02-16

## 2022-02-16 RX ORDER — MAG HYDROX/ALUMINUM HYD/SIMETH 200-200-20
30 SUSPENSION, ORAL (FINAL DOSE FORM) ORAL ONCE
Status: COMPLETED | OUTPATIENT
Start: 2022-02-16 | End: 2022-02-16

## 2022-02-16 RX ORDER — PANTOPRAZOLE SODIUM 40 MG/1
40 TABLET, DELAYED RELEASE ORAL DAILY
Qty: 30 TABLET | Refills: 1 | Status: SHIPPED | OUTPATIENT
Start: 2022-02-16 | End: 2022-02-16

## 2022-02-16 RX ORDER — LIDOCAINE HYDROCHLORIDE 20 MG/ML
10 SOLUTION OROPHARYNGEAL ONCE
Status: COMPLETED | OUTPATIENT
Start: 2022-02-16 | End: 2022-02-16

## 2022-02-16 RX ADMIN — LIDOCAINE HYDROCHLORIDE 10 ML: 20 SOLUTION ORAL; TOPICAL at 10:02

## 2022-02-16 RX ADMIN — MAGNESIUM HYDROXIDE/ALUMINUM HYDROXICE/SIMETHICONE 30 ML: 120; 1200; 1200 SUSPENSION ORAL at 10:02

## 2022-02-16 NOTE — TELEPHONE ENCOUNTER
Care Due:                  Date            Visit Type   Department     Provider  --------------------------------------------------------------------------------                                SAME DAY -                              ESTABLISHED   [facility]  Last Visit: 02-      PATIENT      MEDICINE       Carl Clemons                              EP -                              PRIMARY      [facility]  Next Visit: 03-      CARE ([facility])   MEDICINE       Carl Clemons                                                            Last  Test          Frequency    Reason                     Performed    Due Date  --------------------------------------------------------------------------------    Lipid Panel.  12 months..  pravastatin..............  11- 11-    Uric Acid...  12 months..  allopurinoL..............  Not Found    Overdue    Powered by [address] by [employer]. Reference number: [id number].   2/16/2022 3:41:41 PM CST

## 2022-02-16 NOTE — PROGRESS NOTES
Subjective:       Patient ID: Alyx Weir is a 72 y.o. female.    Chief Complaint: Gastroesophageal Reflux    4 days mid epigastric abdominal pain, with some substernal chest made----------worse with eating------feels like food won't go down. Belching and passing gas.          Had diarrhea but that resolved-------------normal bowel movement today---------no black stool or bright blood.          No N/V.              No fever or chills.    Took nexium---no help.        Also , hurts to touch mid epigastric area and sternum------     Gastroesophageal Reflux  She complains of abdominal pain. She reports no choking, no coughing, no nausea or no wheezing.     Past Medical History:   Diagnosis Date    Anticoagulated on Coumadin     Arm weakness-rotator cuff weakness 11/2/2015    Arthritis     Asthma     Clotting disorder     Coronary artery disease     Deep vein thrombosis     Degenerative disc disease     Diabetes mellitus type I 2011    BS last tested x 1 month not sure what it was.    Heterozygous factor V Leiden mutation     Hx of colonic polyps 11/13/2015    Hyperlipidemia     Hypertension     VIRGIL (obstructive sleep apnea)     Stenosis of right carotid artery 12/13/2016     Past Surgical History:   Procedure Laterality Date    BACK SURGERY      bladder cyst removal      BLADDER SURGERY      CARDIAC CATHETERIZATION  03/2013    mild CAD    COLONOSCOPY N/A 11/13/2015    Procedure: COLONOSCOPY;  Surgeon: Jas Umanzor III, MD;  Location: [facility];  Service: Endoscopy;  Laterality: N/A;    HYSTERECTOMY      28 yrs old    LUMBAR SPINE SURGERY      bone spurs removed    OOPHORECTOMY       Family History   Problem Relation Age of Onset    Heart disease Mother     Hypertension Mother     Cataracts Mother     Hypertension Sister     Glaucoma Sister     Hypertension Brother     Diabetes Father     Diabetes Paternal Uncle     Hypertension Sister     Diabetes Sister     Hypertension Sister      Diabetes Sister     Breast cancer Neg Hx     Colon cancer Neg Hx     Ovarian cancer Neg Hx      Social History     Socioeconomic History    Marital status:    Tobacco Use    Smoking status: Never Smoker    Smokeless tobacco: Never Used   Substance and Sexual Activity    Alcohol use: No    Drug use: No    Sexual activity: Never     Birth control/protection: None   Social History Narrative    Dogs in household, no smokers.     Review of Systems   Constitutional: Negative for chills and fever.   HENT: Negative.    Respiratory: Negative for apnea, cough, choking, chest tightness, shortness of breath, wheezing and stridor.    Cardiovascular: Negative for palpitations and leg swelling.   Gastrointestinal: Positive for abdominal pain. Negative for abdominal distention, anal bleeding, blood in stool, constipation, diarrhea, nausea and vomiting.   Genitourinary: Negative for difficulty urinating, dysuria, frequency, hematuria and urgency.   Musculoskeletal: Negative for myalgias and neck pain.   Neurological: Positive for weakness. Negative for dizziness and light-headedness.   Psychiatric/Behavioral: Negative for agitation, behavioral problems and confusion.       Objective:      Physical Exam  Vitals and nursing note reviewed.   Constitutional:       General: She is not in acute distress.     Appearance: Normal appearance. She is well-developed and well-nourished. She is not diaphoretic.   HENT:      Head: Normocephalic and atraumatic.      Mouth/Throat:      Pharynx: No oropharyngeal exudate.   Eyes:      General: No scleral icterus.  Neck:      Thyroid: No thyromegaly.      Vascular: No carotid bruit or JVD.      Trachea: No tracheal deviation.   Cardiovascular:      Rate and Rhythm: Normal rate and regular rhythm.      Pulses: Intact distal pulses.      Heart sounds: Normal heart sounds.   Pulmonary:      Effort: Pulmonary effort is normal. No respiratory distress.      Breath sounds: Normal breath  sounds. No wheezing or rales.   Chest:      Chest wall: No tenderness.   Abdominal:      General: Bowel sounds are normal. There is no distension.      Palpations: Abdomen is soft.      Tenderness: There is abdominal tenderness. There is no guarding or rebound.      Comments: Reproducible tenderness to palpation mid epigastric and sternal area--------   Musculoskeletal:         General: No tenderness or edema. Normal range of motion.      Cervical back: Normal range of motion and neck supple.   Lymphadenopathy:      Cervical: No cervical adenopathy.   Skin:     General: Skin is warm and dry.      Coloration: Skin is not pale.      Findings: No erythema or rash.   Neurological:      Mental Status: She is alert and oriented to person, place, and time.   Psychiatric:         Mood and Affect: Mood and affect normal.         Behavior: Behavior normal.         Thought Content: Thought content normal.         Judgment: Judgment normal.         CMP  Sodium   Date Value Ref Range Status   06/30/2021 142 136 - 145 mmol/L Final   06/30/2021 142 136 - 145 mmol/L Final     Potassium   Date Value Ref Range Status   06/30/2021 3.8 3.5 - 5.1 mmol/L Final   06/30/2021 3.8 3.5 - 5.1 mmol/L Final     Chloride   Date Value Ref Range Status   06/30/2021 104 95 - 110 mmol/L Final   06/30/2021 104 95 - 110 mmol/L Final     CO2   Date Value Ref Range Status   06/30/2021 27 23 - 29 mmol/L Final   06/30/2021 27 23 - 29 mmol/L Final     Glucose   Date Value Ref Range Status   06/30/2021 88 70 - 110 mg/dL Final   06/30/2021 88 70 - 110 mg/dL Final     BUN   Date Value Ref Range Status   06/30/2021 15 8 - 23 mg/dL Final   06/30/2021 15 8 - 23 mg/dL Final     Creatinine   Date Value Ref Range Status   06/30/2021 0.8 0.5 - 1.4 mg/dL Final   06/30/2021 0.8 0.5 - 1.4 mg/dL Final     Calcium   Date Value Ref Range Status   06/30/2021 9.6 8.7 - 10.5 mg/dL Final   06/30/2021 9.6 8.7 - 10.5 mg/dL Final     Total Protein   Date Value Ref Range Status    06/30/2021 7.3 6.0 - 8.4 g/dL Final     Albumin   Date Value Ref Range Status   06/30/2021 3.6 3.5 - 5.2 g/dL Final     Total Bilirubin   Date Value Ref Range Status   06/30/2021 0.9 0.1 - 1.0 mg/dL Final     Comment:     For infants and newborns, interpretation of results should be based  on gestational age, weight and in agreement with clinical  observations.    Premature Infant recommended reference ranges:  Up to 24 hours.............<8.0 mg/dL  Up to 48 hours............<12.0 mg/dL  3-5 days..................<15.0 mg/dL  6-29 days.................<15.0 mg/dL       Alkaline Phosphatase   Date Value Ref Range Status   06/30/2021 41 (L) 55 - 135 U/L Final     AST   Date Value Ref Range Status   06/30/2021 17 10 - 40 U/L Final     ALT   Date Value Ref Range Status   06/30/2021 17 10 - 44 U/L Final     Anion Gap   Date Value Ref Range Status   06/30/2021 11 8 - 16 mmol/L Final   06/30/2021 11 8 - 16 mmol/L Final     eGFR if    Date Value Ref Range Status   06/30/2021 >60.0 >60 mL/min/1.73 m^2 Final   06/30/2021 >60.0 >60 mL/min/1.73 m^2 Final     eGFR if non    Date Value Ref Range Status   06/30/2021 >60.0 >60 mL/min/1.73 m^2 Final     Comment:     Calculation used to obtain the estimated glomerular filtration  rate (eGFR) is the CKD-EPI equation.      06/30/2021 >60.0 >60 mL/min/1.73 m^2 Final     Comment:     Calculation used to obtain the estimated glomerular filtration  rate (eGFR) is the CKD-EPI equation.        Lab Results   Component Value Date    WBC 7.40 06/30/2021    HGB 12.1 06/30/2021    HCT 38.9 06/30/2021    MCV 89 06/30/2021     06/30/2021     Lab Results   Component Value Date    CHOL 203 (H) 11/17/2020     Lab Results   Component Value Date    HDL 75 11/17/2020     Lab Results   Component Value Date    LDLCALC 110.6 11/17/2020     Lab Results   Component Value Date    TRIG 87 11/17/2020     Lab Results   Component Value Date    CHOLHDL 36.9 11/17/2020      Lab Results   Component Value Date    TSH 0.552 06/30/2021     Lab Results   Component Value Date    HGBA1C 6.0 (H) 06/30/2021     Assessment:       1. Epigastric pain    2. Substernal chest pain        Plan:   Epigastric pain  -     Comprehensive Metabolic Panel; Future; Expected date: 02/16/2022  -     CBC Auto Differential; Future; Expected date: 02/16/2022  -     X-Ray Chest PA And Lateral; Future; Expected date: 02/16/2022  -     pantoprazole (PROTONIX) 40 MG tablet; Take 1 tablet (40 mg total) by mouth once daily.  Dispense: 30 tablet; Refill: 1    Substernal chest pain  -     EKG 12-lead    Abnormal ecg-----------will go home and have daughter bring to er now for further eval ------------------follow 2 weeks

## 2022-02-17 VITALS
HEIGHT: 65 IN | DIASTOLIC BLOOD PRESSURE: 57 MMHG | TEMPERATURE: 98 F | SYSTOLIC BLOOD PRESSURE: 124 MMHG | HEART RATE: 52 BPM | OXYGEN SATURATION: 97 % | BODY MASS INDEX: 29.03 KG/M2 | RESPIRATION RATE: 17 BRPM | WEIGHT: 174.25 LBS

## 2022-02-17 RX ORDER — SUCRALFATE 1 G/10ML
1 SUSPENSION ORAL
Qty: 400 ML | Refills: 0 | Status: SHIPPED | OUTPATIENT
Start: 2022-02-17 | End: 2024-03-25

## 2022-02-17 NOTE — FIRST PROVIDER EVALUATION
"Medical screening exam completed.  I have conducted a focused provider triage encounter, findings are as follows:    Brief history of present illness:  Epigastric pain for a few days     Vitals:    02/16/22 1845   BP: (!) 166/75   BP Location: Right arm   Patient Position: Sitting   Pulse: (!) 59   Resp: 18   Temp: 98.4 °F (36.9 °C)   TempSrc: Oral   SpO2: 97%   Weight: 79.1 kg (174 lb 4.4 oz)   Height: 5' 5" (1.651 m)       Pertinent physical exam:  NAD, vss     Brief workup plan:  Abdominal pain     Preliminary workup initiated; this workup will be continued and followed by the physician or advanced practice provider that is assigned to the patient when roomed.  "

## 2022-02-17 NOTE — ED PROVIDER NOTES
SCRIBE #1 NOTE: I, Riri Chu, am scribing for, and in the presence of, Trista Tenorio MD. I have scribed the entire note.       History     Chief Complaint   Patient presents with    Abdominal Pain     Epigastric pain since Sunday, few episodes of diarrhea +nausea, no vomiting     Review of patient's allergies indicates:   Allergen Reactions    Erythromycin Edema     Angioedema to throat    Amlodipine Other (See Comments)     Headaches    Diazepam Hives    Iodine Hives    Meperidine Hives    Morphine Itching    Methylprednisolone sod suc(pf) Other (See Comments)     headache    Primidone Other (See Comments)         History of Present Illness     HPI    2/16/2022, 9:31 PM  History obtained from the patient      History of Present Illness: Alyx Weir is a 72 y.o. female patient with a PMHx of asthma, CAD, DM, and HTN who presents to the Emergency Department for evaluation of upper abdominal pain which onset gradually 4 days ago. Pt reports that she was having nausea and diarrhea when the pain first started, but both sxs have resolved. Pt saw Dr. Clemons today for same sxs and was sent to ED. Symptoms are constant and moderate in severity. No mitigating or exacerbating factors reported. Patient denies any blood in stool, difficulty urinating, vomiting, CP, SOB, and all other sxs at this time. Prior Tx includes Nexium with no relief. No further complaints or concerns at this time.       Arrival mode: Personal vehicle    PCP: Carl Clemons MD        Past Medical History:  Past Medical History:   Diagnosis Date    Anticoagulated on Coumadin     Arm weakness-rotator cuff weakness 11/2/2015    Arthritis     Asthma     Clotting disorder     Coronary artery disease     Deep vein thrombosis     Degenerative disc disease     Diabetes mellitus type I 2011    BS last tested x 1 month not sure what it was.    Heterozygous factor V Leiden mutation     Hx of colonic polyps 11/13/2015     Hyperlipidemia     Hypertension     VIRGIL (obstructive sleep apnea)     Stenosis of right carotid artery 12/13/2016       Past Surgical History:  Past Surgical History:   Procedure Laterality Date    BACK SURGERY      bladder cyst removal      BLADDER SURGERY      CARDIAC CATHETERIZATION  03/2013    mild CAD    COLONOSCOPY N/A 11/13/2015    Procedure: COLONOSCOPY;  Surgeon: Jas Umanzor III, MD;  Location[facility];  Service: Endoscopy;  Laterality: N/A;    HYSTERECTOMY      28 yrs old    LUMBAR SPINE SURGERY      bone spurs removed    OOPHORECTOMY           Family History:  Family History   Problem Relation Age of Onset    Heart disease Mother     Hypertension Mother     Cataracts Mother     Hypertension Sister     Glaucoma Sister     Hypertension Brother     Diabetes Father     Diabetes Paternal Uncle     Hypertension Sister     Diabetes Sister     Hypertension Sister     Diabetes Sister     Breast cancer Neg Hx     Colon cancer Neg Hx     Ovarian cancer Neg Hx        Social History:  Social History     Tobacco Use    Smoking status: Never Smoker    Smokeless tobacco: Never Used   Substance and Sexual Activity    Alcohol use: No    Drug use: No    Sexual activity: Never     Birth control/protection: None        Review of Systems     Review of Systems   Constitutional: Negative for fever.   HENT: Negative for sore throat.    Respiratory: Negative for shortness of breath.    Cardiovascular: Negative for chest pain.   Gastrointestinal: Positive for abdominal pain (upper), diarrhea (resolved) and nausea (resolved). Negative for blood in stool and vomiting.   Genitourinary: Negative for difficulty urinating and dysuria.   Musculoskeletal: Negative for back pain.   Skin: Negative for rash.   Neurological: Negative for weakness.   Hematological: Does not bruise/bleed easily.   All other systems reviewed and are negative.       Physical Exam     Initial Vitals [02/16/22 1845]   BP Pulse  "Resp Temp SpO2   (!) 166/75 (!) 59 18 98.4 °F (36.9 °C) 97 %      MAP       --          Physical Exam  Nursing Notes and Vital Signs Reviewed.  Constitutional: Patient is in no acute distress. Well-developed and well-nourished.  Head: Atraumatic. Normocephalic.  Eyes: PERRL. EOM intact. Conjunctivae are not pale. No scleral icterus.  ENT: Mucous membranes are moist. Oropharynx is clear and symmetric.    Neck: Supple. Full ROM.   Cardiovascular: Regular rate. Regular rhythm. No murmurs, rubs, or gallops. Distal pulses are 2+ and symmetric.  Pulmonary/Chest: No respiratory distress. Clear to auscultation bilaterally. No wheezing or rales.  Abdominal: Soft and non-distended.  Mild epigastric tenderness.  No rebound, guarding, or rigidity.   Musculoskeletal: Moves all extremities. No obvious deformities. No edema. No calf tenderness.  Skin: Warm and dry.  Neurological:  Alert, awake, and appropriate.  Normal speech.  No acute focal neurological deficits are appreciated.  Psychiatric: Normal affect. Good eye contact. Appropriate in content.     ED Course   Procedures  ED Vital Signs:  Vitals:    02/16/22 1845 02/16/22 2130 02/16/22 2200 02/16/22 2230   BP: (!) 166/75 (!) 145/70 137/73 (!) 111/58   Pulse: (!) 59 (!) 54 61 (!) 56   Resp: 18 20 18 19   Temp: 98.4 °F (36.9 °C)      TempSrc: Oral      SpO2: 97% 100% 100% 97%   Weight: 79.1 kg (174 lb 4.4 oz)      Height: 5' 5" (1.651 m)       02/16/22 2330 02/17/22 0018 02/17/22 0019   BP: (!) 107/56 (!) 124/57    Pulse: (!) 57 (!) 52    Resp: (!) 21 17    Temp:   98.2 °F (36.8 °C)   TempSrc:   Oral   SpO2: 98% 97%    Weight:      Height:          Abnormal Lab Results:  Labs Reviewed   CBC W/ AUTO DIFFERENTIAL - Abnormal; Notable for the following components:       Result Value    RDW 15.4 (*)     MPV 9.0 (*)     Mono # 1.1 (*)     All other components within normal limits   COMPREHENSIVE METABOLIC PANEL - Abnormal; Notable for the following components:    Alkaline " Phosphatase 48 (*)     All other components within normal limits   LIPASE   URINALYSIS, REFLEX TO URINE CULTURE    Narrative:     Specimen Source->Urine   TROPONIN I        All Lab Results:  Results for orders placed or performed during the hospital encounter of 02/16/22   CBC W/ AUTO DIFFERENTIAL   Result Value Ref Range    WBC 7.63 3.90 - 12.70 K/uL    RBC 4.58 4.00 - 5.40 M/uL    Hemoglobin 12.9 12.0 - 16.0 g/dL    Hematocrit 39.6 37.0 - 48.5 %    MCV 87 82 - 98 fL    MCH 28.2 27.0 - 31.0 pg    MCHC 32.6 32.0 - 36.0 g/dL    RDW 15.4 (H) 11.5 - 14.5 %    Platelets 330 150 - 450 K/uL    MPV 9.0 (L) 9.2 - 12.9 fL    Immature Granulocytes 0.3 0.0 - 0.5 %    Gran # (ANC) 4.3 1.8 - 7.7 K/uL    Immature Grans (Abs) 0.02 0.00 - 0.04 K/uL    Lymph # 1.9 1.0 - 4.8 K/uL    Mono # 1.1 (H) 0.3 - 1.0 K/uL    Eos # 0.2 0.0 - 0.5 K/uL    Baso # 0.03 0.00 - 0.20 K/uL    nRBC 0 0 /100 WBC    Gran % 56.5 38.0 - 73.0 %    Lymph % 25.4 18.0 - 48.0 %    Mono % 14.9 4.0 - 15.0 %    Eosinophil % 2.5 0.0 - 8.0 %    Basophil % 0.4 0.0 - 1.9 %    Differential Method Automated    Comp. Metabolic Panel   Result Value Ref Range    Sodium 138 136 - 145 mmol/L    Potassium 3.5 3.5 - 5.1 mmol/L    Chloride 98 95 - 110 mmol/L    CO2 29 23 - 29 mmol/L    Glucose 104 70 - 110 mg/dL    BUN 16 8 - 23 mg/dL    Creatinine 0.8 0.5 - 1.4 mg/dL    Calcium 9.4 8.7 - 10.5 mg/dL    Total Protein 7.3 6.0 - 8.4 g/dL    Albumin 3.5 3.5 - 5.2 g/dL    Total Bilirubin 0.5 0.1 - 1.0 mg/dL    Alkaline Phosphatase 48 (L) 55 - 135 U/L    AST 14 10 - 40 U/L    ALT 10 10 - 44 U/L    Anion Gap 11 8 - 16 mmol/L    eGFR if African American >60 >60 mL/min/1.73 m^2    eGFR if non African American >60 >60 mL/min/1.73 m^2   Lipase   Result Value Ref Range    Lipase 26 4 - 60 U/L   Urinalysis, Reflex to Urine Culture Urine, Clean Catch    Specimen: Urine   Result Value Ref Range    Specimen UA Urine, Clean Catch     Color, UA Yellow Yellow, Straw, Candi    Appearance, UA Clear  Clear    pH, UA 6.0 5.0 - 8.0    Specific Gravity, UA 1.010 1.005 - 1.030    Protein, UA Negative Negative    Glucose, UA Negative Negative    Ketones, UA Negative Negative    Bilirubin (UA) Negative Negative    Occult Blood UA Negative Negative    Nitrite, UA Negative Negative    Urobilinogen, UA Negative <2.0 EU/dL    Leukocytes, UA Negative Negative   Troponin I   Result Value Ref Range    Troponin I <0.006 0.000 - 0.026 ng/mL         Imaging Results:  Imaging Results    None          The EKG was ordered, reviewed, and independently interpreted by the ED provider.  Interpretation time: 21:32  Rate: 54 BPM  Rhythm: sinus bradycardia  Interpretation: T wave abnormality, consider inferior ischemia. No STEMI.             The Emergency Provider reviewed the vital signs and test results, which are outlined above.     ED Discussion       12:02 AM: Reassessed pt at this time. Pt states she feels better with a GI cocktail. Discussed with pt all pertinent ED information and results. Discussed pt dx and plan of tx. Gave pt all f/u and return to the ED instructions. All questions and concerns were addressed at this time. Pt expresses understanding of information and instructions, and is comfortable with plan to discharge. Pt is stable for discharge.    I discussed with patient and/or family/caretaker that evaluation in the ED does not suggest any emergent or life threatening medical conditions requiring immediate intervention beyond what was provided in the ED, and I believe patient is safe for discharge.  Regardless, an unremarkable evaluation in the ED does not preclude the development or presence of a serious of life threatening condition. As such, patient was instructed to return immediately for any worsening or change in current symptoms.         Medical Decision Making:   Clinical Tests:   Lab Tests: Ordered and Reviewed  Medical Tests: Ordered and Reviewed           ED Medication(s):  Medications   aluminum-magnesium  hydroxide-simethicone 200-200-20 mg/5 mL suspension 30 mL (30 mLs Oral Given 2/16/22 2200)     And   LIDOcaine HCl 2% oral solution 10 mL (10 mLs Oral Given 2/16/22 2200)       Discharge Medication List as of 2/17/2022 12:02 AM      START taking these medications    Details   sucralfate (CARAFATE) 100 mg/mL suspension Take 10 mLs (1 g total) by mouth 4 (four) times daily with meals and nightly., Starting Thu 2/17/2022, Print              Follow-up Information     Carl Clemons MD In 2 days.    Specialty: Internal Medicine  Contact information:  [address] [facility] [address] [phone]             [facility] - Emergency Dept..    Specialty: Emergency Medicine  Why: As needed, If symptoms worsen  Contact information:  [address] [facility] [address] [phone]                           Scribe Attestation:   Scribe #1: I performed the above scribed service and the documentation accurately describes the services I performed. I attest to the accuracy of the note.     Attending:   Physician Attestation Statement for Scribe #1: I, Trista Tenorio MD, personally performed the services described in this documentation, as scribed by Riri Chu, in my presence, and it is both accurate and complete.           Clinical Impression       ICD-10-CM ICD-9-CM   1. Upper abdominal pain  R10.10 789.09       Disposition:   Disposition: Discharged  Condition: Stable         Trista Tenorio MD  02/17/22 8552

## 2022-02-18 ENCOUNTER — LAB VISIT (OUTPATIENT)
Dept: LAB | Facility: HOSPITAL | Age: 72
End: 2022-02-18
Attending: INTERNAL MEDICINE
Payer: MEDICARE

## 2022-02-18 ENCOUNTER — OFFICE VISIT (OUTPATIENT)
Dept: CARDIOLOGY | Facility: CLINIC | Age: 72
End: 2022-02-18
Payer: MEDICARE

## 2022-02-18 VITALS — SYSTOLIC BLOOD PRESSURE: 116 MMHG | DIASTOLIC BLOOD PRESSURE: 68 MMHG | HEART RATE: 56 BPM

## 2022-02-18 DIAGNOSIS — D68.51 HETEROZYGOUS FACTOR V LEIDEN MUTATION: Chronic | ICD-10-CM

## 2022-02-18 DIAGNOSIS — I77.1 TORTUOUS AORTA: ICD-10-CM

## 2022-02-18 DIAGNOSIS — E78.00 PURE HYPERCHOLESTEROLEMIA WITH TARGET LOW DENSITY LIPOPROTEIN (LDL) CHOLESTEROL LESS THAN 130 MG/DL: Chronic | ICD-10-CM

## 2022-02-18 DIAGNOSIS — I25.10 CORONARY ARTERY DISEASE INVOLVING NATIVE CORONARY ARTERY OF NATIVE HEART WITHOUT ANGINA PECTORIS: ICD-10-CM

## 2022-02-18 DIAGNOSIS — R07.89 OTHER CHEST PAIN: ICD-10-CM

## 2022-02-18 DIAGNOSIS — G47.33 OSA ON CPAP: Chronic | ICD-10-CM

## 2022-02-18 DIAGNOSIS — Z79.01 ANTICOAGULATED ON COUMADIN: Chronic | ICD-10-CM

## 2022-02-18 DIAGNOSIS — I10 ESSENTIAL HYPERTENSION: Primary | Chronic | ICD-10-CM

## 2022-02-18 LAB — TROPONIN I SERPL DL<=0.01 NG/ML-MCNC: <0.006 NG/ML (ref 0–0.03)

## 2022-02-18 PROCEDURE — 99499 UNLISTED E&M SERVICE: CPT | Mod: HCNC,S$GLB,, | Performed by: INTERNAL MEDICINE

## 2022-02-18 PROCEDURE — 99999 PR PBB SHADOW E&M-EST. PATIENT-LVL II: CPT | Mod: PBBFAC,HCNC,, | Performed by: INTERNAL MEDICINE

## 2022-02-18 PROCEDURE — 99215 PR OFFICE/OUTPT VISIT, EST, LEVL V, 40-54 MIN: ICD-10-PCS | Mod: HCNC,S$GLB,, | Performed by: INTERNAL MEDICINE

## 2022-02-18 PROCEDURE — 99499 RISK ADDL DX/OHS AUDIT: ICD-10-PCS | Mod: HCNC,S$GLB,, | Performed by: INTERNAL MEDICINE

## 2022-02-18 PROCEDURE — 84484 ASSAY OF TROPONIN QUANT: CPT | Mod: HCNC | Performed by: INTERNAL MEDICINE

## 2022-02-18 PROCEDURE — 99999 PR PBB SHADOW E&M-EST. PATIENT-LVL II: ICD-10-PCS | Mod: PBBFAC,HCNC,, | Performed by: INTERNAL MEDICINE

## 2022-02-18 PROCEDURE — 36415 COLL VENOUS BLD VENIPUNCTURE: CPT | Mod: HCNC | Performed by: INTERNAL MEDICINE

## 2022-02-18 PROCEDURE — 99215 OFFICE O/P EST HI 40 MIN: CPT | Mod: HCNC,S$GLB,, | Performed by: INTERNAL MEDICINE

## 2022-02-18 RX ORDER — ISOSORBIDE MONONITRATE 30 MG/1
30 TABLET, EXTENDED RELEASE ORAL DAILY
Qty: 30 TABLET | Refills: 11 | Status: SHIPPED | OUTPATIENT
Start: 2022-02-18 | End: 2022-06-07

## 2022-02-18 NOTE — PROGRESS NOTES
Subjective:   Patient ID:  Alyx Weir is a 72 y.o. female who presents for follow up of No chief complaint on file.      71 yo female, referred by PCP for chest pain  PMH of mild CAD, nonobstructive s/p C 2013. Bradycardia, HTN, HLP, NIDDM, h/o left DVT, factor V Leidein mutation, obesity, sleep apnea needs new masl.  PT WAS INFECTED covid 19 (throat itching) IN . [place]   OF COVID 19   AVALOS with fast walking  Denied chest pain, palpitation dizziness leg swelling, no syncope.  Denied h/o MI.  No smoking/drinking.  No regular exercise.  Patient is compliant with medications.   MPI no ischemia   echo normal EF and LVH    carotid US 0-19% lesion  ekg NSR and LVH  Did not take med today and BP high     visit  ECHO normal EF.  In HTN digit program. Not using CPAP on regular base  PT twice a week for lower back pain  No chest pain, dizziness faint and palpitation  Mild AVALOS     visit  Chronic joint pain. No chest pain dizziness, faint and SOB. C/o Fatigue sometime. Some leg swelling.   ekg sinus bradycardia. Left lower lower medial side pain   Muscle cramp at night    Interval history  C/o lower sternal pain and ingestion for a week, constantly. Disrupted the sleeping, worse with food or laying down. Feels nausea and improved. Some SOB.   Chronic back pain   EKG done in 2 days ago at PCP's office showed NSR and small TWI on inferolateral leads.  Compared to prior EKG done in , small TWI new      Past Medical History:   Diagnosis Date    Anticoagulated on Coumadin     Arm weakness-rotator cuff weakness 2015    Arthritis     Asthma     Clotting disorder     Coronary artery disease     Deep vein thrombosis     Degenerative disc disease     Diabetes mellitus type I     BS last tested x 1 month not sure what it was.    Heterozygous factor V Leiden mutation     Hx of colonic polyps 2015    Hyperlipidemia     Hypertension     VIRGIL  (obstructive sleep apnea)     Stenosis of right carotid artery 12/13/2016       Past Surgical History:   Procedure Laterality Date    BACK SURGERY      bladder cyst removal      BLADDER SURGERY      CARDIAC CATHETERIZATION  03/2013    mild CAD    COLONOSCOPY N/A 11/13/2015    Procedure: COLONOSCOPY;  Surgeon: Jas Umanzor III, MD;  Location: [facility];  Service: Endoscopy;  Laterality: N/A;    HYSTERECTOMY      28 yrs old    LUMBAR SPINE SURGERY      bone spurs removed    OOPHORECTOMY         Social History     Tobacco Use    Smoking status: Never Smoker    Smokeless tobacco: Never Used   Substance Use Topics    Alcohol use: No    Drug use: No       Family History   Problem Relation Age of Onset    Heart disease Mother     Hypertension Mother     Cataracts Mother     Hypertension Sister     Glaucoma Sister     Hypertension Brother     Diabetes Father     Diabetes Paternal Uncle     Hypertension Sister     Diabetes Sister     Hypertension Sister     Diabetes Sister     Breast cancer Neg Hx     Colon cancer Neg Hx     Ovarian cancer Neg Hx          Review of Systems   Constitutional: Negative for decreased appetite, diaphoresis, fever, malaise/fatigue and night sweats.   HENT: Negative for nosebleeds.    Eyes: Negative for blurred vision and double vision.   Cardiovascular: Positive for chest pain and dyspnea on exertion. Negative for claudication, irregular heartbeat, leg swelling, near-syncope, orthopnea, palpitations, paroxysmal nocturnal dyspnea and syncope.   Respiratory: Negative for cough, shortness of breath, sleep disturbances due to breathing, snoring, sputum production and wheezing.    Endocrine: Negative for cold intolerance and polyuria.   Hematologic/Lymphatic: Does not bruise/bleed easily.   Skin: Negative for rash.   Musculoskeletal: Negative for back pain, falls, joint pain, joint swelling and neck pain.   Gastrointestinal: Negative for abdominal pain, heartburn, nausea  and vomiting.   Genitourinary: Negative for dysuria, frequency and hematuria.   Neurological: Negative for difficulty with concentration, dizziness, focal weakness, headaches, light-headedness, numbness, seizures and weakness.   Psychiatric/Behavioral: Negative for depression, memory loss and substance abuse. The patient does not have insomnia.    Allergic/Immunologic: Negative for HIV exposure and hives.       Objective:   Physical Exam  HENT:      Head: Normocephalic.   Eyes:      Pupils: Pupils are equal, round, and reactive to light.   Neck:      Thyroid: No thyromegaly.      Vascular: Normal carotid pulses. No carotid bruit or JVD.   Cardiovascular:      Rate and Rhythm: Regular rhythm. Bradycardia present.  No extrasystoles are present.     Chest Wall: PMI is not displaced.      Pulses: Normal pulses.      Heart sounds: Murmur (ESM on left chest ) heard.   No gallop. No S3 sounds.    Pulmonary:      Effort: No respiratory distress.      Breath sounds: Normal breath sounds. No stridor.   Chest:      Comments: + tenderness on lower sternal   Abdominal:      General: Bowel sounds are normal.      Palpations: Abdomen is soft.      Tenderness: There is no abdominal tenderness. There is no rebound.   Musculoskeletal:         General: Normal range of motion.   Skin:     Findings: No rash.   Neurological:      Mental Status: She is alert and oriented to person, place, and time.   Psychiatric:         Behavior: Behavior normal.         Lab Results   Component Value Date    CHOL 203 (H) 11/17/2020    CHOL 206 (H) 11/21/2019    CHOL 220 (H) 09/27/2018     Lab Results   Component Value Date    HDL 75 11/17/2020    HDL 74 11/21/2019    HDL 80 (H) 09/27/2018     Lab Results   Component Value Date    LDLCALC 110.6 11/17/2020    LDLCALC 122.0 11/21/2019    LDLCALC 124.4 09/27/2018     Lab Results   Component Value Date    TRIG 87 11/17/2020    TRIG 50 11/21/2019    TRIG 78 09/27/2018     Lab Results   Component Value Date     CHOLHDL 36.9 11/17/2020    CHOLHDL 35.9 11/21/2019    CHOLHDL 36.4 09/27/2018       Chemistry        Component Value Date/Time     02/16/2022 2251    K 3.5 02/16/2022 2251    CL 98 02/16/2022 2251    CO2 29 02/16/2022 2251    BUN 16 02/16/2022 2251    CREATININE 0.8 02/16/2022 2251     02/16/2022 2251        Component Value Date/Time    CALCIUM 9.4 02/16/2022 2251    ALKPHOS 48 (L) 02/16/2022 2251    AST 14 02/16/2022 2251    ALT 10 02/16/2022 2251    BILITOT 0.5 02/16/2022 2251    ESTGFRAFRICA >60 02/16/2022 2251    EGFRNONAA >60 02/16/2022 2251          Lab Results   Component Value Date    HGBA1C 6.0 (H) 06/30/2021     Lab Results   Component Value Date    TSH 0.552 06/30/2021     Lab Results   Component Value Date    INR 1.6 (A) 02/15/2022    INR 3.6 (A) 02/11/2022    INR 6.4 (A) 02/09/2022     Lab Results   Component Value Date    WBC 7.63 02/16/2022    HGB 12.9 02/16/2022    HCT 39.6 02/16/2022    MCV 87 02/16/2022     02/16/2022     BMP  Sodium   Date Value Ref Range Status   02/16/2022 138 136 - 145 mmol/L Final     Potassium   Date Value Ref Range Status   02/16/2022 3.5 3.5 - 5.1 mmol/L Final     Chloride   Date Value Ref Range Status   02/16/2022 98 95 - 110 mmol/L Final     CO2   Date Value Ref Range Status   02/16/2022 29 23 - 29 mmol/L Final     BUN   Date Value Ref Range Status   02/16/2022 16 8 - 23 mg/dL Final     Creatinine   Date Value Ref Range Status   02/16/2022 0.8 0.5 - 1.4 mg/dL Final     Calcium   Date Value Ref Range Status   02/16/2022 9.4 8.7 - 10.5 mg/dL Final     Anion Gap   Date Value Ref Range Status   02/16/2022 11 8 - 16 mmol/L Final     eGFR if    Date Value Ref Range Status   02/16/2022 >60 >60 mL/min/1.73 m^2 Final     eGFR if non    Date Value Ref Range Status   02/16/2022 >60 >60 mL/min/1.73 m^2 Final     Comment:     Calculation used to obtain the estimated glomerular filtration  rate (eGFR) is the CKD-EPI equation.         BNP  @LABRCNTIP(BNP,BNPTRIAGEBLO)@  @LABRCNTIP(troponini)@  CrCl cannot be calculated (Unknown ideal weight.).  No results found in the last 24 hours.  No results found in the last 24 hours.  No results found in the last 24 hours.    Assessment:      1. Essential hypertension    2. Other chest pain    3. Pure hypercholesterolemia with target low density lipoprotein (LDL) cholesterol less than 130 mg/dL    4. Coronary artery disease involving native coronary artery of native heart without angina pectoris    5. Tortuous aorta    6. Heterozygous factor V Leiden mutation    7. Anticoagulated on Coumadin    8. VIRGIL on CPAP      Chest pain for a week with EKG change and some reproducible component  H/o DM and nonobstructive CAD    Plan:   Hold aldactone due to soft BP  Add Imdur 30 mg daily for 2 weeks  Check troponin and Phar. MPI to r/o ischemia and acute MI  Continue Coumadin statin olmesartan/amlodipine/HCTZ  Contiune protonix  DM Rx per PCP  Counseled DASH  rtc as scheduked

## 2022-02-21 ENCOUNTER — TELEPHONE (OUTPATIENT)
Dept: ADMINISTRATIVE | Facility: OTHER | Age: 72
End: 2022-02-21
Payer: MEDICARE

## 2022-02-21 ENCOUNTER — PES CALL (OUTPATIENT)
Dept: ADMINISTRATIVE | Facility: CLINIC | Age: 72
End: 2022-02-21
Payer: MEDICARE

## 2022-02-21 ENCOUNTER — TELEPHONE (OUTPATIENT)
Dept: CARDIOLOGY | Facility: CLINIC | Age: 72
End: 2022-02-21
Payer: MEDICARE

## 2022-02-21 NOTE — TELEPHONE ENCOUNTER
Pt contacted about results, pt verbalized understanding.        ----- Message from Mauro Knutson MD sent at 2/20/2022 11:43 AM CST -----  Troponin wnl  No acute MI

## 2022-02-21 NOTE — PROGRESS NOTES
New Patient Chronic Pain Note (Initial Visit)    Referring Physician: Carl Clemons*    PCP: Carl Clemons MD    Chief Complaint:   Chief Complaint   Patient presents with    Low-back Pain        SUBJECTIVE:    Alyx Weir is a 72 y.o. female with past medical history significant for COPD, degenerative disc disease, coronary artery disease, hypertension, diastolic dysfunction with preserved systolic function, right carotid artery stenosis, history of deep vein thrombosis, diabetes, obstructive sleep apnea who presents to the clinic for the evaluation of lower back and leg pain.  Patient reports longstanding history of lower back pain which began after mechanical fall in the 1980s.  Patient reports resultant lower back surgery in 1985 with Dr. Eliot Ortiz at our [facility], which she believes was at the L4-5 level.  Patient reports improvement in lower back and leg pain with reoccurrence over the last 10 years.  Today patient reports pain in a bandlike distribution in her lower back which radiates down the lateral and posterior aspects of bilateral lower extremities to the knee and down to the dorsum of the right foot.  Pain is constant and described as aching in nature.  Pain is exacerbated with lumbar extension as well as with ambulation.  Patient is able to ambulate less than 1 block before requiring rest.  Pain is improved with heat, physical therapy in combination of Tylenol and gabapentin.  Patient is taking gabapentin 100 mg in the evening.  Patient does endorse weakness in the lower extremities associated with her pain.  Patient reports frequent spasm like sensation in the lower extremities.  Patient also does report significant improvement in her symptoms with lumbar epidural steroid injection 2016.     Patient reports significant motor weakness.  Patient denies night fever/night sweats, urinary incontinence, bowel incontinence and significant weight loss.    Patient last saw  Dr. Pressley 03/11/2020 with continuation of gabapentin 100 mg in the morning and 300 mg in the evening, continuation of physical therapy    Pain Disability Index Review:   No flowsheet data found.    Non-Pharmacologic Treatments:  Physical Therapy/Home Exercise: yes  Ice/Heat:yes  TENS: no  Acupuncture: no  Massage: no  Chiropractic: no    Other: no      Pain Medications:  - Adjuvant Medications: Neurontin (Gabapentin), Topical Ointment (Voltaren Gel, Steroid cream, Anti-Inflammatory Cream, Compound cream) and Tylenol (Acetaminophen)  - Anti-Coagulants: Coumadin ( Warfarin)    Pain Procedures:   2/2016: LESI    Past Medical History:   Diagnosis Date    Anticoagulated on Coumadin     Arm weakness-rotator cuff weakness 11/2/2015    Arthritis     Asthma     Clotting disorder     Coronary artery disease     Deep vein thrombosis     Degenerative disc disease     Diabetes mellitus type I 2011    BS last tested x 1 month not sure what it was.    Heterozygous factor V Leiden mutation     Hx of colonic polyps 11/13/2015    Hyperlipidemia     Hypertension     VIRGIL (obstructive sleep apnea)     Stenosis of right carotid artery 12/13/2016     Past Surgical History:   Procedure Laterality Date    BACK SURGERY      bladder cyst removal      BLADDER SURGERY      CARDIAC CATHETERIZATION  03/2013    mild CAD    COLONOSCOPY N/A 11/13/2015    Procedure: COLONOSCOPY;  Surgeon: Jas Umanzor III, MD;  Location: [facility];  Service: Endoscopy;  Laterality: N/A;    HYSTERECTOMY      28 yrs old    LUMBAR SPINE SURGERY      bone spurs removed    OOPHORECTOMY       Review of patient's allergies indicates:   Allergen Reactions    Erythromycin Edema     Angioedema to throat    Amlodipine Other (See Comments)     Headaches    Diazepam Hives    Iodine Hives    Meperidine Hives    Morphine Itching    Methylprednisolone sod suc(pf) Other (See Comments)     headache    Primidone Other (See Comments)       Current  Outpatient Medications   Medication Sig    ACCU-CHEK GUIDE ME GLUCOSE MTR Misc USE TO CHECK BLOOD SUGAR TWICE DAILY    acetaminophen (TYLENOL ARTHRITIS ORAL) Take by mouth.    allopurinoL (ZYLOPRIM) 100 MG tablet Take 1 tablet (100 mg total) by mouth once daily.    baclofen (LIORESAL) 10 MG tablet Take 1 tablet (10 mg total) by mouth 3 (three) times daily as needed.    blood sugar diagnostic Strp To check BG 2 times daily, to use with insurance preferred meter    diclofenac sodium (VOLTAREN) 1 % Gel APPLY 2 GRAMS TOPICALLY DAILY AS NEEDED    gabapentin (NEURONTIN) 300 MG capsule Take 1 capsule (300 mg total) by mouth every evening for 7 days, THEN 2 capsules (600 mg total) every evening for 7 days, THEN 3 capsules (900 mg total) every evening for 16 days.    isosorbide mononitrate (IMDUR) 30 MG 24 hr tablet Take 1 tablet (30 mg total) by mouth once daily.    lancets Misc To check BG 2 times daily, to use with insurance preferred meter    neomycin-polymyxin-hydrocortisone (CORTISPORIN) 3.5-10,000-1 mg/mL-unit/mL-% otic suspension Place 3 drops into both ears 3 (three) times daily as needed.    olmesartan-amLODIPin-hcthiazid 40-10-25 mg Tab Take 1 tablet by mouth once daily.    om 3/E/linol/ala/oleic/gla/lip (OMEGA 3-6-9 ORAL) Take 1 capsule by mouth once daily.    pantoprazole (PROTONIX) 40 MG tablet TAKE 1 TABLET(40 MG) BY MOUTH EVERY DAY    pravastatin (PRAVACHOL) 40 MG tablet TAKE 1 TABLET EVERY DAY    sucralfate (CARAFATE) 100 mg/mL suspension Take 10 mLs (1 g total) by mouth 4 (four) times daily with meals and nightly.    triamcinolone acetonide 0.1% (KENALOG) 0.1 % ointment Apply topically 2 (two) times daily as needed.    vitamin D 1000 units Tab Take 185 mg by mouth once daily.    warfarin (COUMADIN) 5 MG tablet TAKE 1 AND 1/2 TABLETS (7.5MG) ON MONDAYS, WEDNESDAYS AND FRIDAYS AND 1 TABLET (5MG) ON ALL OTHER DAYS OF THE WEEK (Patient taking differently: Take 7.5 mg by mouth Daily. Except 1  tablet (5mg) every Friday.)     No current facility-administered medications for this visit.       Review of Systems     GENERAL:  No weight loss, malaise or fevers.  HEENT:   No recent changes in vision or hearing  NECK:  Negative for lumps, no difficulty with swallowing.  RESPIRATORY:  Negative for cough, wheezing or shortness of breath, patient denies any recent URI.  CARDIOVASCULAR:  Negative for chest pain, leg swelling or palpitations.  GI:  Negative for abdominal discomfort, blood in stools or black stools or change in bowel habits.  MUSCULOSKELETAL:  See HPI.  SKIN:  Negative for lesions, rash, and itching.  PSYCH:  No mood disorder or recent psychosocial stressors.   HEMATOLOGY/LYMPHOLOGY:  Negative for prolonged bleeding, bruising easily or swollen nodes.    NEURO:   No history of headaches, syncope, paralysis, seizures or tremors.  All other reviewed and negative other than HPI.    OBJECTIVE:    /77   Pulse (!) 57   Wt 79.9 kg (176 lb 4.1 oz)   BMI 29.33 kg/m²       Physical Exam    GENERAL: Well appearing, in no acute distress, alert and oriented x3.  PSYCH:  Mood and affect appropriate.  SKIN: Skin color, texture, turgor normal, no rashes or lesions.  HEAD/FACE:  Normocephalic, atraumatic. Cranial nerves grossly intact.    CV: RRR with palpation of the radial artery.  PULM: No evidence of respiratory difficulty, symmetric chest rise.  GI:  Soft and non-tender.    BACK: Straight leg raising in the sitting and supine positions is negative to radicular pain. palpation over the facet joints of the lumbar spine or spinous processes. Normal range of motion without pain reproduction.  EXTREMITIES: Peripheral joint ROM is full and pain free without obvious instability or laxity in all four extremities. No deformities, edema, or skin discoloration. Good capillary refill.  MUSCULOSKELETAL: Able to stand on heels & toes.    hip, and knee provocative maneuvers are negative.   pain with palpation over the  sacroiliac joints and greater trochanteric bursa bilaterally.  FABERs test is positive.  Facet loading test is positive bilaterally.   Bilateral upper and lower extremity strength is normal and symmetric.  No atrophy or tone abnormalities are noted.  RIGHT Lower extremity: Hip flexion 5/5, Hip Abduction 5/5, Hip Adduction 5/5, Knee extension 5/5, Knee flexion 5/5, Ankle dorsiflexion5/5, Extensor hallucis longus 5/5, Ankle plantarflexion 5/5  LEFT Lower extremity:  Hip flexion 5/5, Hip Abduction 5/5,Hip Adduction 5/5, Knee extension 5/5, Knee flexion 5/5, Ankle dorsiflexion 5/5, Extensor hallucis longus 5/5, Ankle plantarflexion 5/5  -Normal testing knee (patellar) jerk and ankle (achilles) jerk    NEURO: Bilateral upper and lower extremity coordination and muscle stretch reflexes are physiologic and symmetric. No loss of sensation is noted.  GAIT: normal.    Imagin16  MRI Lumbar Spine W WO Contrast  Narrative  Findings: No priors.  Multiplanar sequences were obtained of the lumbar spine.  There is grade 1 spondylolisthesis of L4 and L5 with loss of disc height at this level and discogenic changes in the endplates.  The conus ends above the level of L1.  There is desiccation of all lumbar intervertebral discs.  No compression fractures or acute osseous abnormalities are identified.  Sagittal and axial T1-weighted images were acquired before and after intravenous administration of 8 cc of Multihance.  Axial images are acquired through the disc spaces from L1-2 through L5-S1.  The L1-2 disc space is unremarkable.  The L2-3 disc space is likewise unremarkable.  At L3-4 there is facet arthropathy and diffuse bulging of the disc which is creating very mild bilateral foraminal narrowing.  No central canal stenosis.  At L4-5 there is unroofing of the disc due to spondylolisthesis with bilateral pars defects.  There is mild canal and moderate bilateral foraminal narrowing as well as some right-sided lateral recess  stenosis due to facet arthropathy.  At L5-S1 there is central bulging of the disc and facet arthropathy with mild canal and moderate bilateral foraminal narrowing.  Following intravenous administration of contrast material, there does not appear to be any abnormal enhancement identified.  There is some patient motion artifact on the postcontrast T1-weighted axial images.  IMPRESSION:  Grade 1 spondylolisthesis of L4 on L5.  Multilevel degenerative change with areas of acquired canal and foraminal stenosis as discussed above.  Please correlate above findings with the patient's clinical symptoms.        01/28/20    X-Ray Lumbar Complete With Flex And Ext  FINDINGS:  Grade 1 spondylolisthesis of L4 on L5 which appears slightly increased since the comparison exam from 2013.  This currently measures on the order of 7 mm.  This appears similar on both flexion/extension maneuvers.  Bones are demineralized.  No acute fracture.  Discogenic degenerative changes at L4-L5 with multilevel lower lumbar facet arthropathy.      ASSESSMENT: 72 y.o. year old female with lower back and leg pain, consistent with     1. Lumbar facet arthropathy  Ambulatory referral/consult to Physical/Occupational Therapy   2. Chronic lumbar radiculopathy  Ambulatory referral/consult to Pain Clinic    Ambulatory referral/consult to Physical/Occupational Therapy   3. DDD (degenerative disc disease), lumbar  Ambulatory referral/consult to Physical/Occupational Therapy   4. Dorsalgia, unspecified     5. Lumbar radiculopathy  MRI Lumbar Spine Without Contrast         PLAN:   - Interventions:  We discussed considering a bilateral L3, L4, L5 medial branch block to address axial back pain, lumbar epidural to address radicular symptoms or bilateral sacroiliac joint or greater trochanteric bursa injection we will review her lumbar MRI initially.  Explained the risks and benefits of the procedure in detail with the patient today in clinic along with alternative  treatment options    - Anticoagulation use: yes Coumadin     report:  Reviewed and consistent with medication use as prescribed.    - Medications:  --  We have discussed increasing gabapentin.  Patient will increase their medication according to the following algorithm.  We have reviewed potential side effects of this medication including daytime somnolence, weight gain and peripheral edema    Gabapentin Titration:  Week 1: 300 mg QHS  Week 2: 600 mg QHS  Week 3: 900 mg QHS      - Therapy:  We discussed initiating physical therapy to help manage the patient/s painful condition. The patient was counseled that muscle strengthening will improve the long term prognosis in regards to pain and may also help increase range of motion and mobility. They were told that one of the goals of physical therapy is that they learn how to do the exercises so that they can do them independently at home daily upon completion. The patient's questions were answered and they were agreeable to this course. A referral for physical therapy was provided to the patient.      - Imaging: Reviewed available imaging with patient and answered any questions they had regarding study.Order MRI of the Lumbar Spine to help evaluate possible source of pain and weakness.    - Records: Obtain old records from outside physicians and imaging    - Follow up visit: return to clinic in 4-6 weeks      The above plan and management options were discussed at length with patient. Patient is in agreement with the above and verbalized understanding.    - I discussed the goals of interventional chronic pain management with the patient on today's visit. We discussed a multimodal and systematic approach to pain.  This includes diagnostic and therapeutic injections, adjuvant pharmacologic treatment, physical therapy, and at times psychiatry.  I emphasized the importance of regular exercise, core strengthening and stretching, diet and weight loss as a cornerstone of  long-term pain management.    - This condition does not require this patient to take time off of work, and the primary goal of our Pain Management services is to improve the patient's functional capacity.  - Patient Questions: Answered all of the patient's questions regarding diagnoses, therapy, treatment and next steps        Mario Palacios MD  Interventional Pain Management  [facility]    Disclaimer:  This note was prepared using voice recognition system and is likely to have sound alike errors that may have been overlooked even after proof reading.  Please call me with any questions

## 2022-02-22 ENCOUNTER — ANTI-COAG VISIT (OUTPATIENT)
Dept: CARDIOLOGY | Facility: CLINIC | Age: 72
End: 2022-02-22
Payer: MEDICARE

## 2022-02-22 ENCOUNTER — PATIENT OUTREACH (OUTPATIENT)
Dept: ADMINISTRATIVE | Facility: OTHER | Age: 72
End: 2022-02-22
Payer: MEDICARE

## 2022-02-22 ENCOUNTER — OFFICE VISIT (OUTPATIENT)
Dept: PAIN MEDICINE | Facility: CLINIC | Age: 72
End: 2022-02-22
Payer: MEDICARE

## 2022-02-22 ENCOUNTER — TELEPHONE (OUTPATIENT)
Dept: PAIN MEDICINE | Facility: CLINIC | Age: 72
End: 2022-02-22

## 2022-02-22 VITALS
WEIGHT: 176.25 LBS | HEART RATE: 57 BPM | SYSTOLIC BLOOD PRESSURE: 133 MMHG | BODY MASS INDEX: 29.33 KG/M2 | DIASTOLIC BLOOD PRESSURE: 77 MMHG

## 2022-02-22 DIAGNOSIS — D68.51 HETEROZYGOUS FACTOR V LEIDEN MUTATION: Chronic | ICD-10-CM

## 2022-02-22 DIAGNOSIS — M54.9 DORSALGIA, UNSPECIFIED: ICD-10-CM

## 2022-02-22 DIAGNOSIS — M51.36 DDD (DEGENERATIVE DISC DISEASE), LUMBAR: ICD-10-CM

## 2022-02-22 DIAGNOSIS — M47.816 LUMBAR FACET ARTHROPATHY: Primary | ICD-10-CM

## 2022-02-22 DIAGNOSIS — Z79.01 LONG TERM (CURRENT) USE OF ANTICOAGULANTS: Primary | ICD-10-CM

## 2022-02-22 DIAGNOSIS — M54.16 CHRONIC LUMBAR RADICULOPATHY: ICD-10-CM

## 2022-02-22 DIAGNOSIS — M54.16 LUMBAR RADICULOPATHY: ICD-10-CM

## 2022-02-22 LAB — INR PPP: 2.5 (ref 2–3)

## 2022-02-22 PROCEDURE — 3075F PR MOST RECENT SYSTOLIC BLOOD PRESS GE 130-139MM HG: ICD-10-PCS | Mod: HCNC,CPTII,S$GLB, | Performed by: ANESTHESIOLOGY

## 2022-02-22 PROCEDURE — 99214 PR OFFICE/OUTPT VISIT, EST, LEVL IV, 30-39 MIN: ICD-10-PCS | Mod: HCNC,S$GLB,, | Performed by: ANESTHESIOLOGY

## 2022-02-22 PROCEDURE — 99999 PR PBB SHADOW E&M-EST. PATIENT-LVL V: ICD-10-PCS | Mod: PBBFAC,HCNC,, | Performed by: ANESTHESIOLOGY

## 2022-02-22 PROCEDURE — 1125F AMNT PAIN NOTED PAIN PRSNT: CPT | Mod: HCNC,CPTII,S$GLB, | Performed by: ANESTHESIOLOGY

## 2022-02-22 PROCEDURE — 1159F PR MEDICATION LIST DOCUMENTED IN MEDICAL RECORD: ICD-10-PCS | Mod: HCNC,CPTII,S$GLB, | Performed by: ANESTHESIOLOGY

## 2022-02-22 PROCEDURE — 85610 POCT INR: ICD-10-PCS | Mod: QW,HCNC,S$GLB, | Performed by: INTERNAL MEDICINE

## 2022-02-22 PROCEDURE — 3008F BODY MASS INDEX DOCD: CPT | Mod: HCNC,CPTII,S$GLB, | Performed by: ANESTHESIOLOGY

## 2022-02-22 PROCEDURE — 3078F DIAST BP <80 MM HG: CPT | Mod: HCNC,CPTII,S$GLB, | Performed by: ANESTHESIOLOGY

## 2022-02-22 PROCEDURE — 3075F SYST BP GE 130 - 139MM HG: CPT | Mod: HCNC,CPTII,S$GLB, | Performed by: ANESTHESIOLOGY

## 2022-02-22 PROCEDURE — 1159F MED LIST DOCD IN RCRD: CPT | Mod: HCNC,CPTII,S$GLB, | Performed by: ANESTHESIOLOGY

## 2022-02-22 PROCEDURE — 99214 OFFICE O/P EST MOD 30 MIN: CPT | Mod: HCNC,S$GLB,, | Performed by: ANESTHESIOLOGY

## 2022-02-22 PROCEDURE — 93793 PR ANTICOAGULANT MGMT FOR PT TAKING WARFARIN: ICD-10-PCS | Mod: HCNC,S$GLB,,

## 2022-02-22 PROCEDURE — 3078F PR MOST RECENT DIASTOLIC BLOOD PRESSURE < 80 MM HG: ICD-10-PCS | Mod: HCNC,CPTII,S$GLB, | Performed by: ANESTHESIOLOGY

## 2022-02-22 PROCEDURE — 85610 PROTHROMBIN TIME: CPT | Mod: QW,HCNC,S$GLB, | Performed by: INTERNAL MEDICINE

## 2022-02-22 PROCEDURE — 99999 PR PBB SHADOW E&M-EST. PATIENT-LVL V: CPT | Mod: PBBFAC,HCNC,, | Performed by: ANESTHESIOLOGY

## 2022-02-22 PROCEDURE — 3008F PR BODY MASS INDEX (BMI) DOCUMENTED: ICD-10-PCS | Mod: HCNC,CPTII,S$GLB, | Performed by: ANESTHESIOLOGY

## 2022-02-22 PROCEDURE — 1125F PR PAIN SEVERITY QUANTIFIED, PAIN PRESENT: ICD-10-PCS | Mod: HCNC,CPTII,S$GLB, | Performed by: ANESTHESIOLOGY

## 2022-02-22 PROCEDURE — 93793 ANTICOAG MGMT PT WARFARIN: CPT | Mod: HCNC,S$GLB,,

## 2022-02-22 RX ORDER — GABAPENTIN 300 MG/1
CAPSULE ORAL
Qty: 69 CAPSULE | Refills: 0 | Status: SHIPPED | OUTPATIENT
Start: 2022-02-22 | End: 2022-03-24

## 2022-02-22 NOTE — PROGRESS NOTES
Health Maintenance Due   Topic Date Due    TETANUS VACCINE  Never done    Shingles Vaccine (2 of 3) 07/25/2014    Colorectal Cancer Screening  11/13/2020    Foot Exam  07/02/2021    Influenza Vaccine (1) 09/01/2021    Pneumococcal Vaccines (Age 65+) (1 of 1 - PPSV23) 11/06/2021    Lipid Panel  11/17/2021    Mammogram  12/23/2021    Eye Exam  12/23/2021    Hemoglobin A1c  12/30/2021     Updates were requested from care everywhere.  Chart was reviewed for overdue Proactive [facility] Encounters (CELE) topics (CRS, Breast Cancer Screening, Eye exam)  Health Maintenance has been updated.  LINKS immunization registry triggered.  Immunizations were reconciled.

## 2022-02-22 NOTE — PROGRESS NOTES
ED visit on 2/16 - upper abdominal pain//prescribed sucralfate.  INR is therapeutic today at 2.5.  Previous warfarin instructions were verified and followed.  Will continue new dose of warfarin 5 mg every Friday and 7.5 mg on all other days.  Recheck in 1.5 week - [place].  Dose calendar given and reviewed with patient.  Patient verbalized understanding.

## 2022-02-22 NOTE — PATIENT INSTRUCTIONS
General Neck and Back Pain    Both neck and back pain are usually caused by injury to the muscles or ligaments of the spine. Sometimes the disks that separate each bone of the spine may cause pain by pressing on a nearby nerve. Back and neck pain may appear after a sudden twisting or bending force (such as in a car accident), or sometimes after a simple awkward movement. In either case, muscle spasm is often present and adds to the pain.  Acute neck and back pain usually gets better in 1 to 2 weeks. Pain related to disk disease, arthritis in the spinal joints or spinal stenosis (narrowing of the spinal canal) can become chronic and last for months or years.  Back and neck pain are common problems. Most people feel better in 1 or 2 weeks, and most of the rest in 1 to 2 months. Most people can remain active.  People experience and describe pain differently.  Pain can be sharp, stabbing, shooting, aching, cramping, or burning  Movement, standing, bending, lifting, sitting, or walking may worsen the pain  Pain can be localized to one spot or area, or it can be more generalized  Pain can spread or radiate upwards, downwards, to the front, or go down your arms  Muscle spasm may occur.  Most of the time mechanical problems with the muscles or spine cause the pain. it is usually caused by an injury, whether known or not, to the muscles or ligaments. While illnesses can cause back pain, it is usually not caused by a serious illness. Pain is usually related to physical activity, whether sports, exercise, work, or normal activity. Sometimes it can occur without an identifiable cause. This can happen simply by stretching or moving wrong, without noting pain at the time. Other causes include:  Overexertion, lifting, pushing, pulling incorrectly or too aggressively.  Sudden twisting, bending or stretching from an accident (car or fall), or accidental movement.  Poor posture  Poor conditioning, lack of regular exercise  Spinal  disc disease or arthritis  Stress  Pregnancy, or illness like appendicitis, bladder or kidney infection, pelvic infections   Home care  For neck pain: Use a comfortable pillow that supports the head and keeps the spine in a neutral position. The position of the head should not be tilted forward or backward.  When in bed, try to find a position of comfort. A firm mattress is best. Try lying flat on your back with pillows under your knees. You can also try lying on your side with your knees bent up towards your chest and a pillow between your knees.  At first, do not try to stretch out the sore spots. If there is a strain, it is not like the good soreness you get after exercising without an injury. In this case, stretching may make it worse.  Avoid prolonged sitting, long car rides or travel. This puts more stress on the lower back than standing or walking.  During the first 24 to 72 hours after an injury, apply an ice pack to the painful area for 20 minutes and then remove it for 20 minutes over a period of 60 to 90 minutes or several times a day.   You can alternate ice and heat therapies. Talk with your healthcare provider about the best treatment for your back or neck pain. As a safety precaution, do not use a heating pad at bedtime. Sleeping with a heating pad can lead to skin burns or tissue damage.  Therapeutic massage can help relax the back and neck muscles without stretching them.  Be aware of safe lifting methods and do not lift anything over 15 pounds until all the pain is gone.  Medications  Talk to your healthcare provider before using medicine, especially if you have other medical problems or are taking other medicines.  You may use over-the-counter medicine to control pain, unless another pain medicine was prescribed. If you have chronic conditions like diabetes, liver or kidney disease, stomach ulcers,  gastrointestinal bleeding, or are taking blood thinner medicines.  Be careful if you are given pain  medicines, narcotics, or medicine for muscle spasm. They can cause drowsiness, and can affect your coordination, reflexes, and judgment. Do not drive or operate heavy machinery.  Follow-up care  Follow up with your healthcare provider, or as advised. Physical therapy or further tests may be needed.  If X-rays were taken, you will be notified of any new findings that may affect your care.  Call 911  Seek emergency medical care if any of the following occur:  Trouble breathing  Confusion  Very drowsy or trouble awakening  Fainting or loss of consciousness  Rapid or very slow heart rate  Loss of bowel or bladder control  When to seek medical advice  Call your healthcare provider right away if any of these occur:  Pain becomes worse or spreads into your arms or legs  Weakness, numbness or pain in one or both arms or legs  Numbness in the groin area  Difficulty walking  Fever of 100.4ºF (38ºC) or higher, or as directed by your healthcare provider  Date Last Reviewed: 7/1/2016  © 2633-4375 [employer]. [address] [place] [address]. All rights reserved. This information is not intended as a substitute for professional medical care. Always follow your healthcare professional's instructions.       Understanding Lumbar Radiculopathy    Lumbar radiculopathy is irritation or inflammation of a nerve root in the low back. It causes symptoms that spread out from the back down one or both legs. To understand this condition, it helps to understand the parts of the spine:  Vertebrae. These are bones that stack to form the spine. The lumbar spine contains the 5 bottom vertebrae.  Disks. These are soft pads of tissue between the vertebrae. They act as shock absorbers for the spine.  Spinal canal. This is a tunnel formed within the stacked vertebrae. In the lumbar spine, nerves run through this canal.  Nerves. These branch off and leave the spinal canal, traveling out to parts of the body. As they leave the  spinal canal, nerves pass through openings between the vertebrae. The nerve root is the part of the nerve that is closest to the spinal canal.  Sciatic nerve. This is a large nerve formed from several nerve roots in the low back. This nerve extends down the back of the leg to the foot.  With lumbar radiculopathy, nerve roots in the low back become irritated. This leads to pain and symptoms. The sciatic nerve is commonly involved, so the condition is often called sciatica.  What causes lumbar radiculopathy?  Aging, injury, poor posture, extra body weight, and other issues can lead to problems in the low back. These problems may then irritate nerve roots. They include:  Damage to a disk in the lumbar spine. The damaged disk may then press on nearby nerve roots.  Degeneration from wear and tear, and aging. This can lead to narrowing (stenosis) of the openings between the vertebrae. The narrowed openings press on nerve roots as they leave the spinal canal.  Unstable spine. This is when a vertebra slips forward. It can then press on a nerve root.  Other, less common things can put pressure on nerves in the low back. These include diabetes, infection, or a tumor.  Symptoms of lumbar radiculopathy  These include:  Pain in the low back  Pain, numbness, tingling, or weakness that travels into the buttocks, hip, groin, or leg  Muscle spasms  Treatment for lumbar radiculopathy  In most cases, your healthcare provider will first try treatments that help relieve symptoms. These may include:  Prescription and over-the-counter pain medicines. These help relieve pain, swelling, and irritation.  Limits on positions and activities that increase pain. But lying in bed or avoiding all movement is only recommended for a short period of time.  Physical therapy, including exercises and stretches. This helps decrease pain and increase movement and function.  Steroid shots into the lower back. This may help relieve symptoms for a  time.  Weight-loss program. If you are overweight, losing extra pounds may help relieve symptoms.  In some cases, you may need surgery to fix the underlying problem. This depends on the cause, the symptoms, and how long the pain has lasted.  Possible complications  Over time, an irritated and inflamed nerve may become damaged. This may lead to long-lasting (permanent) numbness or weakness in your legs and feet. If symptoms change suddenly or get worse, be sure to let your healthcare provider know.  When to call your healthcare provider  Call your healthcare provider right away if you have any of these:  New pain or pain that gets worse  New or increasing weakness, tingling, or numbness in your leg or foot  Problems controlling your bladder or bowel   Date Last Reviewed: 3/10/2016  © 6629-0786 [employer]. [address] [place] [address], [place] [address]. All rights reserved. This information is not intended as a substitute for professional medical care. Always follow your healthcare professional's instructions.       Exercises to Strengthen Your Lower Back  Strong lower back and abdominal muscles work together to support your spine. The exercises below will help strengthen the lower back. It is important that you begin exercising slowly and increase levels gradually.  Always begin any exercise program with stretching. If you feel pain while doing any of these exercises, stop and talk to your doctor about a more specific exercise program that better suits your condition.   Low back stretch  The point of stretching is to make you more flexible and increase your range of motion. Stretch only as much as you are able. Stretch slowly. Do not push your stretch to the limit. If at any point you feel pain while stretching, this is your (temporary) limit.  Lie on your back with your knees bent and both feet on the ground.  Slowly raise your left knee to your chest as you flatten your lower back against the floor. Hold  for 5 seconds.  Relax and repeat the exercise with your right knee.  Do 10 of these exercises for each leg.  Repeat hugging both knees to your chest at the same time.  Building lower back strength  Start your exercise routine with 10 to 30 minutes a day, 1 to 3 times a day.  Initial exercises  Lying on your back:  Ankle pumps: Move your foot up and down, towards your head, and then away. Repeat 10 times with each foot.  Heel slides: Slowly bend your knee, drawing the heel of your foot towards you. Then slide your heel/foot from you, straightening your knee. Do not lift your foot off the floor (this is not a leg lift).  Abdominal contraction: Bend your knees and put your hands on your stomach. Tighten your stomach muscles. Hold for 5 seconds, then relax. Repeat 10 times.  Straight leg raise: Bend one leg at the knee and keep the other leg straight. Tighten your stomach muscles. Slowly lift your straight leg 6 to 12 inches off the floor and hold for up to 5 seconds. Repeat 10 times on each side.  Standing:  Wall squats: Stand with your back against the wall. Move your feet about 12 inches away from the wall. Tighten your stomach muscles, and slowly bend your knees until they are at about a 45 degree angle. Do not go down too far. Hold about 5 seconds. Then slowly return to your starting position. Repeat 10 times.  Heel raises: Stand facing the wall. Slowly raise the heels of your feet up and down, while keeping your toes on the floor. If you have trouble balancing, you can touch the wall with your hands. Repeat 10 times.  More advanced exercises  When you feel comfortable enough, try these exercises.  Kneeling lumbar extension: Begin on your hands and knees. At the same time, raise and straighten your right arm and left leg until they are parallel to the ground. Hold for 2 seconds and come back slowly to a starting position. Repeat with left arm and right leg, alternating 10 times.  Prone lumbar extension: Lie face  down, arms extended overhead, palms on the floor. At the same time, raise your right arm and left leg as high as comfortably possible. Hold for 10 seconds and slowly return to start. Repeat with left arm and right leg, alternating 10 times. Gradually build up to 20 times. (Advanced: Repeat this exercise raising both arms and both legs a few inches off the floor at the same time. Hold for 5 seconds and release.)  Pelvic tilt: Lie on the floor on your back with your knees bent at 90 degrees. Your feet should be flat on the floor. Inhale, exhale, then slowly contract your abdominal muscles bringing your navel toward your spine. Let your pelvis rock back until your lower back is flat on the floor. Hold for 10 seconds while breathing smoothly.  Abdominal crunch: Perform a pelvic tilt (above) flattening your lower back against the floor. Holding the tension in your abdominal muscles, take another breath and raise your shoulder blades off the ground (this is not a full sit-up). Keep your head in line with your body (dont bend your neck forward). Hold for 2 seconds, then slowly lower.  Date Last Reviewed: 6/1/2016  © 2072-0517 [employer]. [address] [place] [address], [place] [address]. All rights reserved. This information is not intended as a substitute for professional medical care. Always follow your healthcare professional's instructions.

## 2022-02-22 NOTE — TELEPHONE ENCOUNTER
----- Message from Yoon Phillips sent at 2/22/2022  1:18 PM CST -----  Contact: [phone]  Patient would like to get medical advice.  Please call patient about her medication change.

## 2022-03-02 NOTE — TELEPHONE ENCOUNTER
No new care gaps identified.  Powered by [employer]. Reference number: [id number].   3/02/2022 4:38:42 PM CST

## 2022-03-03 RX ORDER — ALLOPURINOL 100 MG/1
TABLET ORAL
Qty: 90 TABLET | Refills: 3 | Status: SHIPPED | OUTPATIENT
Start: 2022-03-03 | End: 2023-02-17

## 2022-03-04 ENCOUNTER — HOSPITAL ENCOUNTER (OUTPATIENT)
Dept: RADIOLOGY | Facility: HOSPITAL | Age: 72
Discharge: HOME | End: 2022-03-04
Attending: INTERNAL MEDICINE
Payer: MEDICARE

## 2022-03-04 ENCOUNTER — ANTI-COAG VISIT (OUTPATIENT)
Dept: CARDIOLOGY | Facility: CLINIC | Age: 72
End: 2022-03-04
Payer: MEDICARE

## 2022-03-04 ENCOUNTER — HOSPITAL ENCOUNTER (OUTPATIENT)
Dept: CARDIOLOGY | Facility: HOSPITAL | Age: 72
Discharge: HOME | End: 2022-03-04
Attending: INTERNAL MEDICINE
Payer: MEDICARE

## 2022-03-04 DIAGNOSIS — O22.30 DVT (DEEP VEIN THROMBOSIS) IN PREGNANCY: ICD-10-CM

## 2022-03-04 DIAGNOSIS — R07.89 OTHER CHEST PAIN: ICD-10-CM

## 2022-03-04 DIAGNOSIS — Z79.01 LONG TERM (CURRENT) USE OF ANTICOAGULANTS: Primary | ICD-10-CM

## 2022-03-04 LAB
CV STRESS BASE HR: 53 BPM
DIASTOLIC BLOOD PRESSURE: 107 MMHG
INR PPP: 3.4 (ref 2–3)
NUC REST EJECTION FRACTION: 76
NUC STRESS EJECTION FRACTION: 80 %
OHS CV CPX 85 PERCENT MAX PREDICTED HEART RATE MALE: 123
OHS CV CPX ESTIMATED METS: 1
OHS CV CPX MAX PREDICTED HEART RATE: 144
OHS CV CPX PATIENT IS FEMALE: 1
OHS CV CPX PATIENT IS MALE: 0
OHS CV CPX PEAK DIASTOLIC BLOOD PRESSURE: 85 MMHG
OHS CV CPX PEAK HEAR RATE: 85 BPM
OHS CV CPX PEAK RATE PRESSURE PRODUCT: NORMAL
OHS CV CPX PEAK SYSTOLIC BLOOD PRESSURE: 156 MMHG
OHS CV CPX PERCENT MAX PREDICTED HEART RATE ACHIEVED: 59
OHS CV CPX RATE PRESSURE PRODUCT PRESENTING: NORMAL
STRESS ECHO POST EXERCISE DUR SEC: 51 SECONDS
SYSTOLIC BLOOD PRESSURE: 202 MMHG

## 2022-03-04 PROCEDURE — 93016 STRESS TEST WITH MYOCARDIAL PERFUSION (CUPID ONLY): ICD-10-PCS | Mod: ,,, | Performed by: INTERNAL MEDICINE

## 2022-03-04 PROCEDURE — 78452 STRESS TEST WITH MYOCARDIAL PERFUSION (CUPID ONLY): ICD-10-PCS | Mod: 26,,, | Performed by: INTERNAL MEDICINE

## 2022-03-04 PROCEDURE — 93793 PR ANTICOAGULANT MGMT FOR PT TAKING WARFARIN: ICD-10-PCS | Mod: S$GLB,,,

## 2022-03-04 PROCEDURE — 93016 CV STRESS TEST SUPVJ ONLY: CPT | Mod: ,,, | Performed by: INTERNAL MEDICINE

## 2022-03-04 PROCEDURE — 78452 HT MUSCLE IMAGE SPECT MULT: CPT | Mod: 26,,, | Performed by: INTERNAL MEDICINE

## 2022-03-04 PROCEDURE — 85610 POCT INR: ICD-10-PCS | Mod: QW,S$GLB,, | Performed by: INTERNAL MEDICINE

## 2022-03-04 PROCEDURE — 85610 PROTHROMBIN TIME: CPT | Mod: QW,S$GLB,, | Performed by: INTERNAL MEDICINE

## 2022-03-04 PROCEDURE — A9502 TC99M TETROFOSMIN: HCPCS

## 2022-03-04 PROCEDURE — 93018 STRESS TEST WITH MYOCARDIAL PERFUSION (CUPID ONLY): ICD-10-PCS | Mod: ,,, | Performed by: INTERNAL MEDICINE

## 2022-03-04 PROCEDURE — 93018 CV STRESS TEST I&R ONLY: CPT | Mod: ,,, | Performed by: INTERNAL MEDICINE

## 2022-03-04 PROCEDURE — 93793 ANTICOAG MGMT PT WARFARIN: CPT | Mod: S$GLB,,,

## 2022-03-04 PROCEDURE — 63600175 PHARM REV CODE 636 W HCPCS: Performed by: INTERNAL MEDICINE

## 2022-03-04 PROCEDURE — 93017 CV STRESS TEST TRACING ONLY: CPT

## 2022-03-04 RX ORDER — REGADENOSON 0.08 MG/ML
0.4 INJECTION, SOLUTION INTRAVENOUS ONCE
Status: COMPLETED | OUTPATIENT
Start: 2022-03-04 | End: 2022-03-04

## 2022-03-04 RX ADMIN — REGADENOSON 0.4 MG: 0.08 INJECTION, SOLUTION INTRAVENOUS at 11:03

## 2022-03-04 NOTE — PROGRESS NOTES
Patient's INR is supra-therapeutic at 3.4.  Patient reports followed previous instructions.  Reports no changes.   Advised of increased risk of bleeding; signs/symptoms of bleeding and need to go to ED if experiences any.   Reports no signs/symptoms of bleeding.   Instructions given: will take warfarin 2.5 mg today 3/4/22; thern re-challenge warfarin 5 mg on Fridays; and 7.5 mg all other days.   Recheck in two weeks on 3/18/22.   Calendar reviewed with patient.  Patient verbalizes understanding.

## 2022-03-08 ENCOUNTER — TELEPHONE (OUTPATIENT)
Dept: CARDIOLOGY | Facility: CLINIC | Age: 72
End: 2022-03-08
Payer: MEDICARE

## 2022-03-08 NOTE — TELEPHONE ENCOUNTER
Pt contacted about results, pt verbalized understanding.          ----- Message from Mauro Knutson MD sent at 3/7/2022  4:29 PM CST -----  [facility] stress test normal

## 2022-03-14 ENCOUNTER — TELEPHONE (OUTPATIENT)
Dept: CARDIOLOGY | Facility: CLINIC | Age: 72
End: 2022-03-14
Payer: MEDICARE

## 2022-03-14 NOTE — TELEPHONE ENCOUNTER
----- Message from Melina Feliciano sent at 3/14/2022 12:22 PM CDT -----  Contact: pt  The pt request a return call, no additional info given and can be reached at [phone]///[email or web address]

## 2022-03-18 NOTE — PROGRESS NOTES
Chart reviewed, agree with LPN plan.       Physical Therapy Daily Progress Note    VISIT#: 3    Subjective   Abhijit Mckee reports that he has some decreased soreness as compared to his pervious session but is still having difficulty with full extension  Pain Rating (0/10): 4 at start of the session, 6 at the end of the session.     Objective     See Exercise, Manual, and Modality Logs for complete treatment.   Elbow ext ROM: 5 deg from neutral    Patient Education: progression of therapy and POC.     Assessment/Plan   Pt continues to progress elbow ROM with both pronation, supination, and elbow ext improving with sharp pain noted will end range ext. Pt continues to progress  and wrist strengthening. Pulleys added today with pain noted at end range. Pt also educated on gel ice pack to mold around elbow    Plan  Progress per Plan of Care and Progress strengthening /stabilization /functional activity            Timed:         Manual Therapy:    25     mins  71610;     Therapeutic Exercise:    15     mins  35895;     Neuromuscular Son:        mins  84183;    Therapeutic Activity:          mins  22183;     Gait Training:           mins  19870;     Ultrasound:         mins  92417;    Ionto                                   mins   78967  Self Care                            mins   99062    Un-Timed:  Electrical Stimulation:         mins  90912 ( );  Dry Needling          mins self-pay  Traction          mins 07401  Low Eval         Mins  74361  Mod Eval          Mins  41212  High Eval                            Mins  39643  Re-Eval                               mins  39507    Timed Treatment:   40   mins   Total Treatment:     40   mins    Gisselle White PT, DPT  Physical Therapist

## 2022-03-22 ENCOUNTER — ANTI-COAG VISIT (OUTPATIENT)
Dept: CARDIOLOGY | Facility: CLINIC | Age: 72
End: 2022-03-22
Payer: MEDICARE

## 2022-03-22 DIAGNOSIS — D68.51 HETEROZYGOUS FACTOR V LEIDEN MUTATION: Chronic | ICD-10-CM

## 2022-03-22 DIAGNOSIS — Z79.01 LONG TERM (CURRENT) USE OF ANTICOAGULANTS: Primary | ICD-10-CM

## 2022-03-22 LAB — INR PPP: 2.4 (ref 2–3)

## 2022-03-22 PROCEDURE — 93793 ANTICOAG MGMT PT WARFARIN: CPT | Mod: S$GLB,,,

## 2022-03-22 PROCEDURE — 85610 POCT INR: ICD-10-PCS | Mod: QW,S$GLB,, | Performed by: INTERNAL MEDICINE

## 2022-03-22 PROCEDURE — 93793 PR ANTICOAGULANT MGMT FOR PT TAKING WARFARIN: ICD-10-PCS | Mod: S$GLB,,,

## 2022-03-22 PROCEDURE — 85610 PROTHROMBIN TIME: CPT | Mod: QW,S$GLB,, | Performed by: INTERNAL MEDICINE

## 2022-03-22 NOTE — PROGRESS NOTES
INR is therapeutic at 2.4.  Previous warfarin instructions were verified and followed.  Patient is currently taking 5 mg every Friday and 7.5 mg on all other days.  No change in dose.  Recheck in 1 month.  Dose calendar given.  Patient verbalized understanding.

## 2022-03-30 ENCOUNTER — PATIENT MESSAGE (OUTPATIENT)
Dept: ADMINISTRATIVE | Facility: HOSPITAL | Age: 72
End: 2022-03-30
Payer: MEDICARE

## 2022-04-22 ENCOUNTER — ANTI-COAG VISIT (OUTPATIENT)
Dept: CARDIOLOGY | Facility: CLINIC | Age: 72
End: 2022-04-22
Payer: MEDICARE

## 2022-04-22 DIAGNOSIS — D68.51 HETEROZYGOUS FACTOR V LEIDEN MUTATION: Chronic | ICD-10-CM

## 2022-04-22 DIAGNOSIS — Z79.01 LONG TERM (CURRENT) USE OF ANTICOAGULANTS: Primary | ICD-10-CM

## 2022-04-22 LAB — INR PPP: 1.6 (ref 2–3)

## 2022-04-22 PROCEDURE — 93793 ANTICOAG MGMT PT WARFARIN: CPT | Mod: S$GLB,,,

## 2022-04-22 PROCEDURE — 85610 POCT INR: ICD-10-PCS | Mod: QW,S$GLB,, | Performed by: INTERNAL MEDICINE

## 2022-04-22 PROCEDURE — 85610 PROTHROMBIN TIME: CPT | Mod: QW,S$GLB,, | Performed by: INTERNAL MEDICINE

## 2022-04-22 PROCEDURE — 93793 PR ANTICOAGULANT MGMT FOR PT TAKING WARFARIN: ICD-10-PCS | Mod: S$GLB,,,

## 2022-04-22 NOTE — PROGRESS NOTES
INR is subtherapeutic at 1.6.  Patient reports no missed doses or diet changes.  Current dose verified.  No s/s reported.  Instructions given:  Will boost today's (Friday) dose to 7.5 mg, then resume current dose of 5 mg every Friday and 7.5 mg on all other days.  Recheck in 2.5 weeks.  Dose calendar given and reviewed with patient.  Patient verbalized understanding.

## 2022-04-26 ENCOUNTER — PATIENT MESSAGE (OUTPATIENT)
Dept: ADMINISTRATIVE | Facility: HOSPITAL | Age: 72
End: 2022-04-26
Payer: MEDICARE

## 2022-04-27 RX ORDER — GABAPENTIN 300 MG/1
CAPSULE ORAL
Qty: 69 CAPSULE | Refills: 0 | OUTPATIENT
Start: 2022-04-27 | End: 2022-05-27

## 2022-04-27 NOTE — TELEPHONE ENCOUNTER
Care Due:                  Date            Visit Type   Department     Provider  --------------------------------------------------------------------------------                                SAME DAY -                              ESTABLISHED   [facility]  Last Visit: 02-      PATIENT      MEDICINE       Carl Clemons  Next Visit: None Scheduled  None         None Found                                                            Last  Test          Frequency    Reason                     Performed    Due Date  --------------------------------------------------------------------------------    Lipid Panel.  12 months..  pravastatin..............  11- 11-    Uric Acid...  12 months..  allopurinoL..............  Not Found    Overdue    Powered by [employer]. Reference number: [id number].   4/27/2022 3:20:16 PM CDT

## 2022-05-10 ENCOUNTER — ANTI-COAG VISIT (OUTPATIENT)
Dept: CARDIOLOGY | Facility: CLINIC | Age: 72
End: 2022-05-10
Payer: MEDICARE

## 2022-05-10 DIAGNOSIS — Z79.01 LONG TERM (CURRENT) USE OF ANTICOAGULANTS: Primary | ICD-10-CM

## 2022-05-10 DIAGNOSIS — D68.51 HETEROZYGOUS FACTOR V LEIDEN MUTATION: Chronic | ICD-10-CM

## 2022-05-10 LAB — INR PPP: 3.4 (ref 2–3)

## 2022-05-10 PROCEDURE — 93793 ANTICOAG MGMT PT WARFARIN: CPT | Mod: S$GLB,,,

## 2022-05-10 PROCEDURE — 85610 PROTHROMBIN TIME: CPT | Mod: QW,S$GLB,, | Performed by: INTERNAL MEDICINE

## 2022-05-10 PROCEDURE — 93793 PR ANTICOAGULANT MGMT FOR PT TAKING WARFARIN: ICD-10-PCS | Mod: S$GLB,,,

## 2022-05-10 PROCEDURE — 85610 POCT INR: ICD-10-PCS | Mod: QW,S$GLB,, | Performed by: INTERNAL MEDICINE

## 2022-05-10 NOTE — PROGRESS NOTES
INR is supratherapeutic at 3.4.  Patient reports no medication, health or diet changes.  Current dose verified and followed.  No bleeding issues reported.  Instructions given:  Will lower today's (Tuesday) dose to 2.5 mg, then re-challenge current dose of 5 mg every Friday and 7.5 mg on all other days.  Recheck INR next week on 5/17/2022, along with another appointment.  ED for any abnormal bleeding issues.  Dose calendar given and reviewed with patient.  Patient verbalized understanding.

## 2022-05-15 ENCOUNTER — HOSPITAL ENCOUNTER (OUTPATIENT)
Dept: RADIOLOGY | Facility: HOSPITAL | Age: 72
Discharge: HOME | End: 2022-05-15
Attending: ANESTHESIOLOGY
Payer: MEDICARE

## 2022-05-15 DIAGNOSIS — M54.16 LUMBAR RADICULOPATHY: ICD-10-CM

## 2022-05-15 PROCEDURE — 72148 MRI LUMBAR SPINE W/O DYE: CPT | Mod: TC

## 2022-05-15 PROCEDURE — 72148 MRI LUMBAR SPINE WITHOUT CONTRAST: ICD-10-PCS | Mod: 26,,, | Performed by: RADIOLOGY

## 2022-05-15 PROCEDURE — 72148 MRI LUMBAR SPINE W/O DYE: CPT | Mod: 26,,, | Performed by: RADIOLOGY

## 2022-05-16 ENCOUNTER — TELEPHONE (OUTPATIENT)
Dept: PAIN MEDICINE | Facility: CLINIC | Age: 72
End: 2022-05-16
Payer: MEDICARE

## 2022-05-16 NOTE — H&P (VIEW-ONLY)
Established Patient Chronic Pain Note     Referring Physician: No ref. provider found    PCP: Carl Clemons MD    Chief Complaint:   Chief Complaint   Patient presents with    Low-back Pain    Leg Pain        SUBJECTIVE:  Interval history 05/17/2022  Patient presents for MRI review.  MRI demonstrates prior L5-S1 laminectomy.  Patient again reports lower back pain which radiates primarily down the lateral and posterior aspects of bilateral lower extremities in L4-S1 distribution to the foot.  Pain today is rated a 10/10.  Patient does report associated weakness in lower extremities associated with her pain.  Patient has continued gabapentin 3 pills in the evening with only marginal improvement in her symptoms.  Patient reports recently ambulating with a cane for additional stabilization and support.  Patient is interested in pursuing interventional treatment.    HPI 02/22/2022  Alyx Weir is a 72 y.o. female with past medical history significant for COPD, degenerative disc disease, coronary artery disease, hypertension, diastolic dysfunction with preserved systolic function, right carotid artery stenosis, history of deep vein thrombosis, diabetes, obstructive sleep apnea who presents to the clinic for the evaluation of lower back and leg pain.  Patient reports longstanding history of lower back pain which began after mechanical fall in the 1980s.  Patient reports resultant lower back surgery in 1985 with Dr. Eliot Ortiz at [facility], which she believes was at the L4-5 level.  Patient reports improvement in lower back and leg pain with reoccurrence over the last 10 years.      Today patient reports pain in a bandlike distribution in her lower back which radiates down the lateral and posterior aspects of bilateral lower extremities to the knee and down to the dorsum of the right foot.  Pain is constant and described as aching in nature.  Pain is exacerbated with lumbar extension as well as  with ambulation.  Patient is able to ambulate less than 1 block before requiring rest.  Pain is improved with heat, physical therapy in combination of Tylenol and gabapentin.  Patient is taking gabapentin 100 mg in the evening.  Patient does endorse weakness in the lower extremities associated with her pain.  Patient reports frequent spasm like sensation in the lower extremities.  Patient also does report significant improvement in her symptoms with lumbar epidural steroid injection 2016.     Patient reports significant motor weakness.  Patient denies night fever/night sweats, urinary incontinence, bowel incontinence and significant weight loss.    Patient last saw Dr. Pressley 03/11/2020 with continuation of gabapentin 100 mg in the morning and 300 mg in the evening, continuation of physical therapy    Pain Disability Index Review:     Last 3 PDI Scores 5/17/2022   Pain Disability Index (PDI) 69       Non-Pharmacologic Treatments:  Physical Therapy/Home Exercise: yes  Ice/Heat:yes  TENS: no  Acupuncture: no  Massage: no  Chiropractic: no    Other: no      Pain Medications:  - Adjuvant Medications: Neurontin (Gabapentin), Topical Ointment (Voltaren Gel, Steroid cream, Anti-Inflammatory Cream, Compound cream) and Tylenol (Acetaminophen)  - Anti-Coagulants: Coumadin ( Warfarin)    Pain Procedures:   2/2016: WILLI    Past Medical History:   Diagnosis Date    Anticoagulated on Coumadin     Arm weakness-rotator cuff weakness 11/2/2015    Arthritis     Asthma     Clotting disorder     Coronary artery disease     Deep vein thrombosis     Degenerative disc disease     Diabetes mellitus type I 2011    BS last tested x 1 month not sure what it was.    Heterozygous factor V Leiden mutation     Hx of colonic polyps 11/13/2015    Hyperlipidemia     Hypertension     VIRGIL (obstructive sleep apnea)     Stenosis of right carotid artery 12/13/2016     Past Surgical History:   Procedure Laterality Date    BACK SURGERY       bladder cyst removal      BLADDER SURGERY      CARDIAC CATHETERIZATION  03/2013    mild CAD    COLONOSCOPY N/A 11/13/2015    Procedure: COLONOSCOPY;  Surgeon: Jas Umanzor III, MD;  Location: [facility];  Service: Endoscopy;  Laterality: N/A;    HYSTERECTOMY      28 yrs old    LUMBAR SPINE SURGERY      bone spurs removed    OOPHORECTOMY       Review of patient's allergies indicates:   Allergen Reactions    Erythromycin Edema     Angioedema to throat    Amlodipine Other (See Comments)     Headaches    Diazepam Hives    Iodine Hives    Meperidine Hives    Morphine Itching    Methylprednisolone sod suc(pf) Other (See Comments)     headache    Primidone Other (See Comments)       Current Outpatient Medications   Medication Sig    ACCU-CHEK GUIDE ME GLUCOSE MTR Misc USE TO CHECK BLOOD SUGAR TWICE DAILY    acetaminophen (TYLENOL ARTHRITIS ORAL) Take by mouth.    allopurinoL (ZYLOPRIM) 100 MG tablet TAKE 1 TABLET EVERY DAY    blood sugar diagnostic Strp To check BG 2 times daily, to use with insurance preferred meter    diclofenac sodium (VOLTAREN) 1 % Gel APPLY 2 GRAMS TOPICALLY DAILY AS NEEDED    isosorbide mononitrate (IMDUR) 30 MG 24 hr tablet Take 1 tablet (30 mg total) by mouth once daily.    lancets Misc To check BG 2 times daily, to use with insurance preferred meter    neomycin-polymyxin-hydrocortisone (CORTISPORIN) 3.5-10,000-1 mg/mL-unit/mL-% otic suspension Place 3 drops into both ears 3 (three) times daily as needed.    olmesartan-amLODIPin-hcthiazid 40-10-25 mg Tab Take 1 tablet by mouth once daily.    om 3/E/linol/ala/oleic/gla/lip (OMEGA 3-6-9 ORAL) Take 1 capsule by mouth once daily.    pantoprazole (PROTONIX) 40 MG tablet TAKE 1 TABLET(40 MG) BY MOUTH EVERY DAY    pravastatin (PRAVACHOL) 40 MG tablet TAKE 1 TABLET EVERY DAY    sucralfate (CARAFATE) 100 mg/mL suspension Take 10 mLs (1 g total) by mouth 4 (four) times daily with meals and nightly.    triamcinolone acetonide  "0.1% (KENALOG) 0.1 % ointment Apply topically 2 (two) times daily as needed.    vitamin D 1000 units Tab Take 185 mg by mouth once daily.    warfarin (COUMADIN) 5 MG tablet TAKE 1 AND 1/2 TABLETS (7.5MG) ON MONDAYS, WEDNESDAYS AND FRIDAYS AND 1 TABLET (5MG) ON ALL OTHER DAYS OF THE WEEK (Patient taking differently: Take 7.5 mg by mouth Daily. Except 1 tablet (5mg) every Friday.)    baclofen (LIORESAL) 10 MG tablet Take 1 tablet (10 mg total) by mouth 3 (three) times daily as needed.    gabapentin (NEURONTIN) 300 MG capsule Take 1 capsule (300 mg total) by mouth every evening for 7 days, THEN 2 capsules (600 mg total) every evening for 7 days, THEN 3 capsules (900 mg total) every evening for 16 days.     No current facility-administered medications for this visit.       Review of Systems     GENERAL:  No weight loss, malaise or fevers.  HEENT:   No recent changes in vision or hearing  NECK:  Negative for lumps, no difficulty with swallowing.  RESPIRATORY:  Negative for cough, wheezing or shortness of breath, patient denies any recent URI.  CARDIOVASCULAR:  Negative for chest pain, leg swelling or palpitations.  GI:  Negative for abdominal discomfort, blood in stools or black stools or change in bowel habits.  MUSCULOSKELETAL:  See HPI.  SKIN:  Negative for lesions, rash, and itching.  PSYCH:  No mood disorder or recent psychosocial stressors.   HEMATOLOGY/LYMPHOLOGY:  Negative for prolonged bleeding, bruising easily or swollen nodes.    NEURO:   No history of headaches, syncope, paralysis, seizures or tremors.  All other reviewed and negative other than HPI.    OBJECTIVE:    BP (!) 153/86   Pulse (!) 55   Resp 17   Ht 5' 5" (1.651 m)   Wt 80.5 kg (177 lb 7.5 oz)   BMI 29.53 kg/m²       Physical Exam    GENERAL: Well appearing, in no acute distress, alert and oriented x3.  PSYCH:  Mood and affect appropriate.  SKIN: Skin color, texture, turgor normal, no rashes or lesions.  HEAD/FACE:  Normocephalic, " atraumatic. Cranial nerves grossly intact.    CV: RRR with palpation of the radial artery.  PULM: No evidence of respiratory difficulty, symmetric chest rise.  GI:  Soft and non-tender.    BACK: Straight leg raising in the sitting and supine positions is negative to radicular pain. palpation over the facet joints of the lumbar spine or spinous processes. Normal range of motion without pain reproduction.  EXTREMITIES: Peripheral joint ROM is full and pain free without obvious instability or laxity in all four extremities. No deformities, edema, or skin discoloration. Good capillary refill.  MUSCULOSKELETAL: Able to stand on heels & toes.    hip, and knee provocative maneuvers are negative.   pain with palpation over the sacroiliac joints and greater trochanteric bursa bilaterally.  FABERs test is positive.  Facet loading test is positive bilaterally.   Bilateral upper and lower extremity strength is normal and symmetric.  No atrophy or tone abnormalities are noted.  RIGHT Lower extremity: Hip flexion 5/5, Hip Abduction 5/5, Hip Adduction 5/5, Knee extension 5/5, Knee flexion 5/5, Ankle dorsiflexion5/5, Extensor hallucis longus 5/5, Ankle plantarflexion 5/5  LEFT Lower extremity:  Hip flexion 5/5, Hip Abduction 5/5,Hip Adduction 5/5, Knee extension 5/5, Knee flexion 5/5, Ankle dorsiflexion 5/5, Extensor hallucis longus 5/5, Ankle plantarflexion 5/5  -Normal testing knee (patellar) jerk and ankle (achilles) jerk    NEURO: Bilateral upper and lower extremity coordination and muscle stretch reflexes are physiologic and symmetric. No loss of sensation is noted.  GAIT: normal.    Imaging:   MRI lumbar spine 02/22/2022  FINDINGS:  Alignment: Grade 1 anterolisthesis of L4 on L5 appears similar to priors.     Vertebrae: No evidence of fracture or marrow replacement process.     Discs: There is severe disc height loss at L4-5 which may be slightly worsened from previous MRI.  Disc desiccation noted from L3-4 through  L5-S1.     Cord: Normal.  Conus terminates at T12-L1.     Degenerative findings:     T12-L1:  No spinal canal or neuroforaminal stenosis.     L1-L2: Moderate bilateral facet arthropathy.  Mild generalized disc bulge.  Slight crowding of the lateral recesses, otherwise no spinal canal stenosis or neural foraminal narrowing.     L2-L3: Mild generalized disc bulge.  Moderate bilateral facet arthropathy. No spinal canal or neuroforaminal stenosis.     L3-L4: Severe bilateral facet arthropathy.  Mild generalized disc bulge.  Mild spinal canal stenosis with moderate right and mild left neural foraminal narrowing.     L4-L5: Severe bilateral facet arthropathy.  Uncovering of the intervertebral disc.  Mild spinal canal stenosis with moderate bilateral neural foraminal narrowing.     L5-S1: Severe bilateral facet arthropathy and mild generalized disc bulge.  Prior laminectomies.  Mild to moderate spinal canal stenosis with mild bilateral neural foraminal narrowing.     Paraspinal muscles & soft tissues: Unremarkable.      01/28/20    X-Ray Lumbar Complete With Flex And Ext  FINDINGS:  Grade 1 spondylolisthesis of L4 on L5 which appears slightly increased since the comparison exam from 2013.  This currently measures on the order of 7 mm.  This appears similar on both flexion/extension maneuvers.  Bones are demineralized.  No acute fracture.  Discogenic degenerative changes at L4-L5 with multilevel lower lumbar facet arthropathy.      ASSESSMENT: 72 y.o. year old female with lower back and leg pain, consistent with     1. DDD (degenerative disc disease), lumbar     2. Lumbar facet arthropathy     3. Chronic lumbar radiculopathy  IR Epidural Transfor 1st Vert Lumbar Emanuel    Case Request-RAD/Other Procedure Area: Bilateral L4/5 TF ASUNCION with RN IV sedation; on Coumadin, will need INR prior to procedure, must be less than 1.3         PLAN:   - Interventions:  Schedule for bilateral L4/5 transforaminal epidural steroid injection to  see if this helps with radicular symptoms..  Explained the risks and benefits of the procedure in detail with the patient today in clinic along with alternative treatment options.  Patient has elected to pursue this procedure.    - Anticoagulation use: yes Coumadin  Per ASA guidelines for secondary prophylaxis, patient will need to pause Coumadin 5 days prior to procedure.  Will obtain cardiac clearance from Dr. Hammond.  INR will need to be less than 1.3 for her procedure.  PT INR to be collected a.m. of procedure.     report:  Reviewed and consistent with medication use as prescribed.    - Medications:  --  We have discussed continuing gabapentin. We have reviewed potential side effects of this medication including daytime somnolence, weight gain and peripheral edema  Gabapentin : 900 mg QHS      - Therapy:  We discussed continuing physical therapy to help manage the patient/s painful condition. The patient was counseled that muscle strengthening will improve the long term prognosis in regards to pain and may also help increase range of motion and mobility.     - Imaging: Reviewed available imaging with patient and answered any questions they had regarding study    - Records: Obtain old records from outside physicians and imaging    - Follow up visit: return to clinic in 4-6 weeks      The above plan and management options were discussed at length with patient. Patient is in agreement with the above and verbalized understanding.    - I discussed the goals of interventional chronic pain management with the patient on today's visit. We discussed a multimodal and systematic approach to pain.  This includes diagnostic and therapeutic injections, adjuvant pharmacologic treatment, physical therapy, and at times psychiatry.  I emphasized the importance of regular exercise, core strengthening and stretching, diet and weight loss as a cornerstone of long-term pain management.    - This condition does not require this  patient to take time off of work, and the primary goal of our Pain Management services is to improve the patient's functional capacity.  - Patient Questions: Answered all of the patient's questions regarding diagnoses, therapy, treatment and next steps        Mario Palacios MD  Interventional Pain Management  [facility]    Disclaimer:  This note was prepared using voice recognition system and is likely to have sound alike errors that may have been overlooked even after proof reading.  Please call me with any questions

## 2022-05-16 NOTE — PROGRESS NOTES
Established Patient Chronic Pain Note     Referring Physician: No ref. provider found    PCP: Carl Clemons MD    Chief Complaint:   Chief Complaint   Patient presents with    Low-back Pain    Leg Pain        SUBJECTIVE:  Interval history 05/17/2022  Patient presents for MRI review.  MRI demonstrates prior L5-S1 laminectomy.  Patient again reports lower back pain which radiates primarily down the lateral and posterior aspects of bilateral lower extremities in L4-S1 distribution to the foot.  Pain today is rated a 10/10.  Patient does report associated weakness in lower extremities associated with her pain.  Patient has continued gabapentin 3 pills in the evening with only marginal improvement in her symptoms.  Patient reports recently ambulating with a cane for additional stabilization and support.  Patient is interested in pursuing interventional treatment.    HPI 02/22/2022  Alyx Weir is a 72 y.o. female with past medical history significant for COPD, degenerative disc disease, coronary artery disease, hypertension, diastolic dysfunction with preserved systolic function, right carotid artery stenosis, history of deep vein thrombosis, diabetes, obstructive sleep apnea who presents to the clinic for the evaluation of lower back and leg pain.  Patient reports longstanding history of lower back pain which began after mechanical fall in the 1980s.  Patient reports resultant lower back surgery in 1985 with Dr. Eliot Ortiz at [facility], which she believes was at the L4-5 level.  Patient reports improvement in lower back and leg pain with reoccurrence over the last 10 years.      Today patient reports pain in a bandlike distribution in her lower back which radiates down the lateral and posterior aspects of bilateral lower extremities to the knee and down to the dorsum of the right foot.  Pain is constant and described as aching in nature.  Pain is exacerbated with lumbar extension as well as  with ambulation.  Patient is able to ambulate less than 1 block before requiring rest.  Pain is improved with heat, physical therapy in combination of Tylenol and gabapentin.  Patient is taking gabapentin 100 mg in the evening.  Patient does endorse weakness in the lower extremities associated with her pain.  Patient reports frequent spasm like sensation in the lower extremities.  Patient also does report significant improvement in her symptoms with lumbar epidural steroid injection 2016.     Patient reports significant motor weakness.  Patient denies night fever/night sweats, urinary incontinence, bowel incontinence and significant weight loss.    Patient last saw Dr. Pressley 03/11/2020 with continuation of gabapentin 100 mg in the morning and 300 mg in the evening, continuation of physical therapy    Pain Disability Index Review:     Last 3 PDI Scores 5/17/2022   Pain Disability Index (PDI) 69       Non-Pharmacologic Treatments:  Physical Therapy/Home Exercise: yes  Ice/Heat:yes  TENS: no  Acupuncture: no  Massage: no  Chiropractic: no    Other: no      Pain Medications:  - Adjuvant Medications: Neurontin (Gabapentin), Topical Ointment (Voltaren Gel, Steroid cream, Anti-Inflammatory Cream, Compound cream) and Tylenol (Acetaminophen)  - Anti-Coagulants: Coumadin ( Warfarin)    Pain Procedures:   2/2016: WILLI    Past Medical History:   Diagnosis Date    Anticoagulated on Coumadin     Arm weakness-rotator cuff weakness 11/2/2015    Arthritis     Asthma     Clotting disorder     Coronary artery disease     Deep vein thrombosis     Degenerative disc disease     Diabetes mellitus type I 2011    BS last tested x 1 month not sure what it was.    Heterozygous factor V Leiden mutation     Hx of colonic polyps 11/13/2015    Hyperlipidemia     Hypertension     VIRGIL (obstructive sleep apnea)     Stenosis of right carotid artery 12/13/2016     Past Surgical History:   Procedure Laterality Date    BACK SURGERY       bladder cyst removal      BLADDER SURGERY      CARDIAC CATHETERIZATION  03/2013    mild CAD    COLONOSCOPY N/A 11/13/2015    Procedure: COLONOSCOPY;  Surgeon: Jas Umanzor III, MD;  Location: [facility];  Service: Endoscopy;  Laterality: N/A;    HYSTERECTOMY      28 yrs old    LUMBAR SPINE SURGERY      bone spurs removed    OOPHORECTOMY       Review of patient's allergies indicates:   Allergen Reactions    Erythromycin Edema     Angioedema to throat    Amlodipine Other (See Comments)     Headaches    Diazepam Hives    Iodine Hives    Meperidine Hives    Morphine Itching    Methylprednisolone sod suc(pf) Other (See Comments)     headache    Primidone Other (See Comments)       Current Outpatient Medications   Medication Sig    ACCU-CHEK GUIDE ME GLUCOSE MTR Misc USE TO CHECK BLOOD SUGAR TWICE DAILY    acetaminophen (TYLENOL ARTHRITIS ORAL) Take by mouth.    allopurinoL (ZYLOPRIM) 100 MG tablet TAKE 1 TABLET EVERY DAY    blood sugar diagnostic Strp To check BG 2 times daily, to use with insurance preferred meter    diclofenac sodium (VOLTAREN) 1 % Gel APPLY 2 GRAMS TOPICALLY DAILY AS NEEDED    isosorbide mononitrate (IMDUR) 30 MG 24 hr tablet Take 1 tablet (30 mg total) by mouth once daily.    lancets Misc To check BG 2 times daily, to use with insurance preferred meter    neomycin-polymyxin-hydrocortisone (CORTISPORIN) 3.5-10,000-1 mg/mL-unit/mL-% otic suspension Place 3 drops into both ears 3 (three) times daily as needed.    olmesartan-amLODIPin-hcthiazid 40-10-25 mg Tab Take 1 tablet by mouth once daily.    om 3/E/linol/ala/oleic/gla/lip (OMEGA 3-6-9 ORAL) Take 1 capsule by mouth once daily.    pantoprazole (PROTONIX) 40 MG tablet TAKE 1 TABLET(40 MG) BY MOUTH EVERY DAY    pravastatin (PRAVACHOL) 40 MG tablet TAKE 1 TABLET EVERY DAY    sucralfate (CARAFATE) 100 mg/mL suspension Take 10 mLs (1 g total) by mouth 4 (four) times daily with meals and nightly.    triamcinolone acetonide  "0.1% (KENALOG) 0.1 % ointment Apply topically 2 (two) times daily as needed.    vitamin D 1000 units Tab Take 185 mg by mouth once daily.    warfarin (COUMADIN) 5 MG tablet TAKE 1 AND 1/2 TABLETS (7.5MG) ON MONDAYS, WEDNESDAYS AND FRIDAYS AND 1 TABLET (5MG) ON ALL OTHER DAYS OF THE WEEK (Patient taking differently: Take 7.5 mg by mouth Daily. Except 1 tablet (5mg) every Friday.)    baclofen (LIORESAL) 10 MG tablet Take 1 tablet (10 mg total) by mouth 3 (three) times daily as needed.    gabapentin (NEURONTIN) 300 MG capsule Take 1 capsule (300 mg total) by mouth every evening for 7 days, THEN 2 capsules (600 mg total) every evening for 7 days, THEN 3 capsules (900 mg total) every evening for 16 days.     No current facility-administered medications for this visit.       Review of Systems     GENERAL:  No weight loss, malaise or fevers.  HEENT:   No recent changes in vision or hearing  NECK:  Negative for lumps, no difficulty with swallowing.  RESPIRATORY:  Negative for cough, wheezing or shortness of breath, patient denies any recent URI.  CARDIOVASCULAR:  Negative for chest pain, leg swelling or palpitations.  GI:  Negative for abdominal discomfort, blood in stools or black stools or change in bowel habits.  MUSCULOSKELETAL:  See HPI.  SKIN:  Negative for lesions, rash, and itching.  PSYCH:  No mood disorder or recent psychosocial stressors.   HEMATOLOGY/LYMPHOLOGY:  Negative for prolonged bleeding, bruising easily or swollen nodes.    NEURO:   No history of headaches, syncope, paralysis, seizures or tremors.  All other reviewed and negative other than HPI.    OBJECTIVE:    BP (!) 153/86   Pulse (!) 55   Resp 17   Ht 5' 5" (1.651 m)   Wt 80.5 kg (177 lb 7.5 oz)   BMI 29.53 kg/m²       Physical Exam    GENERAL: Well appearing, in no acute distress, alert and oriented x3.  PSYCH:  Mood and affect appropriate.  SKIN: Skin color, texture, turgor normal, no rashes or lesions.  HEAD/FACE:  Normocephalic, " atraumatic. Cranial nerves grossly intact.    CV: RRR with palpation of the radial artery.  PULM: No evidence of respiratory difficulty, symmetric chest rise.  GI:  Soft and non-tender.    BACK: Straight leg raising in the sitting and supine positions is negative to radicular pain. palpation over the facet joints of the lumbar spine or spinous processes. Normal range of motion without pain reproduction.  EXTREMITIES: Peripheral joint ROM is full and pain free without obvious instability or laxity in all four extremities. No deformities, edema, or skin discoloration. Good capillary refill.  MUSCULOSKELETAL: Able to stand on heels & toes.    hip, and knee provocative maneuvers are negative.   pain with palpation over the sacroiliac joints and greater trochanteric bursa bilaterally.  FABERs test is positive.  Facet loading test is positive bilaterally.   Bilateral upper and lower extremity strength is normal and symmetric.  No atrophy or tone abnormalities are noted.  RIGHT Lower extremity: Hip flexion 5/5, Hip Abduction 5/5, Hip Adduction 5/5, Knee extension 5/5, Knee flexion 5/5, Ankle dorsiflexion5/5, Extensor hallucis longus 5/5, Ankle plantarflexion 5/5  LEFT Lower extremity:  Hip flexion 5/5, Hip Abduction 5/5,Hip Adduction 5/5, Knee extension 5/5, Knee flexion 5/5, Ankle dorsiflexion 5/5, Extensor hallucis longus 5/5, Ankle plantarflexion 5/5  -Normal testing knee (patellar) jerk and ankle (achilles) jerk    NEURO: Bilateral upper and lower extremity coordination and muscle stretch reflexes are physiologic and symmetric. No loss of sensation is noted.  GAIT: normal.    Imaging:   MRI lumbar spine 02/22/2022  FINDINGS:  Alignment: Grade 1 anterolisthesis of L4 on L5 appears similar to priors.     Vertebrae: No evidence of fracture or marrow replacement process.     Discs: There is severe disc height loss at L4-5 which may be slightly worsened from previous MRI.  Disc desiccation noted from L3-4 through  L5-S1.     Cord: Normal.  Conus terminates at T12-L1.     Degenerative findings:     T12-L1:  No spinal canal or neuroforaminal stenosis.     L1-L2: Moderate bilateral facet arthropathy.  Mild generalized disc bulge.  Slight crowding of the lateral recesses, otherwise no spinal canal stenosis or neural foraminal narrowing.     L2-L3: Mild generalized disc bulge.  Moderate bilateral facet arthropathy. No spinal canal or neuroforaminal stenosis.     L3-L4: Severe bilateral facet arthropathy.  Mild generalized disc bulge.  Mild spinal canal stenosis with moderate right and mild left neural foraminal narrowing.     L4-L5: Severe bilateral facet arthropathy.  Uncovering of the intervertebral disc.  Mild spinal canal stenosis with moderate bilateral neural foraminal narrowing.     L5-S1: Severe bilateral facet arthropathy and mild generalized disc bulge.  Prior laminectomies.  Mild to moderate spinal canal stenosis with mild bilateral neural foraminal narrowing.     Paraspinal muscles & soft tissues: Unremarkable.      01/28/20    X-Ray Lumbar Complete With Flex And Ext  FINDINGS:  Grade 1 spondylolisthesis of L4 on L5 which appears slightly increased since the comparison exam from 2013.  This currently measures on the order of 7 mm.  This appears similar on both flexion/extension maneuvers.  Bones are demineralized.  No acute fracture.  Discogenic degenerative changes at L4-L5 with multilevel lower lumbar facet arthropathy.      ASSESSMENT: 72 y.o. year old female with lower back and leg pain, consistent with     1. DDD (degenerative disc disease), lumbar     2. Lumbar facet arthropathy     3. Chronic lumbar radiculopathy  IR Epidural Transfor 1st Vert Lumbar Emanuel    Case Request-RAD/Other Procedure Area: Bilateral L4/5 TF ASUNCION with RN IV sedation; on Coumadin, will need INR prior to procedure, must be less than 1.3         PLAN:   - Interventions:  Schedule for bilateral L4/5 transforaminal epidural steroid injection to  see if this helps with radicular symptoms..  Explained the risks and benefits of the procedure in detail with the patient today in clinic along with alternative treatment options.  Patient has elected to pursue this procedure.    - Anticoagulation use: yes Coumadin  Per ASA guidelines for secondary prophylaxis, patient will need to pause Coumadin 5 days prior to procedure.  Will obtain cardiac clearance from Dr. Hammond.  INR will need to be less than 1.3 for her procedure.  PT INR to be collected a.m. of procedure.     report:  Reviewed and consistent with medication use as prescribed.    - Medications:  --  We have discussed continuing gabapentin. We have reviewed potential side effects of this medication including daytime somnolence, weight gain and peripheral edema  Gabapentin : 900 mg QHS      - Therapy:  We discussed continuing physical therapy to help manage the patient/s painful condition. The patient was counseled that muscle strengthening will improve the long term prognosis in regards to pain and may also help increase range of motion and mobility.     - Imaging: Reviewed available imaging with patient and answered any questions they had regarding study    - Records: Obtain old records from outside physicians and imaging    - Follow up visit: return to clinic in 4-6 weeks      The above plan and management options were discussed at length with patient. Patient is in agreement with the above and verbalized understanding.    - I discussed the goals of interventional chronic pain management with the patient on today's visit. We discussed a multimodal and systematic approach to pain.  This includes diagnostic and therapeutic injections, adjuvant pharmacologic treatment, physical therapy, and at times psychiatry.  I emphasized the importance of regular exercise, core strengthening and stretching, diet and weight loss as a cornerstone of long-term pain management.    - This condition does not require this  patient to take time off of work, and the primary goal of our Pain Management services is to improve the patient's functional capacity.  - Patient Questions: Answered all of the patient's questions regarding diagnoses, therapy, treatment and next steps        Mario Palacios MD  Interventional Pain Management  [facility]    Disclaimer:  This note was prepared using voice recognition system and is likely to have sound alike errors that may have been overlooked even after proof reading.  Please call me with any questions

## 2022-05-17 ENCOUNTER — PATIENT OUTREACH (OUTPATIENT)
Dept: ADMINISTRATIVE | Facility: OTHER | Age: 72
End: 2022-05-17
Payer: MEDICARE

## 2022-05-17 ENCOUNTER — OFFICE VISIT (OUTPATIENT)
Dept: PAIN MEDICINE | Facility: CLINIC | Age: 72
End: 2022-05-17
Payer: MEDICARE

## 2022-05-17 ENCOUNTER — ANTI-COAG VISIT (OUTPATIENT)
Dept: CARDIOLOGY | Facility: CLINIC | Age: 72
End: 2022-05-17
Payer: MEDICARE

## 2022-05-17 VITALS
DIASTOLIC BLOOD PRESSURE: 86 MMHG | HEIGHT: 65 IN | WEIGHT: 177.5 LBS | HEART RATE: 55 BPM | RESPIRATION RATE: 17 BRPM | SYSTOLIC BLOOD PRESSURE: 153 MMHG | BODY MASS INDEX: 29.57 KG/M2

## 2022-05-17 DIAGNOSIS — M54.16 CHRONIC LUMBAR RADICULOPATHY: ICD-10-CM

## 2022-05-17 DIAGNOSIS — M47.816 LUMBAR FACET ARTHROPATHY: ICD-10-CM

## 2022-05-17 DIAGNOSIS — Z79.01 LONG TERM (CURRENT) USE OF ANTICOAGULANTS: Primary | ICD-10-CM

## 2022-05-17 DIAGNOSIS — M51.36 DDD (DEGENERATIVE DISC DISEASE), LUMBAR: Primary | ICD-10-CM

## 2022-05-17 DIAGNOSIS — D68.51 HETEROZYGOUS FACTOR V LEIDEN MUTATION: Chronic | ICD-10-CM

## 2022-05-17 DIAGNOSIS — Z79.01 ANTICOAGULATED ON COUMADIN: ICD-10-CM

## 2022-05-17 LAB — INR PPP: 2 (ref 2–3)

## 2022-05-17 PROCEDURE — 99214 PR OFFICE/OUTPT VISIT, EST, LEVL IV, 30-39 MIN: ICD-10-PCS | Mod: S$GLB,,, | Performed by: ANESTHESIOLOGY

## 2022-05-17 PROCEDURE — 1101F PT FALLS ASSESS-DOCD LE1/YR: CPT | Mod: CPTII,S$GLB,, | Performed by: ANESTHESIOLOGY

## 2022-05-17 PROCEDURE — 3079F PR MOST RECENT DIASTOLIC BLOOD PRESSURE 80-89 MM HG: ICD-10-PCS | Mod: CPTII,S$GLB,, | Performed by: ANESTHESIOLOGY

## 2022-05-17 PROCEDURE — 93793 PR ANTICOAGULANT MGMT FOR PT TAKING WARFARIN: ICD-10-PCS | Mod: S$GLB,,,

## 2022-05-17 PROCEDURE — 85610 PROTHROMBIN TIME: CPT | Mod: QW,S$GLB,, | Performed by: INTERNAL MEDICINE

## 2022-05-17 PROCEDURE — 3079F DIAST BP 80-89 MM HG: CPT | Mod: CPTII,S$GLB,, | Performed by: ANESTHESIOLOGY

## 2022-05-17 PROCEDURE — 99214 OFFICE O/P EST MOD 30 MIN: CPT | Mod: S$GLB,,, | Performed by: ANESTHESIOLOGY

## 2022-05-17 PROCEDURE — 99999 PR PBB SHADOW E&M-EST. PATIENT-LVL V: ICD-10-PCS | Mod: PBBFAC,,, | Performed by: ANESTHESIOLOGY

## 2022-05-17 PROCEDURE — 3077F SYST BP >= 140 MM HG: CPT | Mod: CPTII,S$GLB,, | Performed by: ANESTHESIOLOGY

## 2022-05-17 PROCEDURE — 1125F PR PAIN SEVERITY QUANTIFIED, PAIN PRESENT: ICD-10-PCS | Mod: CPTII,S$GLB,, | Performed by: ANESTHESIOLOGY

## 2022-05-17 PROCEDURE — 3288F PR FALLS RISK ASSESSMENT DOCUMENTED: ICD-10-PCS | Mod: CPTII,S$GLB,, | Performed by: ANESTHESIOLOGY

## 2022-05-17 PROCEDURE — 1159F PR MEDICATION LIST DOCUMENTED IN MEDICAL RECORD: ICD-10-PCS | Mod: CPTII,S$GLB,, | Performed by: ANESTHESIOLOGY

## 2022-05-17 PROCEDURE — 99999 PR PBB SHADOW E&M-EST. PATIENT-LVL V: CPT | Mod: PBBFAC,,, | Performed by: ANESTHESIOLOGY

## 2022-05-17 PROCEDURE — 93793 ANTICOAG MGMT PT WARFARIN: CPT | Mod: S$GLB,,,

## 2022-05-17 PROCEDURE — 3008F PR BODY MASS INDEX (BMI) DOCUMENTED: ICD-10-PCS | Mod: CPTII,S$GLB,, | Performed by: ANESTHESIOLOGY

## 2022-05-17 PROCEDURE — 3077F PR MOST RECENT SYSTOLIC BLOOD PRESSURE >= 140 MM HG: ICD-10-PCS | Mod: CPTII,S$GLB,, | Performed by: ANESTHESIOLOGY

## 2022-05-17 PROCEDURE — 85610 POCT INR: ICD-10-PCS | Mod: QW,S$GLB,, | Performed by: INTERNAL MEDICINE

## 2022-05-17 PROCEDURE — 1125F AMNT PAIN NOTED PAIN PRSNT: CPT | Mod: CPTII,S$GLB,, | Performed by: ANESTHESIOLOGY

## 2022-05-17 PROCEDURE — 1101F PR PT FALLS ASSESS DOC 0-1 FALLS W/OUT INJ PAST YR: ICD-10-PCS | Mod: CPTII,S$GLB,, | Performed by: ANESTHESIOLOGY

## 2022-05-17 PROCEDURE — 1159F MED LIST DOCD IN RCRD: CPT | Mod: CPTII,S$GLB,, | Performed by: ANESTHESIOLOGY

## 2022-05-17 PROCEDURE — 3288F FALL RISK ASSESSMENT DOCD: CPT | Mod: CPTII,S$GLB,, | Performed by: ANESTHESIOLOGY

## 2022-05-17 PROCEDURE — 3008F BODY MASS INDEX DOCD: CPT | Mod: CPTII,S$GLB,, | Performed by: ANESTHESIOLOGY

## 2022-05-17 NOTE — PROGRESS NOTES
INR is therapeutic at 2.0.  Previous warfarin instructions were verified and followed.  No other changes reported.  Instructions given to continue 5 mg every Friday and 7.5 mg on all other days.  Recheck in 2 weeks.  Dose calendar given and reviewed with patient.  Patient verbalized understanding.

## 2022-05-17 NOTE — PROGRESS NOTES
Health Maintenance Due   Topic Date Due    TETANUS VACCINE  Never done    Shingles Vaccine (2 of 3) 07/25/2014    Colorectal Cancer Screening  11/13/2020    Foot Exam  07/02/2021    Pneumococcal Vaccines (Age 65+) (2 - PPSV23 or PCV20) 11/06/2021    Lipid Panel  11/17/2021    Mammogram  12/23/2021    Eye Exam  12/23/2021    COVID-19 Vaccine (4 - Booster for Moderna series) 12/27/2021    Hemoglobin A1c  12/30/2021    Diabetes Urine Screening  06/30/2022     Updates were requested from care everywhere.  Chart was reviewed for overdue Proactive [facility] Encounters (CELE) topics (CRS, Breast Cancer Screening, Eye exam)  Health Maintenance has been updated.  LINKS immunization registry triggered.  Immunizations were reconciled.

## 2022-05-18 ENCOUNTER — TELEPHONE (OUTPATIENT)
Dept: PAIN MEDICINE | Facility: CLINIC | Age: 72
End: 2022-05-18
Payer: MEDICARE

## 2022-05-18 ENCOUNTER — TELEPHONE (OUTPATIENT)
Dept: CARDIOLOGY | Facility: CLINIC | Age: 72
End: 2022-05-18
Payer: MEDICARE

## 2022-05-18 NOTE — TELEPHONE ENCOUNTER
----- Message from Scotty Anderson MA sent at 5/18/2022 11:46 AM CDT -----  Regarding: Patient cleared for procedure.  Dr. Eamon Bell stated the patient is clear for the blood thinner hold for 5 days. Begin coumadin after injection is completed. Thanks       Mario Palacios MD  would like to stop her Coumadin for 5 days to perform a Lumbar TF Epidural Steroid Injection.     Can you provide clearance? Patient cleared

## 2022-05-18 NOTE — TELEPHONE ENCOUNTER
----- Message from Cristóbal Lemus MA sent at 5/18/2022  8:07 AM CDT -----  Regarding: Cardiac Clearnce  Good Morning ,       Mario Palacios MD  would like to stop her Coumadin for 5 days to perform a Lumbar TF Epidural Steroid Injection. Can you provide clearance?     Cristóbal Lemus   Medical Assistant

## 2022-05-20 ENCOUNTER — PATIENT MESSAGE (OUTPATIENT)
Dept: RESEARCH | Facility: HOSPITAL | Age: 72
End: 2022-05-20
Payer: MEDICARE

## 2022-05-20 ENCOUNTER — TELEPHONE (OUTPATIENT)
Dept: PAIN MEDICINE | Facility: CLINIC | Age: 72
End: 2022-05-20
Payer: MEDICARE

## 2022-05-20 NOTE — TELEPHONE ENCOUNTER
Called pt to set up their procedure. Pt answered and procedure has been made on Kelle 3. Inform pt on the procedure instruction. Pt  does need to stop any bloodthiners. Stop May 30 start back the day after her procedure. Pt understood and all question answered.     Cristóbal Lemus   Medical Assistant

## 2022-05-26 NOTE — PRE-PROCEDURE INSTRUCTIONS
Attempted to PAT patient for procedure on 6.3. with Dr. alexis. No answer. LVM with return phone number. No return call at this time.

## 2022-05-31 ENCOUNTER — ANTI-COAG VISIT (OUTPATIENT)
Dept: CARDIOLOGY | Facility: CLINIC | Age: 72
End: 2022-05-31
Payer: MEDICARE

## 2022-05-31 DIAGNOSIS — Z79.01 LONG TERM (CURRENT) USE OF ANTICOAGULANTS: Primary | ICD-10-CM

## 2022-05-31 DIAGNOSIS — D68.51 HETEROZYGOUS FACTOR V LEIDEN MUTATION: Chronic | ICD-10-CM

## 2022-05-31 LAB — INR PPP: 1.8 (ref 2–3)

## 2022-05-31 PROCEDURE — 93793 ANTICOAG MGMT PT WARFARIN: CPT | Mod: S$GLB,,,

## 2022-05-31 PROCEDURE — 85610 PROTHROMBIN TIME: CPT | Mod: QW,S$GLB,, | Performed by: INTERNAL MEDICINE

## 2022-05-31 PROCEDURE — 85610 POCT INR: ICD-10-PCS | Mod: QW,S$GLB,, | Performed by: INTERNAL MEDICINE

## 2022-05-31 PROCEDURE — 93793 PR ANTICOAGULANT MGMT FOR PT TAKING WARFARIN: ICD-10-PCS | Mod: S$GLB,,,

## 2022-05-31 NOTE — PRE-PROCEDURE INSTRUCTIONS
Attempted to PAT patient for procedure on 6.3 with Dr. alexis. No answer. LVM with return phone number. No return call at this time.

## 2022-05-31 NOTE — PROGRESS NOTES
INR is subtherapeutic at 1.8.  Patient reports an impending ASUNCION scheduled for Friday, 6/03/2022.  (Dr. Palacios: Per ASA guidelines for secondary prophylaxis, patient will need to pause Coumadin 5 days prior to procedure.  Will obtain cardiac clearance from Dr. Hammond.  INR will need to be less than 1.3 for her procedure.  PT INR to be collected a.m. of procedure.  Clearance from Mauro Lauren (Cardiologist) given on 5/18/2022 - see notes.  Instructions given:  Continue holding warfarin for procedure on 6/03/2022.  Post procedure instructions:  Warfarin 7.5 mg on 6/03/2022 and 10 mg on 6/04/2022, then resume on current dose of 5 mg every Friday and 7.5 mg on all other days.  Please call the Coumadin Clinic, if there's any new instructions given post hospital discharge.  Recheck on Tuesday, 6/07/2022, along with Cardiology's appointment.  Dose calendar given and reviewed with patient.  Patient verbalized understanding.

## 2022-06-01 ENCOUNTER — PATIENT MESSAGE (OUTPATIENT)
Dept: PAIN MEDICINE | Facility: HOSPITAL | Age: 72
End: 2022-06-01
Payer: MEDICARE

## 2022-06-02 NOTE — PRE-PROCEDURE INSTRUCTIONS
Spoke with patient regarding procedure scheduled on 6.3    Arrival time 0800    Has patient been sick with fever or on antibiotics within the last 7 days? No    Does the patient have any open wounds, sores or rashes? No    Does the patient have any recent fractures? no    Has patient received a vaccination within the last 7 days? No    Received the COVID vaccination? yes    Has the patient stopped all medications as directed? Hold  Coumadin 5 days prior to procedure. Cardiac clearance obtained from dr quiroga on 5.18.    Does patient have a pacemaker and or defibrillator? no    Does the patient have a ride to and from procedure and someone reliable to remain with patient? daughter    Is the patient diabetic? yes    Does the patient have sleep apnea? Or use O2 at home? kaylee on cpap     Is the patient receiving sedation? yes    Is the patient instructed to remain NPO beginning at midnight the night before their procedure? yes    Procedure location confirmed with patient? Yes    Covid- Denies signs/symptoms. Instructed to notify PAT/MD if any changes.

## 2022-06-02 NOTE — PRE-PROCEDURE INSTRUCTIONS
Attempted to PAT patient for procedure on 6.3. with Dr. alexis. No answer. LVM with return phone number. No return call at this time. Notified MD office.

## 2022-06-03 ENCOUNTER — TELEPHONE (OUTPATIENT)
Dept: PAIN MEDICINE | Facility: CLINIC | Age: 72
End: 2022-06-03
Payer: MEDICARE

## 2022-06-03 ENCOUNTER — HOSPITAL ENCOUNTER (OUTPATIENT)
Facility: HOSPITAL | Age: 72
Discharge: HOME | End: 2022-06-03
Attending: ANESTHESIOLOGY | Admitting: ANESTHESIOLOGY
Payer: MEDICARE

## 2022-06-03 VITALS
SYSTOLIC BLOOD PRESSURE: 143 MMHG | OXYGEN SATURATION: 98 % | TEMPERATURE: 98 F | HEIGHT: 65 IN | DIASTOLIC BLOOD PRESSURE: 71 MMHG | BODY MASS INDEX: 29.41 KG/M2 | RESPIRATION RATE: 18 BRPM | HEART RATE: 55 BPM | WEIGHT: 176.5 LBS

## 2022-06-03 DIAGNOSIS — M54.16 LUMBAR RADICULOPATHY, CHRONIC: ICD-10-CM

## 2022-06-03 LAB
INR PPP: 1.1 (ref 0.8–1.2)
POCT GLUCOSE: 109 MG/DL (ref 70–110)
PROTHROMBIN TIME: 12.3 SEC (ref 9–12.5)

## 2022-06-03 PROCEDURE — 82962 GLUCOSE BLOOD TEST: CPT | Performed by: ANESTHESIOLOGY

## 2022-06-03 PROCEDURE — 25500020 PHARM REV CODE 255: Performed by: ANESTHESIOLOGY

## 2022-06-03 PROCEDURE — 63600175 PHARM REV CODE 636 W HCPCS: Performed by: ANESTHESIOLOGY

## 2022-06-03 PROCEDURE — 64483 NJX AA&/STRD TFRM EPI L/S 1: CPT | Mod: 50,,, | Performed by: ANESTHESIOLOGY

## 2022-06-03 PROCEDURE — 25000003 PHARM REV CODE 250: Performed by: ANESTHESIOLOGY

## 2022-06-03 PROCEDURE — 64483 NJX AA&/STRD TFRM EPI L/S 1: CPT | Mod: 50 | Performed by: ANESTHESIOLOGY

## 2022-06-03 PROCEDURE — A9585 GADOBUTROL INJECTION: HCPCS | Performed by: ANESTHESIOLOGY

## 2022-06-03 PROCEDURE — 36415 COLL VENOUS BLD VENIPUNCTURE: CPT | Performed by: ANESTHESIOLOGY

## 2022-06-03 PROCEDURE — 64483 PR EPIDURAL INJ, ANES/STEROID, TRANSFORAMINAL, LUMB/SACR, SNGL LEVL: ICD-10-PCS | Mod: 50,,, | Performed by: ANESTHESIOLOGY

## 2022-06-03 PROCEDURE — 85610 PROTHROMBIN TIME: CPT | Performed by: ANESTHESIOLOGY

## 2022-06-03 RX ORDER — GADOBUTROL 604.72 MG/ML
INJECTION INTRAVENOUS
Status: DISCONTINUED | OUTPATIENT
Start: 2022-06-03 | End: 2022-06-03 | Stop reason: HOSPADM

## 2022-06-03 RX ORDER — DEXAMETHASONE SODIUM PHOSPHATE 10 MG/ML
INJECTION INTRAMUSCULAR; INTRAVENOUS
Status: DISCONTINUED | OUTPATIENT
Start: 2022-06-03 | End: 2022-06-03 | Stop reason: HOSPADM

## 2022-06-03 RX ORDER — MIDAZOLAM HYDROCHLORIDE 1 MG/ML
INJECTION, SOLUTION INTRAMUSCULAR; INTRAVENOUS
Status: DISCONTINUED | OUTPATIENT
Start: 2022-06-03 | End: 2022-06-03 | Stop reason: HOSPADM

## 2022-06-03 RX ORDER — INDOMETHACIN 25 MG/1
CAPSULE ORAL
Status: DISCONTINUED | OUTPATIENT
Start: 2022-06-03 | End: 2022-06-03 | Stop reason: HOSPADM

## 2022-06-03 RX ORDER — BUPIVACAINE HYDROCHLORIDE 2.5 MG/ML
INJECTION, SOLUTION EPIDURAL; INFILTRATION; INTRACAUDAL
Status: DISCONTINUED | OUTPATIENT
Start: 2022-06-03 | End: 2022-06-03 | Stop reason: HOSPADM

## 2022-06-03 NOTE — DISCHARGE INSTRUCTIONS

## 2022-06-03 NOTE — DISCHARGE SUMMARY
[facility]  Discharge Note  Short Stay    Procedure(s) (LRB):  Bilateral L4/5 TF ASUNCION with RN IV sedation; on Coumadin, will need INR prior to procedure, must be less than 1.3 (Bilateral)    OUTCOME: Patient tolerated treatment/procedure well without complication and is now ready for discharge.    DISPOSITION: Home or Self Care    FINAL DIAGNOSIS:  <principal problem not specified>    FOLLOWUP: In clinic    DISCHARGE INSTRUCTIONS:  No discharge procedures on file.     TIME SPENT ON DISCHARGE: 15 minutes

## 2022-06-03 NOTE — TELEPHONE ENCOUNTER
----- Message from Maritza Day sent at 6/3/2022  8:45 AM CDT -----  Contact: Aby/ Chidi Weir would like a call back at [phone], in regards to pt.

## 2022-06-03 NOTE — OP NOTE
Alyx JUARES Weir  72 y.o. female      Vitals:    06/03/22 0953   BP: (!) 182/90   Pulse: (!) 57   Resp: 17   Temp:      Date: 6/3/22      INFORMED CONSENT: The procedure, risks, benefits and options were discussed with patient. There are no contraindications to the procedure. The patient expressed understanding and agreed to proceed. The personnel performing the procedure was discussed. I verify that I personally obtained consent prior to the start of the procedure and the signed consent can be found on the patient's chart.       Anesthesia:   Conscious sedation provided by M.D    The patient was monitored with continuous pulse oximetry, EKG, and intermittent blood pressure monitors.  The patient was hemodynamically stable throughout the entire process was responsive to voice, and breathing spontaneously.  Supplemental O2 was provided at 2L/min via nasal cannula.  Patient was comfortable for the duration of the procedure. (See nurse documentation and case log for sedation time)    There was a total of 2mg IV Midazolam and 0mcg Fentanyl titrated for the procedure    Pre Procedure diagnosis: Chronic lumbar radiculopathy [M54.16]  Post-Procedure diagnosis: SAME     Complications: None    Specimens: None      DESCRIPTION OF PROCEDURE: The patient was brought to the procedure room. IV access was obtained prior to the procedure. The patient was positioned prone on the fluoroscopy table. Continuous hemodynamic monitoring was initiated including blood pressure, EKG, and pulse oximetry. . The skin was prepped with chlorhexidine and draped in a sterile fashion. Skin anesthesia was achieved using a total of 10mL of lidocaine, 5mL over each respective injection site.     The  L4/5 transforaminal spaces were identified with fluoroscopy in the  AP, oblique, and lateral views.  A 22 gauge spinal quinke needle was then advanced into the area of the trans foraminal spaces bilateral with confirmation of proper needle position using AP,  oblique, and lateral fluoroscopic views. Once the needle tip was in the area of the transforaminal space, and there was no blood, CSF or paraesthesias,  1.5 mL of Omnipaque 300mg/ml was injected on bilateral for a total of 3mL.  Fluoroscopic imaging in the AP and lateral views revealed a clear outline of the spinal nerve with proximal spread of agent through the neural foramen into the epidural space. A total combination of 2 mL of Bupivicaine 0.25% and 10 mg dexamethasone was injected on each side for a total of 6 mL of injected medications with displacement of the contrast dye confirming that the medication went into the area of the transforaminal spaces bilateral. A sterile dressing was applied.   Patient tolerated the procedure well.    Patient was taken back to the recovery room for further observation.     The patient was discharged to home in stable condition

## 2022-06-03 NOTE — PLAN OF CARE
Pt discharged per MD order. Pt transported in wheelchair to family vehicle without incident or complaint.

## 2022-06-03 NOTE — TELEPHONE ENCOUNTER
Attempt to reach patient from messages. Patient did not answer. Left message on patients voice mail to call back at earliest convenience.   Cristóbal Lemus  Medical

## 2022-06-06 ENCOUNTER — TELEPHONE (OUTPATIENT)
Dept: PAIN MEDICINE | Facility: CLINIC | Age: 72
End: 2022-06-06
Payer: MEDICARE

## 2022-06-06 NOTE — TELEPHONE ENCOUNTER
----- Message from Veronika Graham sent at 6/3/2022  3:38 PM CDT -----  Contact: patient/[phone]  Type:  Patient Returning Call    Who Called:patient  Who Left Message for Patient  Cristóbal Lemus  Does the patient know what this is regarding?:no not sure  Would the patient rather a call back or a response via [facility]?callback   Best Call Back [phone]  Additional Information:       Thanks KL

## 2022-06-06 NOTE — TELEPHONE ENCOUNTER
Reached out to patient to schedule appointment from messages. Apt has been made.   Pt understand. All questions answered.     Cristóbal Lemus  Medical Assistant

## 2022-06-07 ENCOUNTER — OFFICE VISIT (OUTPATIENT)
Dept: CARDIOLOGY | Facility: CLINIC | Age: 72
End: 2022-06-07
Payer: MEDICARE

## 2022-06-07 ENCOUNTER — ANTI-COAG VISIT (OUTPATIENT)
Dept: CARDIOLOGY | Facility: CLINIC | Age: 72
End: 2022-06-07
Payer: MEDICARE

## 2022-06-07 VITALS
BODY MASS INDEX: 29.39 KG/M2 | DIASTOLIC BLOOD PRESSURE: 62 MMHG | OXYGEN SATURATION: 96 % | WEIGHT: 176.56 LBS | SYSTOLIC BLOOD PRESSURE: 118 MMHG | HEART RATE: 66 BPM

## 2022-06-07 DIAGNOSIS — E78.5 DYSLIPIDEMIA: ICD-10-CM

## 2022-06-07 DIAGNOSIS — I25.10 CORONARY ARTERY DISEASE INVOLVING NATIVE CORONARY ARTERY OF NATIVE HEART WITHOUT ANGINA PECTORIS: ICD-10-CM

## 2022-06-07 DIAGNOSIS — D68.51 HETEROZYGOUS FACTOR V LEIDEN MUTATION: Chronic | ICD-10-CM

## 2022-06-07 DIAGNOSIS — O22.30 DVT (DEEP VEIN THROMBOSIS) IN PREGNANCY: ICD-10-CM

## 2022-06-07 DIAGNOSIS — E11.9 TYPE 2 DIABETES MELLITUS WITHOUT COMPLICATION, WITHOUT LONG-TERM CURRENT USE OF INSULIN: ICD-10-CM

## 2022-06-07 DIAGNOSIS — I10 ESSENTIAL HYPERTENSION: Primary | Chronic | ICD-10-CM

## 2022-06-07 DIAGNOSIS — Z79.01 LONG TERM (CURRENT) USE OF ANTICOAGULANTS: Primary | ICD-10-CM

## 2022-06-07 DIAGNOSIS — J44.9 CHRONIC OBSTRUCTIVE PULMONARY DISEASE, UNSPECIFIED COPD TYPE: Chronic | ICD-10-CM

## 2022-06-07 LAB — INR PPP: 2 (ref 2–3)

## 2022-06-07 PROCEDURE — 85610 POCT INR: ICD-10-PCS | Mod: QW,S$GLB,, | Performed by: INTERNAL MEDICINE

## 2022-06-07 PROCEDURE — 3074F PR MOST RECENT SYSTOLIC BLOOD PRESSURE < 130 MM HG: ICD-10-PCS | Mod: CPTII,S$GLB,, | Performed by: INTERNAL MEDICINE

## 2022-06-07 PROCEDURE — 99214 PR OFFICE/OUTPT VISIT, EST, LEVL IV, 30-39 MIN: ICD-10-PCS | Mod: S$GLB,,, | Performed by: INTERNAL MEDICINE

## 2022-06-07 PROCEDURE — 99499 RISK ADDL DX/OHS AUDIT: ICD-10-PCS | Mod: S$GLB,,, | Performed by: INTERNAL MEDICINE

## 2022-06-07 PROCEDURE — 3078F PR MOST RECENT DIASTOLIC BLOOD PRESSURE < 80 MM HG: ICD-10-PCS | Mod: CPTII,S$GLB,, | Performed by: INTERNAL MEDICINE

## 2022-06-07 PROCEDURE — 3008F PR BODY MASS INDEX (BMI) DOCUMENTED: ICD-10-PCS | Mod: CPTII,S$GLB,, | Performed by: INTERNAL MEDICINE

## 2022-06-07 PROCEDURE — 3074F SYST BP LT 130 MM HG: CPT | Mod: CPTII,S$GLB,, | Performed by: INTERNAL MEDICINE

## 2022-06-07 PROCEDURE — 85610 PROTHROMBIN TIME: CPT | Mod: QW,S$GLB,, | Performed by: INTERNAL MEDICINE

## 2022-06-07 PROCEDURE — 99499 UNLISTED E&M SERVICE: CPT | Mod: S$GLB,,, | Performed by: INTERNAL MEDICINE

## 2022-06-07 PROCEDURE — 99999 PR PBB SHADOW E&M-EST. PATIENT-LVL III: CPT | Mod: PBBFAC,,, | Performed by: INTERNAL MEDICINE

## 2022-06-07 PROCEDURE — 99214 OFFICE O/P EST MOD 30 MIN: CPT | Mod: S$GLB,,, | Performed by: INTERNAL MEDICINE

## 2022-06-07 PROCEDURE — 99999 PR PBB SHADOW E&M-EST. PATIENT-LVL III: ICD-10-PCS | Mod: PBBFAC,,, | Performed by: INTERNAL MEDICINE

## 2022-06-07 PROCEDURE — 3078F DIAST BP <80 MM HG: CPT | Mod: CPTII,S$GLB,, | Performed by: INTERNAL MEDICINE

## 2022-06-07 PROCEDURE — 3008F BODY MASS INDEX DOCD: CPT | Mod: CPTII,S$GLB,, | Performed by: INTERNAL MEDICINE

## 2022-06-07 NOTE — PROGRESS NOTES
Subjective:   Patient ID:  Alyx Weir is a 72 y.o. female who presents for follow up of No chief complaint on file.      73 yo female, referred by PCP for chest pain  PMH of mild CAD, nonobstructive s/p C 2013. Bradycardia, HTN, HLP, NIDDM, h/o left DVT, factor V Leidein mutation, obesity, sleep apnea needs new masl.  PT WAS INFECTED covid 19 (throat itching) IN . [place]   OF COVID 19   AVALOS with fast walking  Denied chest pain, palpitation dizziness leg swelling, no syncope.  Denied h/o MI.  No smoking/drinking.  No regular exercise.  Patient is compliant with medications.   MPI no ischemia   echo normal EF and LVH    carotid US 0-19% lesion  ekg NSR and LVH  Did not take med today and BP high     visit  ECHO normal EF.  In HTN digit program. Not using CPAP on regular base  PT twice a week for lower back pain  No chest pain, dizziness faint and palpitation  Mild AVALOS     visit  Chronic joint pain. No chest pain dizziness, faint and SOB. C/o Fatigue sometime. Some leg swelling.   ekg sinus bradycardia. Left lower lower medial side pain   Muscle cramp at night     visit  C/o lower sternal pain and ingestion for a week, constantly. Disrupted the sleeping, worse with food or laying down. Feels nausea and improved. Some SOB.   Chronic back pain   EKG done in 2 days ago at PCP's office showed NSR and small TWI on inferolateral leads.  Compared to prior EKG done in , small TWI new    Interval history  Still lower back pain,. Needs handicap   NUKE stress test showed normal function.   No chest pain dyspnea dizziness palpitation and leg swelling  No active bleeding        Past Medical History:   Diagnosis Date    Anticoagulated on Coumadin     Arm weakness-rotator cuff weakness 2015    Arthritis     Asthma     Clotting disorder     Coronary artery disease     Deep vein thrombosis     Degenerative disc disease     Diabetes mellitus type  I 2011    BS last tested x 1 month not sure what it was.    Heterozygous factor V Leiden mutation     Hx of colonic polyps 11/13/2015    Hyperlipidemia     Hypertension     VIRGIL (obstructive sleep apnea)     Stenosis of right carotid artery 12/13/2016       Past Surgical History:   Procedure Laterality Date    BACK SURGERY      bladder cyst removal      BLADDER SURGERY      CARDIAC CATHETERIZATION  03/2013    mild CAD    COLONOSCOPY N/A 11/13/2015    Procedure: COLONOSCOPY;  Surgeon: Jas Umanzor III, MD;  Location[facility];  Service: Endoscopy;  Laterality: N/A;    HYSTERECTOMY      28 yrs old    LUMBAR SPINE SURGERY      bone spurs removed    OOPHORECTOMY      SELECTIVE INJECTION OF ANESTHETIC AGENT AROUND LUMBAR SPINAL NERVE ROOT BY TRANSFORAMINAL APPROACH Bilateral 6/3/2022    Procedure: Bilateral L4/5 TF ASUNCION with RN IV sedation; on Coumadin, will need INR prior to procedure, must be less than 1.3;  Surgeon: Mario Palacios MD;  [facility];  Service: Pain Management;  Laterality: Bilateral;       Social History     Tobacco Use    Smoking status: Never Smoker    Smokeless tobacco: Never Used   Substance Use Topics    Alcohol use: No    Drug use: No       Family History   Problem Relation Age of Onset    Heart disease Mother     Hypertension Mother     Cataracts Mother     Hypertension Sister     Glaucoma Sister     Hypertension Brother     Diabetes Father     Diabetes Paternal Uncle     Hypertension Sister     Diabetes Sister     Hypertension Sister     Diabetes Sister     Breast cancer Neg Hx     Colon cancer Neg Hx     Ovarian cancer Neg Hx          Review of Systems   Constitutional: Negative for decreased appetite, diaphoresis, fever, malaise/fatigue and night sweats.   HENT: Negative for nosebleeds.    Eyes: Negative for blurred vision and double vision.   Cardiovascular: Positive for chest pain and dyspnea on exertion. Negative for claudication, irregular  heartbeat, leg swelling, near-syncope, orthopnea, palpitations, paroxysmal nocturnal dyspnea and syncope.   Respiratory: Negative for cough, shortness of breath, sleep disturbances due to breathing, snoring, sputum production and wheezing.    Endocrine: Negative for cold intolerance and polyuria.   Hematologic/Lymphatic: Does not bruise/bleed easily.   Skin: Negative for rash.   Musculoskeletal: Positive for arthritis, back pain and joint pain. Negative for falls, joint swelling and neck pain.   Gastrointestinal: Negative for abdominal pain, heartburn, nausea and vomiting.   Genitourinary: Negative for dysuria, frequency and hematuria.   Neurological: Negative for difficulty with concentration, dizziness, focal weakness, headaches, light-headedness, numbness, seizures and weakness.   Psychiatric/Behavioral: Negative for depression, memory loss and substance abuse. The patient does not have insomnia.    Allergic/Immunologic: Negative for HIV exposure and hives.       Objective:   Physical Exam  HENT:      Head: Normocephalic.   Eyes:      Pupils: Pupils are equal, round, and reactive to light.   Neck:      Thyroid: No thyromegaly.      Vascular: Normal carotid pulses. No carotid bruit or JVD.   Cardiovascular:      Rate and Rhythm: Regular rhythm. Bradycardia present.  No extrasystoles are present.     Chest Wall: PMI is not displaced.      Pulses: Normal pulses.      Heart sounds: Murmur (ESM on left chest ) heard.     No gallop. No S3 sounds.   Pulmonary:      Effort: No respiratory distress.      Breath sounds: Normal breath sounds. No stridor.   Chest:      Comments: + tenderness on lower sternal   Abdominal:      General: Bowel sounds are normal.      Palpations: Abdomen is soft.      Tenderness: There is no abdominal tenderness. There is no rebound.   Musculoskeletal:         General: Normal range of motion.   Skin:     Findings: No rash.   Neurological:      Mental Status: She is alert and oriented to person,  place, and time.   Psychiatric:         Behavior: Behavior normal.         Lab Results   Component Value Date    CHOL 203 (H) 11/17/2020    CHOL 206 (H) 11/21/2019    CHOL 220 (H) 09/27/2018     Lab Results   Component Value Date    HDL 75 11/17/2020    HDL 74 11/21/2019    HDL 80 (H) 09/27/2018     Lab Results   Component Value Date    LDLCALC 110.6 11/17/2020    LDLCALC 122.0 11/21/2019    LDLCALC 124.4 09/27/2018     Lab Results   Component Value Date    TRIG 87 11/17/2020    TRIG 50 11/21/2019    TRIG 78 09/27/2018     Lab Results   Component Value Date    CHOLHDL 36.9 11/17/2020    CHOLHDL 35.9 11/21/2019    CHOLHDL 36.4 09/27/2018       Chemistry        Component Value Date/Time     02/16/2022 2251    K 3.5 02/16/2022 2251    CL 98 02/16/2022 2251    CO2 29 02/16/2022 2251    BUN 16 02/16/2022 2251    CREATININE 0.8 02/16/2022 2251     02/16/2022 2251        Component Value Date/Time    CALCIUM 9.4 02/16/2022 2251    ALKPHOS 48 (L) 02/16/2022 2251    AST 14 02/16/2022 2251    ALT 10 02/16/2022 2251    BILITOT 0.5 02/16/2022 2251    ESTGFRAFRICA >60 02/16/2022 2251    EGFRNONAA >60 02/16/2022 2251          Lab Results   Component Value Date    HGBA1C 6.0 (H) 06/30/2021     Lab Results   Component Value Date    TSH 0.552 06/30/2021     Lab Results   Component Value Date    INR 2.0 06/07/2022    INR 1.1 06/03/2022    INR 1.8 (A) 05/31/2022     Lab Results   Component Value Date    WBC 7.63 02/16/2022    HGB 12.9 02/16/2022    HCT 39.6 02/16/2022    MCV 87 02/16/2022     02/16/2022     BMP  Sodium   Date Value Ref Range Status   02/16/2022 138 136 - 145 mmol/L Final     Potassium   Date Value Ref Range Status   02/16/2022 3.5 3.5 - 5.1 mmol/L Final     Chloride   Date Value Ref Range Status   02/16/2022 98 95 - 110 mmol/L Final     CO2   Date Value Ref Range Status   02/16/2022 29 23 - 29 mmol/L Final     BUN   Date Value Ref Range Status   02/16/2022 16 8 - 23 mg/dL Final     Creatinine   Date  Value Ref Range Status   02/16/2022 0.8 0.5 - 1.4 mg/dL Final     Calcium   Date Value Ref Range Status   02/16/2022 9.4 8.7 - 10.5 mg/dL Final     Anion Gap   Date Value Ref Range Status   02/16/2022 11 8 - 16 mmol/L Final     eGFR if    Date Value Ref Range Status   02/16/2022 >60 >60 mL/min/1.73 m^2 Final     eGFR if non    Date Value Ref Range Status   02/16/2022 >60 >60 mL/min/1.73 m^2 Final     Comment:     Calculation used to obtain the estimated glomerular filtration  rate (eGFR) is the CKD-EPI equation.        BNP  @LABRCNTIP(BNP,BNPTRIAGEBLO)@  @LABRCNTIP(troponini)@  CrCl cannot be calculated (Patient's most recent lab result is older than the maximum 7 days allowed.).  No results found in the last 24 hours.  No results found in the last 24 hours.  No results found in the last 24 hours.    Assessment:      1. Essential hypertension    2. Coronary artery disease involving native coronary artery of native heart without angina pectoris    3. Dyslipidemia    4. Chronic obstructive pulmonary disease, unspecified COPD type    5. Heterozygous factor V Leiden mutation    6. Type 2 diabetes mellitus without complication, without long-term current use of insulin        Plan:   Handicap 1 yr for lower back pain  Continue Coumadin Rx for factor V leiden  Continue Amlodipine/olmesartan/HCTZ and statin for HTN and HLD  DM Rx per PCP  Counseled DASH  Check Lipid profile in 6 months  Recommend heart-healthy diet, weight control and regular exercise.  Mine. Risk modification.   I have reviewed all pertinent labs and cardiac studies independently. Plans and recommendations have been formulated under my direct supervision. All questions answered and patient voiced understanding.   If symptoms persist go to the ED  RTC in 12 months

## 2022-06-07 NOTE — PROGRESS NOTES
INR at goal. Medications and chart reviewed. No changes noted to necessitate adjustment of warfarin or follow-up plan. See calendar.  Resume dose and repeat INR in 2 weeks.

## 2022-06-07 NOTE — PROGRESS NOTES
Patient's INR is therapeutic at 2.0.  Patient is post ASUNCION on 6/3/22.  Reports followed previous instructions.  Reports no changes.   Sent to PharmD for dosing/instructions.

## 2022-06-08 ENCOUNTER — PATIENT OUTREACH (OUTPATIENT)
Dept: ADMINISTRATIVE | Facility: HOSPITAL | Age: 72
End: 2022-06-08
Payer: MEDICARE

## 2022-06-08 NOTE — PROGRESS NOTES
Working CAD Report:     Pt due for refill on pravastatin. Spoke with pt, she states she is taking daily. Receiving refills from mail pharmacy. She still has medication remaining from last rx in 2021. She declined sending a new rx to [facility] until she takes what she has.     Advised patient to call for a new rx prior to running out of medication. Pt voiced understanding.

## 2022-06-10 ENCOUNTER — TELEPHONE (OUTPATIENT)
Dept: PAIN MEDICINE | Facility: CLINIC | Age: 72
End: 2022-06-10
Payer: MEDICARE

## 2022-06-10 ENCOUNTER — PATIENT OUTREACH (OUTPATIENT)
Dept: ADMINISTRATIVE | Facility: HOSPITAL | Age: 72
End: 2022-06-10
Payer: MEDICARE

## 2022-06-10 NOTE — TELEPHONE ENCOUNTER
----- Message from Debby Bonner PA-C sent at 6/10/2022  9:51 AM CDT -----  Contact: self    ----- Message -----  From: Maritza Day  Sent: 6/10/2022   9:36 AM CDT  To: [facility] Staff    Alyx Weir would like a rick back at [phone], in regards to her injection. Pt states that she feel as if the pain is worse. She would also like her MRI to be faxed over to [email or web address]. (Fax# [phone])

## 2022-06-10 NOTE — PROGRESS NOTES
Working Diabetes Report.     [facility]-no results found.  [facility]-no results found.  [facility]-no recent results found.    Last A1C 6.0 completed June 2021.  LM to schedule labs.

## 2022-06-10 NOTE — TELEPHONE ENCOUNTER
Reached out to inform pt that it is normal to have pain after receiving an injection due to the numb medication wearing off. Inform pt that it may take 2-3 weeks for the injection to take full affect.  Also  Faxed over MRI note to [facility]. Pt understand. All questions answered.     Cristóbal Lemus  Medical Assistant

## 2022-06-21 ENCOUNTER — ANTI-COAG VISIT (OUTPATIENT)
Dept: CARDIOLOGY | Facility: CLINIC | Age: 72
End: 2022-06-21
Payer: MEDICARE

## 2022-06-21 DIAGNOSIS — D68.51 HETEROZYGOUS FACTOR V LEIDEN MUTATION: Chronic | ICD-10-CM

## 2022-06-21 DIAGNOSIS — Z79.01 LONG TERM (CURRENT) USE OF ANTICOAGULANTS: Primary | ICD-10-CM

## 2022-06-21 LAB — INR PPP: 3.5 (ref 2–3)

## 2022-06-21 PROCEDURE — 85610 PROTHROMBIN TIME: CPT | Mod: QW,S$GLB,, | Performed by: INTERNAL MEDICINE

## 2022-06-21 PROCEDURE — 93793 ANTICOAG MGMT PT WARFARIN: CPT | Mod: S$GLB,,,

## 2022-06-21 PROCEDURE — 85610 POCT INR: ICD-10-PCS | Mod: QW,S$GLB,, | Performed by: INTERNAL MEDICINE

## 2022-06-21 PROCEDURE — 93793 PR ANTICOAGULANT MGMT FOR PT TAKING WARFARIN: ICD-10-PCS | Mod: S$GLB,,,

## 2022-06-21 NOTE — PROGRESS NOTES
INR is supratherapeutic at 3.5.  Patient reports no medication, health or diet changes.  ASUNCION completed on 6/03/2022.  No bleeding issues reported.  Instructions given:  Hold warfarin dose today, then will re-challenge current dose of 5 mg every Friday and 7.5 mg on all other days.  Recheck in 2 weeks (7/07/2022) - [facility].  ED for any abnormal bleeding issues.  Dose calendar given and reviewed with patient.  Patient verbalized understanding.

## 2022-06-29 ENCOUNTER — PATIENT MESSAGE (OUTPATIENT)
Dept: ADMINISTRATIVE | Facility: HOSPITAL | Age: 72
End: 2022-06-29
Payer: MEDICARE

## 2022-07-06 ENCOUNTER — TELEPHONE (OUTPATIENT)
Dept: PAIN MEDICINE | Facility: CLINIC | Age: 72
End: 2022-07-06
Payer: MEDICARE

## 2022-07-07 ENCOUNTER — OFFICE VISIT (OUTPATIENT)
Dept: PAIN MEDICINE | Facility: CLINIC | Age: 72
End: 2022-07-07
Payer: MEDICARE

## 2022-07-07 ENCOUNTER — ANTI-COAG VISIT (OUTPATIENT)
Dept: CARDIOLOGY | Facility: CLINIC | Age: 72
End: 2022-07-07
Payer: MEDICARE

## 2022-07-07 VITALS
BODY MASS INDEX: 29.42 KG/M2 | WEIGHT: 176.56 LBS | DIASTOLIC BLOOD PRESSURE: 77 MMHG | SYSTOLIC BLOOD PRESSURE: 144 MMHG | HEIGHT: 65 IN | HEART RATE: 54 BPM

## 2022-07-07 DIAGNOSIS — M51.36 DDD (DEGENERATIVE DISC DISEASE), LUMBAR: Primary | ICD-10-CM

## 2022-07-07 DIAGNOSIS — Z79.01 LONG TERM (CURRENT) USE OF ANTICOAGULANTS: Primary | ICD-10-CM

## 2022-07-07 DIAGNOSIS — D68.51 HETEROZYGOUS FACTOR V LEIDEN MUTATION: Chronic | ICD-10-CM

## 2022-07-07 LAB — INR PPP: 2.2 (ref 2–3)

## 2022-07-07 PROCEDURE — 3078F DIAST BP <80 MM HG: CPT | Mod: CPTII,S$GLB,, | Performed by: PHYSICIAN ASSISTANT

## 2022-07-07 PROCEDURE — 1160F RVW MEDS BY RX/DR IN RCRD: CPT | Mod: CPTII,S$GLB,, | Performed by: PHYSICIAN ASSISTANT

## 2022-07-07 PROCEDURE — 99214 PR OFFICE/OUTPT VISIT, EST, LEVL IV, 30-39 MIN: ICD-10-PCS | Mod: S$GLB,,, | Performed by: PHYSICIAN ASSISTANT

## 2022-07-07 PROCEDURE — 85610 POCT INR: ICD-10-PCS | Mod: QW,S$GLB,, | Performed by: INTERNAL MEDICINE

## 2022-07-07 PROCEDURE — 99214 OFFICE O/P EST MOD 30 MIN: CPT | Mod: S$GLB,,, | Performed by: PHYSICIAN ASSISTANT

## 2022-07-07 PROCEDURE — 3077F PR MOST RECENT SYSTOLIC BLOOD PRESSURE >= 140 MM HG: ICD-10-PCS | Mod: CPTII,S$GLB,, | Performed by: PHYSICIAN ASSISTANT

## 2022-07-07 PROCEDURE — 1125F PR PAIN SEVERITY QUANTIFIED, PAIN PRESENT: ICD-10-PCS | Mod: CPTII,S$GLB,, | Performed by: PHYSICIAN ASSISTANT

## 2022-07-07 PROCEDURE — 1101F PT FALLS ASSESS-DOCD LE1/YR: CPT | Mod: CPTII,S$GLB,, | Performed by: PHYSICIAN ASSISTANT

## 2022-07-07 PROCEDURE — 99999 PR PBB SHADOW E&M-EST. PATIENT-LVL IV: CPT | Mod: PBBFAC,,, | Performed by: PHYSICIAN ASSISTANT

## 2022-07-07 PROCEDURE — 3288F FALL RISK ASSESSMENT DOCD: CPT | Mod: CPTII,S$GLB,, | Performed by: PHYSICIAN ASSISTANT

## 2022-07-07 PROCEDURE — 93793 ANTICOAG MGMT PT WARFARIN: CPT | Mod: S$GLB,,,

## 2022-07-07 PROCEDURE — 1160F PR REVIEW ALL MEDS BY PRESCRIBER/CLIN PHARMACIST DOCUMENTED: ICD-10-PCS | Mod: CPTII,S$GLB,, | Performed by: PHYSICIAN ASSISTANT

## 2022-07-07 PROCEDURE — 3008F BODY MASS INDEX DOCD: CPT | Mod: CPTII,S$GLB,, | Performed by: PHYSICIAN ASSISTANT

## 2022-07-07 PROCEDURE — 3077F SYST BP >= 140 MM HG: CPT | Mod: CPTII,S$GLB,, | Performed by: PHYSICIAN ASSISTANT

## 2022-07-07 PROCEDURE — 3078F PR MOST RECENT DIASTOLIC BLOOD PRESSURE < 80 MM HG: ICD-10-PCS | Mod: CPTII,S$GLB,, | Performed by: PHYSICIAN ASSISTANT

## 2022-07-07 PROCEDURE — 1159F MED LIST DOCD IN RCRD: CPT | Mod: CPTII,S$GLB,, | Performed by: PHYSICIAN ASSISTANT

## 2022-07-07 PROCEDURE — 1101F PR PT FALLS ASSESS DOC 0-1 FALLS W/OUT INJ PAST YR: ICD-10-PCS | Mod: CPTII,S$GLB,, | Performed by: PHYSICIAN ASSISTANT

## 2022-07-07 PROCEDURE — 3288F PR FALLS RISK ASSESSMENT DOCUMENTED: ICD-10-PCS | Mod: CPTII,S$GLB,, | Performed by: PHYSICIAN ASSISTANT

## 2022-07-07 PROCEDURE — 1159F PR MEDICATION LIST DOCUMENTED IN MEDICAL RECORD: ICD-10-PCS | Mod: CPTII,S$GLB,, | Performed by: PHYSICIAN ASSISTANT

## 2022-07-07 PROCEDURE — 99999 PR PBB SHADOW E&M-EST. PATIENT-LVL IV: ICD-10-PCS | Mod: PBBFAC,,, | Performed by: PHYSICIAN ASSISTANT

## 2022-07-07 PROCEDURE — 85610 PROTHROMBIN TIME: CPT | Mod: QW,S$GLB,, | Performed by: INTERNAL MEDICINE

## 2022-07-07 PROCEDURE — 1125F AMNT PAIN NOTED PAIN PRSNT: CPT | Mod: CPTII,S$GLB,, | Performed by: PHYSICIAN ASSISTANT

## 2022-07-07 PROCEDURE — 3008F PR BODY MASS INDEX (BMI) DOCUMENTED: ICD-10-PCS | Mod: CPTII,S$GLB,, | Performed by: PHYSICIAN ASSISTANT

## 2022-07-07 PROCEDURE — 93793 PR ANTICOAGULANT MGMT FOR PT TAKING WARFARIN: ICD-10-PCS | Mod: S$GLB,,,

## 2022-07-07 RX ORDER — GABAPENTIN 300 MG/1
900 CAPSULE ORAL NIGHTLY
Qty: 30 CAPSULE | Refills: 2 | Status: SHIPPED | OUTPATIENT
Start: 2022-07-07 | End: 2022-10-03 | Stop reason: SDUPTHER

## 2022-07-07 RX ORDER — METHOCARBAMOL 500 MG/1
500 TABLET, FILM COATED ORAL 3 TIMES DAILY PRN
Qty: 90 TABLET | Refills: 1 | Status: SHIPPED | OUTPATIENT
Start: 2022-07-07 | End: 2023-08-22

## 2022-07-07 NOTE — PROGRESS NOTES
INR is therapeutic at 2.2.  Patient reports no recent changes.  Previous warfarin instructions were verified and followed.  Instructions given to continue 5 mg every Friday and 7.5 mg on all other days as directed.  Recheck in 3 weeks.  Dose calendar given.  Patient verbalized understanding.

## 2022-07-07 NOTE — PROGRESS NOTES
Established Patient Chronic Pain Note     Referring Physician: No ref. provider found    PCP: Carl Clemons MD    Chief Complaint:   Chief Complaint   Patient presents with    Follow-up     0% relief since last inj, pt states pain in low back radiating down L leg, BL upper leg pain, BL buttock pain         SUBJECTIVE:  Interval History (7/7/2022): Alyx Weir presents today for follow-up visit.  she underwent bilateral L4/5 TF ASUNCION on 6/3/22.  The patient reports that she is/was unchanged following the procedure.  she reports little to no relief.      Interval history 05/17/2022  Patient presents for MRI review.  MRI demonstrates prior L5-S1 laminectomy.  Patient again reports lower back pain which radiates primarily down the lateral and posterior aspects of bilateral lower extremities in L4-S1 distribution to the foot.  Pain today is rated a 10/10.  Patient does report associated weakness in lower extremities associated with her pain.  Patient has continued gabapentin 3 pills in the evening with only marginal improvement in her symptoms.  Patient reports recently ambulating with a cane for additional stabilization and support.  Patient is interested in pursuing interventional treatment.    HPI 02/22/2022  Alyx Weir is a 72 y.o. female with past medical history significant for COPD, degenerative disc disease, coronary artery disease, hypertension, diastolic dysfunction with preserved systolic function, right carotid artery stenosis, history of deep vein thrombosis, diabetes, obstructive sleep apnea who presents to the clinic for the evaluation of lower back and leg pain.  Patient reports longstanding history of lower back pain which began after mechanical fall in the 1980s.  Patient reports resultant lower back surgery in 1985 with Dr. Eliot Ortiz at [facility], which she believes was at the L4-5 level.  Patient reports improvement in lower back and leg pain with reoccurrence over the  last 10 years.      Today patient reports pain in a bandlike distribution in her lower back which radiates down the lateral and posterior aspects of bilateral lower extremities to the knee and down to the dorsum of the right foot.  Pain is constant and described as aching in nature.  Pain is exacerbated with lumbar extension as well as with ambulation.  Patient is able to ambulate less than 1 block before requiring rest.  Pain is improved with heat, physical therapy in combination of Tylenol and gabapentin.  Patient is taking gabapentin 100 mg in the evening.  Patient does endorse weakness in the lower extremities associated with her pain.  Patient reports frequent spasm like sensation in the lower extremities.  Patient also does report significant improvement in her symptoms with lumbar epidural steroid injection 2016.     Patient reports significant motor weakness.  Patient denies night fever/night sweats, urinary incontinence, bowel incontinence and significant weight loss.    Patient last saw Dr. Pressley 03/11/2020 with continuation of gabapentin 100 mg in the morning and 300 mg in the evening, continuation of physical therapy    Pain Disability Index Review:     Last 3 PDI Scores 7/7/2022 5/17/2022   Pain Disability Index (PDI) 53 69       Non-Pharmacologic Treatments:  Physical Therapy/Home Exercise: yes  Ice/Heat:yes  TENS: no  Acupuncture: no  Massage: no  Chiropractic: no    Other: no      Pain Medications:  - Adjuvant Medications: Neurontin (Gabapentin), Topical Ointment (Voltaren Gel, Steroid cream, Anti-Inflammatory Cream, Compound cream) and Tylenol (Acetaminophen)  - Anti-Coagulants: Coumadin ( Warfarin)    Pain Procedures:   2/2016: LESI  bilateral L4/5 TF ASUNCION on 6/3/22    Past Medical History:   Diagnosis Date    Anticoagulated on Coumadin     Arm weakness-rotator cuff weakness 11/2/2015    Arthritis     Asthma     Clotting disorder     Coronary artery disease     Deep vein thrombosis      Degenerative disc disease     Diabetes mellitus type I 2011    BS last tested x 1 month not sure what it was.    Heterozygous factor V Leiden mutation     Hx of colonic polyps 11/13/2015    Hyperlipidemia     Hypertension     VIRGIL (obstructive sleep apnea)     Stenosis of right carotid artery 12/13/2016     Past Surgical History:   Procedure Laterality Date    BACK SURGERY      bladder cyst removal      BLADDER SURGERY      CARDIAC CATHETERIZATION  03/2013    mild CAD    COLONOSCOPY N/A 11/13/2015    Procedure: COLONOSCOPY;  Surgeon: Jas Umanzor III, MD;  Location: [facility] ENDO;  Service: Endoscopy;  Laterality: N/A;    HYSTERECTOMY      28 yrs old    LUMBAR SPINE SURGERY      bone spurs removed    OOPHORECTOMY      SELECTIVE INJECTION OF ANESTHETIC AGENT AROUND LUMBAR SPINAL NERVE ROOT BY TRANSFORAMINAL APPROACH Bilateral 6/3/2022    Procedure: Bilateral L4/5 TF ASUNCION with RN IV sedation; on Coumadin, will need INR prior to procedure, must be less than 1.3;  Surgeon: Mario Palacios MD;  Location: [facility];  Service: Pain Management;  Laterality: Bilateral;     Review of patient's allergies indicates:   Allergen Reactions    Erythromycin Edema     Angioedema to throat    Amlodipine Other (See Comments)     Headaches    Diazepam Hives    Iodine Hives    Meperidine Hives    Morphine Itching    Methylprednisolone sod suc(pf) Other (See Comments)     headache    Primidone Other (See Comments)       Current Outpatient Medications   Medication Sig    ACCU-CHEK GUIDE ME GLUCOSE MTR Misc USE TO CHECK BLOOD SUGAR TWICE DAILY    acetaminophen (TYLENOL ARTHRITIS ORAL) Take by mouth.    allopurinoL (ZYLOPRIM) 100 MG tablet TAKE 1 TABLET EVERY DAY    blood sugar diagnostic Strp To check BG 2 times daily, to use with insurance preferred meter    diclofenac sodium (VOLTAREN) 1 % Gel APPLY 2 GRAMS TOPICALLY DAILY AS NEEDED    lancets Misc To check BG 2 times daily, to use with insurance preferred  meter    neomycin-polymyxin-hydrocortisone (CORTISPORIN) 3.5-10,000-1 mg/mL-unit/mL-% otic suspension Place 3 drops into both ears 3 (three) times daily as needed.    olmesartan-amLODIPin-hcthiazid 40-10-25 mg Tab Take 1 tablet by mouth once daily.    om 3/E/linol/ala/oleic/gla/lip (OMEGA 3-6-9 ORAL) Take 1 capsule by mouth once daily.    pantoprazole (PROTONIX) 40 MG tablet TAKE 1 TABLET(40 MG) BY MOUTH EVERY DAY    pravastatin (PRAVACHOL) 40 MG tablet TAKE 1 TABLET EVERY DAY    sucralfate (CARAFATE) 100 mg/mL suspension Take 10 mLs (1 g total) by mouth 4 (four) times daily with meals and nightly.    triamcinolone acetonide 0.1% (KENALOG) 0.1 % ointment Apply topically 2 (two) times daily as needed.    vitamin D 1000 units Tab Take 185 mg by mouth once daily.    warfarin (COUMADIN) 5 MG tablet TAKE 1 AND 1/2 TABLETS (7.5MG) ON MONDAYS, WEDNESDAYS AND FRIDAYS AND 1 TABLET (5MG) ON ALL OTHER DAYS OF THE WEEK (Patient taking differently: Take 7.5 mg by mouth Daily. Except 1 tablet (5mg) every Friday.)    gabapentin (NEURONTIN) 300 MG capsule Take 3 capsules (900 mg total) by mouth every evening.    methocarbamoL (ROBAXIN) 500 MG Tab Take 1 tablet (500 mg total) by mouth 3 (three) times daily as needed (muscle spasms). May cause drowsiness.     No current facility-administered medications for this visit.       Review of Systems     GENERAL:  No weight loss, malaise or fevers.  HEENT:   No recent changes in vision or hearing  NECK:  Negative for lumps, no difficulty with swallowing.  RESPIRATORY:  Negative for cough, wheezing or shortness of breath, patient denies any recent URI.  CARDIOVASCULAR:  Negative for chest pain, leg swelling or palpitations.  GI:  Negative for abdominal discomfort, blood in stools or black stools or change in bowel habits.  MUSCULOSKELETAL:  See HPI.  SKIN:  Negative for lesions, rash, and itching.  PSYCH:  No mood disorder or recent psychosocial stressors.   HEMATOLOGY/LYMPHOLOGY:   "Negative for prolonged bleeding, bruising easily or swollen nodes.    NEURO:   No history of headaches, syncope, paralysis, seizures or tremors.  All other reviewed and negative other than HPI.    OBJECTIVE:    BP (!) 144/77   Pulse (!) 54   Ht 5' 5" (1.651 m)   Wt 80.1 kg (176 lb 9.4 oz)   BMI 29.39 kg/m²       Physical Exam    GENERAL: Well appearing, in no acute distress, alert and oriented x3.  PSYCH:  Mood and affect appropriate.  SKIN: Skin color, texture, turgor normal, no rashes or lesions.  HEAD/FACE:  Normocephalic, atraumatic. Cranial nerves grossly intact.    CV: RRR with palpation of the radial artery.  PULM: No evidence of respiratory difficulty, symmetric chest rise.  GI:  Soft and non-tender.    BACK: Straight leg raising in the sitting and supine positions is negative to radicular pain. palpation over the facet joints of the lumbar spine or spinous processes. Normal range of motion without pain reproduction.  EXTREMITIES: Peripheral joint ROM is full and pain free without obvious instability or laxity in all four extremities. No deformities, edema, or skin discoloration. Good capillary refill.  MUSCULOSKELETAL: Able to stand on heels & toes.    hip, and knee provocative maneuvers are negative.   pain with palpation over the sacroiliac joints and greater trochanteric bursa bilaterally.  FABERs test is positive.  Facet loading test is positive bilaterally.   Bilateral upper and lower extremity strength is normal and symmetric.  No atrophy or tone abnormalities are noted.  RIGHT Lower extremity: Hip flexion 5/5, Hip Abduction 5/5, Hip Adduction 5/5, Knee extension 5/5, Knee flexion 5/5, Ankle dorsiflexion5/5, Extensor hallucis longus 5/5, Ankle plantarflexion 5/5  LEFT Lower extremity:  Hip flexion 5/5, Hip Abduction 5/5,Hip Adduction 5/5, Knee extension 5/5, Knee flexion 5/5, Ankle dorsiflexion 5/5, Extensor hallucis longus 5/5, Ankle plantarflexion 5/5  -Normal testing knee (patellar) jerk and " ankle (achilles) jerk    NEURO: Bilateral upper and lower extremity coordination and muscle stretch reflexes are physiologic and symmetric. No loss of sensation is noted.  GAIT: normal.    Imaging:   MRI lumbar spine 02/22/2022  FINDINGS:  Alignment: Grade 1 anterolisthesis of L4 on L5 appears similar to priors.     Vertebrae: No evidence of fracture or marrow replacement process.     Discs: There is severe disc height loss at L4-5 which may be slightly worsened from previous MRI.  Disc desiccation noted from L3-4 through L5-S1.     Cord: Normal.  Conus terminates at T12-L1.     Degenerative findings:     T12-L1:  No spinal canal or neuroforaminal stenosis.     L1-L2: Moderate bilateral facet arthropathy.  Mild generalized disc bulge.  Slight crowding of the lateral recesses, otherwise no spinal canal stenosis or neural foraminal narrowing.     L2-L3: Mild generalized disc bulge.  Moderate bilateral facet arthropathy. No spinal canal or neuroforaminal stenosis.     L3-L4: Severe bilateral facet arthropathy.  Mild generalized disc bulge.  Mild spinal canal stenosis with moderate right and mild left neural foraminal narrowing.     L4-L5: Severe bilateral facet arthropathy.  Uncovering of the intervertebral disc.  Mild spinal canal stenosis with moderate bilateral neural foraminal narrowing.     L5-S1: Severe bilateral facet arthropathy and mild generalized disc bulge.  Prior laminectomies.  Mild to moderate spinal canal stenosis with mild bilateral neural foraminal narrowing.     Paraspinal muscles & soft tissues: Unremarkable.      01/28/20    X-Ray Lumbar Complete With Flex And Ext  FINDINGS:  Grade 1 spondylolisthesis of L4 on L5 which appears slightly increased since the comparison exam from 2013.  This currently measures on the order of 7 mm.  This appears similar on both flexion/extension maneuvers.  Bones are demineralized.  No acute fracture.  Discogenic degenerative changes at L4-L5 with multilevel lower  lumbar facet arthropathy.      ASSESSMENT: 72 y.o. year old female with lower back and leg pain, consistent with     1. DDD (degenerative disc disease), lumbar  Ambulatory referral/consult to Neurosurgery    gabapentin (NEURONTIN) 300 MG capsule    methocarbamoL (ROBAXIN) 500 MG Tab         PLAN:   - Interventions: None at this time    S/p bilateral L4/5 transforaminal epidural steroid injection with limited relief     - Anticoagulation use: yes Coumadin     report:  Reviewed and consistent with medication use as prescribed.    - Medications:  --  We have discussed continuing gabapentin. We have reviewed potential side effects of this medication including daytime somnolence, weight gain and peripheral edema  Gabapentin : 900 mg QHS    - Therapy:  We discussed continuing physical therapy to help manage the patient/s painful condition. The patient was counseled that muscle strengthening will improve the long term prognosis in regards to pain and may also help increase range of motion and mobility.     - Imaging: Reviewed available imaging with patient and answered any questions they had regarding study    -Consults/referral: Refer to Neurosurgery for further evaluation per patient request    - Follow up visit: return to clinic in 4-6 weeks      The above plan and management options were discussed at length with patient. Patient is in agreement with the above and verbalized understanding.    - I discussed the goals of interventional chronic pain management with the patient on today's visit. We discussed a multimodal and systematic approach to pain.  This includes diagnostic and therapeutic injections, adjuvant pharmacologic treatment, physical therapy, and at times psychiatry.  I emphasized the importance of regular exercise, core strengthening and stretching, diet and weight loss as a cornerstone of long-term pain management.    - This condition does not require this patient to take time off of work, and the  primary goal of our Pain Management services is to improve the patient's functional capacity.  - Patient Questions: Answered all of the patient's questions regarding diagnoses, therapy, treatment and next steps        Aimee Zepeda PA-C  Interventional Pain Management  [facility]    Disclaimer:  This note was prepared using voice recognition system and is likely to have sound alike errors that may have been overlooked even after proof reading.  Please call me with any questions

## 2022-07-13 ENCOUNTER — TELEPHONE (OUTPATIENT)
Dept: PAIN MEDICINE | Facility: CLINIC | Age: 72
End: 2022-07-13
Payer: MEDICARE

## 2022-07-13 NOTE — TELEPHONE ENCOUNTER
Pt states the medication she received for her back pain and and hip is not getting better and it is hard for her to sit/lay down or to get up. Pt would like to know if she can be seen today or get a different medicine to try. Pt takes Jimbo 900mg 3x. Robaxin 3x a day. Last seen 7/07/22   Pt went to PT yesterday and said that is only temporary relief. Iform pt that I will give her a call back with more information. Please advise.    Cristóbal Lemus   Medical Assistant

## 2022-07-13 NOTE — TELEPHONE ENCOUNTER
----- Message from Mirela Mendez sent at 7/13/2022  8:16 AM CDT -----  Pt states the medication she received for her back pain is not getting better and it is hard for her to sit/lay down or to get up. Pt would like to know if she can be seen today or get a different medicine to try. Please call .[phone]

## 2022-07-15 ENCOUNTER — TELEPHONE (OUTPATIENT)
Dept: PAIN MEDICINE | Facility: CLINIC | Age: 72
End: 2022-07-15
Payer: MEDICARE

## 2022-07-15 DIAGNOSIS — M54.9 DORSALGIA, UNSPECIFIED: Primary | ICD-10-CM

## 2022-07-15 RX ORDER — DICLOFENAC SODIUM 10 MG/G
2 GEL TOPICAL 4 TIMES DAILY PRN
Qty: 1 EACH | Refills: 2 | Status: CANCELLED | OUTPATIENT
Start: 2022-07-15 | End: 2022-08-14

## 2022-07-15 NOTE — TELEPHONE ENCOUNTER
"Reached out to patient to informed her that  said that she can send in Voltaren cream to the pharmacy and resubmit order for compound cream. Patient is on Coumadin, she cannot take Anti-inflammatory medications, this is contraindicated in patients on blood thinners such as Coumadin. Pt declined and said that she tired that before and it did not help, she can get that from OTC. Pt said, "nevermind thanks for the help" Inform pt of her next appt. Pt understand. All questions answered.     Cristóbal Lemus  Medical Assistant   "

## 2022-07-15 NOTE — TELEPHONE ENCOUNTER
----- Message from Hanna Holloway sent at 7/15/2022  8:04 AM CDT -----  Contact: Alyx Woodson is requesting a call to follow up on options for pain relief. Please call her back at [phone].          Thanks  DD

## 2022-07-15 NOTE — TELEPHONE ENCOUNTER
----- Message from Hanna Holloway sent at 7/15/2022 12:26 PM CDT -----  Contact: Alyx  .Type:  Patient Returning Call    Who Called:Alyx  Who Left Message for Patient:Cristóbal  Does the patient know what this is regarding?:pain relief   Would the patient rather a call back or a response via [facility]? Call back   Best Call Back Number:[phone] ask for alteration dept or [phone]  Additional Information: n/a            Thanks  DD

## 2022-07-15 NOTE — TELEPHONE ENCOUNTER
Attempt to reach patient to  Her that  said that she did not recive any relief from the previous injection, she placed a referral to Neurosurgery per her request. Also the patient could consider switching to Lyrica to see if it offers more relief. Left message on patients voice mail to call back at earliest convenience.    Cristóbal Lemus  Medical Assistant

## 2022-07-15 NOTE — TELEPHONE ENCOUNTER
Reached out to patient to inorm her that  said that she did not recive any relief from the previous injection, she placed a referral to Neurosurgery per her request. Also the patient could consider switching to Lyrica to see if it offers more relief. . Pt decline lyrica but is requesting for an inflammatory and a compund cream. Also she said that she will go to her Neuro appt.   Pharmacy  [id number] - [facility] [address] [address]    Cristóbal Allan  Medical Assistant

## 2022-07-21 ENCOUNTER — TELEPHONE (OUTPATIENT)
Dept: NEUROSURGERY | Facility: CLINIC | Age: 72
End: 2022-07-21
Payer: MEDICARE

## 2022-07-21 NOTE — TELEPHONE ENCOUNTER
Reached out to patient for scheduling from Neurosurgery W.  Patient stated that she is not interested in any surgical intervention at this time.  No appt made.    Bryanna Mills RN

## 2022-07-28 ENCOUNTER — TELEPHONE (OUTPATIENT)
Dept: CARDIOLOGY | Facility: CLINIC | Age: 72
End: 2022-07-28
Payer: MEDICARE

## 2022-08-01 ENCOUNTER — OFFICE VISIT (OUTPATIENT)
Dept: PODIATRY | Facility: CLINIC | Age: 72
End: 2022-08-01
Payer: MEDICARE

## 2022-08-01 ENCOUNTER — IMMUNIZATION (OUTPATIENT)
Dept: PRIMARY CARE CLINIC | Facility: CLINIC | Age: 72
End: 2022-08-01
Payer: MEDICARE

## 2022-08-01 ENCOUNTER — ANTI-COAG VISIT (OUTPATIENT)
Dept: CARDIOLOGY | Facility: CLINIC | Age: 72
End: 2022-08-01
Payer: MEDICARE

## 2022-08-01 DIAGNOSIS — D68.51 HETEROZYGOUS FACTOR V LEIDEN MUTATION: Chronic | ICD-10-CM

## 2022-08-01 DIAGNOSIS — E11.49 TYPE II DIABETES MELLITUS WITH NEUROLOGICAL MANIFESTATIONS: ICD-10-CM

## 2022-08-01 DIAGNOSIS — Z79.01 ANTICOAGULATED ON COUMADIN: ICD-10-CM

## 2022-08-01 DIAGNOSIS — M10.072 ACUTE IDIOPATHIC GOUT INVOLVING TOE OF LEFT FOOT: Primary | ICD-10-CM

## 2022-08-01 DIAGNOSIS — Z79.01 LONG TERM (CURRENT) USE OF ANTICOAGULANTS: Primary | ICD-10-CM

## 2022-08-01 DIAGNOSIS — Z23 NEED FOR VACCINATION: Primary | ICD-10-CM

## 2022-08-01 LAB — INR PPP: 5 (ref 2–3)

## 2022-08-01 PROCEDURE — 99214 OFFICE O/P EST MOD 30 MIN: CPT | Mod: S$GLB,,, | Performed by: PODIATRIST

## 2022-08-01 PROCEDURE — 93793 PR ANTICOAGULANT MGMT FOR PT TAKING WARFARIN: ICD-10-PCS | Mod: S$GLB,,,

## 2022-08-01 PROCEDURE — 93793 ANTICOAG MGMT PT WARFARIN: CPT | Mod: S$GLB,,,

## 2022-08-01 PROCEDURE — 99999 PR PBB SHADOW E&M-EST. PATIENT-LVL III: CPT | Mod: PBBFAC,,, | Performed by: PODIATRIST

## 2022-08-01 PROCEDURE — 99999 PR PBB SHADOW E&M-EST. PATIENT-LVL III: ICD-10-PCS | Mod: PBBFAC,,, | Performed by: PODIATRIST

## 2022-08-01 PROCEDURE — 99214 PR OFFICE/OUTPT VISIT, EST, LEVL IV, 30-39 MIN: ICD-10-PCS | Mod: S$GLB,,, | Performed by: PODIATRIST

## 2022-08-01 PROCEDURE — 1160F PR REVIEW ALL MEDS BY PRESCRIBER/CLIN PHARMACIST DOCUMENTED: ICD-10-PCS | Mod: CPTII,S$GLB,, | Performed by: PODIATRIST

## 2022-08-01 PROCEDURE — 1101F PR PT FALLS ASSESS DOC 0-1 FALLS W/OUT INJ PAST YR: ICD-10-PCS | Mod: CPTII,S$GLB,, | Performed by: PODIATRIST

## 2022-08-01 PROCEDURE — 1101F PT FALLS ASSESS-DOCD LE1/YR: CPT | Mod: CPTII,S$GLB,, | Performed by: PODIATRIST

## 2022-08-01 PROCEDURE — 1159F PR MEDICATION LIST DOCUMENTED IN MEDICAL RECORD: ICD-10-PCS | Mod: CPTII,S$GLB,, | Performed by: PODIATRIST

## 2022-08-01 PROCEDURE — 85610 PROTHROMBIN TIME: CPT | Mod: QW,S$GLB,, | Performed by: INTERNAL MEDICINE

## 2022-08-01 PROCEDURE — 3288F FALL RISK ASSESSMENT DOCD: CPT | Mod: CPTII,S$GLB,, | Performed by: PODIATRIST

## 2022-08-01 PROCEDURE — 1160F RVW MEDS BY RX/DR IN RCRD: CPT | Mod: CPTII,S$GLB,, | Performed by: PODIATRIST

## 2022-08-01 PROCEDURE — 85610 POCT INR: ICD-10-PCS | Mod: QW,S$GLB,, | Performed by: INTERNAL MEDICINE

## 2022-08-01 PROCEDURE — 1159F MED LIST DOCD IN RCRD: CPT | Mod: CPTII,S$GLB,, | Performed by: PODIATRIST

## 2022-08-01 PROCEDURE — 3288F PR FALLS RISK ASSESSMENT DOCUMENTED: ICD-10-PCS | Mod: CPTII,S$GLB,, | Performed by: PODIATRIST

## 2022-08-01 PROCEDURE — 0054A COVID-19, MRNA, LNP-S, PF, 30 MCG/0.3 ML DOSE VACCINE (PFIZER): CPT | Mod: CV19,PBBFAC | Performed by: FAMILY MEDICINE

## 2022-08-01 RX ORDER — COLCHICINE 0.6 MG/1
0.6 CAPSULE ORAL DAILY
Qty: 4 CAPSULE | Refills: 0 | Status: SHIPPED | OUTPATIENT
Start: 2022-08-01 | End: 2022-08-04 | Stop reason: SDUPTHER

## 2022-08-01 NOTE — PROGRESS NOTES
INR: 5.0 - supratherapeutic.  Patient reports gout pain - taking allopurinol and will be staring colchicine today (4 days).  No bleeding issues reported.  Current warfarin dose verified and followed.  Instructions given to hold warfarin dose today and tomorrow (8/02), then resume current dose of 5 mg every Friday and 7.5 mg on all other days as directed.  ED for any abnormal bleeding.  Recheck next Monday, 8/08/2022.  Dose calendar given and reviewed with patient.  Patient verbalized understanding.

## 2022-08-01 NOTE — PROGRESS NOTES
Subjective:       Patient ID: Alyx Weir is a 72 y.o. female.    Chief Complaint: Gout (Patient reports gout flare up to left MTP joint. She complains of 7/10 pain at present. Some redness and swelling noted to area. )      HPI: Alyx Weir presents to the office today with the chief complaint of severe pains to the left foot at the great toe. Pains are rated at approx. 7/10. Pains are described as sharp and intense in nature. Patient states these symptoms started approx. 4 days ago. Prolonged walking and standing as well as direct palpation and pressure worsens the pains. Patient denies trauma. Reports extreme warmth and redness about the area. States moderate swelling as well.  Unable use anti-inflammatories due to warfarin use.  Patient's Primary Care Provider is Carl Clemons MD.     Review of patient's allergies indicates:   Allergen Reactions    Erythromycin Edema     Angioedema to throat    Amlodipine Other (See Comments)     Headaches    Diazepam Hives    Iodine Hives    Meperidine Hives    Morphine Itching    Methylprednisolone sod suc(pf) Other (See Comments)     headache    Primidone Other (See Comments)       Past Medical History:   Diagnosis Date    Anticoagulated on Coumadin     Arm weakness-rotator cuff weakness 11/2/2015    Arthritis     Asthma     Clotting disorder     Coronary artery disease     Deep vein thrombosis     Degenerative disc disease     Diabetes mellitus type I 2011    BS last tested x 1 month not sure what it was.    Heterozygous factor V Leiden mutation     Hx of colonic polyps 11/13/2015    Hyperlipidemia     Hypertension     VIRGIL (obstructive sleep apnea)     Stenosis of right carotid artery 12/13/2016       Family History   Problem Relation Age of Onset    Heart disease Mother     Hypertension Mother     Cataracts Mother     Hypertension Sister     Glaucoma Sister     Hypertension Brother     Diabetes Father     Diabetes Paternal  Uncle     Hypertension Sister     Diabetes Sister     Hypertension Sister     Diabetes Sister     Breast cancer Neg Hx     Colon cancer Neg Hx     Ovarian cancer Neg Hx        Social History     Socioeconomic History    Marital status:    Tobacco Use    Smoking status: Never Smoker    Smokeless tobacco: Never Used   Substance and Sexual Activity    Alcohol use: No    Drug use: No   Social History Narrative    Dogs in household, no smokers.       Past Surgical History:   Procedure Laterality Date    BACK SURGERY      bladder cyst removal      BLADDER SURGERY      CARDIAC CATHETERIZATION  03/2013    mild CAD    COLONOSCOPY N/A 11/13/2015    Procedure: COLONOSCOPY;  Surgeon: Jas Umanzor III, MD;  Location: [facility] ENDO;  Service: Endoscopy;  Laterality: N/A;    HYSTERECTOMY      28 yrs old    LUMBAR SPINE SURGERY      bone spurs removed    OOPHORECTOMY      SELECTIVE INJECTION OF ANESTHETIC AGENT AROUND LUMBAR SPINAL NERVE ROOT BY TRANSFORAMINAL APPROACH Bilateral 6/3/2022    Procedure: Bilateral L4/5 TF ASUNCION with RN IV sedation; on Coumadin, will need INR prior to procedure, must be less than 1.3;  Surgeon: Mario Palacios MD;  Location: [facility];  Service: Pain Management;  Laterality: Bilateral;       Review of Systems      Objective:   There were no vitals taken for this visit.    Physical Exam    LOWER EXTREMITY PHYSICAL EXAMINATION  ORTHOPEDIC: Manual Muscle Testing is 5/5 in all planes on the left, without pains, with and without resistance. Gait pattern is slightly antalgic.    NEUROLOGY: Sensation to light touch is intact. Proprioception is intact.  Vibratory sensation is intact    VASCULAR: On the left foot, the dorsalis pedis pulse is 2/4 and the posterior tibial pulse is 2/4. Capillary refill time is less than 3 seconds. Hair growth is present on the dorsum of the foot and at the digits. Proximal to distal temperature is warm to warm.    DERMATOLOGY: Minimal erythema is  noted to the left great toe. No calor is noted. No cellulitis is noted.      Assessment:     1. Acute idiopathic gout involving toe of left foot    2. Type II diabetes mellitus with neurological manifestations    3. Anticoagulated on Coumadin- Hx of DVT          Plan:     Acute idiopathic gout involving toe of left foot  -     colchicine (MITIGARE) 0.6 mg Cap; Take 1 capsule (0.6 mg total) by mouth once daily for 4 days  Dispense: 4 capsule; Refill: 0    Type II diabetes mellitus with neurological manifestations    Anticoagulated on Coumadin- Hx of DVT      Thorough discussion is had with the patient today, concerning the diagnosis, its etiology, and the treatment algorithm at present.    Patient having signs and symptoms associated with gout to the left great toe.  Has tolerated colchicine in the past without complications.  Will plan to refill patient's medication for her acute flare.  Continue take allopurinol as directed.    Discussed possibility of steroid injection, however patient states having a headache with prednisone oral pack in the past and would like to refrain from steroids if possible.  We also discussed that with the steroid injection, this was elevated blood sugars and may cause changes her diabetes mellitus type 2 for few days.  Patient would like to continue with colchicine.        Future Appointments   Date Time Provider Department Center   8/8/2022  9:40 AM [facility] ON COU  Medical C   8/25/2022  8:00 AM Santino Walsh MD [facility] UROLOGY  Medical C   10/3/2022  1:20 PM Aimee Zepeda PA-C [facility]

## 2022-08-04 DIAGNOSIS — M10.072 ACUTE IDIOPATHIC GOUT INVOLVING TOE OF LEFT FOOT: ICD-10-CM

## 2022-08-04 RX ORDER — COLCHICINE 0.6 MG/1
0.6 CAPSULE ORAL DAILY
Qty: 4 CAPSULE | Refills: 0 | Status: SHIPPED | OUTPATIENT
Start: 2022-08-04 | End: 2022-08-30

## 2022-08-08 ENCOUNTER — ANTI-COAG VISIT (OUTPATIENT)
Dept: CARDIOLOGY | Facility: CLINIC | Age: 72
End: 2022-08-08
Payer: MEDICARE

## 2022-08-08 DIAGNOSIS — Z79.01 LONG TERM (CURRENT) USE OF ANTICOAGULANTS: Primary | ICD-10-CM

## 2022-08-08 DIAGNOSIS — D68.51 HETEROZYGOUS FACTOR V LEIDEN MUTATION: Chronic | ICD-10-CM

## 2022-08-08 LAB — INR PPP: 3.8 (ref 2–3)

## 2022-08-08 PROCEDURE — 85610 POCT INR: ICD-10-PCS | Mod: QW,S$GLB,, | Performed by: INTERNAL MEDICINE

## 2022-08-08 PROCEDURE — 93793 ANTICOAG MGMT PT WARFARIN: CPT | Mod: S$GLB,,,

## 2022-08-08 PROCEDURE — 93793 PR ANTICOAGULANT MGMT FOR PT TAKING WARFARIN: ICD-10-PCS | Mod: S$GLB,,,

## 2022-08-08 PROCEDURE — 85610 PROTHROMBIN TIME: CPT | Mod: QW,S$GLB,, | Performed by: INTERNAL MEDICINE

## 2022-08-08 NOTE — PROGRESS NOTES
INR: 3.8 - remains supratherapeutic, has improved.  Reports colchicine completed yesterday.  Remains on allopurinol.  Denies any bleeding issues.  Previous warfarin instructions were verified and followed.  Instructions given:  Will attempt another hold today, then re-challenge current dose of 5 mg every Friday and 7.5 mg on all other days.  Recheck in 2 weeks.  Bleeding precautions, in place.  Dose calendar given and reviewed with patient.  Patient verbalized understanding.

## 2022-08-09 ENCOUNTER — PATIENT MESSAGE (OUTPATIENT)
Dept: ADMINISTRATIVE | Facility: HOSPITAL | Age: 72
End: 2022-08-09
Payer: MEDICARE

## 2022-08-11 ENCOUNTER — PATIENT OUTREACH (OUTPATIENT)
Dept: ADMINISTRATIVE | Facility: HOSPITAL | Age: 72
End: 2022-08-11
Payer: MEDICARE

## 2022-08-11 NOTE — PROGRESS NOTES
Returned call. States she is not at home. Said when she gets home she will check her med and call tomorrow.

## 2022-08-11 NOTE — PROGRESS NOTES
Working [employer] Statin Report.    Per med card and cardio note 6/07/22, pt is on pravastatin 40 mg.  Snap shot-med dispense history indicates last filled 90 days, 11/18/2021.    LM for pt to contact office.

## 2022-08-16 ENCOUNTER — PATIENT OUTREACH (OUTPATIENT)
Dept: ADMINISTRATIVE | Facility: HOSPITAL | Age: 72
End: 2022-08-16
Payer: MEDICARE

## 2022-08-16 NOTE — PROGRESS NOTES
Working Dm Report:       Pt overdue for DM labs. Called to schedule, no answer-LVM and sent Live Calendarst message.    No Updates found in Care Everywhere, [facility], [facility] or [facility].

## 2022-08-17 ENCOUNTER — LAB VISIT (OUTPATIENT)
Dept: LAB | Facility: HOSPITAL | Age: 72
End: 2022-08-17
Attending: INTERNAL MEDICINE
Payer: MEDICARE

## 2022-08-17 DIAGNOSIS — R10.13 EPIGASTRIC PAIN: ICD-10-CM

## 2022-08-17 LAB
ALBUMIN SERPL BCP-MCNC: 3.8 G/DL (ref 3.5–5.2)
ALP SERPL-CCNC: 55 U/L (ref 55–135)
ALT SERPL W/O P-5'-P-CCNC: 16 U/L (ref 10–44)
ANION GAP SERPL CALC-SCNC: 12 MMOL/L (ref 8–16)
AST SERPL-CCNC: 16 U/L (ref 10–40)
BILIRUB SERPL-MCNC: 1.1 MG/DL (ref 0.1–1)
BUN SERPL-MCNC: 13 MG/DL (ref 8–23)
CALCIUM SERPL-MCNC: 9.8 MG/DL (ref 8.7–10.5)
CHLORIDE SERPL-SCNC: 105 MMOL/L (ref 95–110)
CO2 SERPL-SCNC: 27 MMOL/L (ref 23–29)
CREAT SERPL-MCNC: 0.8 MG/DL (ref 0.5–1.4)
EST. GFR  (NO RACE VARIABLE): >60 ML/MIN/1.73 M^2
GLUCOSE SERPL-MCNC: 98 MG/DL (ref 70–110)
POTASSIUM SERPL-SCNC: 4.4 MMOL/L (ref 3.5–5.1)
PROT SERPL-MCNC: 7.5 G/DL (ref 6–8.4)
SODIUM SERPL-SCNC: 144 MMOL/L (ref 136–145)

## 2022-08-17 PROCEDURE — 36415 COLL VENOUS BLD VENIPUNCTURE: CPT | Mod: PO | Performed by: INTERNAL MEDICINE

## 2022-08-17 PROCEDURE — 80053 COMPREHEN METABOLIC PANEL: CPT | Performed by: INTERNAL MEDICINE

## 2022-08-23 ENCOUNTER — TELEPHONE (OUTPATIENT)
Dept: ADMINISTRATIVE | Facility: HOSPITAL | Age: 72
End: 2022-08-23
Payer: MEDICARE

## 2022-08-26 ENCOUNTER — ANTI-COAG VISIT (OUTPATIENT)
Dept: CARDIOLOGY | Facility: CLINIC | Age: 72
End: 2022-08-26
Payer: MEDICARE

## 2022-08-26 DIAGNOSIS — D68.51 HETEROZYGOUS FACTOR V LEIDEN MUTATION: Chronic | ICD-10-CM

## 2022-08-26 DIAGNOSIS — Z79.01 LONG TERM (CURRENT) USE OF ANTICOAGULANTS: Primary | ICD-10-CM

## 2022-08-26 LAB — INR PPP: 3.5 (ref 2–3)

## 2022-08-26 PROCEDURE — 85610 PROTHROMBIN TIME: CPT | Mod: QW,S$GLB,, | Performed by: INTERNAL MEDICINE

## 2022-08-26 PROCEDURE — 85610 POCT INR: ICD-10-PCS | Mod: QW,S$GLB,, | Performed by: INTERNAL MEDICINE

## 2022-08-26 PROCEDURE — 93793 PR ANTICOAGULANT MGMT FOR PT TAKING WARFARIN: ICD-10-PCS | Mod: S$GLB,,,

## 2022-08-26 PROCEDURE — 93793 ANTICOAG MGMT PT WARFARIN: CPT | Mod: S$GLB,,,

## 2022-08-26 NOTE — PROGRESS NOTES
INR: 3.5 - remains supratherapeutic.  No bleeding issues reported.  Previous warfarin instructions were verified and followed.  Instructions given:  Hold warfarin dose today (Friday), then will decrease overall dose to 5 mg every Monday, Friday; and 7.5 mg on all other days.  Recheck INR in 1 week.  Bleeding precautions, in place - ED for any issues.  Dose calendar given and reviewed with patient.  Patient verbalized understanding.

## 2022-08-30 ENCOUNTER — OFFICE VISIT (OUTPATIENT)
Dept: FAMILY MEDICINE | Facility: CLINIC | Age: 72
End: 2022-08-30
Payer: MEDICARE

## 2022-08-30 ENCOUNTER — LAB VISIT (OUTPATIENT)
Dept: LAB | Facility: HOSPITAL | Age: 72
End: 2022-08-30
Attending: INTERNAL MEDICINE
Payer: MEDICARE

## 2022-08-30 VITALS
BODY MASS INDEX: 28.98 KG/M2 | HEART RATE: 55 BPM | TEMPERATURE: 99 F | RESPIRATION RATE: 16 BRPM | DIASTOLIC BLOOD PRESSURE: 80 MMHG | OXYGEN SATURATION: 100 % | SYSTOLIC BLOOD PRESSURE: 136 MMHG | WEIGHT: 174.19 LBS

## 2022-08-30 DIAGNOSIS — E78.00 PURE HYPERCHOLESTEROLEMIA WITH TARGET LOW DENSITY LIPOPROTEIN (LDL) CHOLESTEROL LESS THAN 130 MG/DL: Chronic | ICD-10-CM

## 2022-08-30 DIAGNOSIS — I25.10 CORONARY ARTERY DISEASE INVOLVING NATIVE CORONARY ARTERY OF NATIVE HEART WITHOUT ANGINA PECTORIS: ICD-10-CM

## 2022-08-30 DIAGNOSIS — D68.51 HETEROZYGOUS FACTOR V LEIDEN MUTATION: Chronic | ICD-10-CM

## 2022-08-30 DIAGNOSIS — Z12.31 ENCOUNTER FOR SCREENING MAMMOGRAM FOR BREAST CANCER: ICD-10-CM

## 2022-08-30 DIAGNOSIS — Z79.01 ANTICOAGULATED ON COUMADIN: Chronic | ICD-10-CM

## 2022-08-30 DIAGNOSIS — Z78.9 STATIN INTOLERANCE: ICD-10-CM

## 2022-08-30 DIAGNOSIS — E11.9 TYPE 2 DIABETES MELLITUS WITHOUT COMPLICATION, WITHOUT LONG-TERM CURRENT USE OF INSULIN: ICD-10-CM

## 2022-08-30 DIAGNOSIS — I10 ESSENTIAL HYPERTENSION: Chronic | ICD-10-CM

## 2022-08-30 DIAGNOSIS — E11.9 TYPE 2 DIABETES MELLITUS WITHOUT COMPLICATION, WITHOUT LONG-TERM CURRENT USE OF INSULIN: Primary | ICD-10-CM

## 2022-08-30 DIAGNOSIS — E78.5 DYSLIPIDEMIA: ICD-10-CM

## 2022-08-30 LAB
CHOLEST SERPL-MCNC: 199 MG/DL (ref 120–199)
CHOLEST/HDLC SERPL: 2.8 {RATIO} (ref 2–5)
ESTIMATED AVG GLUCOSE: 126 MG/DL (ref 68–131)
HBA1C MFR BLD: 6 % (ref 4–5.6)
HDLC SERPL-MCNC: 72 MG/DL (ref 40–75)
HDLC SERPL: 36.2 % (ref 20–50)
LDLC SERPL CALC-MCNC: 117 MG/DL (ref 63–159)
NONHDLC SERPL-MCNC: 127 MG/DL
TRIGL SERPL-MCNC: 50 MG/DL (ref 30–150)

## 2022-08-30 PROCEDURE — 3079F DIAST BP 80-89 MM HG: CPT | Mod: CPTII,S$GLB,, | Performed by: INTERNAL MEDICINE

## 2022-08-30 PROCEDURE — 83036 HEMOGLOBIN GLYCOSYLATED A1C: CPT | Performed by: INTERNAL MEDICINE

## 2022-08-30 PROCEDURE — 3008F PR BODY MASS INDEX (BMI) DOCUMENTED: ICD-10-PCS | Mod: CPTII,S$GLB,, | Performed by: INTERNAL MEDICINE

## 2022-08-30 PROCEDURE — 1101F PT FALLS ASSESS-DOCD LE1/YR: CPT | Mod: CPTII,S$GLB,, | Performed by: INTERNAL MEDICINE

## 2022-08-30 PROCEDURE — 1101F PR PT FALLS ASSESS DOC 0-1 FALLS W/OUT INJ PAST YR: ICD-10-PCS | Mod: CPTII,S$GLB,, | Performed by: INTERNAL MEDICINE

## 2022-08-30 PROCEDURE — 99999 PR PBB SHADOW E&M-EST. PATIENT-LVL IV: ICD-10-PCS | Mod: PBBFAC,,, | Performed by: INTERNAL MEDICINE

## 2022-08-30 PROCEDURE — 99999 PR PBB SHADOW E&M-EST. PATIENT-LVL IV: CPT | Mod: PBBFAC,,, | Performed by: INTERNAL MEDICINE

## 2022-08-30 PROCEDURE — 3079F PR MOST RECENT DIASTOLIC BLOOD PRESSURE 80-89 MM HG: ICD-10-PCS | Mod: CPTII,S$GLB,, | Performed by: INTERNAL MEDICINE

## 2022-08-30 PROCEDURE — 3008F BODY MASS INDEX DOCD: CPT | Mod: CPTII,S$GLB,, | Performed by: INTERNAL MEDICINE

## 2022-08-30 PROCEDURE — 80061 LIPID PANEL: CPT | Performed by: INTERNAL MEDICINE

## 2022-08-30 PROCEDURE — 1125F AMNT PAIN NOTED PAIN PRSNT: CPT | Mod: CPTII,S$GLB,, | Performed by: INTERNAL MEDICINE

## 2022-08-30 PROCEDURE — 1159F PR MEDICATION LIST DOCUMENTED IN MEDICAL RECORD: ICD-10-PCS | Mod: CPTII,S$GLB,, | Performed by: INTERNAL MEDICINE

## 2022-08-30 PROCEDURE — 1159F MED LIST DOCD IN RCRD: CPT | Mod: CPTII,S$GLB,, | Performed by: INTERNAL MEDICINE

## 2022-08-30 PROCEDURE — 36415 COLL VENOUS BLD VENIPUNCTURE: CPT | Mod: PO | Performed by: INTERNAL MEDICINE

## 2022-08-30 PROCEDURE — 1125F PR PAIN SEVERITY QUANTIFIED, PAIN PRESENT: ICD-10-PCS | Mod: CPTII,S$GLB,, | Performed by: INTERNAL MEDICINE

## 2022-08-30 PROCEDURE — 3288F PR FALLS RISK ASSESSMENT DOCUMENTED: ICD-10-PCS | Mod: CPTII,S$GLB,, | Performed by: INTERNAL MEDICINE

## 2022-08-30 PROCEDURE — 3075F PR MOST RECENT SYSTOLIC BLOOD PRESS GE 130-139MM HG: ICD-10-PCS | Mod: CPTII,S$GLB,, | Performed by: INTERNAL MEDICINE

## 2022-08-30 PROCEDURE — 3075F SYST BP GE 130 - 139MM HG: CPT | Mod: CPTII,S$GLB,, | Performed by: INTERNAL MEDICINE

## 2022-08-30 PROCEDURE — 99214 OFFICE O/P EST MOD 30 MIN: CPT | Mod: S$GLB,,, | Performed by: INTERNAL MEDICINE

## 2022-08-30 PROCEDURE — 3288F FALL RISK ASSESSMENT DOCD: CPT | Mod: CPTII,S$GLB,, | Performed by: INTERNAL MEDICINE

## 2022-08-30 PROCEDURE — 99214 PR OFFICE/OUTPT VISIT, EST, LEVL IV, 30-39 MIN: ICD-10-PCS | Mod: S$GLB,,, | Performed by: INTERNAL MEDICINE

## 2022-08-30 RX ORDER — EZETIMIBE 10 MG/1
10 TABLET ORAL DAILY
Qty: 90 TABLET | Refills: 3 | Status: SHIPPED | OUTPATIENT
Start: 2022-08-30 | End: 2023-01-03 | Stop reason: SDUPTHER

## 2022-08-30 NOTE — PROGRESS NOTES
Subjective:       Patient ID: Alyx Weir is a 72 y.o. female.    Chief Complaint: Follow-up, Hypertension, Hyperlipidemia, and Diabetes    Wants off statins--cause cramps-------    Follow-up  Associated symptoms include arthralgias and myalgias. Pertinent negatives include no abdominal pain, chest pain, chills, congestion, coughing, diaphoresis, fatigue, fever, headaches, joint swelling, nausea, neck pain, numbness, rash, sore throat, vomiting or weakness.   Hypertension  Pertinent negatives include no chest pain, headaches, neck pain, palpitations or shortness of breath.   Hyperlipidemia  Associated symptoms include myalgias. Pertinent negatives include no chest pain or shortness of breath.   Diabetes  Pertinent negatives for hypoglycemia include no confusion, dizziness, headaches, nervousness/anxiousness, pallor, seizures, speech difficulty or tremors. Pertinent negatives for diabetes include no chest pain, no fatigue, no polydipsia, no polyphagia, no polyuria and no weakness.   Past Medical History:   Diagnosis Date    Anticoagulated on Coumadin     Arm weakness-rotator cuff weakness 11/2/2015    Arthritis     Asthma     Clotting disorder     Coronary artery disease     Deep vein thrombosis     Degenerative disc disease     Diabetes mellitus type I 2011    BS last tested x 1 month not sure what it was.    Heterozygous factor V Leiden mutation     Hx of colonic polyps 11/13/2015    Hyperlipidemia     Hypertension     VIRGIL (obstructive sleep apnea)     Stenosis of right carotid artery 12/13/2016     Past Surgical History:   Procedure Laterality Date    BACK SURGERY      bladder cyst removal      BLADDER SURGERY      CARDIAC CATHETERIZATION  03/2013    mild CAD    COLONOSCOPY N/A 11/13/2015    Procedure: COLONOSCOPY;  Surgeon: Jas Umanzor III, MD;  Location: [facility];  Service: Endoscopy;  Laterality: N/A;    HYSTERECTOMY      28 yrs old    LUMBAR SPINE SURGERY      bone spurs removed    OOPHORECTOMY       SELECTIVE INJECTION OF ANESTHETIC AGENT AROUND LUMBAR SPINAL NERVE ROOT BY TRANSFORAMINAL APPROACH Bilateral 6/3/2022    Procedure: Bilateral L4/5 TF ASUNCION with RN IV sedation; on Coumadin, will need INR prior to procedure, must be less than 1.3;  Surgeon: Mario Palacios MD;  Location: [facility];  Service: Pain Management;  Laterality: Bilateral;     Family History   Problem Relation Age of Onset    Heart disease Mother     Hypertension Mother     Cataracts Mother     Hypertension Sister     Glaucoma Sister     Hypertension Brother     Diabetes Father     Diabetes Paternal Uncle     Hypertension Sister     Diabetes Sister     Hypertension Sister     Diabetes Sister     Breast cancer Neg Hx     Colon cancer Neg Hx     Ovarian cancer Neg Hx      Social History     Socioeconomic History    Marital status:    Tobacco Use    Smoking status: Never    Smokeless tobacco: Never   Substance and Sexual Activity    Alcohol use: No    Drug use: No   Social History Narrative    Dogs in household, no smokers.     Review of Systems   Constitutional:  Negative for activity change, appetite change, chills, diaphoresis, fatigue, fever and unexpected weight change.   HENT:  Negative for congestion, drooling, ear discharge, ear pain, facial swelling, hearing loss, mouth sores, nosebleeds, postnasal drip, rhinorrhea, sinus pressure, sneezing, sore throat, tinnitus, trouble swallowing and voice change.    Eyes:  Negative for photophobia, redness and visual disturbance.   Respiratory:  Negative for apnea, cough, choking, chest tightness, shortness of breath and wheezing.    Cardiovascular:  Negative for chest pain, palpitations and leg swelling.   Gastrointestinal:  Negative for abdominal distention, abdominal pain, blood in stool, constipation, diarrhea, nausea and vomiting.   Endocrine: Negative for cold intolerance, heat intolerance, polydipsia, polyphagia and polyuria.   Genitourinary:  Negative for decreased urine volume,  difficulty urinating, dysuria, flank pain, frequency, genital sores, hematuria, pelvic pain and urgency.   Musculoskeletal:  Positive for arthralgias, back pain, gait problem and myalgias. Negative for joint swelling, neck pain and neck stiffness.   Skin:  Negative for color change, pallor, rash and wound.   Allergic/Immunologic: Negative for food allergies and immunocompromised state.   Neurological:  Negative for dizziness, tremors, seizures, syncope, speech difficulty, weakness, light-headedness, numbness and headaches.   Hematological:  Negative for adenopathy. Does not bruise/bleed easily.   Psychiatric/Behavioral:  Negative for agitation, behavioral problems, confusion, decreased concentration, dysphoric mood, hallucinations, self-injury, sleep disturbance and suicidal ideas. The patient is not nervous/anxious and is not hyperactive.    All other systems reviewed and are negative.    Objective:      Physical Exam  Vitals and nursing note reviewed.   Constitutional:       General: She is not in acute distress.     Appearance: Normal appearance. She is well-developed. She is not diaphoretic.   HENT:      Head: Normocephalic and atraumatic.      Mouth/Throat:      Pharynx: No oropharyngeal exudate.   Eyes:      General: No scleral icterus.  Neck:      Thyroid: No thyromegaly.      Vascular: No carotid bruit or JVD.      Trachea: No tracheal deviation.   Cardiovascular:      Rate and Rhythm: Normal rate and regular rhythm.      Heart sounds: Normal heart sounds.   Pulmonary:      Effort: Pulmonary effort is normal. No respiratory distress.      Breath sounds: Normal breath sounds. No wheezing or rales.   Chest:      Chest wall: No tenderness.   Abdominal:      General: Bowel sounds are normal. There is no distension.      Palpations: Abdomen is soft.      Tenderness: There is no abdominal tenderness. There is no guarding or rebound.   Musculoskeletal:         General: No tenderness. Normal range of motion.       Cervical back: Normal range of motion and neck supple.   Lymphadenopathy:      Cervical: No cervical adenopathy.   Skin:     General: Skin is warm and dry.      Coloration: Skin is not pale.      Findings: No erythema or rash.   Neurological:      Mental Status: She is alert and oriented to person, place, and time.   Psychiatric:         Behavior: Behavior normal.         Thought Content: Thought content normal.         Judgment: Judgment normal.       CMP  Sodium   Date Value Ref Range Status   08/17/2022 144 136 - 145 mmol/L Final     Potassium   Date Value Ref Range Status   08/17/2022 4.4 3.5 - 5.1 mmol/L Final     Chloride   Date Value Ref Range Status   08/17/2022 105 95 - 110 mmol/L Final     CO2   Date Value Ref Range Status   08/17/2022 27 23 - 29 mmol/L Final     Glucose   Date Value Ref Range Status   08/17/2022 98 70 - 110 mg/dL Final     BUN   Date Value Ref Range Status   08/17/2022 13 8 - 23 mg/dL Final     Creatinine   Date Value Ref Range Status   08/17/2022 0.8 0.5 - 1.4 mg/dL Final     Calcium   Date Value Ref Range Status   08/17/2022 9.8 8.7 - 10.5 mg/dL Final     Total Protein   Date Value Ref Range Status   08/17/2022 7.5 6.0 - 8.4 g/dL Final     Albumin   Date Value Ref Range Status   08/17/2022 3.8 3.5 - 5.2 g/dL Final     Total Bilirubin   Date Value Ref Range Status   08/17/2022 1.1 (H) 0.1 - 1.0 mg/dL Final     Comment:     For infants and newborns, interpretation of results should be based  on gestational age, weight and in agreement with clinical  observations.    Premature Infant recommended reference ranges:  Up to 24 hours.............<8.0 mg/dL  Up to 48 hours............<12.0 mg/dL  3-5 days..................<15.0 mg/dL  6-29 days.................<15.0 mg/dL       Alkaline Phosphatase   Date Value Ref Range Status   08/17/2022 55 55 - 135 U/L Final     AST   Date Value Ref Range Status   08/17/2022 16 10 - 40 U/L Final     ALT   Date Value Ref Range Status   08/17/2022 16 10 - 44  U/L Final     Anion Gap   Date Value Ref Range Status   08/17/2022 12 8 - 16 mmol/L Final     eGFR if    Date Value Ref Range Status   02/16/2022 >60 >60 mL/min/1.73 m^2 Final     eGFR if non    Date Value Ref Range Status   02/16/2022 >60 >60 mL/min/1.73 m^2 Final     Comment:     Calculation used to obtain the estimated glomerular filtration  rate (eGFR) is the CKD-EPI equation.        Lab Results   Component Value Date    WBC 7.63 02/16/2022    HGB 12.9 02/16/2022    HCT 39.6 02/16/2022    MCV 87 02/16/2022     02/16/2022     Lab Results   Component Value Date    CHOL 203 (H) 11/17/2020     Lab Results   Component Value Date    HDL 75 11/17/2020     Lab Results   Component Value Date    LDLCALC 110.6 11/17/2020     Lab Results   Component Value Date    TRIG 87 11/17/2020     Lab Results   Component Value Date    CHOLHDL 36.9 11/17/2020     Lab Results   Component Value Date    TSH 0.552 06/30/2021     Lab Results   Component Value Date    HGBA1C 6.0 (H) 06/30/2021     Assessment:       1. Type 2 diabetes mellitus without complication, without long-term current use of insulin    2. Statin intolerance    3. Pure hypercholesterolemia with target low density lipoprotein (LDL) cholesterol less than 130 mg/dL    4. Heterozygous factor V Leiden mutation    5. Essential hypertension    6. Dyslipidemia    7. Coronary artery disease involving native coronary artery of native heart without angina pectoris    8. Anticoagulated on Coumadin    9. Encounter for screening mammogram for breast cancer          Plan:   Type 2 diabetes mellitus without complication, without long-term current use of insulin  -     Hemoglobin A1C; Future; Expected date: 08/30/2022    Statin intolerance    Pure hypercholesterolemia with target low density lipoprotein (LDL) cholesterol less than 130 mg/dL----------trial zetia-  -     Lipid Panel; Future; Expected date: 08/30/2022    Heterozygous factor V Leiden  mutation    Essential hypertension    Dyslipidemia    Coronary artery disease involving native coronary artery of native heart without angina pectoris    Anticoagulated on Coumadin    Encounter for screening mammogram for breast cancer  -     Mammo Digital Screening Maddi w/ Alfred; Future; Expected date: 08/30/2022    Other orders  -     ezetimibe (ZETIA) 10 mg tablet; Take 1 tablet (10 mg total) by mouth once daily.  Dispense: 90 tablet; Refill: 3      Stable------continue meds---------as above-------f/u 3 months-----

## 2022-08-31 ENCOUNTER — TELEPHONE (OUTPATIENT)
Dept: FAMILY MEDICINE | Facility: CLINIC | Age: 72
End: 2022-08-31
Payer: MEDICARE

## 2022-08-31 DIAGNOSIS — E78.5 DYSLIPIDEMIA: Primary | ICD-10-CM

## 2022-09-01 ENCOUNTER — HOSPITAL ENCOUNTER (OUTPATIENT)
Dept: RADIOLOGY | Facility: HOSPITAL | Age: 72
Discharge: HOME | End: 2022-09-01
Attending: INTERNAL MEDICINE
Payer: MEDICARE

## 2022-09-01 VITALS — HEIGHT: 65 IN | WEIGHT: 174.19 LBS | BODY MASS INDEX: 29.02 KG/M2

## 2022-09-01 DIAGNOSIS — Z12.31 ENCOUNTER FOR SCREENING MAMMOGRAM FOR BREAST CANCER: ICD-10-CM

## 2022-09-01 PROCEDURE — 77067 MAMMO DIGITAL SCREENING BILAT WITH TOMO: ICD-10-PCS | Mod: 26,,, | Performed by: RADIOLOGY

## 2022-09-01 PROCEDURE — 77063 MAMMO DIGITAL SCREENING BILAT WITH TOMO: ICD-10-PCS | Mod: 26,,, | Performed by: RADIOLOGY

## 2022-09-01 PROCEDURE — 77063 BREAST TOMOSYNTHESIS BI: CPT | Mod: 26,,, | Performed by: RADIOLOGY

## 2022-09-01 PROCEDURE — 77063 BREAST TOMOSYNTHESIS BI: CPT | Mod: TC

## 2022-09-01 PROCEDURE — 77067 SCR MAMMO BI INCL CAD: CPT | Mod: 26,,, | Performed by: RADIOLOGY

## 2022-09-01 PROCEDURE — 77067 SCR MAMMO BI INCL CAD: CPT | Mod: TC

## 2022-09-02 ENCOUNTER — ANTI-COAG VISIT (OUTPATIENT)
Dept: CARDIOLOGY | Facility: CLINIC | Age: 72
End: 2022-09-02
Payer: MEDICARE

## 2022-09-02 ENCOUNTER — OFFICE VISIT (OUTPATIENT)
Dept: OPHTHALMOLOGY | Facility: CLINIC | Age: 72
End: 2022-09-02
Payer: MEDICARE

## 2022-09-02 ENCOUNTER — PATIENT MESSAGE (OUTPATIENT)
Dept: OPHTHALMOLOGY | Facility: CLINIC | Age: 72
End: 2022-09-02

## 2022-09-02 DIAGNOSIS — E11.9 TYPE 2 DIABETES MELLITUS WITHOUT COMPLICATION, WITHOUT LONG-TERM CURRENT USE OF INSULIN: Primary | ICD-10-CM

## 2022-09-02 DIAGNOSIS — D68.51 HETEROZYGOUS FACTOR V LEIDEN MUTATION: Chronic | ICD-10-CM

## 2022-09-02 DIAGNOSIS — Z79.01 LONG TERM (CURRENT) USE OF ANTICOAGULANTS: Primary | ICD-10-CM

## 2022-09-02 DIAGNOSIS — H52.7 REFRACTIVE DISORDER: ICD-10-CM

## 2022-09-02 DIAGNOSIS — E11.36 DIABETIC CATARACT: ICD-10-CM

## 2022-09-02 LAB — INR PPP: 4.1 (ref 2–3)

## 2022-09-02 PROCEDURE — 93793 PR ANTICOAGULANT MGMT FOR PT TAKING WARFARIN: ICD-10-PCS | Mod: S$GLB,,,

## 2022-09-02 PROCEDURE — 1159F MED LIST DOCD IN RCRD: CPT | Mod: CPTII,S$GLB,, | Performed by: OPTOMETRIST

## 2022-09-02 PROCEDURE — 2023F PR DILATED RETINAL EXAM W/O EVID OF RETINOPATHY: ICD-10-PCS | Mod: CPTII,S$GLB,, | Performed by: OPTOMETRIST

## 2022-09-02 PROCEDURE — 92014 PR EYE EXAM, EST PATIENT,COMPREHESV: ICD-10-PCS | Mod: S$GLB,,, | Performed by: OPTOMETRIST

## 2022-09-02 PROCEDURE — 1159F PR MEDICATION LIST DOCUMENTED IN MEDICAL RECORD: ICD-10-PCS | Mod: CPTII,S$GLB,, | Performed by: OPTOMETRIST

## 2022-09-02 PROCEDURE — 99999 PR PBB SHADOW E&M-EST. PATIENT-LVL II: ICD-10-PCS | Mod: PBBFAC,,, | Performed by: OPTOMETRIST

## 2022-09-02 PROCEDURE — 92015 PR REFRACTION: ICD-10-PCS | Mod: S$GLB,,, | Performed by: OPTOMETRIST

## 2022-09-02 PROCEDURE — 93793 ANTICOAG MGMT PT WARFARIN: CPT | Mod: S$GLB,,,

## 2022-09-02 PROCEDURE — 85610 PROTHROMBIN TIME: CPT | Mod: QW,S$GLB,, | Performed by: INTERNAL MEDICINE

## 2022-09-02 PROCEDURE — 92015 DETERMINE REFRACTIVE STATE: CPT | Mod: S$GLB,,, | Performed by: OPTOMETRIST

## 2022-09-02 PROCEDURE — 99999 PR PBB SHADOW E&M-EST. PATIENT-LVL II: CPT | Mod: PBBFAC,,, | Performed by: OPTOMETRIST

## 2022-09-02 PROCEDURE — 2023F DILAT RTA XM W/O RTNOPTHY: CPT | Mod: CPTII,S$GLB,, | Performed by: OPTOMETRIST

## 2022-09-02 PROCEDURE — 3044F HG A1C LEVEL LT 7.0%: CPT | Mod: CPTII,S$GLB,, | Performed by: OPTOMETRIST

## 2022-09-02 PROCEDURE — 3044F PR MOST RECENT HEMOGLOBIN A1C LEVEL <7.0%: ICD-10-PCS | Mod: CPTII,S$GLB,, | Performed by: OPTOMETRIST

## 2022-09-02 PROCEDURE — 92014 COMPRE OPH EXAM EST PT 1/>: CPT | Mod: S$GLB,,, | Performed by: OPTOMETRIST

## 2022-09-02 PROCEDURE — 85610 POCT INR: ICD-10-PCS | Mod: QW,S$GLB,, | Performed by: INTERNAL MEDICINE

## 2022-09-02 NOTE — PROGRESS NOTES
HPI    Annual Diabetic Eye Exam  Last Exam 12/23/2020 ABR   Last exam With TRF 12/04/14  Cant see as well with Glasses    .Lab Results       Component                Value               Date                       HGBA1C                   6.0 (H)             08/30/2022             Last edited by Екатерина Wills MA on 9/2/2022 10:35 AM.            Assessment /Plan     For exam results, see Encounter Report.    Type 2 diabetes mellitus without complication, without long-term current use of insulin    Diabetic cataract    Refractive disorder      No Background Diabetic Retinopathy    .trmod    Dispense Final Rx for glasses.  RTC 1 year  Discussed above and answered questions.

## 2022-09-02 NOTE — PROGRESS NOTES
INR: 4.1 - remains supratherapeutic.  Previous warfarin instructions were verified and followed.  No bleeding issues reported.  Instructions given to hold warfarin dose today, then re-challenge new dose of 5 mg every Monday, Friday; and 7.5 mg on all other days.  Patient will be having her weekly serving of greens today.  Recheck in 1.5 weeks.  Dose calendar given and reviewed with patient.  ED for any abnormal bleeding issues.  Patient verbalized understanding.

## 2022-09-06 ENCOUNTER — OFFICE VISIT (OUTPATIENT)
Dept: UROLOGY | Facility: CLINIC | Age: 72
End: 2022-09-06
Payer: MEDICARE

## 2022-09-06 VITALS
BODY MASS INDEX: 29.72 KG/M2 | HEIGHT: 65 IN | DIASTOLIC BLOOD PRESSURE: 84 MMHG | SYSTOLIC BLOOD PRESSURE: 183 MMHG | WEIGHT: 178.38 LBS | TEMPERATURE: 98 F | HEART RATE: 55 BPM

## 2022-09-06 DIAGNOSIS — R35.0 URINE FREQUENCY: ICD-10-CM

## 2022-09-06 PROCEDURE — 1126F AMNT PAIN NOTED NONE PRSNT: CPT | Mod: CPTII,S$GLB,, | Performed by: UROLOGY

## 2022-09-06 PROCEDURE — 1159F PR MEDICATION LIST DOCUMENTED IN MEDICAL RECORD: ICD-10-PCS | Mod: CPTII,S$GLB,, | Performed by: UROLOGY

## 2022-09-06 PROCEDURE — 3077F SYST BP >= 140 MM HG: CPT | Mod: CPTII,S$GLB,, | Performed by: UROLOGY

## 2022-09-06 PROCEDURE — 99204 PR OFFICE/OUTPT VISIT, NEW, LEVL IV, 45-59 MIN: ICD-10-PCS | Mod: S$GLB,,, | Performed by: UROLOGY

## 2022-09-06 PROCEDURE — 3079F PR MOST RECENT DIASTOLIC BLOOD PRESSURE 80-89 MM HG: ICD-10-PCS | Mod: CPTII,S$GLB,, | Performed by: UROLOGY

## 2022-09-06 PROCEDURE — 3079F DIAST BP 80-89 MM HG: CPT | Mod: CPTII,S$GLB,, | Performed by: UROLOGY

## 2022-09-06 PROCEDURE — 3044F PR MOST RECENT HEMOGLOBIN A1C LEVEL <7.0%: ICD-10-PCS | Mod: CPTII,S$GLB,, | Performed by: UROLOGY

## 2022-09-06 PROCEDURE — 1160F PR REVIEW ALL MEDS BY PRESCRIBER/CLIN PHARMACIST DOCUMENTED: ICD-10-PCS | Mod: CPTII,S$GLB,, | Performed by: UROLOGY

## 2022-09-06 PROCEDURE — 3044F HG A1C LEVEL LT 7.0%: CPT | Mod: CPTII,S$GLB,, | Performed by: UROLOGY

## 2022-09-06 PROCEDURE — 1159F MED LIST DOCD IN RCRD: CPT | Mod: CPTII,S$GLB,, | Performed by: UROLOGY

## 2022-09-06 PROCEDURE — 99204 OFFICE O/P NEW MOD 45 MIN: CPT | Mod: S$GLB,,, | Performed by: UROLOGY

## 2022-09-06 PROCEDURE — 1126F PR PAIN SEVERITY QUANTIFIED, NO PAIN PRESENT: ICD-10-PCS | Mod: CPTII,S$GLB,, | Performed by: UROLOGY

## 2022-09-06 PROCEDURE — 3077F PR MOST RECENT SYSTOLIC BLOOD PRESSURE >= 140 MM HG: ICD-10-PCS | Mod: CPTII,S$GLB,, | Performed by: UROLOGY

## 2022-09-06 PROCEDURE — 99999 PR PBB SHADOW E&M-EST. PATIENT-LVL III: CPT | Mod: PBBFAC,,, | Performed by: UROLOGY

## 2022-09-06 PROCEDURE — 3008F PR BODY MASS INDEX (BMI) DOCUMENTED: ICD-10-PCS | Mod: CPTII,S$GLB,, | Performed by: UROLOGY

## 2022-09-06 PROCEDURE — 3008F BODY MASS INDEX DOCD: CPT | Mod: CPTII,S$GLB,, | Performed by: UROLOGY

## 2022-09-06 PROCEDURE — 1160F RVW MEDS BY RX/DR IN RCRD: CPT | Mod: CPTII,S$GLB,, | Performed by: UROLOGY

## 2022-09-06 PROCEDURE — 99999 PR PBB SHADOW E&M-EST. PATIENT-LVL III: ICD-10-PCS | Mod: PBBFAC,,, | Performed by: UROLOGY

## 2022-09-06 RX ORDER — SOLIFENACIN SUCCINATE 5 MG/1
5 TABLET, FILM COATED ORAL DAILY
Qty: 30 TABLET | Refills: 11 | Status: SHIPPED | OUTPATIENT
Start: 2022-09-06 | End: 2022-11-08 | Stop reason: SDUPTHER

## 2022-09-06 NOTE — PROGRESS NOTES
Chief Complaint:  OAB    HPI:   Alyx Weir is a 72 y.o. female that presents today for evaluation and management of OAB.  Patient notes three month history of worsening urgency and urge incontinence.  Due to this she wears two pads per day.  She denies any gross incontinence.  Does not currently on a medication for her bladder.  She notes that she underwent resection of part of her bladder about 30 years ago, denies that it was for cancer.  Denies any gross hematuria or dysuria, denies family history of  cancers, denies kidney stones or bladder infections.      PMH:  Past Medical History:   Diagnosis Date    Anticoagulated on Coumadin     Arm weakness-rotator cuff weakness 11/2/2015    Arthritis     Asthma     Clotting disorder     Coronary artery disease     Deep vein thrombosis     Degenerative disc disease     Diabetes mellitus type I 2011    BS last tested x 1 month not sure what it was.    Heterozygous factor V Leiden mutation     Hx of colonic polyps 11/13/2015    Hyperlipidemia     Hypertension     VIRGIL (obstructive sleep apnea)     Stenosis of right carotid artery 12/13/2016       PSH:  Past Surgical History:   Procedure Laterality Date    BACK SURGERY      bladder cyst removal      BLADDER SURGERY      CARDIAC CATHETERIZATION  03/2013    mild CAD    COLONOSCOPY N/A 11/13/2015    Procedure: COLONOSCOPY;  Surgeon: Jas Umanzor III, MD;  Location: [facility];  Service: Endoscopy;  Laterality: N/A;    HYSTERECTOMY      28 yrs old    LUMBAR SPINE SURGERY      bone spurs removed    OOPHORECTOMY      SELECTIVE INJECTION OF ANESTHETIC AGENT AROUND LUMBAR SPINAL NERVE ROOT BY TRANSFORAMINAL APPROACH Bilateral 6/3/2022    Procedure: Bilateral L4/5 TF ASUNCION with RN IV sedation; on Coumadin, will need INR prior to procedure, must be less than 1.3;  Surgeon: Mario Palacios MD;  Location: [facility];  Service: Pain Management;  Laterality: Bilateral;       Family History:  Family History   Problem Relation Age of  Onset    Heart disease Mother     Hypertension Mother     Cataracts Mother     Hypertension Sister     Glaucoma Sister     Hypertension Brother     Diabetes Father     Diabetes Paternal Uncle     Hypertension Sister     Diabetes Sister     Hypertension Sister     Diabetes Sister     Breast cancer Neg Hx     Colon cancer Neg Hx     Ovarian cancer Neg Hx        Social History:  Social History     Tobacco Use    Smoking status: Never    Smokeless tobacco: Never   Substance Use Topics    Alcohol use: No    Drug use: No        Review of Systems:  General: No fever, chills  Skin: No rashes  Chest:  Denies cough and sputum production  Heart: Denies chest pain  Resp: Denies dyspnea  Abdomen: Denies diarrhea, abdominal pain, hematemesis, or blood in stool.  Musculoskeletal: No joint stiffness or swelling. Denies back pain.  : see HPI  Neuro: no dizziness or weakness    Allergies:  Erythromycin, Amlodipine, Diazepam, Iodine, Meperidine, Morphine, Methylprednisolone sod suc(pf), and Primidone    Medications:    Current Outpatient Medications:     ACCU-CHEK GUIDE ME GLUCOSE MTR Misc, USE TO CHECK BLOOD SUGAR TWICE DAILY, Disp: , Rfl:     acetaminophen (TYLENOL ARTHRITIS ORAL), Take by mouth., Disp: , Rfl:     allopurinoL (ZYLOPRIM) 100 MG tablet, TAKE 1 TABLET EVERY DAY, Disp: 90 tablet, Rfl: 3    blood sugar diagnostic Strp, To check BG 2 times daily, to use with insurance preferred meter, Disp: 100 strip, Rfl: 2    diclofenac sodium (VOLTAREN) 1 % Gel, APPLY 2 GRAMS TOPICALLY DAILY AS NEEDED, Disp: 200 g, Rfl: 2    ezetimibe (ZETIA) 10 mg tablet, Take 1 tablet (10 mg total) by mouth once daily., Disp: 90 tablet, Rfl: 3    lancets Misc, To check BG 2 times daily, to use with insurance preferred meter, Disp: 100 each, Rfl: 2    methocarbamoL (ROBAXIN) 500 MG Tab, Take 1 tablet (500 mg total) by mouth 3 (three) times daily as needed (muscle spasms). May cause drowsiness., Disp: 90 tablet, Rfl: 1     neomycin-polymyxin-hydrocortisone (CORTISPORIN) 3.5-10,000-1 mg/mL-unit/mL-% otic suspension, Place 3 drops into both ears 3 (three) times daily as needed., Disp: 10 mL, Rfl: 0    olmesartan-amLODIPin-hcthiazid 40-10-25 mg Tab, Take 1 tablet by mouth once daily., Disp: 90 tablet, Rfl: 3    om 3/E/linol/ala/oleic/gla/lip (OMEGA 3-6-9 ORAL), Take 1 capsule by mouth once daily., Disp: , Rfl:     pantoprazole (PROTONIX) 40 MG tablet, TAKE 1 TABLET(40 MG) BY MOUTH EVERY DAY, Disp: 90 tablet, Rfl: 0    sucralfate (CARAFATE) 100 mg/mL suspension, Take 10 mLs (1 g total) by mouth 4 (four) times daily with meals and nightly., Disp: 400 mL, Rfl: 0    triamcinolone acetonide 0.1% (KENALOG) 0.1 % ointment, Apply topically 2 (two) times daily as needed., Disp: 30 g, Rfl: 0    vitamin D 1000 units Tab, Take 185 mg by mouth once daily., Disp: , Rfl:     warfarin (COUMADIN) 5 MG tablet, TAKE 1 AND 1/2 TABLETS (7.5MG) ON MONDAYS, WEDNESDAYS AND FRIDAYS AND 1 TABLET (5MG) ON ALL OTHER DAYS OF THE WEEK (Patient taking differently: Take 7.5 mg by mouth Daily. Except 1 tablet (5mg) every Monday and Friday.), Disp: 108 tablet, Rfl: 3    gabapentin (NEURONTIN) 300 MG capsule, Take 3 capsules (900 mg total) by mouth every evening., Disp: 30 capsule, Rfl: 2    solifenacin (VESICARE) 5 MG tablet, Take 1 tablet (5 mg total) by mouth once daily., Disp: 30 tablet, Rfl: 11    Physical Exam:  Vitals:    09/06/22 1114   BP: (!) 183/84   Pulse: (!) 55   Temp: 98.3 °F (36.8 °C)     Body mass index is 29.68 kg/m².  General: awake, alert, cooperative  Head: NC/AT  Ears: external ears normal  Eyes: sclera normal  Lungs: normal inspiration, NAD  Heart: well-perfused  Skin: The skin is warm and dry  Ext: No c/c/e.  Neuro: grossly intact, AOx3    RADIOLOGY:  No recent relevant imaging available for review.    LABS:  I personally reviewed the following lab values:  Lab Results   Component Value Date    WBC 7.63 02/16/2022    HGB 12.9 02/16/2022    HCT 39.6  02/16/2022     02/16/2022     08/17/2022    K 4.4 08/17/2022     08/17/2022    CREATININE 0.8 08/17/2022    BUN 13 08/17/2022    CO2 27 08/17/2022    TSH 0.552 06/30/2021    INR 4.1 (A) 09/02/2022    HGBA1C 6.0 (H) 08/30/2022    CHOL 199 08/30/2022    TRIG 50 08/30/2022    HDL 72 08/30/2022    ALT 16 08/17/2022    AST 16 08/17/2022       URINALYSIS:  Obtained in clinic today specific gravity 1.025 pH six positive leuk esterase 50 glucose 250 blood      Assessment/Plan:   Alyx Weir is a 72 y.o. female with    OAB - start VESIcare, side effects reviewed, follow-up 2 months for symptom check, will also send urine for micro to rule out micro hematuria    Thank you for allowing me the opportunity to participate in this patient's care.     Santino Walsh MD  Urology       normal... Well appearing, awake, alert, oriented to person, place, time/situation and in no apparent distress.

## 2022-09-13 ENCOUNTER — TELEPHONE (OUTPATIENT)
Dept: CARDIOLOGY | Facility: CLINIC | Age: 72
End: 2022-09-13
Payer: MEDICARE

## 2022-09-13 ENCOUNTER — ANTI-COAG VISIT (OUTPATIENT)
Dept: CARDIOLOGY | Facility: CLINIC | Age: 72
End: 2022-09-13
Payer: MEDICARE

## 2022-09-13 DIAGNOSIS — D68.51 HETEROZYGOUS FACTOR V LEIDEN MUTATION: Chronic | ICD-10-CM

## 2022-09-13 DIAGNOSIS — Z79.01 LONG TERM (CURRENT) USE OF ANTICOAGULANTS: Primary | ICD-10-CM

## 2022-09-13 LAB — INR PPP: 2 (ref 2–3)

## 2022-09-13 PROCEDURE — 85610 POCT INR: ICD-10-PCS | Mod: QW,S$GLB,, | Performed by: INTERNAL MEDICINE

## 2022-09-13 PROCEDURE — 93793 ANTICOAG MGMT PT WARFARIN: CPT | Mod: S$GLB,,,

## 2022-09-13 PROCEDURE — 85610 PROTHROMBIN TIME: CPT | Mod: QW,S$GLB,, | Performed by: INTERNAL MEDICINE

## 2022-09-13 PROCEDURE — 93793 PR ANTICOAGULANT MGMT FOR PT TAKING WARFARIN: ICD-10-PCS | Mod: S$GLB,,,

## 2022-09-13 NOTE — PROGRESS NOTES
INR is therapeutic at 2.0.  Previous warfarin instructions were verified and followed.  Currently taking 5 mg every Monday, Friday; and 7.5 mg on all other days as directed.  Instructions given to continue same dose of warfarin.  Recheck in 2 weeks.  Dose calendar given.  Patient verbalized understanding.

## 2022-09-13 NOTE — TELEPHONE ENCOUNTER
----- Message from Marysol Lind sent at 9/13/2022  8:25 AM CDT -----  Contact: Alyx  Patient is calling to speak with a nurse regarding appointment. Patient reports will be late to visit due to traffic and request to reschedule visit if possible. Please give patient a call back at [phone] as soon as possible.  Thank you,  GH

## 2022-09-30 ENCOUNTER — ANTI-COAG VISIT (OUTPATIENT)
Dept: CARDIOLOGY | Facility: CLINIC | Age: 72
End: 2022-09-30
Payer: MEDICARE

## 2022-09-30 DIAGNOSIS — D68.51 HETEROZYGOUS FACTOR V LEIDEN MUTATION: Chronic | ICD-10-CM

## 2022-09-30 DIAGNOSIS — Z79.01 LONG TERM (CURRENT) USE OF ANTICOAGULANTS: Primary | ICD-10-CM

## 2022-09-30 LAB — INR PPP: 2.2 (ref 2–3)

## 2022-09-30 PROCEDURE — 93793 PR ANTICOAGULANT MGMT FOR PT TAKING WARFARIN: ICD-10-PCS | Mod: S$GLB,,,

## 2022-09-30 PROCEDURE — 93793 ANTICOAG MGMT PT WARFARIN: CPT | Mod: S$GLB,,,

## 2022-09-30 PROCEDURE — 85610 PROTHROMBIN TIME: CPT | Mod: QW,S$GLB,, | Performed by: INTERNAL MEDICINE

## 2022-09-30 PROCEDURE — 85610 POCT INR: ICD-10-PCS | Mod: QW,S$GLB,, | Performed by: INTERNAL MEDICINE

## 2022-09-30 NOTE — PROGRESS NOTES
INR: 2.2 - remains therapeutic.  Patient reports taking warfarin 5 mg every Monday, Friday; and 7.5 mg on all other days as directed.  Reports no recent changes.  Instructions given to continue same dose of warfarin.  Recheck in 1 month.  Dose calendar given - confirms understanding.

## 2022-10-03 ENCOUNTER — OFFICE VISIT (OUTPATIENT)
Dept: PAIN MEDICINE | Facility: CLINIC | Age: 72
End: 2022-10-03
Payer: MEDICARE

## 2022-10-03 VITALS
SYSTOLIC BLOOD PRESSURE: 158 MMHG | DIASTOLIC BLOOD PRESSURE: 75 MMHG | WEIGHT: 175.06 LBS | HEART RATE: 56 BPM | BODY MASS INDEX: 29.17 KG/M2 | RESPIRATION RATE: 17 BRPM | HEIGHT: 65 IN

## 2022-10-03 DIAGNOSIS — M51.36 DDD (DEGENERATIVE DISC DISEASE), LUMBAR: ICD-10-CM

## 2022-10-03 PROCEDURE — 3077F SYST BP >= 140 MM HG: CPT | Mod: CPTII,S$GLB,, | Performed by: PHYSICIAN ASSISTANT

## 2022-10-03 PROCEDURE — 99999 PR PBB SHADOW E&M-EST. PATIENT-LVL IV: ICD-10-PCS | Mod: PBBFAC,,, | Performed by: PHYSICIAN ASSISTANT

## 2022-10-03 PROCEDURE — 1101F PR PT FALLS ASSESS DOC 0-1 FALLS W/OUT INJ PAST YR: ICD-10-PCS | Mod: CPTII,S$GLB,, | Performed by: PHYSICIAN ASSISTANT

## 2022-10-03 PROCEDURE — 3078F DIAST BP <80 MM HG: CPT | Mod: CPTII,S$GLB,, | Performed by: PHYSICIAN ASSISTANT

## 2022-10-03 PROCEDURE — 3008F PR BODY MASS INDEX (BMI) DOCUMENTED: ICD-10-PCS | Mod: CPTII,S$GLB,, | Performed by: PHYSICIAN ASSISTANT

## 2022-10-03 PROCEDURE — 99213 PR OFFICE/OUTPT VISIT, EST, LEVL III, 20-29 MIN: ICD-10-PCS | Mod: S$GLB,,, | Performed by: PHYSICIAN ASSISTANT

## 2022-10-03 PROCEDURE — 1160F RVW MEDS BY RX/DR IN RCRD: CPT | Mod: CPTII,S$GLB,, | Performed by: PHYSICIAN ASSISTANT

## 2022-10-03 PROCEDURE — 99999 PR PBB SHADOW E&M-EST. PATIENT-LVL IV: CPT | Mod: PBBFAC,,, | Performed by: PHYSICIAN ASSISTANT

## 2022-10-03 PROCEDURE — 3008F BODY MASS INDEX DOCD: CPT | Mod: CPTII,S$GLB,, | Performed by: PHYSICIAN ASSISTANT

## 2022-10-03 PROCEDURE — 1125F AMNT PAIN NOTED PAIN PRSNT: CPT | Mod: CPTII,S$GLB,, | Performed by: PHYSICIAN ASSISTANT

## 2022-10-03 PROCEDURE — 1160F PR REVIEW ALL MEDS BY PRESCRIBER/CLIN PHARMACIST DOCUMENTED: ICD-10-PCS | Mod: CPTII,S$GLB,, | Performed by: PHYSICIAN ASSISTANT

## 2022-10-03 PROCEDURE — 3288F PR FALLS RISK ASSESSMENT DOCUMENTED: ICD-10-PCS | Mod: CPTII,S$GLB,, | Performed by: PHYSICIAN ASSISTANT

## 2022-10-03 PROCEDURE — 1159F MED LIST DOCD IN RCRD: CPT | Mod: CPTII,S$GLB,, | Performed by: PHYSICIAN ASSISTANT

## 2022-10-03 PROCEDURE — 3044F PR MOST RECENT HEMOGLOBIN A1C LEVEL <7.0%: ICD-10-PCS | Mod: CPTII,S$GLB,, | Performed by: PHYSICIAN ASSISTANT

## 2022-10-03 PROCEDURE — 3078F PR MOST RECENT DIASTOLIC BLOOD PRESSURE < 80 MM HG: ICD-10-PCS | Mod: CPTII,S$GLB,, | Performed by: PHYSICIAN ASSISTANT

## 2022-10-03 PROCEDURE — 3044F HG A1C LEVEL LT 7.0%: CPT | Mod: CPTII,S$GLB,, | Performed by: PHYSICIAN ASSISTANT

## 2022-10-03 PROCEDURE — 99213 OFFICE O/P EST LOW 20 MIN: CPT | Mod: S$GLB,,, | Performed by: PHYSICIAN ASSISTANT

## 2022-10-03 PROCEDURE — 3288F FALL RISK ASSESSMENT DOCD: CPT | Mod: CPTII,S$GLB,, | Performed by: PHYSICIAN ASSISTANT

## 2022-10-03 PROCEDURE — 1125F PR PAIN SEVERITY QUANTIFIED, PAIN PRESENT: ICD-10-PCS | Mod: CPTII,S$GLB,, | Performed by: PHYSICIAN ASSISTANT

## 2022-10-03 PROCEDURE — 1159F PR MEDICATION LIST DOCUMENTED IN MEDICAL RECORD: ICD-10-PCS | Mod: CPTII,S$GLB,, | Performed by: PHYSICIAN ASSISTANT

## 2022-10-03 PROCEDURE — 1101F PT FALLS ASSESS-DOCD LE1/YR: CPT | Mod: CPTII,S$GLB,, | Performed by: PHYSICIAN ASSISTANT

## 2022-10-03 PROCEDURE — 3077F PR MOST RECENT SYSTOLIC BLOOD PRESSURE >= 140 MM HG: ICD-10-PCS | Mod: CPTII,S$GLB,, | Performed by: PHYSICIAN ASSISTANT

## 2022-10-03 RX ORDER — GABAPENTIN 300 MG/1
CAPSULE ORAL
Qty: 30 CAPSULE | Refills: 5 | Status: SHIPPED | OUTPATIENT
Start: 2022-10-03 | End: 2022-11-09

## 2022-10-03 NOTE — PROGRESS NOTES
Established Patient Chronic Pain Note     Referring Physician: No ref. provider found    PCP: Carl Clemons MD    Chief Complaint:   Chief Complaint   Patient presents with    Low-back Pain     Patient with lower back and hip pain.  Pain scale 7/10           SUBJECTIVE:  Interval History (10/3/2022):  Alyx Weir presents today for follow-up visit.  Patient was last seen on 7/7/2022. At that visit, the plan was to refer to Neurosurgery per patient request, when [facility] called to schedule, patient stated she was not interested in surgery, appointment not scheduled.  Patient reports her pain is managed with physical therapy and Gabapentin. She is taking 300 mg in the morning and 600-900 mg at night. Patient reports pain as 7/10 today. She would like to continue physical therapy as this has been very helpful, along with heating pads. Patient again reports lower back pain which radiates primarily down the lateral and posterior aspects of bilateral lower extremities in L4-S1 distribution to the foot.      Interval History (7/7/2022): Alyx Weir presents today for follow-up visit.  she underwent bilateral L4/5 TF ASUNCION on 6/3/22.  The patient reports that she is/was unchanged following the procedure.  she reports little to no relief.      Interval history 05/17/2022  Patient presents for MRI review.  MRI demonstrates prior L5-S1 laminectomy.  Patient again reports lower back pain which radiates primarily down the lateral and posterior aspects of bilateral lower extremities in L4-S1 distribution to the foot.  Pain today is rated a 10/10.  Patient does report associated weakness in lower extremities associated with her pain.  Patient has continued gabapentin 3 pills in the evening with only marginal improvement in her symptoms.  Patient reports recently ambulating with a cane for additional stabilization and support.  Patient is interested in pursuing interventional treatment.    HPI 02/22/2022  lAyx Weir is a  72 y.o. female with past medical history significant for COPD, degenerative disc disease, coronary artery disease, hypertension, diastolic dysfunction with preserved systolic function, right carotid artery stenosis, history of deep vein thrombosis, diabetes, obstructive sleep apnea who presents to the clinic for the evaluation of lower back and leg pain.  Patient reports longstanding history of lower back pain which began after mechanical fall in the 1980s.  Patient reports resultant lower back surgery in 1985 with Dr. Eliot Ortiz at [facility], which she believes was at the L4-5 level.  Patient reports improvement in lower back and leg pain with reoccurrence over the last 10 years.      Today patient reports pain in a bandlike distribution in her lower back which radiates down the lateral and posterior aspects of bilateral lower extremities to the knee and down to the dorsum of the right foot.  Pain is constant and described as aching in nature.  Pain is exacerbated with lumbar extension as well as with ambulation.  Patient is able to ambulate less than 1 block before requiring rest.  Pain is improved with heat, physical therapy in combination of Tylenol and gabapentin.  Patient is taking gabapentin 100 mg in the evening.  Patient does endorse weakness in the lower extremities associated with her pain.  Patient reports frequent spasm like sensation in the lower extremities.  Patient also does report significant improvement in her symptoms with lumbar epidural steroid injection 2016.     Patient reports significant motor weakness.  Patient denies night fever/night sweats, urinary incontinence, bowel incontinence and significant weight loss.    Patient last saw Dr. Pressley 03/11/2020 with continuation of gabapentin 100 mg in the morning and 300 mg in the evening, continuation of physical therapy    Pain Disability Index Review:     Last 3 PDI Scores 7/7/2022 5/17/2022   Pain Disability Index (PDI) 53 69        Non-Pharmacologic Treatments:  Physical Therapy/Home Exercise: yes  Ice/Heat:yes  TENS: no  Acupuncture: no  Massage: no  Chiropractic: no    Other: no      Pain Medications:  - Adjuvant Medications: Neurontin (Gabapentin), Topical Ointment (Voltaren Gel, Steroid cream, Anti-Inflammatory Cream, Compound cream) and Tylenol (Acetaminophen)  - Anti-Coagulants: Coumadin ( Warfarin)    Pain Procedures:   2/2016: LESI  bilateral L4/5 TF ASUNCION on 6/3/22    Past Medical History:   Diagnosis Date    Anticoagulated on Coumadin     Arm weakness-rotator cuff weakness 11/2/2015    Arthritis     Asthma     Clotting disorder     Coronary artery disease     Deep vein thrombosis     Degenerative disc disease     Diabetes mellitus type I 2011    BS last tested x 1 month not sure what it was.    Heterozygous factor V Leiden mutation     Hx of colonic polyps 11/13/2015    Hyperlipidemia     Hypertension     VIRGIL (obstructive sleep apnea)     Stenosis of right carotid artery 12/13/2016     Past Surgical History:   Procedure Laterality Date    BACK SURGERY      bladder cyst removal      BLADDER SURGERY      CARDIAC CATHETERIZATION  03/2013    mild CAD    COLONOSCOPY N/A 11/13/2015    Procedure: COLONOSCOPY;  Surgeon: Jas Umanzor III, MD;  Location: [facility];  Service: Endoscopy;  Laterality: N/A;    HYSTERECTOMY      28 yrs old    LUMBAR SPINE SURGERY      bone spurs removed    OOPHORECTOMY      SELECTIVE INJECTION OF ANESTHETIC AGENT AROUND LUMBAR SPINAL NERVE ROOT BY TRANSFORAMINAL APPROACH Bilateral 6/3/2022    Procedure: Bilateral L4/5 TF ASUNCION with RN IV sedation; on Coumadin, will need INR prior to procedure, must be less than 1.3;  Surgeon: Mario Palacios MD;  Location: [facility];  Service: Pain Management;  Laterality: Bilateral;     Review of patient's allergies indicates:   Allergen Reactions    Erythromycin Edema     Angioedema to throat    Amlodipine Other (See Comments)     Headaches    Diazepam Hives    Iodine  Hives    Meperidine Hives    Morphine Itching    Methylprednisolone sod suc(pf) Other (See Comments)     headache    Primidone Other (See Comments)       Current Outpatient Medications   Medication Sig    ACCU-CHEK GUIDE ME GLUCOSE MTR Misc USE TO CHECK BLOOD SUGAR TWICE DAILY    acetaminophen (TYLENOL ARTHRITIS ORAL) Take by mouth.    allopurinoL (ZYLOPRIM) 100 MG tablet TAKE 1 TABLET EVERY DAY    blood sugar diagnostic Strp To check BG 2 times daily, to use with insurance preferred meter    diclofenac sodium (VOLTAREN) 1 % Gel APPLY 2 GRAMS TOPICALLY DAILY AS NEEDED    ezetimibe (ZETIA) 10 mg tablet Take 1 tablet (10 mg total) by mouth once daily.    lancets Misc To check BG 2 times daily, to use with insurance preferred meter    methocarbamoL (ROBAXIN) 500 MG Tab Take 1 tablet (500 mg total) by mouth 3 (three) times daily as needed (muscle spasms). May cause drowsiness.    neomycin-polymyxin-hydrocortisone (CORTISPORIN) 3.5-10,000-1 mg/mL-unit/mL-% otic suspension Place 3 drops into both ears 3 (three) times daily as needed.    olmesartan-amLODIPin-hcthiazid 40-10-25 mg Tab Take 1 tablet by mouth once daily.    om 3/E/linol/ala/oleic/gla/lip (OMEGA 3-6-9 ORAL) Take 1 capsule by mouth once daily.    pantoprazole (PROTONIX) 40 MG tablet TAKE 1 TABLET(40 MG) BY MOUTH EVERY DAY    solifenacin (VESICARE) 5 MG tablet Take 1 tablet (5 mg total) by mouth once daily.    sucralfate (CARAFATE) 100 mg/mL suspension Take 10 mLs (1 g total) by mouth 4 (four) times daily with meals and nightly.    triamcinolone acetonide 0.1% (KENALOG) 0.1 % ointment Apply topically 2 (two) times daily as needed.    vitamin D 1000 units Tab Take 185 mg by mouth once daily.    warfarin (COUMADIN) 5 MG tablet TAKE 1 AND 1/2 TABLETS (7.5MG) ON MONDAYS, WEDNESDAYS AND FRIDAYS AND 1 TABLET (5MG) ON ALL OTHER DAYS OF THE WEEK (Patient taking differently: Take 7.5 mg by mouth Daily. Except 1 tablet (5mg) every Monday and Friday.)    gabapentin  "(NEURONTIN) 300 MG capsule Take 1 capsule (300 mg total) by mouth every morning AND 3 capsules (900 mg total) every evening.     No current facility-administered medications for this visit.       Review of Systems     GENERAL:  No weight loss, malaise or fevers.  HEENT:   No recent changes in vision or hearing  NECK:  Negative for lumps, no difficulty with swallowing.  RESPIRATORY:  Negative for cough, wheezing or shortness of breath, patient denies any recent URI.  CARDIOVASCULAR:  Negative for chest pain, leg swelling or palpitations.  GI:  Negative for abdominal discomfort, blood in stools or black stools or change in bowel habits.  MUSCULOSKELETAL:  See HPI.  SKIN:  Negative for lesions, rash, and itching.  PSYCH:  No mood disorder or recent psychosocial stressors.   HEMATOLOGY/LYMPHOLOGY:  Negative for prolonged bleeding, bruising easily or swollen nodes.    NEURO:   No history of headaches, syncope, paralysis, seizures or tremors.  All other reviewed and negative other than HPI.    OBJECTIVE:    BP (!) 158/75   Pulse (!) 56   Resp 17   Ht 5' 5" (1.651 m)   Wt 79.4 kg (175 lb 0.7 oz)   BMI 29.13 kg/m²       Physical Exam    GENERAL: Well appearing, in no acute distress, alert and oriented x3.  PSYCH:  Mood and affect appropriate.  SKIN: Skin color, texture, turgor normal, no rashes or lesions.  HEAD/FACE:  Normocephalic, atraumatic. Cranial nerves grossly intact.    CV: RRR with palpation of the radial artery.  PULM: No evidence of respiratory difficulty, symmetric chest rise.  GI:  Soft and non-tender.    BACK: Straight leg raising in the sitting and supine positions is negative to radicular pain. palpation over the facet joints of the lumbar spine or spinous processes. Normal range of motion without pain reproduction.  EXTREMITIES: Peripheral joint ROM is full and pain free without obvious instability or laxity in all four extremities. No deformities, edema, or skin discoloration. Good capillary " refill.  MUSCULOSKELETAL: Able to stand on heels & toes.    hip, and knee provocative maneuvers are negative.   pain with palpation over the sacroiliac joints and greater trochanteric bursa bilaterally.  FABERs test is positive.  Facet loading test is positive bilaterally.   Bilateral upper and lower extremity strength is normal and symmetric.  No atrophy or tone abnormalities are noted.  RIGHT Lower extremity: Hip flexion 5/5, Hip Abduction 5/5, Hip Adduction 5/5, Knee extension 5/5, Knee flexion 5/5, Ankle dorsiflexion5/5, Extensor hallucis longus 5/5, Ankle plantarflexion 5/5  LEFT Lower extremity:  Hip flexion 5/5, Hip Abduction 5/5,Hip Adduction 5/5, Knee extension 5/5, Knee flexion 5/5, Ankle dorsiflexion 5/5, Extensor hallucis longus 5/5, Ankle plantarflexion 5/5  -Normal testing knee (patellar) jerk and ankle (achilles) jerk    NEURO: Bilateral upper and lower extremity coordination and muscle stretch reflexes are physiologic and symmetric. No loss of sensation is noted.  GAIT: normal.    Imaging:   MRI lumbar spine 02/22/2022  FINDINGS:  Alignment: Grade 1 anterolisthesis of L4 on L5 appears similar to priors.     Vertebrae: No evidence of fracture or marrow replacement process.     Discs: There is severe disc height loss at L4-5 which may be slightly worsened from previous MRI.  Disc desiccation noted from L3-4 through L5-S1.     Cord: Normal.  Conus terminates at T12-L1.     Degenerative findings:     T12-L1:  No spinal canal or neuroforaminal stenosis.     L1-L2: Moderate bilateral facet arthropathy.  Mild generalized disc bulge.  Slight crowding of the lateral recesses, otherwise no spinal canal stenosis or neural foraminal narrowing.     L2-L3: Mild generalized disc bulge.  Moderate bilateral facet arthropathy. No spinal canal or neuroforaminal stenosis.     L3-L4: Severe bilateral facet arthropathy.  Mild generalized disc bulge.  Mild spinal canal stenosis with moderate right and mild left neural  foraminal narrowing.     L4-L5: Severe bilateral facet arthropathy.  Uncovering of the intervertebral disc.  Mild spinal canal stenosis with moderate bilateral neural foraminal narrowing.     L5-S1: Severe bilateral facet arthropathy and mild generalized disc bulge.  Prior laminectomies.  Mild to moderate spinal canal stenosis with mild bilateral neural foraminal narrowing.     Paraspinal muscles & soft tissues: Unremarkable.      01/28/20    X-Ray Lumbar Complete With Flex And Ext  FINDINGS:  Grade 1 spondylolisthesis of L4 on L5 which appears slightly increased since the comparison exam from 2013.  This currently measures on the order of 7 mm.  This appears similar on both flexion/extension maneuvers.  Bones are demineralized.  No acute fracture.  Discogenic degenerative changes at L4-L5 with multilevel lower lumbar facet arthropathy.      ASSESSMENT: 72 y.o. year old female with lower back and leg pain, consistent with     1. DDD (degenerative disc disease), lumbar  gabapentin (NEURONTIN) 300 MG capsule              PLAN:   - Interventions: None at this time, not interested in injections or surgery    S/p bilateral L4/5 transforaminal epidural steroid injection with limited relief     - Anticoagulation use: yes Coumadin     report:  Reviewed and consistent with medication use as prescribed.    - Medications:  --  We have discussed continuing gabapentin. We have reviewed potential side effects of this medication including daytime somnolence, weight gain and peripheral edema  Gabapentin :300 mg in the am and  900 mg QHS    - Therapy:  We discussed continuing physical therapy to help manage the patient/s painful condition. The patient was counseled that muscle strengthening will improve the long term prognosis in regards to pain and may also help increase range of motion and mobility. She can call for renewal of physical therapy     - Imaging: Reviewed available imaging with patient and answered any questions  they had regarding study    -Consults/referral: None at this time    - Follow up visit: return to clinic in 4-6 months or PRN      The above plan and management options were discussed at length with patient. Patient is in agreement with the above and verbalized understanding.    - I discussed the goals of interventional chronic pain management with the patient on today's visit. We discussed a multimodal and systematic approach to pain.  This includes diagnostic and therapeutic injections, adjuvant pharmacologic treatment, physical therapy, and at times psychiatry.  I emphasized the importance of regular exercise, core strengthening and stretching, diet and weight loss as a cornerstone of long-term pain management.    - This condition does not require this patient to take time off of work, and the primary goal of our Pain Management services is to improve the patient's functional capacity.  - Patient Questions: Answered all of the patient's questions regarding diagnoses, therapy, treatment and next steps        Aimee Zepeda PA-C  Interventional Pain Management  [facility]    Disclaimer:  This note was prepared using voice recognition system and is likely to have sound alike errors that may have been overlooked even after proof reading.  Please call me with any questions

## 2022-10-05 ENCOUNTER — TELEPHONE (OUTPATIENT)
Dept: FAMILY MEDICINE | Facility: CLINIC | Age: 72
End: 2022-10-05

## 2022-10-05 NOTE — TELEPHONE ENCOUNTER
----- Message from Norah Cali sent at 10/5/2022  1:21 PM CDT -----  Contact: Alyx  The patient is requesting a callback from the nurse in regards to having a prescription called in to the pharmacy due to sore throat feel like razor blades when she swallows for 3 days.       [id number] - [place] - [address] AT [facility]  [address] [address]  Phone: [phone] Fax: [phone]       Please call Alyx at [phone] (home)      Thanks

## 2022-10-05 NOTE — TELEPHONE ENCOUNTER
----- Message from Laine Riddle sent at 10/5/2022  9:04 AM CDT -----  Contact: Alyx  Patient is calling to speak with the nurse regarding medication. Reports having a sore throat and bad cough and wanted to know if something can be called in to her preferred pharmacy. Please give patient a call at [phone]

## 2022-10-10 ENCOUNTER — PATIENT OUTREACH (OUTPATIENT)
Dept: ADMINISTRATIVE | Facility: HOSPITAL | Age: 72
End: 2022-10-10
Payer: MEDICARE

## 2022-10-28 ENCOUNTER — ANTI-COAG VISIT (OUTPATIENT)
Dept: CARDIOLOGY | Facility: CLINIC | Age: 72
End: 2022-10-28
Payer: MEDICARE

## 2022-10-28 DIAGNOSIS — Z79.01 LONG TERM (CURRENT) USE OF ANTICOAGULANTS: Primary | ICD-10-CM

## 2022-10-28 DIAGNOSIS — Z79.01 ANTICOAGULATED ON COUMADIN: Chronic | ICD-10-CM

## 2022-10-28 DIAGNOSIS — D68.51 HETEROZYGOUS FACTOR V LEIDEN MUTATION: Chronic | ICD-10-CM

## 2022-10-28 LAB — INR PPP: 1.9 (ref 2–3)

## 2022-10-28 PROCEDURE — 93793 PR ANTICOAGULANT MGMT FOR PT TAKING WARFARIN: ICD-10-PCS | Mod: S$GLB,,,

## 2022-10-28 PROCEDURE — 93793 ANTICOAG MGMT PT WARFARIN: CPT | Mod: S$GLB,,,

## 2022-10-28 PROCEDURE — 85610 PROTHROMBIN TIME: CPT | Mod: QW,S$GLB,, | Performed by: INTERNAL MEDICINE

## 2022-10-28 PROCEDURE — 85610 POCT INR: ICD-10-PCS | Mod: QW,S$GLB,, | Performed by: INTERNAL MEDICINE

## 2022-10-28 NOTE — PROGRESS NOTES
"INR is just below goal at 1.9.  Patient reports a recent consumption of cabbage - "too much".  No s/s reported.  Current warfarin dose verified and followed.  Instructions given to continue warfarin 5 mg every Monday, Friday; and 7.5 mg on all other days as directed.  Decrease serving portion of leafy greens in diet.  Recheck in 1 month.  Dose calendar given - confirms understanding.    "

## 2022-11-08 ENCOUNTER — OFFICE VISIT (OUTPATIENT)
Dept: UROLOGY | Facility: CLINIC | Age: 72
End: 2022-11-08
Payer: MEDICARE

## 2022-11-08 ENCOUNTER — OFFICE VISIT (OUTPATIENT)
Dept: OBSTETRICS AND GYNECOLOGY | Facility: CLINIC | Age: 72
End: 2022-11-08
Payer: MEDICARE

## 2022-11-08 VITALS
HEIGHT: 65 IN | DIASTOLIC BLOOD PRESSURE: 85 MMHG | WEIGHT: 176.56 LBS | BODY MASS INDEX: 29.42 KG/M2 | SYSTOLIC BLOOD PRESSURE: 145 MMHG

## 2022-11-08 VITALS
SYSTOLIC BLOOD PRESSURE: 172 MMHG | BODY MASS INDEX: 29.31 KG/M2 | HEART RATE: 56 BPM | DIASTOLIC BLOOD PRESSURE: 81 MMHG | WEIGHT: 176.13 LBS

## 2022-11-08 DIAGNOSIS — Z01.419 WELL WOMAN EXAM WITH ROUTINE GYNECOLOGICAL EXAM: Primary | ICD-10-CM

## 2022-11-08 DIAGNOSIS — N32.81 OAB (OVERACTIVE BLADDER): ICD-10-CM

## 2022-11-08 PROCEDURE — 1159F PR MEDICATION LIST DOCUMENTED IN MEDICAL RECORD: ICD-10-PCS | Mod: CPTII,S$GLB,, | Performed by: UROLOGY

## 2022-11-08 PROCEDURE — 99999 PR PBB SHADOW E&M-EST. PATIENT-LVL III: ICD-10-PCS | Mod: PBBFAC,,,

## 2022-11-08 PROCEDURE — 1101F PT FALLS ASSESS-DOCD LE1/YR: CPT | Mod: CPTII,S$GLB,,

## 2022-11-08 PROCEDURE — G0101 CA SCREEN;PELVIC/BREAST EXAM: HCPCS | Mod: S$GLB,,,

## 2022-11-08 PROCEDURE — 3077F SYST BP >= 140 MM HG: CPT | Mod: CPTII,S$GLB,, | Performed by: UROLOGY

## 2022-11-08 PROCEDURE — 3288F PR FALLS RISK ASSESSMENT DOCUMENTED: ICD-10-PCS | Mod: CPTII,S$GLB,,

## 2022-11-08 PROCEDURE — 1159F MED LIST DOCD IN RCRD: CPT | Mod: CPTII,S$GLB,, | Performed by: UROLOGY

## 2022-11-08 PROCEDURE — 1160F RVW MEDS BY RX/DR IN RCRD: CPT | Mod: CPTII,S$GLB,, | Performed by: UROLOGY

## 2022-11-08 PROCEDURE — 1126F PR PAIN SEVERITY QUANTIFIED, NO PAIN PRESENT: ICD-10-PCS | Mod: CPTII,S$GLB,,

## 2022-11-08 PROCEDURE — 3288F FALL RISK ASSESSMENT DOCD: CPT | Mod: CPTII,S$GLB,,

## 2022-11-08 PROCEDURE — 1126F AMNT PAIN NOTED NONE PRSNT: CPT | Mod: CPTII,S$GLB,, | Performed by: UROLOGY

## 2022-11-08 PROCEDURE — 1101F PR PT FALLS ASSESS DOC 0-1 FALLS W/OUT INJ PAST YR: ICD-10-PCS | Mod: CPTII,S$GLB,, | Performed by: UROLOGY

## 2022-11-08 PROCEDURE — 99213 PR OFFICE/OUTPT VISIT, EST, LEVL III, 20-29 MIN: ICD-10-PCS | Mod: S$GLB,,, | Performed by: UROLOGY

## 2022-11-08 PROCEDURE — 1126F PR PAIN SEVERITY QUANTIFIED, NO PAIN PRESENT: ICD-10-PCS | Mod: CPTII,S$GLB,, | Performed by: UROLOGY

## 2022-11-08 PROCEDURE — 3077F SYST BP >= 140 MM HG: CPT | Mod: CPTII,S$GLB,,

## 2022-11-08 PROCEDURE — 3008F PR BODY MASS INDEX (BMI) DOCUMENTED: ICD-10-PCS | Mod: CPTII,S$GLB,,

## 2022-11-08 PROCEDURE — 99999 PR PBB SHADOW E&M-EST. PATIENT-LVL III: CPT | Mod: PBBFAC,,, | Performed by: UROLOGY

## 2022-11-08 PROCEDURE — 99999 PR PBB SHADOW E&M-EST. PATIENT-LVL III: ICD-10-PCS | Mod: PBBFAC,,, | Performed by: UROLOGY

## 2022-11-08 PROCEDURE — 3044F PR MOST RECENT HEMOGLOBIN A1C LEVEL <7.0%: ICD-10-PCS | Mod: CPTII,S$GLB,, | Performed by: UROLOGY

## 2022-11-08 PROCEDURE — 3008F BODY MASS INDEX DOCD: CPT | Mod: CPTII,S$GLB,, | Performed by: UROLOGY

## 2022-11-08 PROCEDURE — 3044F PR MOST RECENT HEMOGLOBIN A1C LEVEL <7.0%: ICD-10-PCS | Mod: CPTII,S$GLB,,

## 2022-11-08 PROCEDURE — 3008F PR BODY MASS INDEX (BMI) DOCUMENTED: ICD-10-PCS | Mod: CPTII,S$GLB,, | Performed by: UROLOGY

## 2022-11-08 PROCEDURE — G0101 PR CA SCREEN;PELVIC/BREAST EXAM: ICD-10-PCS | Mod: S$GLB,,,

## 2022-11-08 PROCEDURE — 1101F PR PT FALLS ASSESS DOC 0-1 FALLS W/OUT INJ PAST YR: ICD-10-PCS | Mod: CPTII,S$GLB,,

## 2022-11-08 PROCEDURE — 99999 PR PBB SHADOW E&M-EST. PATIENT-LVL III: CPT | Mod: PBBFAC,,,

## 2022-11-08 PROCEDURE — 3079F DIAST BP 80-89 MM HG: CPT | Mod: CPTII,S$GLB,,

## 2022-11-08 PROCEDURE — 1159F MED LIST DOCD IN RCRD: CPT | Mod: CPTII,S$GLB,,

## 2022-11-08 PROCEDURE — 99213 OFFICE O/P EST LOW 20 MIN: CPT | Mod: S$GLB,,, | Performed by: UROLOGY

## 2022-11-08 PROCEDURE — 3079F PR MOST RECENT DIASTOLIC BLOOD PRESSURE 80-89 MM HG: ICD-10-PCS | Mod: CPTII,S$GLB,, | Performed by: UROLOGY

## 2022-11-08 PROCEDURE — 3044F HG A1C LEVEL LT 7.0%: CPT | Mod: CPTII,S$GLB,,

## 2022-11-08 PROCEDURE — 3288F FALL RISK ASSESSMENT DOCD: CPT | Mod: CPTII,S$GLB,, | Performed by: UROLOGY

## 2022-11-08 PROCEDURE — 3079F PR MOST RECENT DIASTOLIC BLOOD PRESSURE 80-89 MM HG: ICD-10-PCS | Mod: CPTII,S$GLB,,

## 2022-11-08 PROCEDURE — 3079F DIAST BP 80-89 MM HG: CPT | Mod: CPTII,S$GLB,, | Performed by: UROLOGY

## 2022-11-08 PROCEDURE — 3044F HG A1C LEVEL LT 7.0%: CPT | Mod: CPTII,S$GLB,, | Performed by: UROLOGY

## 2022-11-08 PROCEDURE — 3008F BODY MASS INDEX DOCD: CPT | Mod: CPTII,S$GLB,,

## 2022-11-08 PROCEDURE — 1160F PR REVIEW ALL MEDS BY PRESCRIBER/CLIN PHARMACIST DOCUMENTED: ICD-10-PCS | Mod: CPTII,S$GLB,, | Performed by: UROLOGY

## 2022-11-08 PROCEDURE — 3077F PR MOST RECENT SYSTOLIC BLOOD PRESSURE >= 140 MM HG: ICD-10-PCS | Mod: CPTII,S$GLB,,

## 2022-11-08 PROCEDURE — 3288F PR FALLS RISK ASSESSMENT DOCUMENTED: ICD-10-PCS | Mod: CPTII,S$GLB,, | Performed by: UROLOGY

## 2022-11-08 PROCEDURE — 1126F AMNT PAIN NOTED NONE PRSNT: CPT | Mod: CPTII,S$GLB,,

## 2022-11-08 PROCEDURE — 1159F PR MEDICATION LIST DOCUMENTED IN MEDICAL RECORD: ICD-10-PCS | Mod: CPTII,S$GLB,,

## 2022-11-08 PROCEDURE — 1101F PT FALLS ASSESS-DOCD LE1/YR: CPT | Mod: CPTII,S$GLB,, | Performed by: UROLOGY

## 2022-11-08 PROCEDURE — 3077F PR MOST RECENT SYSTOLIC BLOOD PRESSURE >= 140 MM HG: ICD-10-PCS | Mod: CPTII,S$GLB,, | Performed by: UROLOGY

## 2022-11-08 RX ORDER — SOLIFENACIN SUCCINATE 5 MG/1
5 TABLET, FILM COATED ORAL DAILY
Qty: 90 TABLET | Refills: 3 | Status: SHIPPED | OUTPATIENT
Start: 2022-11-08 | End: 2024-01-13

## 2022-11-08 NOTE — PROGRESS NOTES
Subjective:       Patient ID: Alyx Weir is a 72 y.o. female.    Chief Complaint:  Well Woman      History of Present Illness  HPI  Annual Exam-Postmenopausal  Patient presents for annual exam. The patient has no complaints today. The patient is not currently sexually active   2 years ago. GYN screening history: last pap: was normal and in the 80s and last mammogram: approximate date 24 and was normal. The patient is not taking hormone replacement therapy. Patient denies post-menopausal vaginal bleeding. The patient wears seatbelts: yes. The patient participates in regular exercise: yes. Has the patient ever been transfused or tattooed?: no. The patient reports that there is not domestic violence in her life.    Last colonoscopy in , wnl, repeat in 10 years    GYN & OB History  No LMP recorded. Patient has had a hysterectomy.   Date of Last Pap: No result found    OB History    Para Term  AB Living   0 0 0 0 0 0   SAB IAB Ectopic Multiple Live Births   0 0 0 0         Review of Systems  Review of Systems   Constitutional:  Negative for activity change, appetite change, chills, fatigue and fever.   HENT:  Negative for nasal congestion and mouth sores.    Respiratory:  Negative for cough, shortness of breath and wheezing.    Cardiovascular:  Negative for chest pain.   Gastrointestinal:  Negative for abdominal pain, constipation, diarrhea, nausea and vomiting.   Endocrine: Negative for hair loss and hot flashes.   Genitourinary:  Negative for bladder incontinence, decreased libido, dyspareunia, dysuria, frequency, genital sores, pelvic pain, urgency, vaginal discharge, vaginal pain, urinary incontinence, postcoital bleeding, postmenopausal bleeding, vaginal dryness and vaginal odor.   Musculoskeletal:  Negative for back pain.   Integumentary:  Negative for breast mass, nipple discharge, breast skin changes and breast tenderness.   Neurological:  Negative for headaches.    Hematological:  Negative for adenopathy.   Psychiatric/Behavioral:  Negative for depression. The patient is not nervous/anxious.    All other systems reviewed and are negative.  Breast: Negative for breast self exam, lump, mass, mastodynia, nipple discharge, skin changes and tenderness        Objective:    Physical Exam:   Constitutional: She is oriented to person, place, and time. She appears well-developed and well-nourished. No distress.    HENT:   Head: Normocephalic and atraumatic.   Nose: Nose normal.    Eyes: Pupils are equal, round, and reactive to light. Conjunctivae and EOM are normal. Right eye exhibits no discharge. Left eye exhibits no discharge.     Cardiovascular:  Normal rate, regular rhythm and normal heart sounds.      Exam reveals no clubbing, no cyanosis and no edema.        Pulmonary/Chest: Effort normal and breath sounds normal. No respiratory distress. She has no decreased breath sounds. She has no wheezes. She has no rhonchi. She has no rales. She exhibits no tenderness. Right breast exhibits no inverted nipple, no mass, no nipple discharge, no skin change, no tenderness, no bleeding and no swelling. Left breast exhibits no inverted nipple, no mass, no nipple discharge, no skin change, no tenderness, no bleeding and no swelling. Breasts are symmetrical.   Breast exam deferred per pt        Abdominal: Soft. Bowel sounds are normal. She exhibits no distension. There is no abdominal tenderness. There is no rebound and no guarding. Hernia confirmed negative in the right inguinal area and confirmed negative in the left inguinal area.     Genitourinary:    Inguinal canal, right adnexa and left adnexa normal.   Rectum:      No external hemorrhoid.      Pelvic exam was performed with patient supine.   The external female genitalia was normal.   No external genitalia lesions identified,Genitalia hair distrobution normal .   Labial bartholins normal.There is no rash, tenderness, lesion or injury on  the right labia. There is no rash, tenderness, lesion or injury on the left labia. Right adnexum displays no mass, no tenderness and no fullness. Left adnexum displays no mass, no tenderness and no fullness. Vaginal cuff normal.  No erythema,  no vaginal discharge, tenderness or bleeding in the vagina.    No foreign body in the vagina.      No signs of injury in the vagina.   Vaginal atrophy noted. Cervix is absent.Uterus is absent. Normal urethral meatus.Bladder findings: no bladder distention and no bladder tenderness          Musculoskeletal: Normal range of motion and moves all extremeties.      Lymphadenopathy: No inguinal adenopathy noted on the right or left side.    Neurological: She is alert and oriented to person, place, and time.    Skin: Skin is warm and dry. No rash noted. She is not diaphoretic. No cyanosis or erythema. No pallor. Nails show no clubbing.    Psychiatric: She has a normal mood and affect. Her speech is normal and behavior is normal. Judgment and thought content normal.        Assessment:        1. Well woman exam with routine gynecological exam                Plan:      Well woman exam with routine gynecological exam    Counseled pt on recommendations for Calcium and Vit D supplementation.  Pt no longer needs pap smears.  Needs a pelvic exam q 2 years.  Advised on annual mammogram.      Follow up in about 2 years (around 11/8/2024) for wellness exam.

## 2022-11-08 NOTE — PROGRESS NOTES
Chief Complaint:  OAB    HPI:   11/08/2022 - returns today for follow-up, has been taking the VESIcare as prescribed and it has been working well for her, now has plenty of time to make it to the bathroom, now wearing maybe one pad per day, no side effects that she can tell, no gross hematuria or dysuria    09/06/2022 - 71 yo female that presents for evaluation and management of OAB.  Patient notes three month history of worsening urgency and urge incontinence.  Due to this she wears two pads per day.  She denies any gross incontinence.  Does not currently on a medication for her bladder.  She notes that she underwent resection of part of her bladder about 30 years ago, denies that it was for cancer.  Denies any gross hematuria or dysuria, denies family history of  cancers, denies kidney stones or bladder infections.      PMH:  Past Medical History:   Diagnosis Date    Anticoagulated on Coumadin     Arm weakness-rotator cuff weakness 11/2/2015    Arthritis     Asthma     Clotting disorder     Coronary artery disease     Deep vein thrombosis     Degenerative disc disease     Diabetes mellitus type I 2011    BS last tested x 1 month not sure what it was.    Heterozygous factor V Leiden mutation     Hx of colonic polyps 11/13/2015    Hyperlipidemia     Hypertension     VIRGIL (obstructive sleep apnea)     Stenosis of right carotid artery 12/13/2016       PSH:  Past Surgical History:   Procedure Laterality Date    BACK SURGERY      bladder cyst removal      BLADDER SURGERY      CARDIAC CATHETERIZATION  03/2013    mild CAD    COLONOSCOPY N/A 11/13/2015    Procedure: COLONOSCOPY;  Surgeon: Jas Umanzor III, MD;  Location: [facility];  Service: Endoscopy;  Laterality: N/A;    HYSTERECTOMY      28 yrs old    LUMBAR SPINE SURGERY      bone spurs removed    OOPHORECTOMY      SELECTIVE INJECTION OF ANESTHETIC AGENT AROUND LUMBAR SPINAL NERVE ROOT BY TRANSFORAMINAL APPROACH Bilateral 6/3/2022    Procedure: Bilateral L4/5 TF  ASUNCION with RN IV sedation; on Coumadin, will need INR prior to procedure, must be less than 1.3;  Surgeon: Mario Palacios MD;  Location: [facility];  Service: Pain Management;  Laterality: Bilateral;       Family History:  Family History   Problem Relation Age of Onset    Heart disease Mother     Hypertension Mother     Cataracts Mother     Hypertension Sister     Glaucoma Sister     Hypertension Brother     Diabetes Father     Diabetes Paternal Uncle     Hypertension Sister     Diabetes Sister     Hypertension Sister     Diabetes Sister     Breast cancer Neg Hx     Colon cancer Neg Hx     Ovarian cancer Neg Hx        Social History:  Social History     Tobacco Use    Smoking status: Never    Smokeless tobacco: Never   Substance Use Topics    Alcohol use: No    Drug use: No        Review of Systems:  General: No fever, chills  Skin: No rashes  Chest:  Denies cough and sputum production  Heart: Denies chest pain  Resp: Denies dyspnea  Abdomen: Denies diarrhea, abdominal pain, hematemesis, or blood in stool.  Musculoskeletal: No joint stiffness or swelling. Denies back pain.  : see HPI  Neuro: no dizziness or weakness    Allergies:  Erythromycin, Amlodipine, Diazepam, Iodine, Meperidine, Morphine, Methylprednisolone sod suc(pf), and Primidone    Medications:    Current Outpatient Medications:     ACCU-CHEK GUIDE ME GLUCOSE MTR Misc, USE TO CHECK BLOOD SUGAR TWICE DAILY, Disp: , Rfl:     acetaminophen (TYLENOL ARTHRITIS ORAL), Take by mouth., Disp: , Rfl:     allopurinoL (ZYLOPRIM) 100 MG tablet, TAKE 1 TABLET EVERY DAY, Disp: 90 tablet, Rfl: 3    blood sugar diagnostic Strp, To check BG 2 times daily, to use with insurance preferred meter, Disp: 100 strip, Rfl: 2    diclofenac sodium (VOLTAREN) 1 % Gel, APPLY 2 GRAMS TOPICALLY DAILY AS NEEDED, Disp: 200 g, Rfl: 2    ezetimibe (ZETIA) 10 mg tablet, Take 1 tablet (10 mg total) by mouth once daily., Disp: 90 tablet, Rfl: 3    lancets Misc, To check BG 2 times daily,  to use with insurance preferred meter, Disp: 100 each, Rfl: 2    methocarbamoL (ROBAXIN) 500 MG Tab, Take 1 tablet (500 mg total) by mouth 3 (three) times daily as needed (muscle spasms). May cause drowsiness., Disp: 90 tablet, Rfl: 1    neomycin-polymyxin-hydrocortisone (CORTISPORIN) 3.5-10,000-1 mg/mL-unit/mL-% otic suspension, Place 3 drops into both ears 3 (three) times daily as needed., Disp: 10 mL, Rfl: 0    olmesartan-amLODIPin-hcthiazid 40-10-25 mg Tab, Take 1 tablet by mouth once daily., Disp: 90 tablet, Rfl: 3    om 3/E/linol/ala/oleic/gla/lip (OMEGA 3-6-9 ORAL), Take 1 capsule by mouth once daily., Disp: , Rfl:     pantoprazole (PROTONIX) 40 MG tablet, TAKE 1 TABLET(40 MG) BY MOUTH EVERY DAY, Disp: 90 tablet, Rfl: 0    solifenacin (VESICARE) 5 MG tablet, Take 1 tablet (5 mg total) by mouth once daily., Disp: 30 tablet, Rfl: 11    sucralfate (CARAFATE) 100 mg/mL suspension, Take 10 mLs (1 g total) by mouth 4 (four) times daily with meals and nightly., Disp: 400 mL, Rfl: 0    triamcinolone acetonide 0.1% (KENALOG) 0.1 % ointment, Apply topically 2 (two) times daily as needed., Disp: 30 g, Rfl: 0    vitamin D 1000 units Tab, Take 185 mg by mouth once daily., Disp: , Rfl:     warfarin (COUMADIN) 5 MG tablet, TAKE 1 AND 1/2 TABLETS (7.5MG) ON MONDAYS, WEDNESDAYS AND FRIDAYS AND 1 TABLET (5MG) ON ALL OTHER DAYS OF THE WEEK (Patient taking differently: Take 7.5 mg by mouth Daily. Except 1 tablet (5mg) every Monday and Friday.), Disp: 108 tablet, Rfl: 3    gabapentin (NEURONTIN) 300 MG capsule, Take 1 capsule (300 mg total) by mouth every morning AND 3 capsules (900 mg total) every evening., Disp: 30 capsule, Rfl: 5    Physical Exam:  Vitals:    11/08/22 1438   BP: (!) 172/81   Pulse: (!) 56     Body mass index is 29.31 kg/m².  General: awake, alert, cooperative  Head: NC/AT  Ears: external ears normal  Eyes: sclera normal  Lungs: normal inspiration, NAD  Heart: well-perfused  Skin: The skin is warm and dry  Ext:  No c/c/e.  Neuro: grossly intact, AOx3    RADIOLOGY:  No recent relevant imaging available for review.    LABS:  I personally reviewed the following lab values:  Lab Results   Component Value Date    WBC 7.63 02/16/2022    HGB 12.9 02/16/2022    HCT 39.6 02/16/2022     02/16/2022     08/17/2022    K 4.4 08/17/2022     08/17/2022    CREATININE 0.8 08/17/2022    BUN 13 08/17/2022    CO2 27 08/17/2022    TSH 0.552 06/30/2021    INR 1.9 (A) 10/28/2022    HGBA1C 6.0 (H) 08/30/2022    CHOL 199 08/30/2022    TRIG 50 08/30/2022    HDL 72 08/30/2022    ALT 16 08/17/2022    AST 16 08/17/2022       URINALYSIS:  Obtained in clinic today specific gravity 1.025 pH six positive leuk esterase 50 glucose 250 blood      Assessment/Plan:   Alyx Weir is a 72 y.o. female with    OAB - continue VESIcare, f/u 6 months    Santino Walsh MD  Urology

## 2022-11-14 ENCOUNTER — TELEPHONE (OUTPATIENT)
Dept: ADMINISTRATIVE | Facility: HOSPITAL | Age: 72
End: 2022-11-14
Payer: MEDICARE

## 2022-11-14 DIAGNOSIS — Z79.01 LONG TERM (CURRENT) USE OF ANTICOAGULANTS: ICD-10-CM

## 2022-11-15 RX ORDER — WARFARIN SODIUM 5 MG/1
7.5 TABLET ORAL DAILY
Qty: 90 TABLET | Refills: 3 | Status: SHIPPED | OUTPATIENT
Start: 2022-11-15 | End: 2023-05-11

## 2022-11-15 NOTE — TELEPHONE ENCOUNTER
----- Message from Arina Romero sent at 11/15/2022  9:35 AM CST -----  Contact: sejal/ [facility]  Sejal with [facility] pharmacy is calling to speak with the nurse regarding medications . Reports having questions and concerns, and needing to know if its been sent to them or not. Please give sejal a call back [phone]  Thanks chasity

## 2022-11-21 ENCOUNTER — ANTI-COAG VISIT (OUTPATIENT)
Dept: CARDIOLOGY | Facility: CLINIC | Age: 72
End: 2022-11-21
Payer: MEDICARE

## 2022-11-21 ENCOUNTER — IMMUNIZATION (OUTPATIENT)
Dept: PRIMARY CARE CLINIC | Facility: CLINIC | Age: 72
End: 2022-11-21
Payer: MEDICARE

## 2022-11-21 ENCOUNTER — OFFICE VISIT (OUTPATIENT)
Dept: INTERNAL MEDICINE | Facility: CLINIC | Age: 72
End: 2022-11-21
Payer: MEDICARE

## 2022-11-21 VITALS
SYSTOLIC BLOOD PRESSURE: 146 MMHG | HEART RATE: 55 BPM | HEIGHT: 65 IN | WEIGHT: 175.94 LBS | DIASTOLIC BLOOD PRESSURE: 90 MMHG | BODY MASS INDEX: 29.31 KG/M2 | OXYGEN SATURATION: 99 %

## 2022-11-21 DIAGNOSIS — Z00.00 ENCOUNTER FOR PREVENTIVE HEALTH EXAMINATION: Primary | ICD-10-CM

## 2022-11-21 DIAGNOSIS — Z59.9 FINANCIAL DIFFICULTIES: ICD-10-CM

## 2022-11-21 DIAGNOSIS — Z13.31 POSITIVE DEPRESSION SCREENING: ICD-10-CM

## 2022-11-21 DIAGNOSIS — Z86.718 HISTORY OF DVT (DEEP VEIN THROMBOSIS): ICD-10-CM

## 2022-11-21 DIAGNOSIS — D68.51 HETEROZYGOUS FACTOR V LEIDEN MUTATION: Chronic | ICD-10-CM

## 2022-11-21 DIAGNOSIS — Z23 NEED FOR VACCINATION: Primary | ICD-10-CM

## 2022-11-21 DIAGNOSIS — E11.69 DYSLIPIDEMIA ASSOCIATED WITH TYPE 2 DIABETES MELLITUS: ICD-10-CM

## 2022-11-21 DIAGNOSIS — I25.10 CORONARY ARTERY DISEASE INVOLVING NATIVE CORONARY ARTERY OF NATIVE HEART WITHOUT ANGINA PECTORIS: ICD-10-CM

## 2022-11-21 DIAGNOSIS — J44.9 CHRONIC OBSTRUCTIVE PULMONARY DISEASE, UNSPECIFIED COPD TYPE: Chronic | ICD-10-CM

## 2022-11-21 DIAGNOSIS — E78.5 DYSLIPIDEMIA ASSOCIATED WITH TYPE 2 DIABETES MELLITUS: ICD-10-CM

## 2022-11-21 DIAGNOSIS — Z79.01 ANTICOAGULATED ON COUMADIN: Chronic | ICD-10-CM

## 2022-11-21 DIAGNOSIS — Z86.010 PERSONAL HISTORY OF COLONIC POLYPS: ICD-10-CM

## 2022-11-21 DIAGNOSIS — Z79.01 LONG TERM (CURRENT) USE OF ANTICOAGULANTS: Primary | ICD-10-CM

## 2022-11-21 DIAGNOSIS — G47.33 OSA (OBSTRUCTIVE SLEEP APNEA): ICD-10-CM

## 2022-11-21 LAB
ALBUMIN/CREAT UR: 128.6 UG/MG (ref 0–30)
CREAT UR-MCNC: 70 MG/DL (ref 15–325)
INR PPP: 2.9 (ref 2–3)
MICROALBUMIN UR DL<=1MG/L-MCNC: 90 UG/ML

## 2022-11-21 PROCEDURE — G0439 PR MEDICARE ANNUAL WELLNESS SUBSEQUENT VISIT: ICD-10-PCS | Mod: S$GLB,,, | Performed by: NURSE PRACTITIONER

## 2022-11-21 PROCEDURE — 85610 POCT INR: ICD-10-PCS | Mod: QW,S$GLB,, | Performed by: INTERNAL MEDICINE

## 2022-11-21 PROCEDURE — 85610 PROTHROMBIN TIME: CPT | Mod: QW,S$GLB,, | Performed by: INTERNAL MEDICINE

## 2022-11-21 PROCEDURE — 3044F PR MOST RECENT HEMOGLOBIN A1C LEVEL <7.0%: ICD-10-PCS | Mod: CPTII,S$GLB,, | Performed by: NURSE PRACTITIONER

## 2022-11-21 PROCEDURE — 99999 PR PBB SHADOW E&M-EST. PATIENT-LVL V: ICD-10-PCS | Mod: PBBFAC,,, | Performed by: NURSE PRACTITIONER

## 2022-11-21 PROCEDURE — 3288F PR FALLS RISK ASSESSMENT DOCUMENTED: ICD-10-PCS | Mod: CPTII,S$GLB,, | Performed by: NURSE PRACTITIONER

## 2022-11-21 PROCEDURE — 1101F PR PT FALLS ASSESS DOC 0-1 FALLS W/OUT INJ PAST YR: ICD-10-PCS | Mod: CPTII,S$GLB,, | Performed by: NURSE PRACTITIONER

## 2022-11-21 PROCEDURE — 3288F FALL RISK ASSESSMENT DOCD: CPT | Mod: CPTII,S$GLB,, | Performed by: NURSE PRACTITIONER

## 2022-11-21 PROCEDURE — 3044F HG A1C LEVEL LT 7.0%: CPT | Mod: CPTII,S$GLB,, | Performed by: NURSE PRACTITIONER

## 2022-11-21 PROCEDURE — 99499 UNLISTED E&M SERVICE: CPT | Mod: HCNC,S$GLB,, | Performed by: NURSE PRACTITIONER

## 2022-11-21 PROCEDURE — 3080F PR MOST RECENT DIASTOLIC BLOOD PRESSURE >= 90 MM HG: ICD-10-PCS | Mod: CPTII,S$GLB,, | Performed by: NURSE PRACTITIONER

## 2022-11-21 PROCEDURE — 1170F FXNL STATUS ASSESSED: CPT | Mod: CPTII,S$GLB,, | Performed by: NURSE PRACTITIONER

## 2022-11-21 PROCEDURE — 3008F PR BODY MASS INDEX (BMI) DOCUMENTED: ICD-10-PCS | Mod: CPTII,S$GLB,, | Performed by: NURSE PRACTITIONER

## 2022-11-21 PROCEDURE — G0439 PPPS, SUBSEQ VISIT: HCPCS | Mod: S$GLB,,, | Performed by: NURSE PRACTITIONER

## 2022-11-21 PROCEDURE — 1126F AMNT PAIN NOTED NONE PRSNT: CPT | Mod: CPTII,S$GLB,, | Performed by: NURSE PRACTITIONER

## 2022-11-21 PROCEDURE — 3077F PR MOST RECENT SYSTOLIC BLOOD PRESSURE >= 140 MM HG: ICD-10-PCS | Mod: CPTII,S$GLB,, | Performed by: NURSE PRACTITIONER

## 2022-11-21 PROCEDURE — 82570 ASSAY OF URINE CREATININE: CPT | Performed by: NURSE PRACTITIONER

## 2022-11-21 PROCEDURE — 3008F BODY MASS INDEX DOCD: CPT | Mod: CPTII,S$GLB,, | Performed by: NURSE PRACTITIONER

## 2022-11-21 PROCEDURE — 0124A COVID-19, MRNA, LNP-S, BIVALENT BOOSTER, PF, 30 MCG/0.3 ML DOSE: CPT | Mod: CV19,PBBFAC | Performed by: FAMILY MEDICINE

## 2022-11-21 PROCEDURE — 1126F PR PAIN SEVERITY QUANTIFIED, NO PAIN PRESENT: ICD-10-PCS | Mod: CPTII,S$GLB,, | Performed by: NURSE PRACTITIONER

## 2022-11-21 PROCEDURE — 82043 UR ALBUMIN QUANTITATIVE: CPT | Performed by: NURSE PRACTITIONER

## 2022-11-21 PROCEDURE — 91312 COVID-19, MRNA, LNP-S, BIVALENT BOOSTER, PF, 30 MCG/0.3 ML DOSE: CPT | Mod: PBBFAC | Performed by: FAMILY MEDICINE

## 2022-11-21 PROCEDURE — 99999 PR PBB SHADOW E&M-EST. PATIENT-LVL V: CPT | Mod: PBBFAC,,, | Performed by: NURSE PRACTITIONER

## 2022-11-21 PROCEDURE — 3077F SYST BP >= 140 MM HG: CPT | Mod: CPTII,S$GLB,, | Performed by: NURSE PRACTITIONER

## 2022-11-21 PROCEDURE — 1170F PR FUNCTIONAL STATUS ASSESSED: ICD-10-PCS | Mod: CPTII,S$GLB,, | Performed by: NURSE PRACTITIONER

## 2022-11-21 PROCEDURE — 3080F DIAST BP >= 90 MM HG: CPT | Mod: CPTII,S$GLB,, | Performed by: NURSE PRACTITIONER

## 2022-11-21 PROCEDURE — 1101F PT FALLS ASSESS-DOCD LE1/YR: CPT | Mod: CPTII,S$GLB,, | Performed by: NURSE PRACTITIONER

## 2022-11-21 PROCEDURE — 93793 ANTICOAG MGMT PT WARFARIN: CPT | Mod: S$GLB,,,

## 2022-11-21 PROCEDURE — 93793 PR ANTICOAGULANT MGMT FOR PT TAKING WARFARIN: ICD-10-PCS | Mod: S$GLB,,,

## 2022-11-21 SDOH — SOCIAL DETERMINANTS OF HEALTH (SDOH): PROBLEM RELATED TO HOUSING AND ECONOMIC CIRCUMSTANCES, UNSPECIFIED: Z59.9

## 2022-11-21 NOTE — PROGRESS NOTES
INR is therapeutic at 2.9.  Patient reports no recent changes.  Currently taking warfarin 5 mg every Monday, Friday; and 7.5 mg on all other days as directed.  No change in dose.  Recheck in 1 month - requested 12/16/2022.  Dose calendar given - confirms understanding.

## 2022-11-21 NOTE — PATIENT INSTRUCTIONS
Counseling and Referral of Other Preventative  (Italic type indicates deductible and co-insurance are waived)    Patient Name: Alyx Weir  Today's Date: 11/21/2022    Health Maintenance       Date Due Completion Date    TETANUS VACCINE Never done ---    High Dose Statin Never done ---    Shingles Vaccine (2 of 3) 07/25/2014 5/30/2014    Colorectal Cancer Screening 11/13/2020 11/13/2015    Pneumococcal Vaccines (Age 65+) (2 - PPSV23 if available, else PCV20) 11/06/2021 11/6/2020    Foot Exam 03/02/2022 3/2/2021 (Done)    Override on 3/2/2021: Done (See Podiatry Note)    Override on 7/2/2020: Done    Override on 1/12/2018: Done    Diabetes Urine Screening 06/30/2022 6/30/2021    COVID-19 Vaccine (5 - Booster for Moderna series) 09/26/2022 8/1/2022    Hemoglobin A1c 02/28/2023 8/30/2022    Lipid Panel 08/30/2023 8/30/2022    Mammogram 09/01/2023 9/1/2022    Eye Exam 09/02/2023 9/2/2022    Override on 7/11/2016: Done    Override on 12/4/2014: Done    DEXA Scan 12/23/2023 12/23/2020        Orders Placed This Encounter   Procedures    MICROALBUMIN / CREATININE RATIO URINE    Ambulatory referral/consult to Outpatient Case Management    Ambulatory referral/consult to Podiatry     The following information is provided to all patients.  This information is to help you find resources for any of the problems found today that may be affecting your health:                Living healthy guide: www.[facility].[place].gov      Understanding Diabetes: www.diabetes.org      Eating healthy: www.cdc.gov/healthyweight      CDC home safety checklist: www.cdc.gov/steadi/patient.html      Agency on Aging: www.goea.[place].gov      Alcoholics anonymous (AA): www.aa.org      Physical Activity: www.jannette.nih.gov/ux1eyae      Tobacco use: www.quitwithusla.org

## 2022-11-21 NOTE — PROGRESS NOTES
"  Alyx Weir presented for a  Medicare AWV and comprehensive Health Risk Assessment today. The following components were reviewed and updated:    Medical history  Family History  Social history  Allergies and Current Medications  Health Risk Assessment  Health Maintenance  Care Team         ** See Completed Assessments for Annual Wellness Visit within the encounter summary.**         The following assessments were completed:  Living Situation  CAGE  Depression Screening  Timed Get Up and Go  Whisper Test  Cognitive Function Screening  Nutrition Screening  ADL Screening  PAQ Screening        Vitals:    11/21/22 0854   BP: (!) 146/90   BP Location: Left arm   Patient Position: Sitting   Pulse: (!) 55   SpO2: 99%   Weight: 79.8 kg (175 lb 14.8 oz)   Height: 5' 5" (1.651 m)     Body mass index is 29.28 kg/m².  Physical Exam  Vitals and nursing note reviewed.   Constitutional:       Appearance: She is well-developed.   HENT:      Head: Normocephalic.   Cardiovascular:      Rate and Rhythm: Normal rate and regular rhythm.      Heart sounds: Normal heart sounds.   Pulmonary:      Effort: Pulmonary effort is normal. No respiratory distress.      Breath sounds: Normal breath sounds.   Abdominal:      Palpations: Abdomen is soft. There is no mass.      Tenderness: There is no abdominal tenderness.   Musculoskeletal:         General: Normal range of motion.   Skin:     General: Skin is warm and dry.   Neurological:      Mental Status: She is alert and oriented to person, place, and time.      Motor: No abnormal muscle tone.   Psychiatric:         Speech: Speech normal.         Behavior: Behavior normal.             Diagnoses and health risks identified today and associated recommendations/orders:    1. Encounter for preventive health examination     Review for Opioid Screening: Pt does not have Rx for Opioids      Review for Substance Use Disorders: Patient does not use substance  per chart     MA to schedule  Pulmonary "     Reports she is at her respiratory baseline  Discussed locations to receive COVID booster   Discussed receiving Shingrix, Tetanus vaccine, and pneumonia vaccine at pharmacy.      2. Heterozygous factor V Leiden mutation  Stable.Continue current treatment plan as previously prescribed with your  hematologist.     3. Chronic obstructive pulmonary disease, unspecified COPD type  Stable. Continue current treatment plan as previously prescribed with your  pulmonologist.     4. Dyslipidemia associated with type 2 diabetes mellitus  A1c 6.0  Lipid- not at goal  Continue current treatment plan as previously prescribed with your  pcp and cardiologist.   - Ambulatory referral/consult to Podiatry; Future  - MICROALBUMIN / CREATININE RATIO URINE    5. Coronary artery disease involving native coronary artery of native heart without angina pectoris  Continue current treatment plan as previously prescribed with your  cardiologist.     6. VIRGIL (obstructive sleep apnea)  Discussed risks associated with untreated sleep apnea  Continue current treatment plan as previously prescribed with your  pulmonologist.     7. Positive depression screening  Reports stress  PHQ 9-13  Denies SI  Reports grief during holidays  Discussed counseling. Psychiatry department contact information given to patient.   Advised to follow up with PCP for further evaluation and recommendations. Patient expressed understanding.      8. Financial difficulties  [facility] financial resources information page given to patient.    - Ambulatory referral/consult to Outpatient Case Management    9. History of DVT (deep vein thrombosis)  Continue current treatment plan as previously prescribed with your  providers.    10. Personal history of colonic polyps  Declines colonoscopy referral at this time  She will discuss with PCP at upcoming apt    11.HTN  BP elevated at today's visit. Reports has not taken BP medication this morning but will take as soon as she can. Advised  patient to monitor BP (keep a log) and bring into upcoming pcp apt, along with machine to correlate.          Provided Alyx with a 5-10 year written screening schedule and personal prevention plan. Recommendations were developed using the USPSTF age appropriate recommendations. Education, counseling, and referrals were provided as needed. After Visit Summary printed and given to patient which includes a list of additional screenings\tests needed.    Follow up in about 1 year (around 11/21/2023) for awv.    Mandy Carbone NP  I offered to discuss advanced care planning, including how to pick a person who would make decisions for you if you were unable to make them for yourself, called a health care power of , and what kind of decisions you might make such as use of life sustaining treatments such as ventilators and tube feeding when faced with a life limiting illness recorded on a living will that they will need to know. (How you want to be cared for as you near the end of your natural life)     X Patient is interested in learning more about how to make advanced directives.  I provided them paperwork and offered to discuss this with them.

## 2022-11-22 ENCOUNTER — TELEPHONE (OUTPATIENT)
Dept: INTERNAL MEDICINE | Facility: CLINIC | Age: 72
End: 2022-11-22
Payer: MEDICARE

## 2022-11-22 NOTE — TELEPHONE ENCOUNTER
----- Message from Sadia Baires sent at 11/22/2022 11:01 AM CST -----  Pt is returning call to the office. Stated likely concerning urine result. Pls call back at [phone]. Thanks DB

## 2022-11-22 NOTE — TELEPHONE ENCOUNTER
Attempted to reach patient to discuss her test results. I left a voicemail for her to call the office

## 2022-11-22 NOTE — TELEPHONE ENCOUNTER
----- Message from Mandy Carbone NP sent at 11/22/2022  7:34 AM CST -----   MICROALBUMIN / CREATININE RATIO URINE is elevated.   Recommend good BP and DM control. Advise to avoid NSAIDs. Follow up with PCP for further recommendations and monitoring.    Advise to follow up with PCP for other AWV findings.

## 2022-11-30 ENCOUNTER — OFFICE VISIT (OUTPATIENT)
Dept: FAMILY MEDICINE | Facility: CLINIC | Age: 72
End: 2022-11-30
Payer: MEDICARE

## 2022-11-30 ENCOUNTER — OUTPATIENT CASE MANAGEMENT (OUTPATIENT)
Dept: ADMINISTRATIVE | Facility: OTHER | Age: 72
End: 2022-11-30
Payer: MEDICARE

## 2022-11-30 ENCOUNTER — LAB VISIT (OUTPATIENT)
Dept: LAB | Facility: HOSPITAL | Age: 72
End: 2022-11-30
Attending: INTERNAL MEDICINE
Payer: MEDICARE

## 2022-11-30 VITALS
TEMPERATURE: 98 F | WEIGHT: 177 LBS | DIASTOLIC BLOOD PRESSURE: 88 MMHG | BODY MASS INDEX: 29.46 KG/M2 | RESPIRATION RATE: 16 BRPM | SYSTOLIC BLOOD PRESSURE: 136 MMHG | HEART RATE: 60 BPM

## 2022-11-30 DIAGNOSIS — L81.9 HYPOPIGMENTATION: ICD-10-CM

## 2022-11-30 DIAGNOSIS — E11.9 TYPE 2 DIABETES MELLITUS WITHOUT COMPLICATION, WITHOUT LONG-TERM CURRENT USE OF INSULIN: ICD-10-CM

## 2022-11-30 DIAGNOSIS — E78.5 DYSLIPIDEMIA: ICD-10-CM

## 2022-11-30 DIAGNOSIS — Z78.9 STATIN INTOLERANCE: ICD-10-CM

## 2022-11-30 DIAGNOSIS — I83.93 VARICOSE VEINS OF BOTH LOWER EXTREMITIES, UNSPECIFIED WHETHER COMPLICATED: ICD-10-CM

## 2022-11-30 DIAGNOSIS — I10 ESSENTIAL HYPERTENSION: Chronic | ICD-10-CM

## 2022-11-30 DIAGNOSIS — E11.9 TYPE 2 DIABETES MELLITUS WITHOUT COMPLICATION, WITHOUT LONG-TERM CURRENT USE OF INSULIN: Primary | ICD-10-CM

## 2022-11-30 DIAGNOSIS — D68.51 HETEROZYGOUS FACTOR V LEIDEN MUTATION: Chronic | ICD-10-CM

## 2022-11-30 DIAGNOSIS — Z79.01 ANTICOAGULATED ON COUMADIN: Chronic | ICD-10-CM

## 2022-11-30 DIAGNOSIS — E78.00 PURE HYPERCHOLESTEROLEMIA WITH TARGET LOW DENSITY LIPOPROTEIN (LDL) CHOLESTEROL LESS THAN 130 MG/DL: Chronic | ICD-10-CM

## 2022-11-30 DIAGNOSIS — G47.33 OSA ON CPAP: Chronic | ICD-10-CM

## 2022-11-30 DIAGNOSIS — I25.10 CORONARY ARTERY DISEASE INVOLVING NATIVE CORONARY ARTERY OF NATIVE HEART WITHOUT ANGINA PECTORIS: ICD-10-CM

## 2022-11-30 LAB
ALBUMIN SERPL BCP-MCNC: 3.9 G/DL (ref 3.5–5.2)
ALP SERPL-CCNC: 47 U/L (ref 55–135)
ALT SERPL W/O P-5'-P-CCNC: 16 U/L (ref 10–44)
ANION GAP SERPL CALC-SCNC: 8 MMOL/L (ref 8–16)
AST SERPL-CCNC: 18 U/L (ref 10–40)
BILIRUB SERPL-MCNC: 1.2 MG/DL (ref 0.1–1)
BUN SERPL-MCNC: 14 MG/DL (ref 8–23)
CALCIUM SERPL-MCNC: 9.6 MG/DL (ref 8.7–10.5)
CHLORIDE SERPL-SCNC: 102 MMOL/L (ref 95–110)
CHOLEST SERPL-MCNC: 195 MG/DL (ref 120–199)
CHOLEST/HDLC SERPL: 2.6 {RATIO} (ref 2–5)
CO2 SERPL-SCNC: 29 MMOL/L (ref 23–29)
CREAT SERPL-MCNC: 0.8 MG/DL (ref 0.5–1.4)
EST. GFR  (NO RACE VARIABLE): >60 ML/MIN/1.73 M^2
ESTIMATED AVG GLUCOSE: 123 MG/DL (ref 68–131)
GLUCOSE SERPL-MCNC: 69 MG/DL (ref 70–110)
HBA1C MFR BLD: 5.9 % (ref 4–5.6)
HDLC SERPL-MCNC: 74 MG/DL (ref 40–75)
HDLC SERPL: 37.9 % (ref 20–50)
LDLC SERPL CALC-MCNC: 110.6 MG/DL (ref 63–159)
NONHDLC SERPL-MCNC: 121 MG/DL
POTASSIUM SERPL-SCNC: 4 MMOL/L (ref 3.5–5.1)
PROT SERPL-MCNC: 7.6 G/DL (ref 6–8.4)
SODIUM SERPL-SCNC: 139 MMOL/L (ref 136–145)
TRIGL SERPL-MCNC: 52 MG/DL (ref 30–150)

## 2022-11-30 PROCEDURE — 99999 PR PBB SHADOW E&M-EST. PATIENT-LVL V: ICD-10-PCS | Mod: PBBFAC,,, | Performed by: INTERNAL MEDICINE

## 2022-11-30 PROCEDURE — 1126F AMNT PAIN NOTED NONE PRSNT: CPT | Mod: CPTII,S$GLB,, | Performed by: INTERNAL MEDICINE

## 2022-11-30 PROCEDURE — 99214 OFFICE O/P EST MOD 30 MIN: CPT | Mod: S$GLB,,, | Performed by: INTERNAL MEDICINE

## 2022-11-30 PROCEDURE — 1101F PR PT FALLS ASSESS DOC 0-1 FALLS W/OUT INJ PAST YR: ICD-10-PCS | Mod: CPTII,S$GLB,, | Performed by: INTERNAL MEDICINE

## 2022-11-30 PROCEDURE — 3008F BODY MASS INDEX DOCD: CPT | Mod: CPTII,S$GLB,, | Performed by: INTERNAL MEDICINE

## 2022-11-30 PROCEDURE — 1101F PT FALLS ASSESS-DOCD LE1/YR: CPT | Mod: CPTII,S$GLB,, | Performed by: INTERNAL MEDICINE

## 2022-11-30 PROCEDURE — 99999 PR PBB SHADOW E&M-EST. PATIENT-LVL V: CPT | Mod: PBBFAC,,, | Performed by: INTERNAL MEDICINE

## 2022-11-30 PROCEDURE — 3008F PR BODY MASS INDEX (BMI) DOCUMENTED: ICD-10-PCS | Mod: CPTII,S$GLB,, | Performed by: INTERNAL MEDICINE

## 2022-11-30 PROCEDURE — 83036 HEMOGLOBIN GLYCOSYLATED A1C: CPT | Performed by: INTERNAL MEDICINE

## 2022-11-30 PROCEDURE — 3075F SYST BP GE 130 - 139MM HG: CPT | Mod: CPTII,S$GLB,, | Performed by: INTERNAL MEDICINE

## 2022-11-30 PROCEDURE — 3288F FALL RISK ASSESSMENT DOCD: CPT | Mod: CPTII,S$GLB,, | Performed by: INTERNAL MEDICINE

## 2022-11-30 PROCEDURE — 1159F MED LIST DOCD IN RCRD: CPT | Mod: CPTII,S$GLB,, | Performed by: INTERNAL MEDICINE

## 2022-11-30 PROCEDURE — 80061 LIPID PANEL: CPT | Performed by: INTERNAL MEDICINE

## 2022-11-30 PROCEDURE — 80053 COMPREHEN METABOLIC PANEL: CPT | Performed by: INTERNAL MEDICINE

## 2022-11-30 PROCEDURE — 36415 COLL VENOUS BLD VENIPUNCTURE: CPT | Mod: PO | Performed by: INTERNAL MEDICINE

## 2022-11-30 PROCEDURE — 3288F PR FALLS RISK ASSESSMENT DOCUMENTED: ICD-10-PCS | Mod: CPTII,S$GLB,, | Performed by: INTERNAL MEDICINE

## 2022-11-30 PROCEDURE — 3066F PR DOCUMENTATION OF TREATMENT FOR NEPHROPATHY: ICD-10-PCS | Mod: CPTII,S$GLB,, | Performed by: INTERNAL MEDICINE

## 2022-11-30 PROCEDURE — 3079F PR MOST RECENT DIASTOLIC BLOOD PRESSURE 80-89 MM HG: ICD-10-PCS | Mod: CPTII,S$GLB,, | Performed by: INTERNAL MEDICINE

## 2022-11-30 PROCEDURE — 3044F PR MOST RECENT HEMOGLOBIN A1C LEVEL <7.0%: ICD-10-PCS | Mod: CPTII,S$GLB,, | Performed by: INTERNAL MEDICINE

## 2022-11-30 PROCEDURE — 99214 PR OFFICE/OUTPT VISIT, EST, LEVL IV, 30-39 MIN: ICD-10-PCS | Mod: S$GLB,,, | Performed by: INTERNAL MEDICINE

## 2022-11-30 PROCEDURE — 3079F DIAST BP 80-89 MM HG: CPT | Mod: CPTII,S$GLB,, | Performed by: INTERNAL MEDICINE

## 2022-11-30 PROCEDURE — 3075F PR MOST RECENT SYSTOLIC BLOOD PRESS GE 130-139MM HG: ICD-10-PCS | Mod: CPTII,S$GLB,, | Performed by: INTERNAL MEDICINE

## 2022-11-30 PROCEDURE — 3060F PR POS MICROALBUMINURIA RESULT DOCUMENTED/REVIEW: ICD-10-PCS | Mod: CPTII,S$GLB,, | Performed by: INTERNAL MEDICINE

## 2022-11-30 PROCEDURE — 3044F HG A1C LEVEL LT 7.0%: CPT | Mod: CPTII,S$GLB,, | Performed by: INTERNAL MEDICINE

## 2022-11-30 PROCEDURE — 1159F PR MEDICATION LIST DOCUMENTED IN MEDICAL RECORD: ICD-10-PCS | Mod: CPTII,S$GLB,, | Performed by: INTERNAL MEDICINE

## 2022-11-30 PROCEDURE — 3066F NEPHROPATHY DOC TX: CPT | Mod: CPTII,S$GLB,, | Performed by: INTERNAL MEDICINE

## 2022-11-30 PROCEDURE — 1126F PR PAIN SEVERITY QUANTIFIED, NO PAIN PRESENT: ICD-10-PCS | Mod: CPTII,S$GLB,, | Performed by: INTERNAL MEDICINE

## 2022-11-30 PROCEDURE — 3060F POS MICROALBUMINURIA REV: CPT | Mod: CPTII,S$GLB,, | Performed by: INTERNAL MEDICINE

## 2022-11-30 NOTE — LETTER
December 1, 2022    Alyx Weir  [address] [id number]             [facility] [address] [place] [address] Dear Ms. Weir:    I am writing from the Outpatient Complex Care Management Department at [facility].  I received a referral from Mandy Carbone NP to contact you or your caregiver regarding any needs you may have. I have attempted to contact you or your cargeiver by phone two times unsuccessfully.  Please contact the Outpatient Complex Care Management Department at [phone] if you would like to discuss your needs.      Sincerely,         Marita Williamson, LEOW

## 2022-11-30 NOTE — PROGRESS NOTES
Subjective:       Patient ID: Alyx Weir is a 72 y.o. female.    Chief Complaint: Follow-up, Hypertension, Hyperlipidemia, and Diabetes    Follow-up  Associated symptoms include arthralgias and myalgias. Pertinent negatives include no abdominal pain, chest pain, chills, congestion, coughing, diaphoresis, fatigue, fever, headaches, joint swelling, nausea, neck pain, numbness, rash, sore throat, vomiting or weakness.   Hypertension  Pertinent negatives include no chest pain, headaches, neck pain, palpitations or shortness of breath.   Hyperlipidemia  Associated symptoms include myalgias. Pertinent negatives include no chest pain or shortness of breath.   Diabetes  Pertinent negatives for hypoglycemia include no confusion, dizziness, headaches, nervousness/anxiousness, pallor, seizures, speech difficulty or tremors. Pertinent negatives for diabetes include no chest pain, no fatigue, no polydipsia, no polyphagia, no polyuria and no weakness.   Past Medical History:   Diagnosis Date    Anticoagulated on Coumadin     Arm weakness-rotator cuff weakness 11/2/2015    Arthritis     Asthma     Clotting disorder     Coronary artery disease     Deep vein thrombosis     Degenerative disc disease     Diabetes mellitus type I 2011    BS last tested x 1 month not sure what it was.    Heterozygous factor V Leiden mutation     Hx of colonic polyps 11/13/2015    Hyperlipidemia     Hypertension     VIRGIL (obstructive sleep apnea)     Stenosis of right carotid artery 12/13/2016     Past Surgical History:   Procedure Laterality Date    BACK SURGERY      bladder cyst removal      BLADDER SURGERY      CARDIAC CATHETERIZATION  03/2013    mild CAD    COLONOSCOPY N/A 11/13/2015    Procedure: COLONOSCOPY;  Surgeon: Jas Umanzor III, MD;  Location: [facility];  Service: Endoscopy;  Laterality: N/A;    HYSTERECTOMY      28 yrs old    LUMBAR SPINE SURGERY      bone spurs removed    OOPHORECTOMY      SELECTIVE INJECTION OF ANESTHETIC AGENT  AROUND LUMBAR SPINAL NERVE ROOT BY TRANSFORAMINAL APPROACH Bilateral 6/3/2022    Procedure: Bilateral L4/5 TF ASUNCION with RN IV sedation; on Coumadin, will need INR prior to procedure, must be less than 1.3;  Surgeon: Mario Palacios MD;  Location: [facility] [place];  Service: Pain Management;  Laterality: Bilateral;     Family History   Problem Relation Age of Onset    Heart disease Mother     Hypertension Mother     Cataracts Mother     Hypertension Sister     Glaucoma Sister     Hypertension Brother     Diabetes Father     Diabetes Paternal Uncle     Hypertension Sister     Diabetes Sister     Hypertension Sister     Diabetes Sister     Breast cancer Neg Hx     Colon cancer Neg Hx     Ovarian cancer Neg Hx      Social History     Socioeconomic History    Marital status:    Tobacco Use    Smoking status: Never    Smokeless tobacco: Never   Substance and Sexual Activity    Alcohol use: No    Drug use: No    Sexual activity: Not Currently     Partners: Male     Birth control/protection: None   Social History Narrative    Dogs in household, no smokers.     Social Determinants of Health     Financial Resource Strain: High Risk    Difficulty of Paying Living Expenses: Hard   Food Insecurity: No Food Insecurity    Worried About Running Out of Food in the Last Year: Never true    Ran Out of Food in the Last Year: Never true   Transportation Needs: No Transportation Needs    Lack of Transportation (Medical): No    Lack of Transportation (Non-Medical): No   Physical Activity: Sufficiently Active    Days of Exercise per Week: 4 days    Minutes of Exercise per Session: 60 min   Stress: Stress Concern Present    Feeling of Stress : To some extent   Social Connections: Moderately Isolated    Frequency of Communication with Friends and Family: More than three times a week    Frequency of Social Gatherings with Friends and Family: Three times a week    Attends Taoist Services: More than 4 times per year    Active Member of  Clubs or Organizations: No    Attends Club or Organization Meetings: Never    Marital Status:    Housing Stability: Low Risk     Unable to Pay for Housing in the Last Year: No    Number of Places Lived in the Last Year: 1    Unstable Housing in the Last Year: No     Review of Systems   Constitutional:  Negative for activity change, appetite change, chills, diaphoresis, fatigue, fever and unexpected weight change.   HENT:  Negative for congestion, drooling, ear discharge, ear pain, facial swelling, hearing loss, mouth sores, nosebleeds, postnasal drip, rhinorrhea, sinus pressure, sneezing, sore throat, tinnitus, trouble swallowing and voice change.    Eyes:  Negative for photophobia, redness and visual disturbance.   Respiratory:  Negative for apnea, cough, choking, chest tightness, shortness of breath and wheezing.    Cardiovascular:  Negative for chest pain, palpitations and leg swelling.   Gastrointestinal:  Negative for abdominal distention, abdominal pain, blood in stool, constipation, diarrhea, nausea and vomiting.   Endocrine: Negative for cold intolerance, heat intolerance, polydipsia, polyphagia and polyuria.   Genitourinary:  Negative for decreased urine volume, difficulty urinating, dysuria, flank pain, frequency, genital sores, hematuria, pelvic pain and urgency.   Musculoskeletal:  Positive for arthralgias and myalgias. Negative for back pain, gait problem, joint swelling, neck pain and neck stiffness.   Skin:  Negative for color change, pallor, rash and wound.   Allergic/Immunologic: Negative for food allergies and immunocompromised state.   Neurological:  Negative for dizziness, tremors, seizures, syncope, speech difficulty, weakness, light-headedness, numbness and headaches.   Hematological:  Negative for adenopathy. Does not bruise/bleed easily.   Psychiatric/Behavioral:  Negative for agitation, behavioral problems, confusion, decreased concentration, dysphoric mood, hallucinations,  self-injury, sleep disturbance and suicidal ideas. The patient is not nervous/anxious and is not hyperactive.    All other systems reviewed and are negative.    Objective:      Physical Exam  Vitals and nursing note reviewed.   Constitutional:       General: She is not in acute distress.     Appearance: Normal appearance. She is well-developed. She is not diaphoretic.   HENT:      Head: Normocephalic and atraumatic.      Mouth/Throat:      Pharynx: No oropharyngeal exudate.   Eyes:      General: No scleral icterus.  Neck:      Thyroid: No thyromegaly.      Vascular: No carotid bruit or JVD.      Trachea: No tracheal deviation.   Cardiovascular:      Rate and Rhythm: Normal rate and regular rhythm.      Heart sounds: Normal heart sounds.   Pulmonary:      Effort: Pulmonary effort is normal. No respiratory distress.      Breath sounds: Normal breath sounds. No wheezing or rales.   Chest:      Chest wall: No tenderness.   Abdominal:      General: Bowel sounds are normal. There is no distension.      Palpations: Abdomen is soft.      Tenderness: There is no abdominal tenderness. There is no guarding or rebound.   Musculoskeletal:         General: No tenderness. Normal range of motion.      Cervical back: Normal range of motion and neck supple.   Lymphadenopathy:      Cervical: No cervical adenopathy.   Skin:     General: Skin is warm and dry.      Coloration: Skin is not pale.      Findings: No erythema or rash.   Neurological:      Mental Status: She is alert and oriented to person, place, and time.   Psychiatric:         Behavior: Behavior normal.         Thought Content: Thought content normal.         Judgment: Judgment normal.       CMP  Sodium   Date Value Ref Range Status   08/17/2022 144 136 - 145 mmol/L Final     Potassium   Date Value Ref Range Status   08/17/2022 4.4 3.5 - 5.1 mmol/L Final     Chloride   Date Value Ref Range Status   08/17/2022 105 95 - 110 mmol/L Final     CO2   Date Value Ref Range Status    08/17/2022 27 23 - 29 mmol/L Final     Glucose   Date Value Ref Range Status   08/17/2022 98 70 - 110 mg/dL Final     BUN   Date Value Ref Range Status   08/17/2022 13 8 - 23 mg/dL Final     Creatinine   Date Value Ref Range Status   08/17/2022 0.8 0.5 - 1.4 mg/dL Final     Calcium   Date Value Ref Range Status   08/17/2022 9.8 8.7 - 10.5 mg/dL Final     Total Protein   Date Value Ref Range Status   08/17/2022 7.5 6.0 - 8.4 g/dL Final     Albumin   Date Value Ref Range Status   08/17/2022 3.8 3.5 - 5.2 g/dL Final     Total Bilirubin   Date Value Ref Range Status   08/17/2022 1.1 (H) 0.1 - 1.0 mg/dL Final     Comment:     For infants and newborns, interpretation of results should be based  on gestational age, weight and in agreement with clinical  observations.    Premature Infant recommended reference ranges:  Up to 24 hours.............<8.0 mg/dL  Up to 48 hours............<12.0 mg/dL  3-5 days..................<15.0 mg/dL  6-29 days.................<15.0 mg/dL       Alkaline Phosphatase   Date Value Ref Range Status   08/17/2022 55 55 - 135 U/L Final     AST   Date Value Ref Range Status   08/17/2022 16 10 - 40 U/L Final     ALT   Date Value Ref Range Status   08/17/2022 16 10 - 44 U/L Final     Anion Gap   Date Value Ref Range Status   08/17/2022 12 8 - 16 mmol/L Final     eGFR if    Date Value Ref Range Status   02/16/2022 >60 >60 mL/min/1.73 m^2 Final     eGFR if non    Date Value Ref Range Status   02/16/2022 >60 >60 mL/min/1.73 m^2 Final     Comment:     Calculation used to obtain the estimated glomerular filtration  rate (eGFR) is the CKD-EPI equation.        Lab Results   Component Value Date    WBC 7.63 02/16/2022    HGB 12.9 02/16/2022    HCT 39.6 02/16/2022    MCV 87 02/16/2022     02/16/2022     Lab Results   Component Value Date    CHOL 199 08/30/2022     Lab Results   Component Value Date    HDL 72 08/30/2022     Lab Results   Component Value Date    LDLCALC  117.0 08/30/2022     Lab Results   Component Value Date    TRIG 50 08/30/2022     Lab Results   Component Value Date    CHOLHDL 36.2 08/30/2022     Lab Results   Component Value Date    TSH 0.552 06/30/2021     Lab Results   Component Value Date    HGBA1C 6.0 (H) 08/30/2022     Assessment:       1. Type 2 diabetes mellitus without complication, without long-term current use of insulin    2. Statin intolerance    3. Pure hypercholesterolemia with target low density lipoprotein (LDL) cholesterol less than 130 mg/dL    4. VIRGIL on CPAP    5. Heterozygous factor V Leiden mutation    6. Essential hypertension    7. Dyslipidemia    8. Coronary artery disease involving native coronary artery of native heart without angina pectoris    9. Anticoagulated on Coumadin    10. Hypopigmentation    11. Varicose veins of both lower extremities, unspecified whether complicated          Plan:   Type 2 diabetes mellitus without complication, without long-term current use of insulin  -     Hemoglobin A1C; Future; Expected date: 11/30/2022    Statin intolerance    Pure hypercholesterolemia with target low density lipoprotein (LDL) cholesterol less than 130 mg/dL    VIRGIL on CPAP    Heterozygous factor V Leiden mutation    Essential hypertension  -     Comprehensive Metabolic Panel; Future; Expected date: 11/30/2022    Dyslipidemia  -     Lipid Panel; Future; Expected date: 11/30/2022    Coronary artery disease involving native coronary artery of native heart without angina pectoris    Anticoagulated on Coumadin----------in coumadin clinic-  Hypopigmentation  -     Ambulatory referral/consult to Dermatology; Future; Expected date: 12/07/2022    Varicose veins of both lower extremities, unspecified whether complicated  -     Ambulatory referral/consult to Vascular Surgery; Future; Expected date: 12/07/2022      Stable-----------continue meds-----------as above---------f/u 6 months-----------

## 2022-12-01 ENCOUNTER — TELEPHONE (OUTPATIENT)
Dept: CARDIOLOGY | Facility: CLINIC | Age: 72
End: 2022-12-01
Payer: MEDICARE

## 2022-12-01 NOTE — PROGRESS NOTES
Attempt: #2  This LCSW attempted to reach patient/caregiver to provide resource and left msg requesting a return call.  Letter with contact information was sent via portal to patient/caregiver.  Referral source notified.

## 2022-12-01 NOTE — TELEPHONE ENCOUNTER
----- Message from Nila Phillips MA sent at 11/30/2022  2:33 PM CST -----  Regarding: FW: New Patient Appointment    ----- Message -----  From: Joann Pace  Sent: 11/30/2022   2:25 PM CST  To: [facility] Clinical Support Staff  Subject: New Patient Appointment                          Good Afternoon, patient was seen by Dr. Carl Clemons, has referral/consult for Varicose veins of both lower extremities, unspecified whether complicated. Please schedule and call patient at [phone]. Thanks/elr

## 2022-12-09 ENCOUNTER — OFFICE VISIT (OUTPATIENT)
Dept: PODIATRY | Facility: CLINIC | Age: 72
End: 2022-12-09
Payer: MEDICARE

## 2022-12-09 VITALS — WEIGHT: 177 LBS | HEIGHT: 65 IN | BODY MASS INDEX: 29.49 KG/M2

## 2022-12-09 DIAGNOSIS — E11.9 ENCOUNTER FOR COMPREHENSIVE DIABETIC FOOT EXAMINATION, TYPE 2 DIABETES MELLITUS: Primary | ICD-10-CM

## 2022-12-09 DIAGNOSIS — E11.49 TYPE II DIABETES MELLITUS WITH NEUROLOGICAL MANIFESTATIONS: ICD-10-CM

## 2022-12-09 DIAGNOSIS — R60.0 EDEMA OF FOOT: ICD-10-CM

## 2022-12-09 PROCEDURE — 3066F NEPHROPATHY DOC TX: CPT | Mod: CPTII,S$GLB,, | Performed by: PODIATRIST

## 2022-12-09 PROCEDURE — 1159F PR MEDICATION LIST DOCUMENTED IN MEDICAL RECORD: ICD-10-PCS | Mod: CPTII,S$GLB,, | Performed by: PODIATRIST

## 2022-12-09 PROCEDURE — 3060F POS MICROALBUMINURIA REV: CPT | Mod: CPTII,S$GLB,, | Performed by: PODIATRIST

## 2022-12-09 PROCEDURE — 1125F AMNT PAIN NOTED PAIN PRSNT: CPT | Mod: CPTII,S$GLB,, | Performed by: PODIATRIST

## 2022-12-09 PROCEDURE — 1160F PR REVIEW ALL MEDS BY PRESCRIBER/CLIN PHARMACIST DOCUMENTED: ICD-10-PCS | Mod: CPTII,S$GLB,, | Performed by: PODIATRIST

## 2022-12-09 PROCEDURE — 1101F PT FALLS ASSESS-DOCD LE1/YR: CPT | Mod: CPTII,S$GLB,, | Performed by: PODIATRIST

## 2022-12-09 PROCEDURE — 3044F PR MOST RECENT HEMOGLOBIN A1C LEVEL <7.0%: ICD-10-PCS | Mod: CPTII,S$GLB,, | Performed by: PODIATRIST

## 2022-12-09 PROCEDURE — 3288F PR FALLS RISK ASSESSMENT DOCUMENTED: ICD-10-PCS | Mod: CPTII,S$GLB,, | Performed by: PODIATRIST

## 2022-12-09 PROCEDURE — 1160F RVW MEDS BY RX/DR IN RCRD: CPT | Mod: CPTII,S$GLB,, | Performed by: PODIATRIST

## 2022-12-09 PROCEDURE — 3008F BODY MASS INDEX DOCD: CPT | Mod: CPTII,S$GLB,, | Performed by: PODIATRIST

## 2022-12-09 PROCEDURE — 3044F HG A1C LEVEL LT 7.0%: CPT | Mod: CPTII,S$GLB,, | Performed by: PODIATRIST

## 2022-12-09 PROCEDURE — 1101F PR PT FALLS ASSESS DOC 0-1 FALLS W/OUT INJ PAST YR: ICD-10-PCS | Mod: CPTII,S$GLB,, | Performed by: PODIATRIST

## 2022-12-09 PROCEDURE — 3288F FALL RISK ASSESSMENT DOCD: CPT | Mod: CPTII,S$GLB,, | Performed by: PODIATRIST

## 2022-12-09 PROCEDURE — 3060F PR POS MICROALBUMINURIA RESULT DOCUMENTED/REVIEW: ICD-10-PCS | Mod: CPTII,S$GLB,, | Performed by: PODIATRIST

## 2022-12-09 PROCEDURE — 1125F PR PAIN SEVERITY QUANTIFIED, PAIN PRESENT: ICD-10-PCS | Mod: CPTII,S$GLB,, | Performed by: PODIATRIST

## 2022-12-09 PROCEDURE — 99999 PR PBB SHADOW E&M-EST. PATIENT-LVL III: ICD-10-PCS | Mod: PBBFAC,,, | Performed by: PODIATRIST

## 2022-12-09 PROCEDURE — 99213 PR OFFICE/OUTPT VISIT, EST, LEVL III, 20-29 MIN: ICD-10-PCS | Mod: 25,S$GLB,, | Performed by: PODIATRIST

## 2022-12-09 PROCEDURE — 1159F MED LIST DOCD IN RCRD: CPT | Mod: CPTII,S$GLB,, | Performed by: PODIATRIST

## 2022-12-09 PROCEDURE — 3008F PR BODY MASS INDEX (BMI) DOCUMENTED: ICD-10-PCS | Mod: CPTII,S$GLB,, | Performed by: PODIATRIST

## 2022-12-09 PROCEDURE — 3066F PR DOCUMENTATION OF TREATMENT FOR NEPHROPATHY: ICD-10-PCS | Mod: CPTII,S$GLB,, | Performed by: PODIATRIST

## 2022-12-09 PROCEDURE — 99999 PR PBB SHADOW E&M-EST. PATIENT-LVL III: CPT | Mod: PBBFAC,,, | Performed by: PODIATRIST

## 2022-12-09 PROCEDURE — 99213 OFFICE O/P EST LOW 20 MIN: CPT | Mod: 25,S$GLB,, | Performed by: PODIATRIST

## 2022-12-13 ENCOUNTER — TELEPHONE (OUTPATIENT)
Dept: CARDIOLOGY | Facility: CLINIC | Age: 72
End: 2022-12-13
Payer: MEDICARE

## 2022-12-13 NOTE — TELEPHONE ENCOUNTER
----- Message from Nila Phillips MA sent at 12/13/2022  3:56 PM CST -----  Regarding: FW: New Patient Appointment    ----- Message -----  From: Joann Pace  Sent: 12/13/2022   2:37 PM CST  To: [facility] Staff  Subject: New Patient Appointment                          Good Afternoon, patient seen by Dr. Carl Clemons on 11/30/22 has a referral/consult for Varicose veins of both lower extremities, unspecified whether complicated. Please call [phone] for scheduling. Thanks/elr

## 2022-12-16 ENCOUNTER — ANTI-COAG VISIT (OUTPATIENT)
Dept: CARDIOLOGY | Facility: CLINIC | Age: 72
End: 2022-12-16
Payer: MEDICARE

## 2022-12-16 DIAGNOSIS — D68.51 HETEROZYGOUS FACTOR V LEIDEN MUTATION: Chronic | ICD-10-CM

## 2022-12-16 DIAGNOSIS — Z79.01 ANTICOAGULATED ON COUMADIN: Primary | Chronic | ICD-10-CM

## 2022-12-16 LAB — INR PPP: 3 (ref 2–3)

## 2022-12-16 PROCEDURE — 93793 ANTICOAG MGMT PT WARFARIN: CPT | Mod: S$GLB,,,

## 2022-12-16 PROCEDURE — 85610 POCT INR: ICD-10-PCS | Mod: QW,S$GLB,, | Performed by: INTERNAL MEDICINE

## 2022-12-16 PROCEDURE — 85610 PROTHROMBIN TIME: CPT | Mod: QW,S$GLB,, | Performed by: INTERNAL MEDICINE

## 2022-12-16 PROCEDURE — 93793 PR ANTICOAGULANT MGMT FOR PT TAKING WARFARIN: ICD-10-PCS | Mod: S$GLB,,,

## 2022-12-16 NOTE — PROGRESS NOTES
INR is therapeutic at 3.0 - upper end of goal.  Patient reports taking warfarin 5 mg every Monday, Friday; and 7.5 mg on all other days as directed.  No recent changes reported.  Instructions given to continue same dose of warfarin.  Follow up on 1/13/2023 -  Lab.  Will call with result.  Dose calendar given - confirms understanding.

## 2022-12-19 NOTE — PROGRESS NOTES
Subjective:     Patient ID: Alyx Weir is a 72 y.o. female.    Chief Complaint: Diabetic Foot Exam (C/o bunion pain, rates pain 7/10, Wears casual shoes, diabetic Pt, last seen on 11/30/22 with PCP Dr. Clemons)    Alyx is a 72 y.o. female who presents to the clinic for evaluation and treatment of high risk feet. Alyx has a past medical history of Anticoagulated on Coumadin, Arm weakness-rotator cuff weakness (11/2/2015), Arthritis, Asthma, Clotting disorder, Coronary artery disease, Deep vein thrombosis, Degenerative disc disease, Diabetes mellitus type I (2011), Heterozygous factor V Leiden mutation, colonic polyps (11/13/2015), Hyperlipidemia, Hypertension, VIRGIL (obstructive sleep apnea), and Stenosis of right carotid artery (12/13/2016). The patient's chief complaint is bilateral bunion pain. Patient rates pain 7/10. Patient admits more swelling to feet.  This patient has documented high risk feet requiring routine maintenance secondary to diabetes mellitis and those secondary complications of diabetes, as mentioned..    PCP: Carl Clemons MD    Date Last Seen by PCP: 11/30/2022    Current shoe gear:  Affected Foot: Casual shoes     Unaffected Foot: Casual shoes    Hemoglobin A1C   Date Value Ref Range Status   11/30/2022 5.9 (H) 4.0 - 5.6 % Final     Comment:     ADA Screening Guidelines:  5.7-6.4%  Consistent with prediabetes  >or=6.5%  Consistent with diabetes    High levels of fetal hemoglobin interfere with the HbA1C  assay. Heterozygous hemoglobin variants (HbS, HgC, etc)do  not significantly interfere with this assay.   However, presence of multiple variants may affect accuracy.     08/30/2022 6.0 (H) 4.0 - 5.6 % Final     Comment:     ADA Screening Guidelines:  5.7-6.4%  Consistent with prediabetes  >or=6.5%  Consistent with diabetes    High levels of fetal hemoglobin interfere with the HbA1C  assay. Heterozygous hemoglobin variants (HbS, HgC, etc)do  not significantly interfere with this  assay.   However, presence of multiple variants may affect accuracy.     06/30/2021 6.0 (H) 4.0 - 5.6 % Final     Comment:     ADA Screening Guidelines:  5.7-6.4%  Consistent with prediabetes  >or=6.5%  Consistent with diabetes    High levels of fetal hemoglobin interfere with the HbA1C  assay. Heterozygous hemoglobin variants (HbS, HgC, etc)do  not significantly interfere with this assay.   However, presence of multiple variants may affect accuracy.         Patient Active Problem List   Diagnosis    Heterozygous factor V Leiden mutation    Essential hypertension    Pure hypercholesterolemia with target low density lipoprotein (LDL) cholesterol less than 130 mg/dL    VIRGIL on CPAP    Anticoagulated on Coumadin    Type 2 diabetes mellitus without complication    Overweight with body mass index (BMI) 25.0-29.9    Chronic lumbar radiculopathy    Coronary artery disease involving native coronary artery without angina pectoris    Left ventricular diastolic dysfunction with preserved systolic function    COPD (chronic obstructive pulmonary disease)    Chondromalacia, right knee    Dyslipidemia    IGT (impaired glucose tolerance)    Bradycardia    Muscle cramp    Other chest pain    Statin intolerance    History of DVT (deep vein thrombosis)       Medication List with Changes/Refills   Current Medications    ACCU-CHEK GUIDE ME GLUCOSE MTR MISC    USE TO CHECK BLOOD SUGAR TWICE DAILY    ACETAMINOPHEN (TYLENOL ARTHRITIS ORAL)    Take by mouth. Takes prn    ALLOPURINOL (ZYLOPRIM) 100 MG TABLET    TAKE 1 TABLET EVERY DAY    DICLOFENAC SODIUM (VOLTAREN) 1 % GEL    APPLY 2 GRAMS TOPICALLY DAILY AS NEEDED    EZETIMIBE (ZETIA) 10 MG TABLET    Take 1 tablet (10 mg total) by mouth once daily.    GABAPENTIN (NEURONTIN) 300 MG CAPSULE    Take 1 capsule (300 mg total) by mouth once daily AND 3 capsules (900 mg total) every evening.    METHOCARBAMOL (ROBAXIN) 500 MG TAB    Take 1 tablet (500 mg total) by mouth 3 (three) times daily as  needed (muscle spasms). May cause drowsiness.    NEOMYCIN-POLYMYXIN-HYDROCORTISONE (CORTISPORIN) 3.5-10,000-1 MG/ML-UNIT/ML-% OTIC SUSPENSION    Place 3 drops into both ears 3 (three) times daily as needed.    OLMESARTAN-AMLODIPIN-HCTHIAZID 40-10-25 MG TAB    TAKE 1 TABLET EVERY DAY    OM 3/E/LINOL/ALA/OLEIC/GLA/LIP (OMEGA 3-6-9 ORAL)    Take 1 capsule by mouth once daily.    PANTOPRAZOLE (PROTONIX) 40 MG TABLET    TAKE 1 TABLET(40 MG) BY MOUTH EVERY DAY    SOLIFENACIN (VESICARE) 5 MG TABLET    Take 1 tablet (5 mg total) by mouth once daily.    SUCRALFATE (CARAFATE) 100 MG/ML SUSPENSION    Take 10 mLs (1 g total) by mouth 4 (four) times daily with meals and nightly.    TRIAMCINOLONE ACETONIDE 0.1% (KENALOG) 0.1 % OINTMENT    Apply topically 2 (two) times daily as needed.    VITAMIN D 1000 UNITS TAB    Take 185 mg by mouth once daily.    WARFARIN (COUMADIN) 5 MG TABLET    Take 1.5 tablets (7.5 mg total) by mouth Daily. Except 1 tablet (5mg) every Monday and Friday.       Review of patient's allergies indicates:   Allergen Reactions    Erythromycin Edema     Angioedema to throat    Amlodipine Other (See Comments)     Headaches    Diazepam Hives    Iodine Hives    Meperidine Hives    Morphine Itching    Methylprednisolone sod suc(pf) Other (See Comments)     headache    Primidone Other (See Comments)       Past Surgical History:   Procedure Laterality Date    BACK SURGERY      bladder cyst removal      BLADDER SURGERY      CARDIAC CATHETERIZATION  03/2013    mild CAD    COLONOSCOPY N/A 11/13/2015    Procedure: COLONOSCOPY;  Surgeon: Jas Umanzor III, MD;  Location: [facility];  Service: Endoscopy;  Laterality: N/A;    HYSTERECTOMY      28 yrs old    LUMBAR SPINE SURGERY      bone spurs removed    OOPHORECTOMY      SELECTIVE INJECTION OF ANESTHETIC AGENT AROUND LUMBAR SPINAL NERVE ROOT BY TRANSFORAMINAL APPROACH Bilateral 6/3/2022    Procedure: Bilateral L4/5 TF ASUNCION with RN IV sedation; on Coumadin, will need INR  prior to procedure, must be less than 1.3;  Surgeon: Mario Palacios MD;  Location: [facility];  Service: Pain Management;  Laterality: Bilateral;       Family History   Problem Relation Age of Onset    Heart disease Mother     Hypertension Mother     Cataracts Mother     Hypertension Sister     Glaucoma Sister     Hypertension Brother     Diabetes Father     Diabetes Paternal Uncle     Hypertension Sister     Diabetes Sister     Hypertension Sister     Diabetes Sister     Breast cancer Neg Hx     Colon cancer Neg Hx     Ovarian cancer Neg Hx        Social History     Socioeconomic History    Marital status:    Tobacco Use    Smoking status: Never    Smokeless tobacco: Never   Substance and Sexual Activity    Alcohol use: No    Drug use: No    Sexual activity: Not Currently     Partners: Male     Birth control/protection: None   Social History Narrative    Dogs in household, no smokers.     Social Determinants of Health     Financial Resource Strain: High Risk    Difficulty of Paying Living Expenses: Hard   Food Insecurity: No Food Insecurity    Worried About Running Out of Food in the Last Year: Never true    Ran Out of Food in the Last Year: Never true   Transportation Needs: No Transportation Needs    Lack of Transportation (Medical): No    Lack of Transportation (Non-Medical): No   Physical Activity: Sufficiently Active    Days of Exercise per Week: 4 days    Minutes of Exercise per Session: 60 min   Stress: Stress Concern Present    Feeling of Stress : To some extent   Social Connections: Moderately Isolated    Frequency of Communication with Friends and Family: More than three times a week    Frequency of Social Gatherings with Friends and Family: Three times a week    Attends S[employer] Services: More than 4 times per year    Active Member of Clubs or Organizations: No    Attends Club or Organization Meetings: Never    Marital Status:    Housing Stability: Low Risk     Unable to Pay for Housing  "in the Last Year: No    Number of Places Lived in the Last Year: 1    Unstable Housing in the Last Year: No       Vitals:    12/09/22 0927   Weight: 80.3 kg (177 lb)   Height: 5' 5" (1.651 m)   PainSc:   7   PainLoc: Foot       Hemoglobin A1C   Date Value Ref Range Status   11/30/2022 5.9 (H) 4.0 - 5.6 % Final     Comment:     ADA Screening Guidelines:  5.7-6.4%  Consistent with prediabetes  >or=6.5%  Consistent with diabetes    High levels of fetal hemoglobin interfere with the HbA1C  assay. Heterozygous hemoglobin variants (HbS, HgC, etc)do  not significantly interfere with this assay.   However, presence of multiple variants may affect accuracy.     08/30/2022 6.0 (H) 4.0 - 5.6 % Final     Comment:     ADA Screening Guidelines:  5.7-6.4%  Consistent with prediabetes  >or=6.5%  Consistent with diabetes    High levels of fetal hemoglobin interfere with the HbA1C  assay. Heterozygous hemoglobin variants (HbS, HgC, etc)do  not significantly interfere with this assay.   However, presence of multiple variants may affect accuracy.     06/30/2021 6.0 (H) 4.0 - 5.6 % Final     Comment:     ADA Screening Guidelines:  5.7-6.4%  Consistent with prediabetes  >or=6.5%  Consistent with diabetes    High levels of fetal hemoglobin interfere with the HbA1C  assay. Heterozygous hemoglobin variants (HbS, HgC, etc)do  not significantly interfere with this assay.   However, presence of multiple variants may affect accuracy.         Review of Systems   Constitutional:  Negative for chills and fever.   Respiratory:  Negative for shortness of breath.    Cardiovascular:  Negative for chest pain, palpitations, orthopnea, claudication and leg swelling.   Gastrointestinal:  Negative for diarrhea, nausea and vomiting.   Musculoskeletal:  Negative for joint pain.   Skin:  Negative for rash.   Neurological:  Positive for tingling and sensory change.   Psychiatric/Behavioral: Negative.             Objective:      PHYSICAL EXAM: Apperance: Alert " and orient in no distress,well developed, and with good attention to grooming and body habits  Patient presents ambulating in casual shoes.   LOWER EXTREMITY EXAM:  VASCULAR: Dorsalis pedis pulses 2/4 bilateral and Posterior Tibial pulses 2/4 bilateral. Capillary fill time <4 seconds bilateral. Mild edema observed bilateral. Varicosities absent bilateral. Skin temperature of the lower extremities is warm to warm, proximal to distal. Hair growth dim bilateral.  DERMATOLOGICAL: No skin rashes, subcutaneous nodules, lesions, or ulcers observed bilateral. Nails 1,2,3,4,5 bilateral normal length. Webspaces 1,2,3,4 bilateral clean, dry and without evidence of break in skin integrity.   NEUROLOGICAL: Light touch, sharp-dull, proprioception all present and equal bilaterally.  Vibratory sensation diminished at bilateral hallux. Protective sensation decreased sites as tested with a [place] 5.07 monofilament  MUSCULOSKELETAL: Muscle strength is 5/5 for foot inverters, everters, plantarflexors, and dorsiflexors. Muscle tone is normal. No pain on palpation of bilateral feet.       Assessment:       ICD-10-CM ICD-9-CM   1. Encounter for comprehensive diabetic foot examination, type 2 diabetes mellitus  E11.9 250.00   2. Type II diabetes mellitus with neurological manifestations  E11.49 250.60   3. Edema of foot  R60.0 782.3       Plan:   Encounter for comprehensive diabetic foot examination, type 2 diabetes mellitus  -     Ambulatory referral/consult to Podiatry    Type II diabetes mellitus with neurological manifestations    Edema of foot      I counseled the patient on her conditions, regarding findings of my examination, my impressions, and usual treatment plan.   Greater than 50% of this visit spent on counseling and coordination of care.  Greater than 12 minutes of a 15 minute appointment spent on education about the diabetic foot, neuropathy, and prevention of limb loss.  Shoe inspection. Diabetic Foot Education.  Patient reminded of the importance of good nutrition and blood sugar control to help prevent podiatric complications of diabetes. Patient instructed on proper foot hygeine. We discussed wearing proper shoe gear, daily foot inspections, never walking without protective shoe gear, never putting sharp instruments to feet.    Patient counseled on ways to improve swelling in lower extremities including decreasing/eliminating salt intake, elevating lower extremities, compression stocking, or oral medications. Patient states she understands.   Patient  will continue to monitor the areas daily, inspect feet, wear protective shoe gear when ambulatory, moisturizer to maintain skin integrity. Patient reminded of the importance of good nutrition and blood sugar control to help prevent podiatric complications of diabetes.  Patient to return 12 months or sooner if needed.          Bradley Chu DPM  [facility]

## 2023-01-03 DIAGNOSIS — R10.13 EPIGASTRIC PAIN: ICD-10-CM

## 2023-01-03 RX ORDER — EZETIMIBE 10 MG/1
10 TABLET ORAL DAILY
Qty: 90 TABLET | Refills: 3 | Status: SHIPPED | OUTPATIENT
Start: 2023-01-03 | End: 2024-01-13

## 2023-01-03 RX ORDER — PANTOPRAZOLE SODIUM 40 MG/1
TABLET, DELAYED RELEASE ORAL
Qty: 90 TABLET | Refills: 3 | Status: SHIPPED | OUTPATIENT
Start: 2023-01-03 | End: 2024-03-25 | Stop reason: SDUPTHER

## 2023-01-03 NOTE — TELEPHONE ENCOUNTER
Care Due:                  Date            Visit Type   Department     Provider  --------------------------------------------------------------------------------                                EP -                              PRIMARY      [facility]  Last Visit: 11-      CARE ([facility])   DENAE Clemons                              EP -                              PRIMARY      [facility]  Next Visit: 05-      CARE ([facility])   DENAE Clemons                                                            Last  Test          Frequency    Reason                     Performed    Due Date  --------------------------------------------------------------------------------    CBC.........  12 months..  allopurinoL..............  02- 02-    [facility] Embedded Care Gaps. Reference number: [id number]. 1/03/2023   1:12:34 PM CST

## 2023-01-13 ENCOUNTER — OFFICE VISIT (OUTPATIENT)
Dept: PULMONOLOGY | Facility: CLINIC | Age: 73
End: 2023-01-13
Payer: MEDICARE

## 2023-01-13 ENCOUNTER — LAB VISIT (OUTPATIENT)
Dept: LAB | Facility: HOSPITAL | Age: 73
End: 2023-01-13
Attending: NURSE PRACTITIONER
Payer: MEDICARE

## 2023-01-13 ENCOUNTER — ANTI-COAG VISIT (OUTPATIENT)
Dept: CARDIOLOGY | Facility: CLINIC | Age: 73
End: 2023-01-13
Payer: MEDICARE

## 2023-01-13 VITALS
HEIGHT: 65 IN | DIASTOLIC BLOOD PRESSURE: 80 MMHG | RESPIRATION RATE: 17 BRPM | SYSTOLIC BLOOD PRESSURE: 140 MMHG | WEIGHT: 176.56 LBS | BODY MASS INDEX: 29.42 KG/M2 | HEART RATE: 60 BPM | OXYGEN SATURATION: 100 %

## 2023-01-13 DIAGNOSIS — R40.0 DAYTIME SLEEPINESS: ICD-10-CM

## 2023-01-13 DIAGNOSIS — Z86.69 HISTORY OF OBSTRUCTIVE SLEEP APNEA: Primary | ICD-10-CM

## 2023-01-13 DIAGNOSIS — Z79.01 ANTICOAGULATED ON COUMADIN: Chronic | ICD-10-CM

## 2023-01-13 DIAGNOSIS — Z79.01 ANTICOAGULATED ON COUMADIN: Primary | Chronic | ICD-10-CM

## 2023-01-13 DIAGNOSIS — I10 ESSENTIAL HYPERTENSION: Chronic | ICD-10-CM

## 2023-01-13 DIAGNOSIS — G47.30 SLEEP-DISORDERED BREATHING: ICD-10-CM

## 2023-01-13 DIAGNOSIS — E66.3 OVERWEIGHT WITH BODY MASS INDEX (BMI) 25.0-29.9: ICD-10-CM

## 2023-01-13 DIAGNOSIS — R53.83 FEELING TIRED: ICD-10-CM

## 2023-01-13 DIAGNOSIS — I51.89 LEFT VENTRICULAR DIASTOLIC DYSFUNCTION WITH PRESERVED SYSTOLIC FUNCTION: Chronic | ICD-10-CM

## 2023-01-13 DIAGNOSIS — D68.51 HETEROZYGOUS FACTOR V LEIDEN MUTATION: Chronic | ICD-10-CM

## 2023-01-13 DIAGNOSIS — G47.33 OSA ON CPAP: Chronic | ICD-10-CM

## 2023-01-13 LAB
INR PPP: 2.1 (ref 0.8–1.2)
PROTHROMBIN TIME: 22.4 SEC (ref 9–12.5)

## 2023-01-13 PROCEDURE — 3079F DIAST BP 80-89 MM HG: CPT | Mod: HCNC,CPTII,S$GLB, | Performed by: NURSE PRACTITIONER

## 2023-01-13 PROCEDURE — 99999 PR PBB SHADOW E&M-EST. PATIENT-LVL IV: ICD-10-PCS | Mod: PBBFAC,HCNC,, | Performed by: NURSE PRACTITIONER

## 2023-01-13 PROCEDURE — 3072F PR LOW RISK FOR RETINOPATHY: ICD-10-PCS | Mod: HCNC,CPTII,S$GLB, | Performed by: NURSE PRACTITIONER

## 2023-01-13 PROCEDURE — 99999 PR PBB SHADOW E&M-EST. PATIENT-LVL IV: CPT | Mod: PBBFAC,HCNC,, | Performed by: NURSE PRACTITIONER

## 2023-01-13 PROCEDURE — 3072F LOW RISK FOR RETINOPATHY: CPT | Mod: HCNC,CPTII,S$GLB, | Performed by: NURSE PRACTITIONER

## 2023-01-13 PROCEDURE — 93793 PR ANTICOAGULANT MGMT FOR PT TAKING WARFARIN: ICD-10-PCS | Mod: S$GLB,,,

## 2023-01-13 PROCEDURE — 99214 OFFICE O/P EST MOD 30 MIN: CPT | Mod: HCNC,S$GLB,, | Performed by: NURSE PRACTITIONER

## 2023-01-13 PROCEDURE — 3288F PR FALLS RISK ASSESSMENT DOCUMENTED: ICD-10-PCS | Mod: HCNC,CPTII,S$GLB, | Performed by: NURSE PRACTITIONER

## 2023-01-13 PROCEDURE — 1159F PR MEDICATION LIST DOCUMENTED IN MEDICAL RECORD: ICD-10-PCS | Mod: HCNC,CPTII,S$GLB, | Performed by: NURSE PRACTITIONER

## 2023-01-13 PROCEDURE — 1160F RVW MEDS BY RX/DR IN RCRD: CPT | Mod: HCNC,CPTII,S$GLB, | Performed by: NURSE PRACTITIONER

## 2023-01-13 PROCEDURE — 1101F PR PT FALLS ASSESS DOC 0-1 FALLS W/OUT INJ PAST YR: ICD-10-PCS | Mod: HCNC,CPTII,S$GLB, | Performed by: NURSE PRACTITIONER

## 2023-01-13 PROCEDURE — 1101F PT FALLS ASSESS-DOCD LE1/YR: CPT | Mod: HCNC,CPTII,S$GLB, | Performed by: NURSE PRACTITIONER

## 2023-01-13 PROCEDURE — 3008F PR BODY MASS INDEX (BMI) DOCUMENTED: ICD-10-PCS | Mod: HCNC,CPTII,S$GLB, | Performed by: NURSE PRACTITIONER

## 2023-01-13 PROCEDURE — 3077F PR MOST RECENT SYSTOLIC BLOOD PRESSURE >= 140 MM HG: ICD-10-PCS | Mod: HCNC,CPTII,S$GLB, | Performed by: NURSE PRACTITIONER

## 2023-01-13 PROCEDURE — 3288F FALL RISK ASSESSMENT DOCD: CPT | Mod: HCNC,CPTII,S$GLB, | Performed by: NURSE PRACTITIONER

## 2023-01-13 PROCEDURE — 85610 PROTHROMBIN TIME: CPT | Mod: HCNC | Performed by: INTERNAL MEDICINE

## 2023-01-13 PROCEDURE — 3008F BODY MASS INDEX DOCD: CPT | Mod: HCNC,CPTII,S$GLB, | Performed by: NURSE PRACTITIONER

## 2023-01-13 PROCEDURE — 1159F MED LIST DOCD IN RCRD: CPT | Mod: HCNC,CPTII,S$GLB, | Performed by: NURSE PRACTITIONER

## 2023-01-13 PROCEDURE — 36415 COLL VENOUS BLD VENIPUNCTURE: CPT | Mod: HCNC | Performed by: INTERNAL MEDICINE

## 2023-01-13 PROCEDURE — 1160F PR REVIEW ALL MEDS BY PRESCRIBER/CLIN PHARMACIST DOCUMENTED: ICD-10-PCS | Mod: HCNC,CPTII,S$GLB, | Performed by: NURSE PRACTITIONER

## 2023-01-13 PROCEDURE — 99214 PR OFFICE/OUTPT VISIT, EST, LEVL IV, 30-39 MIN: ICD-10-PCS | Mod: HCNC,S$GLB,, | Performed by: NURSE PRACTITIONER

## 2023-01-13 PROCEDURE — 3079F PR MOST RECENT DIASTOLIC BLOOD PRESSURE 80-89 MM HG: ICD-10-PCS | Mod: HCNC,CPTII,S$GLB, | Performed by: NURSE PRACTITIONER

## 2023-01-13 PROCEDURE — 3077F SYST BP >= 140 MM HG: CPT | Mod: HCNC,CPTII,S$GLB, | Performed by: NURSE PRACTITIONER

## 2023-01-13 PROCEDURE — 93793 ANTICOAG MGMT PT WARFARIN: CPT | Mod: S$GLB,,,

## 2023-01-13 NOTE — ASSESSMENT & PLAN NOTE
Off CPAP 10 cm since 2020.   Weight loss 21 lbs since 2012 study  Lapse in CPAP therapy  Needs replacement CPAP machine if obstructive sleep apnea present  Symptomatic daytime sleepiness, feeling tired  Proceed with PSG re-evalaute for obstructive sleep apnea   If obstructive sleep apnea after PSG/cpap titration studies then plan order CPAP and follow up IDL

## 2023-01-13 NOTE — PROGRESS NOTES
Subjective:      Patient ID: Alyx Weir is a 72 y.o. female.    Chief Complaint: Sleep Apnea    HPI: Alyx Weir is here for follow up for VIRGIL with CPAP complaince assessment.  Last seen 2020.  She was on CPAP of 10 cmH2O pressure which is optimally controlling sleep apnea with apneic index (AHI) 1.8 events an hour.   She stopped using CPAP in  after her  became ill then  2020 from Covid.   She had some depression and just stopped using CPAP.  She saw her primary care provider and was advised of need to treatment obstructive sleep apnea and resume CPAP   Her current machine is from  when initially diagnosis with obstructive sleep apnea.  Her sees particles dark gray in machine so does not want to use.  She has old system one machine no longer manufacture so no replacement parts.   She states she checked with [employer] and her machine was not on recall.   She is ready for replacement with her insurance.   Patient reports benefit from CPAP use.  Since off CPAP she has daytime sleepiness, falls asleep watching TV.   Patient reports no complaints. Nasal pillows mask is tolerated.     Lapse in CPAP therapy over past 3 years.   Weight at time of 2012 PSG weight 197 lbs, current weight 176 lbs.   Proceed with repeat study determine if obstructive sleep apnea remains present after 21 lb weight loss.     2012 The CPAP titration polysomnography revealed that 10 cm H2O pressure optimal.     3/6/2012 The diagnostic polysomnography revealed a mild obstructive sleep apnea / hypopnea syndrome (A + H Index = 13.8 events / hr asleep; 5.3 arousals / hr asleep), with a mean  SaO2 during events= 88.9 %, significant; lowest SaO2 during events= 80.4 %, waking baseline SaO2 = 96 %. Infrequent, mild snoring was noted.     3/11/2010 The diagnostic polysomnography revealed a mild obstructive sleep apnea / hypopnea syndrome (A + H Index = 11.3 events / hr asleep.     Compliance Summary  2020 -  "6/28/2020 (30 days)  Days with Device Usage 25 days  Days without Device Usage 5 days  Percent Days with Device Usage 83.3%  Cumulative Usage 6 days 16 hrs. 12 mins. 42 secs.  Maximum Usage (1 Day) 9 hrs. 10 mins. 34 secs.  Average Usage (All Days) 5 hrs. 20 mins. 25 secs.  Average Usage (Days Used) 6 hrs. 24 mins. 30 secs.  Minimum Usage (1 Day) 2 hrs. 18 mins. 15 secs.  Percent of Days with Usage >= 4 Hours 80.0%  Percent of Days with Usage < 4 Hours 20.0%  Date Range  Total Blower Time 6 days 16 hrs. 28 mins. 42 secs.  CPAP Summary ([employer])  Average Time in Large Leak Per Day 14 secs.  Average AHI 1.8  CPAP 10.0 cmH2O    Sleep Apnea  Patient has been observed in past with apnea and snoring. She states no one tells her she snores and sleep alone so no witnessed apnea in past 3 years. She reports day time sleepiness, falls asleep watching TV during day  Patient reports "restful sleep.  She denies morning headache.   She reports day time napping.  Day time napping duration 1 Hour - 1 hour 30 minutes.  She denies recent weight gain.  Cardiovascular risk factors: hypertension, hyperlipidemia, and coronary artery disease  Bed time is 1200  Wake time is 0700  Sleep onset is within  30 - 45 Minutes.  Sleep maintenance difficulties related to  none  Wake after sleep onset occurs one  two times a night.  Nocturia occurs one - two  times a night,   Sleep aids : No  Dry mouth : No  Sleep walking: No  Sleep talking : No  Sleep eating:No  Vivid Dreams : No  Cataplexy : No    Ge[place]er Sleepiness Scale   EPWORTH SLEEPINESS SCALE 1/13/2023 6/29/2020 3/30/2020 4/19/2018 3/2/2018 1/16/2017 1/6/2016   Sitting and reading 0 1 2 1 2 1 2   Watching TV 2 1 0 1 3 1 2   Sitting, inactive in a public place (e.g. a theatre or a meeting) 0 0 0 0 0 0 0   As a passenger in a car for an hour without a break 0 0 0 0 0 0 0   Lying down to rest in the afternoon when circumstances permit 2 2 1 0 2 1 0   Sitting and talking to someone 0 " 0 0 0 0 0 0   Sitting quietly after a lunch without alcohol 0 0 0 0 0 0 0   In a car, while stopped for a few minutes in traffic 0 0 0 0 0 0 0   Total score 4 4 3 2 7 3 4       Neck circumference is 14.  Mallampati score 4    STOP - BANG Questionnaire:   1. Snoring : Do you snore loudly ?    NO  2. Tired : Do you often feel tired, fatigued, or sleepy during daytime?   YES  3. Observed: Has anyone observed you stop breathing during your sleep?     YES  4. Blood pressure : Do you have or are you being treated for high blood pressure?    YES  5. BMI :BMI more than 35 kg/m2?   NO  6. Age : Age over 50 yr old?    YES  7. Neck circumference: Neck circumference greater than 40 cm?   NO  8. Gender: Gender male?   NO    SCORE: 4    High risk of VIRGIL: Yes 5 - 8  Intermediate risk of VIRGIL: Yes 3 - 4  Low risk of VIRGIL: Yes 0 - 2-      History of asthma as adult in 2020, no continued breathing problems. No on inhalers.      Previous Report Reviewed: lab reports and office notes     Past Medical History: The following portions of the patient's history were reviewed and updated as appropriate:   She  has a past surgical history that includes Back surgery; Bladder surgery; Cardiac catheterization (03/2013); Colonoscopy (N/A, 11/13/2015); Oophorectomy; bladder cyst removal; Lumbar spine surgery; Hysterectomy; and Selective injection of anesthetic agent around lumbar spinal nerve root by transforaminal approach (Bilateral, 6/3/2022).  Her family history includes Cataracts in her mother; Diabetes in her father, paternal uncle, sister, and sister; Glaucoma in her sister; Heart disease in her mother; Hypertension in her brother, mother, sister, sister, and sister.  She  reports that she has never smoked. She has never used smokeless tobacco. She reports that she does not drink alcohol and does not use drugs.  She has a current medication list which includes the following prescription(s): accu-chek guide me glucose mtr, acetaminophen,  "allopurinol, diclofenac sodium, ezetimibe, gabapentin, methocarbamol, neomycin-polymyxin-hydrocortisone, olmesartan-amlodipin-hcthiazid, fish oil/borage/flax/om3,6,9 1, pantoprazole, solifenacin, sucralfate, triamcinolone acetonide 0.1%, vitamin d, and warfarin.  She is allergic to erythromycin, amlodipine, diazepam, iodine, meperidine, morphine, methylprednisolone sod suc(pf), and primidone.    The following portions of the patient's history were reviewed and updated as appropriate: allergies, current medications, past family history, past medical history, past social history, past surgical history and problem list.    Review of Systems   Constitutional:  Negative for fever, chills, weight loss, weight gain, activity change, appetite change, fatigue and night sweats.   HENT:  Negative for postnasal drip, rhinorrhea, sinus pressure, voice change and congestion.    Eyes:  Negative for redness and itching.   Respiratory:  Negative for snoring, cough, sputum production, chest tightness, shortness of breath, wheezing, orthopnea, asthma nighttime symptoms, dyspnea on extertion, use of rescue inhaler and somnolence.    Cardiovascular: Negative.  Negative for chest pain, palpitations and leg swelling.   Genitourinary:  Negative for difficulty urinating and hematuria.   Endocrine:  Negative for cold intolerance and heat intolerance.    Musculoskeletal:  Negative for arthralgias, gait problem, joint swelling and myalgias.   Skin: Negative.    Gastrointestinal:  Negative for nausea, vomiting, abdominal pain and acid reflux.   Neurological:  Negative for dizziness, weakness, light-headedness and headaches.   Hematological:  Negative for adenopathy. No excessive bruising.   All other systems reviewed and are negative.   Objective:   BP (!) 140/80   Pulse 60   Resp 17   Ht 5' 5" (1.651 m)   Wt 80.1 kg (176 lb 9.4 oz)   SpO2 100%   BMI 29.39 kg/m²   Physical Exam  Vitals reviewed.   Constitutional:       General: She is not " in acute distress.     Appearance: She is well-developed. She is not ill-appearing or toxic-appearing.   HENT:      Head: Normocephalic.      Right Ear: External ear normal.      Left Ear: External ear normal.      Nose: Nose normal.      Mouth/Throat:      Pharynx: No oropharyngeal exudate.   Eyes:      Conjunctiva/sclera: Conjunctivae normal.   Cardiovascular:      Rate and Rhythm: Normal rate and regular rhythm.      Heart sounds: Normal heart sounds.   Pulmonary:      Effort: Pulmonary effort is normal.      Breath sounds: Normal breath sounds. No stridor.   Abdominal:      Palpations: Abdomen is soft.   Musculoskeletal:         General: Normal range of motion.      Cervical back: Normal range of motion and neck supple.   Lymphadenopathy:      Cervical: No cervical adenopathy.   Skin:     General: Skin is warm and dry.   Neurological:      Mental Status: She is alert and oriented to person, place, and time.   Psychiatric:         Behavior: Behavior normal. Behavior is cooperative.         Thought Content: Thought content normal.         Judgment: Judgment normal.       Personal Diagnostic Review  CPAP download  CPAP 10 cm  Compliance Summary  5/30/2020 - 6/28/2020 (30 days)  Days with Device Usage 25 days  Days without Device Usage 5 days  Percent Days with Device Usage 83.3%  Cumulative Usage 6 days 16 hrs. 12 mins. 42 secs.  Maximum Usage (1 Day) 9 hrs. 10 mins. 34 secs.  Average Usage (All Days) 5 hrs. 20 mins. 25 secs.  Average Usage (Days Used) 6 hrs. 24 mins. 30 secs.  Minimum Usage (1 Day) 2 hrs. 18 mins. 15 secs.  Percent of Days with Usage >= 4 Hours 80.0%  Percent of Days with Usage < 4 Hours 20.0%  Two cups of reviewed or Date Range  Total Blower Time 6 days 16 hrs. 28 mins. 42 secs.  CPAP Summary ([employer])  Average Time in Large Leak Per Day 14 secs.  Average AHI 1.8  CPAP 10.0 cmH2O    Assessment:     1. History of obstructive sleep apnea    2. Daytime sleepiness    3. Feeling tired     4. Overweight with body mass index (BMI) 25.0-29.9    5. Essential hypertension    6. Left ventricular diastolic dysfunction with preserved systolic function    7. Sleep-disordered breathing    8. VIRGIL on CPAP        Orders Placed This Encounter   Procedures    Polysomnogram (CPAP will be added if patient meets diagnostic criteria.)     Standing Status:   Future     Standing Expiration Date:   1/13/2024     Plan:     Problem List Items Addressed This Visit       VIRGIL on CPAP (Chronic)     Off CPAP 10 cm since 2020.   Weight loss 21 lbs since 2012 study  Lapse in CPAP therapy  Needs replacement CPAP machine if obstructive sleep apnea present  Symptomatic daytime sleepiness, feeling tired  Proceed with PSG re-evalaute for obstructive sleep apnea   If obstructive sleep apnea after PSG/cpap titration studies then plan order CPAP and follow up IDL          Left ventricular diastolic dysfunction with preserved systolic function (Chronic)    Relevant Orders    Polysomnogram (CPAP will be added if patient meets diagnostic criteria.)    Essential hypertension (Chronic)    Relevant Orders    Polysomnogram (CPAP will be added if patient meets diagnostic criteria.)    Overweight with body mass index (BMI) 25.0-29.9    Relevant Orders    Polysomnogram (CPAP will be added if patient meets diagnostic criteria.)     Other Visit Diagnoses       History of obstructive sleep apnea    -  Primary    Relevant Orders    Polysomnogram (CPAP will be added if patient meets diagnostic criteria.)    Daytime sleepiness        Relevant Orders    Polysomnogram (CPAP will be added if patient meets diagnostic criteria.)    Feeling tired        Relevant Orders    Polysomnogram (CPAP will be added if patient meets diagnostic criteria.)    Sleep-disordered breathing        Relevant Orders    Polysomnogram (CPAP will be added if patient meets diagnostic criteria.)             (DME) - [facility]  Reviewed therapeutic goals for positive airway pressure  therapy CPAP  Ideal is usage 100% of nights for 6 - 8 hours per night. Minimum usage is 70% of night for at least 4 hours per night used.     Follow up for if VIRGIL, begin PAP therapy with nasal pillows mask then fu IDL.

## 2023-01-21 ENCOUNTER — HOSPITAL ENCOUNTER (OUTPATIENT)
Dept: SLEEP MEDICINE | Facility: HOSPITAL | Age: 73
Discharge: HOME | End: 2023-01-21
Attending: INTERNAL MEDICINE | Admitting: INTERNAL MEDICINE
Payer: MEDICARE

## 2023-01-21 DIAGNOSIS — G47.30 SLEEP-DISORDERED BREATHING: ICD-10-CM

## 2023-01-21 DIAGNOSIS — I51.89 LEFT VENTRICULAR DIASTOLIC DYSFUNCTION WITH PRESERVED SYSTOLIC FUNCTION: Chronic | ICD-10-CM

## 2023-01-21 DIAGNOSIS — E66.3 OVERWEIGHT WITH BODY MASS INDEX (BMI) 25.0-29.9: ICD-10-CM

## 2023-01-21 DIAGNOSIS — Z86.69 HISTORY OF OBSTRUCTIVE SLEEP APNEA: ICD-10-CM

## 2023-01-21 DIAGNOSIS — R53.83 FEELING TIRED: ICD-10-CM

## 2023-01-21 DIAGNOSIS — G47.33 OSA (OBSTRUCTIVE SLEEP APNEA): Primary | ICD-10-CM

## 2023-01-21 DIAGNOSIS — R40.0 DAYTIME SLEEPINESS: ICD-10-CM

## 2023-01-21 DIAGNOSIS — I10 ESSENTIAL HYPERTENSION: Chronic | ICD-10-CM

## 2023-01-21 PROCEDURE — 95810 POLYSOM 6/> YRS 4/> PARAM: CPT | Mod: HCNC

## 2023-01-21 NOTE — Clinical Note
DIAGNOSIS: Obstructive Sleep Apnea / 327.23  RECOMMENDATIONS:  1. CPAP titration or AutoPAP trial 2. Weight loss 3. Close Followup

## 2023-01-23 PROBLEM — Z86.69 HISTORY OF OBSTRUCTIVE SLEEP APNEA: Status: ACTIVE | Noted: 2023-01-23

## 2023-01-29 PROCEDURE — 95810 POLYSOM 6/> YRS 4/> PARAM: CPT | Mod: 26,HCNC,, | Performed by: INTERNAL MEDICINE

## 2023-01-29 PROCEDURE — 95810 PR POLYSOMNOGRAPHY, 4 OR MORE: ICD-10-PCS | Mod: 26,HCNC,, | Performed by: INTERNAL MEDICINE

## 2023-01-30 NOTE — PROCEDURES
"PATIENT: Alyx Weir   study Date: 2023  Referring Physician: Sunshine Luther NP    Indications for Polysomnography: The patient is a 72 year year old Female who is 5' 5" and weighs 176.0 lbs.  Her BMI equals 29.3.  A full night polysomnogram was performed to evaluate for -.VIRGIL. She was on CPAP of 10 cmH2O pressure which is optimally controlling sleep apnea with apneic index (AHI) 1.8 events an hour.  She stopped using CPAP in  after her  became ill then  2020 from Covid. Lapse in CPAP therapy over past 3 years.  3/6/2012 The diagnostic polysomnography revealed a mild obstructive sleep apnea / hypopnea syndrome (A + H Index = 13.8 events / hr asleep; 5.3 arousals / hr asleep), with a mean SaO2 during events= 88.9 %, significant; lowest SaO2 during events= 80.4 %, waking baseline SaO2 = 96 %. Infrequent, mild snoring was noted.  She has a current medication list which includes the following prescription(s): accu-chek guide me glucose mtr, acetaminophen, allopurinol, diclofenac sodium, ezetimibe, gabapentin, methocarbamol, neomycin-polymyxin-hydrocortisone, olmesartan-amlodipin-hcthiazid, fish oil/borage/flax/om3,6,9 1, pantoprazole, solifenacin, sucralfate, triamcinolone acetonide 0.1%, vitamin d, and warfarin.    Polysomnogram Data:  A full night polysomnogram recorded the standard physiologic parameters including EEG, EOG, EMG, EKG, nasal and oral airflow.  Respiratory parameters of chest and abdominal movements were recorded with Peizo-Crystal motion transducers.  Oxygen saturation was recorded by pulse oximetry.      Sleep Architecture:  The total recording time of the polysomnogram was 401.3 minutes.  The total sleep time was 367.5 minutes.  The patient spent 3.8% of total sleep time in Stage N1, 49.3% in Stage N2, 28.3% in Stages N3 and N, and 18.6% in REM.   Sleep latency was 4.6 minutes.  REM latency was 159.5 minutes.  Sleep Efficiency was 91.6%.  Sleep Maintenance Efficiency " "was 92.7%.  Total wake time was 34.5 minutes for a total wake percentage of 7.2%.    Respiratory Events:  The polysomnogram revealed a presence of 4 obstructive, - central, and - mixed apneas resulting in an Apnea index of 0.7 events per hour.  There were 44 hypopneas (using 3% desaturation criteria) resulting in a Hypopnea index of 7.2 events per hour.  The combined Apnea/Hypopnea index (using 3% desaturation criteria for Hypopneas) was 7.8 events per hour.    Baseline oxygen saturation was 94.8%.  The lowest oxygen saturation was 77.0%.      LIMB ACTIVITY:  There were - limb movements recorded.  Of this total, - were classified as PLMs.  Of the PLMs, - were associated with arousals.  The Limb Movement index was - per hour while the PLM index was - per hour.    CARDIAC SUMMARY:   The average pulse rate was 54.5 bpm.  The minimum pulse rate was 31.0 bpm while the maximum pulse rate was 225.0 bpm.          CONCLUSION:    Overall AHI was 7.8/hr Mild   REM AHI was 26.3/hr        DIAGNOSIS: Obstructive Sleep Apnea / 327.23    RECOMMENDATIONS:    CPAP titration or AutoPAP trial  Weight loss  Close Followup        Dear Sunshine Luther, SHAYE  [address] [facility]  [place] [id number]/Carl Clemons MD         The sleep study that you ordered is complete.  You have ordered sleep LAB services to perform the sleep study for Alyx Weir .      Please find Sleep Study result in  the "Media tab" of Chart Review menu.        You can look  for the report in the  Media by the document type "Sleep Study Documents". Alphabetizing  "Document type" column helps to find the SLEEP STUDY report  Faster.       As the ordering provider, you are responsible for reviewing the results and implementing a treatment plan with your patient.    If you need a Sleep Medicine provider to explain the sleep study findings and arrange treatment for the patient, please refer patient for consultation to our Sleep Clinic via [facility] with " "Ambulatory Consult Sleep.     To do that please place an order for an  "Ambulatory Consult Sleep" -  order , it will go to our clinic work queue for our staff  to contact the patient for an appointment.      For any questions, please contact our sleep lab  staff at [phone] to talk to clinical staff          Niranjan Childress MD   "

## 2023-01-31 DIAGNOSIS — G47.33 OBSTRUCTIVE SLEEP APNEA: Primary | ICD-10-CM

## 2023-01-31 NOTE — PROGRESS NOTES
1/21/2023 PSG Overall AHI was 7.8/hr Mild.  REM AHI was 26.3/hr    Orders Placed This Encounter   Procedures    BiPAP/CPAP Titration ((Must have dx of VIRGIL from previous sleep study)     Standing Status:   Future     Standing Expiration Date:   1/31/2024     Order Specific Question:   Titration Type:     Answer:   CPAP     1. Obstructive sleep apnea  BiPAP/CPAP Titration ((Must have dx of VIRGIL from previous sleep study)

## 2023-01-31 NOTE — ASSESSMENT & PLAN NOTE
1/21/2023 PSG Overall AHI was 7.8/hr Mild.  REM AHI was 26.3/hr  1/31/2023 proceed with CPAP titration

## 2023-02-07 DIAGNOSIS — Z00.00 ENCOUNTER FOR MEDICARE ANNUAL WELLNESS EXAM: ICD-10-CM

## 2023-02-09 DIAGNOSIS — Z00.00 ENCOUNTER FOR MEDICARE ANNUAL WELLNESS EXAM: ICD-10-CM

## 2023-02-10 ENCOUNTER — ANTI-COAG VISIT (OUTPATIENT)
Dept: CARDIOLOGY | Facility: CLINIC | Age: 73
End: 2023-02-10
Payer: MEDICARE

## 2023-02-10 DIAGNOSIS — Z79.01 ANTICOAGULATED ON COUMADIN: Primary | Chronic | ICD-10-CM

## 2023-02-10 DIAGNOSIS — D68.51 HETEROZYGOUS FACTOR V LEIDEN MUTATION: Chronic | ICD-10-CM

## 2023-02-10 LAB — INR PPP: 2.2 (ref 2–3)

## 2023-02-10 PROCEDURE — 85610 POCT INR: ICD-10-PCS | Mod: QW,HCNC,S$GLB, | Performed by: INTERNAL MEDICINE

## 2023-02-10 PROCEDURE — 93793 ANTICOAG MGMT PT WARFARIN: CPT | Mod: HCNC,S$GLB,,

## 2023-02-10 PROCEDURE — 85610 PROTHROMBIN TIME: CPT | Mod: QW,HCNC,S$GLB, | Performed by: INTERNAL MEDICINE

## 2023-02-10 PROCEDURE — 93793 PR ANTICOAGULANT MGMT FOR PT TAKING WARFARIN: ICD-10-PCS | Mod: HCNC,S$GLB,,

## 2023-02-10 NOTE — PROGRESS NOTES
INR is therapeutic at 2.2.  Patient reports no recent changes.  Currently taking warfarin 5 mg every Monday, Friday; and 7.5 mg on all other days as directed.  No change in dose.  Follow up in 1 month.  Dose calendar given - confirms understanding.

## 2023-02-13 ENCOUNTER — HOSPITAL ENCOUNTER (OUTPATIENT)
Dept: SLEEP MEDICINE | Facility: HOSPITAL | Age: 73
Discharge: HOME | End: 2023-02-13
Attending: NURSE PRACTITIONER
Payer: MEDICARE

## 2023-02-13 DIAGNOSIS — G47.61 PLMD (PERIODIC LIMB MOVEMENT DISORDER): ICD-10-CM

## 2023-02-13 DIAGNOSIS — G47.33 OBSTRUCTIVE SLEEP APNEA: Primary | ICD-10-CM

## 2023-02-13 PROCEDURE — 95811 POLYSOM 6/>YRS CPAP 4/> PARM: CPT | Mod: HCNC

## 2023-02-16 ENCOUNTER — TELEPHONE (OUTPATIENT)
Dept: CARDIOLOGY | Facility: CLINIC | Age: 73
End: 2023-02-16
Payer: MEDICARE

## 2023-02-16 NOTE — TELEPHONE ENCOUNTER
----- Message from Mendy Tobar RN sent at 2/16/2023 11:52 AM CST -----  Contact: Alyx    ----- Message -----  From: Maia Hogue  Sent: 2/16/2023  11:20 AM CST  To: [facility] Coumadin Monitoring Pool    Alyx is requesting a call back from Aimee. Please call her back at [phone].

## 2023-02-19 PROCEDURE — 95811 PR POLYSOMNOGRAPHY W/CPAP: ICD-10-PCS | Mod: 26,HCNC,, | Performed by: INTERNAL MEDICINE

## 2023-02-19 PROCEDURE — 95811 POLYSOM 6/>YRS CPAP 4/> PARM: CPT | Mod: 26,HCNC,, | Performed by: INTERNAL MEDICINE

## 2023-02-20 ENCOUNTER — PATIENT MESSAGE (OUTPATIENT)
Dept: PULMONOLOGY | Facility: CLINIC | Age: 73
End: 2023-02-20
Payer: MEDICARE

## 2023-02-20 DIAGNOSIS — G47.33 OBSTRUCTIVE SLEEP APNEA: Primary | ICD-10-CM

## 2023-02-20 NOTE — PROCEDURES
"PATIENT: Alyx Weir  study Date: 2/13/2023  Referring Physician: Sunshine Luther NP    Indications for Polysomnography: The patient is a 73 year old Female who is 5' 5" and weighs 176.0 lbs.  Her BMI equals 29.3.  A full night PAP titration was performed.  Diagnostic PSG 01/21/2023:    There were 44 hypopneas (using 3% desaturation criteria) resulting in a Hypopnea index of 7.2 events per hour. The combined Apnea/Hypopnea index (using 3% desaturation criteria for Hypopneas) was 7.8 events per hour. No PLM's.    1. Overall AHI was 7.8/hr Mild   2. REM AHI was 26.3/hr       Polysomnogram Data: The polysomnogram recorded the standard physiologic parameters including EEG, EOG, EMG, EKG, nasal and oral airflow.  Respiratory parameters of chest and abdominal movements were recorded with Peizo-Crystal motion transducers.  Oxygen saturation was recorded by pulse oximetry.      treatment: The patient was titrated at pressures ranging from 5* cm/H20 with supplemental oxygen at - up to 11* cm/H20 with supplemental oxygen at -.  The last pressure used in the study was 11* cm/H20 with supplemental oxygen at -.   (*=CPAP)        Sleep Architecture: The total recording time of the study was 421.7 minutes.  The total sleep time was 373.0 minutes.  The patient spent 3.8% of total sleep time in Stage N1, 57.1% in Stage N2, 21.4% in Stages N3, and 17.7% in REM.   Sleep latency was 29.1 minutes.  REM latency was 61.5 minutes.  Sleep Efficiency was 88.5%.  Sleep Maintenance Efficiency was 96.9%.  Total wake time was 49.5 minutes for a total wake percentage of 11.7%.    Respiratory Events: The polysomnogram revealed a presence of - obstructive, 2 central, and - mixed apneas resulting in an Apnea index of 0.3 events per hour.  There were 3 hypopneas (using 3% desaturation criteria) resulting in a Hypopnea index of 0.5 events per hour.  The combined Apnea/Hypopnea index (using 3% desaturation criteria for Hypopneas) was 0.8 events per " "hour.    Baseline oxygen saturation was 94.1%.  The lowest oxygen saturation was 85.0%.      LIMB ACTIVITY: There were 216 limb movements recorded.  Of this total, 216 were classified as PLMs.  Of the PLMs, 91 were associated with arousals.  The Limb Movement index was 34.7 per hour while the PLM index was 34.7 per hour.    CARDIAC SUMMARY:   The average pulse rate was 58.3 bpm.  The minimum pulse rate was 42.0 bpm while the maximum pulse rate was 79.0 bpm.          CONCLUSION:  Overall AHI was 0.8/hr  CPAP 9 cm and 11 cmwp was well tolerated  Mask & Size: Resmed AirFit P10 S  Total limb movements 216 with 91 associated with arousals: PLM index: 34.7/hr.  SpO2 bailey 85%.      DIAGNOSIS: PLMD, VIRGIL        RECOMMENDATIONS:    Therapy with CPAP 9 cmwp\    [place]  [address] [facility]  [place] [id number]/Carl Clemons MD         The sleep study that you ordered is complete.  You have ordered sleep LAB services to perform the sleep study for Alyx Weir .      Please find Sleep Study result in  the "Media tab" of Chart Review menu.        You can look  for the report in the  Media by the document type "Sleep Study Documents". Alphabetizing  "Document type" column helps to find the SLEEP STUDY report  Faster.       As the ordering provider, you are responsible for reviewing the results and implementing a treatment plan with your patient.    If you need a Sleep Medicine provider to explain the sleep study findings and arrange treatment for the patient, please refer patient for consultation to our Sleep Clinic via [facility] with Ambulatory Consult Sleep.     To do that please place an order for an  "Ambulatory Consult Sleep" -  order , it will go to our clinic work queue for our staff  to contact the patient for an appointment.      For any questions, please contact our sleep lab  staff at [phone] to talk to clinical staff          Niranjan Childress MD   "

## 2023-02-20 NOTE — ASSESSMENT & PLAN NOTE
3/11/10 and 5/21/12 sleep study : Mild VIRGIL + restless legs syndrome. Compliance summaries: 10/15/12; 2/26/13;4/8/13.  1/21/2023 PSG Overall AHI was 7.8/hr Mild.  REM AHI was 26.3/hr  2/13/2023 cpap titration CPAP 9 cm optimal  2/20/2023 order CPAP 9 cm nasal pillows mask  Follow up 31-90 days from obtaining PAP therapy for IDL.

## 2023-02-20 NOTE — TELEPHONE ENCOUNTER
Orders Placed This Encounter   Procedures    CPAP FOR HOME USE     1/21/2023 PSG Overall AHI was 7.8/hr Mild.  REM AHI was 26.3/hr     Order Specific Question:   Length of need (1-99 months):     Answer:   99     Order Specific Question:   Type ():     Answer:   CPAP     Order Specific Question:   CPAP setting (cmH20):     Answer:   9     Order Specific Question:   Fulfillment Priority:     Answer:   Level 4:  all others     Order Specific Question:   Humidification ():     Answer:   Heated     Order Specific Question:   Choose ONE mask type and its corresponding cushions and/or pillows:     Answer:    Nasal Mask, 1 per 90 days:  Nasal Cushions, (6 per 90 days):  Nasal Pillows, (6 per 90 days)     Order Specific Question:   Choose EITHER Heated or Non-Heated Tubjing     Answer:    Non-Heated Tubing, 1 per 90 days     Order Specific Question:   Number of Days Needed:     Answer:   99     Order Specific Question:   All other supplies as needed as listed below:     Answer:    Headgear, 1 per 180 days     Order Specific Question:   All other supplies as needed as listed below:     Answer:    Chin Strap, 1 per 180 days     Order Specific Question:   All other supplies as needed as listed below:     Answer:    Disposable Filter, 6 per 90 days     Order Specific Question:   All other supplies as needed as listed below:     Answer:    Non-Disposable Filter, 1 per 180 days     Order Specific Question:   All other supplies as needed as listed below:     Answer:    Humidifier Chamber, 1 per 180 days     Problem List Items Addressed This Visit       Obstructive sleep apnea - Primary     3/11/10 and 5/21/12 sleep study : Mild VIRGIL + restless legs syndrome. Compliance summaries: 10/15/12; 2/26/13;4/8/13.  1/21/2023 PSG Overall AHI was 7.8/hr Mild.  REM AHI was 26.3/hr  2/13/2023 cpap titration CPAP 9 cm optimal  2/20/2023 order CPAP 9 cm nasal pillows mask  Follow up 31-90  days from obtaining PAP therapy for IDL.              Relevant Orders    CPAP FOR HOME USE

## 2023-02-28 ENCOUNTER — TELEPHONE (OUTPATIENT)
Dept: PULMONOLOGY | Facility: CLINIC | Age: 73
End: 2023-02-28
Payer: MEDICARE

## 2023-02-28 NOTE — TELEPHONE ENCOUNTER
Called pt to schedule 10 week follow up after receiving CPAP machine, no answer left voicemail scheduled available appointment.

## 2023-03-10 ENCOUNTER — ANTI-COAG VISIT (OUTPATIENT)
Dept: CARDIOLOGY | Facility: CLINIC | Age: 73
End: 2023-03-10
Payer: MEDICARE

## 2023-03-10 DIAGNOSIS — D68.51 HETEROZYGOUS FACTOR V LEIDEN MUTATION: Chronic | ICD-10-CM

## 2023-03-10 DIAGNOSIS — Z79.01 ANTICOAGULATED ON COUMADIN: Primary | Chronic | ICD-10-CM

## 2023-03-10 LAB — INR PPP: 2.6 (ref 2–3)

## 2023-03-10 PROCEDURE — 85610 POCT INR: ICD-10-PCS | Mod: QW,HCNC,S$GLB, | Performed by: INTERNAL MEDICINE

## 2023-03-10 PROCEDURE — 85610 PROTHROMBIN TIME: CPT | Mod: QW,HCNC,S$GLB, | Performed by: INTERNAL MEDICINE

## 2023-03-10 PROCEDURE — 93793 ANTICOAG MGMT PT WARFARIN: CPT | Mod: HCNC,S$GLB,,

## 2023-03-10 PROCEDURE — 93793 PR ANTICOAGULANT MGMT FOR PT TAKING WARFARIN: ICD-10-PCS | Mod: HCNC,S$GLB,,

## 2023-03-10 NOTE — PROGRESS NOTES
INR is therapeutic at 2.6.  Patient reports no recent changes.  Current warfarin dose verified - continue 5 mg every Monday, Friday; and 7.5 mg on all other days as directed.  Follow up in 1 month.  Dose calendar given - confirms understanding.

## 2023-03-15 ENCOUNTER — TELEPHONE (OUTPATIENT)
Dept: OPHTHALMOLOGY | Facility: CLINIC | Age: 73
End: 2023-03-15
Payer: MEDICARE

## 2023-03-15 NOTE — TELEPHONE ENCOUNTER
Called Ms Weir this evening at 4:45 with no answer.  I left her a voicemail message to give us a call back to schedule appt. KF

## 2023-04-14 ENCOUNTER — ANTI-COAG VISIT (OUTPATIENT)
Dept: CARDIOLOGY | Facility: CLINIC | Age: 73
End: 2023-04-14
Payer: MEDICARE

## 2023-04-14 ENCOUNTER — OFFICE VISIT (OUTPATIENT)
Dept: DERMATOLOGY | Facility: CLINIC | Age: 73
End: 2023-04-14
Payer: MEDICARE

## 2023-04-14 DIAGNOSIS — D68.51 HETEROZYGOUS FACTOR V LEIDEN MUTATION: Chronic | ICD-10-CM

## 2023-04-14 DIAGNOSIS — L81.9 HYPOPIGMENTATION: ICD-10-CM

## 2023-04-14 DIAGNOSIS — Z79.01 ANTICOAGULATED ON COUMADIN: Chronic | ICD-10-CM

## 2023-04-14 DIAGNOSIS — L81.8 IDIOPATHIC GUTTATE HYPOMELANOSIS: ICD-10-CM

## 2023-04-14 DIAGNOSIS — L65.9 HAIR LOSS: Primary | ICD-10-CM

## 2023-04-14 DIAGNOSIS — Z79.01 LONG TERM (CURRENT) USE OF ANTICOAGULANTS: Primary | ICD-10-CM

## 2023-04-14 LAB — INR PPP: 2.9 (ref 2–3)

## 2023-04-14 PROCEDURE — 99999 PR PBB SHADOW E&M-EST. PATIENT-LVL III: ICD-10-PCS | Mod: PBBFAC,HCNC,, | Performed by: STUDENT IN AN ORGANIZED HEALTH CARE EDUCATION/TRAINING PROGRAM

## 2023-04-14 PROCEDURE — 99204 PR OFFICE/OUTPT VISIT, NEW, LEVL IV, 45-59 MIN: ICD-10-PCS | Mod: HCNC,S$GLB,, | Performed by: STUDENT IN AN ORGANIZED HEALTH CARE EDUCATION/TRAINING PROGRAM

## 2023-04-14 PROCEDURE — 93793 PR ANTICOAGULANT MGMT FOR PT TAKING WARFARIN: ICD-10-PCS | Mod: HCNC,S$GLB,,

## 2023-04-14 PROCEDURE — 99204 OFFICE O/P NEW MOD 45 MIN: CPT | Mod: HCNC,S$GLB,, | Performed by: STUDENT IN AN ORGANIZED HEALTH CARE EDUCATION/TRAINING PROGRAM

## 2023-04-14 PROCEDURE — 3072F LOW RISK FOR RETINOPATHY: CPT | Mod: HCNC,CPTII,S$GLB, | Performed by: STUDENT IN AN ORGANIZED HEALTH CARE EDUCATION/TRAINING PROGRAM

## 2023-04-14 PROCEDURE — 3072F PR LOW RISK FOR RETINOPATHY: ICD-10-PCS | Mod: HCNC,CPTII,S$GLB, | Performed by: STUDENT IN AN ORGANIZED HEALTH CARE EDUCATION/TRAINING PROGRAM

## 2023-04-14 PROCEDURE — 85610 PROTHROMBIN TIME: CPT | Mod: QW,HCNC,S$GLB, | Performed by: INTERNAL MEDICINE

## 2023-04-14 PROCEDURE — 1101F PR PT FALLS ASSESS DOC 0-1 FALLS W/OUT INJ PAST YR: ICD-10-PCS | Mod: HCNC,CPTII,S$GLB, | Performed by: STUDENT IN AN ORGANIZED HEALTH CARE EDUCATION/TRAINING PROGRAM

## 2023-04-14 PROCEDURE — 1126F PR PAIN SEVERITY QUANTIFIED, NO PAIN PRESENT: ICD-10-PCS | Mod: HCNC,CPTII,S$GLB, | Performed by: STUDENT IN AN ORGANIZED HEALTH CARE EDUCATION/TRAINING PROGRAM

## 2023-04-14 PROCEDURE — 1126F AMNT PAIN NOTED NONE PRSNT: CPT | Mod: HCNC,CPTII,S$GLB, | Performed by: STUDENT IN AN ORGANIZED HEALTH CARE EDUCATION/TRAINING PROGRAM

## 2023-04-14 PROCEDURE — 93793 ANTICOAG MGMT PT WARFARIN: CPT | Mod: HCNC,S$GLB,,

## 2023-04-14 PROCEDURE — 1101F PT FALLS ASSESS-DOCD LE1/YR: CPT | Mod: HCNC,CPTII,S$GLB, | Performed by: STUDENT IN AN ORGANIZED HEALTH CARE EDUCATION/TRAINING PROGRAM

## 2023-04-14 PROCEDURE — 99999 PR PBB SHADOW E&M-EST. PATIENT-LVL III: CPT | Mod: PBBFAC,HCNC,, | Performed by: STUDENT IN AN ORGANIZED HEALTH CARE EDUCATION/TRAINING PROGRAM

## 2023-04-14 PROCEDURE — 3288F FALL RISK ASSESSMENT DOCD: CPT | Mod: HCNC,CPTII,S$GLB, | Performed by: STUDENT IN AN ORGANIZED HEALTH CARE EDUCATION/TRAINING PROGRAM

## 2023-04-14 PROCEDURE — 3288F PR FALLS RISK ASSESSMENT DOCUMENTED: ICD-10-PCS | Mod: HCNC,CPTII,S$GLB, | Performed by: STUDENT IN AN ORGANIZED HEALTH CARE EDUCATION/TRAINING PROGRAM

## 2023-04-14 PROCEDURE — 85610 POCT INR: ICD-10-PCS | Mod: QW,HCNC,S$GLB, | Performed by: INTERNAL MEDICINE

## 2023-04-14 RX ORDER — CLOBETASOL PROPIONATE 0.46 MG/ML
SOLUTION TOPICAL DAILY
Qty: 50 ML | Refills: 3 | Status: SHIPPED | OUTPATIENT
Start: 2023-04-14 | End: 2023-08-29

## 2023-04-14 NOTE — PROGRESS NOTES
Patient's INR is therapeutic at 2.9.  Patient reports no changes.  Instructions given: Continue warfarin 5 mg on Fridays and Mondays; and 7.5 mg all other days.  Recheck in four weeks on 5/12/23.  Calendar reviewed with patient.  Patient verbalizes understanding.

## 2023-04-14 NOTE — PROGRESS NOTES
Subjective:       Patient ID:  Alyx Weir is a 73 y.o. female who presents for   Chief Complaint   Patient presents with    Hair Loss    Hyperpigmentation     History of Present Illness: The patient presents with chief complaint of progressive hair loss/thinning.  Location: scalp, mostly along the frontal and crown areas  Duration: progressive for years  Signs/Symptoms: gradual loss and balding patches. Minimal redness, flaking or itching  Prior treatments: none    Patient with new complaint of lesion(s) - lightened spots  Location: lower legs  Duration: years  Symptoms: scattered white spots on the legs, no symptoms.   Relieving factors/Previous treatments: none            Review of Systems   Constitutional:  Negative for fever and chills.   Skin:  Negative for itching, rash and dry skin.      Objective:    Physical Exam   Constitutional: She appears well-developed and well-nourished. No distress.   Neurological: She is alert and oriented to person, place, and time. She is not disoriented.   Psychiatric: She has a normal mood and affect.   Skin:   Areas Examined (abnormalities noted in diagram):   Scalp / Hair Palpated and Inspected  Head / Face Inspection Performed  Neck Inspection Performed  RLE Inspected  LLE Inspection Performed                 Diagram Legend     Erythematous scaling macule/papule c/w actinic keratosis       Vascular papule c/w angioma      Pigmented verrucoid papule/plaque c/w seborrheic keratosis      Yellow umbilicated papule c/w sebaceous hyperplasia      Irregularly shaped tan macule c/w lentigo     1-2 mm smooth white papules consistent with Milia      Movable subcutaneous cyst with punctum c/w epidermal inclusion cyst      Subcutaneous movable cyst c/w pilar cyst      Firm pink to brown papule c/w dermatofibroma      Pedunculated fleshy papule(s) c/w skin tag(s)      Evenly pigmented macule c/w junctional nevus     Mildly variegated pigmented, slightly irregular-bordered macule c/w  mildly atypical nevus      Flesh colored to evenly pigmented papule c/w intradermal nevus       Pink pearly papule/plaque c/w basal cell carcinoma      Erythematous hyperkeratotic cursted plaque c/w SCC      Surgical scar with no sign of skin cancer recurrence      Open and closed comedones      Inflammatory papules and pustules      Verrucoid papule consistent consistent with wart     Erythematous eczematous patches and plaques     Dystrophic onycholytic nail with subungual debris c/w onychomycosis     Umbilicated papule    Erythematous-base heme-crusted tan verrucoid plaque consistent with inflamed seborrheic keratosis     Erythematous Silvery Scaling Plaque c/w Psoriasis     See annotation      Assessment / Plan:        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 Discussed various treatment options and long term expectations. For now, will start with topical clobetasol.   -     clobetasoL (TEMOVATE) 0.05 % external solution; Apply topically once daily.  Dispense: 50 mL; Refill: 3    Idiopathic guttate hypomelanosis  Reassurance given to patient. No treatment is necessary.   Treatment of benign, asymptomatic lesions may be considered cosmetic.         Follow up in about 3 months (around 7/14/2023).

## 2023-05-09 DIAGNOSIS — Z79.01 LONG TERM (CURRENT) USE OF ANTICOAGULANTS: ICD-10-CM

## 2023-05-11 RX ORDER — WARFARIN SODIUM 5 MG/1
TABLET ORAL
Qty: 124 TABLET | Refills: 3 | Status: SHIPPED | OUTPATIENT
Start: 2023-05-11 | End: 2023-09-29

## 2023-05-12 ENCOUNTER — ANTI-COAG VISIT (OUTPATIENT)
Dept: CARDIOLOGY | Facility: CLINIC | Age: 73
End: 2023-05-12
Payer: MEDICARE

## 2023-05-12 DIAGNOSIS — D68.51 HETEROZYGOUS FACTOR V LEIDEN MUTATION: Chronic | ICD-10-CM

## 2023-05-12 DIAGNOSIS — Z79.01 ANTICOAGULATED ON COUMADIN: Primary | Chronic | ICD-10-CM

## 2023-05-12 LAB — INR PPP: 1.7 (ref 2–3)

## 2023-05-12 PROCEDURE — 85610 POCT INR: ICD-10-PCS | Mod: QW,S$GLB,, | Performed by: INTERNAL MEDICINE

## 2023-05-12 PROCEDURE — 93793 PR ANTICOAGULANT MGMT FOR PT TAKING WARFARIN: ICD-10-PCS | Mod: S$GLB,,,

## 2023-05-12 PROCEDURE — 93793 ANTICOAG MGMT PT WARFARIN: CPT | Mod: S$GLB,,,

## 2023-05-12 PROCEDURE — 85610 PROTHROMBIN TIME: CPT | Mod: QW,S$GLB,, | Performed by: INTERNAL MEDICINE

## 2023-05-12 NOTE — PROGRESS NOTES
INR is subtherapeutic at 1.7.  Patient reports no missed doses, dietary changes or s/s.  Current warfarin dose verified.  Instructions given:  will boost today's (Friday) dose to 7.5 mg, then re-challenge current dose of 5 mg every Monday, Friday; and 7.5 mg on all other days as directed.  Recheck INR on 5/29/2023 ([facility] lab).  Will call with result.  Dose calendar given and reviewed with patient.  Patient verbalizes understanding.

## 2023-05-19 ENCOUNTER — HOSPITAL ENCOUNTER (OUTPATIENT)
Dept: RADIOLOGY | Facility: HOSPITAL | Age: 73
Discharge: HOME | End: 2023-05-19
Attending: NURSE PRACTITIONER
Payer: MEDICARE

## 2023-05-19 ENCOUNTER — OFFICE VISIT (OUTPATIENT)
Dept: PULMONOLOGY | Facility: CLINIC | Age: 73
End: 2023-05-19
Payer: MEDICARE

## 2023-05-19 VITALS
DIASTOLIC BLOOD PRESSURE: 78 MMHG | HEIGHT: 65 IN | SYSTOLIC BLOOD PRESSURE: 124 MMHG | HEART RATE: 63 BPM | RESPIRATION RATE: 16 BRPM | OXYGEN SATURATION: 95 % | BODY MASS INDEX: 28.69 KG/M2 | WEIGHT: 172.19 LBS

## 2023-05-19 DIAGNOSIS — J06.9 URI WITH COUGH AND CONGESTION: ICD-10-CM

## 2023-05-19 DIAGNOSIS — E11.9 TYPE 2 DIABETES MELLITUS WITHOUT COMPLICATION, WITHOUT LONG-TERM CURRENT USE OF INSULIN: ICD-10-CM

## 2023-05-19 DIAGNOSIS — E66.3 OVERWEIGHT WITH BODY MASS INDEX (BMI) 25.0-29.9: ICD-10-CM

## 2023-05-19 DIAGNOSIS — J30.89 NON-SEASONAL ALLERGIC RHINITIS DUE TO OTHER ALLERGIC TRIGGER: ICD-10-CM

## 2023-05-19 DIAGNOSIS — I10 ESSENTIAL HYPERTENSION: Chronic | ICD-10-CM

## 2023-05-19 DIAGNOSIS — G47.33 OSA ON CPAP: Primary | ICD-10-CM

## 2023-05-19 PROBLEM — Z86.69 HISTORY OF OBSTRUCTIVE SLEEP APNEA: Status: RESOLVED | Noted: 2023-01-23 | Resolved: 2023-05-19

## 2023-05-19 PROCEDURE — 99214 OFFICE O/P EST MOD 30 MIN: CPT | Mod: 25 | Performed by: NURSE PRACTITIONER

## 2023-05-19 PROCEDURE — 3072F PR LOW RISK FOR RETINOPATHY: ICD-10-PCS | Mod: CPTII,,, | Performed by: NURSE PRACTITIONER

## 2023-05-19 PROCEDURE — 99214 OFFICE O/P EST MOD 30 MIN: CPT | Mod: ,,, | Performed by: NURSE PRACTITIONER

## 2023-05-19 PROCEDURE — 71046 X-RAY EXAM CHEST 2 VIEWS: CPT | Mod: TC

## 2023-05-19 PROCEDURE — 1160F RVW MEDS BY RX/DR IN RCRD: CPT | Mod: CPTII,,, | Performed by: NURSE PRACTITIONER

## 2023-05-19 PROCEDURE — 99214 PR OFFICE/OUTPT VISIT, EST, LEVL IV, 30-39 MIN: ICD-10-PCS | Mod: ,,, | Performed by: NURSE PRACTITIONER

## 2023-05-19 PROCEDURE — 71046 XR CHEST PA AND LATERAL: ICD-10-PCS | Mod: 26,,, | Performed by: RADIOLOGY

## 2023-05-19 PROCEDURE — 3074F PR MOST RECENT SYSTOLIC BLOOD PRESSURE < 130 MM HG: ICD-10-PCS | Mod: CPTII,,, | Performed by: NURSE PRACTITIONER

## 2023-05-19 PROCEDURE — 3078F DIAST BP <80 MM HG: CPT | Mod: CPTII,,, | Performed by: NURSE PRACTITIONER

## 2023-05-19 PROCEDURE — 3008F BODY MASS INDEX DOCD: CPT | Mod: CPTII,,, | Performed by: NURSE PRACTITIONER

## 2023-05-19 PROCEDURE — 99999 PR PBB SHADOW E&M-EST. PATIENT-LVL IV: CPT | Mod: PBBFAC,,, | Performed by: NURSE PRACTITIONER

## 2023-05-19 PROCEDURE — 1159F MED LIST DOCD IN RCRD: CPT | Mod: CPTII,,, | Performed by: NURSE PRACTITIONER

## 2023-05-19 PROCEDURE — 3074F SYST BP LT 130 MM HG: CPT | Mod: CPTII,,, | Performed by: NURSE PRACTITIONER

## 2023-05-19 PROCEDURE — 3072F LOW RISK FOR RETINOPATHY: CPT | Mod: CPTII,,, | Performed by: NURSE PRACTITIONER

## 2023-05-19 PROCEDURE — 3078F PR MOST RECENT DIASTOLIC BLOOD PRESSURE < 80 MM HG: ICD-10-PCS | Mod: CPTII,,, | Performed by: NURSE PRACTITIONER

## 2023-05-19 PROCEDURE — 71046 X-RAY EXAM CHEST 2 VIEWS: CPT | Mod: 26,,, | Performed by: RADIOLOGY

## 2023-05-19 PROCEDURE — 3288F PR FALLS RISK ASSESSMENT DOCUMENTED: ICD-10-PCS | Mod: CPTII,,, | Performed by: NURSE PRACTITIONER

## 2023-05-19 PROCEDURE — 99999 PR PBB SHADOW E&M-EST. PATIENT-LVL IV: ICD-10-PCS | Mod: PBBFAC,,, | Performed by: NURSE PRACTITIONER

## 2023-05-19 PROCEDURE — 3008F PR BODY MASS INDEX (BMI) DOCUMENTED: ICD-10-PCS | Mod: CPTII,,, | Performed by: NURSE PRACTITIONER

## 2023-05-19 PROCEDURE — 1160F PR REVIEW ALL MEDS BY PRESCRIBER/CLIN PHARMACIST DOCUMENTED: ICD-10-PCS | Mod: CPTII,,, | Performed by: NURSE PRACTITIONER

## 2023-05-19 PROCEDURE — 1101F PT FALLS ASSESS-DOCD LE1/YR: CPT | Mod: CPTII,,, | Performed by: NURSE PRACTITIONER

## 2023-05-19 PROCEDURE — 1159F PR MEDICATION LIST DOCUMENTED IN MEDICAL RECORD: ICD-10-PCS | Mod: CPTII,,, | Performed by: NURSE PRACTITIONER

## 2023-05-19 PROCEDURE — 3288F FALL RISK ASSESSMENT DOCD: CPT | Mod: CPTII,,, | Performed by: NURSE PRACTITIONER

## 2023-05-19 PROCEDURE — 1101F PR PT FALLS ASSESS DOC 0-1 FALLS W/OUT INJ PAST YR: ICD-10-PCS | Mod: CPTII,,, | Performed by: NURSE PRACTITIONER

## 2023-05-19 RX ORDER — GUAIFENESIN 1200 MG/1
1200 TABLET, EXTENDED RELEASE ORAL 2 TIMES DAILY
Qty: 60 TABLET | Refills: 1 | Status: SHIPPED | OUTPATIENT
Start: 2023-05-19 | End: 2023-05-29

## 2023-05-19 RX ORDER — IPRATROPIUM BROMIDE 21 UG/1
2 SPRAY, METERED NASAL 3 TIMES DAILY
Qty: 30 ML | Refills: 11 | Status: SHIPPED | OUTPATIENT
Start: 2023-05-19 | End: 2024-03-25

## 2023-05-19 RX ORDER — LEVOCETIRIZINE DIHYDROCHLORIDE 5 MG/1
5 TABLET, FILM COATED ORAL NIGHTLY
Qty: 90 TABLET | Refills: 3 | Status: SHIPPED | OUTPATIENT
Start: 2023-05-19 | End: 2024-03-25

## 2023-05-19 RX ORDER — PROMETHAZINE HYDROCHLORIDE AND DEXTROMETHORPHAN HYDROBROMIDE 6.25; 15 MG/5ML; MG/5ML
5 SYRUP ORAL NIGHTLY PRN
Qty: 180 ML | Refills: 0 | Status: SHIPPED | OUTPATIENT
Start: 2023-05-19 | End: 2023-05-29

## 2023-05-19 NOTE — PROGRESS NOTES
Subjective:      Patient ID: Alyx Weir is a 73 y.o. female.    Chief Complaint: Sleep Apnea    HPI: Alyx Weir is here for follow up for VIRGIL and CPAP complaince assessment after replacing CPAP machine  She is on CPAP of 9 cmH2O pressure which is optimally controlling sleep apnea with apneic index (AHI) 2.2 events an hour.   She is compliant with CPAP use. Complaince download today reveals 73% of days with greater than 4 hours of device use.   Patient reports benefit from CPAP use and denies snoring and excessive daytime sleepiness.  Patient reports no complaints. Nasal pillows mask is used.     Replacement CPAP:  [facility]  Res Med WITH Modem  Set Up Date: 3/17/23    Compliance Report  Initial compliance period 03/17/2023 - 04/15/2023  Compliance met Yes  Compliance percentage 73%  Payor Standard  Usage 03/17/2023 - 04/15/2023  Usage days 24/30 days (80%)  >= 4 hours 22 days (73%)  < 4 hours 2 days (7%)  Usage hours 156 hours 14 minutes  Average usage (total days) 5 hours 12 minutes  Average usage (days used) 6 hours 31 minutes  Median usage (days used) 6 hours 23 minutes  Total used hours (value since last reset - 04/15/2023) 156 hours  AirSense 11 AutoSet  Serial number [id number]  Mode CPAP  Set pressure 9 cmH2O  EPR Fulltime  EPR level 3  Therapy  Leaks - L/min Median: 5.7 95th percentile: 9.6 Maximum: 29.8  Events per hour AI: 2.0 HI: 0.2 AHI: 2.2  Apnea Index Central: 0.0 Obstructive: 0.3 Unknown: 1.7  RERA Index 0.5    [place] Sleepiness Scale   EPWORTH SLEEPINESS SCALE 5/19/2023 1/13/2023 6/29/2020 3/30/2020 4/19/2018 3/2/2018 1/16/2017   Sitting and reading 2 0 1 2 1 2 1   Watching TV 2 2 1 0 1 3 1   Sitting, inactive in a public place (e.g. a theatre or a meeting) 0 0 0 0 0 0 0   As a passenger in a car for an hour without a break 0 0 0 0 0 0 0   Lying down to rest in the afternoon when circumstances permit 3 2 2 1 0 2 1   Sitting and talking to someone 0 0 0 0 0 0 0   Sitting quietly after a  lunch without alcohol 0 0 0 0 0 0 0   In a car, while stopped for a few minutes in traffic 0 0 0 0 0 0 0   Total score 7 4 4 3 2 7 3      2023 Patient has complaint of acute allergy or cold flare after sitting outside under a tree 2 days ago, she flared that night. Sneezing and clear rhinorrhea with non productive cough. No fever. No chills. No body aches.     2023 Gabrenda NP  HPI: Alyx Weir is here for follow up for VIRGIL with CPAP complaince assessment.  Last seen 2020.  She was on CPAP of 10 cmH2O pressure which is optimally controlling sleep apnea with apneic index (AHI) 1.8 events an hour.   She stopped using CPAP in  after her  became ill then  2020 from Covid.   She had some depression and just stopped using CPAP.  She saw her primary care provider and was advised of need to treatment obstructive sleep apnea and resume CPAP   Her current machine is from  when initially diagnosis with obstructive sleep apnea.  Her sees particles dark gray in machine so does not want to use.  She has old system one machine no longer manufacture so no replacement parts.   She states she checked with [employer] and her machine was not on recall.   She is ready for replacement with her insurance.   Patient reports benefit from CPAP use.  Since off CPAP she has daytime sleepiness, falls asleep watching TV.   Patient reports no complaints. Nasal pillows mask is tolerated.     Lapse in CPAP therapy over past 3 years.   Weight at time of 2012 PSG weight 197 lbs, current weight 176 lbs.   Proceed with repeat study determine if obstructive sleep apnea remains present after 21 lb weight loss.     2012 The CPAP titration polysomnography revealed that 10 cm H2O pressure optimal.     3/6/2012 The diagnostic polysomnography revealed a mild obstructive sleep apnea / hypopnea syndrome (A + H Index = 13.8 events / hr asleep; 5.3 arousals / hr asleep), with a mean SaO2 during events= 88.9 %,  "significant; lowest SaO2 during events= 80.4 %, waking baseline SaO2 = 96 %. Infrequent, mild snoring was noted.     3/11/2010 The diagnostic polysomnography revealed a mild obstructive sleep apnea / hypopnea syndrome (A + H Index = 11.3 events / hr asleep.     Compliance Summary  5/30/2020 - 6/28/2020 (30 days)  Days with Device Usage 25 days  Days without Device Usage 5 days  Percent Days with Device Usage 83.3%  Cumulative Usage 6 days 16 hrs. 12 mins. 42 secs.  Maximum Usage (1 Day) 9 hrs. 10 mins. 34 secs.  Average Usage (All Days) 5 hrs. 20 mins. 25 secs.  Average Usage (Days Used) 6 hrs. 24 mins. 30 secs.  Minimum Usage (1 Day) 2 hrs. 18 mins. 15 secs.  Percent of Days with Usage >= 4 Hours 80.0%  Percent of Days with Usage < 4 Hours 20.0%  Date Range  Total Blower Time 6 days 16 hrs. 28 mins. 42 secs.  CPAP Summary ([facility])  Average Time in Large Leak Per Day 14 secs.  Average AHI 1.8  CPAP 10.0 cmH2O    Sleep Apnea  Patient has been observed in past with apnea and snoring. She states no one tells her she snores and sleep alone so no witnessed apnea in past 3 years. She reports day time sleepiness, falls asleep watching TV during day  Patient reports "restful sleep.  She denies morning headache.   She reports day time napping.  Day time napping duration 1 Hour - 1 hour 30 minutes.  She denies recent weight gain.  Cardiovascular risk factors: hypertension, hyperlipidemia, and coronary artery disease  Bed time is 1200  Wake time is 0700  Sleep onset is within  30 - 45 Minutes.  Sleep maintenance difficulties related to  none  Wake after sleep onset occurs one  two times a night.  Nocturia occurs one - two  times a night,   Sleep aids : No  Dry mouth : No  Sleep walking: No  Sleep talking : No  Sleep eating:No  Vivid Dreams : No  Cataplexy : No    Neck circumference is 14.  Mallampati score 4    STOP - BANG Questionnaire:   1. Snoring : Do you snore loudly ?    NO  2. Tired : Do you often feel " tired, fatigued, or sleepy during daytime?   YES  3. Observed: Has anyone observed you stop breathing during your sleep?     YES  4. Blood pressure : Do you have or are you being treated for high blood pressure?    YES  5. BMI :BMI more than 35 kg/m2?   NO  6. Age : Age over 50 yr old?    YES  7. Neck circumference: Neck circumference greater than 40 cm?   NO  8. Gender: Gender male?   NO    SCORE: 4    High risk of VIRGIL: Yes 5 - 8  Intermediate risk of VIRGIL: Yes 3 - 4  Low risk of VIRGIL: Yes 0 - 2-      History of asthma as adult in 2020, no continued breathing problems. No on inhalers.    Previous Report Reviewed: lab reports and office notes     Past Medical History: The following portions of the patient's history were reviewed and updated as appropriate:   She  has a past surgical history that includes Back surgery; Bladder surgery; Cardiac catheterization (03/2013); Colonoscopy (N/A, 11/13/2015); Oophorectomy; bladder cyst removal; Lumbar spine surgery; Hysterectomy; and Selective injection of anesthetic agent around lumbar spinal nerve root by transforaminal approach (Bilateral, 6/3/2022).  Her family history includes Cataracts in her mother; Diabetes in her father, paternal uncle, sister, and sister; Glaucoma in her sister; Heart disease in her mother; Hypertension in her brother, mother, sister, sister, and sister.  She  reports that she has never smoked. She has never used smokeless tobacco. She reports that she does not drink alcohol and does not use drugs.  She has a current medication list which includes the following prescription(s): accu-chek guide me glucose mtr, acetaminophen, allopurinol, clobetasol, diclofenac sodium, ezetimibe, gabapentin, methocarbamol, neomycin-polymyxin-hydrocortisone, olmesartan-amlodipin-hcthiazid, fish oil/borage/flax/om3,6,9 1, pantoprazole, solifenacin, sucralfate, triamcinolone acetonide 0.1%, vitamin d, warfarin, mucinex, ipratropium, levocetirizine,  "promethazine-dextromethorphan, and sodium chloride.  She is allergic to erythromycin, amlodipine, diazepam, iodine, meperidine, morphine, methylprednisolone sod suc(pf), and primidone.    The following portions of the patient's history were reviewed and updated as appropriate: allergies, current medications, past family history, past medical history, past social history, past surgical history and problem list.    Review of Systems   Constitutional:  Negative for fever, chills, weight loss, weight gain, activity change, appetite change, fatigue and night sweats.   HENT:  Negative for postnasal drip, rhinorrhea, sinus pressure, voice change and congestion.    Eyes:  Negative for redness and itching.   Respiratory:  Negative for snoring, cough, sputum production, chest tightness, shortness of breath, wheezing, orthopnea, asthma nighttime symptoms, dyspnea on extertion, use of rescue inhaler and somnolence.    Cardiovascular: Negative.  Negative for chest pain, palpitations and leg swelling.   Genitourinary:  Negative for difficulty urinating and hematuria.   Endocrine:  Negative for cold intolerance and heat intolerance.    Musculoskeletal:  Negative for arthralgias, gait problem, joint swelling and myalgias.   Skin: Negative.    Gastrointestinal:  Negative for nausea, vomiting, abdominal pain and acid reflux.   Neurological:  Negative for dizziness, weakness, light-headedness and headaches.   Hematological:  Negative for adenopathy. No excessive bruising.   All other systems reviewed and are negative.   Objective:   /78   Pulse 63   Resp 16   Ht 5' 5" (1.651 m)   Wt 78.1 kg (172 lb 2.9 oz)   SpO2 95%   BMI 28.65 kg/m²   Physical Exam  Vitals reviewed.   Constitutional:       General: She is not in acute distress.     Appearance: She is well-developed. She is not ill-appearing or toxic-appearing.   HENT:      Head: Normocephalic.      Right Ear: External ear normal.      Left Ear: External ear normal.      " Nose: Nose normal.      Mouth/Throat:      Pharynx: No oropharyngeal exudate.   Eyes:      Conjunctiva/sclera: Conjunctivae normal.   Cardiovascular:      Rate and Rhythm: Normal rate and regular rhythm.      Heart sounds: Murmur heard.   Systolic murmur is present.   Pulmonary:      Effort: Pulmonary effort is normal.      Breath sounds: Normal breath sounds. No stridor.   Abdominal:      Palpations: Abdomen is soft.   Musculoskeletal:         General: Normal range of motion.      Cervical back: Normal range of motion and neck supple.   Lymphadenopathy:      Cervical: No cervical adenopathy.   Skin:     General: Skin is warm and dry.   Neurological:      Mental Status: She is alert and oriented to person, place, and time.   Psychiatric:         Behavior: Behavior normal. Behavior is cooperative.         Thought Content: Thought content normal.         Judgment: Judgment normal.       Personal Diagnostic Review  CPAP download      X-Ray Chest PA And Lateral  Narrative: EXAMINATION:  XR CHEST PA AND LATERAL    CLINICAL HISTORY:  Acute upper respiratory infection, unspecified    TECHNIQUE:  PA and lateral views of the chest were performed.    FINDINGS:  Comparison: September 27, 2018.    Mediastinal silhouette is within normal limits.  Right basilar atelectasis.  No pneumothorax or pleural effusion.  No acute osseous finding.  Impression: Right basilar atelectasis.    Electronically signed by: Bello Maurer  Date:    05/19/2023  Time:    09:59      Assessment:     1. VIRGIL on CPAP    2. URI with cough and congestion    3. Non-seasonal allergic rhinitis due to other allergic trigger    4. Overweight with body mass index (BMI) 25.0-29.9    5. Essential hypertension    6. Type 2 diabetes mellitus without complication, without long-term current use of insulin      Orders Placed This Encounter   Procedures    CPAP/BIPAP SUPPLIES     Benefits and compliant  90 day supply. 4 refills  HME: [facility]     Order Specific Question:    Length of need (1-99 months):     Answer:   99     Order Specific Question:   Choose ONE mask type and its corresponding cushions and/or pillows:     Answer:    Nasal Mask, 1 per 90 days:  Nasal Cushions, (6 per 90 days):  Nasal Pillows, (6 per 90 days)     Order Specific Question:   Choose EITHER Heated or Non-Heated Tubjing     Answer:    Non-Heated Tubing, 1 per 90 days     Order Specific Question:   Number of Days Needed:     Answer:   99     Order Specific Question:   All other supplies as needed as listed below:     Answer:    Headgear, 1 per 180 days     Order Specific Question:   All other supplies as needed as listed below:     Answer:    Chin Strap, 1 per 180 days     Order Specific Question:   All other supplies as needed as listed below:     Answer:    Disposable Filter, 6 per 90 days     Order Specific Question:   All other supplies as needed as listed below:     Answer:    Humidifier Chamber, 1 per 180 days     Order Specific Question:   All other supplies as needed as listed below:     Answer:    Non-Disposable Filter, 1 per 180 days    X-Ray Chest PA And Lateral     Standing Status:   Future     Number of Occurrences:   1     Standing Expiration Date:   5/19/2024     Order Specific Question:   May the Radiologist modify the order per protocol to meet the clinical needs of the patient?     Answer:   Yes     Order Specific Question:   Release to patient     Answer:   Immediate     Plan:     Problem List Items Addressed This Visit       Essential hypertension (Chronic)     Controlled, followed by cardiology           Type 2 diabetes mellitus without complication     Diet controlled. Continued management with primary care provider   Hemoglobin A1C   Date Value Ref Range Status   11/30/2022 5.9 (H) 4.0 - 5.6 % Final     Comment:     ADA Screening Guidelines:  5.7-6.4%  Consistent with prediabetes  >or=6.5%  Consistent with diabetes    High levels of fetal  hemoglobin interfere with the HbA1C  assay. Heterozygous hemoglobin variants (HbS, HgC, etc)do  not significantly interfere with this assay.   However, presence of multiple variants may affect accuracy.     08/30/2022 6.0 (H) 4.0 - 5.6 % Final     Comment:     ADA Screening Guidelines:  5.7-6.4%  Consistent with prediabetes  >or=6.5%  Consistent with diabetes    High levels of fetal hemoglobin interfere with the HbA1C  assay. Heterozygous hemoglobin variants (HbS, HgC, etc)do  not significantly interfere with this assay.   However, presence of multiple variants may affect accuracy.     06/30/2021 6.0 (H) 4.0 - 5.6 % Final     Comment:     ADA Screening Guidelines:  5.7-6.4%  Consistent with prediabetes  >or=6.5%  Consistent with diabetes    High levels of fetal hemoglobin interfere with the HbA1C  assay. Heterozygous hemoglobin variants (HbS, HgC, etc)do  not significantly interfere with this assay.   However, presence of multiple variants may affect accuracy.                Overweight with body mass index (BMI) 25.0-29.9     Continue to encourage exercise and dietary modification to obtain normal weight.              VIRGIL on CPAP - Primary     3/11/10 and 5/21/12 sleep study : Mild VIRGIL + restless legs syndrome. Compliance summaries: 10/15/12; 2/26/13;4/8/13.  1/21/2023 PSG Overall AHI was 7.8/hr Mild.  REM AHI was 26.3/hr  2/13/2023 cpap titration CPAP 9 cm optimal  2/20/2023 order CPAP 9 cm nasal pillows mask  Benefits and compliant with CPAP 9 cm with optimal control AHI 2.2  Nasal pillows mask  HME: [facility]   Follow up annually               Relevant Orders    CPAP/BIPAP SUPPLIES    Non-seasonal allergic rhinitis     Acute flare begin xyzal and atrovent nasal           Relevant Medications    levocetirizine (XYZAL) 5 MG tablet     Other Visit Diagnoses       URI with cough and congestion        acute onset 2 days ago. exam lungs clear     Relevant Medications    ipratropium (ATROVENT) 21 mcg (0.03 %) nasal spray     promethazine-dextromethorphan (PROMETHAZINE-DM) 6.25-15 mg/5 mL Syrp    guaiFENesin (MUCINEX) 1,200 mg Ta12    sodium chloride (OCEAN NASAL) 0.65 % nasal spray    Other Relevant Orders    X-Ray Chest PA And Lateral (Completed)               ([facility]) - [facility]  Reviewed therapeutic goals for positive airway pressure therapy CPAP  Ideal is usage 100% of nights for 6 - 8 hours per night. Minimum usage is 70% of night for at least 4 hours per night used.     Follow up in about 1 year (around 5/19/2024) for CPAP 1 year compliance download.

## 2023-05-19 NOTE — ASSESSMENT & PLAN NOTE
Diet controlled. Continued management with primary care provider   Hemoglobin A1C   Date Value Ref Range Status   11/30/2022 5.9 (H) 4.0 - 5.6 % Final     Comment:     ADA Screening Guidelines:  5.7-6.4%  Consistent with prediabetes  >or=6.5%  Consistent with diabetes    High levels of fetal hemoglobin interfere with the HbA1C  assay. Heterozygous hemoglobin variants (HbS, HgC, etc)do  not significantly interfere with this assay.   However, presence of multiple variants may affect accuracy.     08/30/2022 6.0 (H) 4.0 - 5.6 % Final     Comment:     ADA Screening Guidelines:  5.7-6.4%  Consistent with prediabetes  >or=6.5%  Consistent with diabetes    High levels of fetal hemoglobin interfere with the HbA1C  assay. Heterozygous hemoglobin variants (HbS, HgC, etc)do  not significantly interfere with this assay.   However, presence of multiple variants may affect accuracy.     06/30/2021 6.0 (H) 4.0 - 5.6 % Final     Comment:     ADA Screening Guidelines:  5.7-6.4%  Consistent with prediabetes  >or=6.5%  Consistent with diabetes    High levels of fetal hemoglobin interfere with the HbA1C  assay. Heterozygous hemoglobin variants (HbS, HgC, etc)do  not significantly interfere with this assay.   However, presence of multiple variants may affect accuracy.

## 2023-05-19 NOTE — ASSESSMENT & PLAN NOTE
3/11/10 and 5/21/12 sleep study : Mild VIRGIL + restless legs syndrome. Compliance summaries: 10/15/12; 2/26/13;4/8/13.  1/21/2023 PSG Overall AHI was 7.8/hr Mild.  REM AHI was 26.3/hr  2/13/2023 cpap titration CPAP 9 cm optimal  2/20/2023 order CPAP 9 cm nasal pillows mask  Benefits and compliant with CPAP 9 cm with optimal control AHI 2.2  Nasal pillows mask  HME: [facility]   Follow up annually

## 2023-05-29 ENCOUNTER — OFFICE VISIT (OUTPATIENT)
Dept: FAMILY MEDICINE | Facility: CLINIC | Age: 73
End: 2023-05-29
Payer: MEDICARE

## 2023-05-29 ENCOUNTER — LAB VISIT (OUTPATIENT)
Dept: LAB | Facility: HOSPITAL | Age: 73
End: 2023-05-29
Attending: INTERNAL MEDICINE
Payer: MEDICARE

## 2023-05-29 VITALS
DIASTOLIC BLOOD PRESSURE: 78 MMHG | SYSTOLIC BLOOD PRESSURE: 126 MMHG | BODY MASS INDEX: 28.76 KG/M2 | RESPIRATION RATE: 16 BRPM | OXYGEN SATURATION: 100 % | HEART RATE: 68 BPM | WEIGHT: 172.81 LBS | TEMPERATURE: 98 F

## 2023-05-29 DIAGNOSIS — R73.02 IGT (IMPAIRED GLUCOSE TOLERANCE): ICD-10-CM

## 2023-05-29 DIAGNOSIS — Z79.01 ANTICOAGULATED ON COUMADIN: Chronic | ICD-10-CM

## 2023-05-29 DIAGNOSIS — R01.1 HEART MURMUR: ICD-10-CM

## 2023-05-29 DIAGNOSIS — E78.5 DYSLIPIDEMIA: ICD-10-CM

## 2023-05-29 DIAGNOSIS — E78.00 PURE HYPERCHOLESTEROLEMIA WITH TARGET LOW DENSITY LIPOPROTEIN (LDL) CHOLESTEROL LESS THAN 130 MG/DL: Primary | Chronic | ICD-10-CM

## 2023-05-29 DIAGNOSIS — I10 ESSENTIAL HYPERTENSION: Chronic | ICD-10-CM

## 2023-05-29 DIAGNOSIS — D68.51 HETEROZYGOUS FACTOR V LEIDEN MUTATION: Chronic | ICD-10-CM

## 2023-05-29 DIAGNOSIS — I25.10 CORONARY ARTERY DISEASE INVOLVING NATIVE CORONARY ARTERY OF NATIVE HEART WITHOUT ANGINA PECTORIS: ICD-10-CM

## 2023-05-29 LAB
INR PPP: 3.3 (ref 0.8–1.2)
PROTHROMBIN TIME: 31.9 SEC (ref 9–12.5)

## 2023-05-29 PROCEDURE — 1126F AMNT PAIN NOTED NONE PRSNT: CPT | Mod: CPTII,S$GLB,, | Performed by: INTERNAL MEDICINE

## 2023-05-29 PROCEDURE — 1126F PR PAIN SEVERITY QUANTIFIED, NO PAIN PRESENT: ICD-10-PCS | Mod: CPTII,S$GLB,, | Performed by: INTERNAL MEDICINE

## 2023-05-29 PROCEDURE — 99999 PR PBB SHADOW E&M-EST. PATIENT-LVL IV: CPT | Mod: PBBFAC,,, | Performed by: INTERNAL MEDICINE

## 2023-05-29 PROCEDURE — 3072F LOW RISK FOR RETINOPATHY: CPT | Mod: CPTII,S$GLB,, | Performed by: INTERNAL MEDICINE

## 2023-05-29 PROCEDURE — 1101F PT FALLS ASSESS-DOCD LE1/YR: CPT | Mod: CPTII,S$GLB,, | Performed by: INTERNAL MEDICINE

## 2023-05-29 PROCEDURE — 3008F BODY MASS INDEX DOCD: CPT | Mod: CPTII,S$GLB,, | Performed by: INTERNAL MEDICINE

## 2023-05-29 PROCEDURE — 3078F PR MOST RECENT DIASTOLIC BLOOD PRESSURE < 80 MM HG: ICD-10-PCS | Mod: CPTII,S$GLB,, | Performed by: INTERNAL MEDICINE

## 2023-05-29 PROCEDURE — 99214 PR OFFICE/OUTPT VISIT, EST, LEVL IV, 30-39 MIN: ICD-10-PCS | Mod: S$GLB,,, | Performed by: INTERNAL MEDICINE

## 2023-05-29 PROCEDURE — 3074F PR MOST RECENT SYSTOLIC BLOOD PRESSURE < 130 MM HG: ICD-10-PCS | Mod: CPTII,S$GLB,, | Performed by: INTERNAL MEDICINE

## 2023-05-29 PROCEDURE — 3288F PR FALLS RISK ASSESSMENT DOCUMENTED: ICD-10-PCS | Mod: CPTII,S$GLB,, | Performed by: INTERNAL MEDICINE

## 2023-05-29 PROCEDURE — 1159F MED LIST DOCD IN RCRD: CPT | Mod: CPTII,S$GLB,, | Performed by: INTERNAL MEDICINE

## 2023-05-29 PROCEDURE — 3008F PR BODY MASS INDEX (BMI) DOCUMENTED: ICD-10-PCS | Mod: CPTII,S$GLB,, | Performed by: INTERNAL MEDICINE

## 2023-05-29 PROCEDURE — 3288F FALL RISK ASSESSMENT DOCD: CPT | Mod: CPTII,S$GLB,, | Performed by: INTERNAL MEDICINE

## 2023-05-29 PROCEDURE — 99214 OFFICE O/P EST MOD 30 MIN: CPT | Mod: S$GLB,,, | Performed by: INTERNAL MEDICINE

## 2023-05-29 PROCEDURE — 85610 PROTHROMBIN TIME: CPT | Performed by: INTERNAL MEDICINE

## 2023-05-29 PROCEDURE — 36415 COLL VENOUS BLD VENIPUNCTURE: CPT | Mod: PO | Performed by: INTERNAL MEDICINE

## 2023-05-29 PROCEDURE — 3074F SYST BP LT 130 MM HG: CPT | Mod: CPTII,S$GLB,, | Performed by: INTERNAL MEDICINE

## 2023-05-29 PROCEDURE — 1159F PR MEDICATION LIST DOCUMENTED IN MEDICAL RECORD: ICD-10-PCS | Mod: CPTII,S$GLB,, | Performed by: INTERNAL MEDICINE

## 2023-05-29 PROCEDURE — 3072F PR LOW RISK FOR RETINOPATHY: ICD-10-PCS | Mod: CPTII,S$GLB,, | Performed by: INTERNAL MEDICINE

## 2023-05-29 PROCEDURE — 99999 PR PBB SHADOW E&M-EST. PATIENT-LVL IV: ICD-10-PCS | Mod: PBBFAC,,, | Performed by: INTERNAL MEDICINE

## 2023-05-29 PROCEDURE — 1101F PR PT FALLS ASSESS DOC 0-1 FALLS W/OUT INJ PAST YR: ICD-10-PCS | Mod: CPTII,S$GLB,, | Performed by: INTERNAL MEDICINE

## 2023-05-29 PROCEDURE — 3078F DIAST BP <80 MM HG: CPT | Mod: CPTII,S$GLB,, | Performed by: INTERNAL MEDICINE

## 2023-05-29 NOTE — PROGRESS NOTES
Subjective:       Patient ID: Alyx Weir is a 73 y.o. female.    Chief Complaint: Follow-up (6m), Hypertension, Hyperlipidemia, and Diabetes    Follow-up  Associated symptoms include arthralgias and congestion. Pertinent negatives include no abdominal pain, chest pain, chills, coughing, diaphoresis, fatigue, fever, headaches, joint swelling, myalgias, nausea, neck pain, numbness, rash, sore throat, vomiting or weakness.   Hypertension  Pertinent negatives include no chest pain, headaches, neck pain, palpitations or shortness of breath.   Hyperlipidemia  Pertinent negatives include no chest pain, myalgias or shortness of breath.   Diabetes  Pertinent negatives for hypoglycemia include no confusion, dizziness, headaches, nervousness/anxiousness, pallor, seizures, speech difficulty or tremors. Pertinent negatives for diabetes include no chest pain, no fatigue, no polydipsia, no polyphagia, no polyuria and no weakness.   Past Medical History:   Diagnosis Date    Anticoagulated on Coumadin     Arm weakness-rotator cuff weakness 11/2/2015    Arthritis     Asthma     Clotting disorder     Coronary artery disease     Deep vein thrombosis     Degenerative disc disease     Diabetes mellitus type I 2011    BS last tested x 1 month not sure what it was.    Heterozygous factor V Leiden mutation     Hx of colonic polyps 11/13/2015    Hyperlipidemia     Hypertension     VIRGIL (obstructive sleep apnea)     Stenosis of right carotid artery 12/13/2016     Past Surgical History:   Procedure Laterality Date    BACK SURGERY      bladder cyst removal      BLADDER SURGERY      CARDIAC CATHETERIZATION  03/2013    mild CAD    COLONOSCOPY N/A 11/13/2015    Procedure: COLONOSCOPY;  Surgeon: Jas Umanzor III, MD;  Location: [facility];  Service: Endoscopy;  Laterality: N/A;    HYSTERECTOMY      28 yrs old    LUMBAR SPINE SURGERY      bone spurs removed    OOPHORECTOMY      SELECTIVE INJECTION OF ANESTHETIC AGENT AROUND LUMBAR SPINAL NERVE  ROOT BY TRANSFORAMINAL APPROACH Bilateral 6/3/2022    Procedure: Bilateral L4/5 TF ASUNCION with RN IV sedation; on Coumadin, will need INR prior to procedure, must be less than 1.3;  Surgeon: Mario Palacios MD;  Location: [facility];  Service: Pain Management;  Laterality: Bilateral;     Family History   Problem Relation Age of Onset    Heart disease Mother     Hypertension Mother     Cataracts Mother     Hypertension Sister     Glaucoma Sister     Hypertension Brother     Diabetes Father     Diabetes Paternal Uncle     Hypertension Sister     Diabetes Sister     Hypertension Sister     Diabetes Sister     Breast cancer Neg Hx     Colon cancer Neg Hx     Ovarian cancer Neg Hx      Social History     Socioeconomic History    Marital status:    Tobacco Use    Smoking status: Never    Smokeless tobacco: Never   Substance and Sexual Activity    Alcohol use: No    Drug use: No    Sexual activity: Not Currently     Partners: Male     Birth control/protection: None   Social History Narrative    Dogs in household, no smokers.     Social Determinants of Health     Financial Resource Strain: High Risk    Difficulty of Paying Living Expenses: Hard   Food Insecurity: No Food Insecurity    Worried About Running Out of Food in the Last Year: Never true    Ran Out of Food in the Last Year: Never true   Transportation Needs: No Transportation Needs    Lack of Transportation (Medical): No    Lack of Transportation (Non-Medical): No   Physical Activity: Sufficiently Active    Days of Exercise per Week: 4 days    Minutes of Exercise per Session: 60 min   Stress: Stress Concern Present    Feeling of Stress : To some extent   Social Connections: Moderately Isolated    Frequency of Communication with Friends and Family: More than three times a week    Frequency of Social Gatherings with Friends and Family: Three times a week    Attends Mandae[employer] Services: More than 4 times per year    Active Member of Clubs or Organizations: No     Attends Club or Organization Meetings: Never    Marital Status:    Housing Stability: Low Risk     Unable to Pay for Housing in the Last Year: No    Number of Places Lived in the Last Year: 1    Unstable Housing in the Last Year: No     Review of Systems   Constitutional:  Negative for activity change, appetite change, chills, diaphoresis, fatigue, fever and unexpected weight change.   HENT:  Positive for congestion. Negative for drooling, ear discharge, ear pain, facial swelling, hearing loss, mouth sores, nosebleeds, postnasal drip, rhinorrhea, sinus pressure, sneezing, sore throat, tinnitus, trouble swallowing and voice change.    Eyes:  Negative for photophobia, redness and visual disturbance.   Respiratory:  Negative for apnea, cough, choking, chest tightness, shortness of breath and wheezing.    Cardiovascular:  Negative for chest pain, palpitations and leg swelling.   Gastrointestinal:  Negative for abdominal distention, abdominal pain, blood in stool, constipation, diarrhea, nausea and vomiting.   Endocrine: Negative for cold intolerance, heat intolerance, polydipsia, polyphagia and polyuria.   Genitourinary:  Negative for decreased urine volume, difficulty urinating, dysuria, flank pain, frequency, genital sores, hematuria, pelvic pain and urgency.   Musculoskeletal:  Positive for arthralgias. Negative for back pain, gait problem, joint swelling, myalgias, neck pain and neck stiffness.   Skin:  Negative for color change, pallor, rash and wound.   Allergic/Immunologic: Negative for food allergies and immunocompromised state.   Neurological:  Negative for dizziness, tremors, seizures, syncope, speech difficulty, weakness, light-headedness, numbness and headaches.   Hematological:  Negative for adenopathy. Does not bruise/bleed easily.   Psychiatric/Behavioral:  Negative for agitation, behavioral problems, confusion, decreased concentration, dysphoric mood, hallucinations, self-injury, sleep disturbance  and suicidal ideas. The patient is not nervous/anxious and is not hyperactive.    All other systems reviewed and are negative.    Objective:      Physical Exam  Vitals and nursing note reviewed.   Constitutional:       General: She is not in acute distress.     Appearance: Normal appearance. She is well-developed. She is not diaphoretic.   HENT:      Head: Normocephalic and atraumatic.      Mouth/Throat:      Pharynx: No oropharyngeal exudate.   Eyes:      General: No scleral icterus.  Neck:      Thyroid: No thyromegaly.      Vascular: No carotid bruit or JVD.      Trachea: No tracheal deviation.   Cardiovascular:      Rate and Rhythm: Normal rate and regular rhythm.      Heart sounds: Murmur heard.   Pulmonary:      Effort: Pulmonary effort is normal. No respiratory distress.      Breath sounds: Normal breath sounds. No wheezing or rales.   Chest:      Chest wall: No tenderness.   Abdominal:      General: Bowel sounds are normal. There is no distension.      Palpations: Abdomen is soft.      Tenderness: There is no abdominal tenderness. There is no guarding or rebound.   Musculoskeletal:         General: No tenderness. Normal range of motion.      Cervical back: Normal range of motion and neck supple.   Lymphadenopathy:      Cervical: No cervical adenopathy.   Skin:     General: Skin is warm and dry.      Coloration: Skin is not pale.      Findings: No erythema or rash.   Neurological:      Mental Status: She is alert and oriented to person, place, and time.   Psychiatric:         Behavior: Behavior normal.         Thought Content: Thought content normal.         Judgment: Judgment normal.       CMP  Sodium   Date Value Ref Range Status   11/30/2022 139 136 - 145 mmol/L Final     Potassium   Date Value Ref Range Status   11/30/2022 4.0 3.5 - 5.1 mmol/L Final     Chloride   Date Value Ref Range Status   11/30/2022 102 95 - 110 mmol/L Final     CO2   Date Value Ref Range Status   11/30/2022 29 23 - 29 mmol/L Final      Glucose   Date Value Ref Range Status   11/30/2022 69 (L) 70 - 110 mg/dL Final     BUN   Date Value Ref Range Status   11/30/2022 14 8 - 23 mg/dL Final     Creatinine   Date Value Ref Range Status   11/30/2022 0.8 0.5 - 1.4 mg/dL Final     Calcium   Date Value Ref Range Status   11/30/2022 9.6 8.7 - 10.5 mg/dL Final     Total Protein   Date Value Ref Range Status   11/30/2022 7.6 6.0 - 8.4 g/dL Final     Albumin   Date Value Ref Range Status   11/30/2022 3.9 3.5 - 5.2 g/dL Final     Total Bilirubin   Date Value Ref Range Status   11/30/2022 1.2 (H) 0.1 - 1.0 mg/dL Final     Comment:     For infants and newborns, interpretation of results should be based  on gestational age, weight and in agreement with clinical  observations.    Premature Infant recommended reference ranges:  Up to 24 hours.............<8.0 mg/dL  Up to 48 hours............<12.0 mg/dL  3-5 days..................<15.0 mg/dL  6-29 days.................<15.0 mg/dL       Alkaline Phosphatase   Date Value Ref Range Status   11/30/2022 47 (L) 55 - 135 U/L Final     AST   Date Value Ref Range Status   11/30/2022 18 10 - 40 U/L Final     ALT   Date Value Ref Range Status   11/30/2022 16 10 - 44 U/L Final     Anion Gap   Date Value Ref Range Status   11/30/2022 8 8 - 16 mmol/L Final     eGFR if    Date Value Ref Range Status   02/16/2022 >60 >60 mL/min/1.73 m^2 Final     eGFR if non    Date Value Ref Range Status   02/16/2022 >60 >60 mL/min/1.73 m^2 Final     Comment:     Calculation used to obtain the estimated glomerular filtration  rate (eGFR) is the CKD-EPI equation.        Lab Results   Component Value Date    WBC 7.63 02/16/2022    HGB 12.9 02/16/2022    HCT 39.6 02/16/2022    MCV 87 02/16/2022     02/16/2022     Lab Results   Component Value Date    CHOL 195 11/30/2022     Lab Results   Component Value Date    HDL 74 11/30/2022     Lab Results   Component Value Date    LDLCALC 110.6 11/30/2022     Lab Results    Component Value Date    TRIG 52 11/30/2022     Lab Results   Component Value Date    CHOLHDL 37.9 11/30/2022     Lab Results   Component Value Date    TSH 0.552 06/30/2021     Lab Results   Component Value Date    HGBA1C 5.9 (H) 11/30/2022     Assessment:       1. Pure hypercholesterolemia with target low density lipoprotein (LDL) cholesterol less than 130 mg/dL    2. Heart murmur    3. IGT (impaired glucose tolerance)    4. Heterozygous factor V Leiden mutation    5. Essential hypertension    6. Dyslipidemia    7. Anticoagulated on Coumadin    8. Coronary artery disease involving native coronary artery of native heart without angina pectoris        Plan:   Pure hypercholesterolemia with target low density lipoprotein (LDL) cholesterol less than 130 mg/dL    Heart murmur    IGT (impaired glucose tolerance)  -     Hemoglobin A1C; Future; Expected date: 05/29/2023    Heterozygous factor V Leiden mutation    Essential hypertension  -     Comprehensive Metabolic Panel; Future; Expected date: 05/29/2023  -     CBC Auto Differential; Future; Expected date: 05/29/2023  -     TSH; Future; Expected date: 05/29/2023    Dyslipidemia    Anticoagulated on Coumadin    Coronary artery disease involving native coronary artery of native heart without angina pectoris-----------------F/U CARDS--------------    Stable--------------continue meds-----------------as above---------------f/u 6 months-

## 2023-05-30 ENCOUNTER — ANTI-COAG VISIT (OUTPATIENT)
Dept: CARDIOLOGY | Facility: CLINIC | Age: 73
End: 2023-05-30
Payer: MEDICARE

## 2023-05-30 DIAGNOSIS — I10 ESSENTIAL HYPERTENSION: Primary | Chronic | ICD-10-CM

## 2023-05-30 DIAGNOSIS — D68.51 HETEROZYGOUS FACTOR V LEIDEN MUTATION: Chronic | ICD-10-CM

## 2023-05-30 DIAGNOSIS — Z79.01 ANTICOAGULATED ON COUMADIN: Primary | Chronic | ICD-10-CM

## 2023-05-30 DIAGNOSIS — R01.1 HEART MURMUR: ICD-10-CM

## 2023-05-30 PROCEDURE — 93793 ANTICOAG MGMT PT WARFARIN: CPT | Mod: S$GLB,,,

## 2023-05-30 PROCEDURE — 93793 PR ANTICOAGULANT MGMT FOR PT TAKING WARFARIN: ICD-10-PCS | Mod: S$GLB,,,

## 2023-05-30 NOTE — PROGRESS NOTES
INR not at goal. Medications, chart, and patient findings reviewed. See calendar for adjustments to dose and follow up plan.  INR has been variable.  We will lower dose today only then re-challenge dose.

## 2023-06-13 ENCOUNTER — HOSPITAL ENCOUNTER (OUTPATIENT)
Dept: CARDIOLOGY | Facility: HOSPITAL | Age: 73
Discharge: HOME | End: 2023-06-13
Attending: INTERNAL MEDICINE
Payer: MEDICARE

## 2023-06-13 ENCOUNTER — OFFICE VISIT (OUTPATIENT)
Dept: CARDIOLOGY | Facility: CLINIC | Age: 73
End: 2023-06-13
Payer: MEDICARE

## 2023-06-13 VITALS
HEART RATE: 58 BPM | SYSTOLIC BLOOD PRESSURE: 123 MMHG | OXYGEN SATURATION: 99 % | BODY MASS INDEX: 29.06 KG/M2 | DIASTOLIC BLOOD PRESSURE: 80 MMHG | WEIGHT: 174.63 LBS

## 2023-06-13 DIAGNOSIS — E78.5 DYSLIPIDEMIA: ICD-10-CM

## 2023-06-13 DIAGNOSIS — D68.51 HETEROZYGOUS FACTOR V LEIDEN MUTATION: Chronic | ICD-10-CM

## 2023-06-13 DIAGNOSIS — E78.00 PURE HYPERCHOLESTEROLEMIA WITH TARGET LOW DENSITY LIPOPROTEIN (LDL) CHOLESTEROL LESS THAN 130 MG/DL: Chronic | ICD-10-CM

## 2023-06-13 DIAGNOSIS — G47.33 OSA ON CPAP: ICD-10-CM

## 2023-06-13 DIAGNOSIS — I10 ESSENTIAL HYPERTENSION: Chronic | ICD-10-CM

## 2023-06-13 DIAGNOSIS — R06.02 SOB (SHORTNESS OF BREATH): ICD-10-CM

## 2023-06-13 DIAGNOSIS — R01.1 MURMUR, CARDIAC: ICD-10-CM

## 2023-06-13 DIAGNOSIS — I25.10 CORONARY ARTERY DISEASE INVOLVING NATIVE CORONARY ARTERY OF NATIVE HEART WITHOUT ANGINA PECTORIS: Primary | ICD-10-CM

## 2023-06-13 DIAGNOSIS — E11.9 TYPE 2 DIABETES MELLITUS WITHOUT COMPLICATION, WITHOUT LONG-TERM CURRENT USE OF INSULIN: ICD-10-CM

## 2023-06-13 DIAGNOSIS — Z86.718 HISTORY OF DVT (DEEP VEIN THROMBOSIS): ICD-10-CM

## 2023-06-13 DIAGNOSIS — R01.1 HEART MURMUR: ICD-10-CM

## 2023-06-13 PROCEDURE — 99214 PR OFFICE/OUTPT VISIT, EST, LEVL IV, 30-39 MIN: ICD-10-PCS | Mod: S$GLB,,, | Performed by: INTERNAL MEDICINE

## 2023-06-13 PROCEDURE — 93010 EKG 12-LEAD: ICD-10-PCS | Mod: ,,, | Performed by: INTERNAL MEDICINE

## 2023-06-13 PROCEDURE — 1101F PR PT FALLS ASSESS DOC 0-1 FALLS W/OUT INJ PAST YR: ICD-10-PCS | Mod: CPTII,S$GLB,, | Performed by: INTERNAL MEDICINE

## 2023-06-13 PROCEDURE — 3074F SYST BP LT 130 MM HG: CPT | Mod: CPTII,S$GLB,, | Performed by: INTERNAL MEDICINE

## 2023-06-13 PROCEDURE — 3072F LOW RISK FOR RETINOPATHY: CPT | Mod: CPTII,S$GLB,, | Performed by: INTERNAL MEDICINE

## 2023-06-13 PROCEDURE — 1126F AMNT PAIN NOTED NONE PRSNT: CPT | Mod: CPTII,S$GLB,, | Performed by: INTERNAL MEDICINE

## 2023-06-13 PROCEDURE — 1159F MED LIST DOCD IN RCRD: CPT | Mod: CPTII,S$GLB,, | Performed by: INTERNAL MEDICINE

## 2023-06-13 PROCEDURE — 99999 PR PBB SHADOW E&M-EST. PATIENT-LVL IV: ICD-10-PCS | Mod: PBBFAC,,, | Performed by: INTERNAL MEDICINE

## 2023-06-13 PROCEDURE — 93010 ELECTROCARDIOGRAM REPORT: CPT | Mod: ,,, | Performed by: INTERNAL MEDICINE

## 2023-06-13 PROCEDURE — 3288F FALL RISK ASSESSMENT DOCD: CPT | Mod: CPTII,S$GLB,, | Performed by: INTERNAL MEDICINE

## 2023-06-13 PROCEDURE — 93005 ELECTROCARDIOGRAM TRACING: CPT

## 2023-06-13 PROCEDURE — 3288F PR FALLS RISK ASSESSMENT DOCUMENTED: ICD-10-PCS | Mod: CPTII,S$GLB,, | Performed by: INTERNAL MEDICINE

## 2023-06-13 PROCEDURE — 99999 PR PBB SHADOW E&M-EST. PATIENT-LVL IV: CPT | Mod: PBBFAC,,, | Performed by: INTERNAL MEDICINE

## 2023-06-13 PROCEDURE — 1160F RVW MEDS BY RX/DR IN RCRD: CPT | Mod: CPTII,S$GLB,, | Performed by: INTERNAL MEDICINE

## 2023-06-13 PROCEDURE — 3008F BODY MASS INDEX DOCD: CPT | Mod: CPTII,S$GLB,, | Performed by: INTERNAL MEDICINE

## 2023-06-13 PROCEDURE — 1101F PT FALLS ASSESS-DOCD LE1/YR: CPT | Mod: CPTII,S$GLB,, | Performed by: INTERNAL MEDICINE

## 2023-06-13 PROCEDURE — 1159F PR MEDICATION LIST DOCUMENTED IN MEDICAL RECORD: ICD-10-PCS | Mod: CPTII,S$GLB,, | Performed by: INTERNAL MEDICINE

## 2023-06-13 PROCEDURE — 3074F PR MOST RECENT SYSTOLIC BLOOD PRESSURE < 130 MM HG: ICD-10-PCS | Mod: CPTII,S$GLB,, | Performed by: INTERNAL MEDICINE

## 2023-06-13 PROCEDURE — 1126F PR PAIN SEVERITY QUANTIFIED, NO PAIN PRESENT: ICD-10-PCS | Mod: CPTII,S$GLB,, | Performed by: INTERNAL MEDICINE

## 2023-06-13 PROCEDURE — 3079F DIAST BP 80-89 MM HG: CPT | Mod: CPTII,S$GLB,, | Performed by: INTERNAL MEDICINE

## 2023-06-13 PROCEDURE — 3044F HG A1C LEVEL LT 7.0%: CPT | Mod: CPTII,S$GLB,, | Performed by: INTERNAL MEDICINE

## 2023-06-13 PROCEDURE — 99214 OFFICE O/P EST MOD 30 MIN: CPT | Mod: S$GLB,,, | Performed by: INTERNAL MEDICINE

## 2023-06-13 PROCEDURE — 3072F PR LOW RISK FOR RETINOPATHY: ICD-10-PCS | Mod: CPTII,S$GLB,, | Performed by: INTERNAL MEDICINE

## 2023-06-13 PROCEDURE — 3044F PR MOST RECENT HEMOGLOBIN A1C LEVEL <7.0%: ICD-10-PCS | Mod: CPTII,S$GLB,, | Performed by: INTERNAL MEDICINE

## 2023-06-13 PROCEDURE — 1160F PR REVIEW ALL MEDS BY PRESCRIBER/CLIN PHARMACIST DOCUMENTED: ICD-10-PCS | Mod: CPTII,S$GLB,, | Performed by: INTERNAL MEDICINE

## 2023-06-13 PROCEDURE — 3079F PR MOST RECENT DIASTOLIC BLOOD PRESSURE 80-89 MM HG: ICD-10-PCS | Mod: CPTII,S$GLB,, | Performed by: INTERNAL MEDICINE

## 2023-06-13 PROCEDURE — 3008F PR BODY MASS INDEX (BMI) DOCUMENTED: ICD-10-PCS | Mod: CPTII,S$GLB,, | Performed by: INTERNAL MEDICINE

## 2023-06-13 RX ORDER — BACLOFEN 10 MG/1
10 TABLET ORAL 3 TIMES DAILY
COMMUNITY

## 2023-06-13 NOTE — PROGRESS NOTES
Subjective:   Patient ID:  Alyx Weir is a 73 y.o. female who presents for follow up of No chief complaint on file.      72 yo female, 1 yr f/u  PMH of mild CAD, nonobstructive s/p C 2013. Bradycardia, HTN, HLP, NIDDM, h/o left DVT, factor V Leidein mutation, obesity, sleep apnea needs new masl.  PT WAS INFECTED covid 19 (throat itching) IN . [facility]   OF COVID 19   AVALOS with fast walking  Denied chest pain, palpitation dizziness leg swelling, no syncope.  Denied h/o MI.  No smoking/drinking.  No regular exercise.  Patient is compliant with medications.   MPI no ischemia   echo normal EF and LVH    carotid US 0-19% lesion  ekg NSR and LVH  Did not take med today and BP high     visit  ECHO normal EF.  In HTN digit program. Not using CPAP on regular base  PT twice a week for lower back pain  No chest pain, dizziness faint and palpitation  Mild AVALOS     visit  Chronic joint pain. No chest pain dizziness, faint and SOB. C/o Fatigue sometime. Some leg swelling.   ekg sinus bradycardia. Left lower lower medial side pain   Muscle cramp at night     visit  C/o lower sternal pain and ingestion for a week, constantly. Disrupted the sleeping, worse with food or laying down. Feels nausea and improved. Some SOB.   Chronic back pain   EKG done in 2 days ago at PCP's office showed NSR and small TWI on inferolateral leads.  Compared to prior EKG done in , small TWI new     visit  Still lower back pain,. Needs handicap   NUKE stress test showed normal function.   No chest pain dyspnea dizziness palpitation and leg swelling  No active bleeding    Interval history  1 yr f/u no admission. Chronic joint pain from DJD.  No chest pain, remain SOB at walking. Fatigue. No dizziness. No leg swelling  Ekg NSR. Cxr  negative          Past Medical History:   Diagnosis Date    Anticoagulated on Coumadin     Arm weakness-rotator cuff weakness 2015     Arthritis     Asthma     Clotting disorder     Coronary artery disease     Deep vein thrombosis     Degenerative disc disease     Diabetes mellitus type I 2011    BS last tested x 1 month not sure what it was.    Heterozygous factor V Leiden mutation     Hx of colonic polyps 11/13/2015    Hyperlipidemia     Hypertension     VIRGIL (obstructive sleep apnea)     Stenosis of right carotid artery 12/13/2016       Past Surgical History:   Procedure Laterality Date    BACK SURGERY      bladder cyst removal      BLADDER SURGERY      CARDIAC CATHETERIZATION  03/2013    mild CAD    COLONOSCOPY N/A 11/13/2015    Procedure: COLONOSCOPY;  Surgeon: Jas Umanzor III, MD;  Location: [facility];  Service: Endoscopy;  Laterality: N/A;    HYSTERECTOMY      28 yrs old    LUMBAR SPINE SURGERY      bone spurs removed    OOPHORECTOMY      SELECTIVE INJECTION OF ANESTHETIC AGENT AROUND LUMBAR SPINAL NERVE ROOT BY TRANSFORAMINAL APPROACH Bilateral 6/3/2022    Procedure: Bilateral L4/5 TF ASUNCION with RN IV sedation; on Coumadin, will need INR prior to procedure, must be less than 1.3;  Surgeon: Mario Palacios MD;  Location: [facility];  Service: Pain Management;  Laterality: Bilateral;       Social History     Tobacco Use    Smoking status: Never    Smokeless tobacco: Never   Substance Use Topics    Alcohol use: No    Drug use: No       Family History   Problem Relation Age of Onset    Heart disease Mother     Hypertension Mother     Cataracts Mother     Hypertension Sister     Glaucoma Sister     Hypertension Brother     Diabetes Father     Diabetes Paternal Uncle     Hypertension Sister     Diabetes Sister     Hypertension Sister     Diabetes Sister     Breast cancer Neg Hx     Colon cancer Neg Hx     Ovarian cancer Neg Hx          ROS    Objective:   Physical Exam  HENT:      Head: Normocephalic.   Eyes:      Pupils: Pupils are equal, round, and reactive to light.   Neck:      Thyroid: No thyromegaly.      Vascular: Normal carotid  pulses. No carotid bruit or JVD.   Cardiovascular:      Rate and Rhythm: Regular rhythm. Bradycardia present. No extrasystoles are present.     Chest Wall: PMI is not displaced.      Pulses: Normal pulses.      Heart sounds: Murmur (ESM on left chest ) heard.     No gallop. No S3 sounds.   Pulmonary:      Effort: No respiratory distress.      Breath sounds: Normal breath sounds. No stridor.   Chest:      Comments: + tenderness on lower sternal   Abdominal:      General: Bowel sounds are normal.      Palpations: Abdomen is soft.      Tenderness: There is no abdominal tenderness. There is no rebound.   Musculoskeletal:         General: Normal range of motion.   Skin:     Findings: No rash.   Neurological:      Mental Status: She is alert and oriented to person, place, and time.   Psychiatric:         Behavior: Behavior normal.       Lab Results   Component Value Date    CHOL 195 11/30/2022    CHOL 199 08/30/2022    CHOL 203 (H) 11/17/2020     Lab Results   Component Value Date    HDL 74 11/30/2022    HDL 72 08/30/2022    HDL 75 11/17/2020     Lab Results   Component Value Date    LDLCALC 110.6 11/30/2022    LDLCALC 117.0 08/30/2022    LDLCALC 110.6 11/17/2020     Lab Results   Component Value Date    TRIG 52 11/30/2022    TRIG 50 08/30/2022    TRIG 87 11/17/2020     Lab Results   Component Value Date    CHOLHDL 37.9 11/30/2022    CHOLHDL 36.2 08/30/2022    CHOLHDL 36.9 11/17/2020       Chemistry        Component Value Date/Time     05/29/2023 1540    K 4.2 05/29/2023 1540     05/29/2023 1540    CO2 27 05/29/2023 1540    BUN 15 05/29/2023 1540    CREATININE 0.8 05/29/2023 1540    GLU 75 05/29/2023 1540        Component Value Date/Time    CALCIUM 9.4 05/29/2023 1540    ALKPHOS 47 (L) 05/29/2023 1540    AST 18 05/29/2023 1540    ALT 15 05/29/2023 1540    BILITOT 0.9 05/29/2023 1540    ESTGFRAFRICA >60 02/16/2022 2251    EGFRNONAA >60 02/16/2022 2251          Lab Results   Component Value Date    HGBA1C 5.7  (H) 05/29/2023     Lab Results   Component Value Date    TSH 0.465 05/29/2023     Lab Results   Component Value Date    INR 3.3 (H) 05/29/2023    INR 1.7 (A) 05/12/2023    INR 2.9 04/14/2023     Lab Results   Component Value Date    WBC 8.62 05/29/2023    HGB 12.2 05/29/2023    HCT 38.2 05/29/2023    MCV 86 05/29/2023     05/29/2023     BMP  Sodium   Date Value Ref Range Status   05/29/2023 141 136 - 145 mmol/L Final     Potassium   Date Value Ref Range Status   05/29/2023 4.2 3.5 - 5.1 mmol/L Final     Chloride   Date Value Ref Range Status   05/29/2023 103 95 - 110 mmol/L Final     CO2   Date Value Ref Range Status   05/29/2023 27 23 - 29 mmol/L Final     BUN   Date Value Ref Range Status   05/29/2023 15 8 - 23 mg/dL Final     Creatinine   Date Value Ref Range Status   05/29/2023 0.8 0.5 - 1.4 mg/dL Final     Calcium   Date Value Ref Range Status   05/29/2023 9.4 8.7 - 10.5 mg/dL Final     Anion Gap   Date Value Ref Range Status   05/29/2023 11 8 - 16 mmol/L Final     eGFR if    Date Value Ref Range Status   02/16/2022 >60 >60 mL/min/1.73 m^2 Final     eGFR if non    Date Value Ref Range Status   02/16/2022 >60 >60 mL/min/1.73 m^2 Final     Comment:     Calculation used to obtain the estimated glomerular filtration  rate (eGFR) is the CKD-EPI equation.        BNP  @LABRCNTIP(BNP,BNPTRIAGEBLO)@  @LABRCNTIP(troponini)@  CrCl cannot be calculated (Patient's most recent lab result is older than the maximum 7 days allowed.).  No results found in the last 24 hours.  No results found in the last 24 hours.  No results found in the last 24 hours.    Assessment:      1. Pure hypercholesterolemia with target low density lipoprotein (LDL) cholesterol less than 130 mg/dL    2. Essential hypertension    3. Coronary artery disease involving native coronary artery of native heart without angina pectoris    4. Dyslipidemia    5. Heterozygous factor V Leiden mutation    6. History of DVT (deep  vein thrombosis)    7. Type 2 diabetes mellitus without complication, without long-term current use of insulin    8. VIRGIL on CPAP        Plan:   Bnp for sob  Echo for murmur  ContinuContinue Coumadin Rx for factor V leiden  Continue Amlodipine/olmesartan/HCTZ and statin for HTN and HLD  DM Rx per PCP  Counseled DASH  Check Lipid profile with PCP in 6 months  Recommend heart-healthy diet, weight control   Mine. Risk modification.   I have reviewed all pertinent labs and cardiac studies independently. Plans and recommendations have been formulated under my direct supervision. All questions answered and patient voiced understanding.   If symptoms persist go to the ED  RTC in 12 months

## 2023-06-14 ENCOUNTER — TELEPHONE (OUTPATIENT)
Dept: CARDIOLOGY | Facility: CLINIC | Age: 73
End: 2023-06-14
Payer: MEDICARE

## 2023-06-14 NOTE — TELEPHONE ENCOUNTER
Attempted to contact patient; Left voicemail for patient to call back to receive results for lab work.     ----- Message from Mauro Knutson MD sent at 6/14/2023  4:17 PM CDT -----  The lab showed no CHF  Advise to f/u with PCp for SOB

## 2023-06-19 ENCOUNTER — ANTI-COAG VISIT (OUTPATIENT)
Dept: CARDIOLOGY | Facility: CLINIC | Age: 73
End: 2023-06-19
Payer: MEDICARE

## 2023-06-19 DIAGNOSIS — D68.51 HETEROZYGOUS FACTOR V LEIDEN MUTATION: Chronic | ICD-10-CM

## 2023-06-19 DIAGNOSIS — Z79.01 ANTICOAGULATED ON COUMADIN: Primary | Chronic | ICD-10-CM

## 2023-06-19 LAB — INR PPP: 2.2 (ref 2–3)

## 2023-06-19 PROCEDURE — 93793 ANTICOAG MGMT PT WARFARIN: CPT | Mod: S$GLB,,,

## 2023-06-19 PROCEDURE — 85610 POCT INR: ICD-10-PCS | Mod: QW,S$GLB,, | Performed by: INTERNAL MEDICINE

## 2023-06-19 PROCEDURE — 93793 PR ANTICOAGULANT MGMT FOR PT TAKING WARFARIN: ICD-10-PCS | Mod: S$GLB,,,

## 2023-06-19 PROCEDURE — 85610 PROTHROMBIN TIME: CPT | Mod: QW,S$GLB,, | Performed by: INTERNAL MEDICINE

## 2023-06-19 NOTE — PROGRESS NOTES
INR is therapeutic at 2.2.  Previous warfarin instructions were verified and followed.  No other changes reported.  Instructions given to continue 5 mg every Monday, Friday; and 7.5 mg on all other days as directed.  Follow up in 3 weeks.  Dose calendar given - confirms understanding.

## 2023-06-21 ENCOUNTER — HOSPITAL ENCOUNTER (OUTPATIENT)
Dept: RADIOLOGY | Facility: HOSPITAL | Age: 73
Discharge: HOME | End: 2023-06-21
Attending: PODIATRIST
Payer: MEDICARE

## 2023-06-21 ENCOUNTER — OFFICE VISIT (OUTPATIENT)
Dept: PODIATRY | Facility: CLINIC | Age: 73
End: 2023-06-21
Payer: MEDICARE

## 2023-06-21 VITALS — BODY MASS INDEX: 28.99 KG/M2 | WEIGHT: 174 LBS | HEIGHT: 65 IN

## 2023-06-21 DIAGNOSIS — M25.571 ACUTE RIGHT ANKLE PAIN: ICD-10-CM

## 2023-06-21 DIAGNOSIS — M25.571 ACUTE RIGHT ANKLE PAIN: Primary | ICD-10-CM

## 2023-06-21 DIAGNOSIS — S93.491A HIGH ANKLE SPRAIN OF RIGHT LOWER EXTREMITY, INITIAL ENCOUNTER: ICD-10-CM

## 2023-06-21 PROCEDURE — 1160F PR REVIEW ALL MEDS BY PRESCRIBER/CLIN PHARMACIST DOCUMENTED: ICD-10-PCS | Mod: CPTII,S$GLB,, | Performed by: PODIATRIST

## 2023-06-21 PROCEDURE — 99999 PR PBB SHADOW E&M-EST. PATIENT-LVL IV: ICD-10-PCS | Mod: PBBFAC,,, | Performed by: PODIATRIST

## 2023-06-21 PROCEDURE — 3288F PR FALLS RISK ASSESSMENT DOCUMENTED: ICD-10-PCS | Mod: CPTII,S$GLB,, | Performed by: PODIATRIST

## 2023-06-21 PROCEDURE — 3008F PR BODY MASS INDEX (BMI) DOCUMENTED: ICD-10-PCS | Mod: CPTII,S$GLB,, | Performed by: PODIATRIST

## 2023-06-21 PROCEDURE — 3288F FALL RISK ASSESSMENT DOCD: CPT | Mod: CPTII,S$GLB,, | Performed by: PODIATRIST

## 2023-06-21 PROCEDURE — 1101F PT FALLS ASSESS-DOCD LE1/YR: CPT | Mod: CPTII,S$GLB,, | Performed by: PODIATRIST

## 2023-06-21 PROCEDURE — 1159F MED LIST DOCD IN RCRD: CPT | Mod: CPTII,S$GLB,, | Performed by: PODIATRIST

## 2023-06-21 PROCEDURE — 1101F PR PT FALLS ASSESS DOC 0-1 FALLS W/OUT INJ PAST YR: ICD-10-PCS | Mod: CPTII,S$GLB,, | Performed by: PODIATRIST

## 2023-06-21 PROCEDURE — 1159F PR MEDICATION LIST DOCUMENTED IN MEDICAL RECORD: ICD-10-PCS | Mod: CPTII,S$GLB,, | Performed by: PODIATRIST

## 2023-06-21 PROCEDURE — 3072F LOW RISK FOR RETINOPATHY: CPT | Mod: CPTII,S$GLB,, | Performed by: PODIATRIST

## 2023-06-21 PROCEDURE — 1125F PR PAIN SEVERITY QUANTIFIED, PAIN PRESENT: ICD-10-PCS | Mod: CPTII,S$GLB,, | Performed by: PODIATRIST

## 2023-06-21 PROCEDURE — 73610 XR ANKLE COMPLETE 3 VIEW RIGHT: ICD-10-PCS | Mod: 26,RT,, | Performed by: RADIOLOGY

## 2023-06-21 PROCEDURE — 73630 X-RAY EXAM OF FOOT: CPT | Mod: 26,RT,, | Performed by: RADIOLOGY

## 2023-06-21 PROCEDURE — 1125F AMNT PAIN NOTED PAIN PRSNT: CPT | Mod: CPTII,S$GLB,, | Performed by: PODIATRIST

## 2023-06-21 PROCEDURE — 3044F HG A1C LEVEL LT 7.0%: CPT | Mod: CPTII,S$GLB,, | Performed by: PODIATRIST

## 2023-06-21 PROCEDURE — 99214 OFFICE O/P EST MOD 30 MIN: CPT | Mod: S$GLB,,, | Performed by: PODIATRIST

## 2023-06-21 PROCEDURE — 3008F BODY MASS INDEX DOCD: CPT | Mod: CPTII,S$GLB,, | Performed by: PODIATRIST

## 2023-06-21 PROCEDURE — 1160F RVW MEDS BY RX/DR IN RCRD: CPT | Mod: CPTII,S$GLB,, | Performed by: PODIATRIST

## 2023-06-21 PROCEDURE — 99214 PR OFFICE/OUTPT VISIT, EST, LEVL IV, 30-39 MIN: ICD-10-PCS | Mod: S$GLB,,, | Performed by: PODIATRIST

## 2023-06-21 PROCEDURE — 73630 X-RAY EXAM OF FOOT: CPT | Mod: TC,RT

## 2023-06-21 PROCEDURE — 3044F PR MOST RECENT HEMOGLOBIN A1C LEVEL <7.0%: ICD-10-PCS | Mod: CPTII,S$GLB,, | Performed by: PODIATRIST

## 2023-06-21 PROCEDURE — 73630 XR FOOT COMPLETE 3 VIEW RIGHT: ICD-10-PCS | Mod: 26,RT,, | Performed by: RADIOLOGY

## 2023-06-21 PROCEDURE — 3072F PR LOW RISK FOR RETINOPATHY: ICD-10-PCS | Mod: CPTII,S$GLB,, | Performed by: PODIATRIST

## 2023-06-21 PROCEDURE — 73610 X-RAY EXAM OF ANKLE: CPT | Mod: 26,RT,, | Performed by: RADIOLOGY

## 2023-06-21 PROCEDURE — 73610 X-RAY EXAM OF ANKLE: CPT | Mod: TC,RT

## 2023-06-21 PROCEDURE — 99999 PR PBB SHADOW E&M-EST. PATIENT-LVL IV: CPT | Mod: PBBFAC,,, | Performed by: PODIATRIST

## 2023-06-21 RX ORDER — TRAMADOL HYDROCHLORIDE 50 MG/1
50 TABLET ORAL EVERY 6 HOURS PRN
Qty: 30 TABLET | Refills: 0 | Status: SHIPPED | OUTPATIENT
Start: 2023-06-21

## 2023-06-21 NOTE — PROGRESS NOTES
Subjective:     Patient ID: Alyx Weir is a 73 y.o. female.    Chief Complaint: Foot Pain (Right lateral foot pain, rates pain 8/10, describes pain as stabbing pains, wears casuals shoes with socks,/Diabetic Pt, Last seen on 05/29/23 with PCP Dr. Clemons )    Alyx is a 73 y.o. female who presents to the podiatry clinic  with complaint of  right foot pain. Onset of the symptoms was several weeks ago. Precipitating event: none known. Current symptoms include: ability to bear weight, but with some pain, stiffness, and swelling. Aggravating factors: walking. Symptoms have been intermittent. Patient has had prior foot problems. Patients rates pain 8/10 on pain scale. Patient points to right lateral ankle.  Patient states she is currently in physical therapy and due to limitation physical therapist told her to come and get ankle evaluated.  Patient has no other pedal complaints at this time.    Patient Active Problem List   Diagnosis    Heterozygous factor V Leiden mutation    Essential hypertension    Pure hypercholesterolemia with target low density lipoprotein (LDL) cholesterol less than 130 mg/dL    VIRGIL on CPAP    Anticoagulated on Coumadin    Type 2 diabetes mellitus without complication    Overweight with body mass index (BMI) 25.0-29.9    Chronic lumbar radiculopathy    Coronary artery disease involving native coronary artery without angina pectoris    Left ventricular diastolic dysfunction with preserved systolic function    COPD (chronic obstructive pulmonary disease)    Chondromalacia, right knee    Dyslipidemia    IGT (impaired glucose tolerance)    Bradycardia    Muscle cramp    Other chest pain    Statin intolerance    History of DVT (deep vein thrombosis)    Non-seasonal allergic rhinitis    Murmur, cardiac       Medication List with Changes/Refills   New Medications    TRAMADOL (ULTRAM) 50 MG TABLET    Take 1 tablet (50 mg total) by mouth every 6 (six) hours as needed for Pain.   Current Medications     ACCU-CHEK GUIDE ME GLUCOSE MTR MISC    USE TO CHECK BLOOD SUGAR TWICE DAILY    ACETAMINOPHEN (TYLENOL ARTHRITIS ORAL)    Take by mouth. Takes prn    ALLOPURINOL (ZYLOPRIM) 100 MG TABLET    TAKE 1 TABLET EVERY DAY    BACLOFEN (LIORESAL) 10 MG TABLET    Take 10 mg by mouth 3 (three) times daily.    CLOBETASOL (TEMOVATE) 0.05 % EXTERNAL SOLUTION    Apply topically once daily.    DICLOFENAC SODIUM (VOLTAREN) 1 % GEL    APPLY 2 GRAMS TOPICALLY DAILY AS NEEDED    EZETIMIBE (ZETIA) 10 MG TABLET    Take 1 tablet (10 mg total) by mouth once daily.    GABAPENTIN (NEURONTIN) 300 MG CAPSULE    Take 1 capsule (300 mg total) by mouth once daily AND 3 capsules (900 mg total) every evening.    IPRATROPIUM (ATROVENT) 21 MCG (0.03 %) NASAL SPRAY    2 sprays by Each Nostril route 3 (three) times daily.    LEVOCETIRIZINE (XYZAL) 5 MG TABLET    Take 1 tablet (5 mg total) by mouth every evening.    METHOCARBAMOL (ROBAXIN) 500 MG TAB    Take 1 tablet (500 mg total) by mouth 3 (three) times daily as needed (muscle spasms). May cause drowsiness.    NEOMYCIN-POLYMYXIN-HYDROCORTISONE (CORTISPORIN) 3.5-10,000-1 MG/ML-UNIT/ML-% OTIC SUSPENSION    Place 3 drops into both ears 3 (three) times daily as needed.    OLMESARTAN-AMLODIPIN-HCTHIAZID 40-10-25 MG TAB    TAKE 1 TABLET EVERY DAY    OM 3/E/LINOL/ALA/OLEIC/GLA/LIP (OMEGA 3-6-9 ORAL)    Take 1 capsule by mouth once daily.    PANTOPRAZOLE (PROTONIX) 40 MG TABLET    TAKE 1 TABLET(40 MG) BY MOUTH EVERY DAY    SODIUM CHLORIDE (OCEAN NASAL) 0.65 % NASAL SPRAY    1 spray by Nasal route as needed for Congestion.    SOLIFENACIN (VESICARE) 5 MG TABLET    Take 1 tablet (5 mg total) by mouth once daily.    SUCRALFATE (CARAFATE) 100 MG/ML SUSPENSION    Take 10 mLs (1 g total) by mouth 4 (four) times daily with meals and nightly.    TRIAMCINOLONE ACETONIDE 0.1% (KENALOG) 0.1 % OINTMENT    Apply topically 2 (two) times daily as needed.    VITAMIN D 1000 UNITS TAB    Take 185 mg by mouth once daily.     WARFARIN (COUMADIN) 5 MG TABLET    TAKE 1 AND 1/2 TABLETS DAILY. EXCEPT TAKE 1 TABLET EVERY MONDAY AND FRIDAY AS DIRECTED       Review of patient's allergies indicates:   Allergen Reactions    Erythromycin Edema     Angioedema to throat    Amlodipine Other (See Comments)     Headaches    Diazepam Hives     Other reaction(s): Unknown    Iodine Hives     Other reaction(s): Unknown    Meperidine Hives     Other reaction(s): Unknown    Morphine Itching    Methylprednisolone sod suc(pf) Other (See Comments)     headache    Primidone Other (See Comments)     Other reaction(s): Unknown       Past Surgical History:   Procedure Laterality Date    BACK SURGERY      bladder cyst removal      BLADDER SURGERY      CARDIAC CATHETERIZATION  03/2013    mild CAD    COLONOSCOPY N/A 11/13/2015    Procedure: COLONOSCOPY;  Surgeon: Jas Umanzor III, MD;  Location: [facility] ENDO;  Service: Endoscopy;  Laterality: N/A;    HYSTERECTOMY      28 yrs old    LUMBAR SPINE SURGERY      bone spurs removed    OOPHORECTOMY      SELECTIVE INJECTION OF ANESTHETIC AGENT AROUND LUMBAR SPINAL NERVE ROOT BY TRANSFORAMINAL APPROACH Bilateral 6/3/2022    Procedure: Bilateral L4/5 TF ASUNCION with RN IV sedation; on Coumadin, will need INR prior to procedure, must be less than 1.3;  Surgeon: Mario Palacios MD;  Location: [facility] PAIN MGT;  Service: Pain Management;  Laterality: Bilateral;       Family History   Problem Relation Age of Onset    Heart disease Mother     Hypertension Mother     Cataracts Mother     Hypertension Sister     Glaucoma Sister     Hypertension Brother     Diabetes Father     Diabetes Paternal Uncle     Hypertension Sister     Diabetes Sister     Hypertension Sister     Diabetes Sister     Breast cancer Neg Hx     Colon cancer Neg Hx     Ovarian cancer Neg Hx        Social History     Socioeconomic History    Marital status:    Tobacco Use    Smoking status: Never    Smokeless tobacco: Never   Substance and Sexual Activity    Alcohol  "use: No    Drug use: No    Sexual activity: Not Currently     Partners: Male     Birth control/protection: None   Social History Narrative    Dogs in household, no smokers.     Social Determinants of Health     Financial Resource Strain: High Risk    Difficulty of Paying Living Expenses: Hard   Food Insecurity: No Food Insecurity    Worried About Running Out of Food in the Last Year: Never true    Ran Out of Food in the Last Year: Never true   Transportation Needs: No Transportation Needs    Lack of Transportation (Medical): No    Lack of Transportation (Non-Medical): No   Physical Activity: Sufficiently Active    Days of Exercise per Week: 4 days    Minutes of Exercise per Session: 60 min   Stress: Stress Concern Present    Feeling of Stress : To some extent   Social Connections: Moderately Isolated    Frequency of Communication with Friends and Family: More than three times a week    Frequency of Social Gatherings with Friends and Family: Three times a week    Attends Gnosticism Services: More than 4 times per year    Active Member of Clubs or Organizations: No    Attends Club or Organization Meetings: Never    Marital Status:    Housing Stability: Low Risk     Unable to Pay for Housing in the Last Year: No    Number of Places Lived in the Last Year: 1    Unstable Housing in the Last Year: No       Vitals:    06/21/23 0823   Weight: 78.9 kg (174 lb)   Height: 5' 5" (1.651 m)   PainSc:   8   PainLoc: Foot       Hemoglobin A1C   Date Value Ref Range Status   05/29/2023 5.7 (H) 4.0 - 5.6 % Final     Comment:     ADA Screening Guidelines:  5.7-6.4%  Consistent with prediabetes  >or=6.5%  Consistent with diabetes    High levels of fetal hemoglobin interfere with the HbA1C  assay. Heterozygous hemoglobin variants (HbS, HgC, etc)do  not significantly interfere with this assay.   However, presence of multiple variants may affect accuracy.     11/30/2022 5.9 (H) 4.0 - 5.6 % Final     Comment:     ADA Screening " Guidelines:  5.7-6.4%  Consistent with prediabetes  >or=6.5%  Consistent with diabetes    High levels of fetal hemoglobin interfere with the HbA1C  assay. Heterozygous hemoglobin variants (HbS, HgC, etc)do  not significantly interfere with this assay.   However, presence of multiple variants may affect accuracy.     08/30/2022 6.0 (H) 4.0 - 5.6 % Final     Comment:     ADA Screening Guidelines:  5.7-6.4%  Consistent with prediabetes  >or=6.5%  Consistent with diabetes    High levels of fetal hemoglobin interfere with the HbA1C  assay. Heterozygous hemoglobin variants (HbS, HgC, etc)do  not significantly interfere with this assay.   However, presence of multiple variants may affect accuracy.         Review of Systems   Constitutional:  Negative for chills and fever.   Respiratory:  Negative for shortness of breath.    Cardiovascular:  Negative for chest pain, palpitations, orthopnea, claudication and leg swelling.   Gastrointestinal:  Negative for diarrhea, nausea and vomiting.   Musculoskeletal:  Positive for joint pain (right lateral ankle/foot).   Skin:  Negative for rash.   Neurological:  Negative for dizziness, tingling, sensory change, focal weakness and weakness.   Psychiatric/Behavioral: Negative.             Objective:       PHYSICAL EXAM: Apperance: Alert and orient in no distress,well developed, and with good attention to grooming and body habits  Patient presents ambulating in casual shoes.   Lower Extremity Physical Exam:  VASCULAR: Dorsalis pedis pulses 2/4 right and Posterior Tibial pulses 1/4 right.   DERMATOLOGICAL: No skin rashes, subcutaneous nodules, lesions, or ulcers observed bilateral.  NEUROLOGICAL: Light touch, sharp-dull, proprioception all present and equal bilaterally.    MUSCULOSKELETAL: Muscle strength is 5/5 for foot inverters, everters, plantarflexors, and dorsiflexors. Muscle tone is normal. (+) pain on palpation of right lateral ankle and along the lateral 5th metatarsal shaft.  Tenderness with right ankle inversion.           Assessment:       ICD-10-CM ICD-9-CM   1. Acute right ankle pain  M25.571 719.47     338.19   2. High ankle sprain of right lower extremity, initial encounter  S93.491A 845.03       Plan:   Acute right ankle pain  -     X-Ray Ankle Complete Right; Future; Expected date: 06/21/2023  -     X-Ray Foot Complete Right; Future; Expected date: 06/21/2023  -     traMADoL (ULTRAM) 50 mg tablet; Take 1 tablet (50 mg total) by mouth every 6 (six) hours as needed for Pain.  Dispense: 30 tablet; Refill: 0    High ankle sprain of right lower extremity, initial encounter  -     traMADoL (ULTRAM) 50 mg tablet; Take 1 tablet (50 mg total) by mouth every 6 (six) hours as needed for Pain.  Dispense: 30 tablet; Refill: 0    I counseled the patient on her conditions, regarding findings of my examination, my impressions, and usual treatment plan.   Ordered right ankle and foot x-rays to be completed today.  Prescribed Tramadol 50mg to be taken as needed for pain. Patient advised on the possible elevation of blood pressure or heart effects and caution to take pills as needed and to discontinue use if symptoms arise, patient agreed.   Patient was fitted with lace up ankle brace and instructed on proper usage. This should be worn daily for a minimum of 2 weeks at all times when ambulating.  The patient and I reviewed the types of shoes she should be wearing, my recommendation includes generally the best time of the day for a shoe fitting is the afternoon, shoes with a wide toe box, very good cushion, and tennis shoes with removable inner soles.The patient and I reviewed my recommendations for over-the-counter orthotic inserts.   Patient to return in 4 weeks or sooner if needed.            Bradley Chu DPM  [facility]

## 2023-06-22 ENCOUNTER — HOSPITAL ENCOUNTER (OUTPATIENT)
Dept: CARDIOLOGY | Facility: HOSPITAL | Age: 73
Discharge: HOME | End: 2023-06-22
Attending: INTERNAL MEDICINE
Payer: MEDICARE

## 2023-06-22 VITALS
SYSTOLIC BLOOD PRESSURE: 123 MMHG | HEIGHT: 65 IN | BODY MASS INDEX: 28.99 KG/M2 | DIASTOLIC BLOOD PRESSURE: 80 MMHG | WEIGHT: 174 LBS | HEART RATE: 58 BPM

## 2023-06-22 DIAGNOSIS — I25.10 CORONARY ARTERY DISEASE INVOLVING NATIVE CORONARY ARTERY OF NATIVE HEART WITHOUT ANGINA PECTORIS: ICD-10-CM

## 2023-06-22 DIAGNOSIS — R01.1 MURMUR, CARDIAC: ICD-10-CM

## 2023-06-22 LAB
AORTIC ROOT ANNULUS: 2.95 CM
AV INDEX (PROSTH): 1.03
AV MEAN GRADIENT: 7 MMHG
AV PEAK GRADIENT: 12 MMHG
AV VALVE AREA: 3.02 CM2
AV VELOCITY RATIO: 1.02
BSA FOR ECHO PROCEDURE: 1.9 M2
CV ECHO LV RWT: 0.61 CM
DOP CALC AO PEAK VEL: 1.76 M/S
DOP CALC AO VTI: 41.5 CM
DOP CALC LVOT AREA: 2.9 CM2
DOP CALC LVOT DIAMETER: 1.93 CM
DOP CALC LVOT PEAK VEL: 1.79 M/S
DOP CALC LVOT STROKE VOLUME: 125.15 CM3
DOP CALC RVOT PEAK VEL: 0.76 M/S
DOP CALC RVOT VTI: 15.7 CM
DOP CALCLVOT PEAK VEL VTI: 42.8 CM
E WAVE DECELERATION TIME: 211.2 MSEC
E/A RATIO: 0.92
E/E' RATIO: 9.44 M/S
ECHO LV POSTERIOR WALL: 1.14 CM (ref 0.6–1.1)
EJECTION FRACTION: 60 %
FRACTIONAL SHORTENING: 31 % (ref 28–44)
HCV RNA # SERPL NAA+PROBE: 281.8 MMHG/S
INTERVENTRICULAR SEPTUM: 1.08 CM (ref 0.6–1.1)
IVRT: 117.98 MSEC
LA MAJOR: 4.28 CM
LA MINOR: 4.32 CM
LA WIDTH: 3.8 CM
LEFT ATRIUM SIZE: 4.15 CM
LEFT ATRIUM VOLUME INDEX MOD: 30.7 ML/M2
LEFT ATRIUM VOLUME INDEX: 31 ML/M2
LEFT ATRIUM VOLUME MOD: 57.02 CM3
LEFT ATRIUM VOLUME: 57.64 CM3
LEFT INTERNAL DIMENSION IN SYSTOLE: 2.56 CM (ref 2.1–4)
LEFT VENTRICLE DIASTOLIC VOLUME INDEX: 31.62 ML/M2
LEFT VENTRICLE DIASTOLIC VOLUME: 58.81 ML
LEFT VENTRICLE MASS INDEX: 71 G/M2
LEFT VENTRICLE SYSTOLIC VOLUME INDEX: 12.7 ML/M2
LEFT VENTRICLE SYSTOLIC VOLUME: 23.58 ML
LEFT VENTRICULAR INTERNAL DIMENSION IN DIASTOLE: 3.72 CM (ref 3.5–6)
LEFT VENTRICULAR MASS: 132.14 G
LV LATERAL E/E' RATIO: 9.44 M/S
LV SEPTAL E/E' RATIO: 9.44 M/S
LVOT MG: 6.68 MMHG
LVOT MV: 1.2 CM/S
MV PEAK A VEL: 0.92 M/S
MV PEAK E VEL: 0.85 M/S
MV STENOSIS PRESSURE HALF TIME: 61.25 MS
MV VALVE AREA P 1/2 METHOD: 3.59 CM2
PISA TR MAX VEL: 2.46 M/S
PV MEAN GRADIENT: 1.48 MMHG
PV PEAK VELOCITY: 0.8 CM/S
RA MAJOR: 3.41 CM
RA PRESSURE: 3 MMHG
RA WIDTH: 2.87 CM
RIGHT VENTRICULAR END-DIASTOLIC DIMENSION: 2.5 CM
SINUS: 2.67 CM
STJ: 2.74 CM
TDI LATERAL: 0.09 M/S
TDI SEPTAL: 0.09 M/S
TDI: 0.09 M/S
TR MAX PG: 24 MMHG
TRICUSPID ANNULAR PLANE SYSTOLIC EXCURSION: 2.08 CM
TV REST PULMONARY ARTERY PRESSURE: 27 MMHG

## 2023-06-22 PROCEDURE — 93306 TTE W/DOPPLER COMPLETE: CPT

## 2023-06-22 PROCEDURE — 93306 ECHO (CUPID ONLY): ICD-10-PCS | Mod: 26,,, | Performed by: INTERNAL MEDICINE

## 2023-06-22 PROCEDURE — 93306 TTE W/DOPPLER COMPLETE: CPT | Mod: 26,,, | Performed by: INTERNAL MEDICINE

## 2023-06-23 ENCOUNTER — TELEPHONE (OUTPATIENT)
Dept: CARDIOLOGY | Facility: CLINIC | Age: 73
End: 2023-06-23
Payer: MEDICARE

## 2023-06-23 ENCOUNTER — PES CALL (OUTPATIENT)
Dept: ADMINISTRATIVE | Facility: CLINIC | Age: 73
End: 2023-06-23
Payer: MEDICARE

## 2023-06-23 NOTE — TELEPHONE ENCOUNTER
MD JOSE LUIS German Staff  Echo showed normal function and mild TR   Continue current Rx   F/U as scheduled     Left  portal message

## 2023-07-10 ENCOUNTER — PES CALL (OUTPATIENT)
Dept: ADMINISTRATIVE | Facility: CLINIC | Age: 73
End: 2023-07-10
Payer: MEDICARE

## 2023-07-14 ENCOUNTER — ANTI-COAG VISIT (OUTPATIENT)
Dept: CARDIOLOGY | Facility: CLINIC | Age: 73
End: 2023-07-14
Payer: MEDICARE

## 2023-07-14 ENCOUNTER — OFFICE VISIT (OUTPATIENT)
Dept: DERMATOLOGY | Facility: CLINIC | Age: 73
End: 2023-07-14
Payer: MEDICARE

## 2023-07-14 DIAGNOSIS — Z79.01 LONG TERM (CURRENT) USE OF ANTICOAGULANTS: Primary | ICD-10-CM

## 2023-07-14 DIAGNOSIS — Z79.01 ANTICOAGULATED ON COUMADIN: Chronic | ICD-10-CM

## 2023-07-14 DIAGNOSIS — L65.9 HAIR LOSS: Primary | ICD-10-CM

## 2023-07-14 DIAGNOSIS — D68.51 HETEROZYGOUS FACTOR V LEIDEN MUTATION: Chronic | ICD-10-CM

## 2023-07-14 LAB — INR PPP: 2.7 (ref 2–3)

## 2023-07-14 PROCEDURE — 1125F PR PAIN SEVERITY QUANTIFIED, PAIN PRESENT: ICD-10-PCS | Mod: HCNC,CPTII,S$GLB, | Performed by: STUDENT IN AN ORGANIZED HEALTH CARE EDUCATION/TRAINING PROGRAM

## 2023-07-14 PROCEDURE — 3288F FALL RISK ASSESSMENT DOCD: CPT | Mod: HCNC,CPTII,S$GLB, | Performed by: STUDENT IN AN ORGANIZED HEALTH CARE EDUCATION/TRAINING PROGRAM

## 2023-07-14 PROCEDURE — 3044F PR MOST RECENT HEMOGLOBIN A1C LEVEL <7.0%: ICD-10-PCS | Mod: HCNC,CPTII,S$GLB, | Performed by: STUDENT IN AN ORGANIZED HEALTH CARE EDUCATION/TRAINING PROGRAM

## 2023-07-14 PROCEDURE — 85610 PROTHROMBIN TIME: CPT | Mod: QW,HCNC,S$GLB, | Performed by: INTERNAL MEDICINE

## 2023-07-14 PROCEDURE — 99214 PR OFFICE/OUTPT VISIT, EST, LEVL IV, 30-39 MIN: ICD-10-PCS | Mod: HCNC,S$GLB,, | Performed by: STUDENT IN AN ORGANIZED HEALTH CARE EDUCATION/TRAINING PROGRAM

## 2023-07-14 PROCEDURE — 1101F PT FALLS ASSESS-DOCD LE1/YR: CPT | Mod: HCNC,CPTII,S$GLB, | Performed by: STUDENT IN AN ORGANIZED HEALTH CARE EDUCATION/TRAINING PROGRAM

## 2023-07-14 PROCEDURE — 93793 ANTICOAG MGMT PT WARFARIN: CPT | Mod: HCNC,S$GLB,,

## 2023-07-14 PROCEDURE — 99999 PR PBB SHADOW E&M-EST. PATIENT-LVL III: ICD-10-PCS | Mod: PBBFAC,HCNC,, | Performed by: STUDENT IN AN ORGANIZED HEALTH CARE EDUCATION/TRAINING PROGRAM

## 2023-07-14 PROCEDURE — 1101F PR PT FALLS ASSESS DOC 0-1 FALLS W/OUT INJ PAST YR: ICD-10-PCS | Mod: HCNC,CPTII,S$GLB, | Performed by: STUDENT IN AN ORGANIZED HEALTH CARE EDUCATION/TRAINING PROGRAM

## 2023-07-14 PROCEDURE — 93793 PR ANTICOAGULANT MGMT FOR PT TAKING WARFARIN: ICD-10-PCS | Mod: HCNC,S$GLB,,

## 2023-07-14 PROCEDURE — 3044F HG A1C LEVEL LT 7.0%: CPT | Mod: HCNC,CPTII,S$GLB, | Performed by: STUDENT IN AN ORGANIZED HEALTH CARE EDUCATION/TRAINING PROGRAM

## 2023-07-14 PROCEDURE — 99999 PR PBB SHADOW E&M-EST. PATIENT-LVL III: CPT | Mod: PBBFAC,HCNC,, | Performed by: STUDENT IN AN ORGANIZED HEALTH CARE EDUCATION/TRAINING PROGRAM

## 2023-07-14 PROCEDURE — 1125F AMNT PAIN NOTED PAIN PRSNT: CPT | Mod: HCNC,CPTII,S$GLB, | Performed by: STUDENT IN AN ORGANIZED HEALTH CARE EDUCATION/TRAINING PROGRAM

## 2023-07-14 PROCEDURE — 3072F LOW RISK FOR RETINOPATHY: CPT | Mod: HCNC,CPTII,S$GLB, | Performed by: STUDENT IN AN ORGANIZED HEALTH CARE EDUCATION/TRAINING PROGRAM

## 2023-07-14 PROCEDURE — 3288F PR FALLS RISK ASSESSMENT DOCUMENTED: ICD-10-PCS | Mod: HCNC,CPTII,S$GLB, | Performed by: STUDENT IN AN ORGANIZED HEALTH CARE EDUCATION/TRAINING PROGRAM

## 2023-07-14 PROCEDURE — 85610 POCT INR: ICD-10-PCS | Mod: QW,HCNC,S$GLB, | Performed by: INTERNAL MEDICINE

## 2023-07-14 PROCEDURE — 3072F PR LOW RISK FOR RETINOPATHY: ICD-10-PCS | Mod: HCNC,CPTII,S$GLB, | Performed by: STUDENT IN AN ORGANIZED HEALTH CARE EDUCATION/TRAINING PROGRAM

## 2023-07-14 PROCEDURE — 99214 OFFICE O/P EST MOD 30 MIN: CPT | Mod: HCNC,S$GLB,, | Performed by: STUDENT IN AN ORGANIZED HEALTH CARE EDUCATION/TRAINING PROGRAM

## 2023-07-14 RX ORDER — SPIRONOLACTONE 50 MG/1
50 TABLET, FILM COATED ORAL 2 TIMES DAILY
Qty: 60 TABLET | Refills: 3 | Status: SHIPPED | OUTPATIENT
Start: 2023-07-14 | End: 2024-07-13

## 2023-07-14 NOTE — PROGRESS NOTES
Subjective:       Patient ID:  Alyx Weir is a 73 y.o. female who presents for   Chief Complaint   Patient presents with    Hair Loss     Pt reports no improvement on her scalp.      History of Present Illness: The patient presents for follow up of hair loss, last seen on 4/14/ where she was started on topical clobetasol solution. She reports minimal improvement in symptoms. She denies any worsening of hair loss symptoms, but has not noticed much regrowth. No other concerning rash or skin lesions.       Hair Loss      Review of Systems   Constitutional:  Negative for fever and chills.   Skin:  Negative for itching, rash and dry skin.      Objective:    Physical Exam   Constitutional: She appears well-developed and well-nourished. No distress.   Neurological: She is alert and oriented to person, place, and time. She is not disoriented.   Psychiatric: She has a normal mood and affect.   Skin:   Areas Examined (abnormalities noted in diagram):   Scalp / Hair Palpated and Inspected  Head / Face Inspection Performed  Neck Inspection Performed            Diagram Legend     Erythematous scaling macule/papule c/w actinic keratosis       Vascular papule c/w angioma      Pigmented verrucoid papule/plaque c/w seborrheic keratosis      Yellow umbilicated papule c/w sebaceous hyperplasia      Irregularly shaped tan macule c/w lentigo     1-2 mm smooth white papules consistent with Milia      Movable subcutaneous cyst with punctum c/w epidermal inclusion cyst      Subcutaneous movable cyst c/w pilar cyst      Firm pink to brown papule c/w dermatofibroma      Pedunculated fleshy papule(s) c/w skin tag(s)      Evenly pigmented macule c/w junctional nevus     Mildly variegated pigmented, slightly irregular-bordered macule c/w mildly atypical nevus      Flesh colored to evenly pigmented papule c/w intradermal nevus       Pink pearly papule/plaque c/w basal cell carcinoma      Erythematous hyperkeratotic cursted plaque c/w SCC       Surgical scar with no sign of skin cancer recurrence      Open and closed comedones      Inflammatory papules and pustules      Verrucoid papule consistent consistent with wart     Erythematous eczematous patches and plaques     Dystrophic onycholytic nail with subungual debris c/w onychomycosis     Umbilicated papule    Erythematous-base heme-crusted tan verrucoid plaque consistent with inflamed seborrheic keratosis     Erythematous Silvery Scaling Plaque c/w Psoriasis     See annotation      Assessment / Plan:        Hair loss - persistent, unchanged. Discussed other treatment options. After discussion, will start spironolactone and continue with topical clobetasol.   -     spironolactone (ALDACTONE) 50 MG tablet; Take 1 tablet (50 mg total) by mouth 2 (two) times daily.  Dispense: 60 tablet; Refill: 3    Discussed benefits and risks of therapy including but not limited to breakthrough bleeding, breast tenderness, and elevated potassium levels which may give symptoms of fatigue, palpitations, and nausea. Patient should limit potassium intake - avoid potassium supplements or salt substitutes, limit bananas and citrus fruits. Pregnancy must be avoided while taking spironolactone.           Follow up in about 3 months (around 10/14/2023).

## 2023-07-14 NOTE — PROGRESS NOTES
Patient's INR is therapeutic at 2.7.  Patient reports no changes.  Instructions given: Continue warfarin 5 mg on Fridays and Mondays; and 7.5 mg all other days.  Recheck on 8/11/23.  Calendar reviewed with patient.  Patient verbalizes understanding.

## 2023-08-11 ENCOUNTER — ANTI-COAG VISIT (OUTPATIENT)
Dept: CARDIOLOGY | Facility: CLINIC | Age: 73
End: 2023-08-11
Payer: MEDICARE

## 2023-08-11 DIAGNOSIS — D68.51 HETEROZYGOUS FACTOR V LEIDEN MUTATION: Chronic | ICD-10-CM

## 2023-08-11 DIAGNOSIS — Z79.01 ANTICOAGULATED ON COUMADIN: Primary | Chronic | ICD-10-CM

## 2023-08-11 LAB — INR PPP: 3.9 (ref 2–3)

## 2023-08-11 PROCEDURE — 93793 PR ANTICOAGULANT MGMT FOR PT TAKING WARFARIN: ICD-10-PCS | Mod: HCNC,S$GLB,,

## 2023-08-11 PROCEDURE — 85610 PROTHROMBIN TIME: CPT | Mod: QW,HCNC,S$GLB, | Performed by: INTERNAL MEDICINE

## 2023-08-11 PROCEDURE — 93793 ANTICOAG MGMT PT WARFARIN: CPT | Mod: HCNC,S$GLB,,

## 2023-08-11 PROCEDURE — 85610 POCT INR: ICD-10-PCS | Mod: QW,HCNC,S$GLB, | Performed by: INTERNAL MEDICINE

## 2023-08-11 NOTE — PROGRESS NOTES
INR is above goal at 3.9.  Patient reports no medication/health changes or bleeding issues.  Current warfarin dose verified and followed.  Instructions given to hold warfarin dose today, then resume current dose of 5 mg every Mon, Fri; and 7.5 mg on all other days.  Bleeding precautions in place - ED for any abnormal bleeding issues.  Follow up in 3 weeks.  Dose calendar given and reviewed with patient.  Patient verbalizes understanding of instructions given.

## 2023-08-22 ENCOUNTER — OFFICE VISIT (OUTPATIENT)
Dept: FAMILY MEDICINE | Facility: CLINIC | Age: 73
End: 2023-08-22
Payer: MEDICARE

## 2023-08-22 VITALS
OXYGEN SATURATION: 98 % | HEART RATE: 60 BPM | RESPIRATION RATE: 16 BRPM | SYSTOLIC BLOOD PRESSURE: 128 MMHG | DIASTOLIC BLOOD PRESSURE: 86 MMHG | WEIGHT: 173.31 LBS | TEMPERATURE: 98 F | BODY MASS INDEX: 28.84 KG/M2

## 2023-08-22 DIAGNOSIS — M54.16 CHRONIC LUMBAR RADICULOPATHY: Primary | ICD-10-CM

## 2023-08-22 PROCEDURE — 3288F FALL RISK ASSESSMENT DOCD: CPT | Mod: HCNC,CPTII,S$GLB, | Performed by: INTERNAL MEDICINE

## 2023-08-22 PROCEDURE — 3044F PR MOST RECENT HEMOGLOBIN A1C LEVEL <7.0%: ICD-10-PCS | Mod: HCNC,CPTII,S$GLB, | Performed by: INTERNAL MEDICINE

## 2023-08-22 PROCEDURE — 3074F SYST BP LT 130 MM HG: CPT | Mod: HCNC,CPTII,S$GLB, | Performed by: INTERNAL MEDICINE

## 2023-08-22 PROCEDURE — 3072F LOW RISK FOR RETINOPATHY: CPT | Mod: HCNC,CPTII,S$GLB, | Performed by: INTERNAL MEDICINE

## 2023-08-22 PROCEDURE — 99213 PR OFFICE/OUTPT VISIT, EST, LEVL III, 20-29 MIN: ICD-10-PCS | Mod: HCNC,S$GLB,, | Performed by: INTERNAL MEDICINE

## 2023-08-22 PROCEDURE — 1159F MED LIST DOCD IN RCRD: CPT | Mod: HCNC,CPTII,S$GLB, | Performed by: INTERNAL MEDICINE

## 2023-08-22 PROCEDURE — 3044F HG A1C LEVEL LT 7.0%: CPT | Mod: HCNC,CPTII,S$GLB, | Performed by: INTERNAL MEDICINE

## 2023-08-22 PROCEDURE — 3072F PR LOW RISK FOR RETINOPATHY: ICD-10-PCS | Mod: HCNC,CPTII,S$GLB, | Performed by: INTERNAL MEDICINE

## 2023-08-22 PROCEDURE — 3008F BODY MASS INDEX DOCD: CPT | Mod: HCNC,CPTII,S$GLB, | Performed by: INTERNAL MEDICINE

## 2023-08-22 PROCEDURE — 1159F PR MEDICATION LIST DOCUMENTED IN MEDICAL RECORD: ICD-10-PCS | Mod: HCNC,CPTII,S$GLB, | Performed by: INTERNAL MEDICINE

## 2023-08-22 PROCEDURE — 3079F PR MOST RECENT DIASTOLIC BLOOD PRESSURE 80-89 MM HG: ICD-10-PCS | Mod: HCNC,CPTII,S$GLB, | Performed by: INTERNAL MEDICINE

## 2023-08-22 PROCEDURE — 3079F DIAST BP 80-89 MM HG: CPT | Mod: HCNC,CPTII,S$GLB, | Performed by: INTERNAL MEDICINE

## 2023-08-22 PROCEDURE — 3008F PR BODY MASS INDEX (BMI) DOCUMENTED: ICD-10-PCS | Mod: HCNC,CPTII,S$GLB, | Performed by: INTERNAL MEDICINE

## 2023-08-22 PROCEDURE — 99999 PR PBB SHADOW E&M-EST. PATIENT-LVL V: ICD-10-PCS | Mod: PBBFAC,HCNC,, | Performed by: INTERNAL MEDICINE

## 2023-08-22 PROCEDURE — 1101F PR PT FALLS ASSESS DOC 0-1 FALLS W/OUT INJ PAST YR: ICD-10-PCS | Mod: HCNC,CPTII,S$GLB, | Performed by: INTERNAL MEDICINE

## 2023-08-22 PROCEDURE — 1125F PR PAIN SEVERITY QUANTIFIED, PAIN PRESENT: ICD-10-PCS | Mod: HCNC,CPTII,S$GLB, | Performed by: INTERNAL MEDICINE

## 2023-08-22 PROCEDURE — 99213 OFFICE O/P EST LOW 20 MIN: CPT | Mod: HCNC,S$GLB,, | Performed by: INTERNAL MEDICINE

## 2023-08-22 PROCEDURE — 3288F PR FALLS RISK ASSESSMENT DOCUMENTED: ICD-10-PCS | Mod: HCNC,CPTII,S$GLB, | Performed by: INTERNAL MEDICINE

## 2023-08-22 PROCEDURE — 1125F AMNT PAIN NOTED PAIN PRSNT: CPT | Mod: HCNC,CPTII,S$GLB, | Performed by: INTERNAL MEDICINE

## 2023-08-22 PROCEDURE — 3074F PR MOST RECENT SYSTOLIC BLOOD PRESSURE < 130 MM HG: ICD-10-PCS | Mod: HCNC,CPTII,S$GLB, | Performed by: INTERNAL MEDICINE

## 2023-08-22 PROCEDURE — 1101F PT FALLS ASSESS-DOCD LE1/YR: CPT | Mod: HCNC,CPTII,S$GLB, | Performed by: INTERNAL MEDICINE

## 2023-08-22 PROCEDURE — 99999 PR PBB SHADOW E&M-EST. PATIENT-LVL V: CPT | Mod: PBBFAC,HCNC,, | Performed by: INTERNAL MEDICINE

## 2023-08-22 NOTE — PROGRESS NOTES
Subjective:       Patient ID: Alyx Weir is a 73 y.o. female.    Chief Complaint: Back Pain    C/o worsening of her chronic bilat lower back pain to buttocks,hips.   In P.T.-----------on gabapentin-------    Back Pain  Pertinent negatives include no chest pain, fever or weakness.     Past Medical History:   Diagnosis Date    Anticoagulated on Coumadin     Arm weakness-rotator cuff weakness 11/2/2015    Arthritis     Asthma     Clotting disorder     Coronary artery disease     Deep vein thrombosis     Degenerative disc disease     Diabetes mellitus type I 2011    BS last tested x 1 month not sure what it was.    Heterozygous factor V Leiden mutation     Hx of colonic polyps 11/13/2015    Hyperlipidemia     Hypertension     VIRGIL (obstructive sleep apnea)     Stenosis of right carotid artery 12/13/2016     Past Surgical History:   Procedure Laterality Date    BACK SURGERY      bladder cyst removal      BLADDER SURGERY      CARDIAC CATHETERIZATION  03/2013    mild CAD    COLONOSCOPY N/A 11/13/2015    Procedure: COLONOSCOPY;  Surgeon: Jas Umanzor III, MD;  Location: [facility];  Service: Endoscopy;  Laterality: N/A;    HYSTERECTOMY      28 yrs old    LUMBAR SPINE SURGERY      bone spurs removed    OOPHORECTOMY      SELECTIVE INJECTION OF ANESTHETIC AGENT AROUND LUMBAR SPINAL NERVE ROOT BY TRANSFORAMINAL APPROACH Bilateral 6/3/2022    Procedure: Bilateral L4/5 TF ASUNCION with RN IV sedation; on Coumadin, will need INR prior to procedure, must be less than 1.3;  Surgeon: Mario Palacios MD;  Location: [facility];  Service: Pain Management;  Laterality: Bilateral;     Family History   Problem Relation Age of Onset    Heart disease Mother     Hypertension Mother     Cataracts Mother     Hypertension Sister     Glaucoma Sister     Hypertension Brother     Diabetes Father     Diabetes Paternal Uncle     Hypertension Sister     Diabetes Sister     Hypertension Sister     Diabetes Sister     Breast cancer Neg Hx     Colon  cancer Neg Hx     Ovarian cancer Neg Hx      Social History     Socioeconomic History    Marital status:    Tobacco Use    Smoking status: Never    Smokeless tobacco: Never   Substance and Sexual Activity    Alcohol use: No    Drug use: No    Sexual activity: Not Currently     Partners: Male     Birth control/protection: None   Social History Narrative    Dogs in household, no smokers.     Social Determinants of Health     Financial Resource Strain: High Risk (11/21/2022)    Overall Financial Resource Strain (CARDIA)     Difficulty of Paying Living Expenses: Hard   Food Insecurity: No Food Insecurity (11/21/2022)    Hunger Vital Sign     Worried About Running Out of Food in the Last Year: Never true     Ran Out of Food in the Last Year: Never true   Transportation Needs: No Transportation Needs (11/21/2022)    PRAPARE - Transportation     Lack of Transportation (Medical): No     Lack of Transportation (Non-Medical): No   Physical Activity: Sufficiently Active (11/21/2022)    Exercise Vital Sign     Days of Exercise per Week: 4 days     Minutes of Exercise per Session: 60 min   Stress: Stress Concern Present (11/21/2022)    [place] of [employer]ational [employer] - Occupational Stress Questionnaire     Feeling of Stress : To some extent   Social Connections: Moderately Isolated (11/21/2022)    Social Connection and Isolation Panel [NHANES]     Frequency of Communication with Friends and Family: More than three times a week     Frequency of Social Gatherings with Friends and Family: Three times a week     Attends Evangelical Services: More than 4 times per year     Active Member of Clubs or Organizations: No     Attends Club or Organization Meetings: Never     Marital Status:    Housing Stability: Low Risk  (11/21/2022)    Housing Stability Vital Sign     Unable to Pay for Housing in the Last Year: No     Number of Places Lived in the Last Year: 1     Unstable Housing in the Last Year: No     Review of  Systems   Constitutional:  Negative for chills and fever.   HENT: Negative.     Respiratory:  Negative for apnea, cough, choking, chest tightness, shortness of breath, wheezing and stridor.    Cardiovascular:  Negative for chest pain and palpitations.   Gastrointestinal:  Negative for abdominal distention, nausea and vomiting.   Musculoskeletal:  Positive for arthralgias, back pain and gait problem.   Neurological:  Negative for dizziness and weakness.   Psychiatric/Behavioral:  Negative for agitation, behavioral problems and confusion.        Objective:      Physical Exam  Vitals and nursing note reviewed.   Constitutional:       General: She is not in acute distress.     Appearance: Normal appearance. She is well-developed. She is not diaphoretic.   HENT:      Head: Normocephalic and atraumatic.      Mouth/Throat:      Pharynx: No oropharyngeal exudate.   Eyes:      General: No scleral icterus.  Neck:      Thyroid: No thyromegaly.      Vascular: No carotid bruit or JVD.      Trachea: No tracheal deviation.   Cardiovascular:      Rate and Rhythm: Normal rate and regular rhythm.      Heart sounds: Normal heart sounds.   Pulmonary:      Effort: Pulmonary effort is normal. No respiratory distress.      Breath sounds: Normal breath sounds. No wheezing or rales.   Chest:      Chest wall: No tenderness.   Abdominal:      General: Bowel sounds are normal. There is no distension.      Palpations: Abdomen is soft.      Tenderness: There is no abdominal tenderness. There is no guarding or rebound.   Musculoskeletal:         General: No tenderness. Normal range of motion.      Cervical back: Normal range of motion and neck supple.   Lymphadenopathy:      Cervical: No cervical adenopathy.   Skin:     General: Skin is warm and dry.      Coloration: Skin is not pale.      Findings: No erythema or rash.   Neurological:      Mental Status: She is alert and oriented to person, place, and time.   Psychiatric:         Behavior:  Behavior normal.         Thought Content: Thought content normal.         Judgment: Judgment normal.         CMP  Sodium   Date Value Ref Range Status   05/29/2023 141 136 - 145 mmol/L Final     Potassium   Date Value Ref Range Status   05/29/2023 4.2 3.5 - 5.1 mmol/L Final     Chloride   Date Value Ref Range Status   05/29/2023 103 95 - 110 mmol/L Final     CO2   Date Value Ref Range Status   05/29/2023 27 23 - 29 mmol/L Final     Glucose   Date Value Ref Range Status   05/29/2023 75 70 - 110 mg/dL Final     BUN   Date Value Ref Range Status   05/29/2023 15 8 - 23 mg/dL Final     Creatinine   Date Value Ref Range Status   05/29/2023 0.8 0.5 - 1.4 mg/dL Final     Calcium   Date Value Ref Range Status   05/29/2023 9.4 8.7 - 10.5 mg/dL Final     Total Protein   Date Value Ref Range Status   05/29/2023 7.3 6.0 - 8.4 g/dL Final     Albumin   Date Value Ref Range Status   05/29/2023 3.8 3.5 - 5.2 g/dL Final     Total Bilirubin   Date Value Ref Range Status   05/29/2023 0.9 0.1 - 1.0 mg/dL Final     Comment:     For infants and newborns, interpretation of results should be based  on gestational age, weight and in agreement with clinical  observations.    Premature Infant recommended reference ranges:  Up to 24 hours.............<8.0 mg/dL  Up to 48 hours............<12.0 mg/dL  3-5 days..................<15.0 mg/dL  6-29 days.................<15.0 mg/dL       Alkaline Phosphatase   Date Value Ref Range Status   05/29/2023 47 (L) 55 - 135 U/L Final     AST   Date Value Ref Range Status   05/29/2023 18 10 - 40 U/L Final     ALT   Date Value Ref Range Status   05/29/2023 15 10 - 44 U/L Final     Anion Gap   Date Value Ref Range Status   05/29/2023 11 8 - 16 mmol/L Final     eGFR if    Date Value Ref Range Status   02/16/2022 >60 >60 mL/min/1.73 m^2 Final     eGFR if non    Date Value Ref Range Status   02/16/2022 >60 >60 mL/min/1.73 m^2 Final     Comment:     Calculation used to obtain the  estimated glomerular filtration  rate (eGFR) is the CKD-EPI equation.        Lab Results   Component Value Date    WBC 8.62 05/29/2023    HGB 12.2 05/29/2023    HCT 38.2 05/29/2023    MCV 86 05/29/2023     05/29/2023     Lab Results   Component Value Date    CHOL 195 11/30/2022     Lab Results   Component Value Date    HDL 74 11/30/2022     Lab Results   Component Value Date    LDLCALC 110.6 11/30/2022     Lab Results   Component Value Date    TRIG 52 11/30/2022     Lab Results   Component Value Date    CHOLHDL 37.9 11/30/2022     Lab Results   Component Value Date    TSH 0.465 05/29/2023     Lab Results   Component Value Date    HGBA1C 5.7 (H) 05/29/2023     Assessment:       1. Chronic lumbar radiculopathy        Plan:   Chronic lumbar radiculopathy------------continue gabapentin, baclofen, P.T.---------------add tylenol.       On coumadin.           Declines f/u with pain clinic, neurosurgery consult.        Call if persists------------f/u as scheduled-

## 2023-08-29 DIAGNOSIS — L65.9 HAIR LOSS: ICD-10-CM

## 2023-08-29 RX ORDER — CLOBETASOL PROPIONATE 0.46 MG/ML
SOLUTION TOPICAL
Qty: 50 ML | Refills: 0 | Status: SHIPPED | OUTPATIENT
Start: 2023-08-29 | End: 2023-09-20

## 2023-09-01 ENCOUNTER — ANTI-COAG VISIT (OUTPATIENT)
Dept: CARDIOLOGY | Facility: CLINIC | Age: 73
End: 2023-09-01
Payer: MEDICARE

## 2023-09-01 DIAGNOSIS — Z79.01 ANTICOAGULATED ON COUMADIN: Primary | Chronic | ICD-10-CM

## 2023-09-01 DIAGNOSIS — D68.51 HETEROZYGOUS FACTOR V LEIDEN MUTATION: Chronic | ICD-10-CM

## 2023-09-01 LAB — INR PPP: 2.7 (ref 2–3)

## 2023-09-01 PROCEDURE — 93793 PR ANTICOAGULANT MGMT FOR PT TAKING WARFARIN: ICD-10-PCS | Mod: HCNC,S$GLB,,

## 2023-09-01 PROCEDURE — 93793 ANTICOAG MGMT PT WARFARIN: CPT | Mod: HCNC,S$GLB,,

## 2023-09-01 PROCEDURE — 85610 POCT INR: ICD-10-PCS | Mod: QW,HCNC,S$GLB, | Performed by: INTERNAL MEDICINE

## 2023-09-01 PROCEDURE — 85610 PROTHROMBIN TIME: CPT | Mod: QW,HCNC,S$GLB, | Performed by: INTERNAL MEDICINE

## 2023-09-01 NOTE — PROGRESS NOTES
INR is back therapeutic at 2.7.  Patient reports no recent changes.  Current warfarin dose verified - continue 5 mg every Monday, Friday; and 7.5 mg on all other days.  Follow up in 1 month.  Dose calendar given - confirms understanding.

## 2023-09-18 DIAGNOSIS — L65.9 HAIR LOSS: ICD-10-CM

## 2023-09-20 RX ORDER — CLOBETASOL PROPIONATE 0.46 MG/ML
SOLUTION TOPICAL
Qty: 50 ML | Refills: 0 | Status: SHIPPED | OUTPATIENT
Start: 2023-09-20 | End: 2023-10-17

## 2023-09-29 ENCOUNTER — ANTI-COAG VISIT (OUTPATIENT)
Dept: CARDIOLOGY | Facility: CLINIC | Age: 73
End: 2023-09-29
Payer: MEDICARE

## 2023-09-29 DIAGNOSIS — D68.51 HETEROZYGOUS FACTOR V LEIDEN MUTATION: Chronic | ICD-10-CM

## 2023-09-29 DIAGNOSIS — Z79.01 LONG TERM (CURRENT) USE OF ANTICOAGULANTS: ICD-10-CM

## 2023-09-29 DIAGNOSIS — Z79.01 ANTICOAGULATED ON COUMADIN: Primary | Chronic | ICD-10-CM

## 2023-09-29 LAB — INR PPP: 4.2 (ref 2–3)

## 2023-09-29 PROCEDURE — 85610 POCT INR: ICD-10-PCS | Mod: QW,HCNC,S$GLB, | Performed by: INTERNAL MEDICINE

## 2023-09-29 PROCEDURE — 85610 PROTHROMBIN TIME: CPT | Mod: QW,HCNC,S$GLB, | Performed by: INTERNAL MEDICINE

## 2023-09-29 PROCEDURE — 93793 PR ANTICOAGULANT MGMT FOR PT TAKING WARFARIN: ICD-10-PCS | Mod: HCNC,S$GLB,,

## 2023-09-29 PROCEDURE — 93793 ANTICOAG MGMT PT WARFARIN: CPT | Mod: HCNC,S$GLB,,

## 2023-09-29 RX ORDER — GLUCOSAMINE/CHONDRO SU A 500-400 MG
1 TABLET ORAL 3 TIMES DAILY
COMMUNITY

## 2023-09-29 RX ORDER — WARFARIN SODIUM 5 MG/1
TABLET ORAL
Qty: 115 TABLET | Refills: 3 | Status: SHIPPED | OUTPATIENT
Start: 2023-09-30

## 2023-09-29 NOTE — PROGRESS NOTES
Patient's INR is supratherapeutic at 4.2.  Started Started Glucosamine/Chrondrontin on Saturday, 9/23/2023 for bilateral hip pain.  No bleeding issues reported.  Confirms current warfarin dose.  Bleeding precautions given - ED for any abnormal bleeding issues.  Will send to PharmD for dosing instructions.

## 2023-09-29 NOTE — PROGRESS NOTES
INR not at goal. Medications, chart, and patient findings reviewed. See calendar for adjustments to dose and follow up plan.  Documented DDI with GSC supplement.  We will need to lower her dose and follow up frequently until stable.  Refill sent to [facility].

## 2023-10-06 ENCOUNTER — ANTI-COAG VISIT (OUTPATIENT)
Dept: CARDIOLOGY | Facility: CLINIC | Age: 73
End: 2023-10-06
Payer: MEDICARE

## 2023-10-06 DIAGNOSIS — D68.51 HETEROZYGOUS FACTOR V LEIDEN MUTATION: Chronic | ICD-10-CM

## 2023-10-06 DIAGNOSIS — Z79.01 ANTICOAGULATED ON COUMADIN: Primary | Chronic | ICD-10-CM

## 2023-10-06 LAB — INR PPP: 4.9 (ref 2–3)

## 2023-10-06 PROCEDURE — 93793 PR ANTICOAGULANT MGMT FOR PT TAKING WARFARIN: ICD-10-PCS | Mod: HCNC,S$GLB,,

## 2023-10-06 PROCEDURE — 85610 PROTHROMBIN TIME: CPT | Mod: QW,HCNC,S$GLB, | Performed by: INTERNAL MEDICINE

## 2023-10-06 PROCEDURE — 85610 POCT INR: ICD-10-PCS | Mod: QW,HCNC,S$GLB, | Performed by: INTERNAL MEDICINE

## 2023-10-06 PROCEDURE — 93793 ANTICOAG MGMT PT WARFARIN: CPT | Mod: HCNC,S$GLB,,

## 2023-10-06 NOTE — PROGRESS NOTES
INR not at goal. Medications, chart, and patient findings reviewed. See calendar for adjustments to dose and follow up plan.  Hold x 2 doses and lower dose - may still be interacting with glucosamine/chondroitin.  Repeat INR Wednesday.

## 2023-10-06 NOTE — PROGRESS NOTES
INR is has climbed to 4.9 - supratherapeutic.  Warfarin was decreased on last visit due to GSC - DDI.  Also, patient reports 2 doses of GSC was missed.  No bleeding episodes reported.  Bleeding precaution remains in place.  Will send to PharmD for further dosing instructions.

## 2023-10-11 ENCOUNTER — ANTI-COAG VISIT (OUTPATIENT)
Dept: CARDIOLOGY | Facility: CLINIC | Age: 73
End: 2023-10-11
Payer: MEDICARE

## 2023-10-11 DIAGNOSIS — D68.51 HETEROZYGOUS FACTOR V LEIDEN MUTATION: Chronic | ICD-10-CM

## 2023-10-11 DIAGNOSIS — Z79.01 ANTICOAGULATED ON COUMADIN: Primary | Chronic | ICD-10-CM

## 2023-10-11 LAB — INR PPP: 1.9 (ref 2–3)

## 2023-10-11 PROCEDURE — 93793 PR ANTICOAGULANT MGMT FOR PT TAKING WARFARIN: ICD-10-PCS | Mod: HCNC,S$GLB,,

## 2023-10-11 PROCEDURE — 85610 PROTHROMBIN TIME: CPT | Mod: QW,HCNC,S$GLB, | Performed by: INTERNAL MEDICINE

## 2023-10-11 PROCEDURE — 93793 ANTICOAG MGMT PT WARFARIN: CPT | Mod: HCNC,S$GLB,,

## 2023-10-11 PROCEDURE — 85610 POCT INR: ICD-10-PCS | Mod: QW,HCNC,S$GLB, | Performed by: INTERNAL MEDICINE

## 2023-10-11 NOTE — PROGRESS NOTES
INR is just below goal at 1.9.  Previous warfarin instructions were verified and followed.  No other changes reported.  We will re-challenge current new dose of 7.5 mg every Tues, Thurs, Sat; and 5 mg on all other days.  Recheck INR in 1 week.  Dose calendar given and reviewed with patient - confirms understanding.

## 2023-10-16 DIAGNOSIS — L65.9 HAIR LOSS: ICD-10-CM

## 2023-10-17 RX ORDER — CLOBETASOL PROPIONATE 0.46 MG/ML
SOLUTION TOPICAL
Qty: 50 ML | Refills: 0 | Status: SHIPPED | OUTPATIENT
Start: 2023-10-17 | End: 2023-11-21

## 2023-10-20 ENCOUNTER — ANTI-COAG VISIT (OUTPATIENT)
Dept: CARDIOLOGY | Facility: CLINIC | Age: 73
End: 2023-10-20
Payer: MEDICARE

## 2023-10-20 DIAGNOSIS — D68.51 HETEROZYGOUS FACTOR V LEIDEN MUTATION: Chronic | ICD-10-CM

## 2023-10-20 DIAGNOSIS — Z79.01 ANTICOAGULATED ON COUMADIN: Primary | Chronic | ICD-10-CM

## 2023-10-20 LAB — INR PPP: 3.5 (ref 2–3)

## 2023-10-20 PROCEDURE — 85610 POCT INR: ICD-10-PCS | Mod: QW,HCNC,S$GLB, | Performed by: INTERNAL MEDICINE

## 2023-10-20 PROCEDURE — 93793 PR ANTICOAGULANT MGMT FOR PT TAKING WARFARIN: ICD-10-PCS | Mod: HCNC,S$GLB,,

## 2023-10-20 PROCEDURE — 93793 ANTICOAG MGMT PT WARFARIN: CPT | Mod: HCNC,S$GLB,,

## 2023-10-20 PROCEDURE — 85610 PROTHROMBIN TIME: CPT | Mod: QW,HCNC,S$GLB, | Performed by: INTERNAL MEDICINE

## 2023-10-20 NOTE — PROGRESS NOTES
INR has climbed back above goal at 3.5.  Previous warfarin instructions were verified and followed.  No bleeding issues or change in medication reported.  Instructed to hold warfarin dose today, then will decrease dose again to 7.5 mg every Thurs, Sat; and 5 mg on all other days.  Bleeding precautions in place - ED for any abnormal bleeding.  Will recheck INR in 2 weeks.  Dose calendar given and reviewed with patient.  Patient confirms understanding of instructions given.

## 2023-11-02 ENCOUNTER — OFFICE VISIT (OUTPATIENT)
Dept: FAMILY MEDICINE | Facility: CLINIC | Age: 73
End: 2023-11-02
Payer: MEDICARE

## 2023-11-02 VITALS
OXYGEN SATURATION: 100 % | RESPIRATION RATE: 16 BRPM | TEMPERATURE: 99 F | WEIGHT: 171.75 LBS | DIASTOLIC BLOOD PRESSURE: 82 MMHG | HEART RATE: 63 BPM | BODY MASS INDEX: 28.58 KG/M2 | SYSTOLIC BLOOD PRESSURE: 122 MMHG

## 2023-11-02 DIAGNOSIS — R09.81 SINUS CONGESTION: Primary | ICD-10-CM

## 2023-11-02 PROCEDURE — 1125F PR PAIN SEVERITY QUANTIFIED, PAIN PRESENT: ICD-10-PCS | Mod: HCNC,CPTII,S$GLB, | Performed by: INTERNAL MEDICINE

## 2023-11-02 PROCEDURE — 3072F LOW RISK FOR RETINOPATHY: CPT | Mod: HCNC,CPTII,S$GLB, | Performed by: INTERNAL MEDICINE

## 2023-11-02 PROCEDURE — 1159F MED LIST DOCD IN RCRD: CPT | Mod: HCNC,CPTII,S$GLB, | Performed by: INTERNAL MEDICINE

## 2023-11-02 PROCEDURE — 3044F HG A1C LEVEL LT 7.0%: CPT | Mod: HCNC,CPTII,S$GLB, | Performed by: INTERNAL MEDICINE

## 2023-11-02 PROCEDURE — 3079F DIAST BP 80-89 MM HG: CPT | Mod: HCNC,CPTII,S$GLB, | Performed by: INTERNAL MEDICINE

## 2023-11-02 PROCEDURE — 3072F PR LOW RISK FOR RETINOPATHY: ICD-10-PCS | Mod: HCNC,CPTII,S$GLB, | Performed by: INTERNAL MEDICINE

## 2023-11-02 PROCEDURE — 99213 OFFICE O/P EST LOW 20 MIN: CPT | Mod: HCNC,S$GLB,, | Performed by: INTERNAL MEDICINE

## 2023-11-02 PROCEDURE — 3008F PR BODY MASS INDEX (BMI) DOCUMENTED: ICD-10-PCS | Mod: HCNC,CPTII,S$GLB, | Performed by: INTERNAL MEDICINE

## 2023-11-02 PROCEDURE — 3079F PR MOST RECENT DIASTOLIC BLOOD PRESSURE 80-89 MM HG: ICD-10-PCS | Mod: HCNC,CPTII,S$GLB, | Performed by: INTERNAL MEDICINE

## 2023-11-02 PROCEDURE — 3044F PR MOST RECENT HEMOGLOBIN A1C LEVEL <7.0%: ICD-10-PCS | Mod: HCNC,CPTII,S$GLB, | Performed by: INTERNAL MEDICINE

## 2023-11-02 PROCEDURE — 99213 PR OFFICE/OUTPT VISIT, EST, LEVL III, 20-29 MIN: ICD-10-PCS | Mod: HCNC,S$GLB,, | Performed by: INTERNAL MEDICINE

## 2023-11-02 PROCEDURE — 1125F AMNT PAIN NOTED PAIN PRSNT: CPT | Mod: HCNC,CPTII,S$GLB, | Performed by: INTERNAL MEDICINE

## 2023-11-02 PROCEDURE — 1101F PR PT FALLS ASSESS DOC 0-1 FALLS W/OUT INJ PAST YR: ICD-10-PCS | Mod: HCNC,CPTII,S$GLB, | Performed by: INTERNAL MEDICINE

## 2023-11-02 PROCEDURE — 3074F SYST BP LT 130 MM HG: CPT | Mod: HCNC,CPTII,S$GLB, | Performed by: INTERNAL MEDICINE

## 2023-11-02 PROCEDURE — 99999 PR PBB SHADOW E&M-EST. PATIENT-LVL V: CPT | Mod: PBBFAC,HCNC,, | Performed by: INTERNAL MEDICINE

## 2023-11-02 PROCEDURE — 3074F PR MOST RECENT SYSTOLIC BLOOD PRESSURE < 130 MM HG: ICD-10-PCS | Mod: HCNC,CPTII,S$GLB, | Performed by: INTERNAL MEDICINE

## 2023-11-02 PROCEDURE — 99999 PR PBB SHADOW E&M-EST. PATIENT-LVL V: ICD-10-PCS | Mod: PBBFAC,HCNC,, | Performed by: INTERNAL MEDICINE

## 2023-11-02 PROCEDURE — 3288F PR FALLS RISK ASSESSMENT DOCUMENTED: ICD-10-PCS | Mod: HCNC,CPTII,S$GLB, | Performed by: INTERNAL MEDICINE

## 2023-11-02 PROCEDURE — 3288F FALL RISK ASSESSMENT DOCD: CPT | Mod: HCNC,CPTII,S$GLB, | Performed by: INTERNAL MEDICINE

## 2023-11-02 PROCEDURE — 1159F PR MEDICATION LIST DOCUMENTED IN MEDICAL RECORD: ICD-10-PCS | Mod: HCNC,CPTII,S$GLB, | Performed by: INTERNAL MEDICINE

## 2023-11-02 PROCEDURE — 3008F BODY MASS INDEX DOCD: CPT | Mod: HCNC,CPTII,S$GLB, | Performed by: INTERNAL MEDICINE

## 2023-11-02 PROCEDURE — 1101F PT FALLS ASSESS-DOCD LE1/YR: CPT | Mod: HCNC,CPTII,S$GLB, | Performed by: INTERNAL MEDICINE

## 2023-11-02 RX ORDER — AMOXICILLIN 250 MG/5ML
250 POWDER, FOR SUSPENSION ORAL 3 TIMES DAILY
Qty: 150 ML | Refills: 0 | Status: SHIPPED | OUTPATIENT
Start: 2023-11-02 | End: 2023-11-12

## 2023-11-02 NOTE — PROGRESS NOTES
Subjective:       Patient ID: Alyx Weir is a 73 y.o. female.    Chief Complaint: Sinus Problem and Sore Throat    2 days sinus drip with scratchy throat--------throat is better today----------no fever or chills, no cough,  no chest congestion, no chest pain or SOB----------    Sinus Problem  Associated symptoms include a sore throat. Pertinent negatives include no chills, coughing or shortness of breath.   Sore Throat   Pertinent negatives include no abdominal pain, coughing, shortness of breath, stridor or vomiting.     Past Medical History:   Diagnosis Date    Anticoagulated on Coumadin     Arm weakness-rotator cuff weakness 11/2/2015    Arthritis     Asthma     Clotting disorder     Coronary artery disease     Deep vein thrombosis     Degenerative disc disease     Diabetes mellitus type I 2011    BS last tested x 1 month not sure what it was.    Heterozygous factor V Leiden mutation     Hx of colonic polyps 11/13/2015    Hyperlipidemia     Hypertension     VIRGIL (obstructive sleep apnea)     Stenosis of right carotid artery 12/13/2016     Past Surgical History:   Procedure Laterality Date    BACK SURGERY      bladder cyst removal      BLADDER SURGERY      CARDIAC CATHETERIZATION  03/2013    mild CAD    COLONOSCOPY N/A 11/13/2015    Procedure: COLONOSCOPY;  Surgeon: Jas Umanzor III, MD;  Location: [facility];  Service: Endoscopy;  Laterality: N/A;    HYSTERECTOMY      28 yrs old    LUMBAR SPINE SURGERY      bone spurs removed    OOPHORECTOMY      SELECTIVE INJECTION OF ANESTHETIC AGENT AROUND LUMBAR SPINAL NERVE ROOT BY TRANSFORAMINAL APPROACH Bilateral 6/3/2022    Procedure: Bilateral L4/5 TF ASUNCION with RN IV sedation; on Coumadin, will need INR prior to procedure, must be less than 1.3;  Surgeon: Mario Palacios MD;  Location: [facility];  Service: Pain Management;  Laterality: Bilateral;     Family History   Problem Relation Age of Onset    Heart disease Mother     Hypertension Mother     Cataracts Mother      Hypertension Sister     Glaucoma Sister     Hypertension Brother     Diabetes Father     Diabetes Paternal Uncle     Hypertension Sister     Diabetes Sister     Hypertension Sister     Diabetes Sister     Breast cancer Neg Hx     Colon cancer Neg Hx     Ovarian cancer Neg Hx      Social History     Socioeconomic History    Marital status:    Tobacco Use    Smoking status: Never    Smokeless tobacco: Never   Substance and Sexual Activity    Alcohol use: No    Drug use: No    Sexual activity: Not Currently     Partners: Male     Birth control/protection: None   Social History Narrative    Dogs in household, no smokers.     Social Determinants of Health     Financial Resource Strain: High Risk (11/21/2022)    Overall Financial Resource Strain (CARDIA)     Difficulty of Paying Living Expenses: Hard   Food Insecurity: No Food Insecurity (11/21/2022)    Hunger Vital Sign     Worried About Running Out of Food in the Last Year: Never true     Ran Out of Food in the Last Year: Never true   Transportation Needs: No Transportation Needs (11/21/2022)    PRAPARE - Transportation     Lack of Transportation (Medical): No     Lack of Transportation (Non-Medical): No   Physical Activity: Sufficiently Active (11/21/2022)    Exercise Vital Sign     Days of Exercise per Week: 4 days     Minutes of Exercise per Session: 60 min   Stress: Stress Concern Present (11/21/2022)    [place] [employer] - Occupational Stress Questionnaire     Feeling of Stress : To some extent   Social Connections: Moderately Isolated (11/21/2022)    Social Connection and Isolation Panel [NHANES]     Frequency of Communication with Friends and Family: More than three times a week     Frequency of Social Gatherings with Friends and Family: Three times a week     Attends Cheondoism Services: More than 4 times per year     Active Member of Clubs or Organizations: No     Attends Club or Organization Meetings: Never     Marital Status:     Housing Stability: Low Risk  (11/21/2022)    Housing Stability Vital Sign     Unable to Pay for Housing in the Last Year: No     Number of Places Lived in the Last Year: 1     Unstable Housing in the Last Year: No     Review of Systems   Constitutional:  Negative for chills and fever.   HENT:  Positive for postnasal drip and sore throat.    Respiratory:  Negative for apnea, cough, choking, chest tightness, shortness of breath, wheezing and stridor.    Cardiovascular:  Negative for chest pain and palpitations.   Gastrointestinal:  Negative for abdominal pain, nausea and vomiting.   Musculoskeletal:  Negative for arthralgias and myalgias.   Neurological:  Negative for dizziness and weakness.   Psychiatric/Behavioral:  Negative for agitation, behavioral problems and confusion.        Objective:      Physical Exam  Vitals and nursing note reviewed.   Constitutional:       General: She is not in acute distress.     Appearance: Normal appearance. She is well-developed. She is not diaphoretic.   HENT:      Head: Normocephalic and atraumatic.      Nose: No congestion or rhinorrhea.      Mouth/Throat:      Pharynx: No oropharyngeal exudate or posterior oropharyngeal erythema.   Eyes:      General: No scleral icterus.  Neck:      Thyroid: No thyromegaly.      Vascular: No carotid bruit or JVD.      Trachea: No tracheal deviation.   Cardiovascular:      Rate and Rhythm: Normal rate and regular rhythm.      Heart sounds: Normal heart sounds.   Pulmonary:      Effort: Pulmonary effort is normal. No respiratory distress.      Breath sounds: Normal breath sounds. No wheezing or rales.   Chest:      Chest wall: No tenderness.   Abdominal:      General: Bowel sounds are normal. There is no distension.      Palpations: Abdomen is soft.      Tenderness: There is no abdominal tenderness. There is no guarding or rebound.   Musculoskeletal:         General: No tenderness. Normal range of motion.      Cervical back: Normal range  of motion and neck supple.   Lymphadenopathy:      Cervical: No cervical adenopathy.   Skin:     General: Skin is warm and dry.      Coloration: Skin is not pale.      Findings: No erythema or rash.   Neurological:      Mental Status: She is alert and oriented to person, place, and time.   Psychiatric:         Behavior: Behavior normal.         Thought Content: Thought content normal.         Judgment: Judgment normal.         CMP  Sodium   Date Value Ref Range Status   05/29/2023 141 136 - 145 mmol/L Final     Potassium   Date Value Ref Range Status   05/29/2023 4.2 3.5 - 5.1 mmol/L Final     Chloride   Date Value Ref Range Status   05/29/2023 103 95 - 110 mmol/L Final     CO2   Date Value Ref Range Status   05/29/2023 27 23 - 29 mmol/L Final     Glucose   Date Value Ref Range Status   05/29/2023 75 70 - 110 mg/dL Final     BUN   Date Value Ref Range Status   05/29/2023 15 8 - 23 mg/dL Final     Creatinine   Date Value Ref Range Status   05/29/2023 0.8 0.5 - 1.4 mg/dL Final     Calcium   Date Value Ref Range Status   05/29/2023 9.4 8.7 - 10.5 mg/dL Final     Total Protein   Date Value Ref Range Status   05/29/2023 7.3 6.0 - 8.4 g/dL Final     Albumin   Date Value Ref Range Status   05/29/2023 3.8 3.5 - 5.2 g/dL Final     Total Bilirubin   Date Value Ref Range Status   05/29/2023 0.9 0.1 - 1.0 mg/dL Final     Comment:     For infants and newborns, interpretation of results should be based  on gestational age, weight and in agreement with clinical  observations.    Premature Infant recommended reference ranges:  Up to 24 hours.............<8.0 mg/dL  Up to 48 hours............<12.0 mg/dL  3-5 days..................<15.0 mg/dL  6-29 days.................<15.0 mg/dL       Alkaline Phosphatase   Date Value Ref Range Status   05/29/2023 47 (L) 55 - 135 U/L Final     AST   Date Value Ref Range Status   05/29/2023 18 10 - 40 U/L Final     ALT   Date Value Ref Range Status   05/29/2023 15 10 - 44 U/L Final     Anion Gap    Date Value Ref Range Status   05/29/2023 11 8 - 16 mmol/L Final     eGFR if    Date Value Ref Range Status   02/16/2022 >60 >60 mL/min/1.73 m^2 Final     eGFR if non    Date Value Ref Range Status   02/16/2022 >60 >60 mL/min/1.73 m^2 Final     Comment:     Calculation used to obtain the estimated glomerular filtration  rate (eGFR) is the CKD-EPI equation.        Lab Results   Component Value Date    WBC 8.62 05/29/2023    HGB 12.2 05/29/2023    HCT 38.2 05/29/2023    MCV 86 05/29/2023     05/29/2023     Lab Results   Component Value Date    CHOL 195 11/30/2022     Lab Results   Component Value Date    HDL 74 11/30/2022     Lab Results   Component Value Date    LDLCALC 110.6 11/30/2022     Lab Results   Component Value Date    TRIG 52 11/30/2022     Lab Results   Component Value Date    CHOLHDL 37.9 11/30/2022     Lab Results   Component Value Date    TSH 0.465 05/29/2023     Lab Results   Component Value Date    HGBA1C 5.7 (H) 05/29/2023     Assessment:       1. Sinus congestion        Plan:   Sinus congestion    Other orders  -     amoxicillin (AMOXIL) 250 mg/5 mL suspension; Take 5 mLs (250 mg total) by mouth 3 (three) times daily. for 10 days  Dispense: 150 mL; Refill: 0      Tylenol, cough med if needed, fluids, rest---------------go to er for worsening symptoms--------

## 2023-11-06 ENCOUNTER — ANTI-COAG VISIT (OUTPATIENT)
Dept: CARDIOLOGY | Facility: CLINIC | Age: 73
End: 2023-11-06
Payer: MEDICARE

## 2023-11-06 DIAGNOSIS — Z79.01 ANTICOAGULATED ON COUMADIN: Primary | Chronic | ICD-10-CM

## 2023-11-06 DIAGNOSIS — D68.51 HETEROZYGOUS FACTOR V LEIDEN MUTATION: Chronic | ICD-10-CM

## 2023-11-06 LAB — INR PPP: 1.8 (ref 2–3)

## 2023-11-06 PROCEDURE — 85610 PROTHROMBIN TIME: CPT | Mod: QW,HCNC,S$GLB, | Performed by: INTERNAL MEDICINE

## 2023-11-06 PROCEDURE — 85610 POCT INR: ICD-10-PCS | Mod: QW,HCNC,S$GLB, | Performed by: INTERNAL MEDICINE

## 2023-11-06 PROCEDURE — 93793 PR ANTICOAGULANT MGMT FOR PT TAKING WARFARIN: ICD-10-PCS | Mod: HCNC,S$GLB,,

## 2023-11-06 PROCEDURE — 93793 ANTICOAG MGMT PT WARFARIN: CPT | Mod: HCNC,S$GLB,,

## 2023-11-06 NOTE — PROGRESS NOTES
INR is slightly below goal at 1.8.  Previous warfarin instructions were verified and followed.  We will re-challenge newly decreased dose to verify INR.  No s/s reported.  Recheck INR in 1.5 weeks.  Dose calendar given and reviewed with patient.  Patient confirms understanding of instructions given.

## 2023-11-15 DIAGNOSIS — L65.9 HAIR LOSS: ICD-10-CM

## 2023-11-17 ENCOUNTER — ANTI-COAG VISIT (OUTPATIENT)
Dept: CARDIOLOGY | Facility: CLINIC | Age: 73
End: 2023-11-17
Payer: MEDICARE

## 2023-11-17 DIAGNOSIS — Z79.01 ANTICOAGULATED ON COUMADIN: Primary | Chronic | ICD-10-CM

## 2023-11-17 DIAGNOSIS — D68.51 HETEROZYGOUS FACTOR V LEIDEN MUTATION: Chronic | ICD-10-CM

## 2023-11-17 LAB — INR PPP: 1.7 (ref 2–3)

## 2023-11-17 PROCEDURE — 93793 ANTICOAG MGMT PT WARFARIN: CPT | Mod: HCNC,S$GLB,,

## 2023-11-17 PROCEDURE — 93793 PR ANTICOAGULANT MGMT FOR PT TAKING WARFARIN: ICD-10-PCS | Mod: HCNC,S$GLB,,

## 2023-11-17 PROCEDURE — 85610 POCT INR: ICD-10-PCS | Mod: QW,HCNC,S$GLB, | Performed by: INTERNAL MEDICINE

## 2023-11-17 PROCEDURE — 85610 PROTHROMBIN TIME: CPT | Mod: QW,HCNC,S$GLB, | Performed by: INTERNAL MEDICINE

## 2023-11-17 NOTE — PROGRESS NOTES
INR not at goal. Medications, chart, and patient findings reviewed. See calendar for adjustments to dose and follow up plan.  INR has declined slightly, we will boost this visit as we did not attempt a boost last week as her INRs had been elevated.  Repeat INR in 1.5 weeks.

## 2023-11-17 NOTE — PROGRESS NOTES
INR is drifting to 1.7 (subtherapeutic) with the newly decrease dose.  Patient reports no missed doses or dietary changes.  [facility] 1x per week as normal.  No s/s reported.  Current warfarin dose confirmed.  Will send to PharmD for review of dosing.

## 2023-11-21 RX ORDER — CLOBETASOL PROPIONATE 0.46 MG/ML
SOLUTION TOPICAL
Qty: 50 ML | Refills: 0 | Status: SHIPPED | OUTPATIENT
Start: 2023-11-21

## 2023-11-29 ENCOUNTER — OFFICE VISIT (OUTPATIENT)
Dept: FAMILY MEDICINE | Facility: CLINIC | Age: 73
End: 2023-11-29
Payer: MEDICARE

## 2023-11-29 ENCOUNTER — LAB VISIT (OUTPATIENT)
Dept: LAB | Facility: HOSPITAL | Age: 73
End: 2023-11-29
Attending: INTERNAL MEDICINE
Payer: MEDICARE

## 2023-11-29 VITALS
OXYGEN SATURATION: 98 % | DIASTOLIC BLOOD PRESSURE: 80 MMHG | SYSTOLIC BLOOD PRESSURE: 126 MMHG | HEART RATE: 63 BPM | RESPIRATION RATE: 18 BRPM | WEIGHT: 175.06 LBS | BODY MASS INDEX: 29.13 KG/M2 | TEMPERATURE: 98 F

## 2023-11-29 DIAGNOSIS — E11.9 TYPE 2 DIABETES MELLITUS WITHOUT COMPLICATION, WITHOUT LONG-TERM CURRENT USE OF INSULIN: ICD-10-CM

## 2023-11-29 DIAGNOSIS — I51.89 LEFT VENTRICULAR DIASTOLIC DYSFUNCTION WITH PRESERVED SYSTOLIC FUNCTION: Chronic | ICD-10-CM

## 2023-11-29 DIAGNOSIS — D68.51 HETEROZYGOUS FACTOR V LEIDEN MUTATION: Chronic | ICD-10-CM

## 2023-11-29 DIAGNOSIS — Z86.718 HISTORY OF DVT (DEEP VEIN THROMBOSIS): ICD-10-CM

## 2023-11-29 DIAGNOSIS — D68.51 HETEROZYGOUS FACTOR V LEIDEN MUTATION: Primary | Chronic | ICD-10-CM

## 2023-11-29 DIAGNOSIS — E11.49 TYPE II DIABETES MELLITUS WITH NEUROLOGICAL MANIFESTATIONS: ICD-10-CM

## 2023-11-29 DIAGNOSIS — E78.00 PURE HYPERCHOLESTEROLEMIA WITH TARGET LOW DENSITY LIPOPROTEIN (LDL) CHOLESTEROL LESS THAN 130 MG/DL: Chronic | ICD-10-CM

## 2023-11-29 DIAGNOSIS — Z12.31 ENCOUNTER FOR SCREENING MAMMOGRAM FOR BREAST CANCER: ICD-10-CM

## 2023-11-29 DIAGNOSIS — M54.16 CHRONIC LUMBAR RADICULOPATHY: ICD-10-CM

## 2023-11-29 DIAGNOSIS — Z79.01 ANTICOAGULATED ON COUMADIN: Chronic | ICD-10-CM

## 2023-11-29 DIAGNOSIS — I10 ESSENTIAL HYPERTENSION: Chronic | ICD-10-CM

## 2023-11-29 DIAGNOSIS — Z00.00 ENCOUNTER FOR PREVENTIVE HEALTH EXAMINATION: Primary | ICD-10-CM

## 2023-11-29 DIAGNOSIS — Z00.00 ENCOUNTER FOR MEDICARE ANNUAL WELLNESS EXAM: Chronic | ICD-10-CM

## 2023-11-29 DIAGNOSIS — R01.1 MURMUR, CARDIAC: ICD-10-CM

## 2023-11-29 DIAGNOSIS — J44.9 CHRONIC OBSTRUCTIVE PULMONARY DISEASE, UNSPECIFIED COPD TYPE: ICD-10-CM

## 2023-11-29 DIAGNOSIS — J44.9 CHRONIC OBSTRUCTIVE PULMONARY DISEASE, UNSPECIFIED COPD TYPE: Chronic | ICD-10-CM

## 2023-11-29 DIAGNOSIS — Z23 NEED FOR PNEUMOCOCCAL 20-VALENT CONJUGATE VACCINATION: ICD-10-CM

## 2023-11-29 DIAGNOSIS — G47.33 OSA ON CPAP: ICD-10-CM

## 2023-11-29 LAB
INR PPP: 1.5 (ref 0.8–1.2)
PROTHROMBIN TIME: 16.1 SEC (ref 9–12.5)

## 2023-11-29 PROCEDURE — 1101F PT FALLS ASSESS-DOCD LE1/YR: CPT | Mod: HCNC,CPTII,S$GLB, | Performed by: NURSE PRACTITIONER

## 2023-11-29 PROCEDURE — 1101F PR PT FALLS ASSESS DOC 0-1 FALLS W/OUT INJ PAST YR: ICD-10-PCS | Mod: HCNC,CPTII,S$GLB, | Performed by: NURSE PRACTITIONER

## 2023-11-29 PROCEDURE — 3074F SYST BP LT 130 MM HG: CPT | Mod: HCNC,CPTII,S$GLB, | Performed by: INTERNAL MEDICINE

## 2023-11-29 PROCEDURE — 3044F HG A1C LEVEL LT 7.0%: CPT | Mod: HCNC,CPTII,S$GLB, | Performed by: INTERNAL MEDICINE

## 2023-11-29 PROCEDURE — G0439 PPPS, SUBSEQ VISIT: HCPCS | Mod: HCNC,S$GLB,, | Performed by: NURSE PRACTITIONER

## 2023-11-29 PROCEDURE — 3008F PR BODY MASS INDEX (BMI) DOCUMENTED: ICD-10-PCS | Mod: HCNC,CPTII,S$GLB, | Performed by: INTERNAL MEDICINE

## 2023-11-29 PROCEDURE — 3044F PR MOST RECENT HEMOGLOBIN A1C LEVEL <7.0%: ICD-10-PCS | Mod: HCNC,CPTII,S$GLB, | Performed by: NURSE PRACTITIONER

## 2023-11-29 PROCEDURE — 3074F PR MOST RECENT SYSTOLIC BLOOD PRESSURE < 130 MM HG: ICD-10-PCS | Mod: HCNC,CPTII,S$GLB, | Performed by: INTERNAL MEDICINE

## 2023-11-29 PROCEDURE — 3288F PR FALLS RISK ASSESSMENT DOCUMENTED: ICD-10-PCS | Mod: HCNC,CPTII,S$GLB, | Performed by: NURSE PRACTITIONER

## 2023-11-29 PROCEDURE — 3079F DIAST BP 80-89 MM HG: CPT | Mod: HCNC,CPTII,S$GLB, | Performed by: INTERNAL MEDICINE

## 2023-11-29 PROCEDURE — 1159F MED LIST DOCD IN RCRD: CPT | Mod: HCNC,CPTII,S$GLB, | Performed by: NURSE PRACTITIONER

## 2023-11-29 PROCEDURE — 3079F DIAST BP 80-89 MM HG: CPT | Mod: HCNC,CPTII,S$GLB, | Performed by: NURSE PRACTITIONER

## 2023-11-29 PROCEDURE — 1126F AMNT PAIN NOTED NONE PRSNT: CPT | Mod: HCNC,CPTII,S$GLB, | Performed by: NURSE PRACTITIONER

## 2023-11-29 PROCEDURE — 99214 PR OFFICE/OUTPT VISIT, EST, LEVL IV, 30-39 MIN: ICD-10-PCS | Mod: HCNC,S$GLB,, | Performed by: INTERNAL MEDICINE

## 2023-11-29 PROCEDURE — 3079F PR MOST RECENT DIASTOLIC BLOOD PRESSURE 80-89 MM HG: ICD-10-PCS | Mod: HCNC,CPTII,S$GLB, | Performed by: INTERNAL MEDICINE

## 2023-11-29 PROCEDURE — 99999 PR PBB SHADOW E&M-EST. PATIENT-LVL V: ICD-10-PCS | Mod: PBBFAC,HCNC,, | Performed by: INTERNAL MEDICINE

## 2023-11-29 PROCEDURE — 36415 COLL VENOUS BLD VENIPUNCTURE: CPT | Mod: HCNC,PO | Performed by: INTERNAL MEDICINE

## 2023-11-29 PROCEDURE — 1159F PR MEDICATION LIST DOCUMENTED IN MEDICAL RECORD: ICD-10-PCS | Mod: HCNC,CPTII,S$GLB, | Performed by: INTERNAL MEDICINE

## 2023-11-29 PROCEDURE — 3074F PR MOST RECENT SYSTOLIC BLOOD PRESSURE < 130 MM HG: ICD-10-PCS | Mod: HCNC,CPTII,S$GLB, | Performed by: NURSE PRACTITIONER

## 2023-11-29 PROCEDURE — 3072F PR LOW RISK FOR RETINOPATHY: ICD-10-PCS | Mod: HCNC,CPTII,S$GLB, | Performed by: NURSE PRACTITIONER

## 2023-11-29 PROCEDURE — 99999 PR PBB SHADOW E&M-EST. PATIENT-LVL V: CPT | Mod: PBBFAC,HCNC,, | Performed by: NURSE PRACTITIONER

## 2023-11-29 PROCEDURE — 3044F HG A1C LEVEL LT 7.0%: CPT | Mod: HCNC,CPTII,S$GLB, | Performed by: NURSE PRACTITIONER

## 2023-11-29 PROCEDURE — 1125F PR PAIN SEVERITY QUANTIFIED, PAIN PRESENT: ICD-10-PCS | Mod: HCNC,CPTII,S$GLB, | Performed by: INTERNAL MEDICINE

## 2023-11-29 PROCEDURE — 1125F AMNT PAIN NOTED PAIN PRSNT: CPT | Mod: HCNC,CPTII,S$GLB, | Performed by: INTERNAL MEDICINE

## 2023-11-29 PROCEDURE — 3079F PR MOST RECENT DIASTOLIC BLOOD PRESSURE 80-89 MM HG: ICD-10-PCS | Mod: HCNC,CPTII,S$GLB, | Performed by: NURSE PRACTITIONER

## 2023-11-29 PROCEDURE — 3072F LOW RISK FOR RETINOPATHY: CPT | Mod: HCNC,CPTII,S$GLB, | Performed by: INTERNAL MEDICINE

## 2023-11-29 PROCEDURE — 3072F PR LOW RISK FOR RETINOPATHY: ICD-10-PCS | Mod: HCNC,CPTII,S$GLB, | Performed by: INTERNAL MEDICINE

## 2023-11-29 PROCEDURE — 99214 OFFICE O/P EST MOD 30 MIN: CPT | Mod: HCNC,S$GLB,, | Performed by: INTERNAL MEDICINE

## 2023-11-29 PROCEDURE — G0439 PR MEDICARE ANNUAL WELLNESS SUBSEQUENT VISIT: ICD-10-PCS | Mod: HCNC,S$GLB,, | Performed by: NURSE PRACTITIONER

## 2023-11-29 PROCEDURE — 1159F PR MEDICATION LIST DOCUMENTED IN MEDICAL RECORD: ICD-10-PCS | Mod: HCNC,CPTII,S$GLB, | Performed by: NURSE PRACTITIONER

## 2023-11-29 PROCEDURE — 85610 PROTHROMBIN TIME: CPT | Mod: HCNC | Performed by: INTERNAL MEDICINE

## 2023-11-29 PROCEDURE — 3288F FALL RISK ASSESSMENT DOCD: CPT | Mod: HCNC,CPTII,S$GLB, | Performed by: NURSE PRACTITIONER

## 2023-11-29 PROCEDURE — 1160F PR REVIEW ALL MEDS BY PRESCRIBER/CLIN PHARMACIST DOCUMENTED: ICD-10-PCS | Mod: HCNC,CPTII,S$GLB, | Performed by: NURSE PRACTITIONER

## 2023-11-29 PROCEDURE — 90677 PNEUMOCOCCAL CONJUGATE VACCINE 20-VALENT: ICD-10-PCS | Mod: HCNC,S$GLB,, | Performed by: NURSE PRACTITIONER

## 2023-11-29 PROCEDURE — 99999 PR PBB SHADOW E&M-EST. PATIENT-LVL V: CPT | Mod: PBBFAC,HCNC,, | Performed by: INTERNAL MEDICINE

## 2023-11-29 PROCEDURE — 3044F PR MOST RECENT HEMOGLOBIN A1C LEVEL <7.0%: ICD-10-PCS | Mod: HCNC,CPTII,S$GLB, | Performed by: INTERNAL MEDICINE

## 2023-11-29 PROCEDURE — 3008F BODY MASS INDEX DOCD: CPT | Mod: HCNC,CPTII,S$GLB, | Performed by: INTERNAL MEDICINE

## 2023-11-29 PROCEDURE — 1160F RVW MEDS BY RX/DR IN RCRD: CPT | Mod: HCNC,CPTII,S$GLB, | Performed by: NURSE PRACTITIONER

## 2023-11-29 PROCEDURE — G0009 PNEUMOCOCCAL CONJUGATE VACCINE 20-VALENT: ICD-10-PCS | Mod: HCNC,S$GLB,, | Performed by: NURSE PRACTITIONER

## 2023-11-29 PROCEDURE — 3072F LOW RISK FOR RETINOPATHY: CPT | Mod: HCNC,CPTII,S$GLB, | Performed by: NURSE PRACTITIONER

## 2023-11-29 PROCEDURE — 90677 PCV20 VACCINE IM: CPT | Mod: HCNC,S$GLB,, | Performed by: NURSE PRACTITIONER

## 2023-11-29 PROCEDURE — G0009 ADMIN PNEUMOCOCCAL VACCINE: HCPCS | Mod: HCNC,S$GLB,, | Performed by: NURSE PRACTITIONER

## 2023-11-29 PROCEDURE — 3074F SYST BP LT 130 MM HG: CPT | Mod: HCNC,CPTII,S$GLB, | Performed by: NURSE PRACTITIONER

## 2023-11-29 PROCEDURE — 99999 PR PBB SHADOW E&M-EST. PATIENT-LVL V: ICD-10-PCS | Mod: PBBFAC,HCNC,, | Performed by: NURSE PRACTITIONER

## 2023-11-29 PROCEDURE — 1159F MED LIST DOCD IN RCRD: CPT | Mod: HCNC,CPTII,S$GLB, | Performed by: INTERNAL MEDICINE

## 2023-11-29 PROCEDURE — 1126F PR PAIN SEVERITY QUANTIFIED, NO PAIN PRESENT: ICD-10-PCS | Mod: HCNC,CPTII,S$GLB, | Performed by: NURSE PRACTITIONER

## 2023-11-29 NOTE — PATIENT INSTRUCTIONS
Counseling and Referral of Other Preventative  (Italic type indicates deductible and co-insurance are waived)    Patient Name: Alyx Weri  Today's Date: 11/29/2023    Health Maintenance       Date Due Completion Date    TETANUS VACCINE Never done ---    High Dose Statin Never done ---    RSV Vaccine (Age 60+ and Pregnant patients) (1 - 1-dose 60+ series) Never done ---    Shingles Vaccine (2 of 3) 07/25/2014 5/30/2014    Colorectal Cancer Screening 11/13/2020 11/13/2015    Pneumococcal Vaccines (Age 65+) (2 - PPSV23 or PCV20) 01/01/2021 11/6/2020    Mammogram 09/01/2023 9/1/2022    Eye Exam 09/02/2023 9/2/2022    Override on 7/11/2016: Done    Override on 12/4/2014: Done    Diabetes Urine Screening 11/21/2023 11/21/2022    Hemoglobin A1c 11/29/2023 5/29/2023    Lipid Panel 11/30/2023 11/30/2022    Foot Exam 12/09/2023 12/9/2022 (Done)    Override on 12/9/2022: Done    Override on 3/2/2021: Done (See Podiatry Note)    Override on 7/2/2020: Done    Override on 1/12/2018: Done    DEXA Scan 12/23/2023 12/23/2020        No orders of the defined types were placed in this encounter.    The following information is provided to all patients.  This information is to help you find resources for any of the problems found today that may be affecting your health:                Living healthy guide: www.[facility].[place].gov      Understanding Diabetes: www.diabetes.org      Eating healthy: www.cdc.gov/healthyweight      CDC home safety checklist: www.cdc.gov/steadi/patient.html      Agency on Aging: www.goea.[place].gov      Alcoholics anonymous (AA): www.aa.org      Physical Activity: www.jannette.nih.gov/fn8kwrp      Tobacco use: www.quitwithusla.org

## 2023-11-29 NOTE — PROGRESS NOTES
Subjective:       Patient ID: Alyx Weir is a 73 y.o. female.    Chief Complaint: Follow-up (6m), Hypertension, Hyperlipidemia, and Diabetes    Follow-up  Associated symptoms include arthralgias and myalgias. Pertinent negatives include no abdominal pain, chest pain, chills, congestion, coughing, diaphoresis, fatigue, fever, headaches, joint swelling, nausea, neck pain, numbness, rash, sore throat, vomiting or weakness.   Hypertension  Pertinent negatives include no chest pain, headaches, neck pain, palpitations or shortness of breath.   Hyperlipidemia  Associated symptoms include myalgias. Pertinent negatives include no chest pain or shortness of breath.   Diabetes  Pertinent negatives for hypoglycemia include no confusion, dizziness, headaches, nervousness/anxiousness, pallor, seizures, speech difficulty or tremors. Pertinent negatives for diabetes include no chest pain, no fatigue, no polydipsia, no polyphagia, no polyuria and no weakness.     Past Medical History:   Diagnosis Date    Anticoagulated on Coumadin     Arm weakness-rotator cuff weakness 11/2/2015    Arthritis     Asthma     Clotting disorder     Coronary artery disease     Deep vein thrombosis     Degenerative disc disease     Diabetes mellitus type I 2011    BS last tested x 1 month not sure what it was.    Heterozygous factor V Leiden mutation     Hx of colonic polyps 11/13/2015    Hyperlipidemia     Hypertension     VIRGIL (obstructive sleep apnea)     Stenosis of right carotid artery 12/13/2016     Past Surgical History:   Procedure Laterality Date    BACK SURGERY      bladder cyst removal      BLADDER SURGERY      CARDIAC CATHETERIZATION  03/2013    mild CAD    COLONOSCOPY N/A 11/13/2015    Procedure: COLONOSCOPY;  Surgeon: Jas Umanzor III, MD;  Location: [facility];  Service: Endoscopy;  Laterality: N/A;    HYSTERECTOMY      28 yrs old    LUMBAR SPINE SURGERY      bone spurs removed    OOPHORECTOMY      SELECTIVE INJECTION OF ANESTHETIC  AGENT AROUND LUMBAR SPINAL NERVE ROOT BY TRANSFORAMINAL APPROACH Bilateral 6/3/2022    Procedure: Bilateral L4/5 TF ASUNCION with RN IV sedation; on Coumadin, will need INR prior to procedure, must be less than 1.3;  Surgeon: Mario Palacios MD;  Location: [facility];  Service: Pain Management;  Laterality: Bilateral;     Family History   Problem Relation Age of Onset    Heart disease Mother     Hypertension Mother     Cataracts Mother     Hypertension Sister     Glaucoma Sister     Hypertension Brother     Diabetes Father     Diabetes Paternal Uncle     Hypertension Sister     Diabetes Sister     Hypertension Sister     Diabetes Sister     Breast cancer Neg Hx     Colon cancer Neg Hx     Ovarian cancer Neg Hx      Social History     Socioeconomic History    Marital status:    Tobacco Use    Smoking status: Never    Smokeless tobacco: Never   Substance and Sexual Activity    Alcohol use: No    Drug use: No    Sexual activity: Not Currently     Partners: Male     Birth control/protection: None   Social History Narrative    Dogs in household, no smokers.     Social Determinants of Health     Financial Resource Strain: High Risk (11/21/2022)    Overall Financial Resource Strain (CARDIA)     Difficulty of Paying Living Expenses: Hard   Food Insecurity: No Food Insecurity (11/21/2022)    Hunger Vital Sign     Worried About Running Out of Food in the Last Year: Never true     Ran Out of Food in the Last Year: Never true   Transportation Needs: No Transportation Needs (11/21/2022)    PRAPARE - Transportation     Lack of Transportation (Medical): No     Lack of Transportation (Non-Medical): No   Physical Activity: Sufficiently Active (11/21/2022)    Exercise Vital Sign     Days of Exercise per Week: 4 days     Minutes of Exercise per Session: 60 min   Stress: Stress Concern Present (11/21/2022)    [employer] - Occupational Stress Questionnaire     Feeling of Stress : To some extent   Social  Connections: Moderately Isolated (11/21/2022)    Social Connection and Isolation Panel [NHANES]     Frequency of Communication with Friends and Family: More than three times a week     Frequency of Social Gatherings with Friends and Family: Three times a week     Attends [employer] Services: More than 4 times per year     Active Member of Clubs or Organizations: No     Attends Club or Organization Meetings: Never     Marital Status:    Housing Stability: Low Risk  (11/21/2022)    Housing Stability Vital Sign     Unable to Pay for Housing in the Last Year: No     Number of Places Lived in the Last Year: 1     Unstable Housing in the Last Year: No     Review of Systems   Constitutional:  Negative for activity change, appetite change, chills, diaphoresis, fatigue, fever and unexpected weight change.   HENT:  Negative for congestion, drooling, ear discharge, ear pain, facial swelling, hearing loss, mouth sores, nosebleeds, postnasal drip, rhinorrhea, sinus pressure, sneezing, sore throat, tinnitus, trouble swallowing and voice change.    Eyes:  Negative for photophobia, redness and visual disturbance.   Respiratory:  Negative for apnea, cough, choking, chest tightness, shortness of breath and wheezing.    Cardiovascular:  Negative for chest pain and palpitations.   Gastrointestinal:  Negative for abdominal distention, abdominal pain, blood in stool, constipation, diarrhea, nausea and vomiting.   Endocrine: Negative for cold intolerance, heat intolerance, polydipsia, polyphagia and polyuria.   Genitourinary:  Negative for decreased urine volume, difficulty urinating, dysuria, flank pain, frequency, genital sores, hematuria, pelvic pain and urgency.   Musculoskeletal:  Positive for arthralgias, back pain and myalgias. Negative for gait problem, joint swelling, neck pain and neck stiffness.   Skin:  Negative for color change, pallor, rash and wound.   Allergic/Immunologic: Negative for food allergies and  immunocompromised state.   Neurological:  Negative for dizziness, tremors, seizures, syncope, speech difficulty, weakness, light-headedness, numbness and headaches.   Hematological:  Negative for adenopathy. Does not bruise/bleed easily.   Psychiatric/Behavioral:  Negative for agitation, behavioral problems, confusion, decreased concentration, dysphoric mood, hallucinations, self-injury, sleep disturbance and suicidal ideas. The patient is not nervous/anxious and is not hyperactive.    All other systems reviewed and are negative.      Objective:      Physical Exam  Vitals and nursing note reviewed.   Constitutional:       General: She is not in acute distress.     Appearance: Normal appearance. She is well-developed. She is not diaphoretic.   HENT:      Head: Normocephalic and atraumatic.      Mouth/Throat:      Pharynx: No oropharyngeal exudate.   Eyes:      General: No scleral icterus.  Neck:      Thyroid: No thyromegaly.      Vascular: No carotid bruit or JVD.      Trachea: No tracheal deviation.   Cardiovascular:      Rate and Rhythm: Normal rate and regular rhythm.      Heart sounds: Normal heart sounds.   Pulmonary:      Effort: Pulmonary effort is normal. No respiratory distress.      Breath sounds: Normal breath sounds. No wheezing or rales.   Chest:      Chest wall: No tenderness.   Abdominal:      General: Bowel sounds are normal. There is no distension.      Palpations: Abdomen is soft.      Tenderness: There is no abdominal tenderness. There is no guarding or rebound.   Musculoskeletal:         General: No tenderness. Normal range of motion.      Cervical back: Normal range of motion and neck supple.   Lymphadenopathy:      Cervical: No cervical adenopathy.   Skin:     General: Skin is warm and dry.      Coloration: Skin is not pale.      Findings: No erythema or rash.   Neurological:      Mental Status: She is alert and oriented to person, place, and time.   Psychiatric:         Behavior: Behavior  normal.         Thought Content: Thought content normal.         Judgment: Judgment normal.         CMP  Sodium   Date Value Ref Range Status   05/29/2023 141 136 - 145 mmol/L Final     Potassium   Date Value Ref Range Status   05/29/2023 4.2 3.5 - 5.1 mmol/L Final     Chloride   Date Value Ref Range Status   05/29/2023 103 95 - 110 mmol/L Final     CO2   Date Value Ref Range Status   05/29/2023 27 23 - 29 mmol/L Final     Glucose   Date Value Ref Range Status   05/29/2023 75 70 - 110 mg/dL Final     BUN   Date Value Ref Range Status   05/29/2023 15 8 - 23 mg/dL Final     Creatinine   Date Value Ref Range Status   05/29/2023 0.8 0.5 - 1.4 mg/dL Final     Calcium   Date Value Ref Range Status   05/29/2023 9.4 8.7 - 10.5 mg/dL Final     Total Protein   Date Value Ref Range Status   05/29/2023 7.3 6.0 - 8.4 g/dL Final     Albumin   Date Value Ref Range Status   05/29/2023 3.8 3.5 - 5.2 g/dL Final     Total Bilirubin   Date Value Ref Range Status   05/29/2023 0.9 0.1 - 1.0 mg/dL Final     Comment:     For infants and newborns, interpretation of results should be based  on gestational age, weight and in agreement with clinical  observations.    Premature Infant recommended reference ranges:  Up to 24 hours.............<8.0 mg/dL  Up to 48 hours............<12.0 mg/dL  3-5 days..................<15.0 mg/dL  6-29 days.................<15.0 mg/dL       Alkaline Phosphatase   Date Value Ref Range Status   05/29/2023 47 (L) 55 - 135 U/L Final     AST   Date Value Ref Range Status   05/29/2023 18 10 - 40 U/L Final     ALT   Date Value Ref Range Status   05/29/2023 15 10 - 44 U/L Final     Anion Gap   Date Value Ref Range Status   05/29/2023 11 8 - 16 mmol/L Final     eGFR if    Date Value Ref Range Status   02/16/2022 >60 >60 mL/min/1.73 m^2 Final     eGFR if non    Date Value Ref Range Status   02/16/2022 >60 >60 mL/min/1.73 m^2 Final     Comment:     Calculation used to obtain the estimated  glomerular filtration  rate (eGFR) is the CKD-EPI equation.        Lab Results   Component Value Date    WBC 8.62 05/29/2023    HGB 12.2 05/29/2023    HCT 38.2 05/29/2023    MCV 86 05/29/2023     05/29/2023     Lab Results   Component Value Date    CHOL 195 11/30/2022     Lab Results   Component Value Date    HDL 74 11/30/2022     Lab Results   Component Value Date    LDLCALC 110.6 11/30/2022     Lab Results   Component Value Date    TRIG 52 11/30/2022     Lab Results   Component Value Date    CHOLHDL 37.9 11/30/2022     Lab Results   Component Value Date    TSH 0.465 05/29/2023     Lab Results   Component Value Date    HGBA1C 5.7 (H) 05/29/2023     Assessment:       1. Heterozygous factor V Leiden mutation    2. Essential hypertension    3. Pure hypercholesterolemia with target low density lipoprotein (LDL) cholesterol less than 130 mg/dL    4. Anticoagulated on Coumadin    5. Type 2 diabetes mellitus without complication, without long-term current use of insulin    6. Chronic lumbar radiculopathy    7. Type II diabetes mellitus with neurological manifestations    8. Chronic obstructive pulmonary disease, unspecified COPD type    9. Encounter for screening mammogram for breast cancer        Plan:   Heterozygous factor V Leiden mutation    Essential hypertension    Pure hypercholesterolemia with target low density lipoprotein (LDL) cholesterol less than 130 mg/dL  -     Lipid Panel; Future; Expected date: 11/29/2023    Anticoagulated on Coumadin    Type 2 diabetes mellitus without complication, without long-term current use of insulin  -     Hemoglobin A1C; Future; Expected date: 11/29/2023  -     Ambulatory referral/consult to Optometry; Future; Expected date: 12/06/2023    Chronic lumbar radiculopathy    Type II diabetes mellitus with neurological manifestations    Chronic obstructive pulmonary disease, unspecified COPD type    Encounter for screening mammogram for breast cancer  -     [facility]  Screening Maddi moore/ [facility]; Future; Expected date: 11/29/2023      Stable----------------continue meds, watch diet--------------as above------------f/u 6 months---------

## 2023-11-30 ENCOUNTER — HOSPITAL ENCOUNTER (OUTPATIENT)
Dept: RADIOLOGY | Facility: HOSPITAL | Age: 73
Discharge: HOME | End: 2023-11-30
Attending: INTERNAL MEDICINE
Payer: MEDICARE

## 2023-11-30 ENCOUNTER — ANTI-COAG VISIT (OUTPATIENT)
Dept: CARDIOLOGY | Facility: CLINIC | Age: 73
End: 2023-11-30
Payer: MEDICARE

## 2023-11-30 VITALS
HEIGHT: 65 IN | RESPIRATION RATE: 18 BRPM | DIASTOLIC BLOOD PRESSURE: 80 MMHG | BODY MASS INDEX: 29.17 KG/M2 | WEIGHT: 175.06 LBS | HEART RATE: 64 BPM | SYSTOLIC BLOOD PRESSURE: 126 MMHG

## 2023-11-30 DIAGNOSIS — Z79.01 ANTICOAGULATED ON COUMADIN: Primary | Chronic | ICD-10-CM

## 2023-11-30 DIAGNOSIS — Z12.31 ENCOUNTER FOR SCREENING MAMMOGRAM FOR BREAST CANCER: ICD-10-CM

## 2023-11-30 DIAGNOSIS — D68.51 HETEROZYGOUS FACTOR V LEIDEN MUTATION: Chronic | ICD-10-CM

## 2023-11-30 PROCEDURE — 77067 MAMMO DIGITAL SCREENING BILAT WITH TOMO: ICD-10-PCS | Mod: 26,HCNC,, | Performed by: RADIOLOGY

## 2023-11-30 PROCEDURE — 77063 BREAST TOMOSYNTHESIS BI: CPT | Mod: 26,HCNC,, | Performed by: RADIOLOGY

## 2023-11-30 PROCEDURE — 93793 ANTICOAG MGMT PT WARFARIN: CPT | Mod: S$GLB,,,

## 2023-11-30 PROCEDURE — 77067 SCR MAMMO BI INCL CAD: CPT | Mod: 26,HCNC,, | Performed by: RADIOLOGY

## 2023-11-30 PROCEDURE — 77067 SCR MAMMO BI INCL CAD: CPT | Mod: TC,HCNC

## 2023-11-30 PROCEDURE — 77063 MAMMO DIGITAL SCREENING BILAT WITH TOMO: ICD-10-PCS | Mod: 26,HCNC,, | Performed by: RADIOLOGY

## 2023-11-30 PROCEDURE — 93793 PR ANTICOAGULANT MGMT FOR PT TAKING WARFARIN: ICD-10-PCS | Mod: S$GLB,,,

## 2023-11-30 NOTE — PROGRESS NOTES
Patient contacted: INR has declined to 1.5 - subtherapeutic.  Lab collected on yesterday.  Patient reports no missed doses or dietary changes.  Patient confirms previous warfarin instructions.  No s/s reported.  Instructions given:  Will boost today's dose to 10 mg, then increase overall dose to 7.5 mg every Tues, Thurs, Sat; and 5 mg on all other days.  Recheck INR in 2 weeks - 12/15/2023 in the [facility].  Patient repeated back instructions correctly and confirms understanding.

## 2023-11-30 NOTE — PROGRESS NOTES
"  Alyx Weir presented for a  Medicare AWV and comprehensive Health Risk Assessment today. The following components were reviewed and updated:    Medical history  Family History  Social history  Allergies and Current Medications  Health Risk Assessment  Health Maintenance  Care Team         ** See Completed Assessments for Annual Wellness Visit within the encounter summary.**         The following assessments were completed:  Living Situation  CAGE  Depression Screening  Timed Get Up and Go  Whisper Test  Cognitive Function Screening  Nutrition Screening  ADL Screening  PAQ Screening        Vitals:    11/30/23 1127   BP: 126/80   Pulse: 64   Resp: 18   Weight: 79.4 kg (175 lb 0.7 oz)   Height: 5' 5" (1.651 m)     Body mass index is 29.13 kg/m².  Physical Exam  Vitals and nursing note reviewed.   Constitutional:       Appearance: Normal appearance. She is well-developed.   HENT:      Head: Normocephalic and atraumatic.   Eyes:      Pupils: Pupils are equal, round, and reactive to light.   Neck:      Vascular: No carotid bruit.   Cardiovascular:      Rate and Rhythm: Normal rate and regular rhythm.      Pulses: Normal pulses.      Heart sounds: Murmur heard.      No gallop.   Pulmonary:      Effort: Pulmonary effort is normal.      Breath sounds: Normal breath sounds.   Abdominal:      General: Bowel sounds are normal. There is no distension.      Palpations: Abdomen is soft.      Tenderness: There is no abdominal tenderness.   Musculoskeletal:         General: No tenderness. Normal range of motion.   Skin:     General: Skin is warm and dry.   Neurological:      Mental Status: She is alert.      Motor: No abnormal muscle tone.      Gait: Gait normal.   Psychiatric:         Speech: Speech normal.         Behavior: Behavior normal.         Thought Content: Thought content normal.         Judgment: Judgment normal.       Current Outpatient Medications:     allopurinol (ZYLOPRIM) 100 MG tablet, TAKE 1 TABLET EVERY DAY, " Disp: 90 tablet, Rfl: 3    clobetasol (TEMOVATE) 0.05 % external solution, APPLY SOLUTION TOPICALLY TO THE AFFECTED AREA ONCE DAILY (WILL NEED APPOINTMENT FOR FURTHER REFILLS), Disp: 50 mL, Rfl: 0    diclofenac sodium (VOLTAREN) 1 % Gel, APPLY 2 GRAMS TOPICALLY DAILY AS NEEDED, Disp: 200 g, Rfl: 2    ezetimibe (ZETIA) 10 mg tablet, Take 1 tablet (10 mg total) by mouth once daily., Disp: 90 tablet, Rfl: 3    gabapentin (NEURONTIN) 300 MG capsule, Take 1 capsule (300 mg total) by mouth once daily AND 3 capsules (900 mg total) every evening., Disp: 120 capsule, Rfl: 3    levocetirizine (XYZAL) 5 MG tablet, Take 1 tablet (5 mg total) by mouth every evening., Disp: 90 tablet, Rfl: 3    olmesartan-amLODIPin-hcthiazid 40-10-25 mg Tab, TAKE 1 TABLET EVERY DAY, Disp: 90 tablet, Rfl: 1    pantoprazole (PROTONIX) 40 MG tablet, TAKE 1 TABLET(40 MG) BY MOUTH EVERY DAY, Disp: 90 tablet, Rfl: 3    sucralfate (CARAFATE) 100 mg/mL suspension, Take 10 mLs (1 g total) by mouth 4 (four) times daily with meals and nightly., Disp: 400 mL, Rfl: 0    triamcinolone acetonide 0.1% (KENALOG) 0.1 % ointment, Apply topically 2 (two) times daily as needed., Disp: 30 g, Rfl: 0    ACCU-CHEK GUIDE ME GLUCOSE MTR Misc, USE TO CHECK BLOOD SUGAR TWICE DAILY, Disp: , Rfl:     acetaminophen (TYLENOL ARTHRITIS ORAL), Take by mouth. Takes prn, Disp: , Rfl:     baclofen (LIORESAL) 10 MG tablet, Take 10 mg by mouth 3 (three) times daily., Disp: , Rfl:     COVID [id number],12up,,andu,,PF, (SPIKEVAX 2704-8115,12Y UP,,PF,) 50 mcg/0.5 mL injection, Inject into the muscle., Disp: 0.5 mL, Rfl: 0    glucosamine-chondroitin 500-400 mg tablet, Take 1 tablet by mouth 3 (three) times daily., Disp: , Rfl:     ipratropium (ATROVENT) 21 mcg (0.03 %) nasal spray, 2 sprays by Each Nostril route 3 (three) times daily., Disp: 30 mL, Rfl: 11    neomycin-polymyxin-hydrocortisone (CORTISPORIN) 3.5-10,000-1 mg/mL-unit/mL-% otic suspension, Place 3 drops into both  ears 3 (three) times daily as needed., Disp: 10 mL, Rfl: 0    om 3/E/linol/ala/oleic/gla/lip (OMEGA 3-6-9 ORAL), Take 1 capsule by mouth once daily., Disp: , Rfl:     sodium chloride (OCEAN NASAL) 0.65 % nasal spray, 1 spray by Nasal route as needed for Congestion., Disp: , Rfl: 12    solifenacin (VESICARE) 5 MG tablet, Take 1 tablet (5 mg total) by mouth once daily., Disp: 90 tablet, Rfl: 3    spironolactone (ALDACTONE) 50 MG tablet, Take 1 tablet (50 mg total) by mouth 2 (two) times daily., Disp: 60 tablet, Rfl: 3    tramadol (ULTRAM) 50 mg tablet, Take 1 tablet (50 mg total) by mouth every 6 (six) hours as needed for Pain. (Patient not taking: Reported on 11/29/2023), Disp: 30 tablet, Rfl: 0    vitamin D 1000 units Tab, Take 185 mg by mouth once daily., Disp: , Rfl:     warfarin (COUMADIN) 5 MG tablet, Take 1 tablet by mouth on Sunday, Monday and Friday and 7.5 mg on all other days OR as directed by coumadin clinic (Patient taking differently: Take 5 mg by mouth Daily. Except 7.5 mg on Tues, Thurs and Sat;  OR as directed by coumadin clinic), Disp: 115 tablet, Rfl: 3          Diagnoses and health risks identified today and associated recommendations/orders:    1. Encounter for preventive health examination  Review for Opioid Screening: Patient does not have rx for Opioids- doesn't take ULTRAM  Review for Substance Use Disorders: Patient does not use substance.      2. Encounter for Medicare annual wellness exam  -Ambulatory Referral/Consult to Enhanced Annual Wellness Visit (eAWV)    DISCUSSED HEALTH MAINTENANCE WITH PCP       3. Pure hypercholesterolemia with target low density lipoprotein (LDL) cholesterol less than 130 mg/dL  Chronic/ Monitored/Stable on meds as directed.  Followed by card/PCP    4. Chronic obstructive pulmonary disease, unspecified COPD type  Chronic/ Monitored/Stable on  meds as directed.  Followed by card/PCP    5. Essential hypertension  Chronic/ Monitored/Stable on  meds as  directed.  Followed by card/PCP    6. Left ventricular diastolic dysfunction with preserved systolic function  Chronic/ Monitored/Stable on  meds as directed.  Followed by card/PCP    7. Type II diabetes mellitus with neurological manifestations  Chronic/ Monitored/Stable on  meds as directed.  Followed by card/PCP    8. VIRGIL on CPAP  Chronic/ Monitored/Stable on  meds as directed.  Followed by pulm md    9. Chronic lumbar radiculopathy  Chronic and Ongoing. Continue current treatment plan as previously prescribed with your PCP    10. Murmur, cardiac  Chronic/ Monitored/Stable on  meds as directed.  Followed by card/PCP    11. History of DVT (deep vein thrombosis)  Chronic/ Monitored/Stable on   as directed.  Followed by  card mb    12. Anticoagulated on Coumadin  Chronic/ Monitored/Stable on  as directed.  Followed by coumadin /card    13. Heterozygous factor V Leiden mutation  Chronic/ Monitored/Stable on  as directed.  Followed by coumadin clinic/card md    14. Need for pneumococcal 20-valent conjugate vaccination   Given per health maintenance  Provided Alyx with a 5-10 year written screening schedule and personal prevention plan. Recommendations were developed using the USPHS age appropriate recommendations. Education, counseling, and referrals were provided as needed. After Visit Summary printed and given to patient which includes a list of additional screenings\tests needed.    Follow up in about 1 year (around 11/29/2024) for Follow up for Annual with PCP.    Amanda Feliciano NP

## 2023-12-11 DIAGNOSIS — R05.9 COUGH, UNSPECIFIED TYPE: Primary | ICD-10-CM

## 2023-12-11 RX ORDER — PROMETHAZINE HYDROCHLORIDE AND DEXTROMETHORPHAN HYDROBROMIDE 6.25; 15 MG/5ML; MG/5ML
5 SYRUP ORAL 2 TIMES DAILY PRN
Qty: 180 ML | Refills: 0 | Status: SHIPPED | OUTPATIENT
Start: 2023-12-11 | End: 2023-12-29

## 2023-12-15 ENCOUNTER — TELEPHONE (OUTPATIENT)
Dept: CARDIOLOGY | Facility: CLINIC | Age: 73
End: 2023-12-15
Payer: MEDICARE

## 2023-12-15 ENCOUNTER — ANTI-COAG VISIT (OUTPATIENT)
Dept: CARDIOLOGY | Facility: CLINIC | Age: 73
End: 2023-12-15
Payer: MEDICARE

## 2023-12-15 DIAGNOSIS — D68.51 HETEROZYGOUS FACTOR V LEIDEN MUTATION: Chronic | ICD-10-CM

## 2023-12-15 DIAGNOSIS — Z79.01 ANTICOAGULATED ON COUMADIN: Primary | Chronic | ICD-10-CM

## 2023-12-15 LAB — INR PPP: 1.4 (ref 2–3)

## 2023-12-15 PROCEDURE — 93793 ANTICOAG MGMT PT WARFARIN: CPT | Mod: HCNC,S$GLB,,

## 2023-12-15 PROCEDURE — 93793 PR ANTICOAGULANT MGMT FOR PT TAKING WARFARIN: ICD-10-PCS | Mod: HCNC,S$GLB,,

## 2023-12-15 PROCEDURE — 85610 PROTHROMBIN TIME: CPT | Mod: QW,HCNC,S$GLB, | Performed by: INTERNAL MEDICINE

## 2023-12-15 PROCEDURE — 85610 POCT INR: ICD-10-PCS | Mod: QW,HCNC,S$GLB, | Performed by: INTERNAL MEDICINE

## 2023-12-15 NOTE — PROGRESS NOTES
INR not at goal-1.4. Medications, chart, and patient findings reviewed. See calendar for adjustments to dose and follow up plan.  INR has declined.  We will boost once more and follow up closely to make sure INR has recovered.

## 2023-12-15 NOTE — TELEPHONE ENCOUNTER
----- Message from Alison Norton sent at 12/15/2023  8:14 AM CST -----  Contact: Alyx Marley is calling in regards to appt. States she's feeling sick and wanted to know if Veronika thinks she should still come in or reschedule. Please return call to pt at .[phone].

## 2023-12-15 NOTE — PROGRESS NOTES
INR has declined to 1.4 - subtherapeutic.  Patient reports promethazine-dextromethorphan is being taken for a cough (12/11/2023).  No missed doses or s/s reported.  Previous warfarin instructions verified.  Advised on ED for any s/s of clotting/stroke.  Will send to PharmD for dosing instructions.

## 2023-12-18 ENCOUNTER — TELEPHONE (OUTPATIENT)
Dept: FAMILY MEDICINE | Facility: CLINIC | Age: 73
End: 2023-12-18
Payer: MEDICARE

## 2023-12-18 NOTE — TELEPHONE ENCOUNTER
----- Message from Brooke Crum sent at 12/18/2023 10:04 AM CST -----  Type:  Same Day Appointment Request    Caller is requesting a same day appointment.  Caller declined first available appointment listed below.    Name of Caller:pt  When is the first available appointment?12/20  Symptoms:still coughing/ankles swollen  Best Call Back Number:[phone]  Additional Information: .    Thank you

## 2023-12-18 NOTE — PROGRESS NOTES
SUBJECTIVE:     Alyx Weir is a 73 y.o. female is here today for:  Cough    HPI:    Mrs. Weir is here with c/o not feeling well x 3 weeks.  Started with a sore throat, PCP called in abx and took for 10 days.  Cleared up then recurred with cough last week.  Tx with otc/rx cough medication but has not helped.      REVIEW OF SYSTEMS:  Review of Systems   Constitutional:  Positive for chills and malaise/fatigue. Negative for fever.   HENT:  Positive for congestion and sinus pain. Negative for ear pain, hearing loss, nosebleeds, sore throat and tinnitus.    Respiratory:  Positive for cough and sputum production. Negative for hemoptysis, shortness of breath and wheezing.    Cardiovascular: Negative.    Skin: Negative.    Neurological:  Positive for weakness (prob last week, better now) and headaches (head hurts w/ cough). Negative for dizziness, tingling and tremors.         CURRENT ALLERGIES:  Review of patient's allergies indicates:   Allergen Reactions    Erythromycin Edema     Angioedema to throat    Amlodipine Other (See Comments)     Headaches    Diazepam Hives     Other reaction(s): Unknown    Iodine Hives     Other reaction(s): Unknown    Meperidine Hives     Other reaction(s): Unknown    Morphine Itching    Methylprednisolone sod suc(pf) Other (See Comments)     headache    Primidone Other (See Comments)     Other reaction(s): Unknown       CURRENT PROBLEM LIST:  Patient Active Problem List   Diagnosis    Heterozygous factor V Leiden mutation    Essential hypertension    Pure hypercholesterolemia with target low density lipoprotein (LDL) cholesterol less than 130 mg/dL    VIRGIL on CPAP    Anticoagulated on Coumadin    Type 2 diabetes mellitus without complication    Overweight with body mass index (BMI) 25.0-29.9    Chronic lumbar radiculopathy    Coronary artery disease involving native coronary artery without angina pectoris    Left ventricular diastolic dysfunction with preserved systolic function    COPD  (chronic obstructive pulmonary disease)    Chondromalacia, right knee    Dyslipidemia    IGT (impaired glucose tolerance)    Bradycardia    Muscle cramp    Other chest pain    Statin intolerance    History of DVT (deep vein thrombosis)    Non-seasonal allergic rhinitis    Murmur, cardiac    Type II diabetes mellitus with neurological manifestations       CURRENT MEDICATION LIST:  Current Outpatient Medications   Medication Instructions    ACCU-CHEK GUIDE ME GLUCOSE MTR Misc USE TO CHECK BLOOD SUGAR TWICE DAILY    acetaminophen (TYLENOL ARTHRITIS ORAL) Oral, Takes prn    allopurinoL (ZYLOPRIM) 100 MG tablet TAKE 1 TABLET EVERY DAY    baclofen (LIORESAL) 10 mg, Oral, 3 times daily    clobetasoL (TEMOVATE) 0.05 % external solution APPLY SOLUTION TOPICALLY TO THE AFFECTED AREA ONCE DAILY (WILL NEED APPOINTMENT FOR FURTHER REFILLS)    diclofenac sodium (VOLTAREN) 1 % Gel APPLY 2 GRAMS TOPICALLY DAILY AS NEEDED    ezetimibe (ZETIA) 10 mg, Oral, Daily    gabapentin (NEURONTIN) 300 MG capsule Take 1 capsule (300 mg total) by mouth once daily AND 3 capsules (900 mg total) every evening.    glucosamine-chondroitin 500-400 mg tablet 1 tablet, Oral, 3 times daily    ipratropium (ATROVENT) 21 mcg (0.03 %) nasal spray 2 sprays, Each Nostril, 3 times daily    levocetirizine (XYZAL) 5 mg, Oral, Nightly    olmesartan-amLODIPin-hcthiazid 40-10-25 mg Tab TAKE 1 TABLET EVERY DAY    om 3/E/linol/ala/oleic/gla/lip (OMEGA 3-6-9 ORAL) 1 capsule, Oral, Daily    pantoprazole (PROTONIX) 40 MG tablet TAKE 1 TABLET(40 MG) BY MOUTH EVERY DAY    promethazine-dextromethorphan (PROMETHAZINE-DM) 6.25-15 mg/5 mL Syrp 5 mLs, Oral, 2 times daily PRN    sodium chloride (OCEAN NASAL) 0.65 % nasal spray 1 spray, Nasal, As needed (PRN)    solifenacin (VESICARE) 5 mg, Oral, Daily    spironolactone (ALDACTONE) 50 mg, Oral, 2 times daily    sucralfate (CARAFATE) 1 g, Oral, 4 times daily with meals & nightly    traMADoL (ULTRAM) 50 mg, Oral, Every 6 hours PRN  "   triamcinolone acetonide 0.1% (KENALOG) 0.1 % ointment Topical (Top), 2 times daily PRN    vitamin D (VITAMIN D3) 185 mg, Oral, Daily    warfarin (COUMADIN) 5 MG tablet Take 1 tablet by mouth on Sunday, Monday and Friday and 7.5 mg on all other days OR as directed by coumadin clinic         HISTORY:  Past medical, surgical, family and social histories have been reviewed today.      OBJECTIVE:     Vitals:    12/20/23 1007   BP: 136/78   Pulse: 62   Temp: 98.6 °F (37 °C)   SpO2: 99%   Weight: 79.3 kg (174 lb 15 oz)   Height: 5' 5" (1.651 m)   PainSc:   8   PainLoc: Chest       Physical Exam  Vitals reviewed.   Constitutional:       General: She is not in acute distress.  HENT:      Head: Normocephalic and atraumatic.      Right Ear: Tympanic membrane normal.      Left Ear: Tympanic membrane normal.      Nose: Mucosal edema and congestion present. No rhinorrhea.      Right Sinus: Frontal sinus tenderness present. No maxillary sinus tenderness.      Left Sinus: Frontal sinus tenderness present. No maxillary sinus tenderness.      Mouth/Throat:      Mouth: Mucous membranes are moist.      Pharynx: No pharyngeal swelling or posterior oropharyngeal erythema.   Eyes:      Pupils: Pupils are equal, round, and reactive to light.   Cardiovascular:      Rate and Rhythm: Normal rate and regular rhythm.      Pulses: Normal pulses.      Heart sounds: Normal heart sounds.   Pulmonary:      Effort: Pulmonary effort is normal. No respiratory distress.      Breath sounds: No stridor. Wheezing present. No rhonchi.   Chest:      Chest wall: No tenderness.   Musculoskeletal:         General: Normal range of motion.      Cervical back: Normal range of motion and neck supple. No rigidity.      Right lower leg: No edema.      Left lower leg: No edema.   Lymphadenopathy:      Cervical: No cervical adenopathy.   Skin:     Capillary Refill: Capillary refill takes less than 2 seconds.   Neurological:      Mental Status: She is alert and " oriented to person, place, and time.      Motor: No weakness.      Gait: Gait normal.   Psychiatric:         Mood and Affect: Mood normal.         Behavior: Behavior normal.         Thought Content: Thought content normal.         Judgment: Judgment normal.         ASSESSMENT:     1. Sinobronchitis  -     X-Ray Chest PA And Lateral; Future; Expected date: 12/20/2023  -     amoxicillin-clavulanate 500-125mg (AUGMENTIN) 500-125 mg Tab; Take 1 tablet (500 mg total) by mouth 3 (three) times daily. for 7 days  Dispense: 21 tablet; Refill: 0    2. Persistent cough for 3 weeks or longer  -     X-Ray Chest PA And Lateral; Future; Expected date: 12/20/2023  -     benzonatate (TESSALON) 100 MG capsule; Take 1-2 capsules (100-200 mg total) by mouth 3 (three) times daily as needed for Cough. (Patient not taking: Reported on 1/13/2024)  Dispense: 30 capsule; Refill: 0        PLAN:     Supportive care, rest, hydration, rx as ordered.  Pending XR.  RTC as directed and/or prn.        CORY Chaudhari  [facility]       35 minutes of total time spent on the encounter, which includes face to face time and non-face to face time preparing to see the patient.  This includes obtaining and/or reviewing separately obtained history, performing a medically appropriate examination and/or evaluation, and counseling and educating the patient/family/caregiver.  Includes documenting clinical information in the electronic or other health record, independently interpreting results (not separately reported) and communicating results to the patient/family/caregiver, with care coordination (not separately reported).  Medications, tests and/or procedures ordered as necessary along with referring and communicating with other health professionals (when not separately reported).

## 2023-12-20 ENCOUNTER — HOSPITAL ENCOUNTER (OUTPATIENT)
Dept: RADIOLOGY | Facility: HOSPITAL | Age: 73
Discharge: HOME | End: 2023-12-20
Attending: REGISTERED NURSE
Payer: MEDICARE

## 2023-12-20 ENCOUNTER — OFFICE VISIT (OUTPATIENT)
Dept: FAMILY MEDICINE | Facility: CLINIC | Age: 73
End: 2023-12-20
Payer: MEDICARE

## 2023-12-20 ENCOUNTER — ANTI-COAG VISIT (OUTPATIENT)
Dept: CARDIOLOGY | Facility: CLINIC | Age: 73
End: 2023-12-20
Payer: MEDICARE

## 2023-12-20 VITALS
OXYGEN SATURATION: 99 % | WEIGHT: 174.94 LBS | HEART RATE: 62 BPM | DIASTOLIC BLOOD PRESSURE: 78 MMHG | BODY MASS INDEX: 29.15 KG/M2 | SYSTOLIC BLOOD PRESSURE: 136 MMHG | TEMPERATURE: 99 F | HEIGHT: 65 IN

## 2023-12-20 DIAGNOSIS — Z79.01 ANTICOAGULATED ON COUMADIN: Primary | Chronic | ICD-10-CM

## 2023-12-20 DIAGNOSIS — J32.9 SINOBRONCHITIS: Primary | ICD-10-CM

## 2023-12-20 DIAGNOSIS — D68.51 HETEROZYGOUS FACTOR V LEIDEN MUTATION: Chronic | ICD-10-CM

## 2023-12-20 DIAGNOSIS — J40 SINOBRONCHITIS: Primary | ICD-10-CM

## 2023-12-20 DIAGNOSIS — R05.3 PERSISTENT COUGH FOR 3 WEEKS OR LONGER: ICD-10-CM

## 2023-12-20 DIAGNOSIS — J40 SINOBRONCHITIS: ICD-10-CM

## 2023-12-20 DIAGNOSIS — J32.9 SINOBRONCHITIS: ICD-10-CM

## 2023-12-20 PROCEDURE — 99999 PR PBB SHADOW E&M-EST. PATIENT-LVL V: CPT | Mod: PBBFAC,HCNC,, | Performed by: REGISTERED NURSE

## 2023-12-20 PROCEDURE — 71046 X-RAY EXAM CHEST 2 VIEWS: CPT | Mod: TC,HCNC,FY,PO

## 2023-12-20 PROCEDURE — 3288F FALL RISK ASSESSMENT DOCD: CPT | Mod: HCNC,CPTII,S$GLB, | Performed by: REGISTERED NURSE

## 2023-12-20 PROCEDURE — 1125F AMNT PAIN NOTED PAIN PRSNT: CPT | Mod: HCNC,CPTII,S$GLB, | Performed by: REGISTERED NURSE

## 2023-12-20 PROCEDURE — 3078F DIAST BP <80 MM HG: CPT | Mod: HCNC,CPTII,S$GLB, | Performed by: REGISTERED NURSE

## 2023-12-20 PROCEDURE — 3075F SYST BP GE 130 - 139MM HG: CPT | Mod: HCNC,CPTII,S$GLB, | Performed by: REGISTERED NURSE

## 2023-12-20 PROCEDURE — 93793 PR ANTICOAGULANT MGMT FOR PT TAKING WARFARIN: ICD-10-PCS | Mod: S$GLB,,,

## 2023-12-20 PROCEDURE — 99214 OFFICE O/P EST MOD 30 MIN: CPT | Mod: HCNC,S$GLB,, | Performed by: REGISTERED NURSE

## 2023-12-20 PROCEDURE — 1101F PT FALLS ASSESS-DOCD LE1/YR: CPT | Mod: HCNC,CPTII,S$GLB, | Performed by: REGISTERED NURSE

## 2023-12-20 PROCEDURE — 93793 ANTICOAG MGMT PT WARFARIN: CPT | Mod: S$GLB,,,

## 2023-12-20 PROCEDURE — 3008F BODY MASS INDEX DOCD: CPT | Mod: HCNC,CPTII,S$GLB, | Performed by: REGISTERED NURSE

## 2023-12-20 PROCEDURE — 71046 XR CHEST PA AND LATERAL: ICD-10-PCS | Mod: 26,HCNC,, | Performed by: RADIOLOGY

## 2023-12-20 PROCEDURE — 71046 X-RAY EXAM CHEST 2 VIEWS: CPT | Mod: 26,HCNC,, | Performed by: RADIOLOGY

## 2023-12-20 RX ORDER — AMOXICILLIN AND CLAVULANATE POTASSIUM 500; 125 MG/1; MG/1
1 TABLET, FILM COATED ORAL 3 TIMES DAILY
Qty: 21 TABLET | Refills: 0 | Status: SHIPPED | OUTPATIENT
Start: 2023-12-20 | End: 2023-12-27

## 2023-12-20 RX ORDER — BENZONATATE 100 MG/1
100-200 CAPSULE ORAL 3 TIMES DAILY PRN
Qty: 30 CAPSULE | Refills: 0 | Status: SHIPPED | OUTPATIENT
Start: 2023-12-20

## 2023-12-20 NOTE — PROGRESS NOTES
INR not at goal-1.9. Medications, chart, and patient findings reviewed. See calendar for adjustments to dose and follow up plan.  Boost today then re-challenge dose.

## 2024-01-03 ENCOUNTER — ANTI-COAG VISIT (OUTPATIENT)
Dept: CARDIOLOGY | Facility: CLINIC | Age: 74
End: 2024-01-03
Payer: MEDICARE

## 2024-01-03 DIAGNOSIS — Z79.01 ANTICOAGULATED ON COUMADIN: Primary | Chronic | ICD-10-CM

## 2024-01-03 DIAGNOSIS — D68.51 HETEROZYGOUS FACTOR V LEIDEN MUTATION: Chronic | ICD-10-CM

## 2024-01-03 LAB — INR PPP: 2 (ref 2–3)

## 2024-01-03 PROCEDURE — 93793 ANTICOAG MGMT PT WARFARIN: CPT | Mod: HCNC,S$GLB,,

## 2024-01-03 PROCEDURE — 85610 PROTHROMBIN TIME: CPT | Mod: QW,HCNC,S$GLB, | Performed by: INTERNAL MEDICINE

## 2024-01-03 NOTE — PROGRESS NOTES
INR is therapeutic at 2.0.  Previous warfarin instructions were verified and followed.  No other changes reported.  Instructions given to continue 7.5 mg every Tues, Thurs, Sat; and 5 mg on all other days.  Advised to maintain a consistent diet.  Will recheck INR in 2 weeks.  Dose calendar given - confirms understanding.

## 2024-01-04 ENCOUNTER — PATIENT OUTREACH (OUTPATIENT)
Dept: ADMINISTRATIVE | Facility: HOSPITAL | Age: 74
End: 2024-01-04
Payer: MEDICARE

## 2024-01-04 NOTE — PROGRESS NOTES
Population Health Chart Review & Patient Outreach Details      Further Action Needed If Patient Returns Outreach:      Statin Attestation report:   The patient has Statin intolerance          Updates Requested / Reviewed:     [x]  Care Everywhere    [x]     []  External Sources ([facility], [facility], [facility], etc.)    [] [facility]   [] [facility]   [] Other:    []  Care Team Updated   []  Removed  or Duplicate Orders   []  Immunization Reconciliation Completed / Queried    [] [place]   [] [place]   [] [place]   [] [place]      Health Maintenance Topics Addressed and Outreach Outcomes / Actions Taken:             Breast Cancer Screening []  Mammogram Order Placed    []  Mammogram Screening Scheduled    []  External Records Requested & Care Team Updated if Applicable    []  External Records Uploaded & Care Team Updated if Applicable    []  Pt Declined Scheduling Mammogram    []  Pt Will Schedule with External Provider / Order Routed & Care Team Updated if Applicable              Cervical Cancer Screening []  Pap Smear Scheduled in Primary Care or OBGYN    []  External Records Requested & Care Team Updated if Applicable       []  External Records Uploaded, Care Team Updated, & History Updated if Applicable    []  Patient Declined Scheduling Pap Smear    []  Patient Will Schedule with External Provider & Care Team Updated if Applicable                  Colorectal Cancer Screening []  Colonoscopy Case Request / Referral / Home Test Order Placed    []  External Records Requested & Care Team Updated if Applicable    []  External Records Uploaded, Care Team Updated, & History Updated if Applicable    []  Patient Declined Completing Colon Cancer Screening    []  Patient Will Schedule with External Provider & Care Team Updated if Applicable    []  Fit Kit Mailed (add the SmartPhrase under additional notes)    []  Reminded Patient to Complete Home Test                Diabetic Eye Exam []  Eye Exam Screening Order Placed     []  Eye Camera Scheduled or Optometry/Ophthalmology Referral Placed    []  External Records Requested & Care Team Updated if Applicable    []  External Records Uploaded, Care Team Updated, & History Updated if Applicable    []  Patient Declined Scheduling Eye Exam    []  Patient Will Schedule with External Provider & Care Team Updated if Applicable             Blood Pressure Control []  Primary Care Follow Up Visit Scheduled     []  Remote Blood Pressure Reading Captured    []  Patient Declined Remote Reading or Scheduling Appt - Escalated to PCP    []  Patient Will Call Back or Send Portal Message with Reading                 HbA1c & Other Labs []  Overdue Lab(s) Ordered    []  Overdue Lab(s) Scheduled    []  External Records Uploaded & Care Team Updated if Applicable    []  Primary Care Follow Up Visit Scheduled     []  Reminded Patient to Complete A1c Home Test    []  Patient Declined Scheduling Labs or Will Call Back to Schedule    []  Patient Will Schedule with External Provider / Order Routed, & Care Team Updated if Applicable           Primary Care Appointment []  Primary Care Appt Scheduled    []  Patient Declined Scheduling or Will Call Back to Schedule    []  Pt Established with External Provider, Updated Care Team, & Informed Pt to Notify Payor if Applicable           Medication Adherence /    Statin Use []  Primary Care Appointment Scheduled    []  Patient Reminded to  Prescription    []  Patient Declined, Provider Notified if Needed    []  Sent Provider Message to Review to Evaluate Pt for Statin, Add Exclusion Dx Codes, Document   Exclusion in Problem List, Change Statin Intensity Level to Moderate or High Intensity if Applicable                Osteoporosis Screening []  Dexa Order Placed    []  Dexa Appointment Scheduled    []  External Records Requested & Care Team Updated    []  External Records Uploaded, Care Team Updated, & History Updated if Applicable    []  Patient Declined Scheduling  Dexa or Will Call Back to Schedule    []  Patient Will Schedule with External Provider / Order Routed & Care Team Updated if Applicable       Additional Notes:    NO OUTREACH DONE THIS ENCOUNTER

## 2024-01-12 ENCOUNTER — TELEPHONE (OUTPATIENT)
Dept: FAMILY MEDICINE | Facility: CLINIC | Age: 74
End: 2024-01-12
Payer: MEDICARE

## 2024-01-12 NOTE — TELEPHONE ENCOUNTER
----- Message from Breana Hunt sent at 1/12/2024  9:24 AM CST -----  Contact: Alyx  .Patient is calling to speak with the nurse regarding questions and concerns . Reports still feeling the same way the patient came in on 12/20  . Please give patient a call back at .[phone]

## 2024-01-13 ENCOUNTER — OFFICE VISIT (OUTPATIENT)
Dept: URGENT CARE | Facility: CLINIC | Age: 74
End: 2024-01-13
Payer: MEDICARE

## 2024-01-13 VITALS
SYSTOLIC BLOOD PRESSURE: 148 MMHG | BODY MASS INDEX: 29.66 KG/M2 | DIASTOLIC BLOOD PRESSURE: 72 MMHG | TEMPERATURE: 99 F | WEIGHT: 173.75 LBS | HEIGHT: 64 IN | OXYGEN SATURATION: 96 % | RESPIRATION RATE: 16 BRPM | HEART RATE: 63 BPM

## 2024-01-13 DIAGNOSIS — J06.9 VIRAL URI: Primary | ICD-10-CM

## 2024-01-13 DIAGNOSIS — R05.9 COUGH, UNSPECIFIED TYPE: ICD-10-CM

## 2024-01-13 LAB
CTP QC/QA: YES
CTP QC/QA: YES
POC MOLECULAR INFLUENZA A AGN: NEGATIVE
POC MOLECULAR INFLUENZA B AGN: NEGATIVE
SARS-COV-2 AG RESP QL IA.RAPID: NEGATIVE

## 2024-01-13 PROCEDURE — 87811 SARS-COV-2 COVID19 W/OPTIC: CPT | Mod: QW,S$GLB,, | Performed by: EMERGENCY MEDICINE

## 2024-01-13 PROCEDURE — 99214 OFFICE O/P EST MOD 30 MIN: CPT | Mod: S$GLB,,, | Performed by: EMERGENCY MEDICINE

## 2024-01-13 PROCEDURE — 87502 INFLUENZA DNA AMP PROBE: CPT | Mod: QW,S$GLB,, | Performed by: EMERGENCY MEDICINE

## 2024-01-13 RX ORDER — FLUTICASONE PROPIONATE 50 MCG
1 SPRAY, SUSPENSION (ML) NASAL DAILY
Qty: 16 G | Refills: 0 | Status: SHIPPED | OUTPATIENT
Start: 2024-01-13

## 2024-01-13 RX ORDER — LEVOCETIRIZINE DIHYDROCHLORIDE 5 MG/1
5 TABLET, FILM COATED ORAL NIGHTLY
Qty: 30 TABLET | Refills: 0 | Status: SHIPPED | OUTPATIENT
Start: 2024-01-13 | End: 2024-02-12

## 2024-01-13 RX ORDER — PROMETHAZINE HYDROCHLORIDE AND DEXTROMETHORPHAN HYDROBROMIDE 6.25; 15 MG/5ML; MG/5ML
5 SYRUP ORAL EVERY 4 HOURS PRN
Qty: 180 ML | Refills: 0 | Status: SHIPPED | OUTPATIENT
Start: 2024-01-13 | End: 2024-01-23

## 2024-01-13 NOTE — PATIENT INSTRUCTIONS
Use over the counter(OTC) antihistamine like claritin or zyrtec daily.  Flonase: 1 spray per nostril 1-2 times a day.  Hold it right at the opening of the nasal passage before doing the spray to avoid the throat symptoms you described.  Nasal saline drops: 2-4 drops per nostril 10-15 times a day.     Tylenol or Motrin for fever or pain per label instructions.  Consider use of OTC cough medicine like Robitussin DM.  Warm saltwater gargles to 3 times a day.    Rest and drink plenty of fluids.  Avoid OTC decongestants if you have high blood pressure. This includes multi-cold symptom preparations.    Consider permissive fever to allow your immune system to work.  However, if fever is not tolerable or is disrupting sleep, use Tylenol or Motrin per label instructions.    You are considered contagious until your systemic, or whole body, symptoms have resolved fully for 24 hours.  This includes fever, body aches, fatigue.    Follow up in 2-3 days with PCP if no improvement or any worsening.       Viral Upper Respiratory Infection Discharge Instructions, Adult   About this topic   You have an upper respiratory infection or URI. A URI can affect your nose, throat, ears, and sinuses. A virus is the cause of almost all URIs and antibiotics will not help you feel better more quickly. The common cold is an example of a viral URI.  URIs are easy to spread from person to person, most often through coughing or sneezing. A URI will almost always get better in a week or two without any treatment.         What care is needed at home?   Ask your doctor what you need to do when you go home. Make sure you ask questions if you do not understand what the doctor says.  If you smoke, try to quit. Your doctor or nurse can help.  Drink lots of fluids like water, juice, or broth. This will help replace any fluids lost if you have a runny nose or fever. Warm tea or soup can help soothe a sore throat.  If the air in your home feels dry, use a cool  mist humidifier. This can help a stuffy nose and make it easier to breathe.  You can also use saline nose drops to relieve stuffiness.  If you decide to take over-the-counter cough or cold medicines, follow the directions on the label carefully. Be sure you do not take more than 1 medicine that contains acetaminophen. Also, if you have a heart problem or high blood pressure, check with your doctor before you take any of these medicines.  Wash your hands often. Cough or sneeze into a tissue or your elbow instead of your hands. This will help keep others healthy.  What follow-up care is needed?   Your doctor may ask you to make visits to the office to check on your progress. Be sure to keep these visits.  What drugs may be needed?   The doctor may order drugs to:  Open up the tubes of your lungs  Treat viral infection  Relieve or stop coughing  Help with pain from a sore throat  Relieve runny and stuffy nose  Provide oxygen  Will physical activity be limited?   You need to rest for a few days to let your body recover from the infection.  What changes to diet are needed?   Eat soft foods like soup if swallowing is too painful.  What problems could happen?   Asthma attack  Sinus infections  Lung problems like pneumonia and bronchitis  Severe fluid loss. This is dehydration.  What can be done to prevent this health problem?   Wash your hands often with soap and water for at least 20 seconds, especially after coughing or sneezing. Alcohol-based hand sanitizers also work to kill the virus.  If you are sick, cover your mouth and nose with tissue when you cough or sneeze. You can also cough into your elbow. Throw away tissues in the trash and wash your hands after touching used tissues.  Do not get too close (kissing, hugging) to people who are sick.  Do not share towels or hankies with anyone who is sick. Clean commonly handled things like door handles, remotes, toys, and phones. Wipe them with a disinfectant.  Stay away  from crowded places.  Cover your nose and mouth when you sneeze or cough.  Take vitamin C to help build up your body's ability to fight disease.  Get a flu shot each year.  When do I need to call the doctor?   You have trouble breathing when talking or sitting still.  You have a fever of 100.4°F (38°C) or higher for several days, chills, a very bad sore throat, or ear or sinus pain.  You develop a new fever after several days of feeling the same or improving.  You develop chest pain when you cough.  You have a cough that lasts more than 10 days.  You cough up blood, or the color of the mucus you cough up changes.  Teach Back: Helping You Understand   The Teach Back Method helps you understand the information we are giving you. After you talk with the staff, tell them in your own words what you learned. This helps to make sure the staff has described each thing clearly. It also helps to explain things that may have been confusing. Before going home, make sure you can do these:  I can tell you about my condition.  I can tell you what may help ease my signs.  I can tell you what I will do if I have a fever, chills, breathing very fast, or trouble breathing.  Where can I learn more?   American Lung Association  https://www.lung.org/blog/can-you-exercise-with-a-cold   American Lung Association  https://www.lung.org/lung-health-diseases/lung-disease-lookup/influenza/facts-about-the-common-cold   NHS Choices  https://www.nhs.uk/conditions/respiratory-tract-infection/   UpToDate  https://www.uptodate.com/contents/the-common-cold-in-adults-beyond-the-basics   Last Reviewed Date   2021-06-08  Consumer Information Use and Disclaimer   This information is not specific medical advice and does not replace information you receive from your health care provider. This is only a brief summary of general information. It does NOT include all information about conditions, illnesses, injuries, tests, procedures, treatments, therapies,  discharge instructions or life-style choices that may apply to you. You must talk with your health care provider for complete information about your health and treatment options. This information should not be used to decide whether or not to accept your health care providers advice, instructions or recommendations. Only your health care provider has the knowledge and training to provide advice that is right for you.  Copyright   Copyright © 2021 [employer] and its affiliates and/or licensors. All rights reserved.

## 2024-01-13 NOTE — PROGRESS NOTES
"Subjective:      Patient ID: Alyx Weir is a 73 y.o. female.    Vitals:  height is 5' 4.17" (1.63 m) and weight is 78.8 kg (173 lb 11.6 oz). Her oral temperature is 98.6 °F (37 °C). Her blood pressure is 148/72 (abnormal) and her pulse is 63. Her respiration is 16 and oxygen saturation is 96%.     Chief Complaint: Cough    Pt is here today with a cough, runny nose and headache for 3 days. She states she has recently been around someone with URI symptoms. She has taken Mucinex HBP and Promethazine DM with no relief. She rates her headache 9/10.    Cough  This is a new problem. The current episode started in the past 7 days. The problem has been gradually worsening. The problem occurs every few minutes. The cough is Productive of sputum. Associated symptoms include ear pain, headaches, nasal congestion, rhinorrhea, shortness of breath and wheezing. Pertinent negatives include no chest pain, chills, ear congestion, fever, heartburn, hemoptysis, myalgias, postnasal drip, rash, sore throat, sweats or weight loss. The symptoms are aggravated by lying down (talking). She has tried OTC cough suppressant and prescription cough suppressant (Mucinex HBP, Promethazine DM) for the symptoms. The treatment provided no relief. Her past medical history is significant for asthma, bronchitis and pneumonia. There is no history of bronchiectasis, COPD, emphysema or environmental allergies.       Constitution: Negative for chills and fever.   HENT:  Positive for ear pain. Negative for postnasal drip and sore throat.    Cardiovascular:  Negative for chest pain.   Respiratory:  Positive for cough, shortness of breath and wheezing. Negative for bloody sputum.    Gastrointestinal:  Negative for heartburn.   Musculoskeletal:  Negative for muscle ache.   Skin:  Negative for rash.   Allergic/Immunologic: Negative for environmental allergies.   Neurological:  Positive for headaches.      Objective:     Physical Exam   Constitutional: She is " oriented to person, place, and time. She appears well-developed. She is cooperative.  Non-toxic appearance. She does not appear ill. No distress.   HENT:   Head: Normocephalic and atraumatic.   Ears:   Right Ear: Hearing and external ear normal.   Left Ear: Hearing and external ear normal.   Nose: Congestion present. No mucosal edema, rhinorrhea or nasal deformity. No epistaxis. Right sinus exhibits no maxillary sinus tenderness and no frontal sinus tenderness. Left sinus exhibits no maxillary sinus tenderness and no frontal sinus tenderness.      Comments: Nasal mucosa is erythematous and congested.  No sinus tenderness to percussion.    Mouth/Throat: Uvula is midline and mucous membranes are normal. No trismus in the jaw. Normal dentition. No uvula swelling. Posterior oropharyngeal erythema present. No oropharyngeal exudate or posterior oropharyngeal edema.   Eyes: Conjunctivae and lids are normal. No scleral icterus. Extraocular movement intact   Neck: Trachea normal and phonation normal. Neck supple. No edema present. No erythema present. No neck rigidity present.   Cardiovascular: Normal rate, regular rhythm, normal heart sounds and normal pulses.   Pulmonary/Chest: Effort normal and breath sounds normal. No respiratory distress. She has no decreased breath sounds. She has no wheezes. She has no rhonchi.   Abdominal: Normal appearance.   Musculoskeletal: Normal range of motion.         General: No deformity. Normal range of motion.   Neurological: She is alert and oriented to person, place, and time. She exhibits normal muscle tone. Coordination normal.   Skin: Skin is warm, dry, intact, not diaphoretic and not pale.   Psychiatric: Her speech is normal and behavior is normal. Judgment and thought content normal.   Nursing note and vitals reviewed.      Assessment:     1. Viral URI    2. Cough, unspecified type      Results for orders placed or performed in visit on 01/13/24   SARS Coronavirus 2 Antigen, POCT  Manual Read   Result Value Ref Range    SARS Coronavirus 2 Antigen Negative Negative     Acceptable Yes    POCT Influenza A/B Molecular   Result Value Ref Range    POC Molecular Influenza A Ag Negative Negative, Not Reported    POC Molecular Influenza B Ag Negative Negative, Not Reported     Acceptable Yes      *Note: Due to a large number of results and/or encounters for the requested time period, some results have not been displayed. A complete set of results can be found in Results Review.       Plan:       Viral URI  -     levocetirizine (XYZAL) 5 MG tablet; Take 1 tablet (5 mg total) by mouth every evening.  Dispense: 30 tablet; Refill: 0  -     fluticasone propionate (FLONASE) 50 mcg/actuation nasal spray; 1 spray (50 mcg total) by Each Nostril route once daily.  Dispense: 16 g; Refill: 0  -     promethazine-dextromethorphan (PROMETHAZINE-DM) 6.25-15 mg/5 mL Syrp; Take 5 mLs by mouth every 4 (four) hours as needed (cough).  Dispense: 180 mL; Refill: 0    Cough, unspecified type  -     SARS Coronavirus 2 Antigen, POCT Manual Read  -     POCT Influenza A/B Molecular  -     levocetirizine (XYZAL) 5 MG tablet; Take 1 tablet (5 mg total) by mouth every evening.  Dispense: 30 tablet; Refill: 0  -     fluticasone propionate (FLONASE) 50 mcg/actuation nasal spray; 1 spray (50 mcg total) by Each Nostril route once daily.  Dispense: 16 g; Refill: 0          Medical Decision Making:   Initial Assessment:   Cough    Differential Diagnosis:   Allergic rhinitis, URI: Flu, COVID, RSV  Clinical Tests:   Lab Tests: Ordered and Reviewed       <> Summary of Lab: Negative flu and COVID  Urgent Care Management:  Reviewed use of antihistamines, nasal steroid spray, saline drops as well as rest and hydration to deal with viral symptoms.  Patient verbalizes understanding of and agreement with this plan.  Sent prescriptions for Xyzal, Flonase, Phenergan DM

## 2024-01-26 ENCOUNTER — ANTI-COAG VISIT (OUTPATIENT)
Dept: CARDIOLOGY | Facility: CLINIC | Age: 74
End: 2024-01-26
Payer: MEDICARE

## 2024-01-26 DIAGNOSIS — D68.51 HETEROZYGOUS FACTOR V LEIDEN MUTATION: Chronic | ICD-10-CM

## 2024-01-26 DIAGNOSIS — Z79.01 ANTICOAGULATED ON COUMADIN: Primary | Chronic | ICD-10-CM

## 2024-01-26 LAB — INR PPP: 2 (ref 2–3)

## 2024-01-26 PROCEDURE — 93793 ANTICOAG MGMT PT WARFARIN: CPT | Mod: HCNC,S$GLB,,

## 2024-01-26 PROCEDURE — 85610 PROTHROMBIN TIME: CPT | Mod: QW,HCNC,S$GLB, | Performed by: INTERNAL MEDICINE

## 2024-01-26 NOTE — PROGRESS NOTES
INR remains therapeutic at 2.0.  Patient reports an use of cough syrup prn.  No other changes reported.  Confirms current warfarin dose - continue 7.5 mg every Tues, Thurs, Sat; and 5 mg on all other days.  Follow up in 1 month.  Dose calendar given - confirms understanding.

## 2024-01-31 DIAGNOSIS — Z78.0 MENOPAUSE: ICD-10-CM

## 2024-02-23 ENCOUNTER — ANTI-COAG VISIT (OUTPATIENT)
Dept: CARDIOLOGY | Facility: CLINIC | Age: 74
End: 2024-02-23
Payer: MEDICARE

## 2024-02-23 DIAGNOSIS — Z79.01 ANTICOAGULATED ON COUMADIN: Primary | Chronic | ICD-10-CM

## 2024-02-23 DIAGNOSIS — D68.51 HETEROZYGOUS FACTOR V LEIDEN MUTATION: Chronic | ICD-10-CM

## 2024-02-23 LAB — INR PPP: 2.3 (ref 2–3)

## 2024-02-23 PROCEDURE — 93793 ANTICOAG MGMT PT WARFARIN: CPT | Mod: HCNC,S$GLB,,

## 2024-02-23 PROCEDURE — 85610 PROTHROMBIN TIME: CPT | Mod: QW,HCNC,S$GLB, | Performed by: INTERNAL MEDICINE

## 2024-02-23 NOTE — PROGRESS NOTES
INR: 2.3 - remains therapeutic.  No change in dose - continue warfarin 7.5 mg every Tues, Thurs, Sat; and 5 mg on all other days.  Follow up in 1 month for recheck.  Dose calendar given - confirms understanding.

## 2024-03-22 ENCOUNTER — ANTI-COAG VISIT (OUTPATIENT)
Dept: CARDIOLOGY | Facility: CLINIC | Age: 74
End: 2024-03-22
Payer: MEDICARE

## 2024-03-22 DIAGNOSIS — D68.51 HETEROZYGOUS FACTOR V LEIDEN MUTATION: Chronic | ICD-10-CM

## 2024-03-22 DIAGNOSIS — Z79.01 ANTICOAGULATED ON COUMADIN: Primary | Chronic | ICD-10-CM

## 2024-03-22 LAB — INR PPP: 3.3 (ref 2–3)

## 2024-03-22 PROCEDURE — 85610 PROTHROMBIN TIME: CPT | Mod: QW,HCNC,S$GLB, | Performed by: INTERNAL MEDICINE

## 2024-03-22 PROCEDURE — 93793 ANTICOAG MGMT PT WARFARIN: CPT | Mod: HCNC,S$GLB,,

## 2024-03-22 NOTE — PROGRESS NOTES
INR is slightly above goal at 3.3.  Patient reports no medication or dietary changes.  Confirms current warfarin dose and followed.  No bleeding issues reported.  Instructions given:  Will lower today's dose to 2.5 mg, then re-challenge current dose of 7.5 mg every Tues, Thurs, Sat; and 5 mg on all other days.  Recheck INR in 3 weeks.  Bleeding precautions given - ED for any issues.  Dose calendar given and reviewed with patient.  Patient voices understanding of all instructions given.

## 2024-03-24 NOTE — PROGRESS NOTES
SUBJECTIVE:     Alyx Weir  MRN:  [id number]  74 y.o. female    CHIEF COMPLAINT:   Headache    HPI:    Mrs. Weir reports lingering headaches for the past few months, occurring more frequently.  No pain reported today.  HA come/go, severe/sharp, occurs in different areas/moved around.  Triggers unclear as the HA pain will occur when she is awake and does also wake her up from sleep.  Pain lasting just a few minutes, then resolves spontaneously.  Does take Tylenol when needed for HA pain.  No family h/o brain problems.      Review of Systems   Constitutional: Negative.    HENT:  Negative for congestion, ear pain, sinus pain and tinnitus.    Eyes:  Negative for blurred vision, double vision and pain.   Respiratory: Negative.     Cardiovascular: Negative.    Musculoskeletal:  Negative for back pain and neck pain.   Neurological:  Positive for headaches. Negative for dizziness, tingling, tremors, seizures, loss of consciousness and weakness.       Review of patient's allergies indicates:   Allergen Reactions    Erythromycin Edema     Angioedema to throat    Amlodipine Other (See Comments)     Headaches    Diazepam Hives     Other reaction(s): Unknown    Iodine Hives     Other reaction(s): Unknown    Meperidine Hives     Other reaction(s): Unknown    Morphine Itching    Methylprednisolone sod suc(pf) Other (See Comments)     headache    Primidone Other (See Comments)     Other reaction(s): Unknown       Patient Active Problem List   Diagnosis    Heterozygous factor V Leiden mutation    Essential hypertension    Pure hypercholesterolemia with target low density lipoprotein (LDL) cholesterol less than 130 mg/dL    VIRGIL on CPAP    Anticoagulated on Coumadin    Type 2 diabetes mellitus without complication    Overweight with body mass index (BMI) 25.0-29.9    Chronic lumbar radiculopathy    Coronary artery disease involving native coronary artery without angina pectoris    Left ventricular diastolic dysfunction with preserved  systolic function    COPD (chronic obstructive pulmonary disease)    Chondromalacia, right knee    Dyslipidemia    IGT (impaired glucose tolerance)    Bradycardia    Muscle cramp    Other chest pain    Statin intolerance    History of DVT (deep vein thrombosis)    Non-seasonal allergic rhinitis    Murmur, cardiac    Type II diabetes mellitus with neurological manifestations       Current Outpatient Medications   Medication Instructions    ACCU-CHEK GUIDE ME GLUCOSE MTR Misc USE TO CHECK BLOOD SUGAR TWICE DAILY    acetaminophen (TYLENOL ARTHRITIS ORAL) Oral, Takes prn    allopurinoL (ZYLOPRIM) 100 MG tablet TAKE 1 TABLET EVERY DAY    baclofen (LIORESAL) 10 mg, Oral, 3 times daily    clobetasoL (TEMOVATE) 0.05 % external solution APPLY SOLUTION TOPICALLY TO THE AFFECTED AREA ONCE DAILY (WILL NEED APPOINTMENT FOR FURTHER REFILLS)    ezetimibe (ZETIA) 10 mg, Oral, Daily    fluticasone propionate (FLONASE) 50 mcg, Each Nostril, Daily    gabapentin (NEURONTIN) 300 MG capsule Take 1 capsule (300 mg total) by mouth once daily AND 3 capsules (900 mg total) every evening.    glucosamine-chondroitin 500-400 mg tablet 1 tablet, Oral, 3 times daily    olmesartan-amLODIPin-hcthiazid 40-10-25 mg Tab TAKE 1 TABLET EVERY DAY    om 3/E/linol/ala/oleic/gla/lip (OMEGA 3-6-9 ORAL) 1 capsule, Oral, Daily    pantoprazole (PROTONIX) 40 mg, Oral, Daily PRN    solifenacin (VESICARE) 5 mg, Oral, Daily    spironolactone (ALDACTONE) 50 mg, Oral, 2 times daily    traMADoL (ULTRAM) 50 mg, Oral, Every 6 hours PRN    vitamin D (VITAMIN D3) 185 mg, Oral, Daily    warfarin (COUMADIN) 5 MG tablet Take 1 tablet by mouth on Sunday, Monday and Friday and 7.5 mg on all other days OR as directed by coumadin clinic         Past medical, surgical, family and social histories have been reviewed today.      OBJECTIVE:     Vitals:    03/25/24 1105   BP: 138/70   Pulse: 84   Resp: 18   Temp: 97.3 °F (36.3 °C)   TempSrc: Oral   SpO2: 98%   Weight: 79 kg (174 lb  "2.6 oz)   Height: 5' 4" (1.626 m)   PainSc: 0-No pain       Physical Exam    ASSESSMENT:     1. Chronic nonintractable headache, unspecified headache type  -     CT Head Without Contrast; Future; Expected date: 03/25/2024    2. Medication refill  -     pantoprazole (PROTONIX) 40 MG tablet; Take 1 tablet (40 mg total) by mouth daily as needed (gerd).  Dispense: 90 tablet; Refill: 0      PLAN:     Next A1c due on/after 5/29/24, scheduled for PCP f/u on this date.  CT ordered, pending.  RTC as directed and/or prn.        CORY Chaudhari  [facility]       25 minutes of total time spent on the encounter, which includes face to face time and non-face to face time preparing to see the patient.  This includes obtaining and/or reviewing separately obtained history, performing a medically appropriate examination and/or evaluation, and counseling and educating the patient/family/caregiver.  Includes documenting clinical information in the electronic or other health record, independently interpreting results (not separately reported) and communicating results to the patient/family/caregiver, with care coordination (not separately reported).  Medications, tests and/or procedures ordered as necessary along with referring and communicating with other health professionals (when not separately reported).    "

## 2024-03-25 ENCOUNTER — OFFICE VISIT (OUTPATIENT)
Dept: FAMILY MEDICINE | Facility: CLINIC | Age: 74
End: 2024-03-25
Payer: MEDICARE

## 2024-03-25 ENCOUNTER — HOSPITAL ENCOUNTER (OUTPATIENT)
Dept: RADIOLOGY | Facility: HOSPITAL | Age: 74
Discharge: HOME | End: 2024-03-25
Attending: REGISTERED NURSE
Payer: MEDICARE

## 2024-03-25 VITALS
HEIGHT: 64 IN | OXYGEN SATURATION: 98 % | SYSTOLIC BLOOD PRESSURE: 138 MMHG | WEIGHT: 174.19 LBS | BODY MASS INDEX: 29.74 KG/M2 | RESPIRATION RATE: 18 BRPM | HEART RATE: 84 BPM | DIASTOLIC BLOOD PRESSURE: 70 MMHG | TEMPERATURE: 97 F

## 2024-03-25 DIAGNOSIS — G89.29 CHRONIC NONINTRACTABLE HEADACHE, UNSPECIFIED HEADACHE TYPE: Primary | ICD-10-CM

## 2024-03-25 DIAGNOSIS — R51.9 CHRONIC NONINTRACTABLE HEADACHE, UNSPECIFIED HEADACHE TYPE: ICD-10-CM

## 2024-03-25 DIAGNOSIS — R51.9 CHRONIC NONINTRACTABLE HEADACHE, UNSPECIFIED HEADACHE TYPE: Primary | ICD-10-CM

## 2024-03-25 DIAGNOSIS — G89.29 CHRONIC NONINTRACTABLE HEADACHE, UNSPECIFIED HEADACHE TYPE: ICD-10-CM

## 2024-03-25 DIAGNOSIS — Z76.0 MEDICATION REFILL: ICD-10-CM

## 2024-03-25 PROCEDURE — 3288F FALL RISK ASSESSMENT DOCD: CPT | Mod: HCNC,CPTII,S$GLB, | Performed by: REGISTERED NURSE

## 2024-03-25 PROCEDURE — 1126F AMNT PAIN NOTED NONE PRSNT: CPT | Mod: HCNC,CPTII,S$GLB, | Performed by: REGISTERED NURSE

## 2024-03-25 PROCEDURE — 1159F MED LIST DOCD IN RCRD: CPT | Mod: HCNC,CPTII,S$GLB, | Performed by: REGISTERED NURSE

## 2024-03-25 PROCEDURE — 3078F DIAST BP <80 MM HG: CPT | Mod: HCNC,CPTII,S$GLB, | Performed by: REGISTERED NURSE

## 2024-03-25 PROCEDURE — 1101F PT FALLS ASSESS-DOCD LE1/YR: CPT | Mod: HCNC,CPTII,S$GLB, | Performed by: REGISTERED NURSE

## 2024-03-25 PROCEDURE — 70450 CT HEAD/BRAIN W/O DYE: CPT | Mod: TC,HCNC

## 2024-03-25 PROCEDURE — 1160F RVW MEDS BY RX/DR IN RCRD: CPT | Mod: HCNC,CPTII,S$GLB, | Performed by: REGISTERED NURSE

## 2024-03-25 PROCEDURE — 99213 OFFICE O/P EST LOW 20 MIN: CPT | Mod: HCNC,S$GLB,, | Performed by: REGISTERED NURSE

## 2024-03-25 PROCEDURE — 70450 CT HEAD/BRAIN W/O DYE: CPT | Mod: 26,HCNC,, | Performed by: RADIOLOGY

## 2024-03-25 PROCEDURE — 3075F SYST BP GE 130 - 139MM HG: CPT | Mod: HCNC,CPTII,S$GLB, | Performed by: REGISTERED NURSE

## 2024-03-25 PROCEDURE — 99999 PR PBB SHADOW E&M-EST. PATIENT-LVL V: CPT | Mod: PBBFAC,HCNC,, | Performed by: REGISTERED NURSE

## 2024-03-25 PROCEDURE — 3008F BODY MASS INDEX DOCD: CPT | Mod: HCNC,CPTII,S$GLB, | Performed by: REGISTERED NURSE

## 2024-03-25 RX ORDER — PANTOPRAZOLE SODIUM 40 MG/1
40 TABLET, DELAYED RELEASE ORAL DAILY PRN
Qty: 90 TABLET | Refills: 0 | Status: SHIPPED | OUTPATIENT
Start: 2024-03-25

## 2024-03-28 NOTE — PROGRESS NOTES
Subjective:       Patient ID: Alyx Weir is a 66 y.o. female.    Chief Complaint: Sleep Apnea (rev compliance)    HPI   Alyx Weir presents for review of CPAP compliance. Information from CPAP machine downloaded and reviewed. Summary of compliance report is as follows:  Percent days with usage: 23 %  Average usage on days used: 6 hours 3 minutes.  Percent of days used > 4 hours : 23 %   device pressure: 10 cmH2O  Average time in large leak per day: 0 minutes  Average AHI: 1.9    Patient has complaints of none  Patient is using CPAP as prescribed and benefiting from therapy.    Past Medical History   Diagnosis Date    Arthritis     Asthma     Clotting disorder     Coronary artery disease     Deep vein thrombosis     Degenerative disc disease     Diabetes mellitus type I 2011     BS last tested x 1 month not sure what it was.    Heterozygous factor V Leiden mutation     Hx of colonic polyps 11/13/2015    Hyperlipidemia     Hypertension     VIRIGL (obstructive sleep apnea)     Stenosis of right carotid artery 12/13/2016     Past Surgical History   Procedure Laterality Date    Hysterectomy      Back surgery      Bladder surgery      Cardiac catheterization  03/2013     mild CAD    Colonoscopy N/A 11/13/2015     Procedure: COLONOSCOPY;  Surgeon: Jas Umanzor III, MD;  Location: [facility];  Service: Endoscopy;  Laterality: N/A;    Oophorectomy      Bladder cyst removal      Lumbar spine surgery       bone spurs removed     Review of Systems   Respiratory: Positive for apnea.    All other systems reviewed and are negative.        Objective:      Physical Exam   Constitutional: She is oriented to person, place, and time. She appears well-developed and well-nourished.   HENT:   Head: Normocephalic and atraumatic.   Eyes: Conjunctivae are normal. Pupils are equal, round, and reactive to light.   Neck: Neck supple. No JVD present. No tracheal deviation present. No thyromegaly present.    Cardiovascular: Normal rate, regular rhythm and normal heart sounds.    Pulmonary/Chest: Effort normal and breath sounds normal. No respiratory distress. She has no wheezes. She has no rales. She exhibits no tenderness.   Abdominal: Soft. Bowel sounds are normal.   Musculoskeletal: Normal range of motion. She exhibits no edema.   Lymphadenopathy:     She has no cervical adenopathy.   Neurological: She is alert and oriented to person, place, and time.   Skin: Skin is warm and dry.   Nursing note and vitals reviewed.    Personal Diagnostic Review  PSG: significant for  VIRGIL  No flowsheet data found.    [facility], [facility]  Report of CPAP Evaluation  Patient Name: Alyx Weir Date of Report: 12 Date of PS2012  [facility] Clinic No.: [id number] : 1950 Time of PSG: 10:39:14 PM - 05:05:02 AM  Sex: F Age: 62 Weight: 197 lbs Height: 5 6 Type of PSG: CPAP titration  REASONS FOR REFERRAL: Ms Weir s most recent diagnostic polysomnography (3-6-12) revealed an Apnea + Hypopnea Index = 13.8 events / hr asleep, mild  obstructive sleep apnea / hypopnea syndrome. CPAP titration was recommended, and Dr. Claude Tellis determined it was warranted. Dr. Carl Clemons is the  patient s primary care physician.  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  1. The CPAP titration polysomnography revealed that 10 cm H2O pressure (C Flex 3, humidity 2), the highest pressure fully tested with REM sleep, was optimal, as it reduced  the rate of respiratory events 96 % to A  + H Index = 0.5 events / hr asleep in 1.9 hrs sleep (0.5 hrs REM sleep). The overall A + H Index was 5.0 / hr asleep, with only 1.1  respiratory event - related arousals / hr asleep for the titration trial. The mean SaO2 during events improved greatly to 93.0 %, moderate (lowest SaO2 during events = 90 %,  waking baseline SaO2 = 96 %). Snoring was eliminated at all pressures. Lower and higher pressures also could be successful (8 cm A + H I = 4.7, with 0.5 hrs  REM sleep in 1.7 hrs sleep; 12 cm A + H I = 0.0, with no REM sleep in 0.9 hrs sleep). AutoCPAP (4 cm 20 cm) could be tried if necessary. A medium ResMed  Montero nasal pillow CPAP mask was used and was well tolerated.  2. A device to discourage sleep in the supine position is recommended, to further reduce respiratory events and snoring (respiratory events had a strong supine positional  tendency pre ?? CPAP and during CPAP).  3. There were no PLMS in this study, but a mild PLM disorder was observed in the dx PSG of 3-6-12, suggesting either effective treatment, or no treatment and variable  expression of the disorder. Please see the relevant treatment recommendations from the most recent dx PSG, below.  The following treatment recommendations from the Sleep Disorder Evaluation of 3-8-12 likely still apply:  PRIMARY TREATMENT RECOMMENDATIONS Treat, or refer to Sleep Disorders Center.  1. As respiratory events had a strong supine positional tendency, a device to discourage sleep in the supine position is recommended to reduce respiratory events and snoring.  2. Weight loss to the normal range is recommended as it can decrease respiratory events and snoring in overweight patients.  3. A mild PLM disorder was observed (PLMS Index = 18.9 / hr sleep). Given that Ms Queen vale PLMS were moderately disruptive of sleep (8.5 PLMS related arousals / hr asleep),  and, as there was SDI evidence of restless legs syndrome (which can be treated with the same medications as  PLMS), consider treatment of PLM disorder and restless legs  syndrome if RLS is confirmed, or if PLMS symptoms are sufficiently bothersome to the patient. The disorder is variable across PSGs.  4. The following changes in sleep hygiene / sleep - related behavior are recommended after medical treatments are successful  ? Avoid caffeinated beverages after 6:00 pm or within 4 hours of bedtime.  ? Avoid meals or large snacks within 3 hours of bedtime.  ? Only relaxing activities 1 - 2 hrs before bedtime (no work).  SECONDARY TREATMENT RECOMMENDATIONS Treat, or refer to SDC if problems are not satisfactorily resolved by the above.  1. If insomnia persists after treatments for medical sleep disorders have proven effective, and RLS has been ruled out or effectively treated, recommend follow - up inquiry  regarding frequency, duration and nature of reported sleep onset and sleep maintenance problems (stress related and / or conditioned psychophysiological insomnia) and  referral for behavioral treatment of insomnia, as indicated. Please see SDI.  2. Consider a referral for a dental examination and possible dental splint for sleep bruxism.  3. Also consider behavioral and cognitive / behavioral treatments for stress; sleep bruxism might be expected to improve.  4. Follow - up inquiry regarding sleep disruption secondary to pain or other physical discomfort (please see SDI).  5. Follow - up inquiry regarding possible REM sleep behavior disorder (please see SDI).  See below for a complete interpretation of data from the polysomnography and Sleep Disorders Inventory.  Thank you for referring this patient to the [facility].  Yvan Maharaj, Ph.D., ABPP; Diplomate, American Board of Sleep Medicine  INTERPRETATION OF DATA FROM POLYSOMNOGRAPHY AND SLEEP DISORDERS INVENTORY  NOTE: The polysomnography electrophysiological record for this patient has been reviewed in its entirety by Dr. Maharaj.  SLEEP QUANTITY:  Sufficient time asleep for evaluation of CPAP treatment (5.6 hrs). Good sleep efficiency (86.7 %), with difficulty falling asleep (32 min) but no trouble  maintaining sleep (awake 7.5 % of time after sleep onset). Much improved over dx PSG.  ? There was no evidence of hypersomnolence, despite sleep deprivation the prior night (6 hrs sleep), a finding suggestive of stress - related psychophysiological  insomnia; though, difficulty adjusting to the novel CPAP treatment stimulus can produce these same findings, Ms Weir had no problem tolerating or  sleeping well with the treatment.  ? The good sleep maintenance is inconsistent with Ms Weir s Sleep Disorders Inventory (SDI), where she reports often having sleep onset and sleep  maintenance insomnia at home. Better sleep during a CPAP titration PSG than at home is an indicator of successful CPAP treatment.  SLEEP QUALITY: Restorative sleep due to good poor sleep continuity and very good sleep depth. Improved over dx PSG.  Patient Name: ROJAS WEIR  MRN: [id number]  : 1950 Sex: F  ? Sleep Continuity: Normal 11.5 transient arousals / hr asleep, 19.7 sleep stage changes / hr asleep, 1.6 awakenings / hr.  ? Sleep Depth: Normal 6.2 % Stage 1 NREM sleep, high 32.5 % slow wave sleep.  RAPID EYE MOVEMENT SLEEP: Abnormal REM sleep architecture. Improved over dx PSG.  ? Normal 19.4 % of the sleep period was REM sleep? normal REM sleep onset, 47.5 min after sleep onset.  SLEEP - RELATED RESPIRATORY DISORDERS with CPAP: See treatment recommendations, above, for summary and conclusions  ? Overall, Ms Weir had 28 hypopneas, 0 obstructive sleep apneas, 0 central sleep apneas and 0 mixed sleep apneas, in 5.6 hrs asleep during the 6.4 hr titration.  ? Respiratory events had a strong supine positional tendency (89.3 % of events were supine, but only 23.9 % of sleep was supine; supine sleep RDI / other position  RDI = 18.8 / 0.7).  ? Respiratory events had a strong REM  sleep tendency (71.4 % of events were in REM sleep, 20.9 % of sleep was REM sleep; REM sleep RDI / NREM sleep RDI  = 17.1 / 1.8).  Ms Weir had difficulty falling asleep with CPAP initially (32 min to sleep onset), but she adjusted well to it over time (normal rate of spontaneous transient arousals, normal  time awake after sleep onset). The polysomnography technician observed that the patient tolerated CPAP well. A medium ResMed Montero nasal pillow CPAP mask was used.  EKG (Scoring Technician): No EKG abnormalities were observed.  SLEEP - RELATED MOVEMENT DISORDERS: Ms Weir had no PLMS during CPAP titration, though she evidenced a mild PLM disorder in her 3-6-12 dx PSG (PLMS Index =  18.9PLMS / hr asleep, with 8.5 arousals / hr asleep), suggesting either successful treatment, or no treatment and variable expression of the disorder .  SPONTANEOUS TRANSIENT AROUSALS: The rate of STA was normal during CPAP (10.4 STA / hr asleep, 90.6 % of arousals), consistent with good adjustment to CPAP during  sleep.  EVIDENCE OF OTHER SLEEP DISORDERS: See Sleep Disorder Evaluation of 3-8-12  MALADAPTIVE SLEEP - RELATED BEHAVIOR: See Sleep Disorder Evaluation of 3-8-12  STRESS: See Sleep Disorder Evaluation of 3-8-12  Electronically signed by Yvan Maharaj, PH.D., [facility] 5/25/2012 12:39:23 PM  Assessment:       1. VIRGIL on CPAP        Outpatient Encounter Prescriptions as of 1/16/2017   Medication Sig Dispense Refill    biotin 300 mcg Tab Take 1 tablet by mouth once daily.      blood sugar diagnostic (ACCU-CHEK SMARTVIEW TEST STRIP) Strp 1 strip by Misc.(Non-Drug; Combo Route) route once daily. 100 each 3    calcium-magnesium 300-300 mg Tab Take 1 tablet by mouth Once daily as needed.      carvedilol (COREG) 6.25 MG tablet Take 1 tablet (6.25 mg total) by mouth 2 (two) times daily with meals. 180 tablet 2    diclofenac sodium 1 % Gel Apply 2 g topically daily as needed. 3 Tube 2    gabapentin (NEURONTIN) 100 MG capsule Take 1  capsule (100 mg total) by mouth 3 (three) times daily. 90 capsule 2    hydrALAZINE (APRESOLINE) 25 MG tablet Take 1 tablet (25 mg total) by mouth 3 (three) times daily. 270 tablet 2    lancets 31 gauge Misc 1 lancet by Misc.(Non-Drug; Combo Route) route once daily. 100 each 3    olmesartan-amlodipin-hcthiazid (TRIBENZOR) 40-10-25 mg Tab Take 1 tablet by mouth once daily. 90 tablet 2    pravastatin (PRAVACHOL) 40 MG tablet Take 1 tablet (40 mg total) by mouth once daily. 90 tablet 2    tizanidine (ZANAFLEX) 2 MG tablet Take 1-2 tablets (2-4 mg total) by mouth nightly as needed. 60 tablet 2    tramadol (ULTRAM) 50 mg tablet Take 2 tablets by mouth 4 (four) times daily as needed.      vitamin D 1000 units Tab Take 185 mg by mouth once daily.      warfarin (COUMADIN) 10 MG tablet TAKE ONE TABLET BY MOUTH IN THE EVENING ON  MONDAY,  WED,  AND  FRIDAY  AND  ONE-HALF  TABLET  ON  TUE,THURS,  SAT,  AND  SUNDAY 90 tablet 3     No facility-administered encounter medications on file as of 1/16/2017.      Orders Placed This Encounter   Procedures    CPAP/BIPAP SUPPLIES     [facility] for CPAP/Oxygen/Nebulizer supplies.  Customer Service: [phone]  Call: [phone]  Fax: [phone]  Billing Inquiries: [phone] or [phone]       Order Specific Question:   Type of mask:     Answer:   Nasal     Order Specific Question:   Headgear?     Answer:   Yes     Order Specific Question:   Tubing?     Answer:   Yes     Order Specific Question:   Humidifier chamber?     Answer:   Yes     Order Specific Question:   Chin strap?     Answer:   Yes     Order Specific Question:   Filters?     Answer:   Yes     Order Specific Question:   Length of need (1-99 months):     Answer:   99     Plan:     Follow-up in 1 year, sooner should new symptoms or problems arise.    Return in about 1 year (around 1/16/2018) for CPAP download.   Patient tolerated procedure well.

## 2024-04-03 ENCOUNTER — PATIENT MESSAGE (OUTPATIENT)
Dept: PULMONOLOGY | Facility: CLINIC | Age: 74
End: 2024-04-03
Payer: MEDICARE

## 2024-04-08 ENCOUNTER — TELEPHONE (OUTPATIENT)
Dept: DERMATOLOGY | Facility: CLINIC | Age: 74
End: 2024-04-08
Payer: MEDICARE

## 2024-04-08 NOTE — TELEPHONE ENCOUNTER
Attempted to return call. No answer. Message left to return call  ----- Message from Veronika Raymond sent at 4/8/2024 10:47 AM CDT -----  Contact: Alyx  Patient is calling to schedule a appointment for follow up/ med refill. Patient is requesting a Friday appointment. Nothing to schedule on my end. Please call  [phone]

## 2024-04-12 ENCOUNTER — ANTI-COAG VISIT (OUTPATIENT)
Dept: CARDIOLOGY | Facility: CLINIC | Age: 74
End: 2024-04-12
Payer: MEDICARE

## 2024-04-12 DIAGNOSIS — D68.51 HETEROZYGOUS FACTOR V LEIDEN MUTATION: Chronic | ICD-10-CM

## 2024-04-12 DIAGNOSIS — Z79.01 ANTICOAGULATED ON COUMADIN: Primary | Chronic | ICD-10-CM

## 2024-04-12 LAB — INR PPP: 2.5 (ref 2–3)

## 2024-04-12 PROCEDURE — 85610 PROTHROMBIN TIME: CPT | Mod: QW,HCNC,S$GLB, | Performed by: INTERNAL MEDICINE

## 2024-04-12 PROCEDURE — 93793 ANTICOAG MGMT PT WARFARIN: CPT | Mod: HCNC,S$GLB,,

## 2024-04-12 NOTE — PROGRESS NOTES
INR is therapeutic at 2.5.  Patient reports no recent changes.  Previous warfarin instructions were verified and followed.  Will continue 7.5 mg every Tues, Thurs, Sat; and 5 mg all other days.  Follow up in 1 month.  Dose calendar given - confirms understanding.

## 2024-04-15 RX ORDER — OLMESARTAN MEDOXOMIL, AMLODIPINE AND HYDROCHLOROTHIAZIDE TABLET 40/10/25 MG 40; 10; 25 MG/1; MG/1; MG/1
TABLET ORAL
Qty: 90 TABLET | Refills: 0 | Status: SHIPPED | OUTPATIENT
Start: 2024-04-15

## 2024-04-15 NOTE — TELEPHONE ENCOUNTER
Refill Routing Note   Medication(s) are not appropriate for processing by [facility] for the following reason(s):        Drug-drug interaction    [facility] action(s):  Defer   Requires labs : Yes      Medication Therapy Plan: ALDACTONE    Pharmacist review requested: Yes     Appointments  past 12m or future 3m with PCP    Date Provider   Last Visit   11/29/2023 Carl Clemons MD   Next Visit   5/29/2024 Carl Clemons MD   ED visits in past 90 days: 0        Note composed:7:52 AM 04/15/2024

## 2024-04-15 NOTE — TELEPHONE ENCOUNTER
Provider Staff:  Action required for this patient     Please see care gap opportunities below in Care Due Message:   Uric acid outdated  CBC & CMP due 5/23/24    Thanks!  [facility]     Appointments      Date Provider   Last Visit   11/29/2023 Carl Clemons MD   Next Visit   5/29/2024 Carl Clemons MD     Refill Decision Note   Alyx Weir  is requesting a refill authorization.  Brief Assessment and Rationale for Refill:  Approve     Medication Therapy Plan:         Pharmacist review requested: Yes   Extended chart review required: Yes   Comments:     Note composed:10:40 AM 04/15/2024

## 2024-04-15 NOTE — TELEPHONE ENCOUNTER
Care Due:                  Date            Visit Type   Department     Provider  --------------------------------------------------------------------------------                                EP -                              PRIMARY      [facility]  Last Visit: 11-      CARE ([facility])   DENAE Clemons                              EP -                              PRIMARY      [facility]  Next Visit: 05-      CARE ([facility])   DENAE Clemons                                                            Last  Test          Frequency    Reason                     Performed    Due Date  --------------------------------------------------------------------------------    CBC.........  12 months..  allopurinoL..............  05- 05-    CMP.........  12 months..  allopurinoL, ezetimibe,    05- 05-                             olmesartan-amLODIPin-hcth                             iazid....................    Uric Acid...  12 months..  allopurinoL..............  Not Found    Overdue    [employer] Embedded Care Due Messages. Reference number: [id number].   4/15/2024 2:32:09 AM CDT

## 2024-05-06 ENCOUNTER — ANTI-COAG VISIT (OUTPATIENT)
Dept: CARDIOLOGY | Facility: CLINIC | Age: 74
End: 2024-05-06
Payer: MEDICARE

## 2024-05-06 DIAGNOSIS — Z79.01 ANTICOAGULATED ON COUMADIN: Primary | Chronic | ICD-10-CM

## 2024-05-06 DIAGNOSIS — D68.51 HETEROZYGOUS FACTOR V LEIDEN MUTATION: Chronic | ICD-10-CM

## 2024-05-06 LAB — INR PPP: 1.2 (ref 2–3)

## 2024-05-06 PROCEDURE — 93793 ANTICOAG MGMT PT WARFARIN: CPT | Mod: HCNC,S$GLB,,

## 2024-05-06 PROCEDURE — 85610 PROTHROMBIN TIME: CPT | Mod: QW,HCNC,S$GLB, | Performed by: INTERNAL MEDICINE

## 2024-05-06 NOTE — PROGRESS NOTES
INR not at goal. Medications, chart, and patient findings reviewed. See calendar for adjustments to dose and follow up plan.  Boost with 10 mg today, then resume dose.  INR was previously in range or above on this dose.  Repeat INR in 2 weeks.

## 2024-05-06 NOTE — PROGRESS NOTES
INR is subtherapeutic at 1.2.  Patient reports she doesn't recall any missed doses.  Reports diet has been consistent.  No s/s reported.  Will send to PharmD for dosing.

## 2024-05-17 ENCOUNTER — OFFICE VISIT (OUTPATIENT)
Dept: PULMONOLOGY | Facility: CLINIC | Age: 74
End: 2024-05-17
Payer: MEDICARE

## 2024-05-17 VITALS
OXYGEN SATURATION: 96 % | HEART RATE: 53 BPM | RESPIRATION RATE: 15 BRPM | SYSTOLIC BLOOD PRESSURE: 130 MMHG | WEIGHT: 173.94 LBS | BODY MASS INDEX: 29.7 KG/M2 | HEIGHT: 64 IN | DIASTOLIC BLOOD PRESSURE: 80 MMHG

## 2024-05-17 DIAGNOSIS — I10 ESSENTIAL HYPERTENSION: Chronic | ICD-10-CM

## 2024-05-17 DIAGNOSIS — G47.33 OSA ON CPAP: Primary | ICD-10-CM

## 2024-05-17 DIAGNOSIS — R01.1 MURMUR, CARDIAC: ICD-10-CM

## 2024-05-17 DIAGNOSIS — E11.49 TYPE II DIABETES MELLITUS WITH NEUROLOGICAL MANIFESTATIONS: ICD-10-CM

## 2024-05-17 DIAGNOSIS — J30.89 NON-SEASONAL ALLERGIC RHINITIS DUE TO OTHER ALLERGIC TRIGGER: ICD-10-CM

## 2024-05-17 DIAGNOSIS — J44.9 CHRONIC OBSTRUCTIVE PULMONARY DISEASE, UNSPECIFIED COPD TYPE: Chronic | ICD-10-CM

## 2024-05-17 DIAGNOSIS — E66.3 OVERWEIGHT WITH BODY MASS INDEX (BMI) 25.0-29.9: ICD-10-CM

## 2024-05-17 PROCEDURE — 99214 OFFICE O/P EST MOD 30 MIN: CPT | Mod: HCNC,S$GLB,, | Performed by: NURSE PRACTITIONER

## 2024-05-17 PROCEDURE — 1159F MED LIST DOCD IN RCRD: CPT | Mod: HCNC,CPTII,S$GLB, | Performed by: NURSE PRACTITIONER

## 2024-05-17 PROCEDURE — 3288F FALL RISK ASSESSMENT DOCD: CPT | Mod: HCNC,CPTII,S$GLB, | Performed by: NURSE PRACTITIONER

## 2024-05-17 PROCEDURE — 3079F DIAST BP 80-89 MM HG: CPT | Mod: HCNC,CPTII,S$GLB, | Performed by: NURSE PRACTITIONER

## 2024-05-17 PROCEDURE — 99999 PR PBB SHADOW E&M-EST. PATIENT-LVL IV: CPT | Mod: PBBFAC,HCNC,, | Performed by: NURSE PRACTITIONER

## 2024-05-17 PROCEDURE — 1101F PT FALLS ASSESS-DOCD LE1/YR: CPT | Mod: HCNC,CPTII,S$GLB, | Performed by: NURSE PRACTITIONER

## 2024-05-17 PROCEDURE — 3008F BODY MASS INDEX DOCD: CPT | Mod: HCNC,CPTII,S$GLB, | Performed by: NURSE PRACTITIONER

## 2024-05-17 PROCEDURE — 1160F RVW MEDS BY RX/DR IN RCRD: CPT | Mod: HCNC,CPTII,S$GLB, | Performed by: NURSE PRACTITIONER

## 2024-05-17 PROCEDURE — 3075F SYST BP GE 130 - 139MM HG: CPT | Mod: HCNC,CPTII,S$GLB, | Performed by: NURSE PRACTITIONER

## 2024-05-17 NOTE — PROGRESS NOTES
Subjective:      Patient ID: Alyx Weir is a 74 y.o. female.    Chief Complaint: Sleep Apnea and COPD    HPI: Alyx Weir is here for follow up for VIRGIL and CPAP complaince assessment after replacing CPAP machine  She is on CPAP of 9 cmH2O pressure which is optimally controlling sleep apnea with apneic index (AHI) 2.2 events an hour.   Complaince download today reveals 32% of days with greater than 4 hours of device use, neglects putting on before falling asleep, mostly on nights she falls asleep in her recliner.   Patient reports benefit from CPAP use and denies snoring and excessive daytime sleepiness.  Patient reports no complaints. Nasal pillows mask is used.     Replacement CPAP:  [facility] WITH Modem  Set Up Date: 3/17/23    2/20/2023 order CPAP 9 cm nasal pillows mask    2/13/2023 cpap titration CPAP 9 cm optimal    1/21/2023 PSG Overall AHI was 7.8/hr Mild.  REM AHI was 26.3/hr    5/21/2012 The CPAP titration polysomnography revealed that 10 cm H2O pressure optimal.     3/6/2012 The diagnostic polysomnography revealed a mild obstructive sleep apnea / hypopnea syndrome (A + H Index = 13.8 events / hr asleep; 5.3 arousals / hr asleep), with a mean SaO2 during events= 88.9 %, significant; lowest SaO2 during events= 80.4 %, waking baseline SaO2 = 96 %. Infrequent, mild snoring was noted.     3/11/2010 The diagnostic polysomnography revealed a mild obstructive sleep apnea / hypopnea syndrome (A + H Index = 11.3 events / hr asleep.     History of asthma as adult in 2020, no continued breathing problems. Not on inhalers.    Compliance  Payor Standard  Usage 02/14/2024 - 05/13/2024  Usage days 36/90 days (40%)  >= 4 hours 29 days (32%)  < 4 hours 7 days (8%)  Usage hours 202 hours 6 minutes  Average usage (total days) 2 hours 15 minutes  Average usage (days used) 5 hours 37 minutes  Median usage (days used) 5 hours 12 minutes  Total used hours (value since last reset - 05/13/2024) 1,140  hours  AirSense 11 AutoSet  Serial number [id number]  Mode CPAP  Set pressure 9 cmH2O  EPR Fulltime  EPR level 3  Therapy  Leaks - L/min Median: 0.1 95th percentile: 5.0 Maximum: 34.1  Events per hour AI: 0.7 HI: 0.4 AHI: 1.1  Apnea Index Central: 0.0 Obstructive: 0.6 Unknown: 0.0  RERA Index 0.0      [place] Sleepiness Scale       5/17/2024     8:25 AM 5/19/2023     8:44 AM 1/13/2023     9:18 AM 6/29/2020    10:00 AM 3/30/2020    11:00 AM 4/19/2018     9:00 AM 3/2/2018     9:00 AM   EPWORTH SLEEPINESS SCALE   Sitting and reading 3 2 0 1 2 1 2   Watching TV 3 2 2 1 0 1 3   Sitting, inactive in a public place (e.g. a theatre or a meeting) 0 0 0 0 0 0 0   As a passenger in a car for an hour without a break 0 0 0 0 0 0 0   Lying down to rest in the afternoon when circumstances permit 3 3 2 2 1 0 2   Sitting and talking to someone 0 0 0 0 0 0 0   Sitting quietly after a lunch without alcohol 0 0 0 0 0 0 0   In a car, while stopped for a few minutes in traffic 0 0 0 0 0 0 0   Total score 9 7 4 4 3 2 7      Previous Report Reviewed: lab reports and office notes     Past Medical History: The following portions of the patient's history were reviewed and updated as appropriate:   She  has a past surgical history that includes Back surgery; Bladder surgery; Cardiac catheterization (03/2013); Colonoscopy (N/A, 11/13/2015); Oophorectomy; bladder cyst removal; Lumbar spine surgery; Hysterectomy; and Selective injection of anesthetic agent around lumbar spinal nerve root by transforaminal approach (Bilateral, 06/03/2022).  Her family history includes Cataracts in her mother; Diabetes in her father, paternal uncle, sister, and sister; Glaucoma in her sister; Heart disease in her mother; Hypertension in her brother, mother, sister, sister, and sister.  She  reports that she has never smoked. She has been exposed to tobacco smoke. She has never used smokeless tobacco. She reports that she does not drink alcohol and does not use  "drugs.  She has a current medication list which includes the following prescription(s): accu-chek guide me glucose mtr, acetaminophen, allopurinol, baclofen, clobetasol, fluticasone propionate, gabapentin, glucosamine-chondroitin, olmesartan-amlodipin-hcthiazid, fish oil/borage/flax/om3,6,9 1, pantoprazole, spironolactone, tramadol, vitamin d, warfarin, ezetimibe, and solifenacin.  She is allergic to erythromycin, amlodipine, diazepam, iodine, meperidine, morphine, methylprednisolone sod suc(pf), and primidone.    The following portions of the patient's history were reviewed and updated as appropriate: allergies, current medications, past family history, past medical history, past social history, past surgical history and problem list.    Review of Systems   Constitutional:  Negative for fever, chills, weight loss, weight gain, activity change, appetite change, fatigue and night sweats.   HENT:  Negative for postnasal drip, rhinorrhea, sinus pressure, voice change and congestion.    Eyes:  Negative for redness and itching.   Respiratory:  Negative for snoring, cough, sputum production, chest tightness, shortness of breath, wheezing, orthopnea, asthma nighttime symptoms, dyspnea on extertion, use of rescue inhaler and somnolence.    Cardiovascular: Negative.  Negative for chest pain, palpitations and leg swelling.   Genitourinary:  Negative for difficulty urinating and hematuria.   Endocrine:  Negative for cold intolerance and heat intolerance.    Musculoskeletal:  Negative for arthralgias, gait problem, joint swelling and myalgias.   Skin: Negative.    Gastrointestinal:  Negative for nausea, vomiting, abdominal pain and acid reflux.   Neurological:  Negative for dizziness, weakness, light-headedness and headaches.   Hematological:  Negative for adenopathy. No excessive bruising.   All other systems reviewed and are negative.     Objective:   /80   Pulse (!) 53   Resp 15   Ht 5' 4" (1.626 m)   Wt 78.9 kg " (173 lb 15.1 oz)   SpO2 96%   BMI 29.86 kg/m²   Physical Exam  Vitals reviewed.   Constitutional:       General: She is not in acute distress.     Appearance: She is well-developed. She is not ill-appearing or toxic-appearing.   HENT:      Head: Normocephalic.      Right Ear: External ear normal.      Left Ear: External ear normal.      Nose: Nose normal.      Mouth/Throat:      Pharynx: No oropharyngeal exudate.   Eyes:      Conjunctiva/sclera: Conjunctivae normal.   Cardiovascular:      Rate and Rhythm: Normal rate and regular rhythm.      Heart sounds: Murmur heard.      Systolic murmur is present.   Pulmonary:      Effort: Pulmonary effort is normal.      Breath sounds: Normal breath sounds. No stridor.   Abdominal:      Palpations: Abdomen is soft.   Musculoskeletal:         General: Normal range of motion.      Cervical back: Normal range of motion and neck supple.   Lymphadenopathy:      Cervical: No cervical adenopathy.   Skin:     General: Skin is warm and dry.   Neurological:      Mental Status: She is alert and oriented to person, place, and time.   Psychiatric:         Behavior: Behavior normal. Behavior is cooperative.         Thought Content: Thought content normal.         Judgment: Judgment normal.         Personal Diagnostic Review  CPAP download      CT Head Without Contrast  Narrative: EXAMINATION:  CT HEAD WITHOUT CONTRAST    CLINICAL HISTORY:  chronic headaches, increased frequency; Headache, unspecified    TECHNIQUE:  Axial CT images obtained throughout the head without intravenous contrast.    COMPARISON:  None.    FINDINGS:  Negative for acute hemorrhage, mass effect, extraaxial collection, hydrocephalus.    Mild patchy low-density changes throughout the white matter typical of chronic small vessel ischemic changes.  Calcified plaque skull-base vasculature..    The paranasal sinuses and mastoids are clear.    The calvarium is unremarkable with no fractures.  Impression: Negative for acute  intracranial abnormality.    All CT scans at this facility are performed  using dose modulation techniques as appropriate to performed exam including the following:  automated exposure control; adjustment of mA and/or kV according to the patients size (this includes techniques or standardized protocols for targeted exams where dose is matched to indication/reason for exam: i.e. extremities or head);  iterative reconstruction technique.    Electronically signed by: Santino Ellis MD  Date:    03/25/2024  Time:    14:15      Assessment:     1. VIRGIL on CPAP    2. Chronic obstructive pulmonary disease, unspecified COPD type    3. Overweight with body mass index (BMI) 25.0-29.9    4. Essential hypertension    5. Non-seasonal allergic rhinitis due to other allergic trigger    6. Type II diabetes mellitus with neurological manifestations    7. Murmur, cardiac        Orders Placed This Encounter   Procedures    CPAP/BIPAP SUPPLIES     Benefits and compliant  90 day supply. 4 refills  HME: [facility]     Order Specific Question:   Length of need (1-99 months):     Answer:   99     Order Specific Question:   Choose ONE mask type and its corresponding cushions and/or pillows:     Answer:    Nasal Mask, 1 per 90 days:  Nasal Cushions, (6 per 90 days):  Nasal Pillows, (6 per 90 days)     Order Specific Question:   Choose EITHER Heated or Non-Heated Tubjing     Answer:    Non-Heated Tubing, 1 per 90 days     Order Specific Question:   Number of Days Needed:     Answer:   99     Order Specific Question:   All other supplies as needed as listed below:     Answer:    Headgear, 1 per 180 days     Order Specific Question:   All other supplies as needed as listed below:     Answer:    Chin Strap, 1 per 180 days     Order Specific Question:   All other supplies as needed as listed below:     Answer:    Disposable Filter, 6 per 90 days     Order Specific Question:   All other supplies as needed as listed  below:     Answer:    Humidifier Chamber, 1 per 180 days     Order Specific Question:   All other supplies as needed as listed below:     Answer:    Non-Disposable Filter, 1 per 180 days    Complete PFT with bronchodilator     Standing Status:   Future     Standing Expiration Date:   6/25/2025     Order Specific Question:   Release to patient     Answer:   Immediate     Plan:     Problem List Items Addressed This Visit       Essential hypertension (Chronic)     Controlled, followed by cardiology         COPD (chronic obstructive pulmonary disease) (Chronic)     No cough, no wheezing, no shortness of breath   4/2013 mild obstruction on pft  Patient declines additional testing  Never smoker  Asymptomatic  No inhaler use  Follow up cpft            Relevant Orders    Complete PFT with bronchodilator    Type II diabetes mellitus with neurological manifestations     Stable and controlled. Continue current treatment plan as previously prescribed with your PCP.   Hemoglobin A1C   Date Value Ref Range Status   11/29/2023 5.7 (H) 4.0 - 5.6 % Final     Comment:     ADA Screening Guidelines:  5.7-6.4%  Consistent with prediabetes  >or=6.5%  Consistent with diabetes    High levels of fetal hemoglobin interfere with the HbA1C  assay. Heterozygous hemoglobin variants (HbS, HgC, etc)do  not significantly interfere with this assay.   However, presence of multiple variants may affect accuracy.     05/29/2023 5.7 (H) 4.0 - 5.6 % Final     Comment:     ADA Screening Guidelines:  5.7-6.4%  Consistent with prediabetes  >or=6.5%  Consistent with diabetes    High levels of fetal hemoglobin interfere with the HbA1C  assay. Heterozygous hemoglobin variants (HbS, HgC, etc)do  not significantly interfere with this assay.   However, presence of multiple variants may affect accuracy.     11/30/2022 5.9 (H) 4.0 - 5.6 % Final     Comment:     ADA Screening Guidelines:  5.7-6.4%  Consistent with prediabetes  >or=6.5%  Consistent with  diabetes    High levels of fetal hemoglobin interfere with the HbA1C  assay. Heterozygous hemoglobin variants (HbS, HgC, etc)do  not significantly interfere with this assay.   However, presence of multiple variants may affect accuracy.                Overweight with body mass index (BMI) 25.0-29.9     Continue to encourage exercise and dietary modification to obtain normal weight.            VIRGIL on CPAP - Primary     3/11/10 and 5/21/12 sleep study : Mild VIRGIL + restless legs syndrome. Compliance summaries: 10/15/12; 2/26/13;4/8/13.  1/21/2023 PSG Overall AHI was 7.8/hr Mild.  REM AHI was 26.3/hr  2/13/2023 cpap titration CPAP 9 cm optimal  2/20/2023 order CPAP 9 cm nasal pillows mask  Benefits and compliant with CPAP 9 cm with optimal control AHI 1.1  Nasal pillows mask  HME: [facility]   Follow up annually               Relevant Orders    CPAP/BIPAP SUPPLIES    Non-seasonal allergic rhinitis     Acute flare begin xyzal and atrovent nasal         Murmur, cardiac     Stable, continue management with Cardiology             (DME) - [facility]  Reviewed therapeutic goals for positive airway pressure therapy CPAP  Ideal is usage 100% of nights for 6 - 8 hours per night. Minimum usage is 70% of night for at least 4 hours per night used.     Follow up in about 1 year (around 5/17/2025) for CPAP compliance dnld, COPD cpft .

## 2024-05-17 NOTE — ASSESSMENT & PLAN NOTE
3/11/10 and 5/21/12 sleep study : Mild VIRGIL + restless legs syndrome. Compliance summaries: 10/15/12; 2/26/13;4/8/13.  1/21/2023 PSG Overall AHI was 7.8/hr Mild.  REM AHI was 26.3/hr  2/13/2023 cpap titration CPAP 9 cm optimal  2/20/2023 order CPAP 9 cm nasal pillows mask  Benefits and compliant with CPAP 9 cm with optimal control AHI 1.1  Nasal pillows mask  HME: [facility]   Follow up annually

## 2024-05-17 NOTE — ASSESSMENT & PLAN NOTE
Stable and controlled. Continue current treatment plan as previously prescribed with your PCP.   Hemoglobin A1C   Date Value Ref Range Status   11/29/2023 5.7 (H) 4.0 - 5.6 % Final     Comment:     ADA Screening Guidelines:  5.7-6.4%  Consistent with prediabetes  >or=6.5%  Consistent with diabetes    High levels of fetal hemoglobin interfere with the HbA1C  assay. Heterozygous hemoglobin variants (HbS, HgC, etc)do  not significantly interfere with this assay.   However, presence of multiple variants may affect accuracy.     05/29/2023 5.7 (H) 4.0 - 5.6 % Final     Comment:     ADA Screening Guidelines:  5.7-6.4%  Consistent with prediabetes  >or=6.5%  Consistent with diabetes    High levels of fetal hemoglobin interfere with the HbA1C  assay. Heterozygous hemoglobin variants (HbS, HgC, etc)do  not significantly interfere with this assay.   However, presence of multiple variants may affect accuracy.     11/30/2022 5.9 (H) 4.0 - 5.6 % Final     Comment:     ADA Screening Guidelines:  5.7-6.4%  Consistent with prediabetes  >or=6.5%  Consistent with diabetes    High levels of fetal hemoglobin interfere with the HbA1C  assay. Heterozygous hemoglobin variants (HbS, HgC, etc)do  not significantly interfere with this assay.   However, presence of multiple variants may affect accuracy.

## 2024-05-17 NOTE — ASSESSMENT & PLAN NOTE
No cough, no wheezing, no shortness of breath   4/2013 mild obstruction on pft  Patient declines additional testing  Never smoker  Asymptomatic  No inhaler use  Follow up cpft

## 2024-05-20 ENCOUNTER — HOSPITAL ENCOUNTER (EMERGENCY)
Facility: HOSPITAL | Age: 74
Discharge: HOME | End: 2024-05-20
Attending: STUDENT IN AN ORGANIZED HEALTH CARE EDUCATION/TRAINING PROGRAM
Payer: MEDICARE

## 2024-05-20 VITALS
WEIGHT: 170 LBS | BODY MASS INDEX: 30.12 KG/M2 | SYSTOLIC BLOOD PRESSURE: 166 MMHG | TEMPERATURE: 98 F | OXYGEN SATURATION: 100 % | DIASTOLIC BLOOD PRESSURE: 80 MMHG | HEIGHT: 63 IN | RESPIRATION RATE: 16 BRPM | HEART RATE: 50 BPM

## 2024-05-20 DIAGNOSIS — M25.511 RIGHT SHOULDER PAIN: ICD-10-CM

## 2024-05-20 DIAGNOSIS — M25.559 HIP PAIN: ICD-10-CM

## 2024-05-20 PROCEDURE — 99285 EMERGENCY DEPT VISIT HI MDM: CPT | Mod: 25,HCNC

## 2024-05-20 PROCEDURE — 25000003 PHARM REV CODE 250: Mod: HCNC | Performed by: STUDENT IN AN ORGANIZED HEALTH CARE EDUCATION/TRAINING PROGRAM

## 2024-05-20 RX ORDER — HYDROCODONE BITARTRATE AND ACETAMINOPHEN 5; 325 MG/1; MG/1
1 TABLET ORAL
Status: COMPLETED | OUTPATIENT
Start: 2024-05-20 | End: 2024-05-20

## 2024-05-20 RX ORDER — ONDANSETRON 4 MG/1
4 TABLET, ORALLY DISINTEGRATING ORAL
Status: COMPLETED | OUTPATIENT
Start: 2024-05-20 | End: 2024-05-20

## 2024-05-20 RX ORDER — ONDANSETRON 4 MG/1
4 TABLET, ORALLY DISINTEGRATING ORAL EVERY 6 HOURS PRN
Qty: 20 TABLET | Refills: 0 | Status: SHIPPED | OUTPATIENT
Start: 2024-05-20 | End: 2024-05-25

## 2024-05-20 RX ORDER — HYDROCODONE BITARTRATE AND ACETAMINOPHEN 7.5; 325 MG/1; MG/1
1 TABLET ORAL EVERY 6 HOURS PRN
Qty: 12 TABLET | Refills: 0 | Status: SHIPPED | OUTPATIENT
Start: 2024-05-20

## 2024-05-20 RX ORDER — KETOROLAC TROMETHAMINE 10 MG/1
10 TABLET, FILM COATED ORAL EVERY 6 HOURS
Qty: 20 TABLET | Refills: 0 | Status: SHIPPED | OUTPATIENT
Start: 2024-05-20 | End: 2024-05-25

## 2024-05-20 RX ADMIN — HYDROCODONE BITARTRATE AND ACETAMINOPHEN 1 TABLET: 5; 325 TABLET ORAL at 05:05

## 2024-05-20 RX ADMIN — ONDANSETRON 4 MG: 4 TABLET, ORALLY DISINTEGRATING ORAL at 05:05

## 2024-05-20 NOTE — Clinical Note
"Alyx"Huyen Weir was seen and treated in our emergency department on 5/20/2024.  She may return to work on 05/22/2024.       If you have any questions or concerns, please don't hesitate to call.      Kaleb Suresh MD"

## 2024-05-20 NOTE — ED PROVIDER NOTES
SCRIBE #1 NOTE: I, Brian Chaudhary, am scribing for, and in the presence of, Kaleb Suresh MD. I have scribed the entire note.       History     Chief Complaint   Patient presents with    Fall     Pt had a trip and fall. Pt does take coumadin.     Head Laceration     Pt has a lac to right eyebrow.    Shoulder Pain     Pt is complaining of right shoulder pain.     Review of patient's allergies indicates:   Allergen Reactions    Erythromycin Edema     Angioedema to throat    Amlodipine Other (See Comments)     Headaches    Diazepam Hives     Other reaction(s): Unknown    Iodine Hives     Other reaction(s): Unknown    Meperidine Hives     Other reaction(s): Unknown    Morphine Itching    Methylprednisolone sod suc(pf) Other (See Comments)     headache    Primidone Other (See Comments)     Other reaction(s): Unknown         History of Present Illness     HPI    5/20/2024, 5:22 PM  History obtained from the patient      History of Present Illness: Alyx Weir is a 74 y.o. female patient with a PMHx of CAD, HLD, HTN, and DVT who presents to the Emergency Department for evaluation of fall which onset PTA. Pt states that she lost her balance and fell on her right side. She reports that she also is endorsing hip stiffness more than usual on her right side. Symptoms are constant and moderate in severity. No mitigating or exacerbating factors reported. Associated sxs includeright shoulder pain, back pain, and headache. Patient denies any LOC, SOB, nausea, and all other sxs at this time. Pt notes that she is on warfarin. No further complaints or concerns at this time.       Arrival mode:   EMS   PCP: Carl Clemons MD        Past Medical History:  Past Medical History:   Diagnosis Date    Anticoagulated on Coumadin     Arm weakness-rotator cuff weakness 11/02/2015    Arthritis     Asthma     Clotting disorder     Coronary artery disease     Deep vein thrombosis     Degenerative disc disease     Diabetes mellitus  type I 2011    BS last tested x 1 month not sure what it was.    Heterozygous factor V Leiden mutation     Hx of colonic polyps 11/13/2015    Hyperlipidemia     Hypertension     VIRGIL (obstructive sleep apnea)     Stenosis of right carotid artery 12/13/2016       Past Surgical History:  Past Surgical History:   Procedure Laterality Date    BACK SURGERY      bladder cyst removal      BLADDER SURGERY      CARDIAC CATHETERIZATION  03/2013    mild CAD    COLONOSCOPY N/A 11/13/2015    Procedure: COLONOSCOPY;  Surgeon: Jas Umanzor III, MD;  Location: [facility] ENDO;  Service: Endoscopy;  Laterality: N/A;    HYSTERECTOMY      28 yrs old    LUMBAR SPINE SURGERY      bone spurs removed    OOPHORECTOMY      SELECTIVE INJECTION OF ANESTHETIC AGENT AROUND LUMBAR SPINAL NERVE ROOT BY TRANSFORAMINAL APPROACH Bilateral 06/03/2022    Procedure: Bilateral L4/5 TF ASUNCION with RN IV sedation; on Coumadin, will need INR prior to procedure, must be less than 1.3;  Surgeon: Mario Palacios MD;  Location: [facility] PAIN [facility]GT;  Service: Pain Management;  Laterality: Bilateral;         Family History:  Family History   Problem Relation Name Age of Onset    Heart disease Mother      Hypertension Mother      Cataracts Mother      Hypertension Sister      Glaucoma Sister      Hypertension Brother      Diabetes Father      Diabetes Paternal Uncle      Hypertension Sister      Diabetes Sister      Hypertension Sister      Diabetes Sister      Breast cancer Neg Hx      Colon cancer Neg Hx      Ovarian cancer Neg Hx         Social History:  Social History     Tobacco Use    Smoking status: Never     Passive exposure: Past    Smokeless tobacco: Never   Substance and Sexual Activity    Alcohol use: No    Drug use: No    Sexual activity: Not Currently     Partners: Male     Birth control/protection: None        Review of Systems     Review of Systems   Constitutional:  Negative for fever.   HENT:  Negative for sore throat.    Respiratory:  Negative for shortness  of breath.    Cardiovascular:  Negative for chest pain.   Gastrointestinal:  Negative for nausea.   Genitourinary:  Negative for dysuria.   Musculoskeletal:  Positive for back pain.        (+) Right shoulder pain   Skin:  Negative for rash.   Neurological:  Positive for headaches. Negative for syncope and weakness.   Hematological:  Does not bruise/bleed easily.      Physical Exam     Initial Vitals   BP Pulse Resp Temp SpO2   05/20/24 1536 05/20/24 1536 05/20/24 1536 05/20/24 1728 05/20/24 1536   (!) 212/122 62 18 98.2 °F (36.8 °C) 100 %      MAP       --                 Physical Exam  Nursing Notes and Vital Signs Reviewed.  Constitutional: Patient is in no acute distress. Well-developed and well-nourished.  Head: Superficial laceration to the right side. Normocephalic.  Eyes: PERRL. EOM intact. Conjunctivae are not pale.   ENT: Mucous membranes are moist. Oropharynx is clear and symmetric.    Neck: Supple. Full ROM.   Cardiovascular: Regular rate. Regular rhythm. No murmurs, rubs, or gallops. Distal pulses are 2+ and symmetric.  Pulmonary/Chest: No respiratory distress. Clear to auscultation bilaterally. No wheezing or rales.  Abdominal: Soft and non-distended.  There is no tenderness.  No rebound, guarding, or rigidity.   Musculoskeletal: Moves all extremities.  Tenderness to the right shoulder, patient does move it through a full range of motion, but does so with pain.  There is no hip tenderness.  Full range of motion of the hips both actively and passively.  No obvious deformities. No edema. No calf tenderness.  Skin: Warm and dry.  Neurological:  Alert, awake, and appropriate.  Normal speech.  No acute focal neurological deficits are appreciated.  Psychiatric: Normal affect. Good eye contact. Appropriate in content.     ED Course   Procedures  ED Vital Signs:  Vitals:    05/20/24 1536 05/20/24 1728 05/20/24 1731 05/20/24 1854   BP: (!) 212/122  (S) (!) 223/36 (S) (!) 173/78   Pulse: 62  (!) 51 62   Resp: 18   "18 18   Temp:  98.2 °F (36.8 °C)     TempSrc:  Oral     SpO2: 100%  100% 98%   Weight: 77.1 kg (170 lb)      Height: 5' 3" (1.6 m)       05/20/24 2026   BP: (!) 166/80   Pulse: (!) 50   Resp: 16   Temp: 98 °F (36.7 °C)   TempSrc:    SpO2: 100%   Weight:    Height:        Abnormal Lab Results:  Labs Reviewed - No data to display     All Lab Results:  No labs were taken during this visit.     Imaging Results:  Imaging Results              X-Ray Shoulder Trauma Right (Final result)  Result time 05/20/24 18:47:36      Final result by Terry Nichols MD (05/20/24 18:47:36)                   Impression:      As above      Electronically signed by: Terry Nichols  Date:    05/20/2024  Time:    18:47               Narrative:    EXAMINATION:  XR SHOULDER TRAUMA 3 VIEW RIGHT    CLINICAL HISTORY:  Pain in right shoulder    TECHNIQUE:  Three or four views of the right shoulder were performed.    COMPARISON:  None    FINDINGS:  No acute fracture or dislocation.  Mild degenerative joint disease.                                       X-Ray Pelvis Routine AP (Final result)  Result time 05/20/24 18:39:00      Final result by Terry Nichols MD (05/20/24 18:39:00)                   Impression:      As above      Electronically signed by: Terry Nichols  Date:    05/20/2024  Time:    18:39               Narrative:    EXAMINATION:  XR PELVIS ROUTINE AP    CLINICAL HISTORY:  Pain in unspecified hip    TECHNIQUE:  AP view of the pelvis was performed.    COMPARISON:  None.    FINDINGS:  No evidence for pelvic fracture.  Mild degenerative joint disease.                                       CT Head Without Contrast (Final result)  Result time 05/20/24 18:28:45      Final result by Terry Nichols MD (05/20/24 18:28:45)                   Impression:      No acute abnormality.    Atrophy and chronic white matter changes    All CT scans   are performed using dose optimization techniques including the following: automated exposure control; " adjustment of the mA and/or kV; use of iterative reconstruction technique.  Dose modulation was employed for ALARA by means of: Automated exposure control; adjustment of the mA and/or kV according to patient size (this includes techniques or standardized protocols for targeted exams where dose is matched to indication/reason for exam; i.e. extremities or head); and/or use of iterative reconstructive technique.      Electronically signed by: Terry Nichols  Date:    05/20/2024  Time:    18:28               Narrative:    EXAMINATION:  CT HEAD WITHOUT CONTRAST    CLINICAL HISTORY:  Head trauma, minor (Age >= 65y);    TECHNIQUE:  Low dose axial CT images obtained throughout the head without intravenous contrast. Sagittal and coronal reconstructions were performed.    COMPARISON:  None.    FINDINGS:  Atrophy and chronic white matter changes:    Ventricles and sulci are normal in size for age without evidence of hydrocephalus. No extra-axial blood or fluid collections.    No parenchymal mass, hemorrhage, edema or major vascular distribution infarct.    Skull/extracranial contents (limited evaluation): No fracture. Mastoid air cells and paranasal sinuses are essentially clear.                                       CT Cervical Spine Without Contrast (Final result)  Result time 05/20/24 18:32:08      Final result by Terry Nichols MD (05/20/24 18:32:08)                   Impression:      No acute fracture or dislocation.    Mild moderate multilevel degenerative disc disease    All CT scans   are performed using dose optimization techniques including the following: automated exposure control; adjustment of the mA and/or kV; use of iterative reconstruction technique.  Dose modulation was employed for ALARA by means of: Automated exposure control; adjustment of the mA and/or kV according to patient size (this includes techniques or standardized protocols for targeted exams where dose is matched to indication/reason for exam;  i.e. extremities or head); and/or use of iterative reconstructive technique.      Electronically signed by: Terry Magdalena  Date:    05/20/2024  Time:    18:32               Narrative:    EXAMINATION:  CT CERVICAL SPINE WITHOUT CONTRAST    CLINICAL HISTORY:  Neck trauma (Age >= 65y);    TECHNIQUE:  Low dose axial images, sagittal and coronal reformations were performed though the cervical spine.  Contrast was not administered.    COMPARISON:  None    FINDINGS:  Normal vertebral body heights without evidence for spondylolisthesis.  Mild moderate degenerative joint disease..  No prevertebral soft tissue swelling.  Facet joints are congruent.  Normal bone mineral density.  Atherosclerotic changes.                                            The Emergency Provider reviewed the vital signs and test results, which are outlined above.     ED Discussion       8:27 PM: Reassessed pt at this time.  Pt states her condition has improved at this time. Discussed with pt all pertinent ED information and results. Discussed pt dx and plan of tx. Gave pt all f/u and return to the ED instructions. All questions and concerns were addressed at this time. Pt expresses understanding of information and instructions, and is comfortable with plan to discharge. Pt is stable for discharge.    I discussed with patient and/or family/caretaker that evaluation in the ED does not suggest any emergent or life threatening medical conditions requiring immediate intervention beyond what was provided in the ED, and I believe patient is safe for discharge.  Regardless, an unremarkable evaluation in the ED does not preclude the development or presence of a serious of life threatening condition. As such, patient was instructed to return immediately for any worsening or change in current symptoms.     ED Course as of 05/20/24 2251   Mon May 20, 2024   1810 CT Head Without Contrast  My independent interpretation reveals no acute intracranial hemorrhage, mass,  or midline shift. There is motion artifact present []   1831 X-Ray Shoulder Trauma Right  My independent interpretation of right shoulder x-ray reveals no acute fx/dislocation or other bony abnormality []   1914 BP(!)(S): 173/78    Came down appropriately after pain medication.  Patient given education on hypertension and the consequences of uncontrolled blood pressure. []      ED Course User Index  [] Kaleb Suresh MD     Medical Decision Making  Differential diagnosis includes intracranial hemorrhage, skull fracture, right shoulder dislocation, right shoulder fracture, hip injury, among others.    Patient presents to the emergency department as documented.  Workup here is unremarkable for any acute traumatic injury.  Lab work considered, ultimately not performed as this fall was nonsyncopal.  Patient reports remembering the entire event, just losing her balance when she fell.  She states that she did not hit her head, did not lose consciousness, CT scan obtained because the patient is on warfarin.  Patient's pain controlled here in the emergency department.  She feels comfortable with follow up in the outpatient setting.  She did have a small superficial wound over the top of her eyebrow, it is closed on its own, no need for stitches or Dermabond.    Amount and/or Complexity of Data Reviewed  External Data Reviewed: notes.     Details: Reviewed a note from 04/12/2024 documenting a visit with Cardiology documenting her use of Coumadin.  This note impacted my medical decision-making and disposition planning today.  Radiology: ordered and independent interpretation performed. Decision-making details documented in ED Course.    Risk  OTC drugs.  Prescription drug management.                ED Medication(s):  Medications   HYDROcodone-acetaminophen 5-325 mg per tablet 1 tablet (1 tablet Oral Given 5/20/24 1738)   ondansetron disintegrating tablet 4 mg (4 mg Oral Given 5/20/24 1739)       Discharge  Medication List as of 5/20/2024  7:16 PM        START taking these medications    Details   HYDROcodone-acetaminophen (NORCO) 7.5-325 mg per tablet Take 1 tablet by mouth every 6 (six) hours as needed for Pain., Starting Mon 5/20/2024, Print      ketorolac (TORADOL) 10 mg tablet Take 1 tablet (10 mg total) by mouth every 6 (six) hours. for 5 days, Starting Mon 5/20/2024, Until Sat 5/25/2024, Print      ondansetron (ZOFRAN-ODT) 4 MG TbDL Take 1 tablet (4 mg total) by mouth every 6 (six) hours as needed., Starting Mon 5/20/2024, Until Sat 5/25/2024 at 9082, Print              Follow-up Information       Call  Carl Clemons MD.    Specialty: Internal Medicine  Contact information:  [address] [place] [address]  [phone]                                 Scribe Attestation:   Scribe #1: I performed the above scribed service and the documentation accurately describes the services I performed. I attest to the accuracy of the note.     Attending:   Physician Attestation Statement for Scribe #1: I, Kaleb Suresh MD, personally performed the services described in this documentation, as scribed by Brian Chaudhary, in my presence, and it is both accurate and complete.           Clinical Impression       ICD-10-CM ICD-9-CM   1. Right shoulder pain  M25.511 719.41   2. Hip pain  M25.559 719.45       Disposition:   Disposition: Discharged  Condition: Stable        Kaleb Suresh MD  05/20/24 7705

## 2024-05-21 ENCOUNTER — PATIENT OUTREACH (OUTPATIENT)
Dept: EMERGENCY MEDICINE | Facility: HOSPITAL | Age: 74
End: 2024-05-21
Payer: MEDICARE

## 2024-05-21 NOTE — ED PROVIDER NOTES
SCRIBE #1 NOTE: I, Brian Chaudhary, am scribing for, and in the presence of, Kaleb Suresh MD. I have scribed the entire note.       History     Chief Complaint   Patient presents with    Fall     Pt had a trip and fall. Pt does take coumadin.     Head Laceration     Pt has a lac to right eyebrow.    Shoulder Pain     Pt is complaining of right shoulder pain.     Review of patient's allergies indicates:   Allergen Reactions    Erythromycin Edema     Angioedema to throat    Amlodipine Other (See Comments)     Headaches    Diazepam Hives     Other reaction(s): Unknown    Iodine Hives     Other reaction(s): Unknown    Meperidine Hives     Other reaction(s): Unknown    Morphine Itching    Methylprednisolone sod suc(pf) Other (See Comments)     headache    Primidone Other (See Comments)     Other reaction(s): Unknown         History of Present Illness     HPI    5/20/2024, 5:22 PM  History obtained from the patient      History of Present Illness: Alyx Weir is a 74 y.o. female patient with a PMHx of CAD, HLD, HTN, and DVT who presents to the Emergency Department for evaluation of fall which onset PTA. Pt states that she lost her balance and fell on her right side. She reports that she also is endorsing hip stiffness more than usual on her right side. Symptoms are constant and moderate in severity. No mitigating or exacerbating factors reported. Associated sxs includeright shoulder pain, back pain, and headache. Patient denies any LOC, SOB, nausea, and all other sxs at this time. Pt notes that she is on warfarin. No further complaints or concerns at this time.       Arrival mode:   EMS   PCP: Carl Clemons MD        Past Medical History:  Past Medical History:   Diagnosis Date    Anticoagulated on Coumadin     Arm weakness-rotator cuff weakness 11/02/2015    Arthritis     Asthma     Clotting disorder     Coronary artery disease     Deep vein thrombosis     Degenerative disc disease      Diabetes mellitus type I 2011    BS last tested x 1 month not sure what it was.    Heterozygous factor V Leiden mutation     Hx of colonic polyps 11/13/2015    Hyperlipidemia     Hypertension     VIRGIL (obstructive sleep apnea)     Stenosis of right carotid artery 12/13/2016       Past Surgical History:  Past Surgical History:   Procedure Laterality Date    BACK SURGERY      bladder cyst removal      BLADDER SURGERY      CARDIAC CATHETERIZATION  03/2013    mild CAD    COLONOSCOPY N/A 11/13/2015    Procedure: COLONOSCOPY;  Surgeon: Jas Umanzor III, MD;  Location: [facility];  Service: Endoscopy;  Laterality: N/A;    HYSTERECTOMY      28 yrs old    LUMBAR SPINE SURGERY      bone spurs removed    OOPHORECTOMY      SELECTIVE INJECTION OF ANESTHETIC AGENT AROUND LUMBAR SPINAL NERVE ROOT BY TRANSFORAMINAL APPROACH Bilateral 06/03/2022    Procedure: Bilateral L4/5 TF ASUNCION with RN IV sedation; on Coumadin, will need INR prior to procedure, must be less than 1.3;  Surgeon: Mario Palacios MD;  Location: [facility] PAIN [facility];  Service: Pain Management;  Laterality: Bilateral;         Family History:  Family History   Problem Relation Name Age of Onset    Heart disease Mother      Hypertension Mother      Cataracts Mother      Hypertension Sister      Glaucoma Sister      Hypertension Brother      Diabetes Father      Diabetes Paternal Uncle      Hypertension Sister      Diabetes Sister      Hypertension Sister      Diabetes Sister      Breast cancer Neg Hx      Colon cancer Neg Hx      Ovarian cancer Neg Hx         Social History:  Social History     Tobacco Use    Smoking status: Never     Passive exposure: Past    Smokeless tobacco: Never   Substance and Sexual Activity    Alcohol use: No    Drug use: No    Sexual activity: Not Currently     Partners: Male     Birth control/protection: None        Review of Systems     Review of Systems   Constitutional:  Negative for fever.   HENT:  Negative for  "sore throat.    Respiratory:  Negative for shortness of breath.    Cardiovascular:  Negative for chest pain.   Gastrointestinal:  Negative for nausea.   Genitourinary:  Negative for dysuria.   Musculoskeletal:  Positive for back pain.        (+) Right shoulder pain   Skin:  Negative for rash.   Neurological:  Positive for headaches. Negative for syncope and weakness.   Hematological:  Does not bruise/bleed easily.      Physical Exam     Initial Vitals   BP Pulse Resp Temp SpO2   05/20/24 1536 05/20/24 1536 05/20/24 1536 05/20/24 1728 05/20/24 1536   (!) 212/122 62 18 98.2 °F (36.8 °C) 100 %      MAP       --                 Physical Exam  Nursing Notes and Vital Signs Reviewed.  Constitutional: Patient is in no acute distress. Well-developed and well-nourished.  Head: Superficial laceration to the right side. Atraumatic. Normocephalic.  Eyes: PERRL. EOM intact. Conjunctivae are not pale. No scleral icterus.  ENT: Mucous membranes are moist. Oropharynx is clear and symmetric.    Neck: Supple. Full ROM. No lymphadenopathy.  Cardiovascular: Regular rate. Regular rhythm. No murmurs, rubs, or gallops. Distal pulses are 2+ and symmetric.  Pulmonary/Chest: No respiratory distress. Clear to auscultation bilaterally. No wheezing or rales.  Abdominal: Soft and non-distended.  There is no tenderness.  No rebound, guarding, or rigidity. Good bowel sounds.  Genitourinary: No CVA tenderness  Musculoskeletal: Moves all extremities. No obvious deformities. No edema. No calf tenderness.  Skin: Warm and dry.  Neurological:  Alert, awake, and appropriate.  Normal speech.  No acute focal neurological deficits are appreciated.  Psychiatric: Normal affect. Good eye contact. Appropriate in content.     ED Course   Procedures  ED Vital Signs:  Vitals:    05/20/24 1536 05/20/24 1728   BP: (!) 212/122    Pulse: 62    Resp: 18    Temp:  98.2 °F (36.8 °C)   TempSrc:  Oral   SpO2: 100%    Weight: 77.1 kg (170 lb)    Height: 5' 3" (1.6 m)  "       Abnormal Lab Results:  Labs Reviewed - No data to display     All Lab Results:  No labs were taken during this visit.     Imaging Results:  Imaging Results    None               The Emergency Provider reviewed the vital signs and test results, which are outlined above.     ED Discussion       8:27 PM: Reassessed pt at this time.  Pt states her condition has improved at this time. Discussed with pt all pertinent ED information and results. Discussed pt dx and plan of tx. Gave pt all f/u and return to the ED instructions. All questions and concerns were addressed at this time. Pt expresses understanding of information and instructions, and is comfortable with plan to discharge. Pt is stable for discharge.    I discussed with patient and/or family/caretaker that evaluation in the ED does not suggest any emergent or life threatening medical conditions requiring immediate intervention beyond what was provided in the ED, and I believe patient is safe for discharge.  Regardless, an unremarkable evaluation in the ED does not preclude the development or presence of a serious of life threatening condition. As such, patient was instructed to return immediately for any worsening or change in current symptoms.     ED Course as of 05/20/24 1946   Mon May 20, 2024   1810 CT Head Without Contrast  My independent interpretation reveals no acute intracranial hemorrhage, mass, or midline shift. There is motion artifact present []   1831 X-Ray Shoulder Trauma Right  My independent interpretation of right shoulder x-ray reveals no acute fx/dislocation or other bony abnormality []   1914 BP(!)(S): 173/78    Came down appropriately after pain medication.  Patient given education on hypertension and the consequences of uncontrolled blood pressure. []      ED Course User Index  [] Kaleb Suresh MD     Medical Decision Making  Amount and/or Complexity of Data Reviewed  Radiology: ordered. Decision-making details documented  in ED Course.    Risk  Prescription drug management.                ED Medication(s):  Medications   HYDROcodone-acetaminophen 5-325 mg per tablet 1 tablet (has no administration in time range)   ondansetron disintegrating tablet 4 mg (has no administration in time range)       New Prescriptions    No medications on file               Scribe Attestation:   Scribe #1: I performed the above scribed service and the documentation accurately describes the services I performed. I attest to the accuracy of the note.     Attending:   Physician Attestation Statement for Scribe #1: I, Kaleb Suresh MD, personally performed the services described in this documentation, as scribed by Brian Chaudhary, in my presence, and it is both accurate and complete.           Clinical Impression       ICD-10-CM ICD-9-CM   1. Right shoulder pain  M25.511 719.41   2. Hip pain  M25.559 719.45       Disposition:   Disposition: Discharged  Condition: Stable

## 2024-05-22 ENCOUNTER — OFFICE VISIT (OUTPATIENT)
Dept: SPORTS MEDICINE | Facility: CLINIC | Age: 74
End: 2024-05-22
Payer: MEDICARE

## 2024-05-22 VITALS — RESPIRATION RATE: 17 BRPM | WEIGHT: 170 LBS | BODY MASS INDEX: 30.12 KG/M2 | HEIGHT: 63 IN

## 2024-05-22 DIAGNOSIS — M25.511 ACUTE PAIN OF RIGHT SHOULDER: Primary | ICD-10-CM

## 2024-05-22 PROCEDURE — 1160F RVW MEDS BY RX/DR IN RCRD: CPT | Mod: HCNC,CPTII,S$GLB, | Performed by: STUDENT IN AN ORGANIZED HEALTH CARE EDUCATION/TRAINING PROGRAM

## 2024-05-22 PROCEDURE — 3288F FALL RISK ASSESSMENT DOCD: CPT | Mod: HCNC,CPTII,S$GLB, | Performed by: STUDENT IN AN ORGANIZED HEALTH CARE EDUCATION/TRAINING PROGRAM

## 2024-05-22 PROCEDURE — 1100F PTFALLS ASSESS-DOCD GE2>/YR: CPT | Mod: HCNC,CPTII,S$GLB, | Performed by: STUDENT IN AN ORGANIZED HEALTH CARE EDUCATION/TRAINING PROGRAM

## 2024-05-22 PROCEDURE — 3008F BODY MASS INDEX DOCD: CPT | Mod: HCNC,CPTII,S$GLB, | Performed by: STUDENT IN AN ORGANIZED HEALTH CARE EDUCATION/TRAINING PROGRAM

## 2024-05-22 PROCEDURE — 99999 PR PBB SHADOW E&M-EST. PATIENT-LVL IV: CPT | Mod: PBBFAC,HCNC,, | Performed by: STUDENT IN AN ORGANIZED HEALTH CARE EDUCATION/TRAINING PROGRAM

## 2024-05-22 PROCEDURE — 1125F AMNT PAIN NOTED PAIN PRSNT: CPT | Mod: HCNC,CPTII,S$GLB, | Performed by: STUDENT IN AN ORGANIZED HEALTH CARE EDUCATION/TRAINING PROGRAM

## 2024-05-22 PROCEDURE — 99203 OFFICE O/P NEW LOW 30 MIN: CPT | Mod: HCNC,S$GLB,, | Performed by: STUDENT IN AN ORGANIZED HEALTH CARE EDUCATION/TRAINING PROGRAM

## 2024-05-22 PROCEDURE — 1159F MED LIST DOCD IN RCRD: CPT | Mod: HCNC,CPTII,S$GLB, | Performed by: STUDENT IN AN ORGANIZED HEALTH CARE EDUCATION/TRAINING PROGRAM

## 2024-05-22 NOTE — LETTER
May 22, 2024      [facility]  [id number] [facility] [address]  [place] [address]  Phone: [phone]  Fax: [phone]       Patient: Alyx Weir   YOB: 1950  Date of Visit: 05/22/2024    To Whom It May Concern:    Eduar Weir  was at [facility] on 05/22/2024. The patient should remain out of work at this time. She will be seen for re-evaluation on 6/4/24 at which time potential return to work status will be re-evaluated.  If you have any questions or concerns, or if I can be of further assistance, please do not hesitate to contact me.    Sincerely,      Carl uRiz MD

## 2024-05-22 NOTE — PROGRESS NOTES
Pt visited the ED on 5/20/24. I made an attempt to reach patient to assist with scheduling a post ED 7-day follow up with PCP. Unable to reach. Pt was contacted by sports ortho and scheduled a follow up appt on 5/22/24 w/DR. Ruiz at 9:30 a.m. Closing encounter    Janae Cheatham

## 2024-05-22 NOTE — PROGRESS NOTES
Orthopaedics Sports Medicine     Shoulder Initial Visit         5/22/2024    Referring MD: Self, Aaareferral    Chief Complaint   Patient presents with    Right Shoulder - Pain         History of Present Illness:   Alyx Weir is a 74 y.o. right-hand dominant female who presents with right shoulder pain and dysfunction.    Onset of the symptoms was 5/20/24 while at work.      Inciting event: Patient reports she was putting some clothes on a  and stumbled and fell forward. Noted immediate pain in the shoulder following the fall.     Current symptoms include right shoulder pain that has persisted since the fall. She also reports abrasion to her face that she thinks is from her glasses from the fall as well as right hip pain. Reports hip pain is improving, shoulder pain is persisting. She reports the pain is constant. She reports minimal use of her arm due to the pain. She rates her pain an 8/10 today. She denies any numbness or tingling.      Pain is aggravated by movement of her shoulder.      Evaluation to date: X-Ray     Treatment to date: Rest, activity modification, sling     Past Medical History:   Past Medical History:   Diagnosis Date    Anticoagulated on Coumadin     Arm weakness-rotator cuff weakness 11/02/2015    Arthritis     Asthma     Clotting disorder     Coronary artery disease     Deep vein thrombosis     Degenerative disc disease     Diabetes mellitus type I 2011    BS last tested x 1 month not sure what it was.    Heterozygous factor V Leiden mutation     Hx of colonic polyps 11/13/2015    Hyperlipidemia     Hypertension     VIRGIL (obstructive sleep apnea)     Stenosis of right carotid artery 12/13/2016       Past Surgical History:   Past Surgical History:   Procedure Laterality Date    BACK SURGERY      bladder cyst removal      BLADDER SURGERY      CARDIAC CATHETERIZATION  03/2013    mild CAD    COLONOSCOPY N/A 11/13/2015    Procedure: COLONOSCOPY;  Surgeon: Jas Umanzor III, MD;   Location: [facility] ENDO;  Service: Endoscopy;  Laterality: N/A;    HYSTERECTOMY      28 yrs old    LUMBAR SPINE SURGERY      bone spurs removed    OOPHORECTOMY      SELECTIVE INJECTION OF ANESTHETIC AGENT AROUND LUMBAR SPINAL NERVE ROOT BY TRANSFORAMINAL APPROACH Bilateral 06/03/2022    Procedure: Bilateral L4/5 TF ASUNCION with RN IV sedation; on Coumadin, will need INR prior to procedure, must be less than 1.3;  Surgeon: Mario Palacios MD;  Location: [facility] PAIN MGT;  Service: Pain Management;  Laterality: Bilateral;       Medications:  Patient's Medications   New Prescriptions    No medications on file   Previous Medications    ACCU-CHEK GUIDE ME GLUCOSE MTR MISC    USE TO CHECK BLOOD SUGAR TWICE DAILY    ACETAMINOPHEN (TYLENOL ARTHRITIS ORAL)    Take by mouth. Takes prn    ALLOPURINOL (ZYLOPRIM) 100 MG TABLET    TAKE 1 TABLET EVERY DAY    BACLOFEN (LIORESAL) 10 MG TABLET    Take 10 mg by mouth 3 (three) times daily.    CLOBETASOL (TEMOVATE) 0.05 % EXTERNAL SOLUTION    APPLY SOLUTION TOPICALLY TO THE AFFECTED AREA ONCE DAILY (WILL NEED APPOINTMENT FOR FURTHER REFILLS)    EZETIMIBE (ZETIA) 10 MG TABLET    Take 1 tablet (10 mg total) by mouth once daily.    FLUTICASONE PROPIONATE (FLONASE) 50 MCG/ACTUATION NASAL SPRAY    1 spray (50 mcg total) by Each Nostril route once daily.    GABAPENTIN (NEURONTIN) 300 MG CAPSULE    Take 1 capsule (300 mg total) by mouth once daily AND 3 capsules (900 mg total) every evening.    GLUCOSAMINE-CHONDROITIN 500-400 MG TABLET    Take 1 tablet by mouth 3 (three) times daily.    HYDROCODONE-ACETAMINOPHEN (NORCO) 7.5-325 MG PER TABLET    Take 1 tablet by mouth every 6 (six) hours as needed for Pain.    KETOROLAC (TORADOL) 10 MG TABLET    Take 1 tablet (10 mg total) by mouth every 6 (six) hours. for 5 days    OLMESARTAN-AMLODIPIN-HCTHIAZID 40-10-25 MG TAB    TAKE 1 TABLET EVERY DAY    OM 3/E/LINOL/ALA/OLEIC/GLA/LIP (OMEGA 3-6-9 ORAL)    Take 1 capsule by mouth once daily.    ONDANSETRON  (ZOFRAN-ODT) 4 MG TBDL    Take 1 tablet (4 mg total) by mouth every 6 (six) hours as needed.    PANTOPRAZOLE (PROTONIX) 40 MG TABLET    Take 1 tablet (40 mg total) by mouth daily as needed (gerd).    SOLIFENACIN (VESICARE) 5 MG TABLET    Take 1 tablet (5 mg total) by mouth once daily.    SPIRONOLACTONE (ALDACTONE) 50 MG TABLET    Take 1 tablet (50 mg total) by mouth 2 (two) times daily.    TRAMADOL (ULTRAM) 50 MG TABLET    Take 1 tablet (50 mg total) by mouth every 6 (six) hours as needed for Pain.    VITAMIN D 1000 UNITS TAB    Take 185 mg by mouth once daily.    WARFARIN (COUMADIN) 5 MG TABLET    Take 1 tablet by mouth on Sunday, Monday and Friday and 7.5 mg on all other days OR as directed by coumadin clinic   Modified Medications    No medications on file   Discontinued Medications    No medications on file       Allergies:   Review of patient's allergies indicates:   Allergen Reactions    Erythromycin Edema     Angioedema to throat    Amlodipine Other (See Comments)     Headaches    Diazepam Hives     Other reaction(s): Unknown    Iodine Hives     Other reaction(s): Unknown    Meperidine Hives     Other reaction(s): Unknown    Morphine Itching    Methylprednisolone sod suc(pf) Other (See Comments)     headache    Primidone Other (See Comments)     Other reaction(s): Unknown       Social History:   Home town: [place], [place]   Occupation: works at [employer]  Alcohol use: She reports no history of alcohol use.  Tobacco use: She reports that she has never smoked. She has been exposed to tobacco smoke. She has never used smokeless tobacco.    Review of systems:  History of recent illness, fevers, shakes, or chills: no  History of cardiac problems or chest pain: Yes  History of pulmonary problems or asthma: Yes  History of diabetes: Yes  History of prior dvt or clotting problems: Yes  History of sleep apnea: no      Physical Examination:  Estimated body mass index is 30.11 kg/m² as calculated from the following:    " Height as of this encounter: 5' 3" (1.6 m).    Weight as of this encounter: 77.1 kg (169 lb 15.6 oz).    General  Healthy appearing female in no acute distress  Alert and oriented, normal mood, appropriate affect    Shoulder Examination:  Patient is alert and oriented, no distress. Skin is intact. Neuro is normal with no focal motor or sensory findings.    Cervical exam is unremarkable. Intact cervical ROM. Negative Spurling's test    Physical Exam:  RIGHT    LEFT    Scap Dyskinesis/Winging (-)    (-)    Tenderness:          Greater Tuberosity             +    (-)  Bicipital Groove  (-)    (-)  AC joint   (-)    (-)  Other:     Passive ROM:  Forward Elevation 160     180 (Active)  Abduction  100     120 (Active)  ER (at side)  30     70 (Active)    Patient defers active ROM of right shoulder due to pain    Strength:  Deferred due to pain     Special Tests: Deferred due to pain     Neurovascular examination  - Motor grossly intact bilaterally to shoulder abduction, elbow flexion and extension, wrist flexion and extension, and intrinsic hand musculature  - Sensation intact to light touch bilaterally in axillary, median, radial, and ulnar distributions  - Symmetrical radial pulses      Imaging:  XR Results:  Results for orders placed during the hospital encounter of 05/20/24    X-Ray Shoulder Trauma Right    Narrative  EXAMINATION:  XR SHOULDER TRAUMA 3 VIEW RIGHT    CLINICAL HISTORY:  Pain in right shoulder    TECHNIQUE:  Three or four views of the right shoulder were performed.    COMPARISON:  None    FINDINGS:  No acute fracture or dislocation.  Mild degenerative joint disease.    Impression  As above      Electronically signed by: Terry Nichols  Date:    05/20/2024  Time:    18:47      MRI Results:  No results found for this or any previous visit.      CT Results:  No results found for this or any previous visit.      Physician Read: I agree with the above impression.      Impression:  74 y.o. female with acute " right shoulder pain after a fall at work, rotator cuff tendinitis, subacromial bursitis        Plan:  Discussed diagnosis and treatment options with patient today. She has acute right shoulder pain after a fall.  Her symptoms are most consistent with rotator cuff tendinitis and subacromial bursitis.  She is difficulty with active range of motion, but tolerates passive range of motion.  She is able to perform active assisted range of motion.  Her injury occurred just 2 days prior to her visit.  I would like to give her 1-2 weeks and reassess her function at that time.  Symptomatic treatment of pain at this time with Tylenol (two 500 mg every 8 hours) and ice. Can continue with sling as needed for comfort but discussed trying to transition out of the sling as soon as symptoms allow.   Follow up with me in 2 weeks. If still demonstrating weakness will move forward with MRI.           Carl Ruiz MD    I, Estiven Velazquez, acted as a scribe for Carl Ruiz MD for the duration of this office visit.

## 2024-05-29 ENCOUNTER — LAB VISIT (OUTPATIENT)
Dept: LAB | Facility: HOSPITAL | Age: 74
End: 2024-05-29
Attending: INTERNAL MEDICINE
Payer: MEDICARE

## 2024-05-29 ENCOUNTER — OFFICE VISIT (OUTPATIENT)
Dept: FAMILY MEDICINE | Facility: CLINIC | Age: 74
End: 2024-05-29
Payer: MEDICARE

## 2024-05-29 VITALS
HEART RATE: 73 BPM | SYSTOLIC BLOOD PRESSURE: 122 MMHG | BODY MASS INDEX: 30.39 KG/M2 | TEMPERATURE: 97 F | WEIGHT: 171.5 LBS | HEIGHT: 63 IN | RESPIRATION RATE: 18 BRPM | OXYGEN SATURATION: 96 % | DIASTOLIC BLOOD PRESSURE: 76 MMHG

## 2024-05-29 DIAGNOSIS — R73.02 IGT (IMPAIRED GLUCOSE TOLERANCE): ICD-10-CM

## 2024-05-29 DIAGNOSIS — D68.51 HETEROZYGOUS FACTOR V LEIDEN MUTATION: Chronic | ICD-10-CM

## 2024-05-29 DIAGNOSIS — Z79.01 ANTICOAGULATED ON COUMADIN: Chronic | ICD-10-CM

## 2024-05-29 DIAGNOSIS — Z79.01 ANTICOAGULATED ON COUMADIN: Primary | Chronic | ICD-10-CM

## 2024-05-29 DIAGNOSIS — Z86.718 HISTORY OF DVT (DEEP VEIN THROMBOSIS): ICD-10-CM

## 2024-05-29 DIAGNOSIS — I10 ESSENTIAL HYPERTENSION: Chronic | ICD-10-CM

## 2024-05-29 DIAGNOSIS — E78.5 DYSLIPIDEMIA: ICD-10-CM

## 2024-05-29 DIAGNOSIS — Z78.9 STATIN INTOLERANCE: ICD-10-CM

## 2024-05-29 LAB
INR PPP: 1.9 (ref 0.8–1.2)
PROTHROMBIN TIME: 19.6 SEC (ref 9–12.5)

## 2024-05-29 PROCEDURE — 3288F FALL RISK ASSESSMENT DOCD: CPT | Mod: HCNC,CPTII,S$GLB, | Performed by: INTERNAL MEDICINE

## 2024-05-29 PROCEDURE — 1159F MED LIST DOCD IN RCRD: CPT | Mod: HCNC,CPTII,S$GLB, | Performed by: INTERNAL MEDICINE

## 2024-05-29 PROCEDURE — 3008F BODY MASS INDEX DOCD: CPT | Mod: HCNC,CPTII,S$GLB, | Performed by: INTERNAL MEDICINE

## 2024-05-29 PROCEDURE — 99999 PR PBB SHADOW E&M-EST. PATIENT-LVL IV: CPT | Mod: PBBFAC,HCNC,, | Performed by: INTERNAL MEDICINE

## 2024-05-29 PROCEDURE — G2211 COMPLEX E/M VISIT ADD ON: HCPCS | Mod: HCNC,S$GLB,, | Performed by: INTERNAL MEDICINE

## 2024-05-29 PROCEDURE — 85610 PROTHROMBIN TIME: CPT | Mod: HCNC | Performed by: INTERNAL MEDICINE

## 2024-05-29 PROCEDURE — 1100F PTFALLS ASSESS-DOCD GE2>/YR: CPT | Mod: HCNC,CPTII,S$GLB, | Performed by: INTERNAL MEDICINE

## 2024-05-29 PROCEDURE — 3074F SYST BP LT 130 MM HG: CPT | Mod: HCNC,CPTII,S$GLB, | Performed by: INTERNAL MEDICINE

## 2024-05-29 PROCEDURE — 36415 COLL VENOUS BLD VENIPUNCTURE: CPT | Mod: HCNC,PO | Performed by: INTERNAL MEDICINE

## 2024-05-29 PROCEDURE — 99214 OFFICE O/P EST MOD 30 MIN: CPT | Mod: HCNC,S$GLB,, | Performed by: INTERNAL MEDICINE

## 2024-05-29 PROCEDURE — 3078F DIAST BP <80 MM HG: CPT | Mod: HCNC,CPTII,S$GLB, | Performed by: INTERNAL MEDICINE

## 2024-05-29 RX ORDER — EZETIMIBE 10 MG/1
10 TABLET ORAL DAILY
Qty: 90 TABLET | Refills: 3 | Status: SHIPPED | OUTPATIENT
Start: 2024-05-29 | End: 2024-06-13

## 2024-05-29 RX ORDER — BACLOFEN 10 MG/1
10 TABLET ORAL 2 TIMES DAILY PRN
Qty: 60 TABLET | Refills: 1 | Status: SHIPPED | OUTPATIENT
Start: 2024-05-29

## 2024-05-29 NOTE — PROGRESS NOTES
Orthopaedic Follow-Up Visit    Last Appointment: 5/22/24  Diagnosis: Acute right shoulder pain after a fall at work, rotator cuff tendinitis, subacromial bursitis    Prior Procedure: Sling    Alyx Weir is a 74 y.o. female who is here for f/u evaluation of her right shoulder. The patient was last seen here by me on 5/22/24 at which point she had acute right shoulder pain after a fall.  Her symptoms were most consistent with rotator cuff tendinitis and subacromial bursitis.  She had difficulty with active range of motion, but tolerated passive range of motion.  She was able to perform active assisted range of motion. Her injury occurred just 2 days prior to her visit.  I wanted to give her 1-2 weeks and reassess her function at that time. The patient returns today reporting that the symptoms have not improved since her last visit. Has performed HEP and continued with use of sling. She is interested in discussing further treatment options.     To review her history, Alyx Weir is a 74 y.o. right-hand dominant female who presented on 5/22/24 with right shoulder pain and dysfunction that began on 5/20/24 while at work when she was putting some clothes on a  and stumbled and fell forward. Noted immediate pain in the shoulder following the fall. Her symptoms included right shoulder pain that had persisted since the fall. She also reported abrasion to her face that she thought it was from her glasses from the fall as well as right hip pain. Reports hip pain was improving, shoulder pain was persisting. She reported the pain was constant. She reported minimal use of her arm due to the pain. She denied any numbness or tingling. Her pain was aggravated by movement of her shoulder. She had tried rest, activity modification, and sling.      Patient's medications, allergies, past medical, surgical, social and family histories were reviewed and updated as appropriate.    Review of Systems   All systems reviewed were  negative.  Specifically, the patient denies fever, chills, weight loss, chest pain, shortness of breath, or dyspnea on exertion.      Past Medical History:   Diagnosis Date    Anticoagulated on Coumadin     Arm weakness-rotator cuff weakness 11/02/2015    Arthritis     Asthma     Clotting disorder     Coronary artery disease     Deep vein thrombosis     Degenerative disc disease     Diabetes mellitus type I 2011    BS last tested x 1 month not sure what it was.    Heterozygous factor V Leiden mutation     Hx of colonic polyps 11/13/2015    Hyperlipidemia     Hypertension     VIRGIL (obstructive sleep apnea)     Stenosis of right carotid artery 12/13/2016       Past Surgical History:   Procedure Laterality Date    BACK SURGERY      bladder cyst removal      BLADDER SURGERY      CARDIAC CATHETERIZATION  03/2013    mild CAD    COLONOSCOPY N/A 11/13/2015    Procedure: COLONOSCOPY;  Surgeon: Jas Umanzor III, MD;  Location: [facility] ENDO;  Service: Endoscopy;  Laterality: N/A;    HYSTERECTOMY      28 yrs old    LUMBAR SPINE SURGERY      bone spurs removed    OOPHORECTOMY      SELECTIVE INJECTION OF ANESTHETIC AGENT AROUND LUMBAR SPINAL NERVE ROOT BY TRANSFORAMINAL APPROACH Bilateral 06/03/2022    Procedure: Bilateral L4/5 TF ASUNCION with RN IV sedation; on Coumadin, will need INR prior to procedure, must be less than 1.3;  Surgeon: Mario Palacios MD;  Location: [facility] PAIN MGT;  Service: Pain Management;  Laterality: Bilateral;       Patient's Medications   New Prescriptions    No medications on file   Previous Medications    ACCU-CHEK GUIDE ME GLUCOSE MTR MISC    USE TO CHECK BLOOD SUGAR TWICE DAILY    ACETAMINOPHEN (TYLENOL ARTHRITIS ORAL)    Take by mouth. Takes prn    ALLOPURINOL (ZYLOPRIM) 100 MG TABLET    TAKE 1 TABLET EVERY DAY    BACLOFEN (LIORESAL) 10 MG TABLET    Take 10 mg by mouth 3 (three) times daily.    CLOBETASOL (TEMOVATE) 0.05 % EXTERNAL SOLUTION    APPLY SOLUTION TOPICALLY TO THE AFFECTED AREA ONCE DAILY (WILL  NEED APPOINTMENT FOR FURTHER REFILLS)    EZETIMIBE (ZETIA) 10 MG TABLET    Take 1 tablet (10 mg total) by mouth once daily.    FLUTICASONE PROPIONATE (FLONASE) 50 MCG/ACTUATION NASAL SPRAY    1 spray (50 mcg total) by Each Nostril route once daily.    GABAPENTIN (NEURONTIN) 300 MG CAPSULE    Take 1 capsule (300 mg total) by mouth once daily AND 3 capsules (900 mg total) every evening.    GLUCOSAMINE-CHONDROITIN 500-400 MG TABLET    Take 1 tablet by mouth 3 (three) times daily.    HYDROCODONE-ACETAMINOPHEN (NORCO) 7.5-325 MG PER TABLET    Take 1 tablet by mouth every 6 (six) hours as needed for Pain.    OLMESARTAN-AMLODIPIN-HCTHIAZID 40-10-25 MG TAB    TAKE 1 TABLET EVERY DAY    OM 3/E/LINOL/ALA/OLEIC/GLA/LIP (OMEGA 3-6-9 ORAL)    Take 1 capsule by mouth once daily.    PANTOPRAZOLE (PROTONIX) 40 MG TABLET    Take 1 tablet (40 mg total) by mouth daily as needed (gerd).    SOLIFENACIN (VESICARE) 5 MG TABLET    Take 1 tablet (5 mg total) by mouth once daily.    SPIRONOLACTONE (ALDACTONE) 50 MG TABLET    Take 1 tablet (50 mg total) by mouth 2 (two) times daily.    TRAMADOL (ULTRAM) 50 MG TABLET    Take 1 tablet (50 mg total) by mouth every 6 (six) hours as needed for Pain.    VITAMIN D 1000 UNITS TAB    Take 185 mg by mouth once daily.    WARFARIN (COUMADIN) 5 MG TABLET    Take 1 tablet by mouth on Sunday, Monday and Friday and 7.5 mg on all other days OR as directed by coumadin clinic   Modified Medications    No medications on file   Discontinued Medications    No medications on file       Family History   Problem Relation Name Age of Onset    Heart disease Mother      Hypertension Mother      Cataracts Mother      Hypertension Sister      Glaucoma Sister      Hypertension Brother      Diabetes Father      Diabetes Paternal Uncle      Hypertension Sister      Diabetes Sister      Hypertension Sister      Diabetes Sister      Breast cancer Neg Hx      Colon cancer Neg Hx      Ovarian cancer Neg Hx         Review of  patient's allergies indicates:   Allergen Reactions    Erythromycin Edema     Angioedema to throat    Amlodipine Other (See Comments)     Headaches    Diazepam Hives     Other reaction(s): Unknown    Iodine Hives     Other reaction(s): Unknown    Meperidine Hives     Other reaction(s): Unknown    Morphine Itching    Methylprednisolone sod suc(pf) Other (See Comments)     headache    Primidone Other (See Comments)     Other reaction(s): Unknown         Objective:      Physical Exam  Patient is alert and oriented, no distress. Skin is intact. Neuro is normal with no focal motor or sensory findings.    Cervical exam is unremarkable. Intact cervical ROM. Negative Spurling's test    Physical Exam:  RIGHT    LEFT    Scap Dyskinesis/Winging (-)    (-)    Tenderness:          Greater Tuberosity  +    (-)  Bicipital Groove  +    (-)  AC joint   (-)    (-)  Other:     ROM:  Forward Elevation 50/160    160  Abduction  30/90    120  ER (at side)  30    60    Strength:   Supraspinatus  2/5    5/5  Infraspinatus  2/5    5/5  Subscap / IR  5/5    5/5     Special Tests:   Neer:    +    (-)   Mancera:   +    (-)   SS Stress:   +    (-)   Bear Hug:   (-)    (-)   Oldham's:   +    (-)   Resisted Thrower's:   +    (-)   Cross Arm Abduction:  (-)    (-)    Neurovascular examination  - Motor grossly intact bilaterally to shoulder abduction, elbow flexion and extension, wrist flexion and extension, and intrinsic hand musculature  - Sensation intact to light touch bilaterally in axillary, median, radial, and ulnar distributions  - Symmetrical radial pulses    Imaging:    XR Results:  Results for orders placed during the hospital encounter of 05/20/24    X-Ray Shoulder Trauma Right    Narrative  EXAMINATION:  XR SHOULDER TRAUMA 3 VIEW RIGHT    CLINICAL HISTORY:  Pain in right shoulder    TECHNIQUE:  Three or four views of the right shoulder were performed.    COMPARISON:  None    FINDINGS:  No acute fracture or dislocation.  Mild  degenerative joint disease.    Impression  As above      Electronically signed by: Terry Nichols  Date:    05/20/2024  Time:    18:47      MRI Results:  No results found for this or any previous visit.      CT Results:  No results found for this or any previous visit.      Physician read: I agree with the above impression.    Assessment/Plan:   Alyx Weir is a 74 y.o. female with acute right shoulder pain after fall, suspected traumatic rotator cuff tear     Plan:    Her symptoms have not improved since her last visit. They have persisted despite home exercise program and sling immobilization. She has pain in the shoulder and very limited active ROM. Her symptoms are consistent with acute traumatic rotator cuff tear.   I recommend proceeding with STAT MRI of the right shoulder for further evaluation for suspected rotator cuff tear. The patient is in agreement with this plan.   Follow up with me after MRI.           Carl Ruiz MD    I, Estiven Velazquez, acted as a scribe for Carl Ruiz MD for the duration of this office visit.

## 2024-05-29 NOTE — PROGRESS NOTES
Subjective:       Patient ID: Alyx Weir is a 74 y.o. female.    Chief Complaint: Follow-up (6 months ), Hypertension, Hyperlipidemia, and Anticoagulation    Follow-up  Associated symptoms include arthralgias and myalgias. Pertinent negatives include no abdominal pain, chest pain, chills, congestion, coughing, diaphoresis, fatigue, fever, headaches, joint swelling, nausea, neck pain, numbness, rash, sore throat, vomiting or weakness.   Hypertension  Pertinent negatives include no chest pain, headaches, neck pain, palpitations or shortness of breath.   Hyperlipidemia  Associated symptoms include myalgias. Pertinent negatives include no chest pain or shortness of breath.     Past Medical History:   Diagnosis Date    Anticoagulated on Coumadin     Arm weakness-rotator cuff weakness 11/02/2015    Arthritis     Asthma     Clotting disorder     Coronary artery disease     Deep vein thrombosis     Degenerative disc disease     Diabetes mellitus type I 2011    BS last tested x 1 month not sure what it was.    Heterozygous factor V Leiden mutation     Hx of colonic polyps 11/13/2015    Hyperlipidemia     Hypertension     VIRGIL (obstructive sleep apnea)     Stenosis of right carotid artery 12/13/2016     Past Surgical History:   Procedure Laterality Date    BACK SURGERY      bladder cyst removal      BLADDER SURGERY      CARDIAC CATHETERIZATION  03/2013    mild CAD    COLONOSCOPY N/A 11/13/2015    Procedure: COLONOSCOPY;  Surgeon: Jas Umanzor III, MD;  Location: [facility];  Service: Endoscopy;  Laterality: N/A;    HYSTERECTOMY      28 yrs old    LUMBAR SPINE SURGERY      bone spurs removed    OOPHORECTOMY      SELECTIVE INJECTION OF ANESTHETIC AGENT AROUND LUMBAR SPINAL NERVE ROOT BY TRANSFORAMINAL APPROACH Bilateral 06/03/2022    Procedure: Bilateral L4/5 TF ASUNCION with RN IV sedation; on Coumadin, will need INR prior to procedure, must be less than 1.3;  Surgeon: Mario Palacios MD;  Location: [facility];  Service:  Pain Management;  Laterality: Bilateral;     Family History   Problem Relation Name Age of Onset    Heart disease Mother      Hypertension Mother      Cataracts Mother      Hypertension Sister      Glaucoma Sister      Hypertension Brother      Diabetes Father      Diabetes Paternal Uncle      Hypertension Sister      Diabetes Sister      Hypertension Sister      Diabetes Sister      Breast cancer Neg Hx      Colon cancer Neg Hx      Ovarian cancer Neg Hx       Social History     Socioeconomic History    Marital status:    Tobacco Use    Smoking status: Never     Passive exposure: Past    Smokeless tobacco: Never   Substance and Sexual Activity    Alcohol use: No    Drug use: No    Sexual activity: Not Currently     Partners: Male     Birth control/protection: None   Social History Narrative    Dogs in household, no smokers.     Social Determinants of Health     Financial Resource Strain: Medium Risk (11/29/2023)    Overall Financial Resource Strain (CARDIA)     Difficulty of Paying Living Expenses: Somewhat hard   Food Insecurity: No Food Insecurity (11/29/2023)    Hunger Vital Sign     Worried About Running Out of Food in the Last Year: Never true     Ran Out of Food in the Last Year: Never true   Transportation Needs: No Transportation Needs (11/29/2023)    PRAPARE - Transportation     Lack of Transportation (Medical): No     Lack of Transportation (Non-Medical): No   Physical Activity: Insufficiently Active (11/29/2023)    Exercise Vital Sign     Days of Exercise per Week: 2 days     Minutes of Exercise per Session: 60 min   Stress: Stress Concern Present (11/29/2023)    [employer] - Occupational Stress Questionnaire     Feeling of Stress : To some extent   Housing Stability: Low Risk  (11/29/2023)    Housing Stability Vital Sign     Unable to Pay for Housing in the Last Year: No     Number of Places Lived in the Last Year: 1     Unstable Housing in the Last Year: No      Review of Systems   Constitutional:  Negative for activity change, appetite change, chills, diaphoresis, fatigue, fever and unexpected weight change.   HENT:  Negative for congestion, drooling, ear discharge, ear pain, facial swelling, hearing loss, mouth sores, nosebleeds, postnasal drip, rhinorrhea, sinus pressure, sneezing, sore throat, tinnitus, trouble swallowing and voice change.    Eyes:  Negative for photophobia, redness and visual disturbance.   Respiratory:  Negative for apnea, cough, choking, chest tightness, shortness of breath and wheezing.    Cardiovascular:  Negative for chest pain, palpitations and leg swelling.   Gastrointestinal:  Negative for abdominal distention, abdominal pain, blood in stool, constipation, diarrhea, nausea and vomiting.   Endocrine: Negative for cold intolerance, heat intolerance, polydipsia, polyphagia and polyuria.   Genitourinary:  Negative for decreased urine volume, difficulty urinating, dysuria, flank pain, frequency, genital sores, hematuria, pelvic pain and urgency.   Musculoskeletal:  Positive for arthralgias and myalgias. Negative for back pain, gait problem, joint swelling, neck pain and neck stiffness.   Skin:  Negative for color change, pallor, rash and wound.   Allergic/Immunologic: Negative for food allergies and immunocompromised state.   Neurological:  Negative for dizziness, tremors, seizures, syncope, speech difficulty, weakness, light-headedness, numbness and headaches.   Hematological:  Negative for adenopathy. Does not bruise/bleed easily.   Psychiatric/Behavioral:  Negative for agitation, behavioral problems, confusion, decreased concentration, dysphoric mood, hallucinations, self-injury, sleep disturbance and suicidal ideas. The patient is not nervous/anxious and is not hyperactive.    All other systems reviewed and are negative.      Objective:      Physical Exam  Vitals and nursing note reviewed.   Constitutional:       General: She is not in acute  distress.     Appearance: Normal appearance. She is well-developed. She is not diaphoretic.   HENT:      Head: Normocephalic and atraumatic.      Mouth/Throat:      Pharynx: No oropharyngeal exudate.   Eyes:      General: No scleral icterus.  Neck:      Thyroid: No thyromegaly.      Vascular: No carotid bruit or JVD.      Trachea: No tracheal deviation.   Cardiovascular:      Rate and Rhythm: Normal rate and regular rhythm.      Heart sounds: Normal heart sounds.   Pulmonary:      Effort: Pulmonary effort is normal. No respiratory distress.      Breath sounds: Normal breath sounds. No wheezing or rales.   Chest:      Chest wall: No tenderness.   Abdominal:      General: Bowel sounds are normal. There is no distension.      Palpations: Abdomen is soft.      Tenderness: There is no abdominal tenderness. There is no guarding or rebound.   Musculoskeletal:         General: No tenderness. Normal range of motion.      Cervical back: Normal range of motion and neck supple.   Lymphadenopathy:      Cervical: No cervical adenopathy.   Skin:     General: Skin is warm and dry.      Coloration: Skin is not pale.      Findings: No erythema or rash.   Neurological:      Mental Status: She is alert and oriented to person, place, and time.      Cranial Nerves: No cranial nerve deficit.      Coordination: Coordination normal.   Psychiatric:         Behavior: Behavior normal.         Thought Content: Thought content normal.         Judgment: Judgment normal.         CMP  Sodium   Date Value Ref Range Status   05/29/2023 141 136 - 145 mmol/L Final     Potassium   Date Value Ref Range Status   05/29/2023 4.2 3.5 - 5.1 mmol/L Final     Chloride   Date Value Ref Range Status   05/29/2023 103 95 - 110 mmol/L Final     CO2   Date Value Ref Range Status   05/29/2023 27 23 - 29 mmol/L Final     Glucose   Date Value Ref Range Status   05/29/2023 75 70 - 110 mg/dL Final     BUN   Date Value Ref Range Status   05/29/2023 15 8 - 23 mg/dL Final      Creatinine   Date Value Ref Range Status   05/29/2023 0.8 0.5 - 1.4 mg/dL Final     Calcium   Date Value Ref Range Status   05/29/2023 9.4 8.7 - 10.5 mg/dL Final     Total Protein   Date Value Ref Range Status   05/29/2023 7.3 6.0 - 8.4 g/dL Final     Albumin   Date Value Ref Range Status   05/29/2023 3.8 3.5 - 5.2 g/dL Final     Total Bilirubin   Date Value Ref Range Status   05/29/2023 0.9 0.1 - 1.0 mg/dL Final     Comment:     For infants and newborns, interpretation of results should be based  on gestational age, weight and in agreement with clinical  observations.    Premature Infant recommended reference ranges:  Up to 24 hours.............<8.0 mg/dL  Up to 48 hours............<12.0 mg/dL  3-5 days..................<15.0 mg/dL  6-29 days.................<15.0 mg/dL       Alkaline Phosphatase   Date Value Ref Range Status   05/29/2023 47 (L) 55 - 135 U/L Final     AST   Date Value Ref Range Status   05/29/2023 18 10 - 40 U/L Final     ALT   Date Value Ref Range Status   05/29/2023 15 10 - 44 U/L Final     Anion Gap   Date Value Ref Range Status   05/29/2023 11 8 - 16 mmol/L Final     eGFR if    Date Value Ref Range Status   02/16/2022 >60 >60 mL/min/1.73 m^2 Final     eGFR if non    Date Value Ref Range Status   02/16/2022 >60 >60 mL/min/1.73 m^2 Final     Comment:     Calculation used to obtain the estimated glomerular filtration  rate (eGFR) is the CKD-EPI equation.        Lab Results   Component Value Date    WBC 8.62 05/29/2023    HGB 12.2 05/29/2023    HCT 38.2 05/29/2023    MCV 86 05/29/2023     05/29/2023     Lab Results   Component Value Date    CHOL 198 11/29/2023     Lab Results   Component Value Date    HDL 72 11/29/2023     Lab Results   Component Value Date    LDLCALC 110.6 11/29/2023     Lab Results   Component Value Date    TRIG 77 11/29/2023     Lab Results   Component Value Date    CHOLHDL 36.4 11/29/2023     Lab Results   Component Value Date    TSH  0.465 05/29/2023     Lab Results   Component Value Date    HGBA1C 5.7 (H) 11/29/2023     Assessment:       1. Anticoagulated on Coumadin    2. Dyslipidemia    3. Essential hypertension    4. Heterozygous factor V Leiden mutation    5. History of DVT (deep vein thrombosis)    6. IGT (impaired glucose tolerance)    7. Statin intolerance        Plan:   Anticoagulated on Coumadin    Dyslipidemia    Essential hypertension  -     Comprehensive Metabolic Panel; Future; Expected date: 05/29/2024  -     CBC Auto Differential; Future; Expected date: 05/29/2024  -     TSH; Future; Expected date: 05/29/2024    Heterozygous factor V Leiden mutation    History of DVT (deep vein thrombosis)    IGT (impaired glucose tolerance)  -     Hemoglobin A1C; Future; Expected date: 05/29/2024    Statin intolerance    Other orders  -     ezetimibe (ZETIA) 10 mg tablet; Take 1 tablet (10 mg total) by mouth once daily.  Dispense: 90 tablet; Refill: 3  -     baclofen (LIORESAL) 10 MG tablet; Take 1 tablet (10 mg total) by mouth 2 (two) times daily as needed.  Dispense: 60 tablet; Refill: 1      Stable----------continue meds---------as above-------------f/u 4 months------------

## 2024-05-30 ENCOUNTER — ANTI-COAG VISIT (OUTPATIENT)
Dept: CARDIOLOGY | Facility: CLINIC | Age: 74
End: 2024-05-30
Payer: MEDICARE

## 2024-05-30 DIAGNOSIS — Z79.01 ANTICOAGULATED ON COUMADIN: Primary | Chronic | ICD-10-CM

## 2024-05-30 DIAGNOSIS — D68.51 HETEROZYGOUS FACTOR V LEIDEN MUTATION: Chronic | ICD-10-CM

## 2024-05-30 PROCEDURE — 93793 ANTICOAG MGMT PT WARFARIN: CPT | Mod: S$GLB,,,

## 2024-05-30 NOTE — PROGRESS NOTES
Patient contacted:  INR is approaching goal at 1.9 - just below.  Lab collected yesterday.  Previous warfarin instructions were verified and followed.  No s/s reported.  Instructions given:  will boost today's dose to 10 mg, then re-challenge current dose of 7.5 mg every Tues, Thurs, Sat; and 5 mg all other days.  Recheck INR on 6/13/2024 ([facility] lab) - will call with result.  Patient repeated back instructions correctly and confirms understanding.

## 2024-06-04 ENCOUNTER — OFFICE VISIT (OUTPATIENT)
Dept: SPORTS MEDICINE | Facility: CLINIC | Age: 74
End: 2024-06-04
Payer: MEDICARE

## 2024-06-04 VITALS — WEIGHT: 171.5 LBS | BODY MASS INDEX: 30.39 KG/M2 | RESPIRATION RATE: 17 BRPM | HEIGHT: 63 IN

## 2024-06-04 DIAGNOSIS — S46.011D TRAUMATIC TEAR OF RIGHT ROTATOR CUFF, UNSPECIFIED TEAR EXTENT, SUBSEQUENT ENCOUNTER: Primary | ICD-10-CM

## 2024-06-04 DIAGNOSIS — M25.511 ACUTE PAIN OF RIGHT SHOULDER: ICD-10-CM

## 2024-06-04 PROCEDURE — 3288F FALL RISK ASSESSMENT DOCD: CPT | Mod: CPTII,S$GLB,, | Performed by: STUDENT IN AN ORGANIZED HEALTH CARE EDUCATION/TRAINING PROGRAM

## 2024-06-04 PROCEDURE — 3008F BODY MASS INDEX DOCD: CPT | Mod: CPTII,S$GLB,, | Performed by: STUDENT IN AN ORGANIZED HEALTH CARE EDUCATION/TRAINING PROGRAM

## 2024-06-04 PROCEDURE — 1125F AMNT PAIN NOTED PAIN PRSNT: CPT | Mod: CPTII,S$GLB,, | Performed by: STUDENT IN AN ORGANIZED HEALTH CARE EDUCATION/TRAINING PROGRAM

## 2024-06-04 PROCEDURE — 1160F RVW MEDS BY RX/DR IN RCRD: CPT | Mod: CPTII,S$GLB,, | Performed by: STUDENT IN AN ORGANIZED HEALTH CARE EDUCATION/TRAINING PROGRAM

## 2024-06-04 PROCEDURE — 3044F HG A1C LEVEL LT 7.0%: CPT | Mod: CPTII,S$GLB,, | Performed by: STUDENT IN AN ORGANIZED HEALTH CARE EDUCATION/TRAINING PROGRAM

## 2024-06-04 PROCEDURE — 99999 PR PBB SHADOW E&M-EST. PATIENT-LVL IV: CPT | Mod: PBBFAC,HCNC,, | Performed by: STUDENT IN AN ORGANIZED HEALTH CARE EDUCATION/TRAINING PROGRAM

## 2024-06-04 PROCEDURE — 1159F MED LIST DOCD IN RCRD: CPT | Mod: CPTII,S$GLB,, | Performed by: STUDENT IN AN ORGANIZED HEALTH CARE EDUCATION/TRAINING PROGRAM

## 2024-06-04 PROCEDURE — 99214 OFFICE O/P EST MOD 30 MIN: CPT | Mod: S$GLB,,, | Performed by: STUDENT IN AN ORGANIZED HEALTH CARE EDUCATION/TRAINING PROGRAM

## 2024-06-04 PROCEDURE — 1100F PTFALLS ASSESS-DOCD GE2>/YR: CPT | Mod: CPTII,S$GLB,, | Performed by: STUDENT IN AN ORGANIZED HEALTH CARE EDUCATION/TRAINING PROGRAM

## 2024-06-04 NOTE — PATIENT INSTRUCTIONS
[place] [place] [address]    Take exit [address] for [address]   If traveling [address] [place] from the [address], you will turn use the left 2 lanes to turn left onto [address].  If traveling [address] [place] from the [place], you will turn right onto [address]  Continue straight for approximately 2.3 miles until you reach the intersection of [address] and [address] ([address] This will be a traffic light.   [address] becomes [address] after your cross [address]  Make a right onto [address] ([address]). If you will be heading towards hospitals's campus. (SEE BELOW PICTURE DIAGRAM FOR REFERENCE)  Travel a couple hundred feet and make a U-turn and then turn right into the facility.

## 2024-06-05 ENCOUNTER — HOSPITAL ENCOUNTER (OUTPATIENT)
Dept: RADIOLOGY | Facility: HOSPITAL | Age: 74
Discharge: HOME | End: 2024-06-05
Attending: STUDENT IN AN ORGANIZED HEALTH CARE EDUCATION/TRAINING PROGRAM
Payer: MEDICARE

## 2024-06-05 DIAGNOSIS — M25.511 ACUTE PAIN OF RIGHT SHOULDER: ICD-10-CM

## 2024-06-05 DIAGNOSIS — S46.011D TRAUMATIC TEAR OF RIGHT ROTATOR CUFF, UNSPECIFIED TEAR EXTENT, SUBSEQUENT ENCOUNTER: ICD-10-CM

## 2024-06-05 PROCEDURE — 73221 MRI JOINT UPR EXTREM W/O DYE: CPT | Mod: 26,RT,, | Performed by: RADIOLOGY

## 2024-06-05 PROCEDURE — 73221 MRI JOINT UPR EXTREM W/O DYE: CPT | Mod: TC,RT

## 2024-06-06 NOTE — PROGRESS NOTES
Orthopaedic Follow-Up Visit    Last Appointment: 6/4/24  Diagnosis: Acute right shoulder pain after fall, suspected traumatic rotator cuff tear   Prior Procedure: MRI    Alyx Weir is a 74 y.o. female who is here for f/u evaluation of her right shoulder. The patient was last seen here by me on 6/4/24 at which point her symptoms had not improved since her last visit. She continued to have pain in the shoulder and very limited active ROM. Her symptoms were consistent with acute traumatic rotator cuff tear. I recommend proceeding with STAT MRI of the right shoulder for further evaluation for suspected rotator cuff tear. The patient returns today to review her MRI and discuss further treatment options.     To review her history, Alyx Weir is a 74 y.o. right-hand dominant female who presented on 5/22/24 with right shoulder pain and dysfunction that began on 5/20/24 while at work when she was putting some clothes on a  and stumbled and fell forward. Noted immediate pain in the shoulder following the fall. Her symptoms included right shoulder pain that had persisted since the fall. She also reported abrasion to her face that she thought it was from her glasses from the fall as well as right hip pain. Reports hip pain was improving, shoulder pain was persisting. She reported the pain was constant. She reported minimal use of her arm due to the pain. She denied any numbness or tingling. Her pain was aggravated by movement of her shoulder. She had tried rest, activity modification, and sling.  Her symptoms were most consistent with rotator cuff tendinitis and subacromial bursitis.  She had difficulty with active range of motion, but tolerated passive range of motion.  She was able to perform active assisted range of motion. Her injury occurred just 2 days prior to her visit.  I wanted to give her 1-2 weeks and reassess her function at that time.     Patient's medications, allergies, past medical, surgical, social  and family histories were reviewed and updated as appropriate.    Review of Systems   All systems reviewed were negative.  Specifically, the patient denies fever, chills, weight loss, chest pain, shortness of breath, or dyspnea on exertion.      Past Medical History:   Diagnosis Date    Anticoagulated on Coumadin     Arm weakness-rotator cuff weakness 11/02/2015    Arthritis     Asthma     Clotting disorder     Coronary artery disease     Deep vein thrombosis     Degenerative disc disease     Diabetes mellitus type I 2011    BS last tested x 1 month not sure what it was.    Heterozygous factor V Leiden mutation     Hx of colonic polyps 11/13/2015    Hyperlipidemia     Hypertension     VIRGIL (obstructive sleep apnea)     Stenosis of right carotid artery 12/13/2016       Past Surgical History:   Procedure Laterality Date    BACK SURGERY      bladder cyst removal      BLADDER SURGERY      CARDIAC CATHETERIZATION  03/2013    mild CAD    COLONOSCOPY N/A 11/13/2015    Procedure: COLONOSCOPY;  Surgeon: Jas Umanzor III, MD;  Location: [facility] END[facility];  Service: Endoscopy;  Laterality: N/A;    HYSTERECTOMY      28 yrs old    LUMBAR SPINE SURGERY      bone spurs removed    OOPHORECTOMY      SELECTIVE INJECTION OF ANESTHETIC AGENT AROUND LUMBAR SPINAL NERVE ROOT BY TRANSFORAMINAL APPROACH Bilateral 06/03/2022    Procedure: Bilateral L4/5 TF ASUNCION with RN IV sedation; on Coumadin, will need INR prior to procedure, must be less than 1.3;  Surgeon: Mario Palacios MD;  Location: [facility]IN [facility];  Service: Pain Management;  Laterality: Bilateral;       Patient's Medications   New Prescriptions    No medications on file   Previous Medications    ACCU-CHEK GUIDE ME GLUCOSE MTR MISC    USE TO CHECK BLOOD SUGAR TWICE DAILY    ACETAMINOPHEN (TYLENOL ARTHRITIS ORAL)    Take by mouth. Takes prn    ALLOPURINOL (ZYLOPRIM) 100 MG TABLET    TAKE 1 TABLET EVERY DAY    BACLOFEN (LIORESAL) 10 MG TABLET    Take 1 tablet (10 mg total) by mouth 2 (two)  times daily as needed.    CLOBETASOL (TEMOVATE) 0.05 % EXTERNAL SOLUTION    APPLY SOLUTION TOPICALLY TO THE AFFECTED AREA ONCE DAILY (WILL NEED APPOINTMENT FOR FURTHER REFILLS)    EZETIMIBE (ZETIA) 10 MG TABLET    Take 1 tablet (10 mg total) by mouth once daily.    FLUTICASONE PROPIONATE (FLONASE) 50 MCG/ACTUATION NASAL SPRAY    1 spray (50 mcg total) by Each Nostril route once daily.    GABAPENTIN (NEURONTIN) 300 MG CAPSULE    Take 1 capsule (300 mg total) by mouth once daily AND 3 capsules (900 mg total) every evening.    GLUCOSAMINE-CHONDROITIN 500-400 MG TABLET    Take 1 tablet by mouth 3 (three) times daily.    HYDROCODONE-ACETAMINOPHEN (NORCO) 7.5-325 MG PER TABLET    Take 1 tablet by mouth every 6 (six) hours as needed for Pain.    OLMESARTAN-AMLODIPIN-HCTHIAZID 40-10-25 MG TAB    TAKE 1 TABLET EVERY DAY    OM 3/E/LINOL/ALA/OLEIC/GLA/LIP (OMEGA 3-6-9 ORAL)    Take 1 capsule by mouth once daily.    PANTOPRAZOLE (PROTONIX) 40 MG TABLET    Take 1 tablet (40 mg total) by mouth daily as needed (gerd).    SOLIFENACIN (VESICARE) 5 MG TABLET    Take 1 tablet (5 mg total) by mouth once daily.    SPIRONOLACTONE (ALDACTONE) 50 MG TABLET    Take 1 tablet (50 mg total) by mouth 2 (two) times daily.    TRAMADOL (ULTRAM) 50 MG TABLET    Take 1 tablet (50 mg total) by mouth every 6 (six) hours as needed for Pain.    VITAMIN D 1000 UNITS TAB    Take 185 mg by mouth once daily.    WARFARIN (COUMADIN) 5 MG TABLET    Take 1 tablet by mouth on Sunday, Monday and Friday and 7.5 mg on all other days OR as directed by coumadin clinic   Modified Medications    No medications on file   Discontinued Medications    No medications on file       Family History   Problem Relation Name Age of Onset    Heart disease Mother      Hypertension Mother      Cataracts Mother      Hypertension Sister      Glaucoma Sister      Hypertension Brother      Diabetes Father      Diabetes Paternal Uncle      Hypertension Sister      Diabetes Sister       Hypertension Sister      Diabetes Sister      Breast cancer Neg Hx      Colon cancer Neg Hx      Ovarian cancer Neg Hx         Review of patient's allergies indicates:   Allergen Reactions    Erythromycin Edema     Angioedema to throat    Amlodipine Other (See Comments)     Headaches    Diazepam Hives     Other reaction(s): Unknown    Iodine Hives     Other reaction(s): Unknown    Meperidine Hives     Other reaction(s): Unknown    Morphine Itching    Methylprednisolone sod suc(pf) Other (See Comments)     headache    Primidone Other (See Comments)     Other reaction(s): Unknown         Objective:      Physical Exam  Patient is alert and oriented, no distress. Skin is intact. Neuro is normal with no focal motor or sensory findings.    Cervical exam is unremarkable. Intact cervical ROM. Negative Spurling's test    Physical Exam:  RIGHT    LEFT    Scap Dyskinesis/Winging (-)    (-)    Tenderness:          Greater Tuberosity  +    (-)  Bicipital Groove  +    (-)  AC joint   (-)    (-)  Other:     ROM:  Forward Elevation 50/160    160  Abduction  30/90    120  ER (at side)  30    60    Strength:   Supraspinatus  2/5    5/5  Infraspinatus  2/5    5/5  Subscap / IR  5/5    5/5     Special Tests:   Neer:    +    (-)   Mancera:   +    (-)   SS Stress:   +    (-)   Bear Hug:   (-)    (-)   [place]'s:   +    (-)   Resisted Thrower's:   +    (-)   Cross Arm Abduction:  (-)    (-)    Neurovascular examination  - Motor grossly intact bilaterally to shoulder abduction, elbow flexion and extension, wrist flexion and extension, and intrinsic hand musculature  - Sensation intact to light touch bilaterally in axillary, median, radial, and ulnar distributions  - Symmetrical radial pulses    Imaging:    XR Results:  Results for orders placed during the hospital encounter of 05/20/24    X-Ray Shoulder Trauma Right    Narrative  EXAMINATION:  XR SHOULDER TRAUMA 3 VIEW RIGHT    CLINICAL HISTORY:  Pain in right shoulder    TECHNIQUE:  Three  or four views of the right shoulder were performed.    COMPARISON:  None    FINDINGS:  No acute fracture or dislocation.  Mild degenerative joint disease.    Impression  As above      Electronically signed by: Terry Nichols  Date:    05/20/2024  Time:    18:47      MRI Results:  Results for orders placed during the hospital encounter of 06/05/24    MRI Shoulder Without Contrast Right    Narrative  EXAMINATION:  MRI SHOULDER WITHOUT CONTRAST RIGHT    CLINICAL HISTORY:  Shoulder pain, rotator cuff disorder suspected, xray done;  Pain in right shoulder    TECHNIQUE:  Multisequence, multiplanar MR imaging of the shoulder performed without contrast.    COMPARISON:  Prior radiograph    FINDINGS:  Rotator cuff:    Supraspinatus/infraspinatus: Tendinosis with large area high-grade partial to full-thickness tearing involving at least 4 cm in the AP dimension.  There is retraction of deep fibers to the mid humeral head level.  Intact far anterior supraspinatus and far posterior infraspinatus tendon fibers present.    Subscapularis: Intact    Teres minor: Intact    Mild supraspinatus muscle bulk loss.    Labrum: Age expected degenerative fraying present.    Biceps: Long head biceps tendon is intact.    Bone: No fracture present.  Bone marrow signal is unremarkable.    Acromioclavicular joint:Mild degenerative changes    Cartilage: Articular cartilage of the glenohumeral joint is preserved    Miscellaneous: Small joint effusion with mild subacromial/subdeltoid fluid.    Impression  Large area high-grade partial to full-thickness rotator cuff tear with retraction of deep fibers.      Electronically signed by: Kevin Levy MD  Date:    06/05/2024  Time:    11:34      CT Results:  No results found for this or any previous visit.      Physician read: I agree with the above impression. Largely high grade partial thickness rotator cuff tear with small full thickness component    Assessment/Plan:   Alyx Weir is a 74 y.o.  female with acute right shoulder traumatic, rotator cuff tear subacromial impingement     Plan:    Reviewed MRI with the patient today. Her MRI confirms right shoulder traumatic, high-grade partial thickness rotator cuff tear. There is also full thickness component based on my interpretation.  The patient has tried considerable conservative treatment in the form of rest, activity modifications, oral anti-inflammatories, physical therapy, and corticosteroid injections. Despite these interventions, she continues to experience symptoms on a daily basis that limit her activities of daily living and diminish her quality of life.   I recommend proceeding with right shoulder arthroscopy with rotator cuff repair, subacromial decompression, and possible biceps tenodesis.    We reviewed the proposed procedure in detail, which included discussion of risks and benefits, techniques, and possible complications of the procedure. Risks include infection, bleeding, damage to artery and nerves, continual pain and possible stiffness, and blood clots. We reviewed the post-operative restrictions, recovery period, and rehabilitation.  All patient questions were answered. Despite the risks, she elected to proceed with surgery and the consent was freely signed.  At least 10 minutes were spent instructing the patient in home care following surgery including, but not limited to: sling use, sleeping, hygiene, post-operative exercises, preventing post-operative complications, etc.  All questions were answered.  This service was performed under the direction of Carl Ruiz MD.  CPT 28772-BW.  Stop Warfarin 5-7 days prior to surgery. Will restart POD#1.  Follow up with me 10-14 days after surgery            Carl Ruiz MD    I, Estiven Velazquez, acted as a scribe for Carl Ruiz MD for the duration of this office visit.

## 2024-06-07 ENCOUNTER — OFFICE VISIT (OUTPATIENT)
Dept: SPORTS MEDICINE | Facility: CLINIC | Age: 74
End: 2024-06-07
Payer: MEDICARE

## 2024-06-07 VITALS — RESPIRATION RATE: 17 BRPM | BODY MASS INDEX: 30.39 KG/M2 | WEIGHT: 171.5 LBS | HEIGHT: 63 IN

## 2024-06-07 DIAGNOSIS — M75.41 SUBACROMIAL IMPINGEMENT OF RIGHT SHOULDER: ICD-10-CM

## 2024-06-07 DIAGNOSIS — S46.011D TRAUMATIC INCOMPLETE TEAR OF RIGHT ROTATOR CUFF, SUBSEQUENT ENCOUNTER: Primary | ICD-10-CM

## 2024-06-07 PROCEDURE — 99214 OFFICE O/P EST MOD 30 MIN: CPT | Mod: HCNC,S$GLB,, | Performed by: STUDENT IN AN ORGANIZED HEALTH CARE EDUCATION/TRAINING PROGRAM

## 2024-06-07 PROCEDURE — 1160F RVW MEDS BY RX/DR IN RCRD: CPT | Mod: HCNC,CPTII,S$GLB, | Performed by: STUDENT IN AN ORGANIZED HEALTH CARE EDUCATION/TRAINING PROGRAM

## 2024-06-07 PROCEDURE — 1125F AMNT PAIN NOTED PAIN PRSNT: CPT | Mod: HCNC,CPTII,S$GLB, | Performed by: STUDENT IN AN ORGANIZED HEALTH CARE EDUCATION/TRAINING PROGRAM

## 2024-06-07 PROCEDURE — 3288F FALL RISK ASSESSMENT DOCD: CPT | Mod: HCNC,CPTII,S$GLB, | Performed by: STUDENT IN AN ORGANIZED HEALTH CARE EDUCATION/TRAINING PROGRAM

## 2024-06-07 PROCEDURE — 3044F HG A1C LEVEL LT 7.0%: CPT | Mod: HCNC,CPTII,S$GLB, | Performed by: STUDENT IN AN ORGANIZED HEALTH CARE EDUCATION/TRAINING PROGRAM

## 2024-06-07 PROCEDURE — 1101F PT FALLS ASSESS-DOCD LE1/YR: CPT | Mod: HCNC,CPTII,S$GLB, | Performed by: STUDENT IN AN ORGANIZED HEALTH CARE EDUCATION/TRAINING PROGRAM

## 2024-06-07 PROCEDURE — 1159F MED LIST DOCD IN RCRD: CPT | Mod: HCNC,CPTII,S$GLB, | Performed by: STUDENT IN AN ORGANIZED HEALTH CARE EDUCATION/TRAINING PROGRAM

## 2024-06-07 PROCEDURE — 97110 THERAPEUTIC EXERCISES: CPT | Mod: HCNC,GP,S$GLB,97 | Performed by: STUDENT IN AN ORGANIZED HEALTH CARE EDUCATION/TRAINING PROGRAM

## 2024-06-07 PROCEDURE — 3008F BODY MASS INDEX DOCD: CPT | Mod: HCNC,CPTII,S$GLB, | Performed by: STUDENT IN AN ORGANIZED HEALTH CARE EDUCATION/TRAINING PROGRAM

## 2024-06-07 PROCEDURE — 99999 PR PBB SHADOW E&M-EST. PATIENT-LVL IV: CPT | Mod: PBBFAC,,, | Performed by: STUDENT IN AN ORGANIZED HEALTH CARE EDUCATION/TRAINING PROGRAM

## 2024-06-07 NOTE — LETTER
June 7, 2024      [facility]  [address] [place] [address]  Phone: [phone]  Fax: [phone]       Patient: Alyx Weir   YOB: 1950  Date of Visit: 06/07/2024    To Whom It May Concern:    Eduar Weir  was at [facility] on 06/07/2024. The patient is scheduled to undergo right shoulder surgery on 6/24/24 at this time. She is to remain out of work from today until at least 3 months out from her surgery. She will be re-evaluated 3 months after her surgery for potential return to work at that time.  If you have any questions or concerns, or if I can be of further assistance, please do not hesitate to contact me.    Sincerely,      Carl Ruiz MD

## 2024-06-07 NOTE — PATIENT INSTRUCTIONS
Surgery scheduled for 6/24/24    In preparation for you upcoming surgery, here are some things to keep in mind leading up to and after your surgery:    PRE-ADMIT APPOINTMENT  We have a department that will review your chart for any health conditions or other issues to make sure that it is safe from an anesthesia standpoint to undergo surgery.   If they have any concerns they may schedule an appointment for you to be evaluated and have any further testing done. This may include but is not limited to bloodwork, EKG, chest X-ray, referral to cardiologist for additional testing/clearance, referral to pulmonologist for additional testing/clearance, or referral to any other needed specialties for additional testing/clearance.  If only basic testing is needed this appointment may be scheduled a few days before your actually surgery. Unless any new concerns or issues arise, this typically does not affect the date of your surgery.   If you are on any medications, at this appointment they will also review and discuss/provide instructions on when to stop or start taking these medications before and after surgery.   INSTRUCTIONS FOR SURGERY   The day before your surgery (usually between 1-3 PM), someone will call you to give you the scheduled time for you surgery, what time you need to arrive, what time to not eat/drink past, and any other final instructions  If your surgery is scheduled for Monday then they will call you on Friday afternoon.   If you do not receive this call please reach out to our office before the end of the day (4 PM) so that we may assist you.   HOME EXERCISES AFTER SURGERY        PHYSICAL THERAPY  A referral for physical therapy will be placed to the location we discussed today. If you would like to make changes to this, please give us a call or send us a [employer] message as soon as possible so we may coordinate these changes.   We will send that referral to the desired location and also provide them with  the rehabilitation protocol that will be followed to make sure you are progressed appropriately after surgery.   They will also be provided with the start date of your PT after surgery. You will likely start PT prior to you first post-op appointment. Depending on the procedure you have performed this may be as soon as 3 days after surgery or up to 2 weeks after surgery. Below are a few examples of some common time frames for certain procedures:  Rotator cuff surgery- 10-11 days after surgery  Shoulder labrum surgery- 3-5 days after surgery   Shoulder replacement- 3-5 days after surgery  ACL Reconstruction- 3-5 days after surgery  Hip scope- 3-5 days after surgery  Distal biceps tendon repair- once post-op splint is removed/per Dr. Ruiz recommendation  Fracture- once post-op splint is removed/per Dr. Ruiz recommendation   If you ever have any problems or issues with your physical therapy or wish to change locations at any point, please let us know and we are happy to assist with that change.   POST-OP APPOINTMENTS  Post-op appointments will be scheduled at 2 weeks, 6 weeks, and 3 months from the date of your surgery. We will schedule these appointments prior to your surgery and they will be able to be viewed in [employer]  2 week post-op appointment  This appointment will be with Izabella Ng who is Dr. Ruiz's physician assistant (PA). At this appointment we will be removing your sutures, checking for any signs of infection or other concerning issues, and checking to make sure your range of motion is appropriate.   Dr. Ruiz will also be in clinic on the same day as this appointment and can step in to see you if there is anything of concern that needs to be addressed.   You may also have X-rays scheduled at this appointment, depending on the procedure you had performed (shoulder replacement, ACL surgery, surgery for a fracture, etc.)  6 week post-op appointment  This appointment will be with   Joseph. He will make sure everything is progressing well and may also review your pictures from surgery if any were taken.   You may also have X-rays at this appointment, depending on the procedure you had performed (shoulder replacement, surgery for a fracture, etc.)  3 month post-op appointment  This appointment will be with Dr. Ruiz. He will make sure you continue to progress appropriately.  Any follow-ups after this visit will be at the discretion of Dr. Ruiz based upon your recovery/progress, procedure performed, etc.   FMLA OR SHORT TERM DISABILITY PAPERWORK  If you have any paperwork that needs to be filled out in regards to FMLA leave or short term disability leave, you may drop these forms off at the [facility] or attachment them to a patient message via [employer].   These forms will be filled out within a few days of your actual surgery being performed in case your surgery date is rescheduled or changed and the forms would need to potentially be filled out again.   Please try to provide these forms to our office in a timely manner, as we ask for 5-7 business days for them to be completed.       Surgical Treatment of Rotator Cuff Tears    Your doctor may recommend surgery if your pain does not improve with nonsurgical methods. Continued pain is the main indication for surgery. However, your doctor may also suggest surgery if you are very active and/or use your arms for overhead work or sports.     Other signs that surgery may be a good option for you include:  Your symptoms have lasted 6 to 12 months  You have a large tear (more than 3 cm) and the quality of the surrounding tissue is good  You have significant weakness and loss of function in your shoulder  Your tear was caused by a recent, acute injury    Surgery to repair a torn rotator cuff most often involves re-attaching the tendon to the head of humerus (upper arm bone).  Most surgical repairs can be done on an outpatient basis and do not require  you to stay overnight in the hospital.     You may have other shoulder problems in addition to a rotator cuff tear, such as:  Biceps tendon tears (biceps tenotomy versus tenodesis)  Osteoarthritis  Bone spurs (subacromial decompression)  Other soft tissue tears    During the operation, your surgeon may be able to take care of these problems, as well.     The three techniques most commonly used for rotator cuff repair are:  Open repair  Arthroscopic repair (most commonly used today)  Mini-open repair      ALL ARTHROSCOPIC REPAIR  During arthroscopy, your surgeon inserts a small camera, called an arthroscope, into your shoulder joint. The camera displays a live video feed on a monitor, and your surgeon uses these images to guide miniature surgical instruments. Because the arthroscope and surgical instruments are small and thin, your surgeon can use very small incisions (portals), rather than the larger incision needed for standard, open surgery. All-arthroscopic repair is usually an outpatient procedure and is the least invasive method to repair a torn rotator cuff.              PREPARING FOR SURGERY    Medical Evaluation  If you decide to have shoulder replacement surgery, your orthopaedic surgeon may ask you to schedule a complete physical examination with your family physician several weeks before surgery. This is needed to make sure you are healthy enough to have the surgery and complete the recovery process. Many patients with chronic medical conditions, like heart disease, must also be evaluated by a specialist, such as a cardiologist, before the surgery.    Medications  Be sure to talk to your orthopaedic surgeon about the medications you take. Some medications may need to be stopped before surgery. For example, the following over-the-counter medicines may cause excessive bleeding and should be stopped 2 weeks before surgery:  Non-steroidal anti-inflammatory drugs (NSAIDs), such as aspirin, ibuprofen, and  naproxen  Most arthritis medications    If you take blood thinners, either your primary care doctor or cardiologist will advise you about stopping these medications before surgery.    Home Planning  Making simple changes in your home before surgery can make your recovery period easier.For the first several weeks after your surgery, it will be hard to reach high shelves and cupboards. Before your surgery, be sure to go through your home and place any items you may need afterwards on low shelves. When you come home from the hospital, you will need help for a few weeks with some daily tasks like dressing, bathing, cooking, and laundry. If you will not have any support at home immediately after surgery, you may need a short stay in a rehabilitation facility until you become more independent.      YOUR SURGERY    Before Your Operation  Wear loose-fitting clothes and a button-front shirt when you go to the hospital for your surgery. After surgery, you will be wearing a sling and will have limited use of your arm.    Nerve Block  Will receive a nerve block for your surgery to help control your pain after surgery. This cab cause your arm (down to your hand) to feel numb for up to 3 days after surgery. However, it may wear off sooner.      Surgical Procedure  The surgery usually takes about 1-1.5 hours depending on the extent of your tear and any other procedures that need to be performed. After surgery, you will be moved to the recovery room, where you will remain  while your recovery from anesthesia is monitored. Barring any complications you will go home the day of your surgery.   A video of what arthroscopic rotator cuff repair looks like can be found at the following link: Athroscopic Rotator Cuff Repair        AFTER YOUR SURGERY    Immobilization  When you leave the hospital, your arm will be in a sling. You will need the sling to support and protect your shoulder for the first 2 to 6 weeks after surgery, depending on  the complexity of your surgery and your surgeon's preference.    Dressing and Wound Care  Keep the dressing clean and dry. It is normal for there to be some drainage after surgery since the shoulder was irrigated with large amounts of fluid. Reinforce with additional gauze as necessary.    Remove the dressing the 2nd day after surgery and begin changing daily with clean gauze or Band-Aids®. Keep your incisions covered until you follow up in clinic.  If you have Steri-Strips in place of stitches, allow them to stay in place as long as possible. Steri-Strips are made of a fabric material that can get wet in the shower and pat dry with a towel. They usually fall off on their own within 7 to 10 days. You may trim the edges as they begin to curl.   You may bathe or shower on the 2nd day after surgery, but do not scrub or soak the incisions. Dry the area by gently blotting it with a gauze or towel. After it is completely dry, cover the wound with clean gauze or Band-Aids®. Do NOT submerge the incisions (bath/swim) until after the sutures are removed and the wound has completely healed.     Post-Op Medications    Pain Control  After surgery, you will feel pain. However, it is important to stay ahead of pain as it becomes challenging to get under control if you fall behind. Ice and elevation can help and should be used as much as possible in the first few days.   Narcotic pain medications, such as tramadol and oxycodone, should be taken as prescribed. The Tramadol is intended to be taken first as the primary medicine and then oxycodone taken for breakthrough pain. Wean off as soon as possible. Take these with food to decrease the chances of nausea and vomiting. Do not drink alcohol, drive a vehicle, or use heavy machinery while taking narcotic pain medications.   NSAID medications are used for pain control and to decrease inflammation. You may be prescribed an NSAID such as celebrex. Take as instructed. Other NSAID  medications such as ibuprofen, Motrin, Advil, naproxen, or Aleve can be used once you have finished the celebrex, or if a prescription for celebrex was not provided.   Acetaminophen (Tylenol) is an effective over-the-counter pain medication that can be used with NSAID medications and non-acetaminophen containing narcotics such as plain oxycodone.     Blood Clot Prevention  You should take one 81 mg baby aspirin twice daily for two weeks starting the evening of the day you have surgery unless instructed otherwise or taking a different blood thinner such as enoxaparin or warfarin. If you are aware that you are at high risk for a blood clot, notify your physician as soon as possible.   Take aspirin at least 30 minutes before taking ibuprofen or Toradol.    Constipation Prevention  Anesthesia and pain medications, changes in eating and drinking, and less activity can all lead to constipation after surgery. To prevent or reduce constipation, take an over-the-counter stool softener (brands include Colace and Miralax). Follow the directions on the bottle. Drink plenty of water and eat high fiber foods including whole grains, fresh fruits, vegetables, beans, prunes or prune juice.      Physical Therapy  Exercise is a critical component of home care, particularly during the first few weeks after surgery and this will include physical therapy, which will play a vital role in getting you back to your daily activities by helping you regain shoulder strength and motion. Physical therapy will start 10-14 days after your surgery (it can start before your first post-op visit with your doctor). It will progress as follows  Passive exercise: Even though your tear has been repaired, the muscles around your arm remain weak. Once your surgeon decides it is safe for you to move your arm and shoulder, a therapist will help you with passive exercises to improve range of motion in your shoulder. With passive exercise, your therapist  supports your arm and moves it in different positions. In most cases, passive exercise is begun within the first 4 to 6 weeks after surgery.  Active exercise: After 6 weeks, you will progress to doing active exercises without the help of your therapist. Moving your muscles on your own will gradually increase your strength and improve your arm control. At 8 to 12 weeks, your therapist will start you on a strengthening exercise program.  Examples of shoulder exercises can be found at the following link: Rotator Cuff and Shoulder Rehabilitation Exercises    Driving  Your physician will provide recommendations on when it is safe to drive after surgery. At minimum you must NOT be taking any narcotic/opoid medications and feel you are capable of driving. It is NOT recommended that you drive while wearing a sling.       Outcome  The majority of patients report improved shoulder strength and less pain after surgery for a torn rotator cuff. Expect a complete recovery to take several months. Most patients have a functional range of motion and adequate strength by 4 to 6 months after surgery but it may take up to 1 full year from your surgery to be completely healed.  Although it is a slow process, your commitment to rehabilitation is key to a successful outcome.    Factors that can decrease the likelihood of a satisfactory result include:  Poor tendon/tissue quality  Large or massive tears  Poor patient compliance with/participation in restrictions and rehabilitation after surgery  Patient age (older than 65 years)  Smoking and use of other nicotine products  Workers' compensation claims    Complications  After rotator cuff surgery, a small percentage of patients experience complications. In addition to the risks of surgery in general, such as blood loss or problems related to anesthesia, complications of rotator cuff surgery may include:  Nerve injury: This typically involves the nerve that activates your shoulder muscle  (deltoid) but this is not common in shoulder arthroscopy.   Infection: Patients are given antibiotics during the procedure to lessen the risk for infection. If an infection develops, additional surgery and/or prolonged antibiotic treatment may be needed.  Stiffness: Early rehabilitation lessens the likelihood of permanent stiffness or loss of motion. Most of the time, stiffness will improve with more aggressive therapy and exercise. A more advanced type of stiffness called adhesive capsulitis (frozen shoulder) can also develop secondary to an overactive inflammatory response when the shoulder is healing. This can be more common in individuals with diabetes or thyroid disorders. Yo  Tendon re-tear: There is a chance for re-tear following all types of repairs. The larger the tear, the higher the risk of re-tear. Patients who re-tear their tendons usually do not have greater pain or decreased shoulder function. Repeat surgery is needed only if there is severe pain or loss of function.      Links  [employer] Clinical Practice Guideline on the Management of Rotator Cuff Injuries  Rotator Cuff Tears  Management of Rotator Cuff Injuries  Rotator Cuff and Shoulder Rehabilitation Exercises  Rotator Cuff Tear Surgical Treament  Athroscopic Rotator Cuff Repair (Video link)

## 2024-06-10 DIAGNOSIS — R00.1 BRADYCARDIA: ICD-10-CM

## 2024-06-10 DIAGNOSIS — I10 ESSENTIAL HYPERTENSION: Primary | Chronic | ICD-10-CM

## 2024-06-13 ENCOUNTER — OFFICE VISIT (OUTPATIENT)
Dept: CARDIOLOGY | Facility: CLINIC | Age: 74
End: 2024-06-13
Payer: MEDICARE

## 2024-06-13 ENCOUNTER — HOSPITAL ENCOUNTER (OUTPATIENT)
Dept: CARDIOLOGY | Facility: HOSPITAL | Age: 74
Discharge: HOME | End: 2024-06-13
Attending: INTERNAL MEDICINE
Payer: MEDICARE

## 2024-06-13 ENCOUNTER — ANTI-COAG VISIT (OUTPATIENT)
Dept: CARDIOLOGY | Facility: CLINIC | Age: 74
End: 2024-06-13
Payer: MEDICARE

## 2024-06-13 VITALS
WEIGHT: 174.19 LBS | HEIGHT: 63 IN | DIASTOLIC BLOOD PRESSURE: 82 MMHG | BODY MASS INDEX: 30.86 KG/M2 | SYSTOLIC BLOOD PRESSURE: 136 MMHG

## 2024-06-13 DIAGNOSIS — Z79.01 ANTICOAGULATED ON COUMADIN: Chronic | ICD-10-CM

## 2024-06-13 DIAGNOSIS — Z01.810 PREOP CARDIOVASCULAR EXAM: Primary | ICD-10-CM

## 2024-06-13 DIAGNOSIS — D68.51 HETEROZYGOUS FACTOR V LEIDEN MUTATION: Chronic | ICD-10-CM

## 2024-06-13 DIAGNOSIS — I10 ESSENTIAL HYPERTENSION: Chronic | ICD-10-CM

## 2024-06-13 DIAGNOSIS — R00.1 BRADYCARDIA: ICD-10-CM

## 2024-06-13 DIAGNOSIS — I51.89 LEFT VENTRICULAR DIASTOLIC DYSFUNCTION WITH PRESERVED SYSTOLIC FUNCTION: Chronic | ICD-10-CM

## 2024-06-13 DIAGNOSIS — I25.10 CORONARY ARTERY DISEASE INVOLVING NATIVE CORONARY ARTERY OF NATIVE HEART WITHOUT ANGINA PECTORIS: ICD-10-CM

## 2024-06-13 DIAGNOSIS — Z79.01 ANTICOAGULATED ON COUMADIN: Primary | Chronic | ICD-10-CM

## 2024-06-13 DIAGNOSIS — Z78.9 STATIN INTOLERANCE: ICD-10-CM

## 2024-06-13 DIAGNOSIS — E78.00 PURE HYPERCHOLESTEROLEMIA WITH TARGET LOW DENSITY LIPOPROTEIN (LDL) CHOLESTEROL LESS THAN 130 MG/DL: Chronic | ICD-10-CM

## 2024-06-13 DIAGNOSIS — E11.9 TYPE 2 DIABETES MELLITUS WITHOUT COMPLICATION, WITHOUT LONG-TERM CURRENT USE OF INSULIN: ICD-10-CM

## 2024-06-13 DIAGNOSIS — E78.5 DYSLIPIDEMIA: ICD-10-CM

## 2024-06-13 DIAGNOSIS — J44.9 CHRONIC OBSTRUCTIVE PULMONARY DISEASE, UNSPECIFIED COPD TYPE: Chronic | ICD-10-CM

## 2024-06-13 DIAGNOSIS — Z86.718 HISTORY OF DVT (DEEP VEIN THROMBOSIS): ICD-10-CM

## 2024-06-13 DIAGNOSIS — R01.1 MURMUR, CARDIAC: ICD-10-CM

## 2024-06-13 LAB
OHS QRS DURATION: 98 MS
OHS QTC CALCULATION: 412 MS

## 2024-06-13 PROCEDURE — 1126F AMNT PAIN NOTED NONE PRSNT: CPT | Mod: CPTII,S$GLB,, | Performed by: INTERNAL MEDICINE

## 2024-06-13 PROCEDURE — 1160F RVW MEDS BY RX/DR IN RCRD: CPT | Mod: CPTII,S$GLB,, | Performed by: INTERNAL MEDICINE

## 2024-06-13 PROCEDURE — 3008F BODY MASS INDEX DOCD: CPT | Mod: CPTII,S$GLB,, | Performed by: INTERNAL MEDICINE

## 2024-06-13 PROCEDURE — 1159F MED LIST DOCD IN RCRD: CPT | Mod: CPTII,S$GLB,, | Performed by: INTERNAL MEDICINE

## 2024-06-13 PROCEDURE — 3079F DIAST BP 80-89 MM HG: CPT | Mod: CPTII,S$GLB,, | Performed by: INTERNAL MEDICINE

## 2024-06-13 PROCEDURE — 99999 PR PBB SHADOW E&M-EST. PATIENT-LVL IV: CPT | Mod: PBBFAC,,, | Performed by: INTERNAL MEDICINE

## 2024-06-13 PROCEDURE — 3075F SYST BP GE 130 - 139MM HG: CPT | Mod: CPTII,S$GLB,, | Performed by: INTERNAL MEDICINE

## 2024-06-13 PROCEDURE — 3288F FALL RISK ASSESSMENT DOCD: CPT | Mod: CPTII,S$GLB,, | Performed by: INTERNAL MEDICINE

## 2024-06-13 PROCEDURE — 99214 OFFICE O/P EST MOD 30 MIN: CPT | Mod: 25,S$GLB,, | Performed by: INTERNAL MEDICINE

## 2024-06-13 PROCEDURE — 93005 ELECTROCARDIOGRAM TRACING: CPT

## 2024-06-13 PROCEDURE — 93793 ANTICOAG MGMT PT WARFARIN: CPT | Mod: S$GLB,,,

## 2024-06-13 PROCEDURE — 93010 ELECTROCARDIOGRAM REPORT: CPT | Mod: ,,, | Performed by: INTERNAL MEDICINE

## 2024-06-13 PROCEDURE — 3044F HG A1C LEVEL LT 7.0%: CPT | Mod: CPTII,S$GLB,, | Performed by: INTERNAL MEDICINE

## 2024-06-13 PROCEDURE — 1100F PTFALLS ASSESS-DOCD GE2>/YR: CPT | Mod: CPTII,S$GLB,, | Performed by: INTERNAL MEDICINE

## 2024-06-13 NOTE — PROGRESS NOTES
Subjective:   Patient ID:  Alyx Weir is a 74 y.o. female who presents for follow up of Shortness of Breath (Sob with exertion)      73 yo female, 1 yr f/u  PMH of mild CAD, nonobstructive s/p C 2013. Bradycardia, HTN, HLP, NIDDM, h/o left DVT, factor V Leidein mutation on coumadin, obesity, sleep apnea needs new masl.  PT WAS INFECTED covid 19 (throat itching) IN . [place]   OF COVID 19   AVALOS with fast walking  Denied chest pain, palpitation dizziness leg swelling, no syncope.  Denied h/o MI.  No smoking/drinking.  No regular exercise.  Patient is compliant with medications.   MPI no ischemia   echo normal EF and LVH    carotid US 0-19% lesion  ekg NSR and LVH  Did not take med today and BP high     visit  ECHO normal EF.  In HTN digit program. Not using CPAP on regular base  PT twice a week for lower back pain  No chest pain, dizziness faint and palpitation  Mild AVALOS     visit  Chronic joint pain. No chest pain dizziness, faint and SOB. C/o Fatigue sometime. Some leg swelling.   ekg sinus bradycardia. Left lower lower medial side pain   Muscle cramp at night     visit  C/o lower sternal pain and ingestion for a week, constantly. Disrupted the sleeping, worse with food or laying down. Feels nausea and improved. Some SOB.   Chronic back pain   EKG done in 2 days ago at PCP's office showed NSR and small TWI on inferolateral leads.  Compared to prior EKG done in , small TWI new     visit  Still lower back pain,. Needs handicap   NUKE stress test showed normal function.   No chest pain dyspnea dizziness palpitation and leg swelling  No active bleeding     visit   1 yr f/u no admission. Chronic joint pain from DJD.  No chest pain, remain SOB at walking. Fatigue. No dizziness. No leg swelling  Ekg NSR. Cxr  negative    Interval history  Recent mechanical fall and right shoulder injury. Plan surgery by Dr. Ruiz  No chest  pain dyspnea dizziness faint palpitation and leg pain  EKG reviewed by myself today reveals NSR nonspecific STT change PVC  C/o diarrhea after added zetia                  Past Medical History:   Diagnosis Date    Anticoagulated on Coumadin     Arm weakness-rotator cuff weakness 11/02/2015    Arthritis     Asthma     Clotting disorder     Coronary artery disease     Deep vein thrombosis     Degenerative disc disease     Diabetes mellitus type I 2011    BS last tested x 1 month not sure what it was.    Heterozygous factor V Leiden mutation     Hx of colonic polyps 11/13/2015    Hyperlipidemia     Hypertension     VIRGIL (obstructive sleep apnea)     Stenosis of right carotid artery 12/13/2016       Past Surgical History:   Procedure Laterality Date    BACK SURGERY      bladder cyst removal      BLADDER SURGERY      CARDIAC CATHETERIZATION  03/2013    mild CAD    COLONOSCOPY N/A 11/13/2015    Procedure: COLONOSCOPY;  Surgeon: Jas Umanzor III, MD;  Location: [facility] ENDO;  Service: Endoscopy;  Laterality: N/A;    HYSTERECTOMY      28 yrs old    LUMBAR SPINE SURGERY      bone spurs removed    OOPHORECTOMY      SELECTIVE INJECTION OF ANESTHETIC AGENT AROUND LUMBAR SPINAL NERVE ROOT BY TRANSFORAMINAL APPROACH Bilateral 06/03/2022    Procedure: Bilateral L4/5 TF ASUNCION with RN IV sedation; on Coumadin, will need INR prior to procedure, must be less than 1.3;  Surgeon: Mario Palacios MD;  Location: [facility] PAIN MGT;  Service: Pain Management;  Laterality: Bilateral;       Social History     Tobacco Use    Smoking status: Never     Passive exposure: Past    Smokeless tobacco: Never   Substance Use Topics    Alcohol use: No    Drug use: No       Family History   Problem Relation Name Age of Onset    Heart disease Mother      Hypertension Mother      Cataracts Mother      Hypertension Sister      Glaucoma Sister      Hypertension Brother      Diabetes Father      Diabetes Paternal Uncle      Hypertension Sister      Diabetes Sister       Hypertension Sister      Diabetes Sister      Breast cancer Neg Hx      Colon cancer Neg Hx      Ovarian cancer Neg Hx           ROS    Objective:   Physical Exam  HENT:      Head: Normocephalic.   Eyes:      Pupils: Pupils are equal, round, and reactive to light.   Neck:      Thyroid: No thyromegaly.      Vascular: Normal carotid pulses. No carotid bruit or JVD.   Cardiovascular:      Rate and Rhythm: Regular rhythm. Bradycardia present. No extrasystoles are present.     Chest Wall: PMI is not displaced.      Pulses: Normal pulses.      Heart sounds: Murmur (ESM on left chest ) heard.      No gallop. No S3 sounds.   Pulmonary:      Effort: No respiratory distress.      Breath sounds: Normal breath sounds. No stridor.   Chest:      Comments: + tenderness on lower sternal   Abdominal:      General: Bowel sounds are normal.      Palpations: Abdomen is soft.      Tenderness: There is no abdominal tenderness. There is no rebound.   Musculoskeletal:         General: Normal range of motion.   Skin:     Findings: No rash.   Neurological:      Mental Status: She is alert and oriented to person, place, and time.   Psychiatric:         Behavior: Behavior normal.         Lab Results   Component Value Date    CHOL 198 11/29/2023    CHOL 195 11/30/2022    CHOL 199 08/30/2022     Lab Results   Component Value Date    HDL 72 11/29/2023    HDL 74 11/30/2022    HDL 72 08/30/2022     Lab Results   Component Value Date    LDLCALC 110.6 11/29/2023    LDLCALC 110.6 11/30/2022    LDLCALC 117.0 08/30/2022     Lab Results   Component Value Date    TRIG 77 11/29/2023    TRIG 52 11/30/2022    TRIG 50 08/30/2022     Lab Results   Component Value Date    CHOLHDL 36.4 11/29/2023    CHOLHDL 37.9 11/30/2022    CHOLHDL 36.2 08/30/2022       Chemistry        Component Value Date/Time     05/29/2024 1344    K 3.7 05/29/2024 1344     05/29/2024 1344    CO2 26 05/29/2024 1344    BUN 19 05/29/2024 1344    CREATININE 0.9 05/29/2024 1344     GLU 58 (L) 05/29/2024 1344        Component Value Date/Time    CALCIUM 10.2 05/29/2024 1344    ALKPHOS 49 (L) 05/29/2024 1344    AST 15 05/29/2024 1344    ALT 14 05/29/2024 1344    BILITOT 0.7 05/29/2024 1344    ESTGFRAFRICA >60 02/16/2022 2251    EGFRNONAA >60 02/16/2022 2251          Lab Results   Component Value Date    HGBA1C 5.9 (H) 05/29/2024     Lab Results   Component Value Date    TSH 0.847 05/29/2024     Lab Results   Component Value Date    INR 1.9 (H) 05/29/2024    INR 1.2 (A) 05/06/2024    INR 2.5 04/12/2024     Lab Results   Component Value Date    WBC 8.23 05/29/2024    HGB 13.5 05/29/2024    HCT 42.4 05/29/2024    MCV 89 05/29/2024     05/29/2024     BMP  Sodium   Date Value Ref Range Status   05/29/2024 137 136 - 145 mmol/L Final     Potassium   Date Value Ref Range Status   05/29/2024 3.7 3.5 - 5.1 mmol/L Final     Chloride   Date Value Ref Range Status   05/29/2024 101 95 - 110 mmol/L Final     CO2   Date Value Ref Range Status   05/29/2024 26 23 - 29 mmol/L Final     BUN   Date Value Ref Range Status   05/29/2024 19 8 - 23 mg/dL Final     Creatinine   Date Value Ref Range Status   05/29/2024 0.9 0.5 - 1.4 mg/dL Final     Calcium   Date Value Ref Range Status   05/29/2024 10.2 8.7 - 10.5 mg/dL Final     Anion Gap   Date Value Ref Range Status   05/29/2024 10 8 - 16 mmol/L Final     eGFR if    Date Value Ref Range Status   02/16/2022 >60 >60 mL/min/1.73 m^2 Final     eGFR if non    Date Value Ref Range Status   02/16/2022 >60 >60 mL/min/1.73 m^2 Final     Comment:     Calculation used to obtain the estimated glomerular filtration  rate (eGFR) is the CKD-EPI equation.        BNP  @LABRCNTIP(BNP,BNPTRIAGEBLO)@  @LABRCNTIP(troponini)@  CrCl cannot be calculated (Patient's most recent lab result is older than the maximum 7 days allowed.).  No results found in the last 24 hours.  No results found in the last 24 hours.  No results found in the last 24  hours.    Assessment:      1. Preop cardiovascular exam    2. Coronary artery disease involving native coronary artery of native heart without angina pectoris    3. Dyslipidemia    4. Essential hypertension    5. Left ventricular diastolic dysfunction with preserved systolic function    6. Murmur, cardiac    7. Pure hypercholesterolemia with target low density lipoprotein (LDL) cholesterol less than 130 mg/dL    8. Anticoagulated on Coumadin    9. History of DVT (deep vein thrombosis)    10. Heterozygous factor V Leiden mutation    11. Type 2 diabetes mellitus without complication, without long-term current use of insulin    12. Statin intolerance    13. Chronic obstructive pulmonary disease, unspecified COPD type        Plan:   D/c zetia due to diarrhea and declined to add [facility] 9i injection rx   Contiune ARB/amlodipine/chtz, for HTN  Continue coumadin  Counseled DASH  Check Lipid profile with PCP in 6 months  Recommend heart-healthy diet, weight control and regular exercise.  Mine. Risk modification.   I have reviewed all pertinent labs and cardiac studies independently. Plans and recommendations have been formulated under my direct supervision. All questions answered and patient voiced understanding.   If symptoms persist go to the ED  RTC in 12 months        Elevated periop risk of CV events for non-high risk procedure.  Ok to proceed the scheduled procedure without further cardiac study.  OK to hold coumadin 5 days before the procedure and resume postop once hemodynamically stable  Arrange Lovenox richar op

## 2024-06-13 NOTE — PROGRESS NOTES
INR not at goal-1.7. Medications, chart, and patient findings reviewed. See calendar for adjustments to dose and follow up plan.  We will boost today for low INR then resume dose.  She will need a Lovenox bridge for upcoming procedure on 6/24/24.  We will schedule a clinic visit at [facility]  for dosing review on 6/19.

## 2024-06-14 RX ORDER — ENOXAPARIN SODIUM 100 MG/ML
80 INJECTION SUBCUTANEOUS EVERY 12 HOURS
Qty: 16 ML | Refills: 0 | Status: SHIPPED | OUTPATIENT
Start: 2024-06-14

## 2024-06-14 NOTE — PROGRESS NOTES
Pt returned call to me, dosing provided to patient.  Likely needs a dose increase, but we will address after surgery.  Will send in Lovenox today.  Wt 79 kg, CBC and cr stable.  Will send in Lovenox 80 mg q 12 .

## 2024-06-18 ENCOUNTER — ANESTHESIA EVENT (OUTPATIENT)
Dept: SURGERY | Facility: HOSPITAL | Age: 74
End: 2024-06-18
Payer: COMMERCIAL

## 2024-06-18 RX ORDER — MULTIVITAMIN
1 TABLET ORAL DAILY
COMMUNITY

## 2024-06-19 ENCOUNTER — ANTI-COAG VISIT (OUTPATIENT)
Dept: CARDIOLOGY | Facility: CLINIC | Age: 74
End: 2024-06-19
Payer: MEDICARE

## 2024-06-19 DIAGNOSIS — Z79.01 LONG TERM (CURRENT) USE OF ANTICOAGULANTS: Primary | ICD-10-CM

## 2024-06-19 DIAGNOSIS — Z79.01 ANTICOAGULATED ON COUMADIN: Chronic | ICD-10-CM

## 2024-06-19 DIAGNOSIS — D68.51 HETEROZYGOUS FACTOR V LEIDEN MUTATION: Chronic | ICD-10-CM

## 2024-06-19 LAB
CTP QC/QA: YES
INR PPP: 2.1 (ref 2–3)

## 2024-06-19 PROCEDURE — 93793 ANTICOAG MGMT PT WARFARIN: CPT | Mod: S$GLB,,,

## 2024-06-19 PROCEDURE — 85610 PROTHROMBIN TIME: CPT | Mod: QW,S$GLB,, | Performed by: INTERNAL MEDICINE

## 2024-06-19 NOTE — PROGRESS NOTES
Patient's INR is therapeutic at 2.1. Patient reports she followed previous instructions. Patient is scheduled for laparoscopic rotator cuff surgery on 6/24/24 and will need to be off of warfarin with Lovenox bridge. Sent to PharmD for dosing/instructions.

## 2024-06-19 NOTE — PROGRESS NOTES
Lovenox Dose= 80 mg every 12 hours  Surgery 6/24/24        Pre-Procedure Instructions:  Hold coumadin per calendar; resume dose per calendar UNLESS performing MD advises otherwise.  Start enoxaparin injections the evening of 6/20/24  Enoxaparin dose is:80 mg Every 12 hours (Twice Daily)  Last dose of enoxaparin will be the morning of  6/23/24----(AM DOSE ONLY, then STOP   *24 hours prior to the procedure)                         Post-Procedure Instructions:     Restart warfarin PM     6/24         At a dose of    Per calendar     OR PER MD instructions                     Unless directed otherwise by your physician.      Restart lovenox injections on the morning of   6/25/24   Unless directed otherwise by your physician.  Please call us if procedure is canceled.  We will follow up after rehab discharge if you are discharged to rehab.  Please call us at let us know if you will have home health so that we can obtain PT/INR from them     *Call the coumadin clinic your physician has different recommendations post procedure

## 2024-06-19 NOTE — PROGRESS NOTES
Pt took dose this AM 6/19 - we will ask her to eat a serving of greens today and repeat INR on Friday to verify INR is dropping.

## 2024-06-21 ENCOUNTER — TELEPHONE (OUTPATIENT)
Dept: PREADMISSION TESTING | Facility: HOSPITAL | Age: 74
End: 2024-06-21
Payer: MEDICARE

## 2024-06-21 ENCOUNTER — ANTI-COAG VISIT (OUTPATIENT)
Dept: CARDIOLOGY | Facility: CLINIC | Age: 74
End: 2024-06-21
Payer: MEDICARE

## 2024-06-21 DIAGNOSIS — Z79.01 LONG TERM (CURRENT) USE OF ANTICOAGULANTS: Primary | ICD-10-CM

## 2024-06-21 DIAGNOSIS — Z79.01 ANTICOAGULATED ON COUMADIN: Chronic | ICD-10-CM

## 2024-06-21 DIAGNOSIS — D68.51 HETEROZYGOUS FACTOR V LEIDEN MUTATION: Chronic | ICD-10-CM

## 2024-06-21 LAB
CTP QC/QA: YES
INR PPP: 1.4 (ref 2–3)

## 2024-06-21 NOTE — PROGRESS NOTES
INR not at goal. Medications, chart, and patient findings reviewed. See calendar for adjustments to dose and follow up plan.  INR has fallen appropriately.  She will continue her Lovenox bridge thru AM dose on Sunday 6/23/24 - then STOP Lovenox for her procedure.  Do not take Lovenox in PM on 6/23 and NOTHING on 624.  She will resume warfarin as per MD instructions OR resume warfarin by 6/25.  Resume Lovenox AFTER surgery on 6/25/24 in AM and use q 12  or per MD instruction.  Call the [facility] with any different instructions and if she is to have HH.

## 2024-06-21 NOTE — PROGRESS NOTES
Patient's INR is sub-therapeutic at 1.4. Patient reports she followed previous instructions. Patient advised of increased risk of clotting; signs/symptoms of clotting and need to go to ED if she experiences any. Patient reports no signs/symptoms of clotting. Patient is scheduled for laparoscopic rotator cuff surgery on 6/24/24 and will need to be off of warfarin with Lovenox bridge. Sent to PharmD for dosing/instructions

## 2024-06-24 ENCOUNTER — ANESTHESIA (OUTPATIENT)
Dept: SURGERY | Facility: HOSPITAL | Age: 74
End: 2024-06-24
Payer: COMMERCIAL

## 2024-06-24 ENCOUNTER — TELEPHONE (OUTPATIENT)
Dept: CARDIOLOGY | Facility: CLINIC | Age: 74
End: 2024-06-24
Payer: MEDICARE

## 2024-06-24 NOTE — TELEPHONE ENCOUNTER
Patient has been advised : we will boost with warfarin 7.5 mg today 6/24 and she will need to continue Lovenox 80 mg q 12 hours thru the INR already scheduled for this Thursday 6/27.  Resume regular warfarin dose per calendar tomorrow.  Patient repeated back correctly and verbalizes understanding.

## 2024-06-27 ENCOUNTER — ANTI-COAG VISIT (OUTPATIENT)
Dept: CARDIOLOGY | Facility: CLINIC | Age: 74
End: 2024-06-27
Payer: MEDICARE

## 2024-06-27 DIAGNOSIS — D68.51 HETEROZYGOUS FACTOR V LEIDEN MUTATION: Chronic | ICD-10-CM

## 2024-06-27 DIAGNOSIS — Z79.01 ANTICOAGULATED ON COUMADIN: Primary | Chronic | ICD-10-CM

## 2024-06-27 LAB
CTP QC/QA: YES
INR PPP: 1.5 (ref 2–3)

## 2024-06-27 PROCEDURE — 85610 PROTHROMBIN TIME: CPT | Mod: QW,S$GLB,, | Performed by: INTERNAL MEDICINE

## 2024-06-27 PROCEDURE — 93793 ANTICOAG MGMT PT WARFARIN: CPT | Mod: S$GLB,,,

## 2024-06-27 NOTE — PROGRESS NOTES
INR is subtherapeutic at 1.5.  Patient reports 6/24/2024 surgery was postponed - no r/s date as of yet.  Previous warfarin instructions were verified and followed.  Reports last enoxaparin injection was on Tuesday, 6/25/2024 as directed.  No s/s reported.  Patient remains with enoxaparin at home. Will send PharmD for further instructions.

## 2024-06-27 NOTE — PROGRESS NOTES
INR not at goal. Medications, chart, and patient findings reviewed. See calendar for adjustments to dose and follow up plan.  Pt was instructed to remain on Lovenox until INR check today, we will need to resume 80 mg q 12hrs thru Saturday 6/29 PM dose, then stop.  We will boost warfarin with 10 mg today then resume per calendar.  Repeat INR in next Wed.

## 2024-06-28 ENCOUNTER — TELEPHONE (OUTPATIENT)
Dept: SPORTS MEDICINE | Facility: CLINIC | Age: 74
End: 2024-06-28
Payer: MEDICARE

## 2024-07-03 ENCOUNTER — PATIENT MESSAGE (OUTPATIENT)
Dept: SPORTS MEDICINE | Facility: CLINIC | Age: 74
End: 2024-07-03
Payer: MEDICARE

## 2024-07-03 ENCOUNTER — ANTI-COAG VISIT (OUTPATIENT)
Dept: CARDIOLOGY | Facility: CLINIC | Age: 74
End: 2024-07-03
Payer: MEDICARE

## 2024-07-03 DIAGNOSIS — Z79.01 ANTICOAGULATED ON COUMADIN: Primary | Chronic | ICD-10-CM

## 2024-07-03 DIAGNOSIS — D68.51 HETEROZYGOUS FACTOR V LEIDEN MUTATION: Chronic | ICD-10-CM

## 2024-07-03 LAB
CTP QC/QA: YES
INR PPP: 2.5 (ref 2–3)

## 2024-07-03 PROCEDURE — 93793 ANTICOAG MGMT PT WARFARIN: CPT | Mod: HCNC,S$GLB,,

## 2024-07-03 PROCEDURE — 85610 PROTHROMBIN TIME: CPT | Mod: QW,HCNC,S$GLB, | Performed by: INTERNAL MEDICINE

## 2024-07-03 NOTE — PROGRESS NOTES
INR is at goal - 2.5.  Previous warfarin and enoxaparin (stopped on 6/29) instructions were verified and followed.  No r/s date on impending procedure.  Instructions given to continue warfarin 7.5 mg every Tues, Thurs, Sat; and 5 mg all other days.  Lovenox (enoxaparin) - d/c.  Follow up in 2 weeks for an INR recheck.  Dose calendar given - confirms understanding.

## 2024-07-15 RX ORDER — CELECOXIB 200 MG/1
200 CAPSULE ORAL 2 TIMES DAILY
Qty: 40 CAPSULE | Refills: 0 | OUTPATIENT
Start: 2024-07-15

## 2024-07-16 ENCOUNTER — TELEPHONE (OUTPATIENT)
Dept: SPORTS MEDICINE | Facility: CLINIC | Age: 74
End: 2024-07-16
Payer: MEDICARE

## 2024-07-16 ENCOUNTER — PATIENT MESSAGE (OUTPATIENT)
Dept: SPORTS MEDICINE | Facility: CLINIC | Age: 74
End: 2024-07-16
Payer: MEDICARE

## 2024-07-16 NOTE — TELEPHONE ENCOUNTER
Called and spoke to patient. Due to needing to be off of her Warfarin for 5 days prior to surgery, our next available surgery date is 8/5. Patient voiced understanding. Advised would keep her in mind for any cancellations if they occur but would have to make sure of 5 day window prior to surgery.    Advised would plan to start PT at [facility] on 8/15 and they would be calling to get that scheduled.     Also advised that waiting on clarification from Dr. Knutson's office on use of Lovenox perioperatively as well. Would pass along info to patient once he provides answer. REESE

## 2024-07-19 ENCOUNTER — TELEPHONE (OUTPATIENT)
Dept: SPORTS MEDICINE | Facility: CLINIC | Age: 74
End: 2024-07-19
Payer: MEDICARE

## 2024-07-19 NOTE — TELEPHONE ENCOUNTER
Called patient. No answer and LVM. Advised that surgery and post-op PT  is being charged through . Also informed that we would not need to see her for preop appointment but our anesthesia team may reach out to scheduled her for an appointment if needed. REESE      ----- Message from Maritza Day sent at 7/19/2024  2:06 PM CDT -----  Contact: patient  Alyx Weir would like a call back at [phone], in regards to checking to see if she will need a pre-op visit before her surgery on 8/5. Pt would also like to verify if her surgery is being charged through workers comp.

## 2024-07-29 ENCOUNTER — ANTI-COAG VISIT (OUTPATIENT)
Dept: CARDIOLOGY | Facility: CLINIC | Age: 74
End: 2024-07-29
Payer: COMMERCIAL

## 2024-07-29 DIAGNOSIS — D68.51 HETEROZYGOUS FACTOR V LEIDEN MUTATION: Chronic | ICD-10-CM

## 2024-07-29 DIAGNOSIS — Z79.01 ANTICOAGULATED ON COUMADIN: Chronic | ICD-10-CM

## 2024-07-29 DIAGNOSIS — Z79.01 LONG TERM (CURRENT) USE OF ANTICOAGULANTS: Primary | ICD-10-CM

## 2024-07-29 LAB
CTP QC/QA: YES
INR PPP: 4.8 (ref 2–3)

## 2024-07-29 PROCEDURE — 93793 ANTICOAG MGMT PT WARFARIN: CPT | Mod: S$GLB,,,

## 2024-07-29 RX ORDER — ENOXAPARIN SODIUM 100 MG/ML
80 INJECTION SUBCUTANEOUS EVERY 12 HOURS
Qty: 19.2 ML | Refills: 0 | Status: SHIPPED | OUTPATIENT
Start: 2024-07-29

## 2024-07-29 NOTE — PROGRESS NOTES
INR has climbed to 4.8 - supratherapeutic.  No bleeding issues reported.  Rotator cuff procedure is planned for 8/05/2024 - possible bridging needed (wt:175).  Previous warfarin instructions were verified and followed.  Will send to PharmD for pre/post warfarin/Lovenox instructions.

## 2024-07-29 NOTE — PROGRESS NOTES
INR not at goal.  Hold today for extreme elevation.  Lower tomorrow to 5 mg, then begin to hold for procedure.      Pre-Procedure Instructions:  Hold coumadin per calendar; resume dose per calendar UNLESS performing MD advises otherwise.  Start enoxaparin injections the evening of 8/1/24  Enoxaparin dose is:80 mg Every 12 hours (Twice Daily)  Last dose of enoxaparin will be the morning of  8/4/24----(AM DOSE ONLY, then STOP   *24 hours prior to the procedure)                         Post-Procedure Instructions:     Restart warfarin PM     8/6/24         At a dose of    Per calendar     OR PER MD instructions                     Unless directed otherwise by your physician.      Restart lovenox injections on the morning of   8/6/24   Unless directed otherwise by your physician.  Please call us if procedure is canceled.  We will follow up after rehab discharge if you are discharged to rehab.  Please call us at let us know if you will have home health so that we can obtain PT/INR from them.  Overlap with warfarin until INR check on Monday 8/12/24     *Call the coumadin clinic your physician has different recommendations post procedure

## 2024-07-30 ENCOUNTER — PATIENT MESSAGE (OUTPATIENT)
Dept: PREADMISSION TESTING | Facility: HOSPITAL | Age: 74
End: 2024-07-30
Payer: MEDICARE

## 2024-08-01 DIAGNOSIS — Z79.01 LONG TERM (CURRENT) USE OF ANTICOAGULANTS: ICD-10-CM

## 2024-08-01 RX ORDER — WARFARIN SODIUM 5 MG/1
TABLET ORAL
Qty: 115 TABLET | Refills: 3 | Status: SHIPPED | OUTPATIENT
Start: 2024-08-01

## 2024-08-05 ENCOUNTER — HOSPITAL ENCOUNTER (OUTPATIENT)
Facility: HOSPITAL | Age: 74
Discharge: HOME | End: 2024-08-05
Attending: STUDENT IN AN ORGANIZED HEALTH CARE EDUCATION/TRAINING PROGRAM | Admitting: STUDENT IN AN ORGANIZED HEALTH CARE EDUCATION/TRAINING PROGRAM
Payer: COMMERCIAL

## 2024-08-05 VITALS
WEIGHT: 171.31 LBS | OXYGEN SATURATION: 99 % | BODY MASS INDEX: 30.35 KG/M2 | TEMPERATURE: 98 F | HEIGHT: 63 IN | HEART RATE: 75 BPM | DIASTOLIC BLOOD PRESSURE: 63 MMHG | RESPIRATION RATE: 14 BRPM | SYSTOLIC BLOOD PRESSURE: 132 MMHG

## 2024-08-05 DIAGNOSIS — S46.011D TRAUMATIC INCOMPLETE TEAR OF RIGHT ROTATOR CUFF, SUBSEQUENT ENCOUNTER: Primary | ICD-10-CM

## 2024-08-05 LAB
POCT GLUCOSE: 103 MG/DL (ref 70–110)
POCT GLUCOSE: 79 MG/DL (ref 70–110)

## 2024-08-05 PROCEDURE — 25000003 PHARM REV CODE 250: Performed by: PHYSICIAN ASSISTANT

## 2024-08-05 PROCEDURE — 64450 NJX AA&/STRD OTHER PN/BRANCH: CPT | Performed by: STUDENT IN AN ORGANIZED HEALTH CARE EDUCATION/TRAINING PROGRAM

## 2024-08-05 PROCEDURE — 71000015 HC POSTOP RECOV 1ST HR: Performed by: STUDENT IN AN ORGANIZED HEALTH CARE EDUCATION/TRAINING PROGRAM

## 2024-08-05 PROCEDURE — C1713 ANCHOR/SCREW BN/BN,TIS/BN: HCPCS | Performed by: STUDENT IN AN ORGANIZED HEALTH CARE EDUCATION/TRAINING PROGRAM

## 2024-08-05 PROCEDURE — 63600175 PHARM REV CODE 636 W HCPCS: Performed by: NURSE ANESTHETIST, CERTIFIED REGISTERED

## 2024-08-05 PROCEDURE — 25000003 PHARM REV CODE 250: Performed by: ANESTHESIOLOGY

## 2024-08-05 PROCEDURE — 37000008 HC ANESTHESIA 1ST 15 MINUTES: Performed by: STUDENT IN AN ORGANIZED HEALTH CARE EDUCATION/TRAINING PROGRAM

## 2024-08-05 PROCEDURE — 63600175 PHARM REV CODE 636 W HCPCS

## 2024-08-05 PROCEDURE — 63600175 PHARM REV CODE 636 W HCPCS: Performed by: PHYSICIAN ASSISTANT

## 2024-08-05 PROCEDURE — 63600175 PHARM REV CODE 636 W HCPCS: Performed by: ANESTHESIOLOGY

## 2024-08-05 PROCEDURE — 27201423 OPTIME MED/SURG SUP & DEVICES STERILE SUPPLY: Performed by: STUDENT IN AN ORGANIZED HEALTH CARE EDUCATION/TRAINING PROGRAM

## 2024-08-05 PROCEDURE — 71000039 HC RECOVERY, EACH ADD'L HOUR: Performed by: STUDENT IN AN ORGANIZED HEALTH CARE EDUCATION/TRAINING PROGRAM

## 2024-08-05 PROCEDURE — 36000710: Performed by: STUDENT IN AN ORGANIZED HEALTH CARE EDUCATION/TRAINING PROGRAM

## 2024-08-05 PROCEDURE — 25000003 PHARM REV CODE 250: Performed by: NURSE ANESTHETIST, CERTIFIED REGISTERED

## 2024-08-05 PROCEDURE — 29827 SHO ARTHRS SRG RT8TR CUF RPR: CPT | Mod: RT,,, | Performed by: STUDENT IN AN ORGANIZED HEALTH CARE EDUCATION/TRAINING PROGRAM

## 2024-08-05 PROCEDURE — 36000711: Performed by: STUDENT IN AN ORGANIZED HEALTH CARE EDUCATION/TRAINING PROGRAM

## 2024-08-05 PROCEDURE — 64415 NJX AA&/STRD BRCH PLXS IMG: CPT | Performed by: ANESTHESIOLOGY

## 2024-08-05 PROCEDURE — 71000033 HC RECOVERY, INTIAL HOUR: Performed by: STUDENT IN AN ORGANIZED HEALTH CARE EDUCATION/TRAINING PROGRAM

## 2024-08-05 PROCEDURE — 29828 SHO ARTHRS SRG BICP TENODSIS: CPT | Mod: 51,RT,, | Performed by: STUDENT IN AN ORGANIZED HEALTH CARE EDUCATION/TRAINING PROGRAM

## 2024-08-05 PROCEDURE — 37000009 HC ANESTHESIA EA ADD 15 MINS: Performed by: STUDENT IN AN ORGANIZED HEALTH CARE EDUCATION/TRAINING PROGRAM

## 2024-08-05 PROCEDURE — 63600175 PHARM REV CODE 636 W HCPCS: Performed by: STUDENT IN AN ORGANIZED HEALTH CARE EDUCATION/TRAINING PROGRAM

## 2024-08-05 PROCEDURE — 29826 SHO ARTHRS SRG DECOMPRESSION: CPT | Mod: RT,,, | Performed by: STUDENT IN AN ORGANIZED HEALTH CARE EDUCATION/TRAINING PROGRAM

## 2024-08-05 DEVICE — ANCHOR FIBERTAK SFT BLK/WHT #2: Type: IMPLANTABLE DEVICE | Site: SHOULDER | Status: FUNCTIONAL

## 2024-08-05 DEVICE — SYS SWIVELOCK LNT 4.75BC: Type: IMPLANTABLE DEVICE | Site: SHOULDER | Status: FUNCTIONAL

## 2024-08-05 DEVICE — ANCHOR FIBERTAK SOFT BLUE #2: Type: IMPLANTABLE DEVICE | Site: SHOULDER | Status: FUNCTIONAL

## 2024-08-05 DEVICE — ANCHOR SWIVELOCK KNTLS BLUE #2: Type: IMPLANTABLE DEVICE | Site: SHOULDER | Status: FUNCTIONAL

## 2024-08-05 RX ORDER — HYDROCODONE BITARTRATE AND ACETAMINOPHEN 7.5; 325 MG/1; MG/1
1 TABLET ORAL EVERY 6 HOURS PRN
Status: DISCONTINUED | OUTPATIENT
Start: 2024-08-05 | End: 2024-08-05 | Stop reason: HOSPADM

## 2024-08-05 RX ORDER — PROPOFOL 10 MG/ML
VIAL (ML) INTRAVENOUS
Status: DISCONTINUED | OUTPATIENT
Start: 2024-08-05 | End: 2024-08-05

## 2024-08-05 RX ORDER — SUCCINYLCHOLINE CHLORIDE 20 MG/ML
INJECTION INTRAMUSCULAR; INTRAVENOUS
Status: DISCONTINUED | OUTPATIENT
Start: 2024-08-05 | End: 2024-08-05

## 2024-08-05 RX ORDER — HYDROCODONE BITARTRATE AND ACETAMINOPHEN 7.5; 325 MG/15ML; MG/15ML
15 SOLUTION ORAL EVERY 4 HOURS PRN
OUTPATIENT
Start: 2024-08-05

## 2024-08-05 RX ORDER — PHENYLEPHRINE HYDROCHLORIDE 10 MG/ML
INJECTION INTRAVENOUS
Status: DISCONTINUED | OUTPATIENT
Start: 2024-08-05 | End: 2024-08-05

## 2024-08-05 RX ORDER — ONDANSETRON HYDROCHLORIDE 2 MG/ML
INJECTION, SOLUTION INTRAMUSCULAR; INTRAVENOUS
Status: DISCONTINUED | OUTPATIENT
Start: 2024-08-05 | End: 2024-08-05

## 2024-08-05 RX ORDER — ALBUTEROL SULFATE 0.83 MG/ML
2.5 SOLUTION RESPIRATORY (INHALATION) EVERY 4 HOURS PRN
Status: DISCONTINUED | OUTPATIENT
Start: 2024-08-05 | End: 2024-08-05 | Stop reason: HOSPADM

## 2024-08-05 RX ORDER — EPHEDRINE SULFATE 50 MG/ML
INJECTION, SOLUTION INTRAVENOUS
Status: DISCONTINUED | OUTPATIENT
Start: 2024-08-05 | End: 2024-08-05

## 2024-08-05 RX ORDER — ACETAMINOPHEN 500 MG
1000 TABLET ORAL EVERY 8 HOURS PRN
Qty: 60 TABLET | Refills: 0 | Status: SHIPPED | OUTPATIENT
Start: 2024-08-05

## 2024-08-05 RX ORDER — FENTANYL CITRATE 50 UG/ML
INJECTION, SOLUTION INTRAMUSCULAR; INTRAVENOUS
Status: DISCONTINUED | OUTPATIENT
Start: 2024-08-05 | End: 2024-08-05

## 2024-08-05 RX ORDER — OXYCODONE HYDROCHLORIDE 5 MG/1
5 TABLET ORAL
Qty: 28 TABLET | Refills: 0 | Status: SHIPPED | OUTPATIENT
Start: 2024-08-05

## 2024-08-05 RX ORDER — GLUCAGON 1 MG
1 KIT INJECTION
Status: DISCONTINUED | OUTPATIENT
Start: 2024-08-05 | End: 2024-08-05 | Stop reason: HOSPADM

## 2024-08-05 RX ORDER — NEOSTIGMINE METHYLSULFATE 1 MG/ML
INJECTION INTRAVENOUS
Status: DISCONTINUED | OUTPATIENT
Start: 2024-08-05 | End: 2024-08-05

## 2024-08-05 RX ORDER — SODIUM CHLORIDE 9 MG/ML
INJECTION, SOLUTION INTRAVENOUS CONTINUOUS
Status: DISCONTINUED | OUTPATIENT
Start: 2024-08-05 | End: 2024-08-05 | Stop reason: HOSPADM

## 2024-08-05 RX ORDER — LIDOCAINE HYDROCHLORIDE 10 MG/ML
INJECTION, SOLUTION EPIDURAL; INFILTRATION; INTRACAUDAL; PERINEURAL
Status: COMPLETED | OUTPATIENT
Start: 2024-08-05 | End: 2024-08-05

## 2024-08-05 RX ORDER — DIPHENHYDRAMINE HYDROCHLORIDE 50 MG/ML
25 INJECTION INTRAMUSCULAR; INTRAVENOUS EVERY 6 HOURS PRN
Status: DISCONTINUED | OUTPATIENT
Start: 2024-08-05 | End: 2024-08-05 | Stop reason: HOSPADM

## 2024-08-05 RX ORDER — FENTANYL CITRATE 50 UG/ML
INJECTION, SOLUTION INTRAMUSCULAR; INTRAVENOUS
Status: COMPLETED
Start: 2024-08-05 | End: 2024-08-05

## 2024-08-05 RX ORDER — EPINEPHRINE 1 MG/ML
INJECTION, SOLUTION, CONCENTRATE INTRAVENOUS
Status: DISCONTINUED
Start: 2024-08-05 | End: 2024-08-05 | Stop reason: HOSPADM

## 2024-08-05 RX ORDER — ROCURONIUM BROMIDE 10 MG/ML
INJECTION, SOLUTION INTRAVENOUS
Status: DISCONTINUED | OUTPATIENT
Start: 2024-08-05 | End: 2024-08-05

## 2024-08-05 RX ORDER — TRAMADOL HYDROCHLORIDE 50 MG/1
50 TABLET ORAL
Qty: 36 TABLET | Refills: 0 | Status: SHIPPED | OUTPATIENT
Start: 2024-08-05

## 2024-08-05 RX ORDER — MIDAZOLAM HYDROCHLORIDE 1 MG/ML
INJECTION INTRAMUSCULAR; INTRAVENOUS
Status: DISCONTINUED | OUTPATIENT
Start: 2024-08-05 | End: 2024-08-05

## 2024-08-05 RX ORDER — CHLORHEXIDINE GLUCONATE ORAL RINSE 1.2 MG/ML
10 SOLUTION DENTAL
Status: DISCONTINUED | OUTPATIENT
Start: 2024-08-05 | End: 2024-08-05 | Stop reason: HOSPADM

## 2024-08-05 RX ORDER — ROPIVACAINE HYDROCHLORIDE 5 MG/ML
INJECTION, SOLUTION EPIDURAL; INFILTRATION; PERINEURAL
Status: COMPLETED | OUTPATIENT
Start: 2024-08-05 | End: 2024-08-05

## 2024-08-05 RX ORDER — DEXAMETHASONE SODIUM PHOSPHATE 4 MG/ML
INJECTION, SOLUTION INTRA-ARTICULAR; INTRALESIONAL; INTRAMUSCULAR; INTRAVENOUS; SOFT TISSUE
Status: DISCONTINUED | OUTPATIENT
Start: 2024-08-05 | End: 2024-08-05

## 2024-08-05 RX ORDER — FENTANYL CITRATE 50 UG/ML
25 INJECTION, SOLUTION INTRAMUSCULAR; INTRAVENOUS EVERY 5 MIN PRN
Status: DISCONTINUED | OUTPATIENT
Start: 2024-08-05 | End: 2024-08-05 | Stop reason: HOSPADM

## 2024-08-05 RX ORDER — HYDROXYZINE HYDROCHLORIDE 25 MG/1
25 TABLET, FILM COATED ORAL 3 TIMES DAILY PRN
Qty: 15 TABLET | Refills: 0 | Status: SHIPPED | OUTPATIENT
Start: 2024-08-05

## 2024-08-05 RX ORDER — OXYCODONE AND ACETAMINOPHEN 7.5; 325 MG/1; MG/1
1 TABLET ORAL EVERY 4 HOURS PRN
Status: DISCONTINUED | OUTPATIENT
Start: 2024-08-05 | End: 2024-08-05

## 2024-08-05 RX ORDER — ONDANSETRON HYDROCHLORIDE 2 MG/ML
4 INJECTION, SOLUTION INTRAVENOUS ONCE AS NEEDED
Status: COMPLETED | OUTPATIENT
Start: 2024-08-05 | End: 2024-08-05

## 2024-08-05 RX ORDER — EPINEPHRINE 1 MG/ML
INJECTION, SOLUTION, CONCENTRATE INTRAVENOUS
Status: DISCONTINUED | OUTPATIENT
Start: 2024-08-05 | End: 2024-08-05 | Stop reason: HOSPADM

## 2024-08-05 RX ORDER — LIDOCAINE HYDROCHLORIDE 20 MG/ML
INJECTION, SOLUTION EPIDURAL; INFILTRATION; INTRACAUDAL; PERINEURAL
Status: DISCONTINUED | OUTPATIENT
Start: 2024-08-05 | End: 2024-08-05

## 2024-08-05 RX ADMIN — FENTANYL CITRATE 50 MCG: 0.05 INJECTION, SOLUTION INTRAMUSCULAR; INTRAVENOUS at 02:08

## 2024-08-05 RX ADMIN — CEFAZOLIN 2 G: 2 INJECTION, POWDER, FOR SOLUTION INTRAMUSCULAR; INTRAVENOUS at 01:08

## 2024-08-05 RX ADMIN — ROPIVACAINE HYDROCHLORIDE 20 ML: 5 INJECTION, SOLUTION EPIDURAL; INFILTRATION; PERINEURAL at 01:08

## 2024-08-05 RX ADMIN — ROCURONIUM BROMIDE 10 MG: 10 SOLUTION INTRAVENOUS at 01:08

## 2024-08-05 RX ADMIN — EPHEDRINE SULFATE 5 MG: 50 INJECTION INTRAVENOUS at 02:08

## 2024-08-05 RX ADMIN — LIDOCAINE HYDROCHLORIDE 2 MG: 10 SOLUTION INTRAVENOUS at 01:08

## 2024-08-05 RX ADMIN — MIDAZOLAM 2 MG: 1 INJECTION INTRAMUSCULAR; INTRAVENOUS at 01:08

## 2024-08-05 RX ADMIN — FENTANYL CITRATE 25 MCG: 50 INJECTION, SOLUTION INTRAMUSCULAR; INTRAVENOUS at 04:08

## 2024-08-05 RX ADMIN — FENTANYL CITRATE 25 MCG: 50 INJECTION, SOLUTION INTRAMUSCULAR; INTRAVENOUS at 03:08

## 2024-08-05 RX ADMIN — DEXAMETHASONE SODIUM PHOSPHATE 4 MG: 4 INJECTION, SOLUTION INTRA-ARTICULAR; INTRALESIONAL; INTRAMUSCULAR; INTRAVENOUS; SOFT TISSUE at 01:08

## 2024-08-05 RX ADMIN — PHENYLEPHRINE HYDROCHLORIDE 100 MCG: 10 INJECTION INTRAVENOUS at 01:08

## 2024-08-05 RX ADMIN — EPHEDRINE SULFATE 10 MG: 50 INJECTION INTRAVENOUS at 02:08

## 2024-08-05 RX ADMIN — ROCURONIUM BROMIDE 20 MG: 10 SOLUTION INTRAVENOUS at 01:08

## 2024-08-05 RX ADMIN — HYDROCODONE BITARTRATE AND ACETAMINOPHEN 1 TABLET: 7.5; 325 TABLET ORAL at 03:08

## 2024-08-05 RX ADMIN — SODIUM CHLORIDE, POTASSIUM CHLORIDE, SODIUM LACTATE AND CALCIUM CHLORIDE: 600; 310; 30; 20 INJECTION, SOLUTION INTRAVENOUS at 01:08

## 2024-08-05 RX ADMIN — ONDANSETRON 4 MG: 2 INJECTION INTRAMUSCULAR; INTRAVENOUS at 02:08

## 2024-08-05 RX ADMIN — LIDOCAINE HYDROCHLORIDE 50 MG: 20 INJECTION, SOLUTION EPIDURAL; INFILTRATION; INTRACAUDAL; PERINEURAL at 01:08

## 2024-08-05 RX ADMIN — GLYCOPYRROLATE 0.2 MG: 0.2 INJECTION, SOLUTION INTRAMUSCULAR; INTRAVITREAL at 01:08

## 2024-08-05 RX ADMIN — ONDANSETRON 4 MG: 2 INJECTION INTRAMUSCULAR; INTRAVENOUS at 03:08

## 2024-08-05 RX ADMIN — FENTANYL CITRATE 50 MCG: 0.05 INJECTION, SOLUTION INTRAMUSCULAR; INTRAVENOUS at 01:08

## 2024-08-05 RX ADMIN — SUCCINYLCHOLINE CHLORIDE 140 MG: 20 INJECTION, SOLUTION INTRAMUSCULAR; INTRAVENOUS; PARENTERAL at 01:08

## 2024-08-05 RX ADMIN — Medication 50 MG: at 01:08

## 2024-08-05 RX ADMIN — PHENYLEPHRINE HYDROCHLORIDE 200 MCG: 10 INJECTION INTRAVENOUS at 02:08

## 2024-08-05 RX ADMIN — NEOSTIGMINE METHYLSULFATE 2.5 MG: 1 INJECTION INTRAVENOUS at 02:08

## 2024-08-05 RX ADMIN — GLYCOPYRROLATE 0.4 MG: 0.2 INJECTION, SOLUTION INTRAMUSCULAR; INTRAVITREAL at 02:08

## 2024-08-05 RX ADMIN — Medication 150 MG: at 01:08

## 2024-08-05 NOTE — DISCHARGE INSTRUCTIONS
DISCHARGE INSTRUCTIONS FOR ROTATOR CUFF REPAIR     Contact the Sports Medicine Clinic at [phone] if you have questions about your instructions or follow-up appointment.     DIET:   Start with clear liquids and light foods to minimize nausea. Once these are tolerated, advance to a regular diet.     DRESSING AND WOUND CARE:   Keep the dressing clean and dry. It is normal for there to be some drainage after surgery since the shoulder was irrigated with large amounts of fluid. Reinforce with additional gauze as necessary.   Remove the dressing the 2nd day after surgery and begin changing daily with clean gauze or Band-Aids®. Keep your incisions covered until you follow up in clinic.   If you have Steri-Strips in place of stitches, allow them to stay in place as long as possible. Steri-Strips are made of a fabric material that can get wet in the shower and pat dry with a towel. They usually fall off on their own within 7 to 10 days. You may trim the edges as they begin to curl.     BATHING:   You may bathe or shower on the 2nd day after surgery, but do not scrub or soak the incisions. Dry the area by gently blotting it with a gauze or towel. After it is completely dry, cover the wound with clean gauze or Band-Aids®. Do NOT submerge the incisions (bath/swim) until after the sutures are removed and the wound has completely healed.     ACTIVITY:    Ice should be applied to the shoulder for 20-30 minutes, 5-6 times a day, to help control pain and swelling. Apply additional times as needed, especially after exercise, for the first 3-4 weeks. Do not apply ice directly to the skin; use a thin barrier in between. Also, do not use heat.    Elevate the shoulder by sleeping as upright as possible using extra pillows or a recliner. Do this for the first few days to help decrease pain and swelling.    Wear the sling at all times for 6 weeks including while sleeping. The only time you may remove the sling is for bathing and  exercises. Do not lean or put your body weight on your arm.    When your block wears off, start the following exercises:  Remove the sling for 5-10 minutes, 3 times a day, to do the following exercises:   Fully bend & straighten your fingers, your wrist, and your elbow several times.   Lean forward, bracing yourself on a table/counter with your normal arm. Let your surgical arm relax and hang straight down. Shift your weight so that your arm moves side to side, front to back, and in gentle circles like a pendulum or elephant's trunk. Use your body to generate the movement for this, NOT your surgical shoulder's muscles. (see drawing below)      Physical therapy will be started after your 1st follow-up visit. At that time, you will be given a prescription & rehabilitation protocol to take to the PT clinic of your choice. Plan to visit with a therapist within 3 days after your follow-up visit.     PAIN CONTROL:   It is important to stay ahead of pain as it becomes challenging to get under control if you fall behind. Ice and elevation can help and should be used as much as possible in the first few days.    Narcotic pain medications, such as tramadol and oxycodone, should be taken as prescribed. The Tramadol is intended to be taken first as the primary medicine and then oxycodone taken for breakthrough pain. Wean off as soon as possible. Take these with food to decrease the chances of nausea and vomiting. Do not drink alcohol, drive a vehicle, or use heavy machinery while taking narcotic pain medications.    NSAID medications are used for pain control and to decrease inflammation. NO NSAIDS, ON WARFARIN. RESUME WARFARIN POST-OP DAY ONE. You may be prescribed an NSAID such as celebrex. Take as instructed. Other NSAID medications such as ibuprofen, Motrin, Advil, naproxen, or Aleve can be used once you have finished the celebrex, or if a prescription for celebrex was not provided.    Acetaminophen (Tylenol) is an  effective over-the-counter pain medication that can be used with NSAID medications and non-acetaminophen containing narcotics such as plain oxycodone.     ASPIRIN FOR PREVENTION OF BLOOD CLOTS:   You should take one 81 mg baby aspirin twice daily for two weeks starting the evening of the day you have surgery unless instructed otherwise or taking a different blood thinner such as enoxaparin or warfarin. If you are aware that you are at high risk for a blood clot, notify your physician as soon as possible. NO ASPIRIN, RESUME WARFARIN POST OP DAY 1.  Take aspirin at least 30 minutes before taking ibuprofen or Toradol.    CONSTIPATION PREVENTION:   Anesthesia and pain medications, changes in eating and drinking, and less activity can all lead to constipation after surgery. To prevent or reduce constipation, take an over-the-counter stool softener (brands include Colace and Miralax). Follow the directions on the bottle. Drink plenty of water and eat high fiber foods including whole grains, fresh fruits, vegetables, beans, prunes or prune juice.     PROBLEMS TO REPORT:   Persistent bloody drainage that soaks through reinforced dressings.   Fever greater than 101F or 38C.   Incision that is very red, swollen, draining pus, shows red streaks, or feels hot.   Inability to urinate within 8 hours of surgery (a rare effect of the anesthesia).   If you develop a rash, generalized itching or swelling from the medications, STOP the medication and call the clinic or the orthopedic surgery resident on call.   Daytime calls should be directed to the Sports Medicine Clinic at [phone].   Night-time and weekend calls should be directed to the after hours nurse line at [phone]    FREQUENTLY ASKED QUESTIONS     WHAT DAILY ACTIVITIES CAN I DO?   After shoulder surgery, you may do what you feel comfortable doing in the sling. Do not lift anything with your operative arm or put yourself at risk of falling.     CAN I DRIVE OR RIDE  BY CAR/ TRAIN/ PLANE?   You should not drive while using a sling. There are no forced restrictions regarding operating a motor vehicle, however you must always be the  of whether you are able to operate it safely. You should not drive while taking narcotic pain medications. You may ride in a car after surgery as needed. You may take a train or even fly the day after your surgery as long as you feel secure and comfortable.     WHAT ABOUT WORK?   You may return to an office-type job or to school whenever comfortable. For most patients this occurs 1-2 weeks after surgery. For more active jobs that require some lifting, you can wait until after your follow-up appointment. Any other unusual types of jobs should be discussed to determine a date for return to work.     WHAT ABOUT SWELLING?   Expect swelling as a normal process after surgery. Ice, elevation, and other treatments provided at physical therapy will allow this to improve in time. Some swelling may remain for up to 8 weeks, and this is normal.     WHAT IF IT REALLY HURTS TOO MUCH?   Surgery hurts and you cannot expect to be pain free, but our goal is for it to be tolerable. Try to use all available pain therapies such as narcotics, NSAIDS, and acetaminophen. Always try more ice and elevation. If the pain is not tolerable, call the clinic or the after hours nurse line.

## 2024-08-05 NOTE — H&P
[facility]  Orthopedics  H&P    Patient Name: Alyx Weir  MRN: [id number]  Admission Date: 8/5/2024  Primary Care Provider: Carl Clemons MD    Patient information was obtained from patient.     Subjective:     Principal Problem: Traumatic incomplete tear of right rotator cuff, subsequent encounter; Subacromial impingement of right shoulder     Chief Complaint: right shoulder pain     HPI: Alyx Weir is a 74 y.o. female who is here today for right shoulder arthroscopy with rotator cuff repair, subacromial decompression, and possible biceps tenodesis with Dr. Ruiz. She was last seen by him on 6/7/2024, reports no change in history since that visit.     To review her history, Alyx Weir is a 74 y.o. right-hand dominant female who presented on 5/22/24 with right shoulder pain and dysfunction that began on 5/20/24 while at work when she was putting some clothes on a  and stumbled and fell forward. Noted immediate pain in the shoulder following the fall. Her symptoms included right shoulder pain that had persisted since the fall. She also reported abrasion to her face that she thought it was from her glasses from the fall as well as right hip pain. Reports hip pain was improving, shoulder pain was persisting. She reported the pain was constant. She reported minimal use of her arm due to the pain. She denied any numbness or tingling. Her pain was aggravated by movement of her shoulder. She had tried rest, activity modification, and sling.  Her symptoms were most consistent with rotator cuff tendinitis and subacromial bursitis.  She had difficulty with active range of motion, but tolerated passive range of motion.  She was able to perform active assisted range of motion. Her injury occurred just 2 days prior to her visit.  I wanted to give her 1-2 weeks and reassess her function at that time.       Past Medical History:   Diagnosis Date    Anticoagulant long-term use      Anticoagulated on Coumadin     Arm weakness-rotator cuff weakness 11/02/2015    Arthritis     Asthma     Clotting disorder     Coronary artery disease     Deep vein thrombosis     Degenerative disc disease     Diabetes mellitus type I 2011    BS last tested x 1 month not sure what it was.    Heterozygous factor V Leiden mutation     Hx of colonic polyps 11/13/2015    Hyperlipidemia     Hypertension     VIRGIL (obstructive sleep apnea)     Stenosis of right carotid artery 12/13/2016       Past Surgical History:   Procedure Laterality Date    BACK SURGERY      bladder cyst removal      BLADDER SURGERY      CARDIAC CATHETERIZATION  03/2013    mild CAD    COLONOSCOPY N/A 11/13/2015    Procedure: COLONOSCOPY;  Surgeon: Jas Umanzor III, MD;  Location: [facility] ENDO;  Service: Endoscopy;  Laterality: N/A;    HYSTERECTOMY      28 yrs old    LUMBAR SPINE SURGERY      bone spurs removed    OOPHORECTOMY      SELECTIVE INJECTION OF ANESTHETIC AGENT AROUND LUMBAR SPINAL NERVE ROOT BY TRANSFORAMINAL APPROACH Bilateral 06/03/2022    Procedure: Bilateral L4/5 TF ASUNCION with RN IV sedation; on Coumadin, will need INR prior to procedure, must be less than 1.3;  Surgeon: Mario Palacios MD;  [facility];  Service: Pain Management;  Laterality: Bilateral;       Review of patient's allergies indicates:   Allergen Reactions    Erythromycin Edema     Angioedema to throat    Amlodipine Other (See Comments)     Headaches    Diazepam Hives     Other reaction(s): Unknown    Iodine Hives     Other reaction(s): Unknown    Meperidine Hives     Other reaction(s): Unknown    Morphine Itching    Methylprednisolone sod suc(pf) Other (See Comments)     headache    Primidone Other (See Comments)     Other reaction(s): Unknown    Zetia [ezetimibe]      Diarrhea         Current Facility-Administered Medications   Medication    0.9%  NaCl infusion    ceFAZolin 2 g in D5W 50 mL IVPB (MB+)    chlorhexidine 0.12 % solution 10 mL    dextrose 10% bolus  "125 mL 125 mL    dextrose 10% bolus 250 mL 250 mL     Family History       Problem Relation (Age of Onset)    Cataracts Mother    Diabetes Father, Sister, Sister, Brother, Paternal Uncle    Glaucoma Sister    Heart disease Mother    Hypertension Mother, Sister, Sister, Sister, Brother    Ovarian cancer Sister          Tobacco Use    Smoking status: Never     Passive exposure: Past    Smokeless tobacco: Never   Substance and Sexual Activity    Alcohol use: No    Drug use: No    Sexual activity: Not Currently     Partners: Male     Birth control/protection: None     ROS  Objective:     Vital Signs (Most Recent):  Temp: 98.6 °F (37 °C) (08/05/24 0957)  Pulse: 64 (08/05/24 0957)  Resp: 18 (08/05/24 0957)  BP: (!) 165/77 (08/05/24 0957)  SpO2: 100 % (08/05/24 0957) Vital Signs (24h Range):  Temp:  [98.6 °F (37 °C)] 98.6 °F (37 °C)  Pulse:  [64] 64  Resp:  [18] 18  SpO2:  [100 %] 100 %  BP: (165)/(77) 165/77     Weight: 77.7 kg (171 lb 4.8 oz)  Height: 5' 3" (160 cm)  Body mass index is 30.34 kg/m².    No intake or output data in the 24 hours ending 08/05/24 1055    Ortho/SPM Exam    Physical Exam  Patient is alert and oriented, no distress. Skin is intact. Neuro is normal with no focal motor or sensory findings.     Cervical exam is unremarkable. Intact cervical ROM. Negative Spurling's test     Physical Exam:                       RIGHT                                     LEFT     Scap Dyskinesis/Winging       (-)                                             (-)     Tenderness:                                                                              Greater Tuberosity                  +                                              (-)  Bicipital Groove                       +                                              (-)  AC joint                                   (-)                                             (-)  Other:      ROM:  Forward Elevation       50/160                                     160  Abduction "                    30/90                                       120  ER (at side)                 30                                            60     Strength:   Supraspinatus             2/5                                           5/5  Infraspinatus               2/5                                           5/5  Subscap / IR               5/5                                           5/5      Special Tests:              Neer:                                       +                                              (-)              Mancera:                                 +                                              (-)              SS Stress:                               +                                              (-)              Bear Hug:                                (-)                                             (-)              Sikes's:                                 +                                              (-)              Resisted Thrower's:                +                                              (-)              Cross Arm Abduction:             (-)                                             (-)     Neurovascular examination  - Motor grossly intact bilaterally to shoulder abduction, elbow flexion and extension, wrist flexion and extension, and intrinsic hand musculature  - Sensation intact to light touch bilaterally in axillary, median, radial, and ulnar distributions  - Symmetrical radial pulses     Imaging:    XR Results:  Results for orders placed during the hospital encounter of 05/20/24     X-Ray Shoulder Trauma Right     Narrative  EXAMINATION:  XR SHOULDER TRAUMA 3 VIEW RIGHT     CLINICAL HISTORY:  Pain in right shoulder     TECHNIQUE:  Three or four views of the right shoulder were performed.     COMPARISON:  None     FINDINGS:  No acute fracture or dislocation.  Mild degenerative joint disease.     Impression  As above        Electronically signed by:Terry Nichols  Date:                                                 05/20/2024  Time:                                               18:47        MRI Results:  Results for orders placed during the hospital encounter of 06/05/24     MRI Shoulder Without Contrast Right     Narrative  EXAMINATION:  MRI SHOULDER WITHOUT CONTRAST RIGHT     CLINICAL HISTORY:  Shoulder pain, rotator cuff disorder suspected, xray done;  Pain in right shoulder     TECHNIQUE:  Multisequence, multiplanar MR imaging of the shoulder performed without contrast.     COMPARISON:  Prior radiograph     FINDINGS:  Rotator cuff:     Supraspinatus/infraspinatus: Tendinosis with large area high-grade partial to full-thickness tearing involving at least 4 cm in the AP dimension.  There is retraction of deep fibers to the mid humeral head level.  Intact far anterior supraspinatus and far posterior infraspinatus tendon fibers present.     Subscapularis: Intact     Teres minor: Intact     Mild supraspinatus muscle bulk loss.     Labrum: Age expected degenerative fraying present.     Biceps: Long head biceps tendon is intact.     Bone: No fracture present.  Bone marrow signal is unremarkable.     Acromioclavicular joint:Mild degenerative changes     Cartilage: Articular cartilage of the glenohumeral joint is preserved     Miscellaneous: Small joint effusion with mild subacromial/subdeltoid fluid.     Impression  Large area high-grade partial to full-thickness rotator cuff tear with retraction of deep fibers.        Electronically signed by:Kevin Levy MD  Date:                                                06/05/2024  Time:                                               11:34        CT Results:  No results found for this or any previous visit.        Physician read: I agree with the above impression. Largely high grade partial thickness rotator cuff tear with small full thickness component    Significant Labs: All pertinent labs within the past 24 hours have been  reviewed.    Significant Imaging: I have reviewed and interpreted all pertinent imaging results/findings.    Assessment/Plan:     There are no hospital problems to display for this patient.    Plan:  right shoulder arthroscopy with rotator cuff repair, subacromial decompression, and possible biceps tenodesis.       Izabella Ng PA-C  Orthopedics  [facility]

## 2024-08-05 NOTE — PLAN OF CARE
Right supraclavicular peripheral nerve block completed per anesthesia at bedside at this time. Patient tolerated well. VSS. Continuous cardiac and SPO2 monitoring in place.

## 2024-08-06 ENCOUNTER — NURSE TRIAGE (OUTPATIENT)
Dept: ADMINISTRATIVE | Facility: CLINIC | Age: 74
End: 2024-08-06
Payer: MEDICARE

## 2024-08-07 ENCOUNTER — TELEPHONE (OUTPATIENT)
Dept: SPORTS MEDICINE | Facility: CLINIC | Age: 74
End: 2024-08-07
Payer: MEDICARE

## 2024-08-07 ENCOUNTER — PATIENT MESSAGE (OUTPATIENT)
Dept: SPORTS MEDICINE | Facility: CLINIC | Age: 74
End: 2024-08-07
Payer: MEDICARE

## 2024-08-07 NOTE — OP NOTE
ORTHOPAEDIC SURGERY OPERATIVE REPORT    DATE OF SERVICE: 8/5/2024    PRIMARY SURGEON: Carl Ruiz MD    DATE OF SURGERY: 8/5/2024    PATIENT'S NAME: Alyx Weir    MEDICAL RECORD NUMBER: [id number]     PREOPERATIVE DIAGNOSES:   1. Right shoulder rotator cuff tear  2. Right shoulder biceps tendinitis, SLAP tear  3. Right shoulder impingement    POSTOPERATIVE DIAGNOSES:   1. Right shoulder rotator cuff tear  2. Right shoulder biceps tendinitis, SLAP tear  3. Right shoulder impingement    PROCEDURE PERFORMED:   1. Right shoulder arthroscopic rotator cuff repair  2. Right shoulder arthroscopic biceps tenodesis  3. Right shoulder subacromial decompression    ANESTHESIA: General plus regional.     IMPLANTS USED:   Implant Name Type Inv. Item Serial No.  Lot No. LRB No. Used Action   SYS SWIVELOCK LNT 4.75BC - [id number]  SYS SWIVELOCK LNT 4.75BC  [employer] [id number] Right 1 Implanted   ANCHOR FIBERTAK SFT BLK/WHT #2 - [id number]  ANCHOR FIBERTAK SFT BLK/WHT #2  [employer] [id number] Right 1 Implanted   ANCHOR FIBERTAK SOFT BLUE #2 - [id number]  ANCHOR FIBERTAK SOFT BLUE #2  [employer] [id number] Right 1 Implanted   ANCHOR SWIVELOCK KNTLS BLUE #2 - [id number]  ANCHOR SWIVELOCK KNTLS BLUE #2  [employer] [id number] Right 2 Implanted         COMPLICATIONS: None.     POSITION: Beachchair.     BRIEF INDICATIONS: This is a 74 y.o. female who presents with a symptomatic RIGHT rotator cuff tear and associated biceps tendonopathy. she has failed conservative treatment measures. We discussed surgical treatment options including risks and benefits. After a detailed explanation of the technical aspects of the procedure, the patient elected to proceed and signed consent.    OPERATIVE FINDINGS:   Biceps/Labrum: Inflamed tendon with evidence of longitudinal tearing, Type 2 SLAP tear  Glenohumeral joint: Glenoid and humeral head with grade 1-2 changes, no focal lesions  Rotator Cuff: Full thickness tear of the supraspinatus  Subacromial  space: inflamed bursal tissue with anterolateral spur    DESCRIPTION OF PROCEDURE:   The patient was identified in the preoperative holding area. The operative upper extremity was marked.  Consent was verified. The patient was then taken for preoperative block. Following this, the patient was taken to the main operating room where she was laid supine on the operative table. General anesthetic was induced. Preoperative time-out was performed verifying the patient, procedure, and preoperative antibiotics. The patient was then positioned in the beachchair position with all bony prominences well padded. The operative upper extremity was then prepped and draped in the usual sterile fashion.     A posterior portal was established with an #11-blade. The arthroscope was inserted into the glenohumeral joint atraumatically. We then made an anterior portal using an outside-in technique with an #18-gauge spinal needle into the rotator interval. We then used an #11-blade for stab incision and then followed this with a straight hemostat to open our anterior portal. We then inserted our arthroscopic probe to probe the joint. We were able to visualize the biceps tendon which was inflamed and deformed with evidence longitudinal tearing. The labrum was probed and demonstrated frayed edges and a type 2 SLAP tear. The shaver was introduced to debride the frayed edges of labrum.    The subscapularis was viewed and probed and found to be intact.     Through the anterior portal we placed a passport cannula.  We then passed a FiberWire suture in a luggage tag configuration through and around the biceps tendon.  A Passer/grasper was then used to glasgow the biceps tendon distal to the luggage tag and grab a tail again to lock it into place.  This was then loaded into a 4.75mm SwiveLock anchor outside the shoulder joint.  The biceps tendon was then transected proximal to the stitch, and the anchor was advanced to complete the  tenodesis.    From the glenohumeral space we were also able to visualize a full thickness rotator cuff tear.  We then localized a lateral portal under direct visualization with an #18-gauge spinal needle into the cuff tear. We then made a #11-blade stab incision in the lateral shoulder and then through this brought our arthroscopic shaver. We debrided the torn cuff tissue back to a stable rim and also began preparing the tuberosity for anchor placement. We then removed our instruments from the glenohumeral joint and placed the arthroscope from the posterior portal into the subacromial space. We debrided a significant amount of bursal tissue in the subacromial space. We identified the undersurface of the acromion. We used a VAPR to debride the undersurface of the acromion up to the anterior and lateral margins. We identified the CA ligament and we were careful not to remove this. We continued debridement of the bursal tissue, identified the rotator cuff tear, and worked to define the edges more clearly. Next, we did our acromioplasty by burring the anterolateral margin of the acromion then working carefully medially. The portals were switched and the acromioplasty was completed through the posterior portal in the cutting block fashion.The remnants of rotator cuff tissue at the greater tuberosity was debrided and the greater tuberosity preparation was completed by debriding down to bleeding bone.     Once the rotator cuff was prepared, we then placed our medial row anchors.  We used 2 Arthrex 2.6mm FiberTak anchors preloaded with knotless mechanism and FiberTape for the medial row.  We then passed the sutures using the scorpion device from anterior to posterior.  The knotless mechanism was used side to side to reduce the cuff to the articular margin. The FiberTapes were left long for incorporation into a lateral row.    Next, we began debridement of the lateral humerus with a VAPR probe. We debrided down to bony base.  We then placed our lateral row anchors, which consisted of 2 Arthrex 4.75mm SwiveLock anchors.  We brought sutures from each of the medial row anchors and tensioned them into the anterior lateral row anchor and then advanced the anchor.  This was repeated for the posterior lateral row anchor. Once we completed this, we took the remaining final arthroscopic pictures.     The portal sites were closed with 3-0 nylon sutures.  A light sterile dressing and sling shoulder immobilizer were applied.  The patient was then woken from anesthesia and brought to PACU in stable condition.    Plan:  Rotator Cuff Protocol  NWB RUE in sling  Ok to be out of sling for pendulums, elbow, wrist ROM  ASA 81mg BID x 2wks for DVT ppx  f/u 10-14 days for suture removal      Carl Ruiz MD

## 2024-08-09 NOTE — DISCHARGE SUMMARY
[facility]  Discharge Note  Short Stay    Procedure(s) (LRB):  REPAIR, ROTATOR CUFF, ARTHROSCOPIC (Right)  ARTHROSCOPY,SHOULDER,WITH BICEPS TENODESIS (Right)  ARTHROSCOPY, SHOULDER, WITH SUBACROMIAL SPACE DECOMPRESSION (Right)      OUTCOME: Patient tolerated treatment/procedure well without complication and is now ready for discharge.    DISPOSITION: Home or Self Care    FINAL DIAGNOSIS:  right shoulder rotator cuff tear    FOLLOWUP: In clinic    DISCHARGE INSTRUCTIONS:  No discharge procedures on file.     TIME SPENT ON DISCHARGE: 10 minutes

## 2024-08-12 ENCOUNTER — ANTI-COAG VISIT (OUTPATIENT)
Dept: CARDIOLOGY | Facility: CLINIC | Age: 74
End: 2024-08-12
Payer: COMMERCIAL

## 2024-08-12 DIAGNOSIS — Z79.01 LONG TERM (CURRENT) USE OF ANTICOAGULANTS: Primary | ICD-10-CM

## 2024-08-12 DIAGNOSIS — Z79.01 ANTICOAGULATED ON COUMADIN: Chronic | ICD-10-CM

## 2024-08-12 DIAGNOSIS — D68.51 HETEROZYGOUS FACTOR V LEIDEN MUTATION: Chronic | ICD-10-CM

## 2024-08-12 LAB
CTP QC/QA: YES
INR PPP: 2.1 (ref 2–3)

## 2024-08-12 PROCEDURE — 93793 ANTICOAG MGMT PT WARFARIN: CPT | Mod: S$GLB,,,

## 2024-08-12 PROCEDURE — 85610 PROTHROMBIN TIME: CPT | Mod: QW,S$GLB,, | Performed by: INTERNAL MEDICINE

## 2024-08-12 NOTE — PROGRESS NOTES
Post rotator cuff procedure 8/05/2024.  INR is in therapeutic goal range at 2.1.  Patient has been overlapping enoxaparin with warfarin as directed.  No bleeding issues reported from procedure site.  Will send to PharmD for instructions.

## 2024-08-15 ENCOUNTER — CLINICAL SUPPORT (OUTPATIENT)
Dept: REHABILITATION | Facility: HOSPITAL | Age: 74
End: 2024-08-15
Attending: STUDENT IN AN ORGANIZED HEALTH CARE EDUCATION/TRAINING PROGRAM
Payer: COMMERCIAL

## 2024-08-15 DIAGNOSIS — R29.898 DECREASED STRENGTH OF UPPER EXTREMITY: ICD-10-CM

## 2024-08-15 DIAGNOSIS — M75.41 SUBACROMIAL IMPINGEMENT OF RIGHT SHOULDER: ICD-10-CM

## 2024-08-15 DIAGNOSIS — S46.011D TRAUMATIC INCOMPLETE TEAR OF RIGHT ROTATOR CUFF, SUBSEQUENT ENCOUNTER: ICD-10-CM

## 2024-08-15 DIAGNOSIS — M25.611 DECREASED RANGE OF MOTION OF RIGHT SHOULDER: ICD-10-CM

## 2024-08-15 PROCEDURE — 97110 THERAPEUTIC EXERCISES: CPT

## 2024-08-15 PROCEDURE — 97140 MANUAL THERAPY 1/> REGIONS: CPT

## 2024-08-15 PROCEDURE — 97161 PT EVAL LOW COMPLEX 20 MIN: CPT

## 2024-08-15 NOTE — PLAN OF CARE
[facility]   Physical Therapy Initial Evaluation      Date: 8/15/2024     Name: Alyx Weir  Clinic Number: [id number]  Therapy Diagnosis:   Encounter Diagnoses   Name Primary?    Traumatic incomplete tear of right rotator cuff, subsequent encounter     Subacromial impingement of right shoulder     Decreased range of motion of right shoulder     Decreased strength of upper extremity       Physician: Carl Ruiz*      Physician Orders: PT Eval and Treat  Medical Diagnosis from Referral: Traumatic incomplete tear of right rotator cuff, subsequent encounter [S46.011D], Subacromial impingement of right shoulder [M75.41]   Evaluation Date: 8/15/2024  Authorization Period Expiration: 6/7/2025  Plan of Care Expiration: 11/15/2024    Progress Update: 9/15/2024   Visit # / Visits authorized: 1 / 12 - Workman's Comp authorized (12)   FOTO: 8/15/2024 - Scored: 1 / 3      PRECAUTIONS: Standard Precautions, Safety/fall precautions, Orthopedic: Right Cuff Repair, Non-weight beariing, and Orthotic device type: abductor sling, Wearning schedule: full until 6 week follow up     PROBLEM LIST : pain, decreased motion all planes, decreased strength     Date of Surgery/Procedure 8/5/2024- Joseph   Surgery/Procedure Performed Status post Right Rotator Cuff Repair, Subacromial Decompression, Distal Clavicle Dissection, Biceps Tenodesis   Rehabilitation Precautions Protocol: Joseph Cuff Protocol     MD Follow up: 8/20/2024 9/17/2024 11/05/2024    Patient School:     Job/Position: Works at an [employer] story    Time In: 1105  Time Out: 1200  Total Appointment Time (timed & untimed codes): 55    SUBJECTIVE     Date of onset: May 20  Chief Complaint: right shoulder pain    History of current condition - Alyx is a 74 y.o. female whom presents to Physical Therapy status post the above procedure for Physical Therapy intervention and progression of care. She is having elevated pain in the afternoon  and first thing in the morning when her medication wears off.  She has been talking her medication regularly and that has helped the most.  She lives alone, but her family and her neighbors have been helping her consistently.     Falls: [] No  [x] Yes: this injury- complete fall with shoulder injury and eye injury. With head scan and shoulder.   Physician Instructions (per patient): post op exercises  Other concerns: none    Imaging: [x] Xray [] MRI [] CT: Reviewed    Prior Therapy:  [x] No  [] Yes:   Social History: Pt lives alone [x] House [] Apartment/Condo []other  Stairs: [] No  [x] Yes: 3 in front, 3 on the side - hand rail x1 each set  Occupation: Patient is  alterations department at school time uniforms, sometimes sews, but mostly customer service and paperwork.  School/Work Restrictions: [] None [x] Yes: unable to do above right now.  Prior Level of Function: Independent with all activities of daily living  Current Level of Function: 25% of prior level of function  - Challenges based on (missing percentage): all areas due to pain and post op status  - Gym/Home Equipment: none    Pain:  Current 6/10, worst 8/10, best 6 /10   Location: [x] Right [] Left [] Bilateral: shoulder  Description: achey, sore, sharp  Aggravating Factors: overuse, prolonged activity upright   Easing Factors: activity avoidance, rest, medication as needed    Pts goals: Pt reported goals are to improve pain and function to get back to daily activities, community based activities, and social activities.     _______________________________________________________  Medical History:   Past Medical History:   Diagnosis Date    Anticoagulant long-term use     Anticoagulated on Coumadin     Arm weakness-rotator cuff weakness 11/02/2015    Arthritis     Asthma     Clotting disorder     Coronary artery disease     Deep vein thrombosis     Degenerative disc disease     Diabetes mellitus type I 2011    BS last tested x 1 month not sure what it  was.    Heterozygous factor V Leiden mutation     Hx of colonic polyps 11/13/2015    Hyperlipidemia     Hypertension     VIRGIL (obstructive sleep apnea)     Stenosis of right carotid artery 12/13/2016       Surgical History:   Alyx Weir  has a past surgical history that includes Back surgery; Bladder surgery; Cardiac catheterization (03/2013); Colonoscopy (N/A, 11/13/2015); Oophorectomy; bladder cyst removal; Lumbar spine surgery; Hysterectomy; Selective injection of anesthetic agent around lumbar spinal nerve root by transforaminal approach (Bilateral, 06/03/2022); Arthroscopic repair of rotator cuff of shoulder (Right, 8/5/2024); arthroscopy,shoulder,with biceps tenodesis (Right, 8/5/2024); and Arthroscopy of shoulder with decompression of subacromial space (Right, 8/5/2024).    Medications:   Alyx has a current medication list which includes the following prescription(s): accu-chek guide me glucose mtr, acetaminophen, acetaminophen, allopurinol, baclofen, clobetasol, enoxaparin, fluticasone propionate, gabapentin, glucosamine-chondroitin, hydrocodone-acetaminophen, hydroxyzine hcl, multivitamin, olmesartan-amlodipin-hcthiazid, fish oil/borage/flax/om3,6,9 1, oxycodone, pantoprazole, solifenacin, tramadol, tramadol, vitamin d, and warfarin.    Allergies:   Review of patient's allergies indicates:   Allergen Reactions    Erythromycin Edema     Angioedema to throat    Amlodipine Other (See Comments)     Headaches    Diazepam Hives     Other reaction(s): Unknown    Iodine Hives     Other reaction(s): Unknown    Meperidine Hives     Other reaction(s): Unknown    Morphine Itching    Methylprednisolone sod suc(pf) Other (See Comments)     headache    Primidone Other (See Comments)     Other reaction(s): Unknown    Zetia [ezetimibe]      Diarrhea            OBJECTIVE     Posture:  Pt presents with postural abnormalities which include:    [x] Forward Head   [] Increased Lumbar Lordosis   [x] Rounded Shoulder   [] Flat  Back Posture   [] Increased Thoracic Kyphosis [] Inominate Rotation   [] Increased Trunk Sway  [] Genu Recurvatum   [] Increased Trunk Rotation  [] Pes Planus   [] Other:     Sensation:  Sensation is [x] Intact [] Impaired-- to light touch    Incision: [x] Clean [x] Dry [x] Intact [] Other:     Palpation: Increased tone and tenderness noted with palpation to: rihcar-shoulder    POST-OPERATIVE-  Assistive Device Abductor sling   Brace As above   Functional Level Very low due to post operative status   Incisions Clean dry and intact      SHOULDER-   SHOULDER   Range of Motion Right  Active     Passive Left  Active       Passive Goal   Forward Flexion (180º) Not tested 45 160 - 170º   Abduction (180º)  45 120 - 160º   External Rotation at 90º (90º)  - 75 - 80º   External Rotation at 45º (45º)   30 52 - 45º   Internal Rotation at 90º (90º)  30 - - 70º   Functional External Rotation (C7)  -  - C7   Functional Internal Rotation (T10)  -  - T10   (*) pain and/or dysfunction) pain and/or dysfunction     UE STRENGTH -   Upper Extremity  Strength Right  Not tested due to post operative state Goal   Shoulder Flexion []1  []2  []3  []4  []5                []+ []- 5/5 B   Shoulder Abduction []1  []2  []3  []4  []5                []+ []- 5/5 B   Shoulder IR []1  []2  []3  []4  []5                []+ []- 5/5 B   Shoulder ER []1  []2  []3  []4  []5                []+ []- 5/5 B   Elbow Flexion  []1  []2  []3  []4  []5                []+ []- 5/5 B   Elbow Extension []1  []2  []3  []4  []5                []+ []- 5/5 B     Upper Extremity  Strength Left   Goal   Shoulder Flexion []1  []2  []3  [x]4  []5                []+ [x]- 5/5 B   Shoulder Abduction []1  []2  []3  [x]4  []5                [x]+ []- 5/5 B   Shoulder IR []1  []2  []3  [x]4  []5                [x]+ []- 5/5 B   Shoulder ER []1  []2  []3  [x]4  []5                []+ [x]- 5/5 B   Elbow Flexion  []1  []2  []3  [x]4  []5                [x]+ []- 5/5 B   Elbow Extension []1   []2  []3  [x]4  []5                [x]+ []- 5/5 B        OBJECTIVE MEASURES- MOBS: SHOULDER -   Shoulder - Joint Mobility Right Left  Goal   Posterior Glide []Normal   [x]Hypomobile  []Hypermobile [x]Normal  []Hypomobile  []Hypermobile Normal    Anterior Glide []Normal  [x]Hypomobile  []Hypermobile [x]Normal  []Hypomobile  []Hypermobile Normal    Inferior Glide []Normal  [x]Hypomobile  []Hypermobile [x]Normal  []Hypomobile  []Hypermobile Normal    Lateral Glide []Normal  [x]Hypomobile  []Hypermobile [x]Normal  []Hypomobile  []Hypermobile Normal    Scapular Thoracic []Normal  [x]Hypomobile  []Hypermobile [x]Normal  []Hypomobile  []Hypermobile Normal           FUNCTION:     Intake Outcome Measure for FOTO Shoulder Survey    Therapist reviewed FOTO scores for Alxy Weir on 8/15/2024.   FOTO documents entered into [employer] - see Media section.    Intake Score: 28%         TREATMENT     Total Treatment time separate from Evaluation: (40) minutes    Alyx received the following interventions:     CPT Intervention  Right RCR  BT  SAD  DCR Duration/ Details  8/21/24   MOD Hot Pack 5-10 minutes   MT Joint mobilizations Glenohumeral mobs, All Planes, Grade II-IV   MT Mobilization with movement Above mobs with flexion/abduction/internal rotation and external rotation    TE Passive Motion Elbow Flexion & Extension   TE Passive prolonged hang Elbow Extension   NMR Fine Motor Dexterity    NMR Wrist Mobility    PLAN         CPT Codes available for Billing:   (25) minutes of Manual therapy (MT) to improve pain and ROM.  (15) minutes of Therapeutic Exercise (TE) to develop strength, endurance, range of motion, and flexibility.  (00) minutes of Neuromuscular Re-Education (NMR)  to improve: Balance, Coordination, Kinesthetic, Sense, Proprioception, and Posture.  (00) minutes of Therapeutic Activities (TA) to improve functional performance.  Unattended Electrical Stimulation (ES) for muscle performance or pain  modulation.  Vasopneumatic Device Therapy () for management of swelling/edema. (77881)    See EMR under MEDIA for written consent provided for Dry Needling- DATE   Palpation Assessment to determine the necessity for Functional Dry Needling    PATIENT EDUCATION AND HOME EXERCISES     Education/Self-Care provided:  (included in treatment)   Patient educated on the impairments noted above and the effects of physical therapy intervention to improve overall condition and Quality of Life  Patient was educated on all the above exercise prior/during/after for proper posture, positioning, and execution for safe performance with home exercise program.   Self Care:   Edema Education: provided to patient and/or family to keep elevated if appropriate for swelling control.   Icin minutes per hour, 30 min on 30 min off, outside of exercises.   Heigene: Released to shower post-op day #3, no bath/emersion in water  Brace or Sling:  Full time for the first week, will be removed as Physical Therapy guides your through your recovery.   Compression sleeve /stockinet : worn during the day, can be removed at night  Dressings changes: daily if saturated, otherwise every 3 daily changes encouraged.     Written Home Exercises Provided: yes. Prefers: [x] Printed [x] Electronic  Exercises were reviewed and Alyx was able to demonstrate them prior to the end of the session.  Alyx demonstrated good understanding of the education provided. See EMR under Patient Instructions for exercises provided during therapy sessions.      ASSESSMENT     Alyx is a 74 y.o. female referred to outpatient physical therapy with a medical diagnosis of   Encounter Diagnoses   Name Primary?    Traumatic incomplete tear of right rotator cuff, subsequent encounter     Subacromial impingement of right shoulder     Decreased range of motion of right shoulder     Decreased strength of upper extremity    who presents status post Right RCR  BT  SAD  DCR   Joseph on 8/5/24.   Alyx is currently using an abductor shoulder sling as an supportive device, demonstrating the appropriate fit and donning for proper healing and care post operatively. Signs and symptoms are consistent with this stage of recovery, with physical exam findings that support decreased range of motion, decreased extremity strength, joint hypomobility, increased joint and soft tissue edema, and impaired functional mobility. The above impairments will be addressed through manual therapy techniques, therapeutic exercises, functional training, and modalities as necessary. Patient was treated and educated on exercises for home program, progression of therapy, and benefits of therapy to achieve full functional mobility.     Pt prognosis is Good.   Pt will benefit from skilled outpatient Physical Therapy to address the deficits stated above and in the chart below, provide pt/family education, and to maximize pt's level of independence.     Plan of care discussed with patient: Yes  Pt's spiritual, cultural and educational needs considered and patient is agreeable to the plan of care and goals as stated below:     Anticipated Barriers for therapy: co-morbidities      History  Co-morbidities and personal factors that may impact the plan of care [] LOW: no personal factors / co-morbidities  [] MODERATE: 1-2 personal factors / co-morbidities  [x] HIGH: 3+ personal factors / co-morbidities    Moderate / High Support Documentation:   Co-morbidities affecting plan of care:   Past Medical History:   Diagnosis Date    Anticoagulant long-term use     Anticoagulated on Coumadin     Arm weakness-rotator cuff weakness 11/02/2015    Arthritis     Asthma     Clotting disorder     Coronary artery disease     Deep vein thrombosis     Degenerative disc disease     Diabetes mellitus type I 2011    BS last tested x 1 month not sure what it was.    Heterozygous factor V Leiden mutation     Hx of colonic polyps 11/13/2015     Hyperlipidemia     Hypertension     VIRGIL (obstructive sleep apnea)     Stenosis of right carotid artery 12/13/2016       Personal Factors:   coping style  social background  lifestyle     Examination  Body Structures and Functions, activity limitations and participation restrictions that may impact the plan of care [] LOW: addressing 1-2 elements  [] MODERATE: 3+ elements  [x] HIGH: 4+ elements (please support below)    Moderate / High Support Documentation:   [] Head / Neck  [] Spine  [x] Upper Quarter  [] Lower Quarter  [x] Range of motion Deficits  [x] Gross Symmetry Deficits  [x] Strength Deficits  [x] Balance Deficits  [] Gait Deficits  [x] Unable to participate in daily activities  [x] Unable to perform functional tasks  [x] Unable to Care for Self or others  [] Community/ Social Life changes due to impairments     Clinical Presentation [] LOW: stable  [x] MODERATE: Evolving  [] HIGH: Unstable     Decision Making/ Complexity Score: low         SHORT TERM GOALS:  4 weeks (9/15/24) Progress Date met   Recent signs and systems trend is improving in order to progress towards Long term goals.  [] Met  [] Not Met  [] Progressing    Patient will be independent with Home Exercise Program  in order to further progress and return to maximal function. [] Met  [] Not Met  [] Progressing    Pain rating at Worst: 5 /10 in order to progress towards increased independence with activity. [] Met  [] Not Met  [] Progressing    Patient will be able to correct postural deviations in sitting and standing, to decrease pain and promote postural awareness for injury prevention.  [] Met  [] Not Met  [] Progressing    Patient will improve functional outcome (FOTO) score: by 5% to increase self-worth & perceived functional ability towards long term goals [] Met  [] Not Met  [] Progressing      LONG TERM GOALS: Discharge (~12 Weeks) Progress Date met   Patient will return to normal activites of daily living, recreational, and work related  activities with less pain and limitation.  [] Met  [] Not Met  [] Progressing    Patient will improve range of motion  to stated goals in order to return to maximal functional potential.  [] Met  [] Not Met  [] Progressing    Patient will improve Strength to stated goals of appropriate musculature in order to improve functional independence.  [] Met  [] Not Met  [] Progressing    Pain Rating at Best: 1/10 to improve Quality of Life.  [] Met  [] Not Met  [] Progressing    Patient will meet predicted functional outcome (FOTO) score: 80% to increase self-worth & perceived functional ability. [] Met  [] Not Met  [] Progressing    Patient will have met/partially met personal goal of: to get back to daily activities, community based activities, and social activities.  [] Met  [] Not Met  [] Progressing          PLAN   Plan of care Certification: 8/15/2024 to 11/15/2024    Outpatient Physical Therapy 2 times weekly for 12 weeks to include any combination of the following interventions: virtual visits, dry needling, modalities, electrical stimulation (IFC, Pre-Mod, Attended with Functional Dry Needling), Manual Therapy, Moist Heat/ Ice, Neuromuscular Re-ed, Patient Education, Self Care, Therapeutic Exercise, Functional Training, and Therapeutic Activites     Thank you for this referral.    Bryanna Peña, PT

## 2024-08-15 NOTE — PROGRESS NOTES
Orthopaedics  Post-operative follow-up    Procedure Performed:   1. Right shoulder arthroscopic rotator cuff repair  2. Right shoulder arthroscopic biceps tenodesis  3. Right shoulder subacromial decompression    Date of Surgery: 8/5/24    Subjective: Alyx Weir is now almost 2 weeks out from her shoulder surgery. She is doing well with no specific complaints other than the expected post-operative pain and stiffness. She has been compliant with post-operative restrictions. She has started Therapy at [facility] with Bryanna.       Exam:  Sutures removed, incision sites benign with no drainage or redness  Mild bruising as anticipated  ROM fluid, will be formally assessed at next visit  Axillary nerve sensation and motor intact  Motor and sensory intact distally  Strong radial pulse, fingers warm and well perfused    Imaging:  No new imaging.     Impression:  S/p right shoulder arthroscopic rotator cuff repair, biceps tenodesis, and subacromial decompression, initial post-operative visit - doing well    Plan:  Discussed surgical findings, operative procedure, reviewed intraoperative imaging  Reviewed post-operative instructions, restrictions, and rehabilitation  Provided PT script and protocol  Refilled pain medication, she has had adverse reaction to tramadol (hallucinations), encouraged to discontinue use of tramadol. She denies any adverse reactions to the oxycodone thus far. We will refill oxycodone today, if another refill is warranted we will transition and Norco.  Refilled Atarax for itching while on the oxycodone  Symptomatic treatment for pain / swelling  Instructed patient to call clinic if questions or concerns      Follow-up in 1 month with Dr. Ruiz at 6 weeks post-op    Work status:  For weeks 0-4 from now:  1) Sling immobilization at all times, one armed work (other than typing)  2) Allow time for physical therapy    For weeks 4-8 from now:  1) Discontinue sling  2) Continue physical therapy  3) No  lifting/pushing/pulling greater than 2 pounds  4) No overhead work  5) No repetitive motions        Izabella Ng PA-C  Sports Medicine Physician Assistant       Disclaimer: This note was prepared using a voice recognition system and is likely to have sound alike errors within the text.

## 2024-08-19 ENCOUNTER — PATIENT OUTREACH (OUTPATIENT)
Dept: ADMINISTRATIVE | Facility: HOSPITAL | Age: 74
End: 2024-08-19
Payer: MEDICARE

## 2024-08-19 DIAGNOSIS — E11.9 TYPE 2 DIABETES MELLITUS WITHOUT COMPLICATION, WITHOUT LONG-TERM CURRENT USE OF INSULIN: Primary | ICD-10-CM

## 2024-08-19 NOTE — PROGRESS NOTES
[facility] REPORT: per chart review eGFR completed, I ordered and linked microalbumin home collect to lab appt 8.22.24.

## 2024-08-20 ENCOUNTER — ANTI-COAG VISIT (OUTPATIENT)
Dept: CARDIOLOGY | Facility: CLINIC | Age: 74
End: 2024-08-20
Payer: MEDICARE

## 2024-08-20 ENCOUNTER — LAB VISIT (OUTPATIENT)
Dept: LAB | Facility: HOSPITAL | Age: 74
End: 2024-08-20
Attending: INTERNAL MEDICINE
Payer: MEDICARE

## 2024-08-20 ENCOUNTER — OFFICE VISIT (OUTPATIENT)
Dept: SPORTS MEDICINE | Facility: CLINIC | Age: 74
End: 2024-08-20
Payer: COMMERCIAL

## 2024-08-20 VITALS — BODY MASS INDEX: 30.35 KG/M2 | HEIGHT: 63 IN | RESPIRATION RATE: 17 BRPM | WEIGHT: 171.31 LBS

## 2024-08-20 DIAGNOSIS — Z79.01 ANTICOAGULATED ON COUMADIN: Chronic | ICD-10-CM

## 2024-08-20 DIAGNOSIS — E11.9 TYPE 2 DIABETES MELLITUS WITHOUT COMPLICATION, WITHOUT LONG-TERM CURRENT USE OF INSULIN: ICD-10-CM

## 2024-08-20 DIAGNOSIS — D68.51 HETEROZYGOUS FACTOR V LEIDEN MUTATION: Chronic | ICD-10-CM

## 2024-08-20 DIAGNOSIS — Z79.01 ANTICOAGULATED ON COUMADIN: Primary | Chronic | ICD-10-CM

## 2024-08-20 DIAGNOSIS — Z98.890 STATUS POST ROTATOR CUFF REPAIR: Primary | ICD-10-CM

## 2024-08-20 LAB
INR PPP: 2.6 (ref 0.8–1.2)
PROTHROMBIN TIME: 26.1 SEC (ref 9–12.5)

## 2024-08-20 PROCEDURE — 99024 POSTOP FOLLOW-UP VISIT: CPT | Mod: S$GLB,,, | Performed by: PHYSICIAN ASSISTANT

## 2024-08-20 PROCEDURE — 36415 COLL VENOUS BLD VENIPUNCTURE: CPT | Mod: HCNC | Performed by: INTERNAL MEDICINE

## 2024-08-20 PROCEDURE — 93793 ANTICOAG MGMT PT WARFARIN: CPT | Mod: S$GLB,,,

## 2024-08-20 PROCEDURE — 85610 PROTHROMBIN TIME: CPT | Mod: HCNC | Performed by: INTERNAL MEDICINE

## 2024-08-20 PROCEDURE — 82570 ASSAY OF URINE CREATININE: CPT | Mod: HCNC | Performed by: INTERNAL MEDICINE

## 2024-08-20 PROCEDURE — 99999 PR PBB SHADOW E&M-EST. PATIENT-LVL IV: CPT | Mod: PBBFAC,,, | Performed by: PHYSICIAN ASSISTANT

## 2024-08-20 RX ORDER — HYDROXYZINE HYDROCHLORIDE 25 MG/1
25 TABLET, FILM COATED ORAL 3 TIMES DAILY PRN
Qty: 21 TABLET | Refills: 0 | Status: SHIPPED | OUTPATIENT
Start: 2024-08-20

## 2024-08-20 RX ORDER — OXYCODONE HYDROCHLORIDE 5 MG/1
5 TABLET ORAL EVERY 6 HOURS PRN
Qty: 24 TABLET | Refills: 0 | Status: SHIPPED | OUTPATIENT
Start: 2024-08-20

## 2024-08-20 NOTE — PROGRESS NOTES
Patient contacted:  INR is in therapeutic goal range at 2.6 - lab collected.  Patient states current warfarin dose and followed.  Lovenox was stopped as directed.  Instructions given to continue 7.5 mg every Tues, Thurs, Sat; and 5 mg all other days.  INR recheck on 9/17/2024 ([facility]).  Patient voices understanding.

## 2024-08-20 NOTE — LETTER
August 20, 2024      [facility]  [id number]  [place] [address]  Phone: [phone]  Fax: [phone]       Patient: Alyx Weir   YOB: 1950  Date of Visit: 08/20/2024    To Whom It May Concern:    Eduar Weir  was at [facility] on 08/20/2024. The patient is out of work at this time due to her recent shoulder surgery. She will be active in therapy 2-3 days a week for the time being. Please see standard restrictions below:    For weeks 0-4 from now:  1) Sling immobilization at all times, one armed work (other than typing)  2) Allow time for physical therapy  3) No lifting/pushing/pulling greater than 2 pounds  4) No overhead work  5) No repetitive motions    If you have any questions or concerns, or if I can be of further assistance, please do not hesitate to contact me.    Sincerely,      Izabella Ng PA-C

## 2024-08-21 ENCOUNTER — CLINICAL SUPPORT (OUTPATIENT)
Dept: REHABILITATION | Facility: HOSPITAL | Age: 74
End: 2024-08-21
Payer: COMMERCIAL

## 2024-08-21 DIAGNOSIS — M25.611 DECREASED RANGE OF MOTION OF RIGHT SHOULDER: ICD-10-CM

## 2024-08-21 DIAGNOSIS — R29.898 DECREASED STRENGTH OF UPPER EXTREMITY: Primary | ICD-10-CM

## 2024-08-21 LAB
ALBUMIN/CREAT UR: 88.9 UG/MG (ref 0–30)
CREAT UR-MCNC: 45 MG/DL (ref 15–325)
MICROALBUMIN UR DL<=1MG/L-MCNC: 40 UG/ML

## 2024-08-21 PROCEDURE — 97110 THERAPEUTIC EXERCISES: CPT

## 2024-08-21 PROCEDURE — 97140 MANUAL THERAPY 1/> REGIONS: CPT

## 2024-08-21 PROCEDURE — 97112 NEUROMUSCULAR REEDUCATION: CPT

## 2024-08-21 NOTE — PROGRESS NOTES
[facility]   Physical Therapy Treatment Note        Name: Alyx Weir  [facility] Number: [id number]    Therapy Diagnosis:   Encounter Diagnoses   Name Primary?    Decreased strength of upper extremity Yes    Decreased range of motion of right shoulder      Physician: Carl Ruiz    Visit Date: 8/21/2024    Physician Orders: PT Eval and Treat  Medical Diagnosis from Referral: Traumatic incomplete tear of right rotator cuff, subsequent encounter [S46.011D], Subacromial impingement of right shoulder [M75.41]   Evaluation Date: 8/15/2024  Authorization Period Expiration: 6/7/2025  Plan of Care Expiration: 11/15/2024                          Progress Update: 9/15/2024   Visit # / Visits authorized: 2 / 12 - Workman's Comp authorized (12)       FOTO: 8/15/2024 - Scored: 1 / 3       PRECAUTIONS: Standard Precautions, Safety/fall precautions, Orthopedic: Right Cuff Repair, Non-weight beariing, and Orthotic device type: abductor sling, Wearning schedule: full until 6 week follow up      PROBLEM LIST : pain, decreased motion all planes, decreased strength      Date of Surgery/Procedure 8/5/2024- Joseph   Surgery/Procedure Performed Status post Right Rotator Cuff Repair, Subacromial Decompression, Distal Clavicle Dissection, Biceps Tenodesis   Rehabilitation Precautions Protocol: Joseph Cuff Protocol      MD Follow up: 8/20/2024 9/17/2024 11/05/2024     Patient School:     Job/Position: Works at an alteration story    \[facility] [place] [facility]\"" Visit #: 0/5     Time In: 1105  Time Out: 1200  Total Billable Time: 55 minutes    SUBJECTIVE     Pt reports:  Was pretty sore following last session  Compliance with Hep: Daily  Response to previous treatment: no adverse reactions to treatment/updated HEP  Functional change: Better    Pain: 5/10   Worst: 8/10  Location: Right shoulder      OBJECTIVE     Objective Measures updated at progress report unless specified otherwise.      Treatment     Gym/Equipment  Access: none  Time to Complete Exercises: increased for cueing and education    Alyx received the treatments listed below:      CPT Intervention  Right RCR  BT  SAD  DCR Duration/ Details  8/21   MOD Hot Pack 5-10 minutes   MT Joint mobilizations Glenohumeral mobs, All Planes, Grade II-IV   MT Mobilization with movement Above mobs with flexion/abduction/internal rotation and external rotation    TE Passive Motion Elbow Flexion & Extension   TE Passive prolonged hang Elbow Extension   NMR Fine Motor Dexterity Mobility Balls 4'   NMR Wrist Mobility Wrist Maze- 4'  Finger Gripper- yellow- 4'           PLAN         CPT Codes available for Billing:   (25) minutes of Manual therapy (MT) to improve pain and ROM.  (15) minutes of Therapeutic Exercise (TE) to develop strength, endurance, range of motion, and flexibility.  (15) minutes of Neuromuscular Re-Education (NMR)  to improve: Balance, Coordination, Kinesthetic, Sense, Proprioception, and Posture.  (0) minutes of Therapeutic Activities (TA) to improve functional performance.  (0) minutes of Gait Training (GT) to improve functional gait mechanics.  (0) minutes of Self- Care and Education  Unattended Electrical Stimulation (ES) for muscle performance or pain modulation.  Vasopneumatic Device Therapy () for management of swelling/edema. (88040)  BFR: Blood flow restriction applied during exercise  NP or (-): Not Performed    See EMR under MEDIA for written consent provided for Dry Needling- DATE   Palpation Assessment to determine the necessity for Functional Dry Needling     PATIENT EDUCATION AND HOME EXERCISES      Education/Self-Care provided:  (included in treatment unless specified above) minutes   Patient educated on the impairments noted above and the effects of physical therapy intervention to improve overall condition and Quality of Life  Patient was educated on all the above exercise prior/during/after for proper posture, positioning, and execution for  safe performance with home exercise program.      Written Home Exercises Provided: yes. Prefers: [x] Printed [x] Electronic  Exercises were reviewed and Rylee was able to demonstrate them prior to the end of the session.  Rylee demonstrated good understanding of the education provided. See EMR under Patient Instructions for exercises provided during therapy sessions.    ASSESSMENT     Alyx Weir tolerated Physical Therapy  session well with minimal  complaints of pain or discomfort.  Objective findings are improving with pain, range of motion, strength, endurance, exercise tolerance, and functional mobility.  Alyx Weir continues to have difficulty with relaxing during manual therapy interventions.  Alyx demonstrated good understanding of new exercises and will continue to progress at home until next follow-up.     Alyx Is progressing well towards her goals.   Pt prognosis is Good.     Pt will continue to benefit from skilled outpatient physical therapy to address the deficits listed in the problem list box on initial evaluation, provide pt/family education and to maximize pt's level of independence in the home and community environment.     Pt's spiritual, cultural and educational needs considered and pt agreeable to plan of care and goals.    Anticipated Barriers for therapy: co-morbidities       SHORT TERM GOALS:  4 weeks (9/15/24) Progress Date met   Recent signs and systems trend is improving in order to progress towards Long term goals.  [] Met  [] Not Met  [x] Progressing     Patient will be independent with Home Exercise Program  in order to further progress and return to maximal function. [] Met  [] Not Met  [x] Progressing     Pain rating at Worst: 5 /10 in order to progress towards increased independence with activity. [] Met  [] Not Met  [x] Progressing     Patient will be able to correct postural deviations in sitting and standing, to decrease pain and promote postural awareness for injury  prevention.  [] Met  [] Not Met  [x] Progressing     Patient will improve functional outcome (FOTO) score: by 5% to increase self-worth & perceived functional ability towards long term goals [] Met  [] Not Met  [x] Progressing        LONG TERM GOALS: Discharge (~12 Weeks) Progress Date met   Patient will return to normal activites of daily living, recreational, and work related activities with less pain and limitation.  [] Met  [] Not Met  [x] Progressing     Patient will improve range of motion  to stated goals in order to return to maximal functional potential.  [] Met  [] Not Met  [x] Progressing     Patient will improve Strength to stated goals of appropriate musculature in order to improve functional independence.  [] Met  [] Not Met  [x] Progressing     Pain Rating at Best: 1/10 to improve Quality of Life.  [] Met  [] Not Met  [x] Progressing     Patient will meet predicted functional outcome (FOTO) score: 80% to increase self-worth & perceived functional ability. [] Met  [] Not Met  [x] Progressing     Patient will have met/partially met personal goal of: to get back to daily activities, community based activities, and social activities.  [] Met  [] Not Met  [x] Progressing           PLAN   Plan of care Certification: 8/15/2024 to 11/15/2024    Continue Plan of Care (POC) and progress per patient tolerance. See Treatment section for exercise progression.    Bryanna Peña, PT    Disclaimer: This note was prepared using a voice recognition system and is likely to have sound alike errors within the text.

## 2024-08-26 ENCOUNTER — CLINICAL SUPPORT (OUTPATIENT)
Dept: REHABILITATION | Facility: HOSPITAL | Age: 74
End: 2024-08-26
Payer: COMMERCIAL

## 2024-08-26 DIAGNOSIS — R29.898 DECREASED STRENGTH OF UPPER EXTREMITY: Primary | ICD-10-CM

## 2024-08-26 DIAGNOSIS — M25.611 DECREASED RANGE OF MOTION OF RIGHT SHOULDER: ICD-10-CM

## 2024-08-26 PROCEDURE — 97112 NEUROMUSCULAR REEDUCATION: CPT

## 2024-08-26 PROCEDURE — 97110 THERAPEUTIC EXERCISES: CPT

## 2024-08-26 PROCEDURE — 97140 MANUAL THERAPY 1/> REGIONS: CPT

## 2024-08-26 NOTE — PROGRESS NOTES
[facility]   Physical Therapy Treatment Note        Name: Alyx Weir  [facility] Number: [id number]    Therapy Diagnosis:   Encounter Diagnoses   Name Primary?    Decreased strength of upper extremity Yes    Decreased range of motion of right shoulder      Physician: Carl Ruiz    Visit Date: 8/26/2024    Physician Orders: PT Eval and Treat  Medical Diagnosis from Referral: Traumatic incomplete tear of right rotator cuff, subsequent encounter [S46.011D], Subacromial impingement of right shoulder [M75.41]   Evaluation Date: 8/15/2024  Authorization Period Expiration: 6/7/2025  Plan of Care Expiration: 11/15/2024                          Progress Update: 9/15/2024   Visit # / Visits authorized: 3 / 12 - Workman's Comp authorized (12)       FOTO: 8/15/2024 - Scored: 1 / 3       PRECAUTIONS: Standard Precautions, Safety/fall precautions, Orthopedic: Right Cuff Repair, Non-weight beariing, and Orthotic device type: abductor sling, Wearning schedule: full until 6 week follow up      PROBLEM LIST : pain, decreased motion all planes, decreased strength      Date of Surgery/Procedure 8/5/2024- Joseph   Surgery/Procedure Performed Status post Right Rotator Cuff Repair, Subacromial Decompression, Distal Clavicle Dissection, Biceps Tenodesis   Rehabilitation Precautions Protocol: Joseph Cuff Protocol      MD Follow up: 8/20/2024 9/17/2024 11/05/2024     Patient School:     Job/Position: Works at an alteration story    [facility] Visit #: 0/5     Time In: 1300  Time Out: 1400  Total Billable Time: 55 minutes    SUBJECTIVE     Pt reports:  she is very sore today and still has not been able to get relief.   Compliance with Hep: Daily  Response to previous treatment: no adverse reactions to treatment/updated HEP  Functional change: Better    Pain: 5/10   Worst: 8/10  Location: Right shoulder      OBJECTIVE     Objective Measures updated at progress report unless specified  otherwise.      Treatment     Gym/Equipment Access: none  Time to Complete Exercises: increased for cueing and education    Alyx received the treatments listed below:      CPT Intervention  Right RCR  BT  SAD  DCR Duration/ Details  8/26   MOD Hot Pack 5-10 minutes   MT Joint mobilizations Glenohumeral mobs, All Planes, Grade II-IV   MT Mobilization with movement Above mobs with flexion/abduction/internal rotation and external rotation    TE Passive Motion Elbow Flexion & Extension   TE Passive prolonged hang Elbow Extension   NMR Fine Motor Dexterity Mobility Balls 4'   NMR Wrist Mobility Wrist Maze- 4'  Finger Gripper- yellow- 4'           PLAN         CPT Codes available for Billing:   (25) minutes of Manual therapy (MT) to improve pain and ROM.  (15) minutes of Therapeutic Exercise (TE) to develop strength, endurance, range of motion, and flexibility.  (15) minutes of Neuromuscular Re-Education (NMR)  to improve: Balance, Coordination, Kinesthetic, Sense, Proprioception, and Posture.  (0) minutes of Therapeutic Activities (TA) to improve functional performance.  (0) minutes of Gait Training (GT) to improve functional gait mechanics.  (0) minutes of Self- Care and Education  Unattended Electrical Stimulation (ES) for muscle performance or pain modulation.  Vasopneumatic Device Therapy () for management of swelling/edema. (56748)  BFR: Blood flow restriction applied during exercise  NP or (-): Not Performed    See EMR under MEDIA for written consent provided for Dry Needling- DATE   Palpation Assessment to determine the necessity for Functional Dry Needling     PATIENT EDUCATION AND HOME EXERCISES      Education/Self-Care provided:  (included in treatment unless specified above) minutes   Patient educated on the impairments noted above and the effects of physical therapy intervention to improve overall condition and Quality of Life  Patient was educated on all the above exercise prior/during/after for proper  posture, positioning, and execution for safe performance with home exercise program.      Written Home Exercises Provided: yes. Prefers: [x] Printed [x] Electronic  Exercises were reviewed and Rylee was able to demonstrate them prior to the end of the session.  Rylee demonstrated good understanding of the education provided. See EMR under Patient Instructions for exercises provided during therapy sessions.    ASSESSMENT     Alyx Weir tolerated Physical Therapy  session well with minimal  complaints of pain or discomfort.  Objective findings are improving with pain, range of motion, strength, endurance, exercise tolerance, and functional mobility.  Alyx Weir continues to have difficulty with relaxing during manual therapy interventions but she has improved since her initial session  Alyx demonstrated good understanding of new exercises and will continue to progress at home until next follow-up.     Alyx Is progressing well towards her goals.   Pt prognosis is Good.     Pt will continue to benefit from skilled outpatient physical therapy to address the deficits listed in the problem list box on initial evaluation, provide pt/family education and to maximize pt's level of independence in the home and community environment.     Pt's spiritual, cultural and educational needs considered and pt agreeable to plan of care and goals.    Anticipated Barriers for therapy: co-morbidities       SHORT TERM GOALS:  4 weeks (9/15/24) Progress Date met   Recent signs and systems trend is improving in order to progress towards Long term goals.  [] Met  [] Not Met  [x] Progressing     Patient will be independent with Home Exercise Program  in order to further progress and return to maximal function. [] Met  [] Not Met  [x] Progressing     Pain rating at Worst: 5 /10 in order to progress towards increased independence with activity. [] Met  [] Not Met  [x] Progressing     Patient will be able to correct postural deviations in  sitting and standing, to decrease pain and promote postural awareness for injury prevention.  [] Met  [] Not Met  [x] Progressing     Patient will improve functional outcome (FOTO) score: by 5% to increase self-worth & perceived functional ability towards long term goals [] Met  [] Not Met  [x] Progressing        LONG TERM GOALS: Discharge (~12 Weeks) Progress Date met   Patient will return to normal activites of daily living, recreational, and work related activities with less pain and limitation.  [] Met  [] Not Met  [x] Progressing     Patient will improve range of motion  to stated goals in order to return to maximal functional potential.  [] Met  [] Not Met  [x] Progressing     Patient will improve Strength to stated goals of appropriate musculature in order to improve functional independence.  [] Met  [] Not Met  [x] Progressing     Pain Rating at Best: 1/10 to improve Quality of Life.  [] Met  [] Not Met  [x] Progressing     Patient will meet predicted functional outcome (FOTO) score: 80% to increase self-worth & perceived functional ability. [] Met  [] Not Met  [x] Progressing     Patient will have met/partially met personal goal of: to get back to daily activities, community based activities, and social activities.  [] Met  [] Not Met  [x] Progressing           PLAN   Plan of care Certification: 8/15/2024 to 11/15/2024    Continue Plan of Care (POC) and progress per patient tolerance. See Treatment section for exercise progression.    Bryanna Peña, PT    Disclaimer: This note was prepared using a voice recognition system and is likely to have sound alike errors within the text.

## 2024-08-27 ENCOUNTER — PATIENT OUTREACH (OUTPATIENT)
Dept: ADMINISTRATIVE | Facility: HOSPITAL | Age: 74
End: 2024-08-27
Payer: MEDICARE

## 2024-09-04 ENCOUNTER — CLINICAL SUPPORT (OUTPATIENT)
Dept: REHABILITATION | Facility: HOSPITAL | Age: 74
End: 2024-09-04
Payer: COMMERCIAL

## 2024-09-04 DIAGNOSIS — R29.898 DECREASED STRENGTH OF UPPER EXTREMITY: Primary | ICD-10-CM

## 2024-09-04 DIAGNOSIS — M25.611 DECREASED RANGE OF MOTION OF RIGHT SHOULDER: ICD-10-CM

## 2024-09-04 PROCEDURE — 97110 THERAPEUTIC EXERCISES: CPT

## 2024-09-04 PROCEDURE — 97140 MANUAL THERAPY 1/> REGIONS: CPT

## 2024-09-04 PROCEDURE — 97112 NEUROMUSCULAR REEDUCATION: CPT

## 2024-09-04 NOTE — PROGRESS NOTES
[facility]   Physical Therapy Treatment Note        Name: Alyx Weir  [facility] Number: [id number]    Therapy Diagnosis:   Encounter Diagnoses   Name Primary?    Decreased strength of upper extremity Yes    Decreased range of motion of right shoulder      Physician: Carl Ruiz    Visit Date: 9/4/2024    Physician Orders: PT Eval and Treat  Medical Diagnosis from Referral: Traumatic incomplete tear of right rotator cuff, subsequent encounter [S46.011D], Subacromial impingement of right shoulder [M75.41]   Evaluation Date: 8/15/2024  Authorization Period Expiration: 6/7/2025  Plan of Care Expiration: 11/15/2024                          Progress Update: 9/15/2024   Visit # / Visits authorized: 3 / 12 - Workman's Comp authorized (12)       FOTO: 8/15/2024 - Scored: 1 / 3       PRECAUTIONS: Standard Precautions, Safety/fall precautions, Orthopedic: Right Cuff Repair, Non-weight beariing, and Orthotic device type: abductor sling, Wearning schedule: full until 6 week follow up      PROBLEM LIST : pain, decreased motion all planes, decreased strength      Date of Surgery/Procedure 8/5/2024- Joseph   Surgery/Procedure Performed Status post Right Rotator Cuff Repair, Subacromial Decompression, Distal Clavicle Dissection, Biceps Tenodesis   Rehabilitation Precautions Protocol: Joseph Cuff Protocol      MD Follow up: 8/20/2024 9/17/2024 11/05/2024     Patient School:     Job/Position: Works at an alteration story    [facility] Visit #: 0/5     Time In: 1105  Time Out: 1205  Total Billable Time: 55 minutes    SUBJECTIVE     Pt reports:  She was in a lot of pain yesterday, but she feels much better today.  Compliance with Hep: Daily  Response to previous treatment: no adverse reactions to treatment/updated HEP  Functional change: Better    Pain: 5/10   Worst: 8/10  Location: Right shoulder      OBJECTIVE     Objective Measures updated at progress report unless specified  "otherwise.      Treatment     Gym/Equipment Access: none  Time to Complete Exercises: increased for cueing and education    Alyx received the treatments listed below:      CPT Intervention  Right RCR  BT  SAD  DCR Duration/ Details  9/4   MOD Hot Pack 5  minutes   MT Joint mobilizations Glenohumeral mobs, All Planes, Grade II-IV   MT Mobilization with movement Above mobs with flexion/abduction/internal rotation and external rotation    TE Passive Motion Elbow Flexion & Extension   TE Passive prolonged hang Elbow Extension   NMR Fine Motor Dexterity Mobility Balls 4'   NMR Wrist Mobility Wrist Maze- 4'  Finger Gripper- yellow- 4'   NMR Scapular Retractions 10" x 4'   PLAN         CPT Codes available for Billing:   (25) minutes of Manual therapy (MT) to improve pain and ROM.  (15) minutes of Therapeutic Exercise (TE) to develop strength, endurance, range of motion, and flexibility.  (15) minutes of Neuromuscular Re-Education (NMR)  to improve: Balance, Coordination, Kinesthetic, Sense, Proprioception, and Posture.  (0) minutes of Therapeutic Activities (TA) to improve functional performance.  (0) minutes of Gait Training (GT) to improve functional gait mechanics.  (0) minutes of Self- Care and Education  Unattended Electrical Stimulation (ES) for muscle performance or pain modulation.  Vasopneumatic Device Therapy () for management of swelling/edema. (56715)  BFR: Blood flow restriction applied during exercise  NP or (-): Not Performed    See EMR under MEDIA for written consent provided for Dry Needling- DATE   Palpation Assessment to determine the necessity for Functional Dry Needling     PATIENT EDUCATION AND HOME EXERCISES      Education/Self-Care provided:  (included in treatment unless specified above) minutes   Patient educated on the impairments noted above and the effects of physical therapy intervention to improve overall condition and Quality of Life  Patient was educated on all the above exercise " prior/during/after for proper posture, positioning, and execution for safe performance with home exercise program.      Written Home Exercises Provided: yes. Prefers: [x] Printed [x] Electronic  Exercises were reviewed and Rylee was able to demonstrate them prior to the end of the session.  Rylee demonstrated good understanding of the education provided. See EMR under Patient Instructions for exercises provided during therapy sessions.    ASSESSMENT     Alyx Weir tolerated Physical Therapy  session well with moderate  complaints of pain or discomfort.  Objective findings are improving with pain, range of motion.  Alyx Weir continues to have difficulty with relaxing during passive range of motion.  Therapy exercises were reviewed by revisiting exercises given from previous home exercise program while adding scapular retractions in seating.  Handouts were not issued during today's visit. Alyx demonstrated good understanding of new exercises and will continue to progress at home until next follow-up.      Alyx Is progressing well towards her goals.   Pt prognosis is Good.     Pt will continue to benefit from skilled outpatient physical therapy to address the deficits listed in the problem list box on initial evaluation, provide pt/family education and to maximize pt's level of independence in the home and community environment.     Pt's spiritual, cultural and educational needs considered and pt agreeable to plan of care and goals.    Anticipated Barriers for therapy: co-morbidities       SHORT TERM GOALS:  4 weeks (9/15/24) Progress Date met   Recent signs and systems trend is improving in order to progress towards Long term goals.  [] Met  [] Not Met  [x] Progressing     Patient will be independent with Home Exercise Program  in order to further progress and return to maximal function. [] Met  [] Not Met  [x] Progressing     Pain rating at Worst: 5 /10 in order to progress towards increased independence  with activity. [] Met  [] Not Met  [x] Progressing     Patient will be able to correct postural deviations in sitting and standing, to decrease pain and promote postural awareness for injury prevention.  [] Met  [] Not Met  [x] Progressing     Patient will improve functional outcome (FOTO) score: by 5% to increase self-worth & perceived functional ability towards long term goals [] Met  [] Not Met  [x] Progressing        LONG TERM GOALS: Discharge (~12 Weeks) Progress Date met   Patient will return to normal activites of daily living, recreational, and work related activities with less pain and limitation.  [] Met  [] Not Met  [x] Progressing     Patient will improve range of motion  to stated goals in order to return to maximal functional potential.  [] Met  [] Not Met  [x] Progressing     Patient will improve Strength to stated goals of appropriate musculature in order to improve functional independence.  [] Met  [] Not Met  [x] Progressing     Pain Rating at Best: 1/10 to improve Quality of Life.  [] Met  [] Not Met  [x] Progressing     Patient will meet predicted functional outcome (FOTO) score: 80% to increase self-worth & perceived functional ability. [] Met  [] Not Met  [x] Progressing     Patient will have met/partially met personal goal of: to get back to daily activities, community based activities, and social activities.  [] Met  [] Not Met  [x] Progressing           PLAN   Plan of care Certification: 8/15/2024 to 11/15/2024    Continue Plan of Care (POC) and progress per patient tolerance. See Treatment section for exercise progression.    Bryanna Peña, PT    Disclaimer: This note was prepared using a voice recognition system and is likely to have sound alike errors within the text.

## 2024-09-09 ENCOUNTER — CLINICAL SUPPORT (OUTPATIENT)
Dept: REHABILITATION | Facility: HOSPITAL | Age: 74
End: 2024-09-09
Payer: COMMERCIAL

## 2024-09-09 DIAGNOSIS — M25.611 DECREASED RANGE OF MOTION OF RIGHT SHOULDER: ICD-10-CM

## 2024-09-09 DIAGNOSIS — R29.898 DECREASED STRENGTH OF UPPER EXTREMITY: Primary | ICD-10-CM

## 2024-09-09 PROCEDURE — 97112 NEUROMUSCULAR REEDUCATION: CPT

## 2024-09-09 PROCEDURE — 97110 THERAPEUTIC EXERCISES: CPT

## 2024-09-09 PROCEDURE — 97530 THERAPEUTIC ACTIVITIES: CPT

## 2024-09-10 ENCOUNTER — TELEPHONE (OUTPATIENT)
Dept: SPORTS MEDICINE | Facility: CLINIC | Age: 74
End: 2024-09-10
Payer: MEDICARE

## 2024-09-11 NOTE — PROGRESS NOTES
Orthopaedics  Post-operative follow-up    Procedure Performed:  1. Right shoulder arthroscopic rotator cuff repair  2. Right shoulder arthroscopic biceps tenodesis  3. Right shoulder subacromial decompression     Date of Surgery: 8/5/24    Subjective: Alyx Weir is now approximately 6 weeks out from her shoulder surgery.  She has been compliant with post-operative restrictions and has been progressing with PT at [facility].  Her pain and function continue to improve.     Exam:  Incision sites healed, C/D/I  No swelling or bruising noted  Fluid ROM with no crepitus  Supine Active ROM: FF 90, ABD 90, ER 30  Supine Passive ROM: , ABD 90, ER 40  Cuff strength intact on limited testing   Axillary nerve sensation and motor intact  Motor and sensory intact distally  Strong radial pulse, fingers warm and well perfused    Imaging:  No new imaging.     Impression:  S/p right shoulder arthroscopic rotator cuff repair, biceps tenodesis, and subacromial decompression, second post-operative visit - doing well    Plan:  She is making progress with ROM.  Advance PT per protocol  Symptomatic treatment for pain / swelling  May advance activities as reviewed today: Discontinue sling, initiate progression of AAROM and AROM.   Instructed patient to call clinic if questions or concerns    Follow-up in 6 weeks at 3 months post-op (around 11/5/24)    Work status:  For weeks 0-6 from now:  1) Discontinue sling  2) Continue physical therapy  3) No lifting/pushing/pulling greater than 2 pounds  4) No overhead work  5) No repetitive motions        Carl Ruiz MD    I, Estiven Velazquez, acted as a scribe for Carl Ruiz MD for the duration of this office visit.

## 2024-09-11 NOTE — PROGRESS NOTES
[facility]   Physical Therapy Treatment Note        Name: Alyx Weir  [facility] Number: [id number]    Therapy Diagnosis:   Encounter Diagnoses   Name Primary?    Decreased strength of upper extremity Yes    Decreased range of motion of right shoulder      Physician: Carl Ruiz    Visit Date: 9/9/2024    Physician Orders: PT Eval and Treat  Medical Diagnosis from Referral: Traumatic incomplete tear of right rotator cuff, subsequent encounter [S46.011D], Subacromial impingement of right shoulder [M75.41]   Evaluation Date: 8/15/2024  Authorization Period Expiration: 6/7/2025  Plan of Care Expiration: 11/15/2024                          Progress Update: 9/15/2024   Visit # / Visits authorized: 5 / 12 - Workman's Comp authorized (12)       FOTO: 8/15/2024 - Scored: 1 / 3       PRECAUTIONS: Standard Precautions, Safety/fall precautions, Orthopedic: Right Cuff Repair, Non-weight beariing, and Orthotic device type: abductor sling, Wearning schedule: full until 6 week follow up      PROBLEM LIST : pain, decreased motion all planes, decreased strength      Date of Surgery/Procedure 8/5/2024- Joseph   Surgery/Procedure Performed Status post Right Rotator Cuff Repair, Subacromial Decompression, Distal Clavicle Dissection, Biceps Tenodesis   Rehabilitation Precautions Protocol: Joseph Cuff Protocol      MD Follow up: 8/20/2024 9/17/2024 11/05/2024     Patient School:     Job/Position: Works at an alteration story    [facility] Visit #: 0/5     Time In: 1105  Time Out: 1200  Total Billable Time: 55 minutes    SUBJECTIVE     Pt reports: Patient reports increase pain through the shoulder, and increase stiffness.  Compliance with Hep: Daily  Response to previous treatment: no adverse reactions to treatment/updated HEP  Functional change: Better    Pain: 5/10   Worst: 8/10  Location: Right shoulder      OBJECTIVE     Objective Measures updated at progress report unless specified  "otherwise.      Treatment     Gym/Equipment Access: none  Time to Complete Exercises: increased for cueing and education    Alyx received the treatments listed below:      CPT Intervention  Right RCR  BT  SAD  DCR Duration/ Details  9/9   MOD Hot Pack 5-10 minutes   MT Joint mobilizations Glenohumeral mobs, All Planes, Grade II-IV   MT Mobilization with movement Above mobs with flexion/abduction/internal rotation and external rotation    TE Passive Motion Elbow Flexion & Extension   TE Passive prolonged hang Elbow Extension   NMR Fine Motor Dexterity Mobility Balls 4'   NMR Wrist Mobility Wrist Maze- 4'  Finger Gripper- yellow- 4'   NMR Scapular Retractions 10" x 4'   PLAN         CPT Codes available for Billing:   (25) minutes of Manual therapy (MT) to improve pain and ROM.  (15) minutes of Therapeutic Exercise (TE) to develop strength, endurance, range of motion, and flexibility.  (15) minutes of Neuromuscular Re-Education (NMR)  to improve: Balance, Coordination, Kinesthetic, Sense, Proprioception, and Posture.  (0) minutes of Therapeutic Activities (TA) to improve functional performance.  (0) minutes of Gait Training (GT) to improve functional gait mechanics.  (0) minutes of Self- Care and Education  Unattended Electrical Stimulation (ES) for muscle performance or pain modulation.  Vasopneumatic Device Therapy () for management of swelling/edema. (81799)  BFR: Blood flow restriction applied during exercise  NP or (-): Not Performed    See EMR under MEDIA for written consent provided for Dry Needling- DATE   Palpation Assessment to determine the necessity for Functional Dry Needling     PATIENT EDUCATION AND HOME EXERCISES      Education/Self-Care provided:  (included in treatment unless specified above) minutes   Patient educated on the impairments noted above and the effects of physical therapy intervention to improve overall condition and Quality of Life  Patient was educated on all the above exercise " prior/during/after for proper posture, positioning, and execution for safe performance with home exercise program.      Written Home Exercises Provided: yes. Prefers: [x] Printed [x] Electronic  Exercises were reviewed and Rylee was able to demonstrate them prior to the end of the session.  Rylee demonstrated good understanding of the education provided. See EMR under Patient Instructions for exercises provided during therapy sessions.    ASSESSMENT     Patient tolerated PT session fairly well with mild increases in pain with manual intervention. She appears to be increasing her stiffness and extern rotation, therefore her frequency of visits during the week will be increased for a few weeks to see if we can maintain range of motion, with her tolerance of discomfort. Patient was educated on this change, confirmed that this would be an appropriate step and that direction. She will continue to benefit from physical therapy to address short term goals    Alyx Is progressing well towards her goals.   Pt prognosis is Good.     Pt will continue to benefit from skilled outpatient physical therapy to address the deficits listed in the problem list box on initial evaluation, provide pt/family education and to maximize pt's level of independence in the home and community environment.     Pt's spiritual, cultural and educational needs considered and pt agreeable to plan of care and goals.    Anticipated Barriers for therapy: co-morbidities       SHORT TERM GOALS:  4 weeks (9/15/24) Progress Date met   Recent signs and systems trend is improving in order to progress towards Long term goals.  [] Met  [] Not Met  [x] Progressing     Patient will be independent with Home Exercise Program  in order to further progress and return to maximal function. [] Met  [] Not Met  [x] Progressing     Pain rating at Worst: 5 /10 in order to progress towards increased independence with activity. [] Met  [] Not Met  [x] Progressing     Patient  will be able to correct postural deviations in sitting and standing, to decrease pain and promote postural awareness for injury prevention.  [] Met  [] Not Met  [x] Progressing     Patient will improve functional outcome (FOTO) score: by 5% to increase self-worth & perceived functional ability towards long term goals [] Met  [] Not Met  [x] Progressing        LONG TERM GOALS: Discharge (~12 Weeks) Progress Date met   Patient will return to normal activites of daily living, recreational, and work related activities with less pain and limitation.  [] Met  [] Not Met  [x] Progressing     Patient will improve range of motion  to stated goals in order to return to maximal functional potential.  [] Met  [] Not Met  [x] Progressing     Patient will improve Strength to stated goals of appropriate musculature in order to improve functional independence.  [] Met  [] Not Met  [x] Progressing     Pain Rating at Best: 1/10 to improve Quality of Life.  [] Met  [] Not Met  [x] Progressing     Patient will meet predicted functional outcome (FOTO) score: 80% to increase self-worth & perceived functional ability. [] Met  [] Not Met  [x] Progressing     Patient will have met/partially met personal goal of: to get back to daily activities, community based activities, and social activities.  [] Met  [] Not Met  [x] Progressing           PLAN   Plan of care Certification: 8/15/2024 to 11/15/2024    Continue Plan of Care (POC) and progress per patient tolerance. See Treatment section for exercise progression.    Bryanna Peña, PT    Disclaimer: This note was prepared using a voice recognition system and is likely to have sound alike errors within the text.

## 2024-09-17 ENCOUNTER — OFFICE VISIT (OUTPATIENT)
Dept: SPORTS MEDICINE | Facility: CLINIC | Age: 74
End: 2024-09-17
Payer: COMMERCIAL

## 2024-09-17 ENCOUNTER — LAB VISIT (OUTPATIENT)
Dept: LAB | Facility: HOSPITAL | Age: 74
End: 2024-09-17
Attending: INTERNAL MEDICINE
Payer: MEDICARE

## 2024-09-17 ENCOUNTER — ANTI-COAG VISIT (OUTPATIENT)
Dept: CARDIOLOGY | Facility: CLINIC | Age: 74
End: 2024-09-17
Payer: MEDICARE

## 2024-09-17 VITALS — HEIGHT: 63 IN | BODY MASS INDEX: 30.35 KG/M2 | WEIGHT: 171.31 LBS | RESPIRATION RATE: 17 BRPM

## 2024-09-17 DIAGNOSIS — Z98.890 STATUS POST ROTATOR CUFF REPAIR: Primary | ICD-10-CM

## 2024-09-17 DIAGNOSIS — Z79.01 ANTICOAGULATED ON COUMADIN: Chronic | ICD-10-CM

## 2024-09-17 DIAGNOSIS — D68.51 HETEROZYGOUS FACTOR V LEIDEN MUTATION: Chronic | ICD-10-CM

## 2024-09-17 DIAGNOSIS — Z79.01 ANTICOAGULATED ON COUMADIN: Primary | Chronic | ICD-10-CM

## 2024-09-17 LAB
INR PPP: 2.8 (ref 0.8–1.2)
PROTHROMBIN TIME: 28.6 SEC (ref 9–12.5)

## 2024-09-17 PROCEDURE — 99024 POSTOP FOLLOW-UP VISIT: CPT | Mod: S$GLB,,, | Performed by: STUDENT IN AN ORGANIZED HEALTH CARE EDUCATION/TRAINING PROGRAM

## 2024-09-17 PROCEDURE — 93793 ANTICOAG MGMT PT WARFARIN: CPT | Mod: S$GLB,,,

## 2024-09-17 PROCEDURE — 85610 PROTHROMBIN TIME: CPT | Mod: HCNC | Performed by: INTERNAL MEDICINE

## 2024-09-17 PROCEDURE — 99999 PR PBB SHADOW E&M-EST. PATIENT-LVL IV: CPT | Mod: PBBFAC,,, | Performed by: STUDENT IN AN ORGANIZED HEALTH CARE EDUCATION/TRAINING PROGRAM

## 2024-09-17 PROCEDURE — 36415 COLL VENOUS BLD VENIPUNCTURE: CPT | Mod: HCNC | Performed by: INTERNAL MEDICINE

## 2024-09-17 NOTE — LETTER
September 17, 2024      [facility]  [address]  [place] [address]  Phone: [phone]  Fax: [phone]       Patient: Alyx Weir   YOB: 1950  Date of Visit: 09/17/2024    To Whom It May Concern:    Eduar Weir  was at [facility] on 09/17/2024. The patient is out of work at this time due to her recent shoulder surgery. She will be active in therapy 2-3 days a week for the time being. Please see standard restrictions below:     For weeks 0-4 from now:  1) Discontinue sling  2) Continue physical therapy  3) No lifting/pushing/pulling greater than 2 pounds  4) No overhead work  5) No repetitive motions    Sincerely,      Izabella Ng PA-C

## 2024-09-17 NOTE — PROGRESS NOTES
Patient contacted:  INR is in therapeutic goal range at 2.8.  Lab collected.  Patient reports no recent changes.  Confirms current warfarin dose - continue 7.5 mg every Tues, Thurs, Sat; and 5 mg all other days.  INR recheck in 1 month - 10/15 ([facility]).  Patient confirms understanding.

## 2024-09-18 ENCOUNTER — CLINICAL SUPPORT (OUTPATIENT)
Dept: REHABILITATION | Facility: HOSPITAL | Age: 74
End: 2024-09-18
Payer: COMMERCIAL

## 2024-09-18 DIAGNOSIS — R29.898 DECREASED STRENGTH OF UPPER EXTREMITY: Primary | ICD-10-CM

## 2024-09-18 DIAGNOSIS — M25.611 DECREASED RANGE OF MOTION OF RIGHT SHOULDER: ICD-10-CM

## 2024-09-18 PROCEDURE — 97110 THERAPEUTIC EXERCISES: CPT

## 2024-09-18 PROCEDURE — 97140 MANUAL THERAPY 1/> REGIONS: CPT

## 2024-09-20 ENCOUNTER — PATIENT OUTREACH (OUTPATIENT)
Dept: ADMINISTRATIVE | Facility: HOSPITAL | Age: 74
End: 2024-09-20
Payer: MEDICARE

## 2024-09-20 NOTE — PROGRESS NOTES
Statin Report: Recently reviewed, Pt has an intolerance but needs the appropriate code dropped, next PCP visit 09/30, reminder set to place sticky for PCP to update Problem list on next visit.     
Admission

## 2024-09-23 ENCOUNTER — CLINICAL SUPPORT (OUTPATIENT)
Dept: REHABILITATION | Facility: HOSPITAL | Age: 74
End: 2024-09-23
Payer: COMMERCIAL

## 2024-09-23 DIAGNOSIS — M25.611 DECREASED RANGE OF MOTION OF RIGHT SHOULDER: ICD-10-CM

## 2024-09-23 DIAGNOSIS — R29.898 DECREASED STRENGTH OF UPPER EXTREMITY: Primary | ICD-10-CM

## 2024-09-23 PROCEDURE — 97110 THERAPEUTIC EXERCISES: CPT

## 2024-09-23 PROCEDURE — 97140 MANUAL THERAPY 1/> REGIONS: CPT

## 2024-09-23 NOTE — PROGRESS NOTES
[facility]   Physical Therapy Treatment Note + Progress note       Name: Alyx Weir  Clinic Number: [id number]    Therapy Diagnosis:   Encounter Diagnoses   Name Primary?    Decreased strength of upper extremity Yes    Decreased range of motion of right shoulder      Physician: Carl Ruiz    Visit Date: 9/23/2024    Physician Orders: PT Eval and Treat  Medical Diagnosis from Referral: Traumatic incomplete tear of right rotator cuff, subsequent encounter [S46.011D], Subacromial impingement of right shoulder [M75.41]   Evaluation Date: 8/15/2024  Authorization Period Expiration: 6/7/2025  Plan of Care Expiration: 11/15/2024                          Progress Update: 10/15/2024   Visit # / Visits authorized: 7 / 12 - Workman's Comp authorized (12)       FOTO: 8/15/2024 - Scored: 1 / 3       PRECAUTIONS: Standard Precautions, Safety/fall precautions, Orthopedic: Right Cuff Repair, Non-weight beariing, and Orthotic device type: abductor sling, Wearning schedule: full until 6 week follow up      PROBLEM LIST : pain, decreased motion all planes, decreased strength      Date of Surgery/Procedure 8/5/2024- Joseph   Surgery/Procedure Performed Status post Right Rotator Cuff Repair, Subacromial Decompression, Distal Clavicle Dissection, Biceps Tenodesis   Rehabilitation Precautions Protocol: Joseph Cuff Protocol      MD Follow up: 8/20/2024 9/17/2024 11/05/2024     Patient School:     Job/Position: Works at an alteration story    [facility] Visit #: 0/5     Time In: 1125  Time Out: 1215  Total Billable Time: 40 minutes    SUBJECTIVE     Pt reports: More sore today than last visit.  It's painful not hard with her overhead wand movements.  Compliance with Hep: Daily  Response to previous treatment: no adverse reactions to treatment/updated HEP  Functional change: Better    Pain: 5/10   Worst: 8/10  Location: Right shoulder      OBJECTIVE     Objective Measures updated at progress report  unless specified otherwise.    Joint Position RIGHT   Sholder Flexion 110   Shoulder Extension 30   Shoulder Abduction 75   Shoulder ER at 90* Unable against gravity   Shoulder ER at 45* 30   Shoulder IR at 90* -   Functional ER unable   Functional IR unable       Treatment     Gym/Equipment Access: none  Time to Complete Exercises: increased for cueing and education    Alyx received the treatments listed below:      CPT Intervention  Right RCR  BT  SAD  DCR Duration/ Details  9/23   MOD Hot Pack     TE Arm Bike 3/3 forward and back   MT Joint mobilizations Glenohumeral mobs, All Planes, Grade II-IV   MT Mobilization with movement Above mobs with flexion/abduction/internal rotation and external rotation    TE Passive Motion Elbow Flexion & Extension   TE Assisted Motion Hand Assisted Motion  Serratus Punch 3'  Flexion Overhead 3'  Tball Roll outs, 3' each  Flexion  Abduction  Tbar - 3 min each, 5s holds  Serratus Punch  Flexion Overhead  external rotation 45*  external rotation 90*   NMR Scapular Retractions     PLAN         CPT Codes available for Billing:   (15) minutes of Manual therapy (MT) to improve pain and ROM.  (25) minutes of Therapeutic Exercise (TE) to develop strength, endurance, range of motion, and flexibility.  (0) minutes of Neuromuscular Re-Education (NMR)  to improve: Balance, Coordination, Kinesthetic, Sense, Proprioception, and Posture.  (0) minutes of Therapeutic Activities (TA) to improve functional performance.  (0) minutes of Gait Training (GT) to improve functional gait mechanics.  (0) minutes of Self- Care and Education  Unattended Electrical Stimulation (ES) for muscle performance or pain modulation.  Vasopneumatic Device Therapy () for management of swelling/edema. (27176)  BFR: Blood flow restriction applied during exercise  NP or (-): Not Performed    See EMR under MEDIA for written consent provided for Dry Needling- DATE   Palpation Assessment to determine the necessity for  Functional Dry Needling     PATIENT EDUCATION AND HOME EXERCISES      Education/Self-Care provided:  (included in treatment unless specified above) minutes   Patient educated on the impairments noted above and the effects of physical therapy intervention to improve overall condition and Quality of Life  Patient was educated on all the above exercise prior/during/after for proper posture, positioning, and execution for safe performance with home exercise program.      Written Home Exercises Provided: yes. Prefers: [x] Printed [x] Electronic  Exercises were reviewed and Rylee was able to demonstrate them prior to the end of the session.  Rylee demonstrated good understanding of the education provided. See EMR under Patient Instructions for exercises provided during therapy sessions.    ASSESSMENT     Alyx Weir tolerated Physical Therapy  session well with moderate  complaints of pain or discomfort due to painful right flank pain, right upper arm pain, and right elbow pain.   Objective findings are improving with pain, range of motion, strength, endurance, exercise tolerance, and functional mobility.  Alyx Weir continues to have difficulty with all motion above 90* of flexion.  Alyx demonstrated good understanding of new exercises and will continue to progress at home until next follow-up.     Alyx Is progressing well towards her goals.   Pt prognosis is Good.     Pt will continue to benefit from skilled outpatient physical therapy to address the deficits listed in the problem list box on initial evaluation, provide pt/family education and to maximize pt's level of independence in the home and community environment.     Pt's spiritual, cultural and educational needs considered and pt agreeable to plan of care and goals.    Anticipated Barriers for therapy: co-morbidities       SHORT TERM GOALS:  4 weeks (9/15/24) Progress Date met   Recent signs and systems trend is improving in order to progress towards Long  term goals.  [x] Met  [] Not Met  [] Progressing  9/23   Patient will be independent with Home Exercise Program  in order to further progress and return to maximal function. [] Met  [] Not Met  [x] Progressing     Pain rating at Worst: 5 /10 in order to progress towards increased independence with activity. [] Met  [] Not Met  [x] Progressing     Patient will be able to correct postural deviations in sitting and standing, to decrease pain and promote postural awareness for injury prevention.  [] Met  [] Not Met  [x] Progressing     Patient will improve functional outcome (FOTO) score: by 5% to increase self-worth & perceived functional ability towards long term goals [] Met  [] Not Met  [x] Progressing        LONG TERM GOALS: Discharge (~12 Weeks) Progress Date met   Patient will return to normal activites of daily living, recreational, and work related activities with less pain and limitation.  [] Met  [] Not Met  [x] Progressing     Patient will improve range of motion  to stated goals in order to return to maximal functional potential.  [] Met  [] Not Met  [x] Progressing     Patient will improve Strength to stated goals of appropriate musculature in order to improve functional independence.  [] Met  [] Not Met  [x] Progressing     Pain Rating at Best: 1/10 to improve Quality of Life.  [] Met  [] Not Met  [x] Progressing     Patient will meet predicted functional outcome (FOTO) score: 80% to increase self-worth & perceived functional ability. [] Met  [] Not Met  [x] Progressing     Patient will have met/partially met personal goal of: to get back to daily activities, community based activities, and social activities.  [] Met  [] Not Met  [x] Progressing           PLAN   Plan of care Certification: 8/15/2024 to 11/15/2024    Continue Plan of Care (POC) and progress per patient tolerance. See Treatment section for exercise progression.    Bryanna Peña, SILAS    Disclaimer: This note was prepared using a voice  recognition system and is likely to have sound alike errors within the text.

## 2024-09-24 NOTE — PROGRESS NOTES
[facility]   Physical Therapy Treatment Note + Progress Note       Name: Alyx Weir  Clinic Number: [id number]    Therapy Diagnosis:   Encounter Diagnoses   Name Primary?    Decreased strength of upper extremity Yes    Decreased range of motion of right shoulder      Physician: Carl Ruiz    Visit Date: 9/18/2024    Physician Orders: PT Eval and Treat  Medical Diagnosis from Referral: Traumatic incomplete tear of right rotator cuff, subsequent encounter [S46.011D], Subacromial impingement of right shoulder [M75.41]   Evaluation Date: 8/15/2024  Authorization Period Expiration: 6/7/2025  Plan of Care Expiration: 11/15/2024                          Progress Update: 10/15/2024   Visit # / Visits authorized: 6 / 12 - Workman's Comp authorized (12)       FOTO: 8/15/2024 - Scored: 1 / 3       PRECAUTIONS: Standard Precautions, Safety/fall precautions, Orthopedic: Right Cuff Repair, Non-weight beariing, and Orthotic device type: abductor sling, Wearning schedule: full until 6 week follow up      PROBLEM LIST : pain, decreased motion all planes, decreased strength      Date of Surgery/Procedure 8/5/2024- Joseph   Surgery/Procedure Performed Status post Right Rotator Cuff Repair, Subacromial Decompression, Distal Clavicle Dissection, Biceps Tenodesis   Rehabilitation Precautions Protocol: Joseph Cuff Protocol      MD Follow up: 8/20/2024 9/17/2024 11/05/2024     Patient School:     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  She was d/c from the sling yesterday with instructions to discard the sling by the end of the week.   Compliance with Hep: Daily  Response to previous treatment: no adverse reactions to treatment/updated HEP  Functional change: Better    Pain: 5/10   Worst: 8/10  Location: Right shoulder      OBJECTIVE     Objective Measures  "updated at progress report unless specified otherwise.    Joint Position RIGHT   Sholder Flexion 110   Shoulder Extension 30   Shoulder Abduction 75   Shoulder ER at 90* Unable against gravity   Shoulder ER at 45* 30   Shoulder IR at 90* -   Functional ER unable   Functional IR unable       Treatment     Gym/Equipment Access: none  Time to Complete Exercises: increased for cueing and education    Alyx received the treatments listed below:      CPT Intervention  Right RCR  BT  SAD  DCR Duration/ Details  9/18   MOD Hot Pack     TE Arm Bike 3/3 forward and back   MT Joint mobilizations Glenohumeral mobs, All Planes, Grade II-IV   MT Mobilization with movement Above mobs with flexion/abduction/internal rotation and external rotation    TE Passive Motion Elbow Flexion & Extension   TE Assisted Motion Hand Assisted Motion  Serratus Punch 3'  Flexion Overhead 3'  Tball Roll outs, 3' each  Flexion  Abduction  Tbar - 3 min each, 5s holds  Serratus Punch  Flexion Overhead  external rotation 45*  external rotation 90*   NMR Scapular Retractions 10" x 4'   PLAN         CPT Codes available for Billing:   (25) minutes of Manual therapy (MT) to improve pain and ROM.  (25) minutes of Therapeutic Exercise (TE) to develop strength, endurance, range of motion, and flexibility.  (0) minutes of Neuromuscular Re-Education (NMR)  to improve: Balance, Coordination, Kinesthetic, Sense, Proprioception, and Posture.  (0) minutes of Therapeutic Activities (TA) to improve functional performance.  (0) minutes of Gait Training (GT) to improve functional gait mechanics.  (0) minutes of Self- Care and Education  Unattended Electrical Stimulation (ES) for muscle performance or pain modulation.  Vasopneumatic Device Therapy () for management of swelling/edema. (07379)  BFR: Blood flow restriction applied during exercise  NP or (-): Not Performed    See EMR under MEDIA for written consent provided for Dry Needling- DATE   Palpation Assessment " to determine the necessity for Functional Dry Needling     PATIENT EDUCATION AND HOME EXERCISES      Education/Self-Care provided:  (included in treatment unless specified above) minutes   Patient educated on the impairments noted above and the effects of physical therapy intervention to improve overall condition and Quality of Life  Patient was educated on all the above exercise prior/during/after for proper posture, positioning, and execution for safe performance with home exercise program.      Written Home Exercises Provided: yes. Prefers: [x] Printed [x] Electronic  Exercises were reviewed and Rylee was able to demonstrate them prior to the end of the session.  Rylee demonstrated good understanding of the education provided. See EMR under Patient Instructions for exercises provided during therapy sessions.    ASSESSMENT     Patient tolerated PT session fairly well with increased pain and discomfort through exercises that were added into assisted positions and motions. She was discharged from the sling yesterday, and has increased soreness and discomfort at rest and with activity. She was educated on modifications and expectations for discomfort with activity and exercises, and we'll continue to monitor at home and in the clinic for progression of care.    Alyx Is progressing well towards her goals.   Pt prognosis is Good.     Pt will continue to benefit from skilled outpatient physical therapy to address the deficits listed in the problem list box on initial evaluation, provide pt/family education and to maximize pt's level of independence in the home and community environment.     Pt's spiritual, cultural and educational needs considered and pt agreeable to plan of care and goals.    Anticipated Barriers for therapy: co-morbidities       SHORT TERM GOALS:  4 weeks (9/15/24) Progress Date met   Recent signs and systems trend is improving in order to progress towards Long term goals.  [] Met  [] Not Met  [x]  Progressing     Patient will be independent with Home Exercise Program  in order to further progress and return to maximal function. [] Met  [] Not Met  [x] Progressing     Pain rating at Worst: 5 /10 in order to progress towards increased independence with activity. [] Met  [] Not Met  [x] Progressing     Patient will be able to correct postural deviations in sitting and standing, to decrease pain and promote postural awareness for injury prevention.  [] Met  [] Not Met  [x] Progressing     Patient will improve functional outcome (FOTO) score: by 5% to increase self-worth & perceived functional ability towards long term goals [] Met  [] Not Met  [x] Progressing        LONG TERM GOALS: Discharge (~12 Weeks) Progress Date met   Patient will return to normal activites of daily living, recreational, and work related activities with less pain and limitation.  [] Met  [] Not Met  [x] Progressing     Patient will improve range of motion  to stated goals in order to return to maximal functional potential.  [] Met  [] Not Met  [x] Progressing     Patient will improve Strength to stated goals of appropriate musculature in order to improve functional independence.  [] Met  [] Not Met  [x] Progressing     Pain Rating at Best: 1/10 to improve Quality of Life.  [] Met  [] Not Met  [x] Progressing     Patient will meet predicted functional outcome (FOTO) score: 80% to increase self-worth & perceived functional ability. [] Met  [] Not Met  [x] Progressing     Patient will have met/partially met personal goal of: to get back to daily activities, community based activities, and social activities.  [] Met  [] Not Met  [x] Progressing           PLAN   Plan of care Certification: 8/15/2024 to 11/15/2024    Continue Plan of Care (POC) and progress per patient tolerance. See Treatment section for exercise progression.    Bryanna Peña, SILAS    Disclaimer: This note was prepared using a voice recognition system and is likely to have sound  alike errors within the text.

## 2024-09-26 ENCOUNTER — CLINICAL SUPPORT (OUTPATIENT)
Dept: REHABILITATION | Facility: HOSPITAL | Age: 74
End: 2024-09-26
Payer: COMMERCIAL

## 2024-09-26 DIAGNOSIS — M25.611 DECREASED RANGE OF MOTION OF RIGHT SHOULDER: ICD-10-CM

## 2024-09-26 DIAGNOSIS — R29.898 DECREASED STRENGTH OF UPPER EXTREMITY: Primary | ICD-10-CM

## 2024-09-26 PROCEDURE — 97112 NEUROMUSCULAR REEDUCATION: CPT | Performed by: PHYSICAL THERAPIST

## 2024-09-26 PROCEDURE — 97110 THERAPEUTIC EXERCISES: CPT

## 2024-09-26 PROCEDURE — 97112 NEUROMUSCULAR REEDUCATION: CPT

## 2024-09-26 PROCEDURE — 97110 THERAPEUTIC EXERCISES: CPT | Performed by: PHYSICAL THERAPIST

## 2024-09-28 NOTE — PROGRESS NOTES
[facility]   Physical Therapy Treatment Note       Name: Alyx Weir  [facility] Number: [id number]    Therapy Diagnosis:   Encounter Diagnoses   Name Primary?    Decreased strength of upper extremity Yes    Decreased range of motion of right shoulder      Physician: Carl Ruiz    Visit Date: 9/26/2024    Physician Orders: PT Eval and Treat  Medical Diagnosis from Referral: Traumatic incomplete tear of right rotator cuff, subsequent encounter [S46.011D], Subacromial impingement of right shoulder [M75.41]   Evaluation Date: 8/15/2024  Authorization Period Expiration: 6/7/2025  Plan of Care Expiration: 11/15/2024                          Progress Update: 10/15/2024   Visit # / Visits authorized: 8 / 12 - Workman's Comp authorized (12)       FOTO: 8/15/2024 - Scored: 1 / 3       PRECAUTIONS: Standard Precautions, Safety/fall precautions, Orthopedic: Right Cuff Repair, Non-weight beariing, and Orthotic device type: abductor sling, Wearning schedule: full until 6 week follow up      PROBLEM LIST : pain, decreased motion all planes, decreased strength      Date of Surgery/Procedure 8/5/2024- Joseph   Surgery/Procedure Performed Status post Right Rotator Cuff Repair, Subacromial Decompression, Distal Clavicle Dissection, Biceps Tenodesis   Rehabilitation Precautions Protocol: Joseph Cuff Protocol      MD Follow up: 8/20/2024 9/17/2024 11/05/2024     Patient School:     Job/Position: Works at an alteration story    \[facility] [place] [facility]\"" Visit #: 0/5     Time In: 1055  Time Out: 1200  Total Billable Time: 55 minutes    SUBJECTIVE     Pt reports: Her shoulder hurts pretty good today.   Compliance with Hep: Daily  Response to previous treatment: no adverse reactions to treatment/updated HEP  Functional change: Better    Pain: 5/10   Worst: 8/10  Location: Right shoulder      OBJECTIVE     Objective Measures updated at progress report unless specified otherwise.    Joint Position RIGHT   Sholder  "Flexion 110   Shoulder Extension 30   Shoulder Abduction 75   Shoulder ER at 90* Unable against gravity   Shoulder ER at 45* 30   Shoulder IR at 90* -   Functional ER unable   Functional IR unable       Treatment     Gym/Equipment Access: none  Time to Complete Exercises: increased for cueing and education    Alyx received the treatments listed below:      CPT Intervention  Right RCR  BT  SAD  DCR Duration/ Details  9/26/24   TE Arm Bike 3/3 forward and back   MT Joint mobilizations Glenohumeral mobs, All Planes, Grade II-IV   MT Mobilization with movement Above mobs with flexion/abduction/internal rotation and external rotation    TE Passive Motion Elbow Flexion & Extension   TE Assisted Motion Hand Assisted Motion  Serratus Punch 3'  Flexion Overhead 3'  Tball Roll outs, 3' each  Flexion  Abduction  Tbar - 3 min each, 5s holds  Serratus Punch  Flexion Overhead  external rotation 45*  external rotation 90*   NMR Band Series Tband- yellow  Extenisons x10 with 5" hold.   PLAN       CPT Codes available for Billing: Billing reflects 1:1 direct supervision  (15) minutes of Manual therapy (MT) to improve pain and ROM.  (20) minutes of Therapeutic Exercise (TE) to develop strength, endurance, range of motion, and flexibility.  (15) minutes of Neuromuscular Re-Education (NMR)  to improve: Balance, Coordination, Kinesthetic, Sense, Proprioception, and Posture.  (0) minutes of Therapeutic Activities (TA) to improve functional performance.  (0) minutes of Gait Training (GT) to improve functional gait mechanics.  (0) minutes of Self- Care and Education  Unattended Electrical Stimulation (ES) for muscle performance or pain modulation.  Vasopneumatic Device Therapy () for management of swelling/edema. (60781)  BFR: Blood flow restriction applied during exercise  NP or (-): Not Performed    See EMR under MEDIA for written consent provided for Dry Needling- DATE   Palpation Assessment to determine the necessity for " Functional Dry Needling     PATIENT EDUCATION AND HOME EXERCISES      Education/Self-Care provided:  (included in treatment unless specified above) minutes   Patient educated on the impairments noted above and the effects of physical therapy intervention to improve overall condition and Quality of Life  Patient was educated on all the above exercise prior/during/after for proper posture, positioning, and execution for safe performance with home exercise program.      Written Home Exercises Provided: yes. Prefers: [x] Printed [x] Electronic  Exercises were reviewed and Rylee was able to demonstrate them prior to the end of the session.  Rylee demonstrated good understanding of the education provided. See EMR under Patient Instructions for exercises provided during therapy sessions.    ASSESSMENT     Alyx Weir tolerated Physical Therapy session well with moderate  complaints of pain or discomfort.  Objective findings are improving with pain, range of motion, strength, endurance, exercise tolerance, and functional mobility.  Alyx Weir continues to have difficulty with working through the discomfort in her shoulder, especially into external rotation and internal rotation.  Alyx demonstrated good understanding of new exercises and will continue to progress at home until next follow-up.     Alyx Is progressing well towards her goals.   Pt prognosis is Good.     Pt will continue to benefit from skilled outpatient physical therapy to address the deficits listed in the problem list box on initial evaluation, provide pt/family education and to maximize pt's level of independence in the home and community environment.     Pt's spiritual, cultural and educational needs considered and pt agreeable to plan of care and goals.    Anticipated Barriers for therapy: co-morbidities       SHORT TERM GOALS:  4 weeks (9/15/24) Progress Date met   Recent signs and systems trend is improving in order to progress towards Long term  goals.  [x] Met  [] Not Met  [] Progressing  9/23   Patient will be independent with Home Exercise Program  in order to further progress and return to maximal function. [] Met  [] Not Met  [x] Progressing     Pain rating at Worst: 5 /10 in order to progress towards increased independence with activity. [] Met  [] Not Met  [x] Progressing     Patient will be able to correct postural deviations in sitting and standing, to decrease pain and promote postural awareness for injury prevention.  [] Met  [] Not Met  [x] Progressing     Patient will improve functional outcome (FOTO) score: by 5% to increase self-worth & perceived functional ability towards long term goals [] Met  [] Not Met  [x] Progressing        LONG TERM GOALS: Discharge (~12 Weeks) Progress Date met   Patient will return to normal activites of daily living, recreational, and work related activities with less pain and limitation.  [] Met  [] Not Met  [x] Progressing     Patient will improve range of motion  to stated goals in order to return to maximal functional potential.  [] Met  [] Not Met  [x] Progressing     Patient will improve Strength to stated goals of appropriate musculature in order to improve functional independence.  [] Met  [] Not Met  [x] Progressing     Pain Rating at Best: 1/10 to improve Quality of Life.  [] Met  [] Not Met  [x] Progressing     Patient will meet predicted functional outcome (FOTO) score: 80% to increase self-worth & perceived functional ability. [] Met  [] Not Met  [x] Progressing     Patient will have met/partially met personal goal of: to get back to daily activities, community based activities, and social activities.  [] Met  [] Not Met  [x] Progressing           PLAN   Plan of care Certification: 8/15/2024 to 11/15/2024    Continue Plan of Care (POC) and progress per patient tolerance. See Treatment section for exercise progression.    Bryanna Peña, SILAS    Disclaimer: This note was prepared using a voice recognition  system and is likely to have sound alike errors within the text.

## 2024-09-30 ENCOUNTER — OFFICE VISIT (OUTPATIENT)
Dept: FAMILY MEDICINE | Facility: CLINIC | Age: 74
End: 2024-09-30
Payer: MEDICARE

## 2024-09-30 VITALS
HEART RATE: 71 BPM | HEIGHT: 63 IN | SYSTOLIC BLOOD PRESSURE: 128 MMHG | BODY MASS INDEX: 30.29 KG/M2 | TEMPERATURE: 98 F | OXYGEN SATURATION: 99 % | WEIGHT: 170.94 LBS | DIASTOLIC BLOOD PRESSURE: 72 MMHG

## 2024-09-30 DIAGNOSIS — Z79.01 ANTICOAGULATED ON COUMADIN: Primary | Chronic | ICD-10-CM

## 2024-09-30 DIAGNOSIS — E78.5 DYSLIPIDEMIA: ICD-10-CM

## 2024-09-30 DIAGNOSIS — I10 ESSENTIAL HYPERTENSION: Chronic | ICD-10-CM

## 2024-09-30 DIAGNOSIS — R73.02 IGT (IMPAIRED GLUCOSE TOLERANCE): ICD-10-CM

## 2024-09-30 DIAGNOSIS — Z86.718 HISTORY OF DVT (DEEP VEIN THROMBOSIS): ICD-10-CM

## 2024-09-30 DIAGNOSIS — D68.51 HETEROZYGOUS FACTOR V LEIDEN MUTATION: Chronic | ICD-10-CM

## 2024-09-30 DIAGNOSIS — Z23 NEED FOR VACCINATION: ICD-10-CM

## 2024-09-30 DIAGNOSIS — Z78.9 STATIN INTOLERANCE: ICD-10-CM

## 2024-09-30 DIAGNOSIS — J44.9 CHRONIC OBSTRUCTIVE PULMONARY DISEASE, UNSPECIFIED COPD TYPE: Chronic | ICD-10-CM

## 2024-09-30 PROCEDURE — 3078F DIAST BP <80 MM HG: CPT | Mod: HCNC,CPTII,S$GLB, | Performed by: INTERNAL MEDICINE

## 2024-09-30 PROCEDURE — 99214 OFFICE O/P EST MOD 30 MIN: CPT | Mod: HCNC,S$GLB,, | Performed by: INTERNAL MEDICINE

## 2024-09-30 PROCEDURE — 99999 PR PBB SHADOW E&M-EST. PATIENT-LVL IV: CPT | Mod: PBBFAC,HCNC,, | Performed by: INTERNAL MEDICINE

## 2024-09-30 PROCEDURE — 1100F PTFALLS ASSESS-DOCD GE2>/YR: CPT | Mod: HCNC,CPTII,S$GLB, | Performed by: INTERNAL MEDICINE

## 2024-09-30 PROCEDURE — G0008 ADMIN INFLUENZA VIRUS VAC: HCPCS | Mod: HCNC,S$GLB,, | Performed by: INTERNAL MEDICINE

## 2024-09-30 PROCEDURE — 1126F AMNT PAIN NOTED NONE PRSNT: CPT | Mod: HCNC,CPTII,S$GLB, | Performed by: INTERNAL MEDICINE

## 2024-09-30 PROCEDURE — 3044F HG A1C LEVEL LT 7.0%: CPT | Mod: HCNC,CPTII,S$GLB, | Performed by: INTERNAL MEDICINE

## 2024-09-30 PROCEDURE — 3288F FALL RISK ASSESSMENT DOCD: CPT | Mod: HCNC,CPTII,S$GLB, | Performed by: INTERNAL MEDICINE

## 2024-09-30 PROCEDURE — 3074F SYST BP LT 130 MM HG: CPT | Mod: HCNC,CPTII,S$GLB, | Performed by: INTERNAL MEDICINE

## 2024-09-30 PROCEDURE — 90662 IIV NO PRSV INCREASED AG IM: CPT | Mod: HCNC,S$GLB,, | Performed by: INTERNAL MEDICINE

## 2024-09-30 PROCEDURE — G2211 COMPLEX E/M VISIT ADD ON: HCPCS | Mod: HCNC,S$GLB,, | Performed by: INTERNAL MEDICINE

## 2024-09-30 PROCEDURE — 1159F MED LIST DOCD IN RCRD: CPT | Mod: HCNC,CPTII,S$GLB, | Performed by: INTERNAL MEDICINE

## 2024-09-30 PROCEDURE — 3008F BODY MASS INDEX DOCD: CPT | Mod: HCNC,CPTII,S$GLB, | Performed by: INTERNAL MEDICINE

## 2024-09-30 PROCEDURE — 3060F POS MICROALBUMINURIA REV: CPT | Mod: HCNC,CPTII,S$GLB, | Performed by: INTERNAL MEDICINE

## 2024-09-30 PROCEDURE — 3066F NEPHROPATHY DOC TX: CPT | Mod: HCNC,CPTII,S$GLB, | Performed by: INTERNAL MEDICINE

## 2024-09-30 NOTE — PROGRESS NOTES
Subjective:       Patient ID: Alyx Weir is a 74 y.o. female.    Chief Complaint: Follow-up, Hyperlipidemia, Hypertension, and COPD    Follow-up  Associated symptoms include arthralgias. Pertinent negatives include no abdominal pain, chest pain, chills, congestion, coughing, diaphoresis, fatigue, fever, headaches, joint swelling, myalgias, nausea, neck pain, numbness, rash, sore throat, vomiting or weakness.   Hyperlipidemia  Pertinent negatives include no chest pain, myalgias or shortness of breath.   Hypertension  Pertinent negatives include no chest pain, headaches, neck pain, palpitations or shortness of breath.   COPD  Associated symptoms include arthralgias. Pertinent negatives include no abdominal pain, chest pain, chills, congestion, coughing, diaphoresis, fatigue, fever, headaches, joint swelling, myalgias, nausea, neck pain, numbness, rash, sore throat, vomiting or weakness.     Past Medical History:   Diagnosis Date    Anticoagulant long-term use     Anticoagulated on Coumadin     Arm weakness-rotator cuff weakness 11/02/2015    Arthritis     Asthma     Clotting disorder     Coronary artery disease     Deep vein thrombosis     Degenerative disc disease     Diabetes mellitus type I 2011    BS last tested x 1 month not sure what it was.    Heterozygous factor V Leiden mutation     Hx of colonic polyps 11/13/2015    Hyperlipidemia     Hypertension     VIRGIL (obstructive sleep apnea)     Stenosis of right carotid artery 12/13/2016     Past Surgical History:   Procedure Laterality Date    ARTHROSCOPIC REPAIR OF ROTATOR CUFF OF SHOULDER Right 8/5/2024    Procedure: REPAIR, ROTATOR CUFF, ARTHROSCOPIC;  Surgeon: Carl Ruiz MD;  Location: [facility];  Service: Orthopedics;  Laterality: Right;  1. Right shoulder arthroscopic rotator cuff repair  2. Right shoulder arthroscopic biceps tenodesis  3. Right shoulder subacromial decompression    ARTHROSCOPY OF SHOULDER WITH DECOMPRESSION OF SUBACROMIAL SPACE  Right 8/5/2024    Procedure: ARTHROSCOPY, SHOULDER, WITH SUBACROMIAL SPACE DECOMPRESSION;  Surgeon: Carl uRiz MD;  Location: [facility] OR;  Service: Orthopedics;  Laterality: Right;    ARTHROSCOPY,SHOULDER,WITH BICEPS TENODESIS Right 8/5/2024    Procedure: ARTHROSCOPY,SHOULDER,WITH BICEPS TENODESIS;  Surgeon: Carl Ruiz MD;  Location: [facility] OR;  Service: Orthopedics;  Laterality: Right;    BACK SURGERY      bladder cyst removal      BLADDER SURGERY      CARDIAC CATHETERIZATION  03/2013    mild CAD    COLONOSCOPY N/A 11/13/2015    Procedure: COLONOSCOPY;  Surgeon: Jas Umanzor III, MD;  Location: [facility] ENDO;  Service: Endoscopy;  Laterality: N/A;    HYSTERECTOMY      28 yrs old    LUMBAR SPINE SURGERY      bone spurs removed    OOPHORECTOMY      SELECTIVE INJECTION OF ANESTHETIC AGENT AROUND LUMBAR SPINAL NERVE ROOT BY TRANSFORAMINAL APPROACH Bilateral 06/03/2022    Procedure: Bilateral L4/5 TF ASUNCION with RN IV sedation; on Coumadin, will need INR prior to procedure, must be less than 1.3;  Surgeon: Mario Palacios MD;  Location: [facility] PAIN MGT;  Service: Pain Management;  Laterality: Bilateral;     Family History   Problem Relation Name Age of Onset    Heart disease Mother      Hypertension Mother      Cataracts Mother      Diabetes Father      Hypertension Sister      Glaucoma Sister      Ovarian cancer Sister      Hypertension Sister      Diabetes Sister      Hypertension Sister      Diabetes Sister      Diabetes Brother      Hypertension Brother      Diabetes Paternal Uncle      Breast cancer Neg Hx      Colon cancer Neg Hx       Social History     Socioeconomic History    Marital status:    Tobacco Use    Smoking status: Never     Passive exposure: Past    Smokeless tobacco: Never   Substance and Sexual Activity    Alcohol use: No    Drug use: No    Sexual activity: Not Currently     Partners: Male     Birth control/protection: None   Social History Narrative    Dogs in household,  no smokers.     Social Drivers of Health     Financial Resource Strain: Medium Risk (6/19/2024)    Overall Financial Resource Strain (CARDIA)     Difficulty of Paying Living Expenses: Somewhat hard   Food Insecurity: No Food Insecurity (6/19/2024)    Hunger Vital Sign     Worried About Running Out of Food in the Last Year: Never true     Ran Out of Food in the Last Year: Never true   Transportation Needs: No Transportation Needs (11/29/2023)    PRAPARE - Transportation     Lack of Transportation (Medical): No     Lack of Transportation (Non-Medical): No   Physical Activity: Inactive (6/19/2024)    Exercise Vital Sign     Days of Exercise per Week: 0 days     Minutes of Exercise per Session: 60 min   Stress: Stress Concern Present (6/19/2024)    [employer] - Occupational Stress Questionnaire     Feeling of Stress : To some extent   Housing Stability: Low Risk  (11/29/2023)    Housing Stability Vital Sign     Unable to Pay for Housing in the Last Year: No     Number of Places Lived in the Last Year: 1     Unstable Housing in the Last Year: No     Review of Systems   Constitutional:  Negative for activity change, appetite change, chills, diaphoresis, fatigue, fever and unexpected weight change.   HENT:  Negative for congestion, drooling, ear discharge, ear pain, facial swelling, hearing loss, mouth sores, nosebleeds, postnasal drip, rhinorrhea, sinus pressure, sneezing, sore throat, tinnitus, trouble swallowing and voice change.    Eyes:  Negative for photophobia, redness and visual disturbance.   Respiratory:  Negative for apnea, cough, choking, chest tightness, shortness of breath and wheezing.    Cardiovascular:  Negative for chest pain, palpitations and leg swelling.   Gastrointestinal:  Negative for abdominal distention, abdominal pain, blood in stool, constipation, diarrhea, nausea, rectal pain and vomiting.   Endocrine: Negative for cold intolerance, heat intolerance, polydipsia,  polyphagia and polyuria.   Genitourinary:  Negative for decreased urine volume, difficulty urinating, dysuria, flank pain, frequency, genital sores, hematuria, pelvic pain and urgency.   Musculoskeletal:  Positive for arthralgias. Negative for back pain, gait problem, joint swelling, myalgias, neck pain and neck stiffness.   Skin:  Negative for color change, pallor, rash and wound.   Allergic/Immunologic: Negative for food allergies and immunocompromised state.   Neurological:  Negative for dizziness, tremors, seizures, syncope, speech difficulty, weakness, light-headedness, numbness and headaches.   Hematological:  Negative for adenopathy. Does not bruise/bleed easily.   Psychiatric/Behavioral:  Negative for agitation, behavioral problems, confusion, decreased concentration, dysphoric mood, hallucinations, self-injury, sleep disturbance and suicidal ideas. The patient is not nervous/anxious and is not hyperactive.    All other systems reviewed and are negative.      Objective:      Physical Exam  Vitals and nursing note reviewed.   Constitutional:       General: She is not in acute distress.     Appearance: Normal appearance. She is well-developed. She is not diaphoretic.   HENT:      Head: Normocephalic and atraumatic.      Mouth/Throat:      Pharynx: No oropharyngeal exudate.   Eyes:      General: No scleral icterus.  Neck:      Thyroid: No thyromegaly.      Vascular: No carotid bruit or JVD.      Trachea: No tracheal deviation.   Cardiovascular:      Rate and Rhythm: Normal rate and regular rhythm.      Heart sounds: Normal heart sounds.   Pulmonary:      Effort: Pulmonary effort is normal. No respiratory distress.      Breath sounds: Normal breath sounds. No wheezing or rales.   Chest:      Chest wall: No tenderness.   Abdominal:      General: Bowel sounds are normal. There is no distension.      Palpations: Abdomen is soft.      Tenderness: There is no abdominal tenderness. There is no guarding or rebound.    Musculoskeletal:         General: No tenderness. Normal range of motion.      Cervical back: Normal range of motion and neck supple.   Lymphadenopathy:      Cervical: No cervical adenopathy.   Skin:     General: Skin is warm and dry.      Coloration: Skin is not pale.      Findings: No erythema or rash.   Neurological:      Mental Status: She is alert and oriented to person, place, and time.      Cranial Nerves: No cranial nerve deficit.      Coordination: Coordination normal.   Psychiatric:         Behavior: Behavior normal.         Thought Content: Thought content normal.         Judgment: Judgment normal.         CMP  Sodium   Date Value Ref Range Status   05/29/2024 137 136 - 145 mmol/L Final     Potassium   Date Value Ref Range Status   05/29/2024 3.7 3.5 - 5.1 mmol/L Final     Chloride   Date Value Ref Range Status   05/29/2024 101 95 - 110 mmol/L Final     CO2   Date Value Ref Range Status   05/29/2024 26 23 - 29 mmol/L Final     Glucose   Date Value Ref Range Status   05/29/2024 58 (L) 70 - 110 mg/dL Final     BUN   Date Value Ref Range Status   05/29/2024 19 8 - 23 mg/dL Final     Creatinine   Date Value Ref Range Status   05/29/2024 0.9 0.5 - 1.4 mg/dL Final     Calcium   Date Value Ref Range Status   05/29/2024 10.2 8.7 - 10.5 mg/dL Final     Total Protein   Date Value Ref Range Status   05/29/2024 7.9 6.0 - 8.4 g/dL Final     Albumin   Date Value Ref Range Status   05/29/2024 3.9 3.5 - 5.2 g/dL Final     Total Bilirubin   Date Value Ref Range Status   05/29/2024 0.7 0.1 - 1.0 mg/dL Final     Comment:     For infants and newborns, interpretation of results should be based  on gestational age, weight and in agreement with clinical  observations.    Premature Infant recommended reference ranges:  Up to 24 hours.............<8.0 mg/dL  Up to 48 hours............<12.0 mg/dL  3-5 days..................<15.0 mg/dL  6-29 days.................<15.0 mg/dL       Alkaline Phosphatase   Date Value Ref Range Status    05/29/2024 49 (L) 55 - 135 U/L Final     AST   Date Value Ref Range Status   05/29/2024 15 10 - 40 U/L Final     ALT   Date Value Ref Range Status   05/29/2024 14 10 - 44 U/L Final     Anion Gap   Date Value Ref Range Status   05/29/2024 10 8 - 16 mmol/L Final     eGFR if    Date Value Ref Range Status   02/16/2022 >60 >60 mL/min/1.73 m^2 Final     eGFR if non    Date Value Ref Range Status   02/16/2022 >60 >60 mL/min/1.73 m^2 Final     Comment:     Calculation used to obtain the estimated glomerular filtration  rate (eGFR) is the CKD-EPI equation.        Lab Results   Component Value Date    WBC 8.23 05/29/2024    HGB 13.5 05/29/2024    HCT 42.4 05/29/2024    MCV 89 05/29/2024     05/29/2024     Lab Results   Component Value Date    CHOL 198 11/29/2023     Lab Results   Component Value Date    HDL 72 11/29/2023     Lab Results   Component Value Date    LDLCALC 110.6 11/29/2023     Lab Results   Component Value Date    TRIG 77 11/29/2023     Lab Results   Component Value Date    CHOLHDL 36.4 11/29/2023     Lab Results   Component Value Date    TSH 0.847 05/29/2024     Lab Results   Component Value Date    HGBA1C 5.9 (H) 05/29/2024     Assessment:       1. Anticoagulated on Coumadin    2. Chronic obstructive pulmonary disease, unspecified COPD type    3. Dyslipidemia    4. Essential hypertension    5. Heterozygous factor V Leiden mutation    6. History of DVT (deep vein thrombosis)    7. IGT (impaired glucose tolerance)    8. Statin intolerance        Plan:   Anticoagulated on Coumadin    Chronic obstructive pulmonary disease, unspecified COPD type    Dyslipidemia    Essential hypertension    Heterozygous factor V Leiden mutation    History of DVT (deep vein thrombosis)    IGT (impaired glucose tolerance)    Statin intolerance      Stable-----------continue meds--------------as above------------f/u 6 months-------

## 2024-10-02 ENCOUNTER — CLINICAL SUPPORT (OUTPATIENT)
Dept: REHABILITATION | Facility: HOSPITAL | Age: 74
End: 2024-10-02
Payer: COMMERCIAL

## 2024-10-02 DIAGNOSIS — R29.898 DECREASED STRENGTH OF UPPER EXTREMITY: Primary | ICD-10-CM

## 2024-10-02 DIAGNOSIS — M25.611 DECREASED RANGE OF MOTION OF RIGHT SHOULDER: ICD-10-CM

## 2024-10-02 PROCEDURE — 97112 NEUROMUSCULAR REEDUCATION: CPT | Performed by: PHYSICAL THERAPIST

## 2024-10-02 PROCEDURE — 97110 THERAPEUTIC EXERCISES: CPT | Performed by: PHYSICAL THERAPIST

## 2024-10-02 NOTE — PROGRESS NOTES
[facility]   Physical Therapy Treatment Note       Name: Alyx Weir  [facility] Number: [id number]    Therapy Diagnosis:   Encounter Diagnoses   Name Primary?    Decreased strength of upper extremity Yes    Decreased range of motion of right shoulder      Physician: Carl Ruiz    Visit Date: 10/2/2024    Physician Orders: PT Eval and Treat  Medical Diagnosis from Referral: Traumatic incomplete tear of right rotator cuff, subsequent encounter [S46.011D], Subacromial impingement of right shoulder [M75.41]   Evaluation Date: 8/15/2024  Authorization Period Expiration: 6/7/2025  Plan of Care Expiration: 11/15/2024                          Progress Update: 10/15/2024   Visit # / Visits authorized: 9 / 12 - Workman's Comp authorized (12)       FOTO: 8/15/2024 - Scored: 1 / 3       PRECAUTIONS: Standard Precautions, Safety/fall precautions, Orthopedic: Right Cuff Repair, Non-weight beariing, and Orthotic device type: abductor sling, Wearning schedule: full until 6 week follow up      PROBLEM LIST : pain, decreased motion all planes, decreased strength      Date of Surgery/Procedure 8/5/2024- Joseph   Surgery/Procedure Performed Status post Right Rotator Cuff Repair, Subacromial Decompression, Distal Clavicle Dissection, Biceps Tenodesis   Rehabilitation Precautions Protocol: Joseph Cuff Protocol      MD Follow up: 8/20/2024 9/17/2024 11/05/2024     Patient School:     Job/Position: Works at an [employer]    [facility] Visit #: 0/5     Time In: 1300  Time Out: 1400  Total Billable Time: 30 minutes    SUBJECTIVE     Pt reports: lots of soreness and pain today about a 7/10.  Compliance with Hep: Daily  Response to previous treatment: no adverse reactions to treatment/updated HEP  Functional change: Better    Pain: 5/10   Worst: 8/10  Location: Right shoulder      OBJECTIVE     Objective Measures updated at progress report unless specified otherwise.    Joint Position RIGHT  "  Sholder Flexion 110   Shoulder Extension 30   Shoulder Abduction 75   Shoulder ER at 90* Unable against gravity   Shoulder ER at 45* 30   Shoulder IR at 90* -   Functional ER unable   Functional IR unable       Treatment     Gym/Equipment Access: none  Time to Complete Exercises: increased for cueing and education    Alyx received the treatments listed below:      CPT Intervention  Right RCR  BT  SAD  DCR Duration/ Details  10/2   TE Arm Bike 3/3 forward and back   MT Joint mobilizations Glenohumeral mobs, All Planes, Grade II-IV   MT Mobilization with movement Above mobs with flexion/abduction/internal rotation and external rotation    TE Passive Motion Elbow Flexion & Extension   TE Assisted Motion Hand Assisted Motion  Serratus Punch 3'  Flexion Overhead 3'  T-ball Roll outs, 3' each  Flexion  Abduction  T-bar - 3 min each, 5s holds  Serratus Punch  Flexion Overhead  external rotation 45*  external rotation 90*   NMR Band Series T-band- yellow  Extensions x10 with 5" hold.  Adduction x10 with 5" hold.  Rows x10 with 5" hold.   NC Hot Pack              PLAN         CPT Codes available for Billing: Billing reflects 1:1 direct supervision  (15) minutes of Manual therapy (MT) to improve pain and ROM.  (20) minutes of Therapeutic Exercise (TE) to develop strength, endurance, range of motion, and flexibility.  (15) minutes of Neuromuscular Re-Education (NMR)  to improve: Balance, Coordination, Kinesthetic, Sense, Proprioception, and Posture.  (0) minutes of Therapeutic Activities (TA) to improve functional performance.  (0) minutes of Gait Training (GT) to improve functional gait mechanics.  (0) minutes of Self- Care and Education  Unattended Electrical Stimulation (ES) for muscle performance or pain modulation.  Vasopneumatic Device Therapy () for management of swelling/edema. (82617)  BFR: Blood flow restriction applied during exercise  NP or (-): Not Performed    See EMR under MEDIA for written consent " provided for Dry Needling- DATE   Palpation Assessment to determine the necessity for Functional Dry Needling     PATIENT EDUCATION AND HOME EXERCISES      Education/Self-Care provided:  (included in treatment unless specified above) minutes   Patient educated on the impairments noted above and the effects of physical therapy intervention to improve overall condition and Quality of Life  Patient was educated on all the above exercise prior/during/after for proper posture, positioning, and execution for safe performance with home exercise program.      Written Home Exercises Provided: yes. Prefers: [x] Printed [x] Electronic  Exercises were reviewed and Rylee was able to demonstrate them prior to the end of the session.  Rylee demonstrated good understanding of the education provided. See EMR under Patient Instructions for exercises provided during therapy sessions.    ASSESSMENT     Alyx Weir tolerated Physical Therapy session well with moderate  complaints of pain or discomfort- due to muscular soreness  Objective findings are improving with pain, range of motion, strength, endurance, exercise tolerance, and functional mobility.  Alyx Weir continues to have difficulty with external rotation and elevation.  Alyx demonstrated good understanding of new exercises and will continue to progress at home until next follow-up.     Alyx Is progressing well towards her goals.   Pt prognosis is Good.     Pt will continue to benefit from skilled outpatient physical therapy to address the deficits listed in the problem list box on initial evaluation, provide pt/family education and to maximize pt's level of independence in the home and community environment.     Pt's spiritual, cultural and educational needs considered and pt agreeable to plan of care and goals.    Anticipated Barriers for therapy: co-morbidities       SHORT TERM GOALS:  4 weeks (9/15/24) Progress Date met   Recent signs and systems trend is improving  in order to progress towards Long term goals.  [x] Met  [] Not Met  [] Progressing  9/23   Patient will be independent with Home Exercise Program  in order to further progress and return to maximal function. [] Met  [] Not Met  [x] Progressing     Pain rating at Worst: 5 /10 in order to progress towards increased independence with activity. [] Met  [] Not Met  [x] Progressing     Patient will be able to correct postural deviations in sitting and standing, to decrease pain and promote postural awareness for injury prevention.  [] Met  [] Not Met  [x] Progressing     Patient will improve functional outcome (FOTO) score: by 5% to increase self-worth & perceived functional ability towards long term goals [] Met  [] Not Met  [x] Progressing        LONG TERM GOALS: Discharge (~12 Weeks) Progress Date met   Patient will return to normal activites of daily living, recreational, and work related activities with less pain and limitation.  [] Met  [] Not Met  [x] Progressing     Patient will improve range of motion  to stated goals in order to return to maximal functional potential.  [] Met  [] Not Met  [x] Progressing     Patient will improve Strength to stated goals of appropriate musculature in order to improve functional independence.  [] Met  [] Not Met  [x] Progressing     Pain Rating at Best: 1/10 to improve Quality of Life.  [] Met  [] Not Met  [x] Progressing     Patient will meet predicted functional outcome (FOTO) score: 80% to increase self-worth & perceived functional ability. [] Met  [] Not Met  [x] Progressing     Patient will have met/partially met personal goal of: to get back to daily activities, community based activities, and social activities.  [] Met  [] Not Met  [x] Progressing           PLAN   Plan of care Certification: 8/15/2024 to 11/15/2024    Continue Plan of Care (POC) and progress per patient tolerance. See Treatment section for exercise progression.    Bryanna Peña, PT    Disclaimer: This note  was prepared using a voice recognition system and is likely to have sound alike errors within the text.

## 2024-10-07 ENCOUNTER — CLINICAL SUPPORT (OUTPATIENT)
Dept: REHABILITATION | Facility: HOSPITAL | Age: 74
End: 2024-10-07
Payer: COMMERCIAL

## 2024-10-07 DIAGNOSIS — M25.611 DECREASED RANGE OF MOTION OF RIGHT SHOULDER: ICD-10-CM

## 2024-10-07 DIAGNOSIS — R29.898 DECREASED STRENGTH OF UPPER EXTREMITY: Primary | ICD-10-CM

## 2024-10-07 PROCEDURE — 97140 MANUAL THERAPY 1/> REGIONS: CPT | Performed by: PHYSICAL THERAPIST

## 2024-10-07 PROCEDURE — 97112 NEUROMUSCULAR REEDUCATION: CPT | Performed by: PHYSICAL THERAPIST

## 2024-10-07 NOTE — PROGRESS NOTES
[facility]   Physical Therapy Treatment Note       Name: Alyx Weir  [facility] Number: [id number]    Therapy Diagnosis:   Encounter Diagnoses   Name Primary?    Decreased strength of upper extremity Yes    Decreased range of motion of right shoulder      Physician: Carl Ruiz    Visit Date: 10/7/2024    Physician Orders: PT Eval and Treat  Medical Diagnosis from Referral: Traumatic incomplete tear of right rotator cuff, subsequent encounter [S46.011D], Subacromial impingement of right shoulder [M75.41]   Evaluation Date: 8/15/2024  Authorization Period Expiration: 6/7/2025  Plan of Care Expiration: 11/15/2024                          Progress Update: 10/15/2024   Visit # / Visits authorized: 10 / 12   FOTO: 8/15/2024 - Scored: 1 / 3       PRECAUTIONS: Standard Precautions, Safety/fall precautions, Orthopedic: Right Cuff Repair, Non-weight beariing, and Orthotic device type: abductor sling, Wearning schedule: full until 6 week follow up      PROBLEM LIST : pain, decreased motion all planes, decreased strength      Date of Surgery/Procedure 8/5/2024- Joseph   Surgery/Procedure Performed Status post Right Rotator Cuff Repair, Subacromial Decompression, Distal Clavicle Dissection, Biceps Tenodesis   Rehabilitation Precautions Protocol: Joseph Cuff Protocol      MD Follow up: 8/20/2024 9/17/2024 11/05/2024     Patient School:     Job/Position: Works at an alteration story    \[facility]\"" Visit #: 0/5     Time In: 1005  Time Out: 1105  Total Billable Time: 55 minutes    SUBJECTIVE     Pt reports: 7/10 today with pain in the arm near the shoulder.  It has been very painful for her since yesterday and she was unable to control it with medication.   Compliance with Hep: Daily  Response to previous treatment: no adverse reactions to treatment/updated HEP  Functional change: Better    Pain: 5/10   Worst: 8/10  Location: Right shoulder      OBJECTIVE     Objective Measures updated at  "progress report unless specified otherwise.    Joint Position RIGHT   Sholder Flexion 110   Shoulder Extension 30   Shoulder Abduction 75   Shoulder ER at 90* Unable against gravity   Shoulder ER at 45* 30   Shoulder IR at 90* -   Functional ER unable   Functional IR unable       Treatment     Gym/Equipment Access: none  Time to Complete Exercises: increased for cueing and education    Alyx received the treatments listed below:      CPT Intervention  Right RCR  BT  SAD  DCR Duration/ Details  10/7/24   TE Arm Bike 3/3 forward and back   MT Joint mobilizations Glenohumeral mobs, All Planes, Grade II-IV   MT Mobilization with movement     TE Passive Motion     TE Assisted Motion T-ball Roll outs, 3' each  Flexion  Abduction  T-bar - 3 min each, 5s holds  Serratus Punch  Flexion Overhead  external rotation 45*  external rotation 90*   NMR Band Series T-band- yellow  Extensions 2x10 with 5" hold.  Adduction 2x10 with 5" hold.  Rows 2x10 with 5" hold.   NC Hot Pack      PLAN         CPT Codes available for Billing: Billing reflects 1:1 direct supervision  (15) minutes of Manual therapy (MT) to improve pain and ROM.  (20) minutes of Therapeutic Exercise (TE) to develop strength, endurance, range of motion, and flexibility.  (15) minutes of Neuromuscular Re-Education (NMR)  to improve: Balance, Coordination, Kinesthetic, Sense, Proprioception, and Posture.  (0) minutes of Therapeutic Activities (TA) to improve functional performance.  (0) minutes of Gait Training (GT) to improve functional gait mechanics.  (0) minutes of Self- Care and Education  Unattended Electrical Stimulation (ES) for muscle performance or pain modulation.  Vasopneumatic Device Therapy () for management of swelling/edema. (36466)  BFR: Blood flow restriction applied during exercise  NP or (-): Not Performed    See EMR under MEDIA for written consent provided for Dry Needling- DATE   Palpation Assessment to determine the necessity for Functional " Dry Needling     PATIENT EDUCATION AND HOME EXERCISES      Education/Self-Care provided:  (included in treatment unless specified above) minutes   Patient educated on the impairments noted above and the effects of physical therapy intervention to improve overall condition and Quality of Life  Patient was educated on all the above exercise prior/during/after for proper posture, positioning, and execution for safe performance with home exercise program.      Written Home Exercises Provided: yes. Prefers: [x] Printed [x] Electronic  Exercises were reviewed and Rylee was able to demonstrate them prior to the end of the session.  Rylee demonstrated good understanding of the education provided. See EMR under Patient Instructions for exercises provided during therapy sessions.    ASSESSMENT     Patient tolerated PT session fairly well with moderate complains of pain discomfort with active assisted range of motion and resistance exercises. Her pain started at a seven out of 10 at the beginning of her session and decrease two below a five out of 10 at the end of her session with use of pain or leaving tactics and, a hot pack.    Alyx Is progressing well towards her goals.   Pt prognosis is Good.     Pt will continue to benefit from skilled outpatient physical therapy to address the deficits listed in the problem list box on initial evaluation, provide pt/family education and to maximize pt's level of independence in the home and community environment.     Pt's spiritual, cultural and educational needs considered and pt agreeable to plan of care and goals.    Anticipated Barriers for therapy: co-morbidities       SHORT TERM GOALS:  4 weeks (9/15/24) Progress Date met   Recent signs and systems trend is improving in order to progress towards Long term goals.  [x] Met  [] Not Met  [] Progressing  9/23   Patient will be independent with Home Exercise Program  in order to further progress and return to maximal function. []  Met  [] Not Met  [x] Progressing     Pain rating at Worst: 5 /10 in order to progress towards increased independence with activity. [] Met  [] Not Met  [x] Progressing     Patient will be able to correct postural deviations in sitting and standing, to decrease pain and promote postural awareness for injury prevention.  [] Met  [] Not Met  [x] Progressing     Patient will improve functional outcome (FOTO) score: by 5% to increase self-worth & perceived functional ability towards long term goals [] Met  [] Not Met  [x] Progressing        LONG TERM GOALS: Discharge (~12 Weeks) Progress Date met   Patient will return to normal activites of daily living, recreational, and work related activities with less pain and limitation.  [] Met  [] Not Met  [x] Progressing     Patient will improve range of motion  to stated goals in order to return to maximal functional potential.  [] Met  [] Not Met  [x] Progressing     Patient will improve Strength to stated goals of appropriate musculature in order to improve functional independence.  [] Met  [] Not Met  [x] Progressing     Pain Rating at Best: 1/10 to improve Quality of Life.  [] Met  [] Not Met  [x] Progressing     Patient will meet predicted functional outcome (FOTO) score: 80% to increase self-worth & perceived functional ability. [] Met  [] Not Met  [x] Progressing     Patient will have met/partially met personal goal of: to get back to daily activities, community based activities, and social activities.  [] Met  [] Not Met  [x] Progressing           PLAN   Plan of care Certification: 8/15/2024 to 11/15/2024    Continue Plan of Care (POC) and progress per patient tolerance. See Treatment section for exercise progression.    Bryanna Peña PT    Disclaimer: This note was prepared using a voice recognition system and is likely to have sound alike errors within the text.

## 2024-10-09 ENCOUNTER — CLINICAL SUPPORT (OUTPATIENT)
Dept: REHABILITATION | Facility: HOSPITAL | Age: 74
End: 2024-10-09
Payer: COMMERCIAL

## 2024-10-09 DIAGNOSIS — M25.611 DECREASED RANGE OF MOTION OF RIGHT SHOULDER: ICD-10-CM

## 2024-10-09 DIAGNOSIS — R29.898 DECREASED STRENGTH OF UPPER EXTREMITY: Primary | ICD-10-CM

## 2024-10-09 PROCEDURE — 97140 MANUAL THERAPY 1/> REGIONS: CPT | Performed by: PHYSICAL THERAPIST

## 2024-10-09 PROCEDURE — 97112 NEUROMUSCULAR REEDUCATION: CPT | Performed by: PHYSICAL THERAPIST

## 2024-10-09 NOTE — PROGRESS NOTES
Patient contacted - left V/M:  INR is in range at 2.1.  Lab collected.  Continue warfarin 5 mg every Monday, Friday; and 7.5 mg on all other days as directed.  Follow up has been scheduled for 2/06/2023 - [place] [facility], along with another appointment.  Any questions or concerns, contact the CC at [phone].     UV Treatment Record      Patient Information  Phototherapy orders per Dr. Denise  Diagnosis: psoriasis  Treatment:UVB  Skin Type: II  Treatment order 1: increase by 25 mj as tolerated  Treatment order 2:     Treatment Information Area 1   Treatment #: 65  Date: 10/09/24  Joules:   Sri-joules: 865  Mins:   Cum: 35,440  Special Shields: face shield    Reaction to Treatment Area 1  Red: 0 - None  Tender/Itchin - None    Onsite Physician Covering:  Dr. Denise

## 2024-10-11 NOTE — PROGRESS NOTES
[facility]   Physical Therapy Treatment Note       Name: Alyx Weir  [facility] Number: [id number]    Therapy Diagnosis:   Encounter Diagnoses   Name Primary?    Decreased strength of upper extremity Yes    Decreased range of motion of right shoulder      Physician: Carl Ruiz    Visit Date: 10/9/2024    Physician Orders: PT Eval and Treat  Medical Diagnosis from Referral: Traumatic incomplete tear of right rotator cuff, subsequent encounter [S46.011D], Subacromial impingement of right shoulder [M75.41]   Evaluation Date: 8/15/2024  Authorization Period Expiration: 6/7/2025  Plan of Care Expiration: 11/15/2024                          Progress Update: 10/15/2024   Visit # / Visits authorized: 10 / 12   FOTO: 8/15/2024 - Scored: 1 / 3       PRECAUTIONS: Standard Precautions, Safety/fall precautions, Orthopedic: Right Cuff Repair, Non-weight beariing, and Orthotic device type: abductor sling, Wearning schedule: full until 6 week follow up      PROBLEM LIST : pain, decreased motion all planes, decreased strength      Date of Surgery/Procedure 8/5/2024- Joseph   Surgery/Procedure Performed Status post Right Rotator Cuff Repair, Subacromial Decompression, Distal Clavicle Dissection, Biceps Tenodesis   Rehabilitation Precautions Protocol: Joseph Cuff Protocol      MD Follow up: 8/20/2024 9/17/2024 11/05/2024     Patient School:     Job/Position: Works at an alteration story    [facility] Visit #: 0/5     Time In: 1300  Time Out: 1405  Total Billable Time: 55 minutes    SUBJECTIVE     Pt reports: 7/10 today with pain in the arm near the shoulder and down the arm.  She knows she is feeling better, but she is still frustrated with the constant pain.  Compliance with Hep: Daily  Response to previous treatment: no adverse reactions to treatment/updated HEP  Functional change: Better    Pain: 5/10   Worst: 8/10  Location: Right shoulder      OBJECTIVE     Objective Measures updated at  "progress report unless specified otherwise.    Joint Position RIGHT   Sholder Flexion 110   Shoulder Extension 30   Shoulder Abduction 75   Shoulder ER at 90* Unable against gravity   Shoulder ER at 45* 30   Shoulder IR at 90* -   Functional ER unable   Functional IR unable       Treatment     Gym/Equipment Access: none  Time to Complete Exercises: increased for cueing and education    Alyx received the treatments listed below:      CPT Intervention  Right RCR  BT  SAD  DCR Duration/ Details  10/9   TE Arm Bike 3/3 forward and back   MT Joint mobilizations Glenohumeral mobs, All Planes, Grade II-IV   MT Mobilization with movement     TE Passive Motion     TE Assisted Motion T-ball Roll outs, 3' each  Flexion  Abduction  T-bar - 3 min each, 5s holds  Serratus Punch  Flexion Overhead  external rotation 45*  external rotation 90*   NMR Band Series T-band- red  Extensions 2x20 with 5" hold.  Adduction 2x20 with 5" hold.  Rows 2x10 with 5" hold.   NC Hot Pack              PLAN         CPT Codes available for Billing: Billing reflects 1:1 direct supervision  (15) minutes of Manual therapy (MT) to improve pain and ROM.  (20) minutes of Therapeutic Exercise (TE) to develop strength, endurance, range of motion, and flexibility.  (15) minutes of Neuromuscular Re-Education (NMR)  to improve: Balance, Coordination, Kinesthetic, Sense, Proprioception, and Posture.  (0) minutes of Therapeutic Activities (TA) to improve functional performance.  (0) minutes of Gait Training (GT) to improve functional gait mechanics.  (0) minutes of Self- Care and Education  Unattended Electrical Stimulation (ES) for muscle performance or pain modulation.  Vasopneumatic Device Therapy () for management of swelling/edema. (10630)  BFR: Blood flow restriction applied during exercise  NP or (-): Not Performed    See EMR under MEDIA for written consent provided for Dry Needling- DATE   Palpation Assessment to determine the necessity for Functional " Dry Needling     PATIENT EDUCATION AND HOME EXERCISES      Education/Self-Care provided:  (included in treatment unless specified above) minutes   Patient educated on the impairments noted above and the effects of physical therapy intervention to improve overall condition and Quality of Life  Patient was educated on all the above exercise prior/during/after for proper posture, positioning, and execution for safe performance with home exercise program.      Written Home Exercises Provided: yes. Prefers: [x] Printed [x] Electronic  Exercises were reviewed and Rylee was able to demonstrate them prior to the end of the session.  Rylee demonstrated good understanding of the education provided. See EMR under Patient Instructions for exercises provided during therapy sessions.    ASSESSMENT     Patient tolerated PT session fairly well with moderate complains of pain discomfort with active assisted range of motion and resistance exercises. Her pain started at a seven out of 10 at the beginning of her session and decrease two below a five out of 10 at the end of her session with use of pain or leaving tactics and, a hot pack.    Alyx Is progressing well towards her goals.   Pt prognosis is Good.     Pt will continue to benefit from skilled outpatient physical therapy to address the deficits listed in the problem list box on initial evaluation, provide pt/family education and to maximize pt's level of independence in the home and community environment.     Pt's spiritual, cultural and educational needs considered and pt agreeable to plan of care and goals.    Anticipated Barriers for therapy: co-morbidities       SHORT TERM GOALS:  4 weeks (9/15/24) Progress Date met   Recent signs and systems trend is improving in order to progress towards Long term goals.  [x] Met  [] Not Met  [] Progressing  9/23   Patient will be independent with Home Exercise Program  in order to further progress and return to maximal function. []  Met  [] Not Met  [x] Progressing     Pain rating at Worst: 5 /10 in order to progress towards increased independence with activity. [] Met  [] Not Met  [x] Progressing     Patient will be able to correct postural deviations in sitting and standing, to decrease pain and promote postural awareness for injury prevention.  [] Met  [] Not Met  [x] Progressing     Patient will improve functional outcome (FOTO) score: by 5% to increase self-worth & perceived functional ability towards long term goals [] Met  [] Not Met  [x] Progressing        LONG TERM GOALS: Discharge (~12 Weeks) Progress Date met   Patient will return to normal activites of daily living, recreational, and work related activities with less pain and limitation.  [] Met  [] Not Met  [x] Progressing     Patient will improve range of motion  to stated goals in order to return to maximal functional potential.  [] Met  [] Not Met  [x] Progressing     Patient will improve Strength to stated goals of appropriate musculature in order to improve functional independence.  [] Met  [] Not Met  [x] Progressing     Pain Rating at Best: 1/10 to improve Quality of Life.  [] Met  [] Not Met  [x] Progressing     Patient will meet predicted functional outcome (FOTO) score: 80% to increase self-worth & perceived functional ability. [] Met  [] Not Met  [x] Progressing     Patient will have met/partially met personal goal of: to get back to daily activities, community based activities, and social activities.  [] Met  [] Not Met  [x] Progressing           PLAN   Plan of care Certification: 8/15/2024 to 11/15/2024    Continue Plan of Care (POC) and progress per patient tolerance. See Treatment section for exercise progression.    Bryanna Peña PT    Disclaimer: This note was prepared using a voice recognition system and is likely to have sound alike errors within the text.

## 2024-10-14 ENCOUNTER — CLINICAL SUPPORT (OUTPATIENT)
Dept: REHABILITATION | Facility: HOSPITAL | Age: 74
End: 2024-10-14
Payer: COMMERCIAL

## 2024-10-14 DIAGNOSIS — R29.898 DECREASED STRENGTH OF UPPER EXTREMITY: Primary | ICD-10-CM

## 2024-10-14 DIAGNOSIS — M25.611 DECREASED RANGE OF MOTION OF RIGHT SHOULDER: ICD-10-CM

## 2024-10-14 PROCEDURE — 97112 NEUROMUSCULAR REEDUCATION: CPT | Performed by: PHYSICAL THERAPIST

## 2024-10-14 PROCEDURE — 97140 MANUAL THERAPY 1/> REGIONS: CPT | Performed by: PHYSICAL THERAPIST

## 2024-10-14 PROCEDURE — 97110 THERAPEUTIC EXERCISES: CPT | Performed by: PHYSICAL THERAPIST

## 2024-10-14 NOTE — PROGRESS NOTES
[facility]   Physical Therapy Treatment Note       Name: Alyx Weir  [facility] Number: [id number]    Therapy Diagnosis:   Encounter Diagnoses   Name Primary?    Decreased strength of upper extremity Yes    Decreased range of motion of right shoulder      Physician: Carl Ruiz    Visit Date: 10/14/2024    Physician Orders: PT Eval and Treat  Medical Diagnosis from Referral: Traumatic incomplete tear of right rotator cuff, subsequent encounter [S46.011D], Subacromial impingement of right shoulder [M75.41]   Evaluation Date: 8/15/2024  Authorization Period Expiration: 6/7/2025  Plan of Care Expiration: 11/15/2024                          Progress Update: 10/15/2024   Visit # / Visits authorized: 12 / 12   FOTO: 10/2 - Scored: 2 / 3       PRECAUTIONS: Standard Precautions, Safety/fall precautions, Orthopedic: Right Cuff Repair, Non-weight beariing, and Orthotic device type: abductor sling, Wearning schedule: full until 6 week follow up      PROBLEM LIST : pain, decreased motion all planes, decreased strength      Date of Surgery/Procedure 8/5/2024- Joseph   Surgery/Procedure Performed Status post Right Rotator Cuff Repair, Subacromial Decompression, Distal Clavicle Dissection, Biceps Tenodesis   Rehabilitation Precautions Protocol: Joseph Cuff Protocol      MD Follow up: 8/20/2024 9/17/2024 11/05/2024     Patient School:     Job/Position: Works at an alteration story    [facility] Visit #: 0/5     Time In: 1300  Time Out: 1405  Total Billable Time: 50 minutes    SUBJECTIVE     Pt reports: She still feels the soreness in her arm with movement so she took tylenol before therapy today to help.   Compliance with Hep: Daily  Response to previous treatment: no adverse reactions to treatment/updated HEP  Functional change: Better    Pain: 5/10   Worst: 8/10  Location: Right shoulder      OBJECTIVE     Objective Measures updated at progress report unless specified otherwise.    Joint  "Position RIGHT   Sholder Flexion 110   Shoulder Extension 30   Shoulder Abduction 75   Shoulder ER at 90* Unable against gravity   Shoulder ER at 45* 30   Shoulder IR at 90* -   Functional ER unable   Functional IR unable       Treatment     Gym/Equipment Access: none  Time to Complete Exercises: increased for cueing and education    Alyx received the treatments listed below:      CPT Intervention  Right RCR  BT  SAD  DCR Duration/ Details  10/14/24   TE Arm Bike 3/3 forward and back   MT Joint mobilizations Glenohumeral mobs, All Planes, Grade II-IV   TE Assisted Motion T-ball Roll outs, 3' each  Flexion  Abduction  T-bar - 3 min each, 5s holds  Serratus Punch  Flexion Overhead  external rotation 45*  external rotation 90*   NMR Band Series T-band- (red)  Extensions 2x20 with 5" hold.  Adduction 2x20 with 5" hold.  Rows 2x10 with 5" hold.   NC Hot Pack  Yes, conclusion of PT   PLAN         CPT Codes available for Billing: Billing reflects 1:1 direct supervision  (15) minutes of Manual therapy (MT) to improve pain and ROM.  (20) minutes of Therapeutic Exercise (TE) to develop strength, endurance, range of motion, and flexibility.  (15) minutes of Neuromuscular Re-Education (NMR)  to improve: Balance, Coordination, Kinesthetic, Sense, Proprioception, and Posture.  (0) minutes of Therapeutic Activities (TA) to improve functional performance.  (0) minutes of Gait Training (GT) to improve functional gait mechanics.  (0) minutes of Self- Care and Education  Unattended Electrical Stimulation (ES) for muscle performance or pain modulation.  Vasopneumatic Device Therapy () for management of swelling/edema. (55403)  BFR: Blood flow restriction applied during exercise  NP or (-): Not Performed    See EMR under MEDIA for written consent provided for Dry Needling- DATE   Palpation Assessment to determine the necessity for Functional Dry Needling     PATIENT EDUCATION AND HOME EXERCISES      Education/Self-Care provided:  " (included in treatment unless specified above) minutes   Patient educated on the impairments noted above and the effects of physical therapy intervention to improve overall condition and Quality of Life  Patient was educated on all the above exercise prior/during/after for proper posture, positioning, and execution for safe performance with home exercise program.      Written Home Exercises Provided: yes. Prefers: [x] Printed [x] Electronic  Exercises were reviewed and Rylee was able to demonstrate them prior to the end of the session.  Rylee demonstrated good understanding of the education provided. See EMR under Patient Instructions for exercises provided during therapy sessions.    ASSESSMENT     Alyx Weir tolerated Physical Therapy  session well with minimal  complaints of pain or discomfort.  Objective findings are improving with pain, range of motion, strength, endurance, exercise tolerance, and functional mobility.  Alyx Weir continues to have difficulty with arm pain with overhead motions, but she does believe her pain is lessening with time and is motivated by that. .  Alyx demonstrated good understanding of new exercises and will continue to progress at home until next follow-up.     Alyx Is progressing well towards her goals.   Pt prognosis is Good.     Pt will continue to benefit from skilled outpatient physical therapy to address the deficits listed in the problem list box on initial evaluation, provide pt/family education and to maximize pt's level of independence in the home and community environment.     Pt's spiritual, cultural and educational needs considered and pt agreeable to plan of care and goals.    Anticipated Barriers for therapy: co-morbidities       SHORT TERM GOALS:  4 weeks (9/15/24) Progress Date met   Recent signs and systems trend is improving in order to progress towards Long term goals.  [x] Met  [] Not Met  [] Progressing  9/23   Patient will be independent with Home  Exercise Program  in order to further progress and return to maximal function. [] Met  [] Not Met  [x] Progressing     Pain rating at Worst: 5 /10 in order to progress towards increased independence with activity. [] Met  [] Not Met  [x] Progressing     Patient will be able to correct postural deviations in sitting and standing, to decrease pain and promote postural awareness for injury prevention.  [] Met  [] Not Met  [x] Progressing     Patient will improve functional outcome (FOTO) score: by 5% to increase self-worth & perceived functional ability towards long term goals [] Met  [] Not Met  [x] Progressing        LONG TERM GOALS: Discharge (~12 Weeks) Progress Date met   Patient will return to normal activites of daily living, recreational, and work related activities with less pain and limitation.  [] Met  [] Not Met  [x] Progressing     Patient will improve range of motion  to stated goals in order to return to maximal functional potential.  [] Met  [] Not Met  [x] Progressing     Patient will improve Strength to stated goals of appropriate musculature in order to improve functional independence.  [] Met  [] Not Met  [x] Progressing     Pain Rating at Best: 1/10 to improve Quality of Life.  [] Met  [] Not Met  [x] Progressing     Patient will meet predicted functional outcome (FOTO) score: 80% to increase self-worth & perceived functional ability. [] Met  [] Not Met  [x] Progressing     Patient will have met/partially met personal goal of: to get back to daily activities, community based activities, and social activities.  [] Met  [] Not Met  [x] Progressing           PLAN   Plan of care Certification: 8/15/2024 to 11/15/2024    Continue Plan of Care (POC) and progress per patient tolerance. See Treatment section for exercise progression.    Bryanna Peña, PT    Disclaimer: This note was prepared using a voice recognition system and is likely to have sound alike errors within the text.

## 2024-10-17 ENCOUNTER — CLINICAL SUPPORT (OUTPATIENT)
Dept: REHABILITATION | Facility: HOSPITAL | Age: 74
End: 2024-10-17
Payer: COMMERCIAL

## 2024-10-17 DIAGNOSIS — M25.611 DECREASED RANGE OF MOTION OF RIGHT SHOULDER: ICD-10-CM

## 2024-10-17 DIAGNOSIS — Z98.890 STATUS POST ROTATOR CUFF REPAIR: ICD-10-CM

## 2024-10-17 DIAGNOSIS — R29.898 DECREASED STRENGTH OF UPPER EXTREMITY: Primary | ICD-10-CM

## 2024-10-17 PROCEDURE — 97110 THERAPEUTIC EXERCISES: CPT | Performed by: PHYSICAL THERAPIST

## 2024-10-17 PROCEDURE — 97140 MANUAL THERAPY 1/> REGIONS: CPT | Performed by: PHYSICAL THERAPIST

## 2024-10-17 PROCEDURE — 97112 NEUROMUSCULAR REEDUCATION: CPT | Performed by: PHYSICAL THERAPIST

## 2024-10-22 NOTE — PROGRESS NOTES
[facility]   Physical Therapy Treatment Note      Name: Alyx Weir  [facility] Number: [id number]    Therapy Diagnosis:   Encounter Diagnoses   Name Primary?    Status post rotator cuff repair     Decreased strength of upper extremity Yes    Decreased range of motion of right shoulder      Physician: Carl Riuz    Visit Date: 10/17/2024    Physician Orders: PT Eval and Treat  Medical Diagnosis from Referral: Traumatic incomplete tear of right rotator cuff, subsequent encounter [S46.011D], Subacromial impingement of right shoulder [M75.41]   Evaluation Date: 8/15/2024  Authorization Period Expiration: 6/7/2025  Plan of Care Expiration: 11/15/2024                          Progress Update: 11/15/2024   Visit # / Visits authorized: 1 / 12 (already completed 12)  FOTO: 10/2 - Scored: 2 / 3       PRECAUTIONS: Standard Precautions, Safety/fall precautions, Orthopedic: Right Cuff Repair, Non-weight beariing, and Orthotic device type: abductor sling, Wearning schedule: full until 6 week follow up      PROBLEM LIST : pain, decreased motion all planes, decreased strength      Date of Surgery/Procedure 8/5/2024- Joseph   Surgery/Procedure Performed Status post Right Rotator Cuff Repair, Subacromial Decompression, Distal Clavicle Dissection, Biceps Tenodesis   Rehabilitation Precautions Protocol: Joseph Cuff Protocol      MD Follow up: 8/20/2024 9/17/2024 11/05/2024     Patient School:     Job/Position: Works at an alteration story    [facility] Visit #: 0/5     Time In: 1100  Time Out: 1205  Total Billable Time: 55 minutes    SUBJECTIVE     Pt reports: Patient reports continued increased pain through the shoulder. She is sore following each session and still has pain pain down the arm that radiates from the shoulder itself.  Compliance with Hep: Daily  Response to previous treatment: no adverse reactions to treatment/updated HEP  Functional change: Better    Pain: 5/10   Worst:  "8/10  Location: Right shoulder      OBJECTIVE     Objective Measures updated at progress report unless specified otherwise.    Joint Position RIGHT   Sholder Flexion 110   Shoulder Extension 30   Shoulder Abduction 75   Shoulder ER at 90* Unable against gravity   Shoulder ER at 45* 30   Shoulder IR at 90* -   Functional ER unable   Functional IR unable       Treatment     Gym/Equipment Access: none  Time to Complete Exercises: increased for cueing and education    Alyx received the treatments listed below:      CPT Intervention  Right RCR  BT  SAD  DCR Duration/ Details  10/14/24   TE Arm Bike 3/3 forward and back   MT Joint mobilizations Glenohumeral mobs, All Planes, Grade II-IV   TE Assisted Motion T-ball Roll outs, 3' each  Flexion  Abduction  T-bar - 3 min each, 5s holds  Serratus Punch  Flexion Overhead  external rotation 45*  external rotation 90*   NMR Band Series T-band- (red)  Extensions 2x20 with 5" hold.  Adduction 2x20 with 5" hold.  Rows 2x10 with 5" hold.   NC Hot Pack  Yes, conclusion of PT   PLAN         CPT Codes available for Billing: Billing reflects 1:1 direct supervision  (15) minutes of Manual therapy (MT) to improve pain and ROM.  (25) minutes of Therapeutic Exercise (TE) to develop strength, endurance, range of motion, and flexibility.  (15) minutes of Neuromuscular Re-Education (NMR)  to improve: Balance, Coordination, Kinesthetic, Sense, Proprioception, and Posture.  (0) minutes of Therapeutic Activities (TA) to improve functional performance.  (0) minutes of Gait Training (GT) to improve functional gait mechanics.  (0) minutes of Self- Care and Education  Unattended Electrical Stimulation (ES) for muscle performance or pain modulation.  Vasopneumatic Device Therapy () for management of swelling/edema. (44678)  BFR: Blood flow restriction applied during exercise  NP or (-): Not Performed    See EMR under MEDIA for written consent provided for Dry Needling- DATE   Palpation Assessment " to determine the necessity for Functional Dry Needling     PATIENT EDUCATION AND HOME EXERCISES      Education/Self-Care provided:  (included in treatment unless specified above) minutes   Patient educated on the impairments noted above and the effects of physical therapy intervention to improve overall condition and Quality of Life  Patient was educated on all the above exercise prior/during/after for proper posture, positioning, and execution for safe performance with home exercise program.      Written Home Exercises Provided: yes. Prefers: [x] Printed [x] Electronic  Exercises were reviewed and Rylee was able to demonstrate them prior to the end of the session.  Rylee demonstrated good understanding of the education provided. See EMR under Patient Instructions for exercises provided during therapy sessions.    ASSESSMENT     Patient tolerated PT session fairly well. She was able to increase her resistance in her scapular strengthening series with the band and she had more tolerance for long periods of time with improved posture and energy levels.    Alyx Is progressing well towards her goals.   Pt prognosis is Good.     Pt will continue to benefit from skilled outpatient physical therapy to address the deficits listed in the problem list box on initial evaluation, provide pt/family education and to maximize pt's level of independence in the home and community environment.     Pt's spiritual, cultural and educational needs considered and pt agreeable to plan of care and goals.    Anticipated Barriers for therapy: co-morbidities       SHORT TERM GOALS:  4 weeks (9/15/24) Progress Date met   Recent signs and systems trend is improving in order to progress towards Long term goals.  [x] Met  [] Not Met  [] Progressing  9/23   Patient will be independent with Home Exercise Program  in order to further progress and return to maximal function. [] Met  [] Not Met  [x] Progressing     Pain rating at Worst: 5 /10 in order  to progress towards increased independence with activity. [] Met  [] Not Met  [x] Progressing     Patient will be able to correct postural deviations in sitting and standing, to decrease pain and promote postural awareness for injury prevention.  [] Met  [] Not Met  [x] Progressing     Patient will improve functional outcome (FOTO) score: by 5% to increase self-worth & perceived functional ability towards long term goals [] Met  [] Not Met  [x] Progressing        LONG TERM GOALS: Discharge (~12 Weeks) Progress Date met   Patient will return to normal activites of daily living, recreational, and work related activities with less pain and limitation.  [] Met  [] Not Met  [x] Progressing     Patient will improve range of motion  to stated goals in order to return to maximal functional potential.  [] Met  [] Not Met  [x] Progressing     Patient will improve Strength to stated goals of appropriate musculature in order to improve functional independence.  [] Met  [] Not Met  [x] Progressing     Pain Rating at Best: 1/10 to improve Quality of Life.  [] Met  [] Not Met  [x] Progressing     Patient will meet predicted functional outcome (FOTO) score: 80% to increase self-worth & perceived functional ability. [] Met  [] Not Met  [x] Progressing     Patient will have met/partially met personal goal of: to get back to daily activities, community based activities, and social activities.  [] Met  [] Not Met  [x] Progressing           PLAN   Plan of care Certification: 8/15/2024 to 11/15/2024    Continue Plan of Care (POC) and progress per patient tolerance. See Treatment section for exercise progression.    Bryanna Peña, PT    Disclaimer: This note was prepared using a voice recognition system and is likely to have sound alike errors within the text.

## 2024-10-23 ENCOUNTER — CLINICAL SUPPORT (OUTPATIENT)
Dept: REHABILITATION | Facility: HOSPITAL | Age: 74
End: 2024-10-23
Attending: STUDENT IN AN ORGANIZED HEALTH CARE EDUCATION/TRAINING PROGRAM
Payer: COMMERCIAL

## 2024-10-23 ENCOUNTER — ANTI-COAG VISIT (OUTPATIENT)
Dept: CARDIOLOGY | Facility: CLINIC | Age: 74
End: 2024-10-23
Payer: MEDICARE

## 2024-10-23 ENCOUNTER — TELEPHONE (OUTPATIENT)
Dept: FAMILY MEDICINE | Facility: CLINIC | Age: 74
End: 2024-10-23
Payer: MEDICARE

## 2024-10-23 DIAGNOSIS — D68.51 HETEROZYGOUS FACTOR V LEIDEN MUTATION: Chronic | ICD-10-CM

## 2024-10-23 DIAGNOSIS — M25.611 DECREASED RANGE OF MOTION OF RIGHT SHOULDER: ICD-10-CM

## 2024-10-23 DIAGNOSIS — Z79.01 ANTICOAGULATED ON COUMADIN: Chronic | ICD-10-CM

## 2024-10-23 DIAGNOSIS — R29.898 DECREASED STRENGTH OF UPPER EXTREMITY: Primary | ICD-10-CM

## 2024-10-23 DIAGNOSIS — Z79.01 LONG TERM (CURRENT) USE OF ANTICOAGULANTS: Primary | ICD-10-CM

## 2024-10-23 LAB
CTP QC/QA: YES
INR PPP: 3.2 (ref 2–3)

## 2024-10-23 PROCEDURE — 97110 THERAPEUTIC EXERCISES: CPT | Performed by: PHYSICAL THERAPIST

## 2024-10-23 PROCEDURE — 85610 PROTHROMBIN TIME: CPT | Mod: QW,HCNC,S$GLB, | Performed by: INTERNAL MEDICINE

## 2024-10-23 PROCEDURE — 97112 NEUROMUSCULAR REEDUCATION: CPT | Performed by: PHYSICAL THERAPIST

## 2024-10-23 PROCEDURE — 93793 ANTICOAG MGMT PT WARFARIN: CPT | Mod: HCNC,S$GLB,,

## 2024-10-23 NOTE — PROGRESS NOTES
Patient's INR Is supra-therapeutic at 3.2. Patient confirms she followed previous instructions. Patient reports she has not been eating greens weekly. Advised of increased risk of bleeding; sign/symptoms of bleeding and need to go to ED if she experiences any. Patient reports she is not having any signs/symptoms of bleeding. Instructions given: Will eat a 1/2 cup of dark leafy greens today 10/23/24; and continue warfarin 7.5 mg on Thursdays, Saturdays and Tuesdays; and 5 mg all other days. Recheck on 11/5/24. Calendar reviewed with patient. Patient verbalizes understanding.

## 2024-10-24 ENCOUNTER — TELEPHONE (OUTPATIENT)
Dept: FAMILY MEDICINE | Facility: CLINIC | Age: 74
End: 2024-10-24
Payer: MEDICARE

## 2024-10-24 NOTE — TELEPHONE ENCOUNTER
----- Message from Alyx sent at 10/24/2024  2:47 PM CDT -----  Contact: Patient, [phone]  Patient is returning a phone call.    Who left a message for the patient: Amanda    Does patient know what this is regarding:  Schedule EAW    Would you like a call back, or a response through your [facility] portal?:   Call back    Comments: Missed your call, please call her back. Thanks.

## 2024-10-24 NOTE — PROGRESS NOTES
[facility]   Physical Therapy Treatment Note      Name: Alyx Weir  [facility] Number: [id number]    Therapy Diagnosis:   Encounter Diagnoses   Name Primary?    Decreased strength of upper extremity Yes    Decreased range of motion of right shoulder      Physician: Carl Ruiz    Visit Date: 10/23/2024    Physician Orders: PT Eval and Treat  Medical Diagnosis from Referral: Traumatic incomplete tear of right rotator cuff, subsequent encounter [S46.011D], Subacromial impingement of right shoulder [M75.41]   Evaluation Date: 8/15/2024  Authorization Period Expiration: 6/7/2025  Plan of Care Expiration: 11/15/2024                          Progress Update: 11/15/2024   Visit # / Visits authorized: 2 / 12 (already completed 12)  FOTO: 10/2 - Scored: 2 / 3       PRECAUTIONS: Standard Precautions, Safety/fall precautions, Orthopedic: Right Cuff Repair, Non-weight beariing, and Orthotic device type: abductor sling, Wearning schedule: full until 6 week follow up      PROBLEM LIST : pain, decreased motion all planes, decreased strength      Date of Surgery/Procedure 8/5/2024- Joseph   Surgery/Procedure Performed Status post Right Rotator Cuff Repair, Subacromial Decompression, Distal Clavicle Dissection, Biceps Tenodesis   Rehabilitation Precautions Protocol: Joseph Cuff Protocol      MD Follow up:  11/05/2024     Patient School:     Job/Position: Works at an alteration story    [facility] Visit #: 0/5     Time In: 1240  Time Out: 1340  Total Billable Time: 55 minutes    SUBJECTIVE     Pt reports: The pain that she is having in the proximal elbow up to the distal shoulder.   Compliance with Hep: Daily  Response to previous treatment: no adverse reactions to treatment/updated HEP  Functional change: Better    Pain: 5/10   Worst: 8/10  Location: Right shoulder      OBJECTIVE     Objective Measures updated at progress report unless specified otherwise.    Joint Position RIGHT RIGHT (warm)  "  Sholder Flexion 110 100 / 106 aa   Shoulder Extension 30 43   Shoulder Abduction 75 78   Shoulder ER at 90* Unable against gravity unable   Shoulder ER at 45* 30 48   Shoulder IR at 90* -     Functional ER unable     Functional IR unable         Treatment     Gym/Equipment Access: none  Time to Complete Exercises: increased for cueing and education    Alyx received the treatments listed below:      CPT Intervention  Right RCR  BT  SAD  DCR Duration/ Details  10/23   TE Arm Bike 3/3 forward and back   TE Pulleys Flexion & Abduction 4' ea   TE Assisted Motion T-bar - 3 min each, 5s holds- in a chair  Serratus Punch  Flexion Overhead  external rotation 45*  external rotation 90*   NMR Band Series T-band- (green)  Extensions 2x20 with 5" hold.  Adduction 2x20 with 5" hold.  Rows 2x10 with 5" hold.  Internal Rotation 10x10" walkouts  External  10x10" walkouts   NC Hot Pack  Yes, conclusion of PT    PLAN         CPT Codes available for Billing: Billing reflects 1:1 direct supervision  (-) minutes of Manual therapy (MT) to improve pain and ROM.  (25) minutes of Therapeutic Exercise (TE) to develop strength, endurance, range of motion, and flexibility.  (25) minutes of Neuromuscular Re-Education (NMR)  to improve: Balance, Coordination, Kinesthetic, Sense, Proprioception, and Posture.  (0) minutes of Therapeutic Activities (TA) to improve functional performance.  (0) minutes of Gait Training (GT) to improve functional gait mechanics.  (0) minutes of Self- Care and Education  Unattended Electrical Stimulation (ES) for muscle performance or pain modulation.  Vasopneumatic Device Therapy () for management of swelling/edema. (44181)  BFR: Blood flow restriction applied during exercise  NP or (-): Not Performed    See EMR under MEDIA for written consent provided for Dry Needling- DATE   Palpation Assessment to determine the necessity for Functional Dry Needling     PATIENT EDUCATION AND HOME EXERCISES    "   Education/Self-Care provided:  (included in treatment unless specified above) minutes   Patient educated on the impairments noted above and the effects of physical therapy intervention to improve overall condition and Quality of Life  Patient was educated on all the above exercise prior/during/after for proper posture, positioning, and execution for safe performance with home exercise program.      Written Home Exercises Provided: yes. Prefers: [x] Printed [x] Electronic  Exercises were reviewed and Rylee was able to demonstrate them prior to the end of the session.  Rylee demonstrated good understanding of the education provided. See EMR under Patient Instructions for exercises provided during therapy sessions.    ASSESSMENT     Alyx Weir tolerated Physical Therapy  session well with moderate  complaints of pain or discomfort.  Objective findings are improving with pain, range of motion, strength, endurance, exercise tolerance, and functional mobility.  Alyx Weir continues to have difficulty with pain in the midshaft of her humerus that will not dissipate with activity.  She was educated on the value of ice and heat to alternate at home for pain releif, as well as working through the exercises as tolerated.  Alyx demonstrated good understanding of new exercises and will continue to progress at home until next follow-up.     Alyx Is progressing well towards her goals.   Pt prognosis is Good.     Pt will continue to benefit from skilled outpatient physical therapy to address the deficits listed in the problem list box on initial evaluation, provide pt/family education and to maximize pt's level of independence in the home and community environment.     Pt's spiritual, cultural and educational needs considered and pt agreeable to plan of care and goals.    Anticipated Barriers for therapy: co-morbidities       SHORT TERM GOALS:  4 weeks (9/15/24) Progress Date met   Recent signs and systems trend is  improving in order to progress towards Long term goals.  [x] Met  [] Not Met  [] Progressing  9/23   Patient will be independent with Home Exercise Program  in order to further progress and return to maximal function. [] Met  [] Not Met  [x] Progressing     Pain rating at Worst: 5 /10 in order to progress towards increased independence with activity. [] Met  [] Not Met  [x] Progressing     Patient will be able to correct postural deviations in sitting and standing, to decrease pain and promote postural awareness for injury prevention.  [] Met  [] Not Met  [x] Progressing     Patient will improve functional outcome (FOTO) score: by 5% to increase self-worth & perceived functional ability towards long term goals [] Met  [] Not Met  [x] Progressing        LONG TERM GOALS: Discharge (~12 Weeks) Progress Date met   Patient will return to normal activites of daily living, recreational, and work related activities with less pain and limitation.  [] Met  [] Not Met  [x] Progressing     Patient will improve range of motion  to stated goals in order to return to maximal functional potential.  [] Met  [] Not Met  [x] Progressing     Patient will improve Strength to stated goals of appropriate musculature in order to improve functional independence.  [] Met  [] Not Met  [x] Progressing     Pain Rating at Best: 1/10 to improve Quality of Life.  [] Met  [] Not Met  [x] Progressing     Patient will meet predicted functional outcome (FOTO) score: 80% to increase self-worth & perceived functional ability. [] Met  [] Not Met  [x] Progressing     Patient will have met/partially met personal goal of: to get back to daily activities, community based activities, and social activities.  [] Met  [] Not Met  [x] Progressing           PLAN   Plan of care Certification: 8/15/2024 to 11/15/2024    Continue Plan of Care (POC) and progress per patient tolerance. See Treatment section for exercise progression.    Bryanna Peña, PT    Disclaimer:  This note was prepared using a voice recognition system and is likely to have sound alike errors within the text.

## 2024-10-28 ENCOUNTER — CLINICAL SUPPORT (OUTPATIENT)
Dept: REHABILITATION | Facility: HOSPITAL | Age: 74
End: 2024-10-28
Attending: STUDENT IN AN ORGANIZED HEALTH CARE EDUCATION/TRAINING PROGRAM
Payer: COMMERCIAL

## 2024-10-28 DIAGNOSIS — R29.898 DECREASED STRENGTH OF UPPER EXTREMITY: Primary | ICD-10-CM

## 2024-10-28 DIAGNOSIS — M25.611 DECREASED RANGE OF MOTION OF RIGHT SHOULDER: ICD-10-CM

## 2024-10-28 PROCEDURE — 97112 NEUROMUSCULAR REEDUCATION: CPT | Performed by: PHYSICAL THERAPIST

## 2024-10-28 PROCEDURE — 97110 THERAPEUTIC EXERCISES: CPT | Performed by: PHYSICAL THERAPIST

## 2024-10-30 ENCOUNTER — CLINICAL SUPPORT (OUTPATIENT)
Dept: REHABILITATION | Facility: HOSPITAL | Age: 74
End: 2024-10-30
Attending: STUDENT IN AN ORGANIZED HEALTH CARE EDUCATION/TRAINING PROGRAM
Payer: COMMERCIAL

## 2024-10-30 DIAGNOSIS — M25.611 DECREASED RANGE OF MOTION OF RIGHT SHOULDER: ICD-10-CM

## 2024-10-30 DIAGNOSIS — R29.898 DECREASED STRENGTH OF UPPER EXTREMITY: Primary | ICD-10-CM

## 2024-10-30 PROCEDURE — 97110 THERAPEUTIC EXERCISES: CPT | Performed by: PHYSICAL THERAPIST

## 2024-10-30 PROCEDURE — 97140 MANUAL THERAPY 1/> REGIONS: CPT | Performed by: PHYSICAL THERAPIST

## 2024-11-05 ENCOUNTER — OFFICE VISIT (OUTPATIENT)
Dept: SPORTS MEDICINE | Facility: CLINIC | Age: 74
End: 2024-11-05
Payer: COMMERCIAL

## 2024-11-05 ENCOUNTER — LAB VISIT (OUTPATIENT)
Dept: LAB | Facility: HOSPITAL | Age: 74
End: 2024-11-05
Attending: INTERNAL MEDICINE
Payer: MEDICARE

## 2024-11-05 ENCOUNTER — ANTI-COAG VISIT (OUTPATIENT)
Dept: CARDIOLOGY | Facility: CLINIC | Age: 74
End: 2024-11-05
Payer: MEDICARE

## 2024-11-05 VITALS — RESPIRATION RATE: 17 BRPM | BODY MASS INDEX: 30.31 KG/M2 | WEIGHT: 171.06 LBS | HEIGHT: 63 IN

## 2024-11-05 DIAGNOSIS — D68.51 HETEROZYGOUS FACTOR V LEIDEN MUTATION: Chronic | ICD-10-CM

## 2024-11-05 DIAGNOSIS — Z98.890 STATUS POST ROTATOR CUFF REPAIR: Primary | ICD-10-CM

## 2024-11-05 DIAGNOSIS — Z79.01 ANTICOAGULATED ON COUMADIN: Chronic | ICD-10-CM

## 2024-11-05 DIAGNOSIS — Z79.01 ANTICOAGULATED ON COUMADIN: Primary | Chronic | ICD-10-CM

## 2024-11-05 LAB
INR PPP: 1.8 (ref 0.8–1.2)
PROTHROMBIN TIME: 18.8 SEC (ref 9–12.5)

## 2024-11-05 PROCEDURE — 99213 OFFICE O/P EST LOW 20 MIN: CPT | Mod: S$GLB,,, | Performed by: STUDENT IN AN ORGANIZED HEALTH CARE EDUCATION/TRAINING PROGRAM

## 2024-11-05 PROCEDURE — 85610 PROTHROMBIN TIME: CPT | Mod: HCNC | Performed by: INTERNAL MEDICINE

## 2024-11-05 PROCEDURE — 36415 COLL VENOUS BLD VENIPUNCTURE: CPT | Mod: HCNC | Performed by: INTERNAL MEDICINE

## 2024-11-05 PROCEDURE — 99999 PR PBB SHADOW E&M-EST. PATIENT-LVL IV: CPT | Mod: PBBFAC,,, | Performed by: STUDENT IN AN ORGANIZED HEALTH CARE EDUCATION/TRAINING PROGRAM

## 2024-11-05 NOTE — LETTER
November 5, 2024      [facility]  [address]  [place] [address]  Phone: [phone]  Fax: [phone]       Patient: Alyx Weir   YOB: 1950  Date of Visit: 11/05/2024    To Whom It May Concern:    Eduar Weir  was at [facility] on 11/05/2024. The patient may return to work/school on 11/6/24 with the following restrictions.     Allow time for physical therapy  No lifting/pushing/pulling greater than 5 pounds  No overhead work  No repetitive motions    If restrictions are unable to be accommodated, then she should remain out of work at this time. She will be re-evaluated on 12/17/24 at which time restrictions will be updated. She will continue with physical therapy for 2 days/week for the next 6 weeks. If you have any questions or concerns, or if I can be of further assistance, please do not hesitate to contact me.    Sincerely,      Carl Ruiz MD

## 2024-11-05 NOTE — PROGRESS NOTES
INR not at goal-1.8. Medications, chart, and patient findings reviewed. See calendar for adjustments to dose and follow up plan.  Boost today with 10 mg, then resume dose.  Repeat INR in 2 weeks with [facility] appointment.

## 2024-11-06 NOTE — PROGRESS NOTES
Ms  was contacted and advised.  Initially reported an incorrect warfarin dose, as she was using dosing from the bottle.  Pt then found her calendar and then reports that she has followed her dose prescribed in [facility].  She had also already taken warfarin dose for the day.  We will boost with 7.5 mg tomorrow then resume dose.  Repeat INR in 2 weeks at [facility].

## 2024-11-07 ENCOUNTER — CLINICAL SUPPORT (OUTPATIENT)
Dept: REHABILITATION | Facility: HOSPITAL | Age: 74
End: 2024-11-07
Attending: STUDENT IN AN ORGANIZED HEALTH CARE EDUCATION/TRAINING PROGRAM
Payer: COMMERCIAL

## 2024-11-07 DIAGNOSIS — M25.611 DECREASED RANGE OF MOTION OF RIGHT SHOULDER: ICD-10-CM

## 2024-11-07 DIAGNOSIS — R29.898 DECREASED STRENGTH OF UPPER EXTREMITY: Primary | ICD-10-CM

## 2024-11-07 PROCEDURE — 97110 THERAPEUTIC EXERCISES: CPT | Performed by: PHYSICAL THERAPIST

## 2024-11-07 PROCEDURE — 97112 NEUROMUSCULAR REEDUCATION: CPT | Performed by: PHYSICAL THERAPIST

## 2024-11-07 NOTE — PROGRESS NOTES
[facility]   Physical Therapy Treatment Note      Name: Alyx Weir  [facility] Number: [id number]    Therapy Diagnosis:   Encounter Diagnoses   Name Primary?    Decreased strength of upper extremity Yes    Decreased range of motion of right shoulder      Physician: Carl Ruiz    Visit Date: 11/7/2024    Physician Orders: PT Eval and Treat  Medical Diagnosis from Referral: Traumatic incomplete tear of right rotator cuff, subsequent encounter [S46.011D], Subacromial impingement of right shoulder [M75.41]   Evaluation Date: 8/15/2024  Authorization Period Expiration: 6/7/2025  Plan of Care Expiration: 11/15/2024                          Progress Update: 11/15/2024   Visit # / Visits authorized: 4 / 12 (already completed 12)  FOTO: 10/2 - Scored: 2 / 3       PRECAUTIONS: Standard Precautions, Safety/fall precautions, Orthopedic: Right Cuff Repair, Non-weight beariing, and Orthotic device type: abductor sling, Wearning schedule: full until 6 week follow up      PROBLEM LIST : pain, decreased motion all planes, decreased strength      Date of Surgery/Procedure 8/5/2024- Joseph   Surgery/Procedure Performed Status post Right Rotator Cuff Repair, Subacromial Decompression, Distal Clavicle Dissection, Biceps Tenodesis   Rehabilitation Precautions Protocol: Joseph Cuff Protocol      MD Follow up:  11/05/2024     Patient School:     Job/Position: Works at an alteration story    [facility] Visit #: 0/5     Time In: 1530  Time Out: 1630  Total Billable Time: 55 minutes    SUBJECTIVE     Pt reports: she had a little bit of sourness fallen last session and then was really sore and she saw the doctor. Most of her symptoms went away yesterday, and shes about a five out of 10 on pain today.  Compliance with Hep: Daily  Response to previous treatment: no adverse reactions to treatment/updated HEP  Functional change: Better    Pain: 5/10   Worst: 8/10  Location: Right shoulder      OBJECTIVE  "    Objective Measures updated at progress report unless specified otherwise.    Joint Position RIGHT RIGHT (warm)   Sholder Flexion 110aa 104 / 115 aa   Shoulder Extension 30 35   Shoulder Abduction 75 64   Shoulder ER at 90* Unable against gravity unable   Shoulder ER at 45* 30 40   Shoulder IR at 90* -     Functional ER unable     Functional IR unable         Treatment     Gym/Equipment Access: none  Time to Complete Exercises: increased for cueing and education    Alyx received the treatments listed below:      CPT Intervention  Right RCR  BT  SAD  DCR Duration/ Details  11/7   MT Soft Tissue massage     TE Passive Range of motion External rotation, Flexion, Scaption   TE Arm Bike 3/3 forward and back   TE Pulleys Flexion & Abduction 4' ea   TE Assisted Motion T-bar - 3 min each, 5s holds- in a chair  Serratus Punch  Flexion Overhead  external rotation 45*  external rotation 90*  T-Ball Roll outs  Flexion 5'  Abduction 5'   NMR Band Series T-band- (green)  Extensions 2x20 with 5" hold.  Adduction 2x20 with 5" hold.  Rows 2x10 with 5" hold.  Internal Rotation 10x10" walkouts  External  10x10" walkouts   NC Hot Pack      PLAN   Progress note/ POC next visit     CPT Codes available for Billing: Billing reflects 1:1 direct supervision  (0) minutes of Manual therapy (MT) to improve pain and ROM.  (30) minutes of Therapeutic Exercise (TE) to develop strength, endurance, range of motion, and flexibility.  (25) minutes of Neuromuscular Re-Education (NMR)  to improve: Balance, Coordination, Kinesthetic, Sense, Proprioception, and Posture.  (0) minutes of Therapeutic Activities (TA) to improve functional performance.  (0) minutes of Gait Training (GT) to improve functional gait mechanics.  (0) minutes of Self- Care and Education  Unattended Electrical Stimulation (ES) for muscle performance or pain modulation.  Vasopneumatic Device Therapy () for management of swelling/edema. (17898)  BFR: Blood flow restriction " applied during exercise  NP or (-): Not Performed    See EMR under MEDIA for written consent provided for Dry Needling- DATE   Palpation Assessment to determine the necessity for Functional Dry Needling     PATIENT EDUCATION AND HOME EXERCISES      Education/Self-Care provided:  (included in treatment unless specified above) minutes   Patient educated on the impairments noted above and the effects of physical therapy intervention to improve overall condition and Quality of Life  Patient was educated on all the above exercise prior/during/after for proper posture, positioning, and execution for safe performance with home exercise program.      Written Home Exercises Provided: yes. Prefers: [x] Printed [x] Electronic  Exercises were reviewed and Rylee was able to demonstrate them prior to the end of the session.  Rylee demonstrated good understanding of the education provided. See EMR under Patient Instructions for exercises provided during therapy sessions.    ASSESSMENT     Alyx Weir tolerated Physical Therapy  session well with minimal  complaints of pain or discomfort.  Objective findings are improving with pain, range of motion, strength, endurance, exercise tolerance, and functional mobility.  Alyx Weir continues to have difficulty with overhead movements away from the body, especially into abudction.  Alyx demonstrated good understanding of new exercises and will continue to progress at home until next follow-up.      Alyx Is progressing well towards her goals.   Pt prognosis is Good.     Pt will continue to benefit from skilled outpatient physical therapy to address the deficits listed in the problem list box on initial evaluation, provide pt/family education and to maximize pt's level of independence in the home and community environment.     Pt's spiritual, cultural and educational needs considered and pt agreeable to plan of care and goals.    Anticipated Barriers for therapy: co-morbidities        SHORT TERM GOALS:  4 weeks (9/15/24) Progress Date met   Recent signs and systems trend is improving in order to progress towards Long term goals.  [x] Met  [] Not Met  [] Progressing  9/23   Patient will be independent with Home Exercise Program  in order to further progress and return to maximal function. [] Met  [] Not Met  [x] Progressing     Pain rating at Worst: 5 /10 in order to progress towards increased independence with activity. [] Met  [] Not Met  [x] Progressing     Patient will be able to correct postural deviations in sitting and standing, to decrease pain and promote postural awareness for injury prevention.  [] Met  [] Not Met  [x] Progressing     Patient will improve functional outcome (FOTO) score: by 5% to increase self-worth & perceived functional ability towards long term goals [] Met  [] Not Met  [x] Progressing        LONG TERM GOALS: Discharge (~12 Weeks) Progress Date met   Patient will return to normal activites of daily living, recreational, and work related activities with less pain and limitation.  [] Met  [] Not Met  [x] Progressing     Patient will improve range of motion  to stated goals in order to return to maximal functional potential.  [] Met  [] Not Met  [x] Progressing     Patient will improve Strength to stated goals of appropriate musculature in order to improve functional independence.  [] Met  [] Not Met  [x] Progressing     Pain Rating at Best: 1/10 to improve Quality of Life.  [] Met  [] Not Met  [x] Progressing     Patient will meet predicted functional outcome (FOTO) score: 80% to increase self-worth & perceived functional ability. [] Met  [] Not Met  [x] Progressing     Patient will have met/partially met personal goal of: to get back to daily activities, community based activities, and social activities.  [] Met  [] Not Met  [x] Progressing           PLAN   Plan of care Certification: 8/15/2024 to 11/15/2024    Continue Plan of Care (POC) and progress per patient  tolerance. See Treatment section for exercise progression.    Bryanna Peña, SILAS    Disclaimer: This note was prepared using a voice recognition system and is likely to have sound alike errors within the text.

## 2024-11-11 ENCOUNTER — CLINICAL SUPPORT (OUTPATIENT)
Dept: REHABILITATION | Facility: HOSPITAL | Age: 74
End: 2024-11-11
Attending: STUDENT IN AN ORGANIZED HEALTH CARE EDUCATION/TRAINING PROGRAM
Payer: COMMERCIAL

## 2024-11-11 DIAGNOSIS — R29.898 DECREASED STRENGTH OF UPPER EXTREMITY: Primary | ICD-10-CM

## 2024-11-11 DIAGNOSIS — M25.611 DECREASED RANGE OF MOTION OF RIGHT SHOULDER: ICD-10-CM

## 2024-11-11 PROCEDURE — 97110 THERAPEUTIC EXERCISES: CPT | Performed by: PHYSICAL THERAPIST

## 2024-11-11 PROCEDURE — 97112 NEUROMUSCULAR REEDUCATION: CPT | Performed by: PHYSICAL THERAPIST

## 2024-11-13 ENCOUNTER — CLINICAL SUPPORT (OUTPATIENT)
Dept: REHABILITATION | Facility: HOSPITAL | Age: 74
End: 2024-11-13
Attending: STUDENT IN AN ORGANIZED HEALTH CARE EDUCATION/TRAINING PROGRAM
Payer: COMMERCIAL

## 2024-11-13 DIAGNOSIS — M25.611 DECREASED RANGE OF MOTION OF RIGHT SHOULDER: ICD-10-CM

## 2024-11-13 DIAGNOSIS — R29.898 DECREASED STRENGTH OF UPPER EXTREMITY: Primary | ICD-10-CM

## 2024-11-13 PROCEDURE — 97110 THERAPEUTIC EXERCISES: CPT | Performed by: PHYSICAL THERAPIST

## 2024-11-13 PROCEDURE — 97112 NEUROMUSCULAR REEDUCATION: CPT | Performed by: PHYSICAL THERAPIST

## 2024-11-19 ENCOUNTER — LAB VISIT (OUTPATIENT)
Dept: LAB | Facility: HOSPITAL | Age: 74
End: 2024-11-19
Attending: INTERNAL MEDICINE
Payer: MEDICARE

## 2024-11-19 ENCOUNTER — CLINICAL SUPPORT (OUTPATIENT)
Dept: REHABILITATION | Facility: HOSPITAL | Age: 74
End: 2024-11-19
Attending: STUDENT IN AN ORGANIZED HEALTH CARE EDUCATION/TRAINING PROGRAM
Payer: COMMERCIAL

## 2024-11-19 ENCOUNTER — ANTI-COAG VISIT (OUTPATIENT)
Dept: CARDIOLOGY | Facility: CLINIC | Age: 74
End: 2024-11-19
Payer: MEDICARE

## 2024-11-19 DIAGNOSIS — R29.898 DECREASED STRENGTH OF UPPER EXTREMITY: Primary | ICD-10-CM

## 2024-11-19 DIAGNOSIS — D68.51 HETEROZYGOUS FACTOR V LEIDEN MUTATION: Chronic | ICD-10-CM

## 2024-11-19 DIAGNOSIS — M25.611 DECREASED RANGE OF MOTION OF RIGHT SHOULDER: ICD-10-CM

## 2024-11-19 DIAGNOSIS — Z79.01 LONG TERM (CURRENT) USE OF ANTICOAGULANTS: ICD-10-CM

## 2024-11-19 DIAGNOSIS — Z79.01 ANTICOAGULATED ON COUMADIN: Chronic | ICD-10-CM

## 2024-11-19 DIAGNOSIS — Z79.01 ANTICOAGULATED ON COUMADIN: Primary | Chronic | ICD-10-CM

## 2024-11-19 LAB
INR PPP: 2.6 (ref 0.8–1.2)
PROTHROMBIN TIME: 26.5 SEC (ref 9–12.5)

## 2024-11-19 PROCEDURE — 36415 COLL VENOUS BLD VENIPUNCTURE: CPT | Mod: HCNC | Performed by: INTERNAL MEDICINE

## 2024-11-19 PROCEDURE — 85610 PROTHROMBIN TIME: CPT | Mod: HCNC | Performed by: INTERNAL MEDICINE

## 2024-11-19 PROCEDURE — 93793 ANTICOAG MGMT PT WARFARIN: CPT | Mod: S$GLB,,,

## 2024-11-19 PROCEDURE — 97112 NEUROMUSCULAR REEDUCATION: CPT

## 2024-11-19 PROCEDURE — 97110 THERAPEUTIC EXERCISES: CPT

## 2024-11-19 NOTE — PROGRESS NOTES
"[facility]   Physical Therapy Treatment Note       Name: Alyx Weir  [facility] Number: [id number]    Therapy Diagnosis:   Encounter Diagnoses   Name Primary?    Decreased strength of upper extremity Yes    Decreased range of motion of right shoulder      Physician: Carl Ruiz    Visit Date: 11/11/2024    Physician Orders: PT Eval and Treat  Medical Diagnosis from Referral: Traumatic incomplete tear of right rotator cuff, subsequent encounter [S46.011D], Subacromial impingement of right shoulder [M75.41]   Evaluation Date: 8/15/2024  Authorization Period Expiration: 6/7/2025  Plan of Care Expiration: 11/15/2025                        Progress Update: 11/15/2024   Visit # / Visits authorized: 7 / 12 (already completed 12)  FOTO: 10/2 - Scored: 2 / 3       PRECAUTIONS: Standard Precautions, Safety/fall precautions, Orthopedic: Right Cuff Repair, Non-weight beariing, and Orthotic device type: abductor sling, Wearning schedule: full until 6 week follow up      PROBLEM LIST : pain, decreased motion all planes, decreased strength      Date of Surgery/Procedure 8/5/2024- Joseph   Surgery/Procedure Performed Status post Right Rotator Cuff Repair, Subacromial Decompression, Distal Clavicle Dissection, Biceps Tenodesis   Rehabilitation Precautions Protocol: Joseph Cuff Protocol      MD Follow up:  11/05/2024     Patient School:     Job/Position: Works at an alteration story    [facility] Visit #: 0/5     Time In: 1500  Time Out: 1610  Total Billable Time: 55 minutes    SUBJECTIVE     Pt reports: Feels "giovani."  Was sore following last session but not more than usual.   Compliance with Hep: Daily  Response to previous treatment: no adverse reactions to treatment/updated HEP  Functional change: Better    Pain: 5/10   Worst: 8/10  Location: Right shoulder      OBJECTIVE     Objective Measures updated at progress report unless specified otherwise.    Joint Position RIGHT RIGHT (warm)   Sholder " "Flexion 110aa 104 / 115 aa   Shoulder Extension 30 35   Shoulder Abduction 75 64   Shoulder ER at 90* Unable against gravity unable   Shoulder ER at 45* 30 40   Shoulder IR at 90* -     Functional ER unable     Functional IR unable         Treatment     Gym/Equipment Access: none  Time to Complete Exercises: increased for cueing and education    Alyx received the treatments listed below:      CPT Intervention  Right RCR  BT  SAD  DCR Duration/ Details  11/11   TE Arm Bike 3/3 forward and back   TE Pulleys Flexion & Abduction 4' ea   MT Soft Tissue Massage     TE Passive Range of motion External rotation, Flexion, Scaption   TE Assisted Motion BAR - 3 min each, 5s holds  Flexion Holds  Abduction holds  external rotation stretch corner   NMR Band Series T-band- (green)  Extensions 2x20 with 5" hold.  Adduction 2x20 with 5" hold.  Rows 2x10 with 5" hold.  Internal Rotation 10x10" walkouts  External  10x10" walkouts   NC Hot Pack     MT Soft Tissue massage     PLAN   Progress note/ POC next visit     CPT Codes available for Billing: Billing reflects 1:1 direct supervision  (0) minutes of Manual therapy (MT) to improve pain and ROM.  (30) minutes of Therapeutic Exercise (TE) to develop strength, endurance, range of motion, and flexibility.  (25) minutes of Neuromuscular Re-Education (NMR)  to improve: Balance, Coordination, Kinesthetic, Sense, Proprioception, and Posture.  (0) minutes of Therapeutic Activities (TA) to improve functional performance.  (0) minutes of Gait Training (GT) to improve functional gait mechanics.  (0) minutes of Self- Care and Education  Unattended Electrical Stimulation (ES) for muscle performance or pain modulation.  Vasopneumatic Device Therapy () for management of swelling/edema. (49630)  BFR: Blood flow restriction applied during exercise  NP or (-): Not Performed    See EMR under MEDIA for written consent provided for Dry Needling- DATE   Palpation Assessment to determine the " necessity for Functional Dry Needling     PATIENT EDUCATION AND HOME EXERCISES      Education/Self-Care provided:  (included in treatment unless specified above) minutes   Patient educated on the impairments noted above and the effects of physical therapy intervention to improve overall condition and Quality of Life  Patient was educated on all the above exercise prior/during/after for proper posture, positioning, and execution for safe performance with home exercise program.      Written Home Exercises Provided: yes. Prefers: [x] Printed [x] Electronic  Exercises were reviewed and Rylee was able to demonstrate them prior to the end of the session.  Rylee demonstrated good understanding of the education provided. See EMR under Patient Instructions for exercises provided during therapy sessions.    ASSESSMENT     Alyx Weir tolerated Physical Therapy  session well with moderate  complaints of pain or discomfort.  Objective findings are improving with pain, range of motion, strength, endurance, exercise tolerance, and functional mobility.  Alyx Weir continues to have difficulty with elevation above 90* due to pain in the lateral upper arm and upper trap.  Alyx demonstrated good understanding of new exercises and will continue to progress at home until next follow-up.      Alyx Is progressing well towards her goals.   Pt prognosis is Good.     Pt will continue to benefit from skilled outpatient physical therapy to address the deficits listed in the problem list box on initial evaluation, provide pt/family education and to maximize pt's level of independence in the home and community environment.     Pt's spiritual, cultural and educational needs considered and pt agreeable to plan of care and goals.    Anticipated Barriers for therapy: co-morbidities       SHORT TERM GOALS:  4 weeks (9/15/24) Progress Date met   Recent signs and systems trend is improving in order to progress towards Long term goals.  [x]  Met  [] Not Met  [] Progressing  9/23   Patient will be independent with Home Exercise Program  in order to further progress and return to maximal function. [] Met  [] Not Met  [x] Progressing     Pain rating at Worst: 5 /10 in order to progress towards increased independence with activity. [] Met  [] Not Met  [x] Progressing     Patient will be able to correct postural deviations in sitting and standing, to decrease pain and promote postural awareness for injury prevention.  [] Met  [] Not Met  [x] Progressing     Patient will improve functional outcome (FOTO) score: by 5% to increase self-worth & perceived functional ability towards long term goals [] Met  [] Not Met  [x] Progressing        LONG TERM GOALS: Discharge (~12 Weeks) Progress Date met   Patient will return to normal activites of daily living, recreational, and work related activities with less pain and limitation.  [] Met  [] Not Met  [x] Progressing     Patient will improve range of motion  to stated goals in order to return to maximal functional potential.  [] Met  [] Not Met  [x] Progressing     Patient will improve Strength to stated goals of appropriate musculature in order to improve functional independence.  [] Met  [] Not Met  [x] Progressing     Pain Rating at Best: 1/10 to improve Quality of Life.  [] Met  [] Not Met  [x] Progressing     Patient will meet predicted functional outcome (FOTO) score: 80% to increase self-worth & perceived functional ability. [] Met  [] Not Met  [x] Progressing     Patient will have met/partially met personal goal of: to get back to daily activities, community based activities, and social activities.  [] Met  [] Not Met  [x] Progressing           PLAN   Plan of care Certification: 8/15/2024 to 11/15/2024    Continue Plan of Care (POC) and progress per patient tolerance. See Treatment section for exercise progression.    Bryanna Peña, SILAS    Disclaimer: This note was prepared using a voice recognition system and  is likely to have sound alike errors within the text.

## 2024-11-19 NOTE — PLAN OF CARE
[facility]  Physical Therapy Plan of Care Note    Visit Date: 11/13/2024    Name: Alyx JUARES Weir  Clinic Number: [id number]    Therapy Diagnosis:   Encounter Diagnoses   Name Primary?    Decreased strength of upper extremity Yes    Decreased range of motion of right shoulder      Physician: Carl Ruiz*    Physician Orders: PT Eval and Treat  Medical Diagnosis from Referral: Traumatic incomplete tear of right rotator cuff, subsequent encounter [S46.011D], Subacromial impingement of right shoulder [M75.41]   Evaluation Date: 8/15/2024  Authorization Period Expiration: 6/7/2025  Plan of Care Expiration: 2/15/2025                        Progress Update: 12/15/2024   Visit # / Visits authorized: 7 / 12 (already completed 12)      Functional Level Prior to Evaluation:  Independent with all activities of daily living  prior to injury.  Modified Independent with all daily activities and mobility following injury.     SUBJECTIVE     Update: Patient reports signs and symptoms are improving but in small increments since initial evaluation.      Functional change since evaluation:   - Improvements: range of motion, functional mobility, functional strength  - Challenges: pain, endurance, consistent relief, Home Exercise Program compliance.    OBJECTIVE     Posture: Forward rounded shoulders (self corrected with cueing)    Joint Position RIGHT RIGHT (warm)   Sholder Flexion 110aa 104 / 115 aa   Shoulder Extension 30 35   Shoulder Abduction 75 64   Shoulder ER at 90* Unable against gravity unable   Shoulder ER at 45* 30 40   Shoulder IR at 90* -     Functional ER unable     Functional IR unable         ASSESSMENT     Update:     Alyx is progressing well with physical therapy, with moderate complaints of pain or discomfort.  A plan of care update was completed today with significant improvements in baseline functional mobility and strength compared to last progress note, please see exam for  details. Alyx continues to have difficulty with overhead motion above 90* of elevation, due to pain in the upper arm and upper trap.  The above impairments and functional deficits will continue to be addressed over the course of this updated plan of care. Alyx was educated on continued progression of PT, expectations for the duration of care, updates on goals set at initial evaluation, and home exercise program.      Previous Short Term Goals Status:  see migel  New Short Term Goals Status:   updated as appropriate, see below  Long Term Goal Status:   modified:  updated, see below  Reasons for Recertification of Therapy:   Patient has not met all goals set at initial evaluation and continues to required skill physical therapy services for the impairments above to be addressed.        SHORT TERM GOALS:  progressing Progress Date met   Recent signs and systems trend is improving in order to progress towards Long term goals.  [x] Met  [] Not Met  [] Progressing  9/23   Patient will be independent with Home Exercise Program  in order to further progress and return to maximal function. [] Met  [] Not Met  [x] Progressing     Pain rating at Worst: 5 /10 in order to progress towards increased independence with activity. [] Met  [] Not Met  [x] Progressing     Patient will be able to correct postural deviations in sitting and standing, to decrease pain and promote postural awareness for injury prevention.  [x] Met  [] Not Met  [] Progressing  11/13   Patient will improve functional outcome (FOTO) score: by 5% to increase self-worth & perceived functional ability towards long term goals [x] Met  [] Not Met  [] Progressing  11/13      LONG TERM GOALS: Discharge (~Jan 2025) Progress Date met   Patient will return to normal activites of daily living, recreational, and work related activities with less pain and limitation.  [] Met  [] Not Met  [x] Progressing     Patient will improve range of motion  to stated goals in order  to return to maximal functional potential.  [] Met  [] Not Met  [x] Progressing     Patient will improve Strength to stated goals of appropriate musculature in order to improve functional independence.  [] Met  [] Not Met  [x] Progressing     Pain Rating at Best: 1/10 to improve Quality of Life.  [] Met  [] Not Met  [x] Progressing     Patient will meet predicted functional outcome (FOTO) score: 80% to increase self-worth & perceived functional ability. [] Met  [] Not Met  [x] Progressing     Patient will have met/partially met personal goal of: to get back to daily activities, community based activities, and social activities.  [] Met  [] Not Met  [x] Progressing          PLAN     Updated Certification Period: 11/16/2024 to 1/15/2025  Previous Certification Period: 11/15/24  Recommended Treatment Plan: 1-2 times per week for 12 weeks:  Aquatic Therapy, Gait Training, Manual Therapy and Dry Needling, , Moist Heat/ Ice, Neuromuscular Re-ed, Patient Education, Self Care, Therapeutic Activites, and Therapeutic Exercise  Other Recommendations: not applicable.         I CERTIFY THE NEED FOR THESE SERVICES FURNISHED UNDER THIS PLAN OF TREATMENT AND WHILE UNDER MY CARE  Physician's comments:      Physician's Signature: ___________________________________________________

## 2024-11-19 NOTE — PROGRESS NOTES
[facility]   Physical Therapy Treatment Note       Name: Alyx Weir  [facility] Number: [id number]    Therapy Diagnosis:   Encounter Diagnoses   Name Primary?    Decreased strength of upper extremity Yes    Decreased range of motion of right shoulder      Physician: Carl Ruiz    Visit Date: 11/19/2024    Physician Orders: PT Eval and Treat  Medical Diagnosis from Referral: Traumatic incomplete tear of right rotator cuff, subsequent encounter [S46.011D], Subacromial impingement of right shoulder [M75.41]   Evaluation Date: 8/15/2024  Authorization Period Expiration: 6/7/2025  Plan of Care Expiration: 11/15/2025                        Progress Update: 11/15/2024   Visit # / Visits authorized: 7 / 12 (already completed 12)  FOTO: 10/2 - Scored: 2 / 3       PRECAUTIONS: Standard Precautions, Safety/fall precautions, Orthopedic: Right Cuff Repair, Non-weight beariing, and Orthotic device type: abductor sling, Wearning schedule: full until 6 week follow up      PROBLEM LIST : pain, decreased motion all planes, decreased strength      Date of Surgery/Procedure 8/5/2024- Joseph   Surgery/Procedure Performed Status post Right Rotator Cuff Repair, Subacromial Decompression, Distal Clavicle Dissection, Biceps Tenodesis   Rehabilitation Precautions Protocol: Joseph Cuff Protocol      MD Follow up:  11/05/2024     Patient School:     Job/Position: Works at an alteration story    [facility] Visit #: 0/5     Time In: 11:00 AM  Time Out: 12:00 PM  Total Billable Time: 55 minutes    SUBJECTIVE     Pt reports: She is feeling okay today not having a ton of pain, she has been stretching at home. She was sore after last visit.   Compliance with Hep: Daily  Response to previous treatment: no adverse reactions to treatment/updated HEP  Functional change: Better    Pain: 5/10   Worst: 8/10  Location: Right shoulder      OBJECTIVE     Objective Measures updated at progress report unless specified  "otherwise.    Joint Position RIGHT RIGHT (warm)   Sholder Flexion 110aa 104 / 115 aa   Shoulder Extension 30 35   Shoulder Abduction 75 64   Shoulder ER at 90* Unable against gravity unable   Shoulder ER at 45* 30 40   Shoulder IR at 90* -     Functional ER unable     Functional IR unable         Treatment     Gym/Equipment Access: none  Time to Complete Exercises: increased for cueing and education    Alyx received the treatments listed below:      CPT Intervention  Right RCR  BT  SAD  DCR Duration/ Details  11/13   TE Arm Bike 3/3 forward and back   TE Pulleys Flexion & Abduction 4' ea   MT Soft Tissue Massage Upper Trap Bilateral   TE Passive Range of motion External rotation, Flexion, Scaption   TE Assisted Motion BAR - 3 min each, 5s holds  Flexion Holds  Abduction holds  external rotation stretch corner   NMR Band Series T-band- (green)  Extensions 2x20 with 5" hold.  Adduction 2x20 with 5" hold.  Rows 2x10 with 5" hold.  Internal Rotation 10x10" walkouts  External  10x10" walkouts   NC Hot Pack declined   MT Soft Tissue massage     PLAN         CPT Codes available for Billing: Billing reflects 1:1 direct supervision  (0) minutes of Manual therapy (MT) to improve pain and ROM.  (30) minutes of Therapeutic Exercise (TE) to develop strength, endurance, range of motion, and flexibility.  (25) minutes of Neuromuscular Re-Education (NMR)  to improve: Balance, Coordination, Kinesthetic, Sense, Proprioception, and Posture.  (0) minutes of Therapeutic Activities (TA) to improve functional performance.  (0) minutes of Gait Training (GT) to improve functional gait mechanics.  (0) minutes of Self- Care and Education  Unattended Electrical Stimulation (ES) for muscle performance or pain modulation.  Vasopneumatic Device Therapy () for management of swelling/edema. (27823)  BFR: Blood flow restriction applied during exercise  NP or (-): Not Performed    See EMR under MEDIA for written consent provided for Dry " Needling- DATE   Palpation Assessment to determine the necessity for Functional Dry Needling     PATIENT EDUCATION AND HOME EXERCISES      Education/Self-Care provided:  (included in treatment unless specified above) minutes   Patient educated on the impairments noted above and the effects of physical therapy intervention to improve overall condition and Quality of Life  Patient was educated on all the above exercise prior/during/after for proper posture, positioning, and execution for safe performance with home exercise program.      Written Home Exercises Provided: yes. Prefers: [x] Printed [x] Electronic  Exercises were reviewed and Rylee was able to demonstrate them prior to the end of the session.  Rylee demonstrated good understanding of the education provided. See EMR under Patient Instructions for exercises provided during therapy sessions.    ASSESSMENT     Alyx Weir tolerated Physical Therapy  session well with moderate  complaints of pain or discomfort.  Objective findings are improving with pain, range of motion, strength, endurance, exercise tolerance, and functional mobility.  Alyx Weir continues to have difficulty with elevation above 90* due to pain in the lateral upper arm and upper trap. Patient had improved tolerance to abduction to 90 and slightly above with mobilization with movement today. .  Alyx demonstrated good understanding of new exercises and will continue to progress at home until next follow-up.      Alyx Is progressing well towards her goals.   Pt prognosis is Good.     Pt will continue to benefit from skilled outpatient physical therapy to address the deficits listed in the problem list box on initial evaluation, provide pt/family education and to maximize pt's level of independence in the home and community environment.     Pt's spiritual, cultural and educational needs considered and pt agreeable to plan of care and goals.    Anticipated Barriers for therapy:  co-morbidities       SHORT TERM GOALS:  progressing Progress Date met   Recent signs and systems trend is improving in order to progress towards Long term goals.  [x] Met  [] Not Met  [] Progressing  9/23   Patient will be independent with Home Exercise Program  in order to further progress and return to maximal function. [] Met  [] Not Met  [x] Progressing     Pain rating at Worst: 5 /10 in order to progress towards increased independence with activity. [] Met  [] Not Met  [x] Progressing     Patient will be able to correct postural deviations in sitting and standing, to decrease pain and promote postural awareness for injury prevention.  [x] Met  [] Not Met  [] Progressing  11/13   Patient will improve functional outcome (FOTO) score: by 5% to increase self-worth & perceived functional ability towards long term goals [x] Met  [] Not Met  [] Progressing  11/13      LONG TERM GOALS: Discharge (~Jan 2025) Progress Date met   Patient will return to normal activites of daily living, recreational, and work related activities with less pain and limitation.  [] Met  [] Not Met  [x] Progressing     Patient will improve range of motion  to stated goals in order to return to maximal functional potential.  [] Met  [] Not Met  [x] Progressing     Patient will improve Strength to stated goals of appropriate musculature in order to improve functional independence.  [] Met  [] Not Met  [x] Progressing     Pain Rating at Best: 1/10 to improve Quality of Life.  [] Met  [] Not Met  [x] Progressing     Patient will meet predicted functional outcome (FOTO) score: 80% to increase self-worth & perceived functional ability. [] Met  [] Not Met  [x] Progressing     Patient will have met/partially met personal goal of: to get back to daily activities, community based activities, and social activities.  [] Met  [] Not Met  [x] Progressing           PLAN   Plan of care Certification: 11/16/2024 to 1/15/2025  - Previous:  8/15-11/15/2024    Continue Plan of Care (POC) and progress per patient tolerance. See Treatment section for exercise progression.    Cl Jaffe, PT    Disclaimer: This note was prepared using a voice recognition system and is likely to have sound alike errors within the text.

## 2024-11-19 NOTE — PROGRESS NOTES
[facility]   Physical Therapy Treatment Note       Name: Alyx Weir  [facility] Number: [id number]    Therapy Diagnosis:   Encounter Diagnoses   Name Primary?    Decreased strength of upper extremity Yes    Decreased range of motion of right shoulder      Physician: Carl Ruiz    Visit Date: 11/13/2024    Physician Orders: PT Eval and Treat  Medical Diagnosis from Referral: Traumatic incomplete tear of right rotator cuff, subsequent encounter [S46.011D], Subacromial impingement of right shoulder [M75.41]   Evaluation Date: 8/15/2024  Authorization Period Expiration: 6/7/2025  Plan of Care Expiration: 11/15/2025                        Progress Update: 11/15/2024   Visit # / Visits authorized: 7 / 12 (already completed 12)  FOTO: 10/2 - Scored: 2 / 3       PRECAUTIONS: Standard Precautions, Safety/fall precautions, Orthopedic: Right Cuff Repair, Non-weight beariing, and Orthotic device type: abductor sling, Wearning schedule: full until 6 week follow up      PROBLEM LIST : pain, decreased motion all planes, decreased strength      Date of Surgery/Procedure 8/5/2024- Joseph   Surgery/Procedure Performed Status post Right Rotator Cuff Repair, Subacromial Decompression, Distal Clavicle Dissection, Biceps Tenodesis   Rehabilitation Precautions Protocol: Joseph Cuff Protocol      MD Follow up:  11/05/2024     Patient School:     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 She reports overall that she has made some progress since last progress note update, but her upper trap and arm pain is consistent and unchanging.   Compliance with Hep: Daily  Response to previous treatment: no adverse reactions to treatment/updated HEP  Functional change: Better    Pain: 5/10   Worst: 8/10  Location: Right shoulder      OBJECTIVE  "    Objective Measures updated at progress report unless specified otherwise.    Joint Position RIGHT RIGHT (warm)   Sholder Flexion 110aa 104 / 115 aa   Shoulder Extension 30 35   Shoulder Abduction 75 64   Shoulder ER at 90* Unable against gravity unable   Shoulder ER at 45* 30 40   Shoulder IR at 90* -     Functional ER unable     Functional IR unable         Treatment     Gym/Equipment Access: none  Time to Complete Exercises: increased for cueing and education    Alyx received the treatments listed below:      CPT Intervention  Right RCR  BT  SAD  DCR Duration/ Details  11/13   TE Arm Bike 3/3 forward and back   TE Pulleys Flexion & Abduction 4' ea   MT Soft Tissue Massage Upper Trap Bilateral   TE Passive Range of motion External rotation, Flexion, Scaption   TE Assisted Motion BAR - 3 min each, 5s holds  Flexion Holds  Abduction holds  external rotation stretch corner   NMR Band Series T-band- (green)  Extensions 2x20 with 5" hold.  Adduction 2x20 with 5" hold.  Rows 2x10 with 5" hold.  Internal Rotation 10x10" walkouts  External  10x10" walkouts   NC Hot Pack declined   MT Soft Tissue massage     PLAN         CPT Codes available for Billing: Billing reflects 1:1 direct supervision  (0) minutes of Manual therapy (MT) to improve pain and ROM.  (30) minutes of Therapeutic Exercise (TE) to develop strength, endurance, range of motion, and flexibility.  (25) minutes of Neuromuscular Re-Education (NMR)  to improve: Balance, Coordination, Kinesthetic, Sense, Proprioception, and Posture.  (0) minutes of Therapeutic Activities (TA) to improve functional performance.  (0) minutes of Gait Training (GT) to improve functional gait mechanics.  (0) minutes of Self- Care and Education  Unattended Electrical Stimulation (ES) for muscle performance or pain modulation.  Vasopneumatic Device Therapy () for management of swelling/edema. (67215)  BFR: Blood flow restriction applied during exercise  NP or (-): Not " Performed    See EMR under MEDIA for written consent provided for Dry Needling- DATE   Palpation Assessment to determine the necessity for Functional Dry Needling     PATIENT EDUCATION AND HOME EXERCISES      Education/Self-Care provided:  (included in treatment unless specified above) minutes   Patient educated on the impairments noted above and the effects of physical therapy intervention to improve overall condition and Quality of Life  Patient was educated on all the above exercise prior/during/after for proper posture, positioning, and execution for safe performance with home exercise program.      Written Home Exercises Provided: yes. Prefers: [x] Printed [x] Electronic  Exercises were reviewed and Rylee was able to demonstrate them prior to the end of the session.  Rylee demonstrated good understanding of the education provided. See EMR under Patient Instructions for exercises provided during therapy sessions.    ASSESSMENT     Alyx Weir tolerated Physical Therapy  session well with moderate  complaints of pain or discomfort.  Objective findings are improving with pain, range of motion, strength, endurance, exercise tolerance, and functional mobility.  Alyx Weir continues to have difficulty with elevation above 90* due to pain in the lateral upper arm and upper trap.  Alyx demonstrated good understanding of new exercises and will continue to progress at home until next follow-up.      Alyx Is progressing well towards her goals.   Pt prognosis is Good.     Pt will continue to benefit from skilled outpatient physical therapy to address the deficits listed in the problem list box on initial evaluation, provide pt/family education and to maximize pt's level of independence in the home and community environment.     Pt's spiritual, cultural and educational needs considered and pt agreeable to plan of care and goals.    Anticipated Barriers for therapy: co-morbidities       SHORT TERM GOALS:  progressing  Progress Date met   Recent signs and systems trend is improving in order to progress towards Long term goals.  [x] Met  [] Not Met  [] Progressing  9/23   Patient will be independent with Home Exercise Program  in order to further progress and return to maximal function. [] Met  [] Not Met  [x] Progressing     Pain rating at Worst: 5 /10 in order to progress towards increased independence with activity. [] Met  [] Not Met  [x] Progressing     Patient will be able to correct postural deviations in sitting and standing, to decrease pain and promote postural awareness for injury prevention.  [x] Met  [] Not Met  [] Progressing  11/13   Patient will improve functional outcome (FOTO) score: by 5% to increase self-worth & perceived functional ability towards long term goals [x] Met  [] Not Met  [] Progressing  11/13      LONG TERM GOALS: Discharge (~Jan 2025) Progress Date met   Patient will return to normal activites of daily living, recreational, and work related activities with less pain and limitation.  [] Met  [] Not Met  [x] Progressing     Patient will improve range of motion  to stated goals in order to return to maximal functional potential.  [] Met  [] Not Met  [x] Progressing     Patient will improve Strength to stated goals of appropriate musculature in order to improve functional independence.  [] Met  [] Not Met  [x] Progressing     Pain Rating at Best: 1/10 to improve Quality of Life.  [] Met  [] Not Met  [x] Progressing     Patient will meet predicted functional outcome (FOTO) score: 80% to increase self-worth & perceived functional ability. [] Met  [] Not Met  [x] Progressing     Patient will have met/partially met personal goal of: to get back to daily activities, community based activities, and social activities.  [] Met  [] Not Met  [x] Progressing           PLAN   Plan of care Certification: 11/16/2024 to 1/15/2025  - Previous: 8/15-11/15/2024    Continue Plan of Care (POC) and progress per patient  tolerance. See Treatment section for exercise progression.    Bryanna Peña, SILAS    Disclaimer: This note was prepared using a voice recognition system and is likely to have sound alike errors within the text.

## 2024-11-19 NOTE — PROGRESS NOTES
Patient contacted:  INR is in therapeutic goal range at 2.6.  Lab collected.  Previous warfarin instructions were verified and followed.  No other changes reported.  Instructions given to continue 7.5 mg every Tues, Thurs, Sat; and 5 mg all other days.  INR recheck on 12/11, along with another appointment at the [place].  Patient confirms understanding.

## 2024-11-26 ENCOUNTER — CLINICAL SUPPORT (OUTPATIENT)
Dept: REHABILITATION | Facility: HOSPITAL | Age: 74
End: 2024-11-26
Attending: STUDENT IN AN ORGANIZED HEALTH CARE EDUCATION/TRAINING PROGRAM
Payer: COMMERCIAL

## 2024-11-26 DIAGNOSIS — M25.611 DECREASED RANGE OF MOTION OF RIGHT SHOULDER: ICD-10-CM

## 2024-11-26 DIAGNOSIS — R29.898 DECREASED STRENGTH OF UPPER EXTREMITY: Primary | ICD-10-CM

## 2024-11-26 PROCEDURE — 97110 THERAPEUTIC EXERCISES: CPT

## 2024-11-26 PROCEDURE — 97112 NEUROMUSCULAR REEDUCATION: CPT

## 2024-11-26 NOTE — PROGRESS NOTES
[facility]   Physical Therapy Treatment Note       Name: Alyx Weir  [facility] Number: [id number]    Therapy Diagnosis:   Encounter Diagnoses   Name Primary?    Decreased strength of upper extremity Yes    Decreased range of motion of right shoulder      Physician: Carl Ruiz    Visit Date: 11/26/2024    Physician Orders: PT Eval and Treat  Medical Diagnosis from Referral: Traumatic incomplete tear of right rotator cuff, subsequent encounter [S46.011D], Subacromial impingement of right shoulder [M75.41]   Evaluation Date: 8/15/2024  Authorization Period Expiration: 6/7/2025  Plan of Care Expiration:    1/15/2025                 Progress Update: 11/15/2024   Visit # / Visits authorized: 8 / 12 (already completed 12)  FOTO: 10/2 - Scored: 2 / 3       PRECAUTIONS: Standard Precautions, Safety/fall precautions, Orthopedic: Right Cuff Repair, Non-weight beariing, and Orthotic device type: abductor sling, Wearning schedule: full until 6 week follow up      PROBLEM LIST : pain, decreased motion all planes, decreased strength      Date of Surgery/Procedure 8/5/2024- Joseph   Surgery/Procedure Performed Status post Right Rotator Cuff Repair, Subacromial Decompression, Distal Clavicle Dissection, Biceps Tenodesis   Rehabilitation Precautions Protocol: Joseph Cuff Protocol      MD Follow up:  11/05/2024     Patient School:     Job/Position: Works at an [employer] story    PTA Visit #: 0/5     Time In: 2:00 PM  Time Out: 3:00 PM  Total Billable Time: 55 minutes    SUBJECTIVE     Pt reports: She is struggling to lift her arm up on front of her or reach behind her for things. Patient states she is concerned as she feels like she would not be confident to return to work just yet.  Compliance with Hep: Daily  Response to previous treatment: no adverse reactions to treatment/updated HEP  Functional change: Better    Pain: 5/10   Worst: 8/10  Location: Right shoulder      OBJECTIVE  "    Objective Measures updated at progress report unless specified otherwise.    Joint Position RIGHT RIGHT (warm)   Sholder Flexion 110aa 104 / 115 aa   Shoulder Extension 30 35   Shoulder Abduction 75 64   Shoulder ER at 90* Unable against gravity unable   Shoulder ER at 45* 30 40   Shoulder IR at 90* -     Functional ER unable     Functional IR unable         Treatment     Gym/Equipment Access: none  Time to Complete Exercises: increased for cueing and education    Alyx received the treatments listed below:      CPT Intervention  Right RCR  BT  SAD  DCR Duration/ Details  11/13   TE Arm Bike 3/3 forward and back   TE Pulleys Flexion & Abduction 4' ea   MT Soft Tissue Massage -   TE Passive Range of motion -   TE Assisted Motion BAR - 3 min each, 5s holds  Flexion Holds  Abduction holds  external rotation stretch corner  ACTIVE ASSISSTIVE RANGE OF MOTION   Towel slides flexion 2 x 10 bilateral   NMR Band Series T-band- (green)  Extensions 2x20 with 5" hold.  Adduction 2x20 with 5" hold.  Rows 2x10 with 5" hold.  Internal Rotation 10x10" walkouts  External  10x10" walkouts   NC Hot Pack declined   MT Soft Tissue massage     PLAN         CPT Codes available for Billing: Billing reflects 1:1 direct supervision  (0) minutes of Manual therapy (MT) to improve pain and ROM.  (30) minutes of Therapeutic Exercise (TE) to develop strength, endurance, range of motion, and flexibility.  (25) minutes of Neuromuscular Re-Education (NMR)  to improve: Balance, Coordination, Kinesthetic, Sense, Proprioception, and Posture.  (0) minutes of Therapeutic Activities (TA) to improve functional performance.  (0) minutes of Gait Training (GT) to improve functional gait mechanics.  (0) minutes of Self- Care and Education  Unattended Electrical Stimulation (ES) for muscle performance or pain modulation.  Vasopneumatic Device Therapy () for management of swelling/edema. (27699)  BFR: Blood flow restriction applied during exercise  NP " or (-): Not Performed    See EMR under MEDIA for written consent provided for Dry Needling- DATE   Palpation Assessment to determine the necessity for Functional Dry Needling     PATIENT EDUCATION AND HOME EXERCISES      Education/Self-Care provided:  (included in treatment unless specified above) minutes   Patient educated on the impairments noted above and the effects of physical therapy intervention to improve overall condition and Quality of Life  Patient was educated on all the above exercise prior/during/after for proper posture, positioning, and execution for safe performance with home exercise program.      Written Home Exercises Provided: yes. Prefers: [x] Printed [x] Electronic  Exercises were reviewed and Rylee was able to demonstrate them prior to the end of the session.  Rylee demonstrated good understanding of the education provided. See EMR under Patient Instructions for exercises provided during therapy sessions.    ASSESSMENT     Alyx Weir tolerated Physical Therapy  session well with moderate  complaints of pain or discomfort.  Objective findings are improving with pain, range of motion, strength, endurance, exercise tolerance, and functional mobility.  Alyx Weir continues to have difficulty with elevation above 90* due to pain in the lateral upper arm and upper trap. Because of this, wall slides into flexion were added which patient tolerated very well.  Alyx demonstrated good understanding of new exercises and will continue to progress at home until next follow-up.      Alyx Is progressing well towards her goals.   Pt prognosis is Good.     Pt will continue to benefit from skilled outpatient physical therapy to address the deficits listed in the problem list box on initial evaluation, provide pt/family education and to maximize pt's level of independence in the home and community environment.     Pt's spiritual, cultural and educational needs considered and pt agreeable to plan of care  and goals.    Anticipated Barriers for therapy: co-morbidities       SHORT TERM GOALS:  progressing Progress Date met   Recent signs and systems trend is improving in order to progress towards Long term goals.  [x] Met  [] Not Met  [] Progressing  9/23   Patient will be independent with Home Exercise Program  in order to further progress and return to maximal function. [] Met  [] Not Met  [x] Progressing     Pain rating at Worst: 5 /10 in order to progress towards increased independence with activity. [] Met  [] Not Met  [x] Progressing     Patient will be able to correct postural deviations in sitting and standing, to decrease pain and promote postural awareness for injury prevention.  [x] Met  [] Not Met  [] Progressing  11/13   Patient will improve functional outcome (FOTO) score: by 5% to increase self-worth & perceived functional ability towards long term goals [x] Met  [] Not Met  [] Progressing  11/13      LONG TERM GOALS: Discharge (~Jan 2025) Progress Date met   Patient will return to normal activites of daily living, recreational, and work related activities with less pain and limitation.  [] Met  [] Not Met  [x] Progressing     Patient will improve range of motion  to stated goals in order to return to maximal functional potential.  [] Met  [] Not Met  [x] Progressing     Patient will improve Strength to stated goals of appropriate musculature in order to improve functional independence.  [] Met  [] Not Met  [x] Progressing     Pain Rating at Best: 1/10 to improve Quality of Life.  [] Met  [] Not Met  [x] Progressing     Patient will meet predicted functional outcome (FOTO) score: 80% to increase self-worth & perceived functional ability. [] Met  [] Not Met  [x] Progressing     Patient will have met/partially met personal goal of: to get back to daily activities, community based activities, and social activities.  [] Met  [] Not Met  [x] Progressing           PLAN   Plan of care Certification: 11/16/2024  to 1/15/2025  - Previous: 8/15-11/15/2024    Continue Plan of Care (POC) and progress per patient tolerance. See Treatment section for exercise progression.    Mamta Peres, PT    Disclaimer: This note was prepared using a voice recognition system and is likely to have sound alike errors within the text.

## 2024-12-03 ENCOUNTER — TELEPHONE (OUTPATIENT)
Dept: SPORTS MEDICINE | Facility: CLINIC | Age: 74
End: 2024-12-03
Payer: MEDICARE

## 2024-12-04 ENCOUNTER — CLINICAL SUPPORT (OUTPATIENT)
Dept: REHABILITATION | Facility: HOSPITAL | Age: 74
End: 2024-12-04
Attending: STUDENT IN AN ORGANIZED HEALTH CARE EDUCATION/TRAINING PROGRAM
Payer: COMMERCIAL

## 2024-12-04 DIAGNOSIS — M25.611 DECREASED RANGE OF MOTION OF RIGHT SHOULDER: ICD-10-CM

## 2024-12-04 DIAGNOSIS — E11.9 TYPE 2 DIABETES MELLITUS WITHOUT COMPLICATION: ICD-10-CM

## 2024-12-04 DIAGNOSIS — R29.898 DECREASED STRENGTH OF UPPER EXTREMITY: Primary | ICD-10-CM

## 2024-12-04 PROCEDURE — 97110 THERAPEUTIC EXERCISES: CPT

## 2024-12-04 PROCEDURE — 97112 NEUROMUSCULAR REEDUCATION: CPT

## 2024-12-04 NOTE — PROGRESS NOTES
[facility]   Physical Therapy Treatment Note       Name: Alyx Weir  [facility] Number: [id number]    Therapy Diagnosis:   Encounter Diagnoses   Name Primary?    Decreased strength of upper extremity Yes    Decreased range of motion of right shoulder      Physician: Carl Ruiz    Visit Date: 12/4/2024    Physician Orders: PT Eval and Treat  Medical Diagnosis from Referral: Traumatic incomplete tear of right rotator cuff, subsequent encounter [S46.011D], Subacromial impingement of right shoulder [M75.41]   Evaluation Date: 8/15/2024  Authorization Period Expiration: 6/7/2025  Plan of Care Expiration:    1/15/2025                 Progress Update: 11/15/2024   Visit # / Visits authorized: 10 / 12 (already completed 12)  FOTO: 10/2 - Scored: 2 / 3       PRECAUTIONS: Standard Precautions, Safety/fall precautions, Orthopedic: Right Cuff Repair, Non-weight beariing, and Orthotic device type: abductor sling, Wearning schedule: full until 6 week follow up      PROBLEM LIST : pain, decreased motion all planes, decreased strength      Date of Surgery/Procedure 8/5/2024- Joseph   Surgery/Procedure Performed Status post Right Rotator Cuff Repair, Subacromial Decompression, Distal Clavicle Dissection, Biceps Tenodesis   Rehabilitation Precautions Protocol: Joseph Cuff Protocol      MD Follow up:  11/05/2024     Patient School:     Job/Position: Works at an [employer]    [facility] Visit #: 0/5     Time In: 1:25 PM  Time Out: 2:25 PM  Total Billable Time: 55 minutes    SUBJECTIVE     Pt reports: She continues to feel pain in her shoulder and still struggles to don/doff her bra.  Compliance with Hep: Daily  Response to previous treatment: no adverse reactions to treatment/updated HEP  Functional change: Better    Pain: 5/10   Worst: 8/10  Location: Right shoulder      OBJECTIVE     Objective Measures updated at progress report unless specified otherwise.    Joint Position RIGHT RIGHT (warm)  "  Sholder Flexion 110aa 104 / 115 aa   Shoulder Extension 30 35   Shoulder Abduction 75 64   Shoulder ER at 90* Unable against gravity unable   Shoulder ER at 45* 30 40   Shoulder IR at 90* -     Functional ER unable     Functional IR unable         Treatment     Gym/Equipment Access: none  Time to Complete Exercises: increased for cueing and education    Alyx received the treatments listed below:      CPT Intervention  Right RCR  BT  SAD  DCR Duration/ Details  12/4/2024    TE Arm Bike 3/3 forward and back   TE Pulleys Flexion & Abduction 4' ea   MT Soft Tissue Massage -   TE Passive Range of motion -   TE Assisted Motion BAR - 3 min each, 5s holds  Flexion Holds  Sleeper Stretch (3 x 30 seconds)  Abduction holds (not performed today)  external rotation stretch corner (not performed today)   NMR Band Series T-band- (green)  Extensions 2x20 with 5" hold.  Adduction 2x20 with 5" hold.  Rows 2x10 with 5" hold.  Internal Rotation 10x10" walkouts  External  10x10" walkouts   NC Hot Pack declined   MT Soft Tissue massage     PLAN         CPT Codes available for Billing: Billing reflects 1:1 direct supervision  (0) minutes of Manual therapy (MT) to improve pain and ROM.  (30) minutes of Therapeutic Exercise (TE) to develop strength, endurance, range of motion, and flexibility.  (25) minutes of Neuromuscular Re-Education (NMR)  to improve: Balance, Coordination, Kinesthetic, Sense, Proprioception, and Posture.  (0) minutes of Therapeutic Activities (TA) to improve functional performance.  (0) minutes of Gait Training (GT) to improve functional gait mechanics.  (0) minutes of Self- Care and Education  Unattended Electrical Stimulation (ES) for muscle performance or pain modulation.  Vasopneumatic Device Therapy () for management of swelling/edema. (84329)  BFR: Blood flow restriction applied during exercise  NP or (-): Not Performed    See EMR under MEDIA for written consent provided for Dry Needling- DATE   Palpation " Assessment to determine the necessity for Functional Dry Needling     PATIENT EDUCATION AND HOME EXERCISES      Education/Self-Care provided:  (included in treatment unless specified above) minutes   Patient educated on the impairments noted above and the effects of physical therapy intervention to improve overall condition and Quality of Life  Patient was educated on all the above exercise prior/during/after for proper posture, positioning, and execution for safe performance with home exercise program.      Written Home Exercises Provided: yes. Prefers: [x] Printed [x] Electronic  Exercises were reviewed and Rylee was able to demonstrate them prior to the end of the session.  Rylee demonstrated good understanding of the education provided. See EMR under Patient Instructions for exercises provided during therapy sessions.    ASSESSMENT     Alyx Weir tolerated Physical Therapy  session well with moderate  complaints of pain or discomfort.  Objective findings are improving with pain, range of motion, strength, endurance, exercise tolerance, and functional mobility.  Alyx Weir reviewed sleeper stretch and performed today with good tolerance. See EMR for details   Alyx demonstrated good understanding of new exercises and will continue to progress at home until next follow-up.      Alyx Is progressing well towards her goals.   Pt prognosis is Good.     Pt will continue to benefit from skilled outpatient physical therapy to address the deficits listed in the problem list box on initial evaluation, provide pt/family education and to maximize pt's level of independence in the home and community environment.     Pt's spiritual, cultural and educational needs considered and pt agreeable to plan of care and goals.    Anticipated Barriers for therapy: co-morbidities       SHORT TERM GOALS:  progressing Progress Date met   Recent signs and systems trend is improving in order to progress towards Long term goals.  [x]  Met  [] Not Met  [] Progressing  9/23   Patient will be independent with Home Exercise Program  in order to further progress and return to maximal function. [] Met  [] Not Met  [x] Progressing     Pain rating at Worst: 5 /10 in order to progress towards increased independence with activity. [] Met  [] Not Met  [x] Progressing     Patient will be able to correct postural deviations in sitting and standing, to decrease pain and promote postural awareness for injury prevention.  [x] Met  [] Not Met  [] Progressing  11/13   Patient will improve functional outcome (FOTO) score: by 5% to increase self-worth & perceived functional ability towards long term goals [x] Met  [] Not Met  [] Progressing  11/13      LONG TERM GOALS: Discharge (~Jan 2025) Progress Date met   Patient will return to normal activites of daily living, recreational, and work related activities with less pain and limitation.  [] Met  [] Not Met  [x] Progressing     Patient will improve range of motion  to stated goals in order to return to maximal functional potential.  [] Met  [] Not Met  [x] Progressing     Patient will improve Strength to stated goals of appropriate musculature in order to improve functional independence.  [] Met  [] Not Met  [x] Progressing     Pain Rating at Best: 1/10 to improve Quality of Life.  [] Met  [] Not Met  [x] Progressing     Patient will meet predicted functional outcome (FOTO) score: 80% to increase self-worth & perceived functional ability. [] Met  [] Not Met  [x] Progressing     Patient will have met/partially met personal goal of: to get back to daily activities, community based activities, and social activities.  [] Met  [] Not Met  [x] Progressing           PLAN   Plan of care Certification: 11/16/2024 to 1/15/2025  - Previous: 8/15-11/15/2024    Continue Plan of Care (POC) and progress per patient tolerance. See Treatment section for exercise progression.    Mamta Peres, PT    Disclaimer: This note was  prepared using a voice recognition system and is likely to have sound alike errors within the text.

## 2024-12-04 NOTE — TELEPHONE ENCOUNTER
Work Status Confirmation from office visit on 11/5/24 successfully faxed to [phone] on 12/3/24. REESE

## 2024-12-08 NOTE — PROGRESS NOTES
Orthopaedics  Post-operative follow-up    Procedure Performed:  1. Right shoulder arthroscopic rotator cuff repair  2. Right shoulder arthroscopic biceps tenodesis  3. Right shoulder subacromial decompression     Date of Surgery: 8/5/24    Subjective: Alyx Weir is now approximately 4.5 months out from her shoulder surgery.  She has been compliant with post-operative restrictions and has been progressing with PT at [facility].  She continues to improve, but notes persistent pain with activity.  Specifically she mentions pain in the shoulder with reaching overhead and reaching out away from her body.    Exam:  Incision sites healed, C/D/I  No swelling or bruising noted  Fluid ROM with no crepitus  Upright Active ROM: , , ER 40  Cuff strength intact   Axillary nerve sensation and motor intact  Motor and sensory intact distally  Strong radial pulse, fingers warm and well perfused    Imaging:  No new imaging.     Impression:  4.5 months s/p right shoulder arthroscopic rotator cuff repair, biceps tenodesis, and subacromial decompression    Plan:  She continues to make progress postoperatively.  Range of motion is significantly improved from last visit.  Still has some pain with reaching overhead or reaching out but I expect this will continue to improve.  Complimented her on her progress.  Advance PT per protocol  Symptomatic treatment for pain / swelling  May advance activities as reviewed today: Continue to progress strength and endurance of shoulder and periscapular musculature.   Instructed patient to call clinic if questions or concerns    Follow-up with me in 6 weeks    Work status:   1) Continue physical therapy  2) No lifting/pushing/pulling greater than 5 pounds  3) No overhead work  4) No repetitive motions        Carl Ruiz MD    I, Estiven Velazquez, acted as a scribe for Carl Ruiz MD for the duration of this office visit.    This note was generated with the  assistance of ambient listening technology. Verbal consent was obtained by the patient and accompanying visitor(s) for the recording of patient appointment to facilitate this note. I attest to having reviewed and edited the generated note for accuracy, though some syntax or spelling errors may persist. Please contact the author of this note for any clarification.     Statement Selected

## 2024-12-09 ENCOUNTER — CLINICAL SUPPORT (OUTPATIENT)
Dept: REHABILITATION | Facility: HOSPITAL | Age: 74
End: 2024-12-09
Attending: STUDENT IN AN ORGANIZED HEALTH CARE EDUCATION/TRAINING PROGRAM
Payer: COMMERCIAL

## 2024-12-09 DIAGNOSIS — R29.898 DECREASED STRENGTH OF UPPER EXTREMITY: Primary | ICD-10-CM

## 2024-12-09 DIAGNOSIS — M25.611 DECREASED RANGE OF MOTION OF RIGHT SHOULDER: ICD-10-CM

## 2024-12-09 PROCEDURE — 97110 THERAPEUTIC EXERCISES: CPT | Performed by: PHYSICAL THERAPIST

## 2024-12-09 PROCEDURE — 97112 NEUROMUSCULAR REEDUCATION: CPT | Performed by: PHYSICAL THERAPIST

## 2024-12-11 ENCOUNTER — APPOINTMENT (OUTPATIENT)
Dept: RADIOLOGY | Facility: HOSPITAL | Age: 74
End: 2024-12-11
Attending: INTERNAL MEDICINE
Payer: MEDICARE

## 2024-12-11 ENCOUNTER — ANTI-COAG VISIT (OUTPATIENT)
Dept: CARDIOLOGY | Facility: CLINIC | Age: 74
End: 2024-12-11
Payer: MEDICARE

## 2024-12-11 ENCOUNTER — OFFICE VISIT (OUTPATIENT)
Dept: OPHTHALMOLOGY | Facility: CLINIC | Age: 74
End: 2024-12-11
Payer: COMMERCIAL

## 2024-12-11 DIAGNOSIS — H52.13 MYOPIA WITH ASTIGMATISM AND PRESBYOPIA, BILATERAL: ICD-10-CM

## 2024-12-11 DIAGNOSIS — Z79.01 ANTICOAGULATED ON COUMADIN: Chronic | ICD-10-CM

## 2024-12-11 DIAGNOSIS — H52.4 MYOPIA WITH ASTIGMATISM AND PRESBYOPIA, BILATERAL: ICD-10-CM

## 2024-12-11 DIAGNOSIS — H25.813 COMBINED FORM OF AGE-RELATED CATARACT, BOTH EYES: ICD-10-CM

## 2024-12-11 DIAGNOSIS — D68.51 HETEROZYGOUS FACTOR V LEIDEN MUTATION: Chronic | ICD-10-CM

## 2024-12-11 DIAGNOSIS — Z79.01 LONG TERM (CURRENT) USE OF ANTICOAGULANTS: Primary | ICD-10-CM

## 2024-12-11 DIAGNOSIS — H52.203 MYOPIA WITH ASTIGMATISM AND PRESBYOPIA, BILATERAL: ICD-10-CM

## 2024-12-11 DIAGNOSIS — E11.36 DIABETIC CATARACT: ICD-10-CM

## 2024-12-11 DIAGNOSIS — E11.9 DIABETES MELLITUS WITHOUT COMPLICATION: Primary | ICD-10-CM

## 2024-12-11 DIAGNOSIS — H35.343 LAMELLAR MACULAR HOLE OF BOTH EYES: ICD-10-CM

## 2024-12-11 DIAGNOSIS — Z78.0 MENOPAUSE: ICD-10-CM

## 2024-12-11 LAB
CTP QC/QA: YES
INR PPP: 1.8 (ref 2–3)

## 2024-12-11 PROCEDURE — 85610 PROTHROMBIN TIME: CPT | Mod: QW,HCNC,S$GLB, | Performed by: INTERNAL MEDICINE

## 2024-12-11 PROCEDURE — 99999 PR PBB SHADOW E&M-EST. PATIENT-LVL III: CPT | Mod: PBBFAC,HCNC,, | Performed by: OPTOMETRIST

## 2024-12-11 PROCEDURE — 77080 DXA BONE DENSITY AXIAL: CPT | Mod: 26,HCNC,, | Performed by: RADIOLOGY

## 2024-12-11 PROCEDURE — 93793 ANTICOAG MGMT PT WARFARIN: CPT | Mod: HCNC,S$GLB,,

## 2024-12-11 PROCEDURE — 77080 DXA BONE DENSITY AXIAL: CPT | Mod: TC,HCNC

## 2024-12-11 PROCEDURE — 92015 DETERMINE REFRACTIVE STATE: CPT | Mod: HCNC,S$GLB,, | Performed by: OPTOMETRIST

## 2024-12-11 NOTE — PROGRESS NOTES
Patient's INR is sub-therapeutic at 1.8. Patient Patient confirms she followed previous instructions. Patient reports she has been eating food containing nuts twice a day. Re-educated patient on Coumadin Diet and need to keep consistent. Advised patient of increased risk of clotting; signs/symptoms of clotting and need to go to ED if she experiences any. Patient reports she is not having any signs/symptoms of clotting. Instructions given: Will give boosted dose of 10 mg on 12/12/24; then resume warfarin 7.5 mg on Thursdays, Saturdays and Tuesdays; and 5 mg all other days. Recheck on 12/23/24. Patient verbalizes understanding.

## 2024-12-11 NOTE — PROGRESS NOTES
HPI     Diabetic Eye Exam            Comments: Here for DM eye exam   Lab Results       Component                Value               Date                  Has noticed changes in Distance nears seems to be good   Had a fall back in May 2024 had cut over right eye checked in the ER no   fractures   Denies flashes and floaters          HGBA1C                   5.9 (H)             05/29/2024                     Comments    1. NS OU  2. Pre-DM x2015            Last edited by Genesis Crow on 12/11/2024  1:51 PM.            Assessment /Plan     For exam results, see Encounter Report.    Diabetes mellitus without complication  Last A1c 5.9 There was no diabetic retinopathy present on either eye on examination today. Recommend good blood pressure control, strict blood glucose control, and good cholesterol control.  Continue close care with PCP regarding diabetes.    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 Annual observation is recommended at this time. Patient to call or return to clinic with any significant change in vision prior to next visit.    Lamellar macular hole of both eyes  Trace macular changes, not visually significant. Observe at this time    Myopia with astigmatism and presbyopia, bilateral  Eyeglass Final Rx       Eyeglass Final Rx         Sphere Cylinder Axis Add    Right -2.75 +1.50 010 +2.50    Left -1.75 +0.50 050 +2.50      Type: PAL    Expiration Date: 12/11/2025                  RTC 1 yr for dilated eye exam or sooner if any changes to vision.   Discussed above and answered questions.                          thinking about quitting

## 2024-12-12 ENCOUNTER — HOSPITAL ENCOUNTER (OUTPATIENT)
Dept: RADIOLOGY | Facility: HOSPITAL | Age: 74
Discharge: HOME | End: 2024-12-12
Attending: INTERNAL MEDICINE
Payer: MEDICARE

## 2024-12-12 ENCOUNTER — CLINICAL SUPPORT (OUTPATIENT)
Dept: REHABILITATION | Facility: HOSPITAL | Age: 74
End: 2024-12-12
Attending: STUDENT IN AN ORGANIZED HEALTH CARE EDUCATION/TRAINING PROGRAM
Payer: COMMERCIAL

## 2024-12-12 VITALS — BODY MASS INDEX: 30.31 KG/M2 | WEIGHT: 171.06 LBS | HEIGHT: 63 IN

## 2024-12-12 DIAGNOSIS — Z12.31 ENCOUNTER FOR SCREENING MAMMOGRAM FOR BREAST CANCER: ICD-10-CM

## 2024-12-12 DIAGNOSIS — M25.611 DECREASED RANGE OF MOTION OF RIGHT SHOULDER: ICD-10-CM

## 2024-12-12 DIAGNOSIS — R29.898 DECREASED STRENGTH OF UPPER EXTREMITY: Primary | ICD-10-CM

## 2024-12-12 PROCEDURE — 77063 BREAST TOMOSYNTHESIS BI: CPT | Mod: 26,HCNC,, | Performed by: RADIOLOGY

## 2024-12-12 PROCEDURE — 97112 NEUROMUSCULAR REEDUCATION: CPT | Performed by: PHYSICAL THERAPIST

## 2024-12-12 PROCEDURE — 77067 SCR MAMMO BI INCL CAD: CPT | Mod: 26,HCNC,, | Performed by: RADIOLOGY

## 2024-12-12 PROCEDURE — 97110 THERAPEUTIC EXERCISES: CPT | Performed by: PHYSICAL THERAPIST

## 2024-12-12 PROCEDURE — 77067 SCR MAMMO BI INCL CAD: CPT | Mod: TC,HCNC

## 2024-12-12 NOTE — PROGRESS NOTES
[facility]   Physical Therapy Treatment Note + Progress Note      Name: Alyx Weir  Clinic Number: [id number]    Therapy Diagnosis:   Encounter Diagnoses   Name Primary?    Decreased strength of upper extremity Yes    Decreased range of motion of right shoulder      Physician: Carl Ruiz    Visit Date: 12/12/2024    Physician Orders: PT Eval and Treat  Medical Diagnosis from Referral: Traumatic incomplete tear of right rotator cuff, subsequent encounter [S46.011D], Subacromial impingement of right shoulder [M75.41]   Evaluation Date: 8/15/2024  Authorization Period Expiration: 6/7/2025  Plan of Care Expiration:    1/15/2025                 Progress Update: 1/15/2025  Visit # / Visits authorized: 12 / 12 (already completed 12)  FOTO: 10/2 - Scored: 2 / 3       PRECAUTIONS: Standard Precautions, Safety/fall precautions, Orthopedic: Right Cuff Repair, Non-weight beariing, and Orthotic device type: abductor sling, Wearning schedule: full until 6 week follow up      PROBLEM LIST : pain, decreased motion all planes, decreased strength      Date of Surgery/Procedure 8/5/2024- Joseph   Surgery/Procedure Performed Status post Right Rotator Cuff Repair, Subacromial Decompression, Distal Clavicle Dissection, Biceps Tenodesis   Rehabilitation Precautions Protocol: Joseph Cuff Protocol      MD Follow up:  11/05/2024     Patient School:     Job/Position: Works at an [employer]    PTA Visit #: 0/5     Time In: 1315  Time Out: 1415  Total Billable Time: 55 minutes    SUBJECTIVE     Pt reports: She is frustrated, annoyed, in pain, and tired today, but for other reasons outside of just the shoulder.  There has been some communication differences with her workmans comp team and is going to discuss more with Dr. Ruiz during her visit next week.   Compliance with Hep: Daily  Response to previous treatment: no adverse reactions to treatment/updated HEP  Functional change:  "Better    Pain: 5/10   Worst: 8/10  Location: Right shoulder      OBJECTIVE     Objective Measures updated at progress report unless specified otherwise.    Joint Position RIGHT RIGHT (warm)   Sholder Flexion 110aa 104 / 115 aa   Shoulder Extension 30 35   Shoulder Abduction 75 64   Shoulder ER at 90* Unable against gravity unable   Shoulder ER at 45* 30 40   Shoulder IR at 90* -     Functional ER unable     Functional IR unable         Treatment     Gym/Equipment Access: none  Time to Complete Exercises: increased for cueing and education    Alyx received the treatments listed below:      CPT Intervention  Right RCR  BT  SAD  DCR Duration/ Details  12/12   TE Arm Bike 3/3 forward and back   TE Pulleys Flexion & Abduction 4' ea   TE Passive Range of motion External rotation, Flexion, Scaption   TE Assisted Motion BAR - 3 min each, 5s holds  Flexion Holds  Abduction holds  external rotation stretch corner   NMR Band Series T-band- (green)  Internal Rotation 10x10" walkouts  External  10x10" walkouts   TE Free Weights Standing - 3x12 each, #1  Curl to Press  Scaption  Abduction   NC Hot Pack  declined   PLAN         CPT Codes available for Billing: Billing reflects 1:1 direct supervision  (0) minutes of Manual therapy (MT) to improve pain and ROM.  (40) minutes of Therapeutic Exercise (TE) to develop strength, endurance, range of motion, and flexibility.  (15) minutes of Neuromuscular Re-Education (NMR)  to improve: Balance, Coordination, Kinesthetic, Sense, Proprioception, and Posture.  (0) minutes of Therapeutic Activities (TA) to improve functional performance.  (0) minutes of Gait Training (GT) to improve functional gait mechanics.  (0) minutes of Self- Care and Education  Unattended Electrical Stimulation (ES) for muscle performance or pain modulation.  Vasopneumatic Device Therapy () for management of swelling/edema. (93855)  BFR: Blood flow restriction applied during exercise  NP or (-): Not " Performed    See EMR under MEDIA for written consent provided for Dry Needling- DATE   Palpation Assessment to determine the necessity for Functional Dry Needling     PATIENT EDUCATION AND HOME EXERCISES      Education/Self-Care provided:  (included in treatment unless specified above) minutes   Patient educated on the impairments noted above and the effects of physical therapy intervention to improve overall condition and Quality of Life  Patient was educated on all the above exercise prior/during/after for proper posture, positioning, and execution for safe performance with home exercise program.      Written Home Exercises Provided: yes. Prefers: [x] Printed [x] Electronic  Exercises were reviewed and Rylee was able to demonstrate them prior to the end of the session.  Rylee demonstrated good understanding of the education provided. See EMR under Patient Instructions for exercises provided during therapy sessions.    ASSESSMENT     Alyx is progressing well with physical therapy, with moderate complaints of pain or discomfort.  A progress note was completed today with significant improvements in range of motion and functional strength compared to last progress note, please see exam for details. Alyx continues to have difficulty with pain, and this is the limiting factor in her progress at this point.  Physical Therapy discussed with MD for possible injection or intervention to help decrease her discomfort.  The above impairments and functional deficits will continue to be addressed over the course of this plan of care. Alyx was educated on continued progression of PT, expectations for the duration of care, updates on goals set at initial evaluation, and home exercise program.  Handouts were not issued during today's visit with instructions on what exercise to perform up until next visit.     Alyx Is progressing well towards her goals.   Pt prognosis is Good.     Pt will continue to benefit from skilled  outpatient physical therapy to address the deficits listed in the problem list box on initial evaluation, provide pt/family education and to maximize pt's level of independence in the home and community environment.     Pt's spiritual, cultural and educational needs considered and pt agreeable to plan of care and goals.    Anticipated Barriers for therapy: co-morbidities       SHORT TERM GOALS:  progressing Progress Date met   Recent signs and systems trend is improving in order to progress towards Long term goals.  [x] Met  [] Not Met  [] Progressing  9/23   Patient will be independent with Home Exercise Program  in order to further progress and return to maximal function. [] Met  [] Not Met  [x] Progressing     Pain rating at Worst: 5 /10 in order to progress towards increased independence with activity. [] Met  [] Not Met  [x] Progressing     Patient will be able to correct postural deviations in sitting and standing, to decrease pain and promote postural awareness for injury prevention.  [x] Met  [] Not Met  [] Progressing  11/13   Patient will improve functional outcome (FOTO) score: by 5% to increase self-worth & perceived functional ability towards long term goals [x] Met  [] Not Met  [] Progressing  11/13      LONG TERM GOALS: Discharge (~Jan 2025) Progress Date met   Patient will return to normal activites of daily living, recreational, and work related activities with less pain and limitation.  [] Met  [] Not Met  [x] Progressing     Patient will improve range of motion  to stated goals in order to return to maximal functional potential.  [] Met  [] Not Met  [x] Progressing     Patient will improve Strength to stated goals of appropriate musculature in order to improve functional independence.  [] Met  [] Not Met  [x] Progressing     Pain Rating at Best: 1/10 to improve Quality of Life.  [] Met  [] Not Met  [x] Progressing     Patient will meet predicted functional outcome (FOTO) score: 80% to increase  self-worth & perceived functional ability. [] Met  [] Not Met  [x] Progressing     Patient will have met/partially met personal goal of: to get back to daily activities, community based activities, and social activities.  [] Met  [] Not Met  [x] Progressing           PLAN   Plan of care Certification: 11/16/2024 to 1/15/2025  - Previous: 8/15-11/15/2024    Continue Plan of Care (POC) and progress per patient tolerance. See Treatment section for exercise progression.    Bryanna Peña, PT, DPT    Disclaimer: This note was prepared using a voice recognition system and is likely to have sound alike errors within the text.

## 2024-12-16 NOTE — PROGRESS NOTES
[facility]   Physical Therapy Treatment Note + Progress Note      Name: Alyx Weir  Clinic Number: [id number]    Therapy Diagnosis:   Encounter Diagnoses   Name Primary?    Decreased strength of upper extremity Yes    Decreased range of motion of right shoulder      Physician: Carl Ruiz    Visit Date: 12/9/2024    Physician Orders: PT Eval and Treat  Medical Diagnosis from Referral: Traumatic incomplete tear of right rotator cuff, subsequent encounter [S46.011D], Subacromial impingement of right shoulder [M75.41]   Evaluation Date: 8/15/2024  Authorization Period Expiration: 6/7/2025  Plan of Care Expiration: 1/15/2025                 Progress Update: 12/15/2024   Visit # / Visits authorized: 12 / 12 (already completed 12)  FOTO: 10/2 - Scored: 2 / 3       PRECAUTIONS: Standard Precautions, Safety/fall precautions, Orthopedic: Right Cuff Repair, Non-weight beariing, and Orthotic device type: abductor sling, Wearning schedule: full until 6 week follow up      PROBLEM LIST : pain, decreased motion all planes, decreased strength      Date of Surgery/Procedure 8/5/2024- Joseph   Surgery/Procedure Performed Status post Right Rotator Cuff Repair, Subacromial Decompression, Distal Clavicle Dissection, Biceps Tenodesis   Rehabilitation Precautions Protocol: Joseph Cuff Protocol      MD Follow up:  11/05/2024     Patient School:     Job/Position: Works at an alteration story    [facility] Visit #: 0/5     Time In: 1300  Time Out: 1400  Total Billable Time: 55 minutes    SUBJECTIVE     Pt reports: She is still having lots of pain through the shoulder and down into the arm.  She is going to see Dr. Ruiz next week to determine next steps and possible injection if she believes it would help.    Compliance with Hep: Daily  Response to previous treatment: no adverse reactions to treatment/updated HEP  Functional change: Better    Pain: 5/10   Worst: 8/10  Location: Right  "shoulder      OBJECTIVE     Objective Measures updated at progress report unless specified otherwise.    Joint Position RIGHT RIGHT (warm)   Sholder Flexion 110aa 104 / 115 aa   Shoulder Extension 30 35   Shoulder Abduction 75 64   Shoulder ER at 90* Unable against gravity unable   Shoulder ER at 45* 30 40   Shoulder IR at 90* -     Functional ER unable     Functional IR unable           Treatment     Gym/Equipment Access: none  Time to Complete Exercises: increased for cueing and education    Alyx received the treatments listed below:      CPT Intervention  Right RCR  BT  SAD  DCR Duration/ Details  12/9   TE Arm Bike 3/3 forward and back   TE Pulleys Flexion & Abduction 4' ea   TE Passive Range of motion External rotation, Flexion, Scaption   TE Assisted Motion BAR - 3 min each, 5s holds  Flexion Holds  Abduction holds  external rotation stretch corner   TE Free Weights Standing - 3x12 each, #1  Curl to Press  Scaption  Abduction   NMR Band Series T-band- (green)  Extensions 2x20 with 5" hold.  Adduction 2x20 with 5" hold.  Rows 2x10 with 5" hold.  Internal Rotation 10x10" walkouts  External  10x10" walkouts   NC Hot Pack  declined   PLAN         CPT Codes available for Billing: Billing reflects 1:1 direct supervision  (0) minutes of Manual therapy (MT) to improve pain and ROM.  (30) minutes of Therapeutic Exercise (TE) to develop strength, endurance, range of motion, and flexibility.  (25) minutes of Neuromuscular Re-Education (NMR)  to improve: Balance, Coordination, Kinesthetic, Sense, Proprioception, and Posture.  (0) minutes of Therapeutic Activities (TA) to improve functional performance.  (0) minutes of Gait Training (GT) to improve functional gait mechanics.  (0) minutes of Self- Care and Education  Unattended Electrical Stimulation (ES) for muscle performance or pain modulation.  Vasopneumatic Device Therapy () for management of swelling/edema. (56916)  BFR: Blood flow restriction applied during " exercise  NP or (-): Not Performed    See EMR under MEDIA for written consent provided for Dry Needling- DATE   Palpation Assessment to determine the necessity for Functional Dry Needling     PATIENT EDUCATION AND HOME EXERCISES      Education/Self-Care provided:  (included in treatment unless specified above) minutes   Patient educated on the impairments noted above and the effects of physical therapy intervention to improve overall condition and Quality of Life  Patient was educated on all the above exercise prior/during/after for proper posture, positioning, and execution for safe performance with home exercise program.      Written Home Exercises Provided: yes. Prefers: [x] Printed [x] Electronic  Exercises were reviewed and Rylee was able to demonstrate them prior to the end of the session.  Rylee demonstrated good understanding of the education provided. See EMR under Patient Instructions for exercises provided during therapy sessions.    ASSESSMENT     Alyx Weir tolerated Physical Therapy  session fair with moderate  complaints of pain or discomfort- secondary to functional pain with elevation greater than 90* of flexion or abduction.  Objective findings are improving with pain, range of motion, strength, endurance, exercise tolerance, and functional mobility- however pain continues to be the biggest set back for improvements back to full function for return to work based activities.  Alyx mentioned a possibility of returning to work for light duty but does not know what that may entail at this time with her current employer.  Therapy will continue to address underlying impairments including: range of motion, strength, and functional mobility for all overhead movements.  Alyx demonstrated good understanding of new exercises and will continue to progress at home until next follow-up.     Alyx Is progressing well towards her goals.   Pt prognosis is Good.     Pt will continue to benefit from skilled  outpatient physical therapy to address the deficits listed in the problem list box on initial evaluation, provide pt/family education and to maximize pt's level of independence in the home and community environment.     Pt's spiritual, cultural and educational needs considered and pt agreeable to plan of care and goals.    Anticipated Barriers for therapy: co-morbidities       SHORT TERM GOALS:  progressing Progress Date met   Recent signs and systems trend is improving in order to progress towards Long term goals.  [x] Met  [] Not Met  [] Progressing  9/23   Patient will be independent with Home Exercise Program  in order to further progress and return to maximal function. [x] Met  [] Not Met  [] Progressing  12/9   Pain rating at Worst: 5 /10 in order to progress towards increased independence with activity. [] Met  [] Not Met  [x] Progressing     Patient will be able to correct postural deviations in sitting and standing, to decrease pain and promote postural awareness for injury prevention.  [x] Met  [] Not Met  [] Progressing  11/13   Patient will improve functional outcome (FOTO) score: by 5% to increase self-worth & perceived functional ability towards long term goals [x] Met  [] Not Met  [] Progressing  11/13      LONG TERM GOALS: Discharge (~Jan 2025) Progress Date met   Patient will return to normal activites of daily living, recreational, and work related activities with less pain and limitation.  [] Met  [] Not Met  [x] Progressing     Patient will improve range of motion  to stated goals in order to return to maximal functional potential.  [] Met  [] Not Met  [x] Progressing     Patient will improve Strength to stated goals of appropriate musculature in order to improve functional independence.  [] Met  [] Not Met  [x] Progressing     Pain Rating at Best: 1/10 to improve Quality of Life.  [] Met  [] Not Met  [x] Progressing     Patient will meet predicted functional outcome (FOTO) score: 80% to increase  self-worth & perceived functional ability. [] Met  [] Not Met  [x] Progressing     Patient will have met/partially met personal goal of: to get back to daily activities, community based activities, and social activities.  [] Met  [] Not Met  [x] Progressing           PLAN   Plan of care Certification: 11/16/2024 to 1/15/2025  - Previous: 8/15-11/15/2024    Continue Plan of Care (POC) and progress per patient tolerance. See Treatment section for exercise progression.    Bryanna Peña, PT, DPT    Disclaimer: This note was prepared using a voice recognition system and is likely to have sound alike errors within the text.

## 2024-12-17 ENCOUNTER — OFFICE VISIT (OUTPATIENT)
Dept: SPORTS MEDICINE | Facility: CLINIC | Age: 74
End: 2024-12-17
Payer: COMMERCIAL

## 2024-12-17 ENCOUNTER — CLINICAL SUPPORT (OUTPATIENT)
Dept: REHABILITATION | Facility: HOSPITAL | Age: 74
End: 2024-12-17
Attending: STUDENT IN AN ORGANIZED HEALTH CARE EDUCATION/TRAINING PROGRAM
Payer: COMMERCIAL

## 2024-12-17 VITALS — BODY MASS INDEX: 30.31 KG/M2 | HEIGHT: 63 IN | WEIGHT: 171.06 LBS | RESPIRATION RATE: 17 BRPM

## 2024-12-17 DIAGNOSIS — Z98.890 STATUS POST ROTATOR CUFF REPAIR: Primary | ICD-10-CM

## 2024-12-17 DIAGNOSIS — M25.611 DECREASED RANGE OF MOTION OF RIGHT SHOULDER: ICD-10-CM

## 2024-12-17 DIAGNOSIS — Z98.890 STATUS POST ROTATOR CUFF REPAIR: ICD-10-CM

## 2024-12-17 DIAGNOSIS — R29.898 DECREASED STRENGTH OF UPPER EXTREMITY: Primary | ICD-10-CM

## 2024-12-17 PROCEDURE — 99213 OFFICE O/P EST LOW 20 MIN: CPT | Mod: S$GLB,,, | Performed by: STUDENT IN AN ORGANIZED HEALTH CARE EDUCATION/TRAINING PROGRAM

## 2024-12-17 PROCEDURE — 97110 THERAPEUTIC EXERCISES: CPT

## 2024-12-17 PROCEDURE — 99999 PR PBB SHADOW E&M-EST. PATIENT-LVL III: CPT | Mod: PBBFAC,,, | Performed by: STUDENT IN AN ORGANIZED HEALTH CARE EDUCATION/TRAINING PROGRAM

## 2024-12-17 PROCEDURE — 97112 NEUROMUSCULAR REEDUCATION: CPT

## 2024-12-17 NOTE — PROGRESS NOTES
[facility]   Physical Therapy Treatment Note     Name: Alyx Weir  [facility] Number: [id number]    Therapy Diagnosis:   Encounter Diagnoses   Name Primary?    Status post rotator cuff repair     Decreased strength of upper extremity Yes    Decreased range of motion of right shoulder      Physician: Carl Ruiz    Visit Date: 12/17/2024    Physician Orders: PT Eval and Treat  Medical Diagnosis from Referral: Traumatic incomplete tear of right rotator cuff, subsequent encounter [S46.011D], Subacromial impingement of right shoulder [M75.41]   Evaluation Date: 8/15/2024  Authorization Period Expiration: 6/7/2025  Plan of Care Expiration:    1/15/2025                 Progress Update: 11/15/2024   Visit # / Visits authorized: 1/ 12 (already completed 22)  FOTO: 10/2 - Scored: 2 / 3       PRECAUTIONS: Standard Precautions, Safety/fall precautions, Orthopedic: Right Cuff Repair, Non-weight beariing, and Orthotic device type: abductor sling, Wearning schedule: full until 6 week follow up      PROBLEM LIST : pain, decreased motion all planes, decreased strength      Date of Surgery/Procedure 8/5/2024- Joseph   Surgery/Procedure Performed Status post Right Rotator Cuff Repair, Subacromial Decompression, Distal Clavicle Dissection, Biceps Tenodesis   Rehabilitation Precautions Protocol: Joseph Cuff Protocol      MD Follow up:  11/05/2024     Patient School:     Job/Position: Works at an alteration story    [facility] [place] Visit #: 0/5     Time In: 10:50 AM  Time Out: 11:48 AM  Total Billable Time: 53 minutes    SUBJECTIVE     Pt reports: She continues to struggle lifting her right arm overhead to grab things in her kitchen cabinet. Patient states she has an appointment with Dr. Ruiz later today to discuss options for continued pain.  Compliance with Hep: Daily  Response to previous treatment: no adverse reactions to treatment/updated HEP  Functional change: Better    Pain: 5/10   Worst:  "8/10  Location: Right shoulder      OBJECTIVE     Objective Measures updated at progress report unless specified otherwise.    Joint Position RIGHT RIGHT (warm)   Sholder Flexion 110aa 104 / 115 aa   Shoulder Extension 30 35   Shoulder Abduction 75 64   Shoulder ER at 90* Unable against gravity unable   Shoulder ER at 45* 30 40   Shoulder IR at 90* -     Functional ER unable     Functional IR unable         Treatment     Gym/Equipment Access: none  Time to Complete Exercises: increased for cueing and education    Alyx received the treatments listed below:      CPT Intervention  Right RCR  BT  SAD  DCR Duration/ Details  12/17   TE Arm Bike 3/3 forward and back   TE Pulleys Flexion & Abduction 4' ea   TE Passive Range of motion -   TE Assisted Motion BAR - 3 min each, 5s holds  Flexion Holds  Abduction holds  external rotation stretch corner (Seated with bar today and towel)   NMR Band Series T-band- (green)  Internal Rotation 10x10" walkouts  External  10x10" walkouts   TE Free Weights Standing - 3x10 each, #1  Curl to Press (replaced with cabinet reaches overhead at OT station)  Scaption  Abduction   NC Hot Pack  declined   PLAN         CPT Codes available for Billing: Billing reflects 1:1 direct supervision  (0) minutes of Manual therapy (MT) to improve pain and ROM.  (43) minutes of Therapeutic Exercise (TE) to develop strength, endurance, range of motion, and flexibility.  (10) minutes of Neuromuscular Re-Education (NMR)  to improve: Balance, Coordination, Kinesthetic, Sense, Proprioception, and Posture.  (0) minutes of Therapeutic Activities (TA) to improve functional performance.  (0) minutes of Gait Training (GT) to improve functional gait mechanics.  (0) minutes of Self- Care and Education  Unattended Electrical Stimulation (ES) for muscle performance or pain modulation.  Vasopneumatic Device Therapy () for management of swelling/edema. (48796)  BFR: Blood flow restriction applied during exercise  NP " or (-): Not Performed    See EMR under MEDIA for written consent provided for Dry Needling- DATE   Palpation Assessment to determine the necessity for Functional Dry Needling     PATIENT EDUCATION AND HOME EXERCISES      Education/Self-Care provided:  (included in treatment unless specified above) minutes   Patient educated on the impairments noted above and the effects of physical therapy intervention to improve overall condition and Quality of Life  Patient was educated on all the above exercise prior/during/after for proper posture, positioning, and execution for safe performance with home exercise program.      Written Home Exercises Provided: yes. Prefers: [x] Printed [x] Electronic  Exercises were reviewed and Rylee was able to demonstrate them prior to the end of the session.  Rylee demonstrated good understanding of the education provided. See EMR under Patient Instructions for exercises provided during therapy sessions.    ASSESSMENT     Alyx Weir tolerated Physical Therapy  session well with minimal  complaints of pain or discomfort.  Objective findings are improving with pain, range of motion, strength, endurance, exercise tolerance, and functional mobility.  Alyx Weir given overhead reach in cabinet to work on functional strengthening of overhead reaching.  Alyx demonstrated good understanding of new exercises and will continue to progress at home until next follow-up.     Alyx Is progressing well towards her goals.   Pt prognosis is Good.     Pt will continue to benefit from skilled outpatient physical therapy to address the deficits listed in the problem list box on initial evaluation, provide pt/family education and to maximize pt's level of independence in the home and community environment.     Pt's spiritual, cultural and educational needs considered and pt agreeable to plan of care and goals.    Anticipated Barriers for therapy: co-morbidities       SHORT TERM GOALS:  progressing  Progress Date met   Recent signs and systems trend is improving in order to progress towards Long term goals.  [x] Met  [] Not Met  [] Progressing  9/23   Patient will be independent with Home Exercise Program  in order to further progress and return to maximal function. [] Met  [] Not Met  [x] Progressing     Pain rating at Worst: 5 /10 in order to progress towards increased independence with activity. [] Met  [] Not Met  [x] Progressing     Patient will be able to correct postural deviations in sitting and standing, to decrease pain and promote postural awareness for injury prevention.  [x] Met  [] Not Met  [] Progressing  11/13   Patient will improve functional outcome (FOTO) score: by 5% to increase self-worth & perceived functional ability towards long term goals [x] Met  [] Not Met  [] Progressing  11/13      LONG TERM GOALS: Discharge (~Jan 2025) Progress Date met   Patient will return to normal activites of daily living, recreational, and work related activities with less pain and limitation.  [] Met  [] Not Met  [x] Progressing     Patient will improve range of motion  to stated goals in order to return to maximal functional potential.  [] Met  [] Not Met  [x] Progressing     Patient will improve Strength to stated goals of appropriate musculature in order to improve functional independence.  [] Met  [] Not Met  [x] Progressing     Pain Rating at Best: 1/10 to improve Quality of Life.  [] Met  [] Not Met  [x] Progressing     Patient will meet predicted functional outcome (FOTO) score: 80% to increase self-worth & perceived functional ability. [] Met  [] Not Met  [x] Progressing     Patient will have met/partially met personal goal of: to get back to daily activities, community based activities, and social activities.  [] Met  [] Not Met  [x] Progressing           PLAN   Plan of care Certification: 11/16/2024 to 1/15/2025  - Previous: 8/15-11/15/2024    Continue Plan of Care (POC) and progress per patient  tolerance. See Treatment section for exercise progression.    Mamta Peres, PT    Disclaimer: This note was prepared using a voice recognition system and is likely to have sound alike errors within the text.

## 2024-12-17 NOTE — LETTER
December 17, 2024      [facility]  [address]  [place] [address]  Phone: [phone]  Fax: [phone]       Patient: Alyx Weir   YOB: 1950  Date of Visit: 12/17/2024    To Whom It May Concern:    Eduar Weir  was at [facility] on 12/17/2024. The patient may return to work/school on 12/18/24 with the following restrictions.      Allow time for physical therapy  No lifting/pushing/pulling greater than 5 pounds  No overhead work  No repetitive motions     If restrictions are unable to be accommodated, then she should remain out of work at this time. She will be re-evaluated on 2/5/25 at which time restrictions will be updated. She will continue with physical therapy for 2 days/week for the next 6 weeks. If you have any questions or concerns, or if I can be of further assistance, please do not hesitate to contact me.     Sincerely,       Carl Ruiz MD

## 2024-12-19 ENCOUNTER — TELEPHONE (OUTPATIENT)
Dept: FAMILY MEDICINE | Facility: CLINIC | Age: 74
End: 2024-12-19
Payer: MEDICARE

## 2024-12-19 ENCOUNTER — CLINICAL SUPPORT (OUTPATIENT)
Dept: REHABILITATION | Facility: HOSPITAL | Age: 74
End: 2024-12-19
Attending: STUDENT IN AN ORGANIZED HEALTH CARE EDUCATION/TRAINING PROGRAM
Payer: COMMERCIAL

## 2024-12-19 ENCOUNTER — PATIENT MESSAGE (OUTPATIENT)
Dept: SPORTS MEDICINE | Facility: CLINIC | Age: 74
End: 2024-12-19
Payer: MEDICARE

## 2024-12-19 DIAGNOSIS — M25.611 DECREASED RANGE OF MOTION OF RIGHT SHOULDER: ICD-10-CM

## 2024-12-19 DIAGNOSIS — R73.03 PREDIABETES: Primary | ICD-10-CM

## 2024-12-19 DIAGNOSIS — R29.898 DECREASED STRENGTH OF UPPER EXTREMITY: Primary | ICD-10-CM

## 2024-12-19 PROCEDURE — 97112 NEUROMUSCULAR REEDUCATION: CPT | Performed by: PHYSICAL THERAPIST

## 2024-12-19 PROCEDURE — 97110 THERAPEUTIC EXERCISES: CPT | Performed by: PHYSICAL THERAPIST

## 2024-12-19 NOTE — TELEPHONE ENCOUNTER
----- Message from Naldo sent at 12/19/2024  3:31 PM CST -----  Contact: self  ..Type: Orders Request    What orders/ testing are being requested? A1c     Is there a future appointment scheduled for the patient with PCP? no    When?    Would you prefer a response via [employer]? Call back, [phone]    Comments:

## 2024-12-19 NOTE — PROGRESS NOTES
[facility]   Physical Therapy Treatment Note     Name: Alyx Weir  [facility] Number: [id number]    Therapy Diagnosis:   Encounter Diagnoses   Name Primary?    Decreased strength of upper extremity Yes    Decreased range of motion of right shoulder      Physician: Carl Ruiz    Visit Date: 12/19/2024    Physician Orders: PT Eval and Treat  Medical Diagnosis from Referral: Traumatic incomplete tear of right rotator cuff, subsequent encounter [S46.011D], Subacromial impingement of right shoulder [M75.41]   Evaluation Date: 8/15/2024  Authorization Period Expiration: 6/7/2025  Plan of Care Expiration:    1/15/2025                 Progress Update: 1/15/2025   Visit # / Visits authorized: 2 / 12 (already completed 22)  FOTO: 12/12 - Scored: 2 / 3       PRECAUTIONS: Standard Precautions, Safety/fall precautions, Orthopedic: Right Cuff Repair, Non-weight beariing, and Orthotic device type: abductor sling, Wearning schedule: full until 6 week follow up      PROBLEM LIST : pain, decreased motion all planes, decreased strength      Date of Surgery/Procedure 8/5/2024- Joseph   Surgery/Procedure Performed Status post Right Rotator Cuff Repair, Subacromial Decompression, Distal Clavicle Dissection, Biceps Tenodesis   Rehabilitation Precautions Protocol: Joseph Cuff Protocol      MD Follow up:  11/05/2024     Patient School:     Job/Position: Works at [email or web address]    PTA Visit #: 0/5     Time In: 1305  Time Out: 1400  Total Billable Time: 55 minutes    SUBJECTIVE     Pt reports: She followed up with Dr. Ruiz.  He did not do the injection because of her A1C labs and didn't want to affect her sugars.  He did release her back to light duty.   Compliance with Hep: Daily  Response to previous treatment: no adverse reactions to treatment/updated HEP  Functional change: Better    Pain: 5/10   Worst: 8/10  Location: Right shoulder      OBJECTIVE     Objective Measures updated at  "progress report unless specified otherwise.    Joint Position RIGHT RIGHT (warm)   Sholder Flexion 110aa 104 / 115 aa   Shoulder Extension 30 35   Shoulder Abduction 75 64   Shoulder ER at 90* Unable against gravity unable   Shoulder ER at 45* 30 40   Shoulder IR at 90* -     Functional ER unable     Functional IR unable         Treatment     Gym/Equipment Access: none  Time to Complete Exercises: increased for cueing and education    Alyx received the treatments listed below:      CPT Intervention  Right RCR  BT  SAD  DCR Duration/ Details  12/17   TE Arm Bike 3/3 forward and back   TE Pulleys Flexion & Abduction 4' ea   TE Passive Range of motion -   TE Assisted Motion BAR - 3 min each, 5s holds  Flexion Holds  Abduction holds  external rotation stretch corner (Seated with bar today and towel)   NMR Band Series T-band- (green)  Internal Rotation 10x10" walkouts  External  10x10" walkouts   TE Free Weights Standing - 3x10 each, #1  Curl to Press (replaced with cabinet reaches overhead at OT station)  Scaption  Abduction   NC Hot Pack  declined   PLAN         CPT Codes available for Billing: Billing reflects 1:1 direct supervision  (0) minutes of Manual therapy (MT) to improve pain and ROM.  (45) minutes of Therapeutic Exercise (TE) to develop strength, endurance, range of motion, and flexibility.  (10) minutes of Neuromuscular Re-Education (NMR)  to improve: Balance, Coordination, Kinesthetic, Sense, Proprioception, and Posture.  (0) minutes of Therapeutic Activities (TA) to improve functional performance.  (0) minutes of Gait Training (GT) to improve functional gait mechanics.  (0) minutes of Self- Care and Education  Unattended Electrical Stimulation (ES) for muscle performance or pain modulation.  Vasopneumatic Device Therapy () for management of swelling/edema. (84872)  BFR: Blood flow restriction applied during exercise  NP or (-): Not Performed    See EMR under MEDIA for written consent provided for Dry " Needling- DATE   Palpation Assessment to determine the necessity for Functional Dry Needling     PATIENT EDUCATION AND HOME EXERCISES      Education/Self-Care provided:  (included in treatment unless specified above) minutes   Patient educated on the impairments noted above and the effects of physical therapy intervention to improve overall condition and Quality of Life  Patient was educated on all the above exercise prior/during/after for proper posture, positioning, and execution for safe performance with home exercise program.      Written Home Exercises Provided: yes. Prefers: [x] Printed [x] Electronic  Exercises were reviewed and Rylee was able to demonstrate them prior to the end of the session.  Rylee demonstrated good understanding of the education provided. See EMR under Patient Instructions for exercises provided during therapy sessions.    ASSESSMENT     Alyx Weir tolerated Physical Therapy session well with moderate  complaints of pain or discomfort- due to muscular tightness and dull achey bony pain.  Objective findings are improving with pain, range of motion, strength, endurance, exercise tolerance, and functional mobility.  Alyx Weir continues to have difficulty with prolonged activity with exercises, but does show signs of exercise tolerance improvmeents.  Alyx demonstrated good understanding of new exercises and will continue to progress at home until next follow-up.     Alyx Is progressing well towards her goals.   Pt prognosis is Good.     Pt will continue to benefit from skilled outpatient physical therapy to address the deficits listed in the problem list box on initial evaluation, provide pt/family education and to maximize pt's level of independence in the home and community environment.     Pt's spiritual, cultural and educational needs considered and pt agreeable to plan of care and goals.    Anticipated Barriers for therapy: co-morbidities       SHORT TERM GOALS:  progressing  Progress Date met   Recent signs and systems trend is improving in order to progress towards Long term goals.  [x] Met  [] Not Met  [] Progressing  9/23   Patient will be independent with Home Exercise Program  in order to further progress and return to maximal function. [] Met  [] Not Met  [x] Progressing     Pain rating at Worst: 5 /10 in order to progress towards increased independence with activity. [] Met  [] Not Met  [x] Progressing     Patient will be able to correct postural deviations in sitting and standing, to decrease pain and promote postural awareness for injury prevention.  [x] Met  [] Not Met  [] Progressing  11/13   Patient will improve functional outcome (FOTO) score: by 5% to increase self-worth & perceived functional ability towards long term goals [x] Met  [] Not Met  [] Progressing  11/13      LONG TERM GOALS: Discharge (~Jan 2025) Progress Date met   Patient will return to normal activites of daily living, recreational, and work related activities with less pain and limitation.  [] Met  [] Not Met  [x] Progressing     Patient will improve range of motion  to stated goals in order to return to maximal functional potential.  [] Met  [] Not Met  [x] Progressing     Patient will improve Strength to stated goals of appropriate musculature in order to improve functional independence.  [] Met  [] Not Met  [x] Progressing     Pain Rating at Best: 1/10 to improve Quality of Life.  [] Met  [] Not Met  [x] Progressing     Patient will meet predicted functional outcome (FOTO) score: 80% to increase self-worth & perceived functional ability. [] Met  [] Not Met  [x] Progressing     Patient will have met/partially met personal goal of: to get back to daily activities, community based activities, and social activities.  [] Met  [] Not Met  [x] Progressing           PLAN   Plan of care Certification: 11/16/2024 to 1/15/2025  - Previous: 8/15-11/15/2024    Continue Plan of Care (POC) and progress per patient  tolerance. See Treatment section for exercise progression.    Bryanna Peña PT, DPT    Disclaimer: This note was prepared using a voice recognition system and is likely to have sound alike errors within the text.

## 2024-12-20 ENCOUNTER — TELEPHONE (OUTPATIENT)
Dept: FAMILY MEDICINE | Facility: CLINIC | Age: 74
End: 2024-12-20
Payer: MEDICARE

## 2024-12-20 DIAGNOSIS — Z12.11 COLON CANCER SCREENING: Primary | ICD-10-CM

## 2024-12-20 NOTE — TELEPHONE ENCOUNTER
Spoke with patient informed Dr. Clemons ordered Colonoscopy and he say last colon note in chart does indicate is due , patient states she will wait until 11/2025 and discuss with doctor at next visit on 03/25.

## 2024-12-20 NOTE — TELEPHONE ENCOUNTER
Spoke with patient informed lab order received for A1C, she states she received message colonoscopy is past due but she was told by Dr. Clemons that repeat should be done in 2025, I informed patient by chart record I see not due until 11/13/25 but I would send message to Dr. Clemons for clarification and call her back once he responds, voiced understanding.

## 2024-12-23 ENCOUNTER — LAB VISIT (OUTPATIENT)
Dept: LAB | Facility: HOSPITAL | Age: 74
End: 2024-12-23
Attending: INTERNAL MEDICINE
Payer: MEDICARE

## 2024-12-23 ENCOUNTER — CLINICAL SUPPORT (OUTPATIENT)
Dept: REHABILITATION | Facility: HOSPITAL | Age: 74
End: 2024-12-23
Attending: STUDENT IN AN ORGANIZED HEALTH CARE EDUCATION/TRAINING PROGRAM
Payer: COMMERCIAL

## 2024-12-23 ENCOUNTER — ANTI-COAG VISIT (OUTPATIENT)
Dept: CARDIOLOGY | Facility: CLINIC | Age: 74
End: 2024-12-23
Payer: MEDICARE

## 2024-12-23 DIAGNOSIS — M25.611 DECREASED RANGE OF MOTION OF RIGHT SHOULDER: ICD-10-CM

## 2024-12-23 DIAGNOSIS — D68.51 HETEROZYGOUS FACTOR V LEIDEN MUTATION: Chronic | ICD-10-CM

## 2024-12-23 DIAGNOSIS — Z79.01 ANTICOAGULATED ON COUMADIN: Chronic | ICD-10-CM

## 2024-12-23 DIAGNOSIS — R73.03 PREDIABETES: ICD-10-CM

## 2024-12-23 DIAGNOSIS — Z79.01 LONG TERM (CURRENT) USE OF ANTICOAGULANTS: Primary | ICD-10-CM

## 2024-12-23 DIAGNOSIS — R29.898 DECREASED STRENGTH OF UPPER EXTREMITY: Primary | ICD-10-CM

## 2024-12-23 LAB
CTP QC/QA: YES
ESTIMATED AVG GLUCOSE: 117 MG/DL (ref 68–131)
HBA1C MFR BLD: 5.7 % (ref 4–5.6)
INR PPP: 3.3 (ref 2–3)

## 2024-12-23 PROCEDURE — 85610 PROTHROMBIN TIME: CPT | Mod: QW,HCNC,S$GLB, | Performed by: INTERNAL MEDICINE

## 2024-12-23 PROCEDURE — 36415 COLL VENOUS BLD VENIPUNCTURE: CPT | Mod: HCNC | Performed by: INTERNAL MEDICINE

## 2024-12-23 PROCEDURE — 97112 NEUROMUSCULAR REEDUCATION: CPT | Performed by: PHYSICAL THERAPIST

## 2024-12-23 PROCEDURE — 97110 THERAPEUTIC EXERCISES: CPT | Performed by: PHYSICAL THERAPIST

## 2024-12-23 PROCEDURE — 93793 ANTICOAG MGMT PT WARFARIN: CPT | Mod: HCNC,S$GLB,,

## 2024-12-23 PROCEDURE — 83036 HEMOGLOBIN GLYCOSYLATED A1C: CPT | Mod: HCNC | Performed by: INTERNAL MEDICINE

## 2024-12-23 NOTE — PROGRESS NOTES
[facility]   Physical Therapy Treatment Note     Name: Alyx Weir  [facility] Number: [id number]    Therapy Diagnosis:   Encounter Diagnoses   Name Primary?    Decreased strength of upper extremity Yes    Decreased range of motion of right shoulder      Physician: Carl Ruiz    Visit Date: 12/23/2024    Physician Orders: PT Eval and Treat  Medical Diagnosis from Referral: Traumatic incomplete tear of right rotator cuff, subsequent encounter [S46.011D], Subacromial impingement of right shoulder [M75.41]   Evaluation Date: 8/15/2024  Authorization Period Expiration: 6/7/2025  Plan of Care Expiration:    1/15/2025                 Progress Update: 1/15/2025   Visit # / Visits authorized: 3 / 12 (already completed 22)  FOTO: 12/12 - Scored: 2 / 3       PRECAUTIONS: Standard Precautions, Safety/fall precautions, Orthopedic: Right Cuff Repair, Non-weight beariing, and Orthotic device type: abductor sling, Wearning schedule: full until 6 week follow up      PROBLEM LIST : pain, decreased motion all planes, decreased strength      Date of Surgery/Procedure 8/5/2024- Joseph   Surgery/Procedure Performed Status post Right Rotator Cuff Repair, Subacromial Decompression, Distal Clavicle Dissection, Biceps Tenodesis   Rehabilitation Precautions Protocol: [facility] Cuff Protocol      MD Follow up:       Patient School:     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  She still feels like a knot is in the midshaft of her arm and just won't go away.  Compliance with Hep: Daily  Response to previous treatment: no adverse reactions to treatment/updated HEP  Functional change: Better    Pain: 5/10   Worst: 8/10  Location: Right shoulder      OBJECTIVE     Objective Measures updated at progress report unless specified otherwise.    Joint Position RIGHT RIGHT (warm)  "  Sholder Flexion 110aa 104 / 115 aa   Shoulder Extension 30 35   Shoulder Abduction 75 64   Shoulder ER at 90* Unable against gravity unable   Shoulder ER at 45* 30 40   Shoulder IR at 90* -     Functional ER unable     Functional IR unable       Treatment     Gym/Equipment Access: none  Time to Complete Exercises: increased for cueing and education    Alyx received the treatments listed below:      CPT Intervention  Right RCR  BT  SAD  DCR Duration/ Details  12/23   TE Arm Bike 3/3 forward and back   TE Tball Ball Walk outs- 4' each  Forward  Lateral ea   TE Pulleys Flexion & Abduction 4' ea   TE Passive Range of motion     TE Free Weights Standing - 3x12 each, #1  Curl to Press  Scaption  Abduction        NMR Band Series T-band- (green)  Internal Rotation 10x10" walkouts  External  10x10" walkouts   NC Hot Pack  declined   PLAN         CPT Codes available for Billing: Billing reflects 1:1 direct supervision  (0) minutes of Manual therapy (MT) to improve pain and ROM.  (45) minutes of Therapeutic Exercise (TE) to develop strength, endurance, range of motion, and flexibility.  (10) minutes of Neuromuscular Re-Education (NMR)  to improve: Balance, Coordination, Kinesthetic, Sense, Proprioception, and Posture.  (0) minutes of Therapeutic Activities (TA) to improve functional performance.  (0) minutes of Gait Training (GT) to improve functional gait mechanics.  (0) minutes of Self- Care and Education  Unattended Electrical Stimulation (ES) for muscle performance or pain modulation.  Vasopneumatic Device Therapy () for management of swelling/edema. (28458)  BFR: Blood flow restriction applied during exercise  NP or (-): Not Performed    See EMR under MEDIA for written consent provided for Dry Needling- DATE   Palpation Assessment to determine the necessity for Functional Dry Needling     PATIENT EDUCATION AND HOME EXERCISES      Education/Self-Care provided:  (included in treatment unless specified above) minutes "   Patient educated on the impairments noted above and the effects of physical therapy intervention to improve overall condition and Quality of Life  Patient was educated on all the above exercise prior/during/after for proper posture, positioning, and execution for safe performance with home exercise program.      Written Home Exercises Provided: yes. Prefers: [x] Printed [x] Electronic  Exercises were reviewed and Rylee was able to demonstrate them prior to the end of the session.  Rylee demonstrated good understanding of the education provided. See EMR under Patient Instructions for exercises provided during therapy sessions.    ASSESSMENT     Alyx Weir tolerated Physical Therapy session well with moderate complaints of pain or discomfort- due to muscular tightness and dull achey bony pain.  Objective findings are improving with pain, range of motion, strength, endurance, exercise tolerance, and functional mobility.  Alyx Weir continues to have difficulty with prolonged activity with exercises, but does show signs of exercise tolerance improvments.  Alyx demonstrated good understanding of new exercises and will continue to progress at home until next follow-up.     Alyx Is progressing well towards her goals.   Pt prognosis is Good.     Pt will continue to benefit from skilled outpatient physical therapy to address the deficits listed in the problem list box on initial evaluation, provide pt/family education and to maximize pt's level of independence in the home and community environment.     Pt's spiritual, cultural and educational needs considered and pt agreeable to plan of care and goals.    Anticipated Barriers for therapy: co-morbidities       SHORT TERM GOALS:  progressing Progress Date met   Recent signs and systems trend is improving in order to progress towards Long term goals.  [x] Met  [] Not Met  [] Progressing  9/23   Patient will be independent with Home Exercise Program  in order to  further progress and return to maximal function. [] Met  [] Not Met  [x] Progressing     Pain rating at Worst: 5 /10 in order to progress towards increased independence with activity. [] Met  [] Not Met  [x] Progressing     Patient will be able to correct postural deviations in sitting and standing, to decrease pain and promote postural awareness for injury prevention.  [x] Met  [] Not Met  [] Progressing  11/13   Patient will improve functional outcome (FOTO) score: by 5% to increase self-worth & perceived functional ability towards long term goals [x] Met  [] Not Met  [] Progressing  11/13      LONG TERM GOALS: Discharge (~Jan 2025) Progress Date met   Patient will return to normal activites of daily living, recreational, and work related activities with less pain and limitation.  [] Met  [] Not Met  [x] Progressing     Patient will improve range of motion  to stated goals in order to return to maximal functional potential.  [] Met  [] Not Met  [x] Progressing     Patient will improve Strength to stated goals of appropriate musculature in order to improve functional independence.  [] Met  [] Not Met  [x] Progressing     Pain Rating at Best: 1/10 to improve Quality of Life.  [] Met  [] Not Met  [x] Progressing     Patient will meet predicted functional outcome (FOTO) score: 80% to increase self-worth & perceived functional ability. [] Met  [] Not Met  [x] Progressing     Patient will have met/partially met personal goal of: to get back to daily activities, community based activities, and social activities.  [] Met  [] Not Met  [x] Progressing           PLAN   Plan of care Certification: 11/16/2024 to 1/15/2025  - Previous: 8/15-11/15/2024    Continue Plan of Care (POC) and progress per patient tolerance. See Treatment section for exercise progression.    Bryanna Peña, PT, DPT    Disclaimer: This note was prepared using a voice recognition system and is likely to have sound alike errors within the text.

## 2024-12-23 NOTE — PROGRESS NOTES
Patient's INR is supra-therapeutic at 3.3. Patient confirms she followed previous instructions. Patient reports no changes. Advised patient of increased risk of bleeding; signs/symptoms of bleeding. Patient reports she is not having any signs/symptoms of bleeding. Instructions given: Take warfarin 2.5 mg today 12/23/24; then re-challenge warfarin 7.5 mg on Tuesdays, Thursdays and Saturdays; and 5 mg all other days. Recheck on 12/31/24. Calendar reviewed with patient. Patient verbalizes understanding.

## 2024-12-24 ENCOUNTER — PATIENT MESSAGE (OUTPATIENT)
Dept: FAMILY MEDICINE | Facility: CLINIC | Age: 74
End: 2024-12-24
Payer: MEDICARE

## 2024-12-27 ENCOUNTER — CLINICAL SUPPORT (OUTPATIENT)
Dept: REHABILITATION | Facility: HOSPITAL | Age: 74
End: 2024-12-27
Attending: STUDENT IN AN ORGANIZED HEALTH CARE EDUCATION/TRAINING PROGRAM
Payer: COMMERCIAL

## 2024-12-27 DIAGNOSIS — M25.611 DECREASED RANGE OF MOTION OF RIGHT SHOULDER: ICD-10-CM

## 2024-12-27 DIAGNOSIS — R29.898 DECREASED STRENGTH OF UPPER EXTREMITY: Primary | ICD-10-CM

## 2024-12-27 PROCEDURE — 97110 THERAPEUTIC EXERCISES: CPT | Performed by: PHYSICAL THERAPIST

## 2024-12-27 NOTE — PROGRESS NOTES
[facility]   Physical Therapy Treatment Note     Name: Alyx Weir  [facility] Number: [id number]    Therapy Diagnosis:   Encounter Diagnoses   Name Primary?    Decreased strength of upper extremity Yes    Decreased range of motion of right shoulder      Physician: Carl Ruiz    Visit Date: 12/27/2024    Physician Orders: PT Eval and Treat  Medical Diagnosis from Referral: Traumatic incomplete tear of right rotator cuff, subsequent encounter [S46.011D], Subacromial impingement of right shoulder [M75.41]   Evaluation Date: 8/15/2024  Authorization Period Expiration: 6/7/2025  Plan of Care Expiration:    1/15/2025                 Progress Update: 1/15/2025   Visit # / Visits authorized: 4 / 12 (already completed 22)  FOTO: 12/12 - Scored: 2 / 3       PRECAUTIONS: Standard Precautions, Safety/fall precautions, Orthopedic: Right Cuff Repair, Non-weight beariing, and Orthotic device type: abductor sling, Wearning schedule: full until 6 week follow up      PROBLEM LIST : pain, decreased motion all planes, decreased strength      Date of Surgery/Procedure 8/5/2024- Joseph   Surgery/Procedure Performed Status post Right Rotator Cuff Repair, Subacromial Decompression, Distal Clavicle Dissection, Biceps Tenodesis   Rehabilitation Precautions Protocol: Joseph Cuff Protocol      MD Follow up:       Patient School:     Job/Position: Works at an alteration story    [facility] Visit #: 0/5     Time In: 1050  Time Out: 1200  Total Billable Time: 55 minutes    SUBJECTIVE     Pt reports:  She feels ok.  She still feels like a knot is in the midshaft of her arm and just won't go away.  Compliance with Hep: Daily  Response to previous treatment: no adverse reactions to treatment/updated HEP  Functional change: Better    Pain: 5/10   Worst: 8/10  Location: Right shoulder      OBJECTIVE     Objective Measures updated at progress report unless specified otherwise.    Joint Position RIGHT RIGHT (warm)    Sholder Flexion 110aa 104 / 115 aa   Shoulder Extension 30 35   Shoulder Abduction 75 64   Shoulder ER at 90* Unable against gravity unable   Shoulder ER at 45* 30 40   Shoulder IR at 90* -     Functional ER unable     Functional IR unable       Treatment     Gym/Equipment Access: none  Time to Complete Exercises: increased for cueing and education    Alyx received the treatments listed below:      CPT Intervention  Right RCR  BT  SAD  DCR Duration/ Details  12/27   TE Arm Bike 3/3 forward and back   TE Tball Ball Walk outs- 4' each  Forward  Lateral ea   TE Pulleys Flexion & Abduction 4' ea   TE Passive Range of motion     TE Assisted Motion BAR - 3 min each, 5s holds  Flexion Holds  Abduction holds  external rotation stretch corner   TE Free Weights Standing - 3x12 each, #1  Curl to Press  Scaption  Abduction  Tricep Extensions- in chair - 3x12   NMR Band Series    NC Hot Pack  10 minutes   PLAN         CPT Codes available for Billing: Billing reflects 1:1 direct supervision  (0) minutes of Manual therapy (MT) to improve pain and ROM.  (55) minutes of Therapeutic Exercise (TE) to develop strength, endurance, range of motion, and flexibility.  (0) minutes of Neuromuscular Re-Education (NMR)  to improve: Balance, Coordination, Kinesthetic, Sense, Proprioception, and Posture.  (0) minutes of Therapeutic Activities (TA) to improve functional performance.  (0) minutes of Gait Training (GT) to improve functional gait mechanics.  (0) minutes of Self- Care and Education  Unattended Electrical Stimulation (ES) for muscle performance or pain modulation.  Vasopneumatic Device Therapy () for management of swelling/edema. (47110)  BFR: Blood flow restriction applied during exercise  NP or (-): Not Performed    See EMR under MEDIA for written consent provided for Dry Needling- DATE   Palpation Assessment to determine the necessity for Functional Dry Needling     PATIENT EDUCATION AND HOME EXERCISES       Education/Self-Care provided:  (included in treatment unless specified above) minutes   Patient educated on the impairments noted above and the effects of physical therapy intervention to improve overall condition and Quality of Life  Patient was educated on all the above exercise prior/during/after for proper posture, positioning, and execution for safe performance with home exercise program.      Written Home Exercises Provided: yes. Prefers: [x] Printed [x] Electronic  Exercises were reviewed and Rylee was able to demonstrate them prior to the end of the session.  Rylee demonstrated good understanding of the education provided. See EMR under Patient Instructions for exercises provided during therapy sessions.    ASSESSMENT     Alyx Weir tolerated Physical Therapy session well with minimal  complaints of pain or discomfort.  Objective findings are improving with pain, range of motion, strength are slowly progressing, but pain continues to be her limiting factor.  Alyx demonstrated good understanding of new exercises and will continue to progress at home until next follow-up.     Alyx Is progressing well towards her goals.   Pt prognosis is Good.     Pt will continue to benefit from skilled outpatient physical therapy to address the deficits listed in the problem list box on initial evaluation, provide pt/family education and to maximize pt's level of independence in the home and community environment.     Pt's spiritual, cultural and educational needs considered and pt agreeable to plan of care and goals.    Anticipated Barriers for therapy: co-morbidities       SHORT TERM GOALS:  progressing Progress Date met   Recent signs and systems trend is improving in order to progress towards Long term goals.  [x] Met  [] Not Met  [] Progressing  9/23   Patient will be independent with Home Exercise Program  in order to further progress and return to maximal function. [] Met  [] Not Met  [x] Progressing     Pain  rating at Worst: 5 /10 in order to progress towards increased independence with activity. [] Met  [] Not Met  [x] Progressing     Patient will be able to correct postural deviations in sitting and standing, to decrease pain and promote postural awareness for injury prevention.  [x] Met  [] Not Met  [] Progressing  11/13   Patient will improve functional outcome (FOTO) score: by 5% to increase self-worth & perceived functional ability towards long term goals [x] Met  [] Not Met  [] Progressing  11/13      LONG TERM GOALS: Discharge (~Jan 2025) Progress Date met   Patient will return to normal activites of daily living, recreational, and work related activities with less pain and limitation.  [] Met  [] Not Met  [x] Progressing     Patient will improve range of motion  to stated goals in order to return to maximal functional potential.  [] Met  [] Not Met  [x] Progressing     Patient will improve Strength to stated goals of appropriate musculature in order to improve functional independence.  [] Met  [] Not Met  [x] Progressing     Pain Rating at Best: 1/10 to improve Quality of Life.  [] Met  [] Not Met  [x] Progressing     Patient will meet predicted functional outcome (FOTO) score: 80% to increase self-worth & perceived functional ability. [] Met  [] Not Met  [x] Progressing     Patient will have met/partially met personal goal of: to get back to daily activities, community based activities, and social activities.  [] Met  [] Not Met  [x] Progressing           PLAN   Plan of care Certification: 11/16/2024 to 1/15/2025  - Previous: 8/15-11/15/2024    Continue Plan of Care (POC) and progress per patient tolerance. See Treatment section for exercise progression.    Bryanna Peña, PT, DPT    Disclaimer: This note was prepared using a voice recognition system and is likely to have sound alike errors within the text.

## 2024-12-31 ENCOUNTER — CLINICAL SUPPORT (OUTPATIENT)
Dept: REHABILITATION | Facility: HOSPITAL | Age: 74
End: 2024-12-31
Attending: STUDENT IN AN ORGANIZED HEALTH CARE EDUCATION/TRAINING PROGRAM
Payer: COMMERCIAL

## 2024-12-31 ENCOUNTER — ANTI-COAG VISIT (OUTPATIENT)
Dept: CARDIOLOGY | Facility: CLINIC | Age: 74
End: 2024-12-31
Payer: MEDICARE

## 2024-12-31 DIAGNOSIS — D68.51 HETEROZYGOUS FACTOR V LEIDEN MUTATION: Chronic | ICD-10-CM

## 2024-12-31 DIAGNOSIS — M25.611 DECREASED RANGE OF MOTION OF RIGHT SHOULDER: ICD-10-CM

## 2024-12-31 DIAGNOSIS — Z79.01 LONG TERM (CURRENT) USE OF ANTICOAGULANTS: Primary | ICD-10-CM

## 2024-12-31 DIAGNOSIS — R29.898 DECREASED STRENGTH OF UPPER EXTREMITY: Primary | ICD-10-CM

## 2024-12-31 DIAGNOSIS — Z79.01 ANTICOAGULATED ON COUMADIN: Chronic | ICD-10-CM

## 2024-12-31 LAB
CTP QC/QA: YES
INR PPP: 2.2 (ref 2–3)

## 2024-12-31 PROCEDURE — 85610 PROTHROMBIN TIME: CPT | Mod: QW,HCNC,S$GLB, | Performed by: INTERNAL MEDICINE

## 2024-12-31 PROCEDURE — 93793 ANTICOAG MGMT PT WARFARIN: CPT | Mod: HCNC,S$GLB,,

## 2024-12-31 PROCEDURE — 97110 THERAPEUTIC EXERCISES: CPT

## 2024-12-31 NOTE — PROGRESS NOTES
Patient's INR is therapeutic at 2.2. Patient reports she followed previous instructions. Patient reports no changes. Instructions given: Continue warfarin 7.5 mg on Tuesdays, Thursdays and Saturdays; and 5 mg all other days. Recheck on 1/24/25. Calendar reviewed with patient. Patient verbalizes understanding.

## 2024-12-31 NOTE — PROGRESS NOTES
[facility]   Physical Therapy Treatment Note     Name: Alyx Weir  [facility] Number: [id number]    Therapy Diagnosis:   Encounter Diagnoses   Name Primary?    Decreased strength of upper extremity Yes    Decreased range of motion of right shoulder      Physician: Carl Ruiz    Visit Date: 12/31/2024    Physician Orders: PT Eval and Treat  Medical Diagnosis from Referral: Traumatic incomplete tear of right rotator cuff, subsequent encounter [S46.011D], Subacromial impingement of right shoulder [M75.41]   Evaluation Date: 8/15/2024  Authorization Period Expiration: 6/7/2025  Plan of Care Expiration:    1/15/2025                 Progress Update: 1/15/2025   Visit # / Visits authorized: 5 / 12 (already completed 22)  FOTO: 12/12 - Scored: 2 / 3       PRECAUTIONS: Standard Precautions, Safety/fall precautions, Orthopedic: Right Cuff Repair, Non-weight beariing, and Orthotic device type: abductor sling, Wearning schedule: full until 6 week follow up      PROBLEM LIST : pain, decreased motion all planes, decreased strength      Date of Surgery/Procedure 8/5/2024- Joseph   Surgery/Procedure Performed Status post Right Rotator Cuff Repair, Subacromial Decompression, Distal Clavicle Dissection, Biceps Tenodesis   Rehabilitation Precautions Protocol: Joseph Cuff Protocol      MD Follow up:       Patient School:     Job/Position: Works at an alteration story    \[facility]\"" Visit #: 0/5     Time In: 10:00 AM  Time Out: 10:55 AM  Total Billable Time: 55 minutes    SUBJECTIVE     Pt reports:  she still struggles to put her bra on and off and reach her arm behind her back.  Compliance with Hep: Daily  Response to previous treatment: no adverse reactions to treatment/updated HEP  Functional change: Better    Pain: 5/10   Worst: 8/10  Location: Right shoulder      OBJECTIVE     Objective Measures updated at progress report unless specified otherwise.    Joint Position RIGHT RIGHT (warm)   Zach  Flexion 110aa 104 / 115 aa   Shoulder Extension 30 35   Shoulder Abduction 75 64   Shoulder ER at 90* Unable against gravity unable   Shoulder ER at 45* 30 40   Shoulder IR at 90* -     Functional ER unable     Functional IR unable       Treatment     Gym/Equipment Access: none  Time to Complete Exercises: increased for cueing and education    Alyx received the treatments listed below:      CPT Intervention  Right RCR  BT  SAD  DCR Duration/ Details  12/31   TE Arm Bike 3/3 forward and back   TE Tball    TE Pulleys Flexion & Abduction 4' ea   TE Passive Range of motion     TE Assisted Motion BAR - 3 min each, 5s holds  Supine flexion 2lbs  Supine chest press 2lbs  INTERNAL ROTATION stretch with band 3 x 30 seconds right    TE Free Weights Standing - 3x10 each, #1  Curl to Press  Scaption  Abduction     NMR Band Series    NC Hot Pack  10 minutes   PLAN         CPT Codes available for Billing: Billing reflects 1:1 direct supervision  (0) minutes of Manual therapy (MT) to improve pain and ROM.  (55) minutes of Therapeutic Exercise (TE) to develop strength, endurance, range of motion, and flexibility.  (0) minutes of Neuromuscular Re-Education (NMR)  to improve: Balance, Coordination, Kinesthetic, Sense, Proprioception, and Posture.  (0) minutes of Therapeutic Activities (TA) to improve functional performance.  (0) minutes of Gait Training (GT) to improve functional gait mechanics.  (0) minutes of Self- Care and Education  Unattended Electrical Stimulation (ES) for muscle performance or pain modulation.  Vasopneumatic Device Therapy () for management of swelling/edema. (26772)  BFR: Blood flow restriction applied during exercise  NP or (-): Not Performed    See EMR under MEDIA for written consent provided for Dry Needling- DATE   Palpation Assessment to determine the necessity for Functional Dry Needling     PATIENT EDUCATION AND HOME EXERCISES      Education/Self-Care provided:  (included in treatment unless  specified above) minutes   Patient educated on the impairments noted above and the effects of physical therapy intervention to improve overall condition and Quality of Life  Patient was educated on all the above exercise prior/during/after for proper posture, positioning, and execution for safe performance with home exercise program.      Written Home Exercises Provided: yes. Prefers: [x] Printed [x] Electronic  Exercises were reviewed and Rylee was able to demonstrate them prior to the end of the session.  Rylee demonstrated good understanding of the education provided. See EMR under Patient Instructions for exercises provided during therapy sessions.    ASSESSMENT     Alyx Weir tolerated Physical Therapy session well with minimal  complaints of pain or discomfort.  Objective findings are improving with pain, range of motion, strength are slowly progressing, but pain continues to be her limiting factor.  Alyx demonstrated good understanding of new exercises and will continue to progress at home until next follow-up.     Alyx Is progressing well towards her goals.   Pt prognosis is Good.     Pt will continue to benefit from skilled outpatient physical therapy to address the deficits listed in the problem list box on initial evaluation, provide pt/family education and to maximize pt's level of independence in the home and community environment.     Pt's spiritual, cultural and educational needs considered and pt agreeable to plan of care and goals.    Anticipated Barriers for therapy: co-morbidities       SHORT TERM GOALS:  progressing Progress Date met   Recent signs and systems trend is improving in order to progress towards Long term goals.  [x] Met  [] Not Met  [] Progressing  9/23   Patient will be independent with Home Exercise Program  in order to further progress and return to maximal function. [] Met  [] Not Met  [x] Progressing     Pain rating at Worst: 5 /10 in order to progress towards increased  independence with activity. [] Met  [] Not Met  [x] Progressing     Patient will be able to correct postural deviations in sitting and standing, to decrease pain and promote postural awareness for injury prevention.  [x] Met  [] Not Met  [] Progressing  11/13   Patient will improve functional outcome (FOTO) score: by 5% to increase self-worth & perceived functional ability towards long term goals [x] Met  [] Not Met  [] Progressing  11/13      LONG TERM GOALS: Discharge (~Jan 2025) Progress Date met   Patient will return to normal activites of daily living, recreational, and work related activities with less pain and limitation.  [] Met  [] Not Met  [x] Progressing     Patient will improve range of motion  to stated goals in order to return to maximal functional potential.  [] Met  [] Not Met  [x] Progressing     Patient will improve Strength to stated goals of appropriate musculature in order to improve functional independence.  [] Met  [] Not Met  [x] Progressing     Pain Rating at Best: 1/10 to improve Quality of Life.  [] Met  [] Not Met  [x] Progressing     Patient will meet predicted functional outcome (FOTO) score: 80% to increase self-worth & perceived functional ability. [] Met  [] Not Met  [x] Progressing     Patient will have met/partially met personal goal of: to get back to daily activities, community based activities, and social activities.  [] Met  [] Not Met  [x] Progressing           PLAN   Plan of care Certification: 11/16/2024 to 1/15/2025  - Previous: 8/15-11/15/2024    Continue Plan of Care (POC) and progress per patient tolerance. See Treatment section for exercise progression.    Mamta Peres, PT    Disclaimer: This note was prepared using a voice recognition system and is likely to have sound alike errors within the text.

## 2025-01-03 ENCOUNTER — DOCUMENTATION ONLY (OUTPATIENT)
Dept: REHABILITATION | Facility: HOSPITAL | Age: 75
End: 2025-01-03
Payer: MEDICARE

## 2025-01-03 ENCOUNTER — CLINICAL SUPPORT (OUTPATIENT)
Dept: REHABILITATION | Facility: HOSPITAL | Age: 75
End: 2025-01-03
Attending: STUDENT IN AN ORGANIZED HEALTH CARE EDUCATION/TRAINING PROGRAM
Payer: COMMERCIAL

## 2025-01-03 ENCOUNTER — TELEPHONE (OUTPATIENT)
Dept: SPORTS MEDICINE | Facility: CLINIC | Age: 75
End: 2025-01-03
Payer: MEDICARE

## 2025-01-03 DIAGNOSIS — R29.898 DECREASED STRENGTH OF UPPER EXTREMITY: Primary | ICD-10-CM

## 2025-01-03 DIAGNOSIS — M25.611 DECREASED RANGE OF MOTION OF RIGHT SHOULDER: ICD-10-CM

## 2025-01-03 PROCEDURE — 97112 NEUROMUSCULAR REEDUCATION: CPT | Mod: CQ

## 2025-01-03 PROCEDURE — 97110 THERAPEUTIC EXERCISES: CPT | Mod: CQ

## 2025-01-03 NOTE — PROGRESS NOTES
[facility]   Physical Therapy Treatment Note     Name: Alyx Weir  [facility] Number: [id number]    Therapy Diagnosis:   Encounter Diagnoses   Name Primary?    Decreased strength of upper extremity Yes    Decreased range of motion of right shoulder        Physician: Carl Ruiz    Visit Date: 1/3/2025    Physician Orders: PT Eval and Treat  Medical Diagnosis from Referral: Traumatic incomplete tear of right rotator cuff, subsequent encounter [S46.011D], Subacromial impingement of right shoulder [M75.41]   Evaluation Date: 8/15/2024  Authorization Period Expiration: 6/7/2025  Plan of Care Expiration:    1/15/2025                 Progress Update: 1/15/2025   Visit # / Visits authorized: 6/ 12 (already completed 22)  FOTO: 12/12 - Scored: 2 / 3       PRECAUTIONS: Standard Precautions, Safety/fall precautions, Orthopedic: Right Cuff Repair, Non-weight beariing, and Orthotic device type: abductor sling, Wearning schedule: full until 6 week follow up      PROBLEM LIST : pain, decreased motion all planes, decreased strength      Date of Surgery/Procedure 8/5/2024- Joseph   Surgery/Procedure Performed Status post Right Rotator Cuff Repair, Subacromial Decompression, Distal Clavicle Dissection, Biceps Tenodesis   Rehabilitation Precautions Protocol: Joseph Cuff Protocol      MD Follow up:       Patient School:     Job/Position: Works at an alteration story    [facility] Visit #: 1/5     Time In: 11:30 AM  Time Out: 12:15 pm  Total Billable Time: 45 minutes    SUBJECTIVE     Pt reports: went back to work recently and is having more shoulder pain today.     Compliance with Hep: Daily    Response to previous treatment: no adverse reactions to treatment/updated HOME EXERCISE PROGRAM    Functional change: Better    Pain: 6/10   Worst: 8/10  Location: Right shoulder      OBJECTIVE     Objective Measures updated at progress report unless specified otherwise.      Treatment     Gym/Equipment Access:  "none  Time to Complete Exercises: increased for cueing and education    Alyx received the treatments listed below:       CPT Intervention  Right RCR  BT  SAD  DCR Duration/ Details  12/27 Duration/ Details  12/23 Duration/ Details  12/12 1/3/25   TE Arm Bike 3/3 forward and back 3/3 forward and back 3/3 forward and back 3/3 forward and back   TE Tball Ball Walk outs- 4' each  Forward  Lateral ea Ball Walk outs- 4' each  Forward  Lateral ea   Ball Walk outs- 4' each blue swiss ball on elevated table patient  standing  Forward  Lateral (deferred)   TE Pulleys Flexion & Abduction 4' ea Flexion & Abduction 4' ea Flexion & Abduction 4' ea Flexion & Abduction 4' ea   TE Passive Range of motion     External rotation, Flexion, Scaption     TE Assisted Motion BAR - 3 min each, 5s holds  Flexion Holds  Abduction holds  external rotation stretch corner   BAR - 3 min each, 5s holds  Flexion Holds  Abduction holds  external rotation stretch corner     TE Free Weights Standing - 3x12 each, #1  Curl to Press  Scaption  Abduction  Tricep Extensions- in chair - 3x12 Standing - 3x12 each, #1  Curl to Press  Scaption  Abduction Standing - 3x12 each, #1  Curl to Press  Scaption  Abduction Standing - 3x12 each, #1  Curl to Press  Scaption  Abduction (deferred)   NMR Band Series   T-band- (green)  Internal Rotation 10x10" walkouts  External  10x10" walkouts T-band- (red)  Internal Rotation 10x10" walkouts  External  10x10" walkouts T-band- (red)  Internal Rotation 10x10" walkouts  External  10x10" walkouts   NC Hot Pack  10 minutes declined declined     PLAN                CPT Codes available for Billing:   (00) minutes of Manual therapy (MT) to improve pain and ROM.  (35) minutes of Therapeutic Exercise (TE) to develop strength, endurance, range of motion, and flexibility.  (10) minutes of Neuromuscular Re-Education (NMR)  to improve: Balance, Coordination, Kinesthetic, Sense, Proprioception, and Posture.  (00) minutes of " Therapeutic Activities (TA) to improve functional performance.  (00) minutes of Gait Training (GT) to improve functional gait mechanics.  (00) minutes of Self- Care and Education  Unattended Electrical Stimulation (ES) for muscle performance or pain modulation.  Vasopneumatic Device Therapy () for management of swelling/edema. (19119)  BFR: Blood flow restriction applied during exercise  Functional Performance Test (FPT)  NP or (-): Not Performed        See EMR under MEDIA for written consent provided for Dry Needling- DATE   Palpation Assessment to determine the necessity for Functional Dry Needling     PATIENT EDUCATION AND HOME EXERCISES      Education/Self-Care provided:  (included in treatment unless specified above) minutes   Patient educated on the impairments noted above and the effects of physical therapy intervention to improve overall condition and Quality of Life  Patient was educated on all the above exercise prior/during/after for proper posture, positioning, and execution for safe performance with home exercise program.      Written Home Exercises Provided: yes. Prefers: [x] Printed [x] Electronic  Exercises were reviewed and Rylee was able to demonstrate them prior to the end of the session.  Rylee demonstrated good understanding of the education provided. See EMR under Patient Instructions for exercises provided during therapy sessions.    ASSESSMENT     Alyx Weir tolerated Physical Therapy session with no complaints of pain. Patient performed all strengthening exercises with good quality. Shoulder strengthening was performed in sitting by her request due to low back pain with overhead movements.     Alyx Is progressing well towards her goals.   Pt prognosis is Good.     Pt will continue to benefit from skilled outpatient physical therapy to address the deficits listed in the problem list box on initial evaluation, provide pt/family education and to maximize pt's level of independence in the  home and community environment.     Pt's spiritual, cultural and educational needs considered and pt agreeable to plan of care and goals.    Anticipated Barriers for therapy: co-morbidities       SHORT TERM GOALS:  progressing Progress Date met   Recent signs and systems trend is improving in order to progress towards Long term goals.  [x] Met  [] Not Met  [] Progressing  9/23   Patient will be independent with Home Exercise Program  in order to further progress and return to maximal function. [] Met  [] Not Met  [x] Progressing     Pain rating at Worst: 5 /10 in order to progress towards increased independence with activity. [] Met  [] Not Met  [x] Progressing     Patient will be able to correct postural deviations in sitting and standing, to decrease pain and promote postural awareness for injury prevention.  [x] Met  [] Not Met  [] Progressing  11/13   Patient will improve functional outcome (FOTO) score: by 5% to increase self-worth & perceived functional ability towards long term goals [x] Met  [] Not Met  [] Progressing  11/13      LONG TERM GOALS: Discharge (~Jan 2025) Progress Date met   Patient will return to normal activites of daily living, recreational, and work related activities with less pain and limitation.  [] Met  [] Not Met  [x] Progressing     Patient will improve range of motion  to stated goals in order to return to maximal functional potential.  [] Met  [] Not Met  [x] Progressing     Patient will improve Strength to stated goals of appropriate musculature in order to improve functional independence.  [] Met  [] Not Met  [x] Progressing     Pain Rating at Best: 1/10 to improve Quality of Life.  [] Met  [] Not Met  [x] Progressing     Patient will meet predicted functional outcome (FOTO) score: 80% to increase self-worth & perceived functional ability. [] Met  [] Not Met  [x] Progressing     Patient will have met/partially met personal goal of: to get back to daily activities, community based  activities, and social activities.  [] Met  [] Not Met  [x] Progressing           PLAN   Plan of care Certification: 11/16/2024 to 1/15/2025  - Previous: 8/15-11/15/2024    Continue Plan of Care (POC) and progress per patient tolerance. See Treatment section for exercise progression.    Kaleb Rousseau, PTA    Disclaimer: This note was prepared using a voice recognition system and is likely to have sound alike errors within the text.

## 2025-01-03 NOTE — PROGRESS NOTES
PT/PTA met face to face to discuss pt's treatment plan and progress towards established goals. Pt will be seen by a physical therapist minimally every 6th visit or every 30 days.      Kaleb Rousseau PTA

## 2025-01-07 ENCOUNTER — CLINICAL SUPPORT (OUTPATIENT)
Dept: REHABILITATION | Facility: HOSPITAL | Age: 75
End: 2025-01-07
Attending: STUDENT IN AN ORGANIZED HEALTH CARE EDUCATION/TRAINING PROGRAM
Payer: COMMERCIAL

## 2025-01-07 DIAGNOSIS — M25.611 DECREASED RANGE OF MOTION OF RIGHT SHOULDER: ICD-10-CM

## 2025-01-07 DIAGNOSIS — R29.898 DECREASED STRENGTH OF UPPER EXTREMITY: Primary | ICD-10-CM

## 2025-01-07 PROCEDURE — 97110 THERAPEUTIC EXERCISES: CPT

## 2025-01-07 NOTE — PROGRESS NOTES
[facility]   Physical Therapy Treatment Note     Name: Alyx Weir  [facility] Number: [id number]    Therapy Diagnosis:   Encounter Diagnoses   Name Primary?    Decreased strength of upper extremity Yes    Decreased range of motion of right shoulder        Physician: Carl Ruiz    Visit Date: 1/7/2025    Physician Orders: PT Eval and Treat  Medical Diagnosis from Referral: Traumatic incomplete tear of right rotator cuff, subsequent encounter [S46.011D], Subacromial impingement of right shoulder [M75.41]   Evaluation Date: 8/15/2024  Authorization Period Expiration: 6/7/2025  Plan of Care Expiration:    1/15/2025                 Progress Update: 1/15/2025   Visit # / Visits authorized: 7/ 12 (already completed 22)  FOTO: 12/12 - Scored: 2 / 3       PRECAUTIONS: Standard Precautions, Safety/fall precautions, Orthopedic: Right Cuff Repair, Non-weight beariing, and Orthotic device type: abductor sling, Wearning schedule: full until 6 week follow up      PROBLEM LIST : pain, decreased motion all planes, decreased strength      Date of Surgery/Procedure 8/5/2024- Joseph   Surgery/Procedure Performed Status post Right Rotator Cuff Repair, Subacromial Decompression, Distal Clavicle Dissection, Biceps Tenodesis   Rehabilitation Precautions Protocol: Joseph Cuff Protocol      MD Follow up:       Patient School:     Job/Position: Works at an alteration story    [facility] Visit #: 0/5     Time In: 10:05 AM  Time Out: 11:00 AM  Total Billable Time: 55 minutes    SUBJECTIVE     Pt reports: 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 Patient states she is going to try to ask to have different job duties so she does not increase her shoulder pain.    Compliance with Hep: Daily    Response to previous treatment: no adverse reactions to treatment/updated HOME EXERCISE PROGRAM    Functional change:  Better    Pain: 6/10   Worst: 8/10  Location: Right shoulder      OBJECTIVE     Objective Measures updated at progress report unless specified otherwise.      Treatment     Gym/Equipment Access: none  Time to Complete Exercises: increased for cueing and education    Alyx received the treatments listed below:       CPT Intervention  Right RCR  BT  SAD  DCR Duration/ Details  01/07   TE Arm Bike 3/3 forward and back   TE Tball     TE Pulleys Flexion & Abduction 4' ea   TE Passive Range of motion     TE Assisted Motion BAR - 3 min each, 5s holds  Supine flexion 2lbs  Supine chest press 3lbs  INTERNAL ROTATION stretch with band 3 x 30 seconds right    TE Free Weights Standing - 3x10 each, #1  Curl to Press  Scaption  Abduction      NMR Band Series     NC Hot Pack  10 minutes   PLAN          CPT Codes available for Billing:   (00) minutes of Manual therapy (MT) to improve pain and ROM.  (55) minutes of Therapeutic Exercise (TE) to develop strength, endurance, range of motion, and flexibility.  (00) minutes of Neuromuscular Re-Education (NMR)  to improve: Balance, Coordination, Kinesthetic, Sense, Proprioception, and Posture.  (00) minutes of Therapeutic Activities (TA) to improve functional performance.  (00) minutes of Gait Training (GT) to improve functional gait mechanics.  (00) minutes of Self- Care and Education  Unattended Electrical Stimulation (ES) for muscle performance or pain modulation.  Vasopneumatic Device Therapy () for management of swelling/edema. (80778)  BFR: Blood flow restriction applied during exercise  Functional Performance Test (FPT)  NP or (-): Not Performed     See EMR under MEDIA for written consent provided for Dry Needling- DATE   Palpation Assessment to determine the necessity for Functional Dry Needling     PATIENT EDUCATION AND HOME EXERCISES      Education/Self-Care provided:  (included in treatment unless specified above) minutes   Patient educated on the impairments noted above  and the effects of physical therapy intervention to improve overall condition and Quality of Life  Patient was educated on all the above exercise prior/during/after for proper posture, positioning, and execution for safe performance with home exercise program.      Written Home Exercises Provided: yes. Prefers: [x] Printed [x] Electronic  Exercises were reviewed and Rylee was able to demonstrate them prior to the end of the session.  Rylee demonstrated good understanding of the education provided. See EMR under Patient Instructions for exercises provided during therapy sessions.    ASSESSMENT     Alyx Weir tolerated Physical Therapy session with no complaints of pain. Patient still struggles with strengthening exercises with 1lbs dumbbells and this difficulty is demonstrated functionally by her struggling to perform work duties she has returned to. Plan to progress resistance as next visit as tolerable to continue progressing shoulder strength and endurance for work.    Alyx Is progressing well towards her goals.   Pt prognosis is Good.     Pt will continue to benefit from skilled outpatient physical therapy to address the deficits listed in the problem list box on initial evaluation, provide pt/family education and to maximize pt's level of independence in the home and community environment.     Pt's spiritual, cultural and educational needs considered and pt agreeable to plan of care and goals.    Anticipated Barriers for therapy: co-morbidities       SHORT TERM GOALS:  progressing Progress Date met   Recent signs and systems trend is improving in order to progress towards Long term goals.  [x] Met  [] Not Met  [] Progressing  9/23   Patient will be independent with Home Exercise Program  in order to further progress and return to maximal function. [] Met  [] Not Met  [x] Progressing     Pain rating at Worst: 5 /10 in order to progress towards increased independence with activity. [] Met  [] Not Met  [x]  Progressing     Patient will be able to correct postural deviations in sitting and standing, to decrease pain and promote postural awareness for injury prevention.  [x] Met  [] Not Met  [] Progressing  11/13   Patient will improve functional outcome (FOTO) score: by 5% to increase self-worth & perceived functional ability towards long term goals [x] Met  [] Not Met  [] Progressing  11/13      LONG TERM GOALS: Discharge (~Jan 2025) Progress Date met   Patient will return to normal activites of daily living, recreational, and work related activities with less pain and limitation.  [] Met  [] Not Met  [x] Progressing     Patient will improve range of motion  to stated goals in order to return to maximal functional potential.  [] Met  [] Not Met  [x] Progressing     Patient will improve Strength to stated goals of appropriate musculature in order to improve functional independence.  [] Met  [] Not Met  [x] Progressing     Pain Rating at Best: 1/10 to improve Quality of Life.  [] Met  [] Not Met  [x] Progressing     Patient will meet predicted functional outcome (FOTO) score: 80% to increase self-worth & perceived functional ability. [] Met  [] Not Met  [x] Progressing     Patient will have met/partially met personal goal of: to get back to daily activities, community based activities, and social activities.  [] Met  [] Not Met  [x] Progressing           PLAN   Plan of care Certification: 11/16/2024 to 1/15/2025  - Previous: 8/15-11/15/2024    Continue Plan of Care (POC) and progress per patient tolerance. See Treatment section for exercise progression.    Mamta Peres, PT    Disclaimer: This note was prepared using a voice recognition system and is likely to have sound alike errors within the text.

## 2025-01-14 ENCOUNTER — CLINICAL SUPPORT (OUTPATIENT)
Dept: REHABILITATION | Facility: HOSPITAL | Age: 75
End: 2025-01-14
Attending: STUDENT IN AN ORGANIZED HEALTH CARE EDUCATION/TRAINING PROGRAM
Payer: COMMERCIAL

## 2025-01-14 DIAGNOSIS — M25.611 DECREASED RANGE OF MOTION OF RIGHT SHOULDER: ICD-10-CM

## 2025-01-14 DIAGNOSIS — R29.898 DECREASED STRENGTH OF UPPER EXTREMITY: Primary | ICD-10-CM

## 2025-01-14 PROCEDURE — 97530 THERAPEUTIC ACTIVITIES: CPT

## 2025-01-14 PROCEDURE — 97110 THERAPEUTIC EXERCISES: CPT

## 2025-01-14 NOTE — PROGRESS NOTES
[facility]   Physical Therapy Treatment Note + Updated Plan of Care    Name: Alyx Weir  Clinic Number: [id number]    Therapy Diagnosis:   Encounter Diagnoses   Name Primary?    Decreased strength of upper extremity Yes    Decreased range of motion of right shoulder        Physician: Carl Ruiz    Visit Date: 1/14/2025    Physician Orders: PT Eval and Treat  Medical Diagnosis from Referral: Traumatic incomplete tear of right rotator cuff, subsequent encounter [S46.011D], Subacromial impingement of right shoulder [M75.41]   Evaluation Date: 8/15/2024  Authorization Period Expiration: 6/7/2025  Plan of Care Expiration:    2/25/2025     (updated)            Progress Update: 2/13/2025   Visit # / Visits authorized: 8/ 12 (already completed 22)  FOTO: 12/12 - Scored: 3 / 3       PRECAUTIONS: Standard Precautions, Safety/fall precautions, Orthopedic: Right Cuff Repair, Non-weight beariing, and Orthotic device type: abductor sling, Wearning schedule: full until 6 week follow up      PROBLEM LIST : pain, decreased motion all planes, decreased strength      Date of Surgery/Procedure 8/5/2024- Joseph   Surgery/Procedure Performed Status post Right Rotator Cuff Repair, Subacromial Decompression, Distal Clavicle Dissection, Biceps Tenodesis   Rehabilitation Precautions Protocol: Joseph Cuff Protocol      MD Follow up:       Patient School:     Job/Position: Works at an alteration story    [facility] Visit #: 0/5     Time In: 10:05 AM  Time Out: 11:00 AM  Total Billable Time: 55 minutes    SUBJECTIVE     Pt reports: she still feels pain constantly in her shoulder and since she has returned to her job still struggles with some of her job tasks.    Compliance with Hep: Daily    Response to previous treatment: no adverse reactions to treatment/updated HOME EXERCISE PROGRAM    Functional change: Better    Pain: 5/10   Worst: 8/10  Location: Right shoulder      OBJECTIVE     Objective Measures  updated at progress report unless specified otherwise.    SHOULDER-           SHOULDER   Range of Motion Right (11/14/2025)  Active     Passive Left  Active       Passive Goal   Forward Flexion (180º) 160* - 160 - 170º   Abduction (180º)  155* - 120 - 160º   External Rotation at 90º (90º)  70* - 75 - 80º   External Rotation at 45º (45º)   40* - 52 - 45º   Internal Rotation at 90º (90º)  70* - - - 70º   Functional External Rotation (C7)   -   - C7   Functional Internal Rotation (T10)   -   - T10   (*) pain and/or dysfunction) pain and/or dysfunction     UE STRENGTH -   Upper Extremity  Strength Right (11/14/2025)  Not tested due to post operative state Goal   Shoulder Flexion []1  []2  []3  [x]4  []5                [x]+ []- 5/5 B   Shoulder Abduction []1  []2  []3  [x]4  []5                [x]+ []- 5/5 B   Shoulder IR []1  []2  []3  [x]4  []5                [x]+ []- 5/5 B   Shoulder ER []1  []2  []3  [x]4  []5                [x]+ []- 5/5 B   Elbow Flexion  []1  []2  []3  [x]4  []5                [x]+ []- 5/5 B   Elbow Extension []1  []2  []3  [x]4  []5                [x]+ []- 5/5 B      FOTO: 50 (11/14/2025)    Treatment     Gym/Equipment Access: none  Time to Complete Exercises: increased for cueing and education    Alyx received the treatments listed below:       CPT Intervention  Right RCR  BT  SAD  DCR Duration/ Details  01/14   TE Arm Bike 3/3 forward and back   TE Objectives Re-assessed 25 minutes   TE Series 6  Chest Stretch  Bear Hugs  Swimmers  [place] 2 minutes each   TE Seated Shoulder Strap INTERNAL ROTATION stretch 3 minutes with 10 second holds 5 second rest right    TE Tball     TE Pulleys -   TE Passive Range of motion     TE Assisted Motion -   TE Free Weights -      TA Seated scapular retractions 4 minutes 7.5lbs   TA Seated Lat Pull downs 4 minutes 5lbs   NMR Band Series     NC Hot Pack  -   PLAN          CPT Codes available for Billing:   (00) minutes of Manual therapy (MT) to improve pain  and ROM.  (47) minutes of Therapeutic Exercise (TE) to develop strength, endurance, range of motion, and flexibility.  (00) minutes of Neuromuscular Re-Education (NMR)  to improve: Balance, Coordination, Kinesthetic, Sense, Proprioception, and Posture.  (08) minutes of Therapeutic Activities (TA) to improve functional performance.  (00) minutes of Gait Training (GT) to improve functional gait mechanics.  (00) minutes of Self- Care and Education  Unattended Electrical Stimulation (ES) for muscle performance or pain modulation.  Vasopneumatic Device Therapy () for management of swelling/edema. (32263)  BFR: Blood flow restriction applied during exercise  Functional Performance Test (FPT)  NP or (-): Not Performed     See EMR under MEDIA for written consent provided for Dry Needling- DATE   Palpation Assessment to determine the necessity for Functional Dry Needling     PATIENT EDUCATION AND HOME EXERCISES      Education/Self-Care provided:  (included in treatment unless specified above) minutes   Patient educated on the impairments noted above and the effects of physical therapy intervention to improve overall condition and Quality of Life  Patient was educated on all the above exercise prior/during/after for proper posture, positioning, and execution for safe performance with home exercise program.      Written Home Exercises Provided: yes. Prefers: [x] Printed [x] Electronic  Exercises were reviewed and Rylee was able to demonstrate them prior to the end of the session.  Rylee demonstrated good understanding of the education provided. See EMR under Patient Instructions for exercises provided during therapy sessions.    ASSESSMENT     Alyx JUARES Weir demonstrates moderate to significant improvements in global right shoulder RANGE OF MOTION as well as global strength tests in right upper extremity. Patient also with improvements noted in functional limitations per FOTO score today. Patient is still struggling with  functional work related tasks and still has not met long term goals for RANGE OF MOTION and strength, but because of her continued progress, patient would continue to benefit from skilled therapy to allow patient to meet her goals.    Alyx Is progressing well towards her goals.   Pt prognosis is Good.     Pt will continue to benefit from skilled outpatient physical therapy to address the deficits listed in the problem list box on initial evaluation, provide pt/family education and to maximize pt's level of independence in the home and community environment.     Pt's spiritual, cultural and educational needs considered and pt agreeable to plan of care and goals.    Anticipated Barriers for therapy: co-morbidities       SHORT TERM GOALS:  progressing Progress Date met   Recent signs and systems trend is improving in order to progress towards Long term goals.  [x] Met  [] Not Met  [] Progressing  9/23   Patient will be independent with Home Exercise Program  in order to further progress and return to maximal function. [x] Met  [] Not Met  [] Progressing  01/14/2025   Pain rating at Worst: 5 /10 in order to progress towards increased independence with activity. [x] Met  [] Not Met  [] Progressing   01/14/2025   Patient will be able to correct postural deviations in sitting and standing, to decrease pain and promote postural awareness for injury prevention.  [x] Met  [] Not Met  [] Progressing  11/13   Patient will improve functional outcome (FOTO) score: by 5% to increase self-worth & perceived functional ability towards long term goals [x] Met  [] Not Met  [] Progressing  11/13      LONG TERM GOALS: Discharge (~Jan 2025) Progress Date met   Patient will return to normal activites of daily living, recreational, and work related activities with less pain and limitation.  [] Met  [] Not Met  [x] Progressing     Patient will improve range of motion  to stated goals in order to return to maximal functional potential.  []  Met  [] Not Met  [x] Progressing     Patient will improve Strength to stated goals of appropriate musculature in order to improve functional independence.  [] Met  [] Not Met  [x] Progressing     Pain Rating at Best: 1/10 to improve Quality of Life.  [] Met  [] Not Met  [x] Progressing     Patient will meet predicted functional outcome (FOTO) score: 80% to increase self-worth & perceived functional ability. [] Met  [] Not Met  [x] Progressing     Patient will have met/partially met personal goal of: to get back to daily activities, community based activities, and social activities.  [] Met  [] Not Met  [x] Progressing           PLAN   Updated Plan of care Certification: 01/14/2025 to 02/25/2025     Outpatient Physical Therapy 2 times weekly for 6 weeks to include any combination of the following interventions: virtual visits, dry needling, modalities, electrical stimulation (IFC, Pre-Mod, Attended with Functional Dry Needling), Manual Therapy, Moist Heat/ Ice, Neuromuscular Re-ed, Patient Education, Self Care, Therapeutic Exercise, Functional Training, and Therapeutic Activites     Mamta Peres, PT

## 2025-01-14 NOTE — PLAN OF CARE
[facility]   Physical Therapy Treatment Note + Updated Plan of Care    Name: Alyx Weir  Clinic Number: [id number]    Therapy Diagnosis:   Encounter Diagnoses   Name Primary?    Decreased strength of upper extremity Yes    Decreased range of motion of right shoulder        Physician: Carl Ruiz    Visit Date: 1/14/2025    Physician Orders: PT Eval and Treat  Medical Diagnosis from Referral: Traumatic incomplete tear of right rotator cuff, subsequent encounter [S46.011D], Subacromial impingement of right shoulder [M75.41]   Evaluation Date: 8/15/2024  Authorization Period Expiration: 6/7/2025  Plan of Care Expiration:    2/25/2025     (updated)            Progress Update: 2/13/2025   Visit # / Visits authorized: 8/ 12 (already completed 22)  FOTO: 12/12 - Scored: 3 / 3       PRECAUTIONS: Standard Precautions, Safety/fall precautions, Orthopedic: Right Cuff Repair, Non-weight beariing, and Orthotic device type: abductor sling, Wearning schedule: full until 6 week follow up      PROBLEM LIST : pain, decreased motion all planes, decreased strength      Date of Surgery/Procedure 8/5/2024- Joseph   Surgery/Procedure Performed Status post Right Rotator Cuff Repair, Subacromial Decompression, Distal Clavicle Dissection, Biceps Tenodesis   Rehabilitation Precautions Protocol: Joseph Cuff Protocol      MD Follow up:       Patient School:     Job/Position: Works at an alteration story    [facility] Visit #: 0/5     Time In: 10:05 AM  Time Out: 11:00 AM  Total Billable Time: 55 minutes    SUBJECTIVE     Pt reports: she still feels pain constantly in her shoulder and since she has returned to her job still struggles with some of her job tasks.    Compliance with Hep: Daily    Response to previous treatment: no adverse reactions to treatment/updated HOME EXERCISE PROGRAM    Functional change: Better    Pain: 5/10   Worst: 8/10  Location: Right shoulder      OBJECTIVE     Objective Measures  updated at progress report unless specified otherwise.    SHOULDER-           SHOULDER   Range of Motion Right (11/14/2025)  Active     Passive Left  Active       Passive Goal   Forward Flexion (180º) 160* - 160 - 170º   Abduction (180º)  155* - 120 - 160º   External Rotation at 90º (90º)  70* - 75 - 80º   External Rotation at 45º (45º)   40* - 52 - 45º   Internal Rotation at 90º (90º)  70* - - - 70º   Functional External Rotation (C7)   -   - C7   Functional Internal Rotation (T10)   -   - T10   (*) pain and/or dysfunction) pain and/or dysfunction     UE STRENGTH -   Upper Extremity  Strength Right (11/14/2025)  Not tested due to post operative state Goal   Shoulder Flexion []1  []2  []3  [x]4  []5                [x]+ []- 5/5 B   Shoulder Abduction []1  []2  []3  [x]4  []5                [x]+ []- 5/5 B   Shoulder IR []1  []2  []3  [x]4  []5                [x]+ []- 5/5 B   Shoulder ER []1  []2  []3  [x]4  []5                [x]+ []- 5/5 B   Elbow Flexion  []1  []2  []3  [x]4  []5                [x]+ []- 5/5 B   Elbow Extension []1  []2  []3  [x]4  []5                [x]+ []- 5/5 B      FOTO: 50 (11/14/2025)    Treatment     Gym/Equipment Access: none  Time to Complete Exercises: increased for cueing and education    Alyx received the treatments listed below:       CPT Intervention  Right RCR  BT  SAD  DCR Duration/ Details  01/14   TE Arm Bike 3/3 forward and back   TE Objectives Re-assessed 25 minutes   TE Series 6  Chest Stretch  Bear Hugs  Swimmers  Snow [place] 2 minutes each   TE Seated Shoulder Strap INTERNAL ROTATION stretch 3 minutes with 10 second holds 5 second rest right    TE Tball     TE Pulleys -   TE Passive Range of motion     TE Assisted Motion -   TE Free Weights -      TA Seated scapular retractions 4 minutes 7.5lbs   TA Seated Lat Pull downs 4 minutes 5lbs   NMR Band Series     NC Hot Pack  -   PLAN          CPT Codes available for Billing:   (00) minutes of Manual therapy (MT) to improve pain  and ROM.  (47) minutes of Therapeutic Exercise (TE) to develop strength, endurance, range of motion, and flexibility.  (00) minutes of Neuromuscular Re-Education (NMR)  to improve: Balance, Coordination, Kinesthetic, Sense, Proprioception, and Posture.  (08) minutes of Therapeutic Activities (TA) to improve functional performance.  (00) minutes of Gait Training (GT) to improve functional gait mechanics.  (00) minutes of Self- Care and Education  Unattended Electrical Stimulation (ES) for muscle performance or pain modulation.  Vasopneumatic Device Therapy () for management of swelling/edema. (74165)  BFR: Blood flow restriction applied during exercise  Functional Performance Test (FPT)  NP or (-): Not Performed     See EMR under MEDIA for written consent provided for Dry Needling- DATE   Palpation Assessment to determine the necessity for Functional Dry Needling     PATIENT EDUCATION AND HOME EXERCISES      Education/Self-Care provided:  (included in treatment unless specified above) minutes   Patient educated on the impairments noted above and the effects of physical therapy intervention to improve overall condition and Quality of Life  Patient was educated on all the above exercise prior/during/after for proper posture, positioning, and execution for safe performance with home exercise program.      Written Home Exercises Provided: yes. Prefers: [x] Printed [x] Electronic  Exercises were reviewed and Rylee was able to demonstrate them prior to the end of the session.  Rylee demonstrated good understanding of the education provided. See EMR under Patient Instructions for exercises provided during therapy sessions.    ASSESSMENT     Alyx JUARES Weir demonstrates moderate to significant improvements in global right shoulder RANGE OF MOTION as well as global strength tests in right upper extremity. Patient also with improvements noted in functional limitations per FOTO score today. Patient is still struggling with  functional work related tasks and still has not met long term goals for RANGE OF MOTION and strength, but because of her continued progress, patient would continue to benefit from skilled therapy to allow patient to meet her goals.    Alyx Is progressing well towards her goals.   Pt prognosis is Good.     Pt will continue to benefit from skilled outpatient physical therapy to address the deficits listed in the problem list box on initial evaluation, provide pt/family education and to maximize pt's level of independence in the home and community environment.     Pt's spiritual, cultural and educational needs considered and pt agreeable to plan of care and goals.    Anticipated Barriers for therapy: co-morbidities       SHORT TERM GOALS:  progressing Progress Date met   Recent signs and systems trend is improving in order to progress towards Long term goals.  [x] Met  [] Not Met  [] Progressing  9/23   Patient will be independent with Home Exercise Program  in order to further progress and return to maximal function. [x] Met  [] Not Met  [] Progressing  01/14/2025   Pain rating at Worst: 5 /10 in order to progress towards increased independence with activity. [x] Met  [] Not Met  [] Progressing   01/14/2025   Patient will be able to correct postural deviations in sitting and standing, to decrease pain and promote postural awareness for injury prevention.  [x] Met  [] Not Met  [] Progressing  11/13   Patient will improve functional outcome (FOTO) score: by 5% to increase self-worth & perceived functional ability towards long term goals [x] Met  [] Not Met  [] Progressing  11/13      LONG TERM GOALS: Discharge (~Jan 2025) Progress Date met   Patient will return to normal activites of daily living, recreational, and work related activities with less pain and limitation.  [] Met  [] Not Met  [x] Progressing     Patient will improve range of motion  to stated goals in order to return to maximal functional potential.  []  Met  [] Not Met  [x] Progressing     Patient will improve Strength to stated goals of appropriate musculature in order to improve functional independence.  [] Met  [] Not Met  [x] Progressing     Pain Rating at Best: 1/10 to improve Quality of Life.  [] Met  [] Not Met  [x] Progressing     Patient will meet predicted functional outcome (FOTO) score: 80% to increase self-worth & perceived functional ability. [] Met  [] Not Met  [x] Progressing     Patient will have met/partially met personal goal of: to get back to daily activities, community based activities, and social activities.  [] Met  [] Not Met  [x] Progressing           PLAN   Updated Plan of care Certification: 01/14/2025 to 02/25/2025     Outpatient Physical Therapy 2 times weekly for 6 weeks to include any combination of the following interventions: virtual visits, dry needling, modalities, electrical stimulation (IFC, Pre-Mod, Attended with Functional Dry Needling), Manual Therapy, Moist Heat/ Ice, Neuromuscular Re-ed, Patient Education, Self Care, Therapeutic Exercise, Functional Training, and Therapeutic Activites     Mamta Peres, PT

## 2025-01-16 ENCOUNTER — CLINICAL SUPPORT (OUTPATIENT)
Dept: REHABILITATION | Facility: HOSPITAL | Age: 75
End: 2025-01-16
Attending: STUDENT IN AN ORGANIZED HEALTH CARE EDUCATION/TRAINING PROGRAM
Payer: COMMERCIAL

## 2025-01-16 DIAGNOSIS — R29.898 DECREASED STRENGTH OF UPPER EXTREMITY: Primary | ICD-10-CM

## 2025-01-16 DIAGNOSIS — M25.611 DECREASED RANGE OF MOTION OF RIGHT SHOULDER: ICD-10-CM

## 2025-01-16 PROCEDURE — 97110 THERAPEUTIC EXERCISES: CPT

## 2025-01-16 PROCEDURE — 97112 NEUROMUSCULAR REEDUCATION: CPT

## 2025-01-16 PROCEDURE — 97530 THERAPEUTIC ACTIVITIES: CPT

## 2025-01-16 NOTE — PROGRESS NOTES
[facility] OUTPATIENT THERAPY AND WELLNESS   Physical Therapy Treatment Note     Name: Alyx Weir  [facility] Number: [id number]    Therapy Diagnosis:   Encounter Diagnoses   Name Primary?    Decreased strength of upper extremity Yes    Decreased range of motion of right shoulder        Physician: Carl Ruiz    Visit Date: 1/16/2025    Physician Orders: PT Eval and Treat  Medical Diagnosis from Referral: Traumatic incomplete tear of right rotator cuff, subsequent encounter [S46.011D], Subacromial impingement of right shoulder [M75.41]   Evaluation Date: 8/15/2024  Authorization Period Expiration: 6/7/2025  Plan of Care Expiration:    2/25/2025     (updated)            Progress Update: 2/13/2025   Visit # / Visits authorized: 8/ 12 (already completed 22)  FOTO: 12/12 - Scored: 3 / 3       PRECAUTIONS: Standard Precautions, Safety/fall precautions, Orthopedic: Right Cuff Repair, Non-weight beariing, and Orthotic device type: abductor sling, Wearning schedule: full until 6 week follow up      PROBLEM LIST : pain, decreased motion all planes, decreased strength      Date of Surgery/Procedure 8/5/2024- Joseph   Surgery/Procedure Performed Status post Right Rotator Cuff Repair, Subacromial Decompression, Distal Clavicle Dissection, Biceps Tenodesis   Rehabilitation Precautions Protocol: Joseph Cuff Protocol      MD Follow up:       Patient School:     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     Compliance with Hep: Daily    Response to previous treatment: no adverse reactions to treatment/updated HOME EXERCISE PROGRAM    Functional change: Better    Pain: 5/10   Worst: 8/10  Location: Right shoulder      OBJECTIVE     Objective Measures updated at progress report unless specified  otherwise.    Treatment     Gym/Equipment Access: none  Time to Complete Exercises: increased for cueing and education    Alyx received the treatments listed below:       CPT Intervention  Right RCR  BT  SAD  DCR Performed = x Duration/ Details  01/14   TE Arm Bike x 3/3 forward and back   TE Objectives Re-assessed  25 minutes   TE Series 6  Chest Stretch  Bear Hugs  Swimmers  Snow [place]  2 minutes each   TE Seated Shoulder Strap INTERNAL ROTATION stretch x 3 minutes with 10 second holds 5 second rest right    TE Tball      TE Pulleys x 3'/3'   TE Passive Range of motion x Flexion, IR/ER, abd    TE Assisted Motion x 3 minutes supine flexion   TE Free Weights x Lateral raises 1# 3 x 10   TA Seated scapular retractions x 4 minutes 7.5lbs   TA Seated Lat Pull downs x 4 minutes 5lbs   NMR Band Series x  x ER with single red band 3 x 10  IR with double red 3 x 10    NC Hot Pack   -   PLAN        CPT Codes available for Billing:   (00) minutes of Manual therapy (MT) to improve pain and ROM.  (34) minutes of Therapeutic Exercise (TE) to develop strength, endurance, range of motion, and flexibility.  (10) minutes of Neuromuscular Re-Education (NMR)  to improve: Balance, Coordination, Kinesthetic, Sense, Proprioception, and Posture.  (10) minutes of Therapeutic Activities (TA) to improve functional performance.  (00) minutes of Gait Training (GT) to improve functional gait mechanics.  (00) minutes of Self- Care and Education  Unattended Electrical Stimulation (ES) for muscle performance or pain modulation.  Vasopneumatic Device Therapy () for management of swelling/edema. (22580)  BFR: Blood flow restriction applied during exercise  Functional Performance Test (FPT)  NP or (-): Not Performed     See EMR under MEDIA for written consent provided for Dry Needling- DATE   Palpation Assessment to determine the necessity for Functional Dry Needling     PATIENT EDUCATION AND HOME EXERCISES      Education/Self-Care provided:   (included in treatment unless specified above) minutes   Patient educated on the impairments noted above and the effects of physical therapy intervention to improve overall condition and Quality of Life  Patient was educated on all the above exercise prior/during/after for proper posture, positioning, and execution for safe performance with home exercise program.      Written Home Exercises Provided: yes. Prefers: [x] Printed [x] Electronic  Exercises were reviewed and Rylee was able to demonstrate them prior to the end of the session.  Rylee demonstrated good understanding of the education provided. See EMR under Patient Instructions for exercises provided during therapy sessions.    ASSESSMENT     Alyx Weir tolerated session well. Patient able to demonstrate lateral raises well with 1 pound of resistance. PROM and AROM limited similarly with painful end feel.     Alyx Is progressing well towards her goals.   Pt prognosis is Good.     Pt will continue to benefit from skilled outpatient physical therapy to address the deficits listed in the problem list box on initial evaluation, provide pt/family education and to maximize pt's level of independence in the home and community environment.     Pt's spiritual, cultural and educational needs considered and pt agreeable to plan of care and goals.    Anticipated Barriers for therapy: co-morbidities       SHORT TERM GOALS:  progressing Progress Date met   Recent signs and systems trend is improving in order to progress towards Long term goals.  [x] Met  [] Not Met  [] Progressing  9/23   Patient will be independent with Home Exercise Program  in order to further progress and return to maximal function. [x] Met  [] Not Met  [] Progressing  01/14/2025   Pain rating at Worst: 5 /10 in order to progress towards increased independence with activity. [x] Met  [] Not Met  [] Progressing   01/14/2025   Patient will be able to correct postural deviations in sitting and  standing, to decrease pain and promote postural awareness for injury prevention.  [x] Met  [] Not Met  [] Progressing  11/13   Patient will improve functional outcome (FOTO) score: by 5% to increase self-worth & perceived functional ability towards long term goals [x] Met  [] Not Met  [] Progressing  11/13      LONG TERM GOALS: Discharge (~Jan 2025) Progress Date met   Patient will return to normal activites of daily living, recreational, and work related activities with less pain and limitation.  [] Met  [] Not Met  [x] Progressing     Patient will improve range of motion  to stated goals in order to return to maximal functional potential.  [] Met  [] Not Met  [x] Progressing     Patient will improve Strength to stated goals of appropriate musculature in order to improve functional independence.  [] Met  [] Not Met  [x] Progressing     Pain Rating at Best: 1/10 to improve Quality of Life.  [] Met  [] Not Met  [x] Progressing     Patient will meet predicted functional outcome (FOTO) score: 80% to increase self-worth & perceived functional ability. [] Met  [] Not Met  [x] Progressing     Patient will have met/partially met personal goal of: to get back to daily activities, community based activities, and social activities.  [] Met  [] Not Met  [x] Progressing           PLAN   Updated Plan of care Certification: 01/14/2025 to 02/25/2025     Outpatient Physical Therapy 2 times weekly for 6 weeks to include any combination of the following interventions: virtual visits, dry needling, modalities, electrical stimulation (IFC, Pre-Mod, Attended with Functional Dry Needling), Manual Therapy, Moist Heat/ Ice, Neuromuscular Re-ed, Patient Education, Self Care, Therapeutic Exercise, Functional Training, and Therapeutic Activites     Estiven Davis, PT

## 2025-01-20 ENCOUNTER — CLINICAL SUPPORT (OUTPATIENT)
Dept: REHABILITATION | Facility: HOSPITAL | Age: 75
End: 2025-01-20
Attending: STUDENT IN AN ORGANIZED HEALTH CARE EDUCATION/TRAINING PROGRAM
Payer: COMMERCIAL

## 2025-01-20 DIAGNOSIS — M25.611 DECREASED RANGE OF MOTION OF RIGHT SHOULDER: ICD-10-CM

## 2025-01-20 DIAGNOSIS — R29.898 DECREASED STRENGTH OF UPPER EXTREMITY: Primary | ICD-10-CM

## 2025-01-20 PROCEDURE — 97530 THERAPEUTIC ACTIVITIES: CPT

## 2025-01-20 PROCEDURE — 97112 NEUROMUSCULAR REEDUCATION: CPT

## 2025-01-20 NOTE — PROGRESS NOTES
[facility]   Physical Therapy Treatment Note     Name: Alyx Weir  [facility] Number: [id number]    Therapy Diagnosis:   Encounter Diagnoses   Name Primary?    Decreased strength of upper extremity Yes    Decreased range of motion of right shoulder        Physician: Carl Ruiz    Visit Date: 1/20/2025    Physician Orders: PT Eval and Treat  Medical Diagnosis from Referral: Traumatic incomplete tear of right rotator cuff, subsequent encounter [S46.011D], Subacromial impingement of right shoulder [M75.41]   Evaluation Date: 8/15/2024  Authorization Period Expiration: 6/7/2025  Plan of Care Expiration:    2/25/2025     (updated)            Progress Update: 2/13/2025   Visit # / Visits authorized:10/ 12 (already completed 22)  FOTO: 12/12 - Scored: 3 / 3       PRECAUTIONS: Standard Precautions, Safety/fall precautions, Orthopedic: Right Cuff Repair, Non-weight beariing, and Orthotic device type: abductor sling, Wearning schedule: full until 6 week follow up      PROBLEM LIST : pain, decreased motion all planes, decreased strength      Date of Surgery/Procedure 8/5/2024- Joseph   Surgery/Procedure Performed Status post Right Rotator Cuff Repair, Subacromial Decompression, Distal Clavicle Dissection, Biceps Tenodesis   Rehabilitation Precautions Protocol: Joseph Cuff Protocol      MD Follow up:       Patient School:     Job/Position: Works at an alteration story    [facility] Visit #: 0/5     Time In:10:40  Time Out: 11:38  Total Billable Time: 25 minutes    SUBJECTIVE     Pt reports: her R shoulder hurts all of the time. Also reports tightness and indicates R UT.     Compliance with Hep: Daily    Response to previous treatment: no adverse reactions to treatment/updated HOME EXERCISE PROGRAM    Functional change: Better    Pain: 6/10  Location: Right shoulder      OBJECTIVE     Objective Measures updated at progress report unless specified otherwise.    Treatment     Time to Complete  Exercises: increased for cueing and education    Alyx received the treatments listed below:      Alyx received the following manual therapy techniques applied for NC minutes, including:  Gentle joint mobs  Stretching / PROM  STM R UT     Alyx  received therapeutic exercises to develop strength, endurance, ROM, flexibility, posture, and core stabilization for N/C minutes including:  UBE for upright posture, ROM, strength 3; F/ 3' B  Pulleys 1' flexion, scaption 1', abduction 1'  UT stretch 3 x 20 secs    Alyx  participated in neuromuscular re-education activities to improve: Balance, Coordination, Kinesthetic, Sense, Proprioception, and Posture for 15  minutes., including:  Wall slides 1 x 10 reps with 10 sec hold  Wall push ups with a plus 3 x 10 reps  SL trio 1# 3 x 10 reps  Shoulder rolls 1 x 20 reps  Scapula squeezes 1 x 20 reps    Alyx  participated in dynamic functional therapeutic activities to improve functional performance for 10 minutes, including:  Shoulder press 2 x 10 reps  Chest press with R TB 1 x 10 reps       PATIENT EDUCATION AND HOME EXERCISES      Education/Self-Care provided:  (included in treatment unless specified above) minutes   Patient educated on the impairments noted above and the effects of physical therapy intervention to improve overall condition and Quality of Life  Patient was educated on all the above exercise prior/during/after for proper posture, positioning, and execution for safe performance with home exercise program.      Written Home Exercises Provided: yes. Prefers: [x] Printed [x] Electronic  Exercises were reviewed and Rylee was able to demonstrate them prior to the end of the session.  Rylee demonstrated good understanding of the education provided. See EMR under Patient Instructions for exercises provided during therapy sessions.    ASSESSMENT     Alyx Weir tolerated treatment well and has full PROM R shoulder. She progressed with exercises today. She had some  tightness in R UT and reported she felt better after the session. PT will continue to progress patient to increase functional use of R UE.         Alyx Is progressing well towards her goals.   Pt prognosis is Good.     Pt will continue to benefit from skilled outpatient physical therapy to address the deficits listed in the problem list box on initial evaluation, provide pt/family education and to maximize pt's level of independence in the home and community environment.     Pt's spiritual, cultural and educational needs considered and pt agreeable to plan of care and goals.    Anticipated Barriers for therapy: co-morbidities       SHORT TERM GOALS:  progressing Progress Date met   Recent signs and systems trend is improving in order to progress towards Long term goals.  [x] Met  [] Not Met  [] Progressing  9/23   Patient will be independent with Home Exercise Program  in order to further progress and return to maximal function. [x] Met  [] Not Met  [] Progressing  01/14/2025   Pain rating at Worst: 5 /10 in order to progress towards increased independence with activity. [x] Met  [] Not Met  [] Progressing   01/14/2025   Patient will be able to correct postural deviations in sitting and standing, to decrease pain and promote postural awareness for injury prevention.  [x] Met  [] Not Met  [] Progressing  11/13   Patient will improve functional outcome (FOTO) score: by 5% to increase self-worth & perceived functional ability towards long term goals [x] Met  [] Not Met  [] Progressing  11/13      LONG TERM GOALS: Discharge (~Jan 2025) Progress Date met   Patient will return to normal activites of daily living, recreational, and work related activities with less pain and limitation.  [] Met  [] Not Met  [x] Progressing     Patient will improve range of motion  to stated goals in order to return to maximal functional potential.  [] Met  [] Not Met  [x] Progressing     Patient will improve Strength to stated goals of  appropriate musculature in order to improve functional independence.  [] Met  [] Not Met  [x] Progressing     Pain Rating at Best: 1/10 to improve Quality of Life.  [] Met  [] Not Met  [x] Progressing     Patient will meet predicted functional outcome (FOTO) score: 80% to increase self-worth & perceived functional ability. [] Met  [] Not Met  [x] Progressing     Patient will have met/partially met personal goal of: to get back to daily activities, community based activities, and social activities.  [] Met  [] Not Met  [x] Progressing           PLAN   Updated Plan of care Certification: 01/14/2025 to 02/25/2025     Outpatient Physical Therapy 2 times weekly for 6 weeks to include any combination of the following interventions: virtual visits, dry needling, modalities, electrical stimulation (IFC, Pre-Mod, Attended with Functional Dry Needling), Manual Therapy, Moist Heat/ Ice, Neuromuscular Re-ed, Patient Education, Self Care, Therapeutic Exercise, Functional Training, and Therapeutic Activites     Faye Morales, PT

## 2025-01-22 NOTE — PROGRESS NOTES
Orthopaedics  Post-operative follow-up    Procedure Performed:  1. Right shoulder arthroscopic rotator cuff repair  2. Right shoulder arthroscopic biceps tenodesis  3. Right shoulder subacromial decompression     Date of Surgery: 8/5/24    Subjective: Alyx Weir is now approximately 6 months out from her shoulder surgery.  She has been compliant with post-operative restrictions and has been progressing with PT at [facility].  ***    Exam:  Incision sites healed, C/D/I  No swelling or bruising noted  Fluid ROM with no crepitus  Upright Active ROM: ***, ***, ER 40***  Cuff strength intact   Axillary nerve sensation and motor intact  Motor and sensory intact distally  Strong radial pulse, fingers warm and well perfused    Imaging:  No new imaging.     Impression:  6 months s/p right shoulder arthroscopic rotator cuff repair, biceps tenodesis, and subacromial decompression ***    Plan:  ***  Advance PT per protocol  Symptomatic treatment for pain / swelling  May advance activities as reviewed today: Continue to progress strength and endurance of shoulder and periscapular musculature.   Instructed patient to call clinic if questions or concerns    Follow-up with me in ***    Work status:   1) Continue physical therapy  2) No lifting/pushing/pulling greater than 5*** pounds  3) No overhead work  4) No repetitive motions        Carl Ruiz MD    I, Estiven Velazquez, acted as a scribe for Carl Ruiz MD for the duration of this office visit.    This note was generated with the assistance of ambient listening technology. Verbal consent was obtained by the patient and accompanying visitor(s) for the recording of patient appointment to facilitate this note. I attest to having reviewed and edited the generated note for accuracy, though some syntax or spelling errors may persist. Please contact the author of this note for any clarification.

## 2025-01-24 ENCOUNTER — ANTI-COAG VISIT (OUTPATIENT)
Dept: CARDIOLOGY | Facility: CLINIC | Age: 75
End: 2025-01-24
Payer: MEDICARE

## 2025-01-24 ENCOUNTER — CLINICAL SUPPORT (OUTPATIENT)
Dept: REHABILITATION | Facility: HOSPITAL | Age: 75
End: 2025-01-24
Attending: STUDENT IN AN ORGANIZED HEALTH CARE EDUCATION/TRAINING PROGRAM
Payer: COMMERCIAL

## 2025-01-24 DIAGNOSIS — Z79.01 ANTICOAGULATED ON COUMADIN: Chronic | ICD-10-CM

## 2025-01-24 DIAGNOSIS — R29.898 DECREASED STRENGTH OF UPPER EXTREMITY: Primary | ICD-10-CM

## 2025-01-24 DIAGNOSIS — D68.51 HETEROZYGOUS FACTOR V LEIDEN MUTATION: Chronic | ICD-10-CM

## 2025-01-24 DIAGNOSIS — M25.611 DECREASED RANGE OF MOTION OF RIGHT SHOULDER: ICD-10-CM

## 2025-01-24 DIAGNOSIS — Z79.01 LONG TERM (CURRENT) USE OF ANTICOAGULANTS: Primary | ICD-10-CM

## 2025-01-24 LAB
CTP QC/QA: YES
INR PPP: 4.1 (ref 2–3)

## 2025-01-24 PROCEDURE — 93793 ANTICOAG MGMT PT WARFARIN: CPT | Mod: HCNC,S$GLB,,

## 2025-01-24 PROCEDURE — 97110 THERAPEUTIC EXERCISES: CPT

## 2025-01-24 PROCEDURE — 97530 THERAPEUTIC ACTIVITIES: CPT

## 2025-01-24 PROCEDURE — 85610 PROTHROMBIN TIME: CPT | Mod: QW,HCNC,S$GLB, | Performed by: INTERNAL MEDICINE

## 2025-01-24 NOTE — PROGRESS NOTES
INR not at goal. Medications, chart, and patient findings reviewed. See calendar for adjustments to dose and follow up plan.  Hold and then resume dose.  No DDI with new medication.  Repeat INR In 1.5 weeks.

## 2025-01-24 NOTE — PROGRESS NOTES
Patient's INR is supra-therapeutic at 4.1. Patient confirms she followed previous dosing/instructions. Patient reports she re-started Baclofen a week ago. Advised patient of increased risk of bleeding; signs/symptoms of bleeding and need to go to ED if she experiences any. Patient reports she is not having any signs/symptoms of bleeding. Sent to PharmD for dosing/instructions.

## 2025-01-24 NOTE — PROGRESS NOTES
[facility]   Physical Therapy Treatment Note     Name: Alyx Weir  [facility] Number: [id number]    Therapy Diagnosis:   Encounter Diagnoses   Name Primary?    Decreased strength of upper extremity Yes    Decreased range of motion of right shoulder        Physician: Carl Ruiz    Visit Date: 1/24/2025    Physician Orders: PT Eval and Treat  Medical Diagnosis from Referral: Traumatic incomplete tear of right rotator cuff, subsequent encounter [S46.011D], Subacromial impingement of right shoulder [M75.41]   Evaluation Date: 8/15/2024  Authorization Period Expiration: 6/7/2025  Plan of Care Expiration:    2/25/2025     (updated)            Progress Update: 2/13/2025   Visit # / Visits authorized: 11/ 12 (already completed 22)  FOTO: 12/12 - Scored: 3 / 3       PRECAUTIONS: Standard Precautions, Safety/fall precautions, Orthopedic: Right Cuff Repair, Non-weight beariing, and Orthotic device type: abductor sling, Wearning schedule: full until 6 week follow up      PROBLEM LIST : pain, decreased motion all planes, decreased strength      Date of Surgery/Procedure 8/5/2024- [facility]   Surgery/Procedure Performed Status post Right Rotator Cuff Repair, Subacromial Decompression, Distal Clavicle Dissection, Biceps Tenodesis   Rehabilitation Precautions Protocol: Joseph Cuff Protocol      MD Follow up:       Patient School:     Job/Position: Works at an alteration store    PTA Visit #: 0/5     Time In: 1200   Time Out: 1255   Total Billable Time: 55 minutes    SUBJECTIVE     Pt reports: she is still having pain but wants to continue working on her function as much as she can tolerate to get better.    Compliance with Hep: Daily    Response to previous treatment: no adverse reactions to treatment/updated HOME EXERCISE PROGRAM    Functional change: Better    Pain: 4/10   Worst: 8/10  Location: Right shoulder      OBJECTIVE     Objective Measures updated at progress report unless specified  otherwise.    SHOULDER-           SHOULDER   Range of Motion Right (11/14/2025)  Active     Passive Left  Active       Passive Goal   Forward Flexion (180º) 160* - 160 - 170º   Abduction (180º)  155* - 120 - 160º   External Rotation at 90º (90º)  70* - 75 - 80º   External Rotation at 45º (45º)   40* - 52 - 45º   Internal Rotation at 90º (90º)  70* - - - 70º   Functional External Rotation (C7)   -   - C7   Functional Internal Rotation (T10)   -   - T10   (*) pain and/or dysfunction) pain and/or dysfunction     UE STRENGTH -   Upper Extremity  Strength Right (11/14/2025)  Not tested due to post operative state Goal   Shoulder Flexion []1  []2  []3  [x]4  []5                [x]+ []- 5/5 B   Shoulder Abduction []1  []2  []3  [x]4  []5                [x]+ []- 5/5 B   Shoulder IR []1  []2  []3  [x]4  []5                [x]+ []- 5/5 B   Shoulder ER []1  []2  []3  [x]4  []5                [x]+ []- 5/5 B   Elbow Flexion  []1  []2  []3  [x]4  []5                [x]+ []- 5/5 B   Elbow Extension []1  []2  []3  [x]4  []5                [x]+ []- 5/5 B      FOTO: 50 (11/14/2025)    Treatment     Gym/Equipment Access: none  Time to Complete Exercises: increased for cueing and education    Alyx received the treatments listed below:       CPT Intervention  Right RCR  BT  SAD  DCR Duration/ Details  1/24/2025    TE Arm Bike 3/3 forward and back   TE Objectives Re-assessed    TE Series 6  Chest Stretch  Bear Hugs  Swimmers  Snow [place] 2 minutes each   TE Seated Shoulder Strap INTERNAL ROTATION stretch 3 minutes with 10 second holds 5 second rest right    TE Tball     TE Pulleys -   TE Passive Range of motion     TE Assisted Motion 3 minutes flexion standing  3 minutes flexion supine with 2lbs   TE Free Weights 2 x 10 3lbs shoulder press      TA Seated scapular retractions 4 minutes 7.5lbs   TA Seated Lat Pull downs 4 minutes 7.5lbs   NMR Band Series     NC Hot Pack  -   PLAN          CPT Codes available for Billing:   (00) minutes  of Manual therapy (MT) to improve pain and ROM.  (47) minutes of Therapeutic Exercise (TE) to develop strength, endurance, range of motion, and flexibility.  (00) minutes of Neuromuscular Re-Education (NMR)  to improve: Balance, Coordination, Kinesthetic, Sense, Proprioception, and Posture.  (08) minutes of Therapeutic Activities (TA) to improve functional performance.  (00) minutes of Gait Training (GT) to improve functional gait mechanics.  (00) minutes of Self- Care and Education  Unattended Electrical Stimulation (ES) for muscle performance or pain modulation.  Vasopneumatic Device Therapy () for management of swelling/edema. (79653)  BFR: Blood flow restriction applied during exercise  Functional Performance Test (FPT)  NP or (-): Not Performed     See EMR under MEDIA for written consent provided for Dry Needling- DATE   Palpation Assessment to determine the necessity for Functional Dry Needling     PATIENT EDUCATION AND HOME EXERCISES      Education/Self-Care provided:  (included in treatment unless specified above) minutes   Patient educated on the impairments noted above and the effects of physical therapy intervention to improve overall condition and Quality of Life  Patient was educated on all the above exercise prior/during/after for proper posture, positioning, and execution for safe performance with home exercise program.      Written Home Exercises Provided: yes. Prefers: [x] Printed [x] Electronic  Exercises were reviewed and Rylee was able to demonstrate them prior to the end of the session.  Rylee demonstrated good understanding of the education provided. See EMR under Patient Instructions for exercises provided during therapy sessions.    ASSESSMENT     Alyx Weir tolerated today's session well and was able to increase tanja on overhead press as well as lat pull downs. Patient's reported pain may potentially be due more to muscle contraction throughout activity and overall deconditioning.      Alyx Is progressing well towards her goals.   Pt prognosis is Good.     Pt will continue to benefit from skilled outpatient physical therapy to address the deficits listed in the problem list box on initial evaluation, provide pt/family education and to maximize pt's level of independence in the home and community environment.     Pt's spiritual, cultural and educational needs considered and pt agreeable to plan of care and goals.    Anticipated Barriers for therapy: co-morbidities       SHORT TERM GOALS:  progressing Progress Date met   Recent signs and systems trend is improving in order to progress towards Long term goals.  [x] Met  [] Not Met  [] Progressing  9/23   Patient will be independent with Home Exercise Program  in order to further progress and return to maximal function. [x] Met  [] Not Met  [] Progressing  01/14/2025   Pain rating at Worst: 5 /10 in order to progress towards increased independence with activity. [x] Met  [] Not Met  [] Progressing   01/14/2025   Patient will be able to correct postural deviations in sitting and standing, to decrease pain and promote postural awareness for injury prevention.  [x] Met  [] Not Met  [] Progressing  11/13   Patient will improve functional outcome (FOTO) score: by 5% to increase self-worth & perceived functional ability towards long term goals [x] Met  [] Not Met  [] Progressing  11/13      LONG TERM GOALS: Discharge (~Jan 2025) Progress Date met   Patient will return to normal activites of daily living, recreational, and work related activities with less pain and limitation.  [] Met  [] Not Met  [x] Progressing     Patient will improve range of motion  to stated goals in order to return to maximal functional potential.  [] Met  [] Not Met  [x] Progressing     Patient will improve Strength to stated goals of appropriate musculature in order to improve functional independence.  [] Met  [] Not Met  [x] Progressing     Pain Rating at Best: 1/10 to improve  Quality of Life.  [] Met  [] Not Met  [x] Progressing     Patient will meet predicted functional outcome (FOTO) score: 80% to increase self-worth & perceived functional ability. [] Met  [] Not Met  [x] Progressing     Patient will have met/partially met personal goal of: to get back to daily activities, community based activities, and social activities.  [] Met  [] Not Met  [x] Progressing           PLAN   Updated Plan of care Certification: 01/14/2025 to 02/25/2025     Outpatient Physical Therapy 2 times weekly for 6 weeks to include any combination of the following interventions: virtual visits, dry needling, modalities, electrical stimulation (IFC, Pre-Mod, Attended with Functional Dry Needling), Manual Therapy, Moist Heat/ Ice, Neuromuscular Re-ed, Patient Education, Self Care, Therapeutic Exercise, Functional Training, and Therapeutic Activites     Mamta Peres, PT

## 2025-01-27 ENCOUNTER — OFFICE VISIT (OUTPATIENT)
Dept: OPHTHALMOLOGY | Facility: CLINIC | Age: 75
End: 2025-01-27
Payer: MEDICARE

## 2025-01-27 DIAGNOSIS — H52.4 MYOPIA WITH ASTIGMATISM AND PRESBYOPIA, BILATERAL: Primary | ICD-10-CM

## 2025-01-27 DIAGNOSIS — H52.203 MYOPIA WITH ASTIGMATISM AND PRESBYOPIA, BILATERAL: Primary | ICD-10-CM

## 2025-01-27 DIAGNOSIS — H52.13 MYOPIA WITH ASTIGMATISM AND PRESBYOPIA, BILATERAL: Primary | ICD-10-CM

## 2025-01-27 PROCEDURE — 99499 UNLISTED E&M SERVICE: CPT | Mod: HCNC,S$GLB,, | Performed by: OPTOMETRIST

## 2025-01-27 PROCEDURE — 99999 PR PBB SHADOW E&M-EST. PATIENT-LVL III: CPT | Mod: PBBFAC,HCNC,, | Performed by: OPTOMETRIST

## 2025-01-27 NOTE — PROGRESS NOTES
HPI     Follow-up            Comments: Patient here today for glasses recheck   Blurred vision st distance with new glasses          Comments    1. NS OU  2. Pre-DM x2015             Last edited by Corinne Fitzpatrick, PCT on 1/27/2025 11:13 AM.            Assessment /Plan     For exam results, see Encounter Report.    1. Myopia with astigmatism and presbyopia, bilateral  Eyeglass Final Rx       Eyeglass Final Rx         Sphere Cylinder Axis Add    Right -2.25 +1.25 169 +2.50    Left -2.00 +1.25 003 +2.50      Type: PAL    Expiration Date: 1/27/2026                  Patient trial framed at distance near and computer with updated Mrx. Seeing well. Mrx provided.     RTC as scheduled for annual eye examination, sooner if any changes to vision or worsening symptoms.

## 2025-01-28 ENCOUNTER — CLINICAL SUPPORT (OUTPATIENT)
Dept: REHABILITATION | Facility: HOSPITAL | Age: 75
End: 2025-01-28
Attending: STUDENT IN AN ORGANIZED HEALTH CARE EDUCATION/TRAINING PROGRAM
Payer: COMMERCIAL

## 2025-01-28 DIAGNOSIS — R29.898 DECREASED STRENGTH OF UPPER EXTREMITY: Primary | ICD-10-CM

## 2025-01-28 DIAGNOSIS — M25.611 DECREASED RANGE OF MOTION OF RIGHT SHOULDER: ICD-10-CM

## 2025-01-28 PROCEDURE — 97530 THERAPEUTIC ACTIVITIES: CPT

## 2025-01-28 PROCEDURE — 97110 THERAPEUTIC EXERCISES: CPT

## 2025-01-28 NOTE — PROGRESS NOTES
[facility]   Physical Therapy Treatment Note     Name: Alyx Weir  [facility] Number: [id number]    Therapy Diagnosis:   Encounter Diagnoses   Name Primary?    Decreased strength of upper extremity Yes    Decreased range of motion of right shoulder        Physician: Carl Ruiz    Visit Date: 1/28/2025    Physician Orders: PT Eval and Treat  Medical Diagnosis from Referral: Traumatic incomplete tear of right rotator cuff, subsequent encounter [S46.011D], Subacromial impingement of right shoulder [M75.41]   Evaluation Date: 8/15/2024  Authorization Period Expiration: 6/7/2025  Plan of Care Expiration:    2/25/2025     (updated)            Progress Update: 2/13/2025   Visit # / Visits authorized: 12/ 12 (already completed 22)  FOTO: 12/12 - Scored: 3 / 3       PRECAUTIONS: Standard Precautions, Safety/fall precautions, Orthopedic: Right Cuff Repair, Non-weight beariing, and Orthotic device type: abductor sling, Wearning schedule: full until 6 week follow up      PROBLEM LIST : pain, decreased motion all planes, decreased strength      Date of Surgery/Procedure 8/5/2024- [facility]   Surgery/Procedure Performed Status post Right Rotator Cuff Repair, Subacromial Decompression, Distal Clavicle Dissection, Biceps Tenodesis   Rehabilitation Precautions Protocol: Joseph Cuff Protocol      MD Follow up:       Patient School:     Job/Position: Works at an alteration store    PTA Visit #: 0/5     Time In: 1000   Time Out: 1100   Total Billable Time: 55 minutes    SUBJECTIVE     Pt reports: she continues to have pain but is thinking it just may be from her shoulder still being weak.    Compliance with Hep: Daily    Response to previous treatment: no adverse reactions to treatment/updated HOME EXERCISE PROGRAM    Functional change: Better    Pain: 4/10   Worst: 8/10  Location: Right shoulder      OBJECTIVE     Objective Measures updated at progress report unless specified  otherwise.    SHOULDER-           SHOULDER   Range of Motion Right (11/14/2025)  Active     Passive Left  Active       Passive Goal   Forward Flexion (180º) 160* - 160 - 170º   Abduction (180º)  155* - 120 - 160º   External Rotation at 90º (90º)  70* - 75 - 80º   External Rotation at 45º (45º)   40* - 52 - 45º   Internal Rotation at 90º (90º)  70* - - - 70º   Functional External Rotation (C7)   -   - C7   Functional Internal Rotation (T10)   -   - T10   (*) pain and/or dysfunction) pain and/or dysfunction     UE STRENGTH -   Upper Extremity  Strength Right (11/14/2025)  Not tested due to post operative state Goal   Shoulder Flexion []1  []2  []3  [x]4  []5                [x]+ []- 5/5 B   Shoulder Abduction []1  []2  []3  [x]4  []5                [x]+ []- 5/5 B   Shoulder IR []1  []2  []3  [x]4  []5                [x]+ []- 5/5 B   Shoulder ER []1  []2  []3  [x]4  []5                [x]+ []- 5/5 B   Elbow Flexion  []1  []2  []3  [x]4  []5                [x]+ []- 5/5 B   Elbow Extension []1  []2  []3  [x]4  []5                [x]+ []- 5/5 B      FOTO: 50 (11/14/2025)    Treatment     Gym/Equipment Access: none  Time to Complete Exercises: increased for cueing and education    Alyx received the treatments listed below:       CPT Intervention  Right RCR  BT  SAD  DCR Duration/ Details  1/28/2025    TE Arm Bike 3/3 forward and back   TE Objectives Re-assessed    TE Series 6  Chest Stretch  Bear Hugs  Swimmers  Snow [place]    TE Seated Shoulder Strap INTERNAL ROTATION stretch 3 minutes with 10 second holds 5 second rest right    TE Tball     TE Pulleys -   TE Passive Range of motion     TE Assisted Motion   3 minutes flexion supine with 2lbs   TE Free Weights 2 x 10 3lbs shoulder press  2 x 10 lateral raise cable 2.5lbs   TA Seated scapular retractions 4 minutes 7.5lbs   TA Seated Lat Pull downs 4 minutes 7.5lbs   NMR Band Series     NC Hot Pack  -   PLAN          CPT Codes available for Billing:   (00) minutes of  Manual therapy (MT) to improve pain and ROM.  (47) minutes of Therapeutic Exercise (TE) to develop strength, endurance, range of motion, and flexibility.  (00) minutes of Neuromuscular Re-Education (NMR)  to improve: Balance, Coordination, Kinesthetic, Sense, Proprioception, and Posture.  (08) minutes of Therapeutic Activities (TA) to improve functional performance.  (00) minutes of Gait Training (GT) to improve functional gait mechanics.  (00) minutes of Self- Care and Education  Unattended Electrical Stimulation (ES) for muscle performance or pain modulation.  Vasopneumatic Device Therapy () for management of swelling/edema. (08150)  BFR: Blood flow restriction applied during exercise  Functional Performance Test (FPT)  NP or (-): Not Performed     See EMR under MEDIA for written consent provided for Dry Needling- DATE   Palpation Assessment to determine the necessity for Functional Dry Needling     PATIENT EDUCATION AND HOME EXERCISES      Education/Self-Care provided:  (included in treatment unless specified above) minutes   Patient educated on the impairments noted above and the effects of physical therapy intervention to improve overall condition and Quality of Life  Patient was educated on all the above exercise prior/during/after for proper posture, positioning, and execution for safe performance with home exercise program.      Written Home Exercises Provided: yes. Prefers: [x] Printed [x] Electronic  Exercises were reviewed and Rylee was able to demonstrate them prior to the end of the session.  Rylee demonstrated good understanding of the education provided. See EMR under Patient Instructions for exercises provided during therapy sessions.    ASSESSMENT     Alyx Weir tolerated today's session well. Added in cable lateral raises which patient still presents with substitution patterns. However, after verbal and tactile cues patient was able to perform with appropriate muscular. Patient educated she  may be sore after progressions today and continued HOME EXERCISE PROGRAM. Patient verbalized understanding.    Alyx Is progressing well towards her goals.   Pt prognosis is Good.     Pt will continue to benefit from skilled outpatient physical therapy to address the deficits listed in the problem list box on initial evaluation, provide pt/family education and to maximize pt's level of independence in the home and community environment.     Pt's spiritual, cultural and educational needs considered and pt agreeable to plan of care and goals.    Anticipated Barriers for therapy: co-morbidities       SHORT TERM GOALS:  progressing Progress Date met   Recent signs and systems trend is improving in order to progress towards Long term goals.  [x] Met  [] Not Met  [] Progressing  9/23   Patient will be independent with Home Exercise Program  in order to further progress and return to maximal function. [x] Met  [] Not Met  [] Progressing  01/14/2025   Pain rating at Worst: 5 /10 in order to progress towards increased independence with activity. [x] Met  [] Not Met  [] Progressing   01/14/2025   Patient will be able to correct postural deviations in sitting and standing, to decrease pain and promote postural awareness for injury prevention.  [x] Met  [] Not Met  [] Progressing  11/13   Patient will improve functional outcome (FOTO) score: by 5% to increase self-worth & perceived functional ability towards long term goals [x] Met  [] Not Met  [] Progressing  11/13      LONG TERM GOALS: Discharge (~Jan 2025) Progress Date met   Patient will return to normal activites of daily living, recreational, and work related activities with less pain and limitation.  [] Met  [] Not Met  [x] Progressing     Patient will improve range of motion  to stated goals in order to return to maximal functional potential.  [] Met  [] Not Met  [x] Progressing     Patient will improve Strength to stated goals of appropriate musculature in order to  improve functional independence.  [] Met  [] Not Met  [x] Progressing     Pain Rating at Best: 1/10 to improve Quality of Life.  [] Met  [] Not Met  [x] Progressing     Patient will meet predicted functional outcome (FOTO) score: 80% to increase self-worth & perceived functional ability. [] Met  [] Not Met  [x] Progressing     Patient will have met/partially met personal goal of: to get back to daily activities, community based activities, and social activities.  [] Met  [] Not Met  [x] Progressing           PLAN   Updated Plan of care Certification: 01/14/2025 to 02/25/2025     Outpatient Physical Therapy 2 times weekly for 6 weeks to include any combination of the following interventions: virtual visits, dry needling, modalities, electrical stimulation (IFC, Pre-Mod, Attended with Functional Dry Needling), Manual Therapy, Moist Heat/ Ice, Neuromuscular Re-ed, Patient Education, Self Care, Therapeutic Exercise, Functional Training, and Therapeutic Activites     Mamta Peres, PT

## 2025-01-31 ENCOUNTER — CLINICAL SUPPORT (OUTPATIENT)
Dept: REHABILITATION | Facility: HOSPITAL | Age: 75
End: 2025-01-31
Attending: STUDENT IN AN ORGANIZED HEALTH CARE EDUCATION/TRAINING PROGRAM
Payer: COMMERCIAL

## 2025-01-31 DIAGNOSIS — Z98.890 STATUS POST ROTATOR CUFF REPAIR: ICD-10-CM

## 2025-01-31 DIAGNOSIS — R29.898 DECREASED STRENGTH OF UPPER EXTREMITY: Primary | ICD-10-CM

## 2025-01-31 DIAGNOSIS — M25.611 DECREASED RANGE OF MOTION OF RIGHT SHOULDER: ICD-10-CM

## 2025-01-31 PROCEDURE — 97110 THERAPEUTIC EXERCISES: CPT

## 2025-01-31 PROCEDURE — 97530 THERAPEUTIC ACTIVITIES: CPT

## 2025-01-31 NOTE — PROGRESS NOTES
[facility]   Physical Therapy Treatment Note     Name: Alyx Weir  [facility] Number: [id number]    Therapy Diagnosis:   Encounter Diagnoses   Name Primary?    Status post rotator cuff repair     Decreased strength of upper extremity Yes    Decreased range of motion of right shoulder        Physician: Carl Ruiz    Visit Date: 1/31/2025    Physician Orders: PT Eval and Treat  Medical Diagnosis from Referral: Traumatic incomplete tear of right rotator cuff, subsequent encounter [S46.011D], Subacromial impingement of right shoulder [M75.41]   Evaluation Date: 8/15/2024  Authorization Period Expiration: 6/7/2025  Plan of Care Expiration:    2/25/2025     (updated)            Progress Update: 2/13/2025   Visit # / Visits authorized: 13/ 12 (already completed 22)  FOTO: 12/12 - Scored: 3 / 3       PRECAUTIONS: Standard Precautions, Safety/fall precautions, Orthopedic: Right Cuff Repair, Non-weight beariing, and Orthotic device type: abductor sling, Wearning schedule: full until 6 week follow up      PROBLEM LIST : pain, decreased motion all planes, decreased strength      Date of Surgery/Procedure 8/5/2024- Joseph   Surgery/Procedure Performed Status post Right Rotator Cuff Repair, Subacromial Decompression, Distal Clavicle Dissection, Biceps Tenodesis   Rehabilitation Precautions Protocol: Joseph Cuff Protocol      MD Follow up:       Patient School:     Job/Position: Works at an alteration store    PTA Visit #: 0/5     Time In: 1100   Time Out: 1155  Total Billable Time: 55 minutes    SUBJECTIVE     Pt reports: she will return to surgeon next week and plans to discuss plans moving forward as she is still having pain.    Compliance with Hep: Daily    Response to previous treatment: no adverse reactions to treatment/updated HOME EXERCISE PROGRAM    Functional change: Better    Pain: 4/10   Worst: 8/10  Location: Right shoulder      OBJECTIVE     Objective Measures updated at  progress report unless specified otherwise.    SHOULDER-           SHOULDER   Range of Motion Right (11/14/2025)  Active     Passive Left  Active       Passive Goal   Forward Flexion (180º) 160* - 160 - 170º   Abduction (180º)  155* - 120 - 160º   External Rotation at 90º (90º)  70* - 75 - 80º   External Rotation at 45º (45º)   40* - 52 - 45º   Internal Rotation at 90º (90º)  70* - - - 70º   Functional External Rotation (C7)   -   - C7   Functional Internal Rotation (T10)   -   - T10   (*) pain and/or dysfunction) pain and/or dysfunction     UE STRENGTH -   Upper Extremity  Strength Right (11/14/2025)  Not tested due to post operative state Goal   Shoulder Flexion []1  []2  []3  [x]4  []5                [x]+ []- 5/5 B   Shoulder Abduction []1  []2  []3  [x]4  []5                [x]+ []- 5/5 B   Shoulder IR []1  []2  []3  [x]4  []5                [x]+ []- 5/5 B   Shoulder ER []1  []2  []3  [x]4  []5                [x]+ []- 5/5 B   Elbow Flexion  []1  []2  []3  [x]4  []5                [x]+ []- 5/5 B   Elbow Extension []1  []2  []3  [x]4  []5                [x]+ []- 5/5 B      FOTO: 50 (11/14/2025)    Treatment     Gym/Equipment Access: none  Time to Complete Exercises: increased for cueing and education    Alyx received the treatments listed below:       CPT Intervention  Right RCR  BT  SAD  DCR Duration/ Details  1/31/2025    TE Arm Bike 3/3 forward and back   TE Objectives Re-assessed    TE Series 6  Chest Stretch  Bear Hugs  Swimmers  Snow [place]    TE Seated Shoulder Strap INTERNAL ROTATION stretch 3 minutes with 10 second holds 5 second rest right    TE Tball     TE Pulleys -   TE Passive Range of motion     TE Assisted Motion   3 minutes flexion supine with 2lbs   TE Free Weights 2 x 10 3lbs shoulder press  2 x 10 lateral raise cable 2.5lbs  2 x 10 biceps curls 3lbs   TA Seated scapular retractions 4 minutes 7.5lbs   TA Seated Lat Pull downs 4 minutes 7.5lbs   NMR Band Series     NC Hot Pack  -   PLAN           CPT Codes available for Billing:   (00) minutes of Manual therapy (MT) to improve pain and ROM.  (47) minutes of Therapeutic Exercise (TE) to develop strength, endurance, range of motion, and flexibility.  (00) minutes of Neuromuscular Re-Education (NMR)  to improve: Balance, Coordination, Kinesthetic, Sense, Proprioception, and Posture.  (08) minutes of Therapeutic Activities (TA) to improve functional performance.  (00) minutes of Gait Training (GT) to improve functional gait mechanics.  (00) minutes of Self- Care and Education  Unattended Electrical Stimulation (ES) for muscle performance or pain modulation.  Vasopneumatic Device Therapy () for management of swelling/edema. (20130)  BFR: Blood flow restriction applied during exercise  Functional Performance Test (FPT)  NP or (-): Not Performed     See EMR under MEDIA for written consent provided for Dry Needling- DATE   Palpation Assessment to determine the necessity for Functional Dry Needling     PATIENT EDUCATION AND HOME EXERCISES      Education/Self-Care provided:  (included in treatment unless specified above) minutes   Patient educated on the impairments noted above and the effects of physical therapy intervention to improve overall condition and Quality of Life  Patient was educated on all the above exercise prior/during/after for proper posture, positioning, and execution for safe performance with home exercise program.      Written Home Exercises Provided: yes. Prefers: [x] Printed [x] Electronic  Exercises were reviewed and Rylee was able to demonstrate them prior to the end of the session.  Rylee demonstrated good understanding of the education provided. See EMR under Patient Instructions for exercises provided during therapy sessions.    ASSESSMENT     Alyx Weir tolerated today's session well. Continued encouragement of HOME EXERCISE PROGRAM.    Aylx Is progressing well towards her goals.   Pt prognosis is Good.     Pt will continue to  benefit from skilled outpatient physical therapy to address the deficits listed in the problem list box on initial evaluation, provide pt/family education and to maximize pt's level of independence in the home and community environment.     Pt's spiritual, cultural and educational needs considered and pt agreeable to plan of care and goals.    Anticipated Barriers for therapy: co-morbidities       SHORT TERM GOALS:  progressing Progress Date met   Recent signs and systems trend is improving in order to progress towards Long term goals.  [x] Met  [] Not Met  [] Progressing  9/23   Patient will be independent with Home Exercise Program  in order to further progress and return to maximal function. [x] Met  [] Not Met  [] Progressing  01/14/2025   Pain rating at Worst: 5 /10 in order to progress towards increased independence with activity. [x] Met  [] Not Met  [] Progressing   01/14/2025   Patient will be able to correct postural deviations in sitting and standing, to decrease pain and promote postural awareness for injury prevention.  [x] Met  [] Not Met  [] Progressing  11/13   Patient will improve functional outcome (FOTO) score: by 5% to increase self-worth & perceived functional ability towards long term goals [x] Met  [] Not Met  [] Progressing  11/13      LONG TERM GOALS: Discharge (~Jan 2025) Progress Date met   Patient will return to normal activites of daily living, recreational, and work related activities with less pain and limitation.  [] Met  [] Not Met  [x] Progressing     Patient will improve range of motion  to stated goals in order to return to maximal functional potential.  [] Met  [] Not Met  [x] Progressing     Patient will improve Strength to stated goals of appropriate musculature in order to improve functional independence.  [] Met  [] Not Met  [x] Progressing     Pain Rating at Best: 1/10 to improve Quality of Life.  [] Met  [] Not Met  [x] Progressing     Patient will meet predicted functional  outcome (FOTO) score: 80% to increase self-worth & perceived functional ability. [] Met  [] Not Met  [x] Progressing     Patient will have met/partially met personal goal of: to get back to daily activities, community based activities, and social activities.  [] Met  [] Not Met  [x] Progressing           PLAN   Updated Plan of care Certification: 01/14/2025 to 02/25/2025     Outpatient Physical Therapy 2 times weekly for 6 weeks to include any combination of the following interventions: virtual visits, dry needling, modalities, electrical stimulation (IFC, Pre-Mod, Attended with Functional Dry Needling), Manual Therapy, Moist Heat/ Ice, Neuromuscular Re-ed, Patient Education, Self Care, Therapeutic Exercise, Functional Training, and Therapeutic Activites     Mamta Peres, PT

## 2025-02-05 ENCOUNTER — OFFICE VISIT (OUTPATIENT)
Dept: SPORTS MEDICINE | Facility: CLINIC | Age: 75
End: 2025-02-05
Payer: COMMERCIAL

## 2025-02-05 ENCOUNTER — ANTI-COAG VISIT (OUTPATIENT)
Dept: CARDIOLOGY | Facility: CLINIC | Age: 75
End: 2025-02-05
Payer: MEDICARE

## 2025-02-05 VITALS — BODY MASS INDEX: 30.31 KG/M2 | WEIGHT: 171.06 LBS | HEIGHT: 63 IN | RESPIRATION RATE: 17 BRPM

## 2025-02-05 DIAGNOSIS — Z79.01 ANTICOAGULATED ON COUMADIN: Chronic | ICD-10-CM

## 2025-02-05 DIAGNOSIS — Z98.890 STATUS POST ROTATOR CUFF REPAIR: Primary | ICD-10-CM

## 2025-02-05 DIAGNOSIS — Z79.01 LONG TERM (CURRENT) USE OF ANTICOAGULANTS: Primary | ICD-10-CM

## 2025-02-05 DIAGNOSIS — D68.51 HETEROZYGOUS FACTOR V LEIDEN MUTATION: Chronic | ICD-10-CM

## 2025-02-05 LAB
CTP QC/QA: YES
INR PPP: 1.4 (ref 2–3)

## 2025-02-05 PROCEDURE — 99999 PR PBB SHADOW E&M-EST. PATIENT-LVL III: CPT | Mod: PBBFAC,,, | Performed by: STUDENT IN AN ORGANIZED HEALTH CARE EDUCATION/TRAINING PROGRAM

## 2025-02-05 PROCEDURE — 85610 PROTHROMBIN TIME: CPT | Mod: QW,HCNC,S$GLB, | Performed by: INTERNAL MEDICINE

## 2025-02-05 PROCEDURE — 93793 ANTICOAG MGMT PT WARFARIN: CPT | Mod: HCNC,S$GLB,,

## 2025-02-05 NOTE — LETTER
Patient: Alyx Weir   YOB: 1950   Clinic Number: [id number]   Today's Date: February 5, 2025                  Work Status Summary     Patient: Alyx Weir   YOB: 1950     To Whom It May Concern:     Eduar Weir  was at [facility] on 02/05/2025. The patient may return to work/school on 02/06/25 with the following restrictions.      Allow time for physical therapy  Intermittent lifting/pushing/pulling greater than 5 pounds  No overhead work  No repetitive motions  Allow to work from 2-4 hours per day, may be able to gradually increase over time if able     She will be re-evaluated on 4/8/25 at which time restrictions will be updated. She will continue with physical therapy for 2 days/week for the next 6 weeks. If you have any questions or concerns, or if I can be of further assistance, please do not hesitate to contact me.     Sincerely,          __  Carl Ruiz MD

## 2025-02-05 NOTE — PROGRESS NOTES
INR not at goal. Medications, chart, and patient findings reviewed. See calendar for adjustments to dose and follow up plan.  Boost dose today only then resume per calendar.  Repeat INR in 1.5 weeks.

## 2025-02-05 NOTE — LETTER
Patient: Alyx Weir   YOB: 1950   Clinic Number: [id number]   Today's Date: February 5, 2025     To: ***   Fax Number: [phone]        Work Status Summary         ____________________________         February 5, 2025    Carl Ruiz MD  Signed (Provider)

## 2025-02-05 NOTE — PROGRESS NOTES
Orthopaedics  Post-operative follow-up    Procedure Performed:  1. Right shoulder arthroscopic rotator cuff repair  2. Right shoulder arthroscopic biceps tenodesis  3. Right shoulder subacromial decompression     Date of Surgery: 8/5/24    Subjective: Alyx Weir is now approximately 6 months out from her shoulder surgery.  She has been compliant with post-operative restrictions and has been progressing with PT at [facility].      Exam:  Incision sites healed, C/D/I  No swelling or bruising noted  Fluid ROM with no crepitus  Upright Active ROM: , , ER 40  Cuff strength intact   Axillary nerve sensation and motor intact  Motor and sensory intact distally  Strong radial pulse, fingers warm and well perfused    Imaging:  No new imaging.     Impression:  6 months s/p right shoulder arthroscopic rotator cuff repair, biceps tenodesis, and subacromial decompression     Plan:  Overall her motion is doing well and endurance is improving.  She reports some pain with prolonged activity and lifting any type of weight.  Advance PT per protocol  Symptomatic treatment for pain / swelling  May advance activities as reviewed today: Continue to progress strength and endurance of shoulder and periscapular musculature.   Instructed patient to call clinic if questions or concerns     Follow-up with me in 2 months    Work status:   1) Continue physical therapy  2) intermittent lifting/pushing/pulling up to 5lbs   3) No overhead work  4) No repetitive motions  5) Allow to work from 2-4 hours per day, may be able to gradually increase over time if able      Carl Ruiz MD    I, Estiven Velazquez, acted as a scribe for Carl Ruiz MD for the duration of this office visit.    This note was generated with the assistance of ambient listening technology. Verbal consent was obtained by the patient and accompanying visitor(s) for the recording of patient appointment to facilitate this note. I attest to  having reviewed and edited the generated note for accuracy, though some syntax or spelling errors may persist. Please contact the author of this note for any clarification.

## 2025-02-05 NOTE — PROGRESS NOTES
Patient's INR is sub-therapeutic at 1.4. Patient reports missed dose. Advised of increased risk of clotting; signs/symptoms of clotting and need to go to ED if she experiences any. Patient reports she is not having any signs/symptoms of clotting. Sent to PharmD for dosing/instructions.

## 2025-02-17 ENCOUNTER — HOSPITAL ENCOUNTER (EMERGENCY)
Facility: HOSPITAL | Age: 75
Discharge: HOME | End: 2025-02-17
Attending: EMERGENCY MEDICINE
Payer: MEDICARE

## 2025-02-17 ENCOUNTER — OFFICE VISIT (OUTPATIENT)
Dept: FAMILY MEDICINE | Facility: CLINIC | Age: 75
End: 2025-02-17
Payer: MEDICARE

## 2025-02-17 VITALS
WEIGHT: 177.5 LBS | DIASTOLIC BLOOD PRESSURE: 84 MMHG | HEART RATE: 62 BPM | HEIGHT: 63 IN | SYSTOLIC BLOOD PRESSURE: 134 MMHG | BODY MASS INDEX: 31.45 KG/M2 | RESPIRATION RATE: 18 BRPM | OXYGEN SATURATION: 99 % | TEMPERATURE: 99 F

## 2025-02-17 VITALS
SYSTOLIC BLOOD PRESSURE: 149 MMHG | OXYGEN SATURATION: 99 % | TEMPERATURE: 98 F | RESPIRATION RATE: 18 BRPM | DIASTOLIC BLOOD PRESSURE: 72 MMHG | HEART RATE: 54 BPM | WEIGHT: 177.19 LBS | BODY MASS INDEX: 31.39 KG/M2

## 2025-02-17 DIAGNOSIS — R10.32 LEFT LOWER QUADRANT ABDOMINAL PAIN: Primary | ICD-10-CM

## 2025-02-17 DIAGNOSIS — R10.9 ACUTE LEFT FLANK PAIN: ICD-10-CM

## 2025-02-17 LAB
ALBUMIN SERPL BCP-MCNC: 4.3 G/DL (ref 3.5–5.2)
ALP SERPL-CCNC: 51 U/L (ref 40–150)
ALT SERPL W/O P-5'-P-CCNC: 17 U/L (ref 10–44)
ANION GAP SERPL CALC-SCNC: 12 MMOL/L (ref 8–16)
AST SERPL-CCNC: 15 U/L (ref 10–40)
BASOPHILS # BLD AUTO: 0.06 K/UL (ref 0–0.2)
BASOPHILS NFR BLD: 0.6 % (ref 0–1.9)
BILIRUB SERPL-MCNC: 0.6 MG/DL (ref 0.1–1)
BILIRUB UR QL STRIP: NEGATIVE
BUN SERPL-MCNC: 14 MG/DL (ref 8–23)
CALCIUM SERPL-MCNC: 10.2 MG/DL (ref 8.7–10.5)
CHLORIDE SERPL-SCNC: 101 MMOL/L (ref 95–110)
CLARITY UR: CLEAR
CO2 SERPL-SCNC: 26 MMOL/L (ref 23–29)
COLOR UR: COLORLESS
CREAT SERPL-MCNC: 0.8 MG/DL (ref 0.5–1.4)
DIFFERENTIAL METHOD BLD: ABNORMAL
EOSINOPHIL # BLD AUTO: 0.3 K/UL (ref 0–0.5)
EOSINOPHIL NFR BLD: 2.6 % (ref 0–8)
ERYTHROCYTE [DISTWIDTH] IN BLOOD BY AUTOMATED COUNT: 15.6 % (ref 11.5–14.5)
EST. GFR  (NO RACE VARIABLE): >60 ML/MIN/1.73 M^2
GLUCOSE SERPL-MCNC: 90 MG/DL (ref 70–110)
GLUCOSE UR QL STRIP: NEGATIVE
HCT VFR BLD AUTO: 41 % (ref 37–48.5)
HCV AB SERPL QL IA: NEGATIVE
HEP C VIRUS HOLD SPECIMEN: NORMAL
HGB BLD-MCNC: 13.5 G/DL (ref 12–16)
HGB UR QL STRIP: NEGATIVE
HIV 1+2 AB+HIV1 P24 AG SERPL QL IA: NEGATIVE
IMM GRANULOCYTES # BLD AUTO: 0.03 K/UL (ref 0–0.04)
IMM GRANULOCYTES NFR BLD AUTO: 0.3 % (ref 0–0.5)
KETONES UR QL STRIP: NEGATIVE
LEUKOCYTE ESTERASE UR QL STRIP: NEGATIVE
LIPASE SERPL-CCNC: 31 U/L (ref 4–60)
LYMPHOCYTES # BLD AUTO: 3.2 K/UL (ref 1–4.8)
LYMPHOCYTES NFR BLD: 32.7 % (ref 18–48)
MCH RBC QN AUTO: 28.7 PG (ref 27–31)
MCHC RBC AUTO-ENTMCNC: 32.9 G/DL (ref 32–36)
MCV RBC AUTO: 87 FL (ref 82–98)
MONOCYTES # BLD AUTO: 0.8 K/UL (ref 0.3–1)
MONOCYTES NFR BLD: 8 % (ref 4–15)
NEUTROPHILS # BLD AUTO: 5.5 K/UL (ref 1.8–7.7)
NEUTROPHILS NFR BLD: 55.8 % (ref 38–73)
NITRITE UR QL STRIP: NEGATIVE
NRBC BLD-RTO: 0 /100 WBC
PH UR STRIP: 7 [PH] (ref 5–8)
PLATELET # BLD AUTO: 319 K/UL (ref 150–450)
PMV BLD AUTO: 8.8 FL (ref 9.2–12.9)
POTASSIUM SERPL-SCNC: 3.7 MMOL/L (ref 3.5–5.1)
PROT SERPL-MCNC: 8.2 G/DL (ref 6–8.4)
PROT UR QL STRIP: NEGATIVE
RBC # BLD AUTO: 4.71 M/UL (ref 4–5.4)
SODIUM SERPL-SCNC: 139 MMOL/L (ref 136–145)
SP GR UR STRIP: 1 (ref 1–1.03)
URN SPEC COLLECT METH UR: ABNORMAL
UROBILINOGEN UR STRIP-ACNC: NEGATIVE EU/DL
WBC # BLD AUTO: 9.88 K/UL (ref 3.9–12.7)

## 2025-02-17 PROCEDURE — 83690 ASSAY OF LIPASE: CPT | Mod: HCNC | Performed by: NURSE PRACTITIONER

## 2025-02-17 PROCEDURE — 99285 EMERGENCY DEPT VISIT HI MDM: CPT | Mod: 25,HCNC

## 2025-02-17 PROCEDURE — 81003 URINALYSIS AUTO W/O SCOPE: CPT | Mod: HCNC | Performed by: NURSE PRACTITIONER

## 2025-02-17 PROCEDURE — 96375 TX/PRO/DX INJ NEW DRUG ADDON: CPT | Mod: HCNC

## 2025-02-17 PROCEDURE — 87389 HIV-1 AG W/HIV-1&-2 AB AG IA: CPT | Mod: HCNC | Performed by: EMERGENCY MEDICINE

## 2025-02-17 PROCEDURE — 80053 COMPREHEN METABOLIC PANEL: CPT | Mod: HCNC | Performed by: NURSE PRACTITIONER

## 2025-02-17 PROCEDURE — 86803 HEPATITIS C AB TEST: CPT | Mod: HCNC | Performed by: EMERGENCY MEDICINE

## 2025-02-17 PROCEDURE — 25000003 PHARM REV CODE 250: Mod: HCNC | Performed by: EMERGENCY MEDICINE

## 2025-02-17 PROCEDURE — 85025 COMPLETE CBC W/AUTO DIFF WBC: CPT | Mod: HCNC | Performed by: NURSE PRACTITIONER

## 2025-02-17 PROCEDURE — 96374 THER/PROPH/DIAG INJ IV PUSH: CPT | Mod: HCNC

## 2025-02-17 PROCEDURE — 63600175 PHARM REV CODE 636 W HCPCS: Mod: HCNC | Performed by: EMERGENCY MEDICINE

## 2025-02-17 RX ORDER — CLONIDINE HYDROCHLORIDE 0.2 MG/1
0.2 TABLET ORAL
Status: COMPLETED | OUTPATIENT
Start: 2025-02-17 | End: 2025-02-17

## 2025-02-17 RX ORDER — ONDANSETRON HYDROCHLORIDE 2 MG/ML
4 INJECTION, SOLUTION INTRAVENOUS
Status: COMPLETED | OUTPATIENT
Start: 2025-02-17 | End: 2025-02-17

## 2025-02-17 RX ORDER — HYDROCODONE BITARTRATE AND ACETAMINOPHEN 5; 325 MG/1; MG/1
1 TABLET ORAL EVERY 6 HOURS PRN
Qty: 9 TABLET | Refills: 0 | Status: SHIPPED | OUTPATIENT
Start: 2025-02-17

## 2025-02-17 RX ORDER — FENTANYL CITRATE 50 UG/ML
25 INJECTION, SOLUTION INTRAMUSCULAR; INTRAVENOUS
Refills: 0 | Status: COMPLETED | OUTPATIENT
Start: 2025-02-17 | End: 2025-02-17

## 2025-02-17 RX ADMIN — CLONIDINE HYDROCHLORIDE 0.2 MG: 0.2 TABLET ORAL at 11:02

## 2025-02-17 RX ADMIN — FENTANYL CITRATE 25 MCG: 50 INJECTION INTRAMUSCULAR; INTRAVENOUS at 09:02

## 2025-02-17 RX ADMIN — ONDANSETRON 4 MG: 2 INJECTION INTRAMUSCULAR; INTRAVENOUS at 09:02

## 2025-02-17 NOTE — PROGRESS NOTES
Subjective:       Patient ID: Alyx Weir is a 75 y.o. female.    Chief Complaint: Abdominal Pain and Back Pain    1 week worsening LLQ abdominal pain radiating to left flank/back--------no fever------no N/V----no urine symptoms-    Abdominal Pain  Pertinent negatives include no fever.   Back Pain  Associated symptoms include abdominal pain. Pertinent negatives include no chest pain or fever.     Past Medical History:   Diagnosis Date    Anticoagulant long-term use     Anticoagulated on Coumadin     Arm weakness-rotator cuff weakness 11/02/2015    Arthritis     Asthma     Clotting disorder     Coronary artery disease     Deep vein thrombosis     Degenerative disc disease     Diabetes mellitus type I 2011    BS last tested x 1 month not sure what it was.    Heterozygous factor V Leiden mutation     Hx of colonic polyps 11/13/2015    Hyperlipidemia     Hypertension     VIRGIL (obstructive sleep apnea)     Stenosis of right carotid artery 12/13/2016     Past Surgical History:   Procedure Laterality Date    APPENDECTOMY      ARTHROSCOPIC REPAIR OF ROTATOR CUFF OF SHOULDER Right 08/05/2024    Procedure: REPAIR, ROTATOR CUFF, ARTHROSCOPIC;  Surgeon: Carl Ruiz MD;  Location: [facility] OR;  Service: Orthopedics;  Laterality: Right;  1. Right shoulder arthroscopic rotator cuff repair  2. Right shoulder arthroscopic biceps tenodesis  3. Right shoulder subacromial decompression    ARTHROSCOPY OF SHOULDER WITH DECOMPRESSION OF SUBACROMIAL SPACE Right 08/05/2024    Procedure: ARTHROSCOPY, SHOULDER, WITH SUBACROMIAL SPACE DECOMPRESSION;  Surgeon: Carl Ruiz MD;  Location: [facility] OR;  Service: Orthopedics;  Laterality: Right;    ARTHROSCOPY,SHOULDER,WITH BICEPS TENODESIS Right 08/05/2024    Procedure: ARTHROSCOPY,SHOULDER,WITH BICEPS TENODESIS;  Surgeon: Carl Ruiz MD;  Location: [facility] OR;  Service: Orthopedics;  Laterality: Right;    BACK SURGERY      bladder cyst removal      BLADDER SURGERY       CARDIAC CATHETERIZATION  03/2013    mild CAD    COLONOSCOPY N/A 11/13/2015    Procedure: COLONOSCOPY;  Surgeon: Jas Umanzor III, MD;  Location: [facility] ENDO;  Service: Endoscopy;  Laterality: N/A;    HYSTERECTOMY      28 yrs old    LUMBAR SPINE SURGERY      bone spurs removed    OOPHORECTOMY      SELECTIVE INJECTION OF ANESTHETIC AGENT AROUND LUMBAR SPINAL NERVE ROOT BY TRANSFORAMINAL APPROACH Bilateral 06/03/2022    Procedure: Bilateral L4/5 TF ASUNCION with RN IV sedation; on Coumadin, will need INR prior to procedure, must be less than 1.3;  Surgeon: Mario Palacios MD;  Location: [facility] PAIN MGT;  Service: Pain Management;  Laterality: Bilateral;     Family History   Problem Relation Name Age of Onset    Heart disease Mother Jass [place]     Hypertension Mother Jass [place]     Cataracts Mother Jass [place]     Arthritis Mother Jass [place]     Diabetes Father Reji     Hypertension Sister Jannet     Glaucoma Sister Jannet     Ovarian cancer Sister Jannet     Hypertension Sister Kenisha     Diabetes Sister Kenisha     Hypertension Sister Veda     Diabetes Sister Veda     Diabetes Brother Eliot     Hypertension Brother Eliot     Diabetes Paternal Uncle Rikki     Breast cancer Neg Hx      Colon cancer Neg Hx       Social History[1]  Review of Systems   Constitutional:  Negative for chills and fever.   HENT:  Negative for congestion.    Respiratory:  Negative for cough, choking, shortness of breath, wheezing and stridor.    Cardiovascular:  Negative for chest pain.   Gastrointestinal:  Positive for abdominal pain. Negative for rectal pain.   Genitourinary:  Positive for flank pain.   Musculoskeletal:  Positive for back pain.   Neurological:  Negative for dizziness.       Objective:      Physical Exam  Vitals and nursing note reviewed.   Constitutional:       General: She is not in acute distress.     Appearance: Normal appearance. She is well-developed. She is not diaphoretic.   HENT:      Head: Normocephalic and atraumatic.       Mouth/Throat:      Pharynx: No oropharyngeal exudate.   Eyes:      General: No scleral icterus.  Neck:      Thyroid: No thyromegaly.      Vascular: No carotid bruit or JVD.      Trachea: No tracheal deviation.   Cardiovascular:      Rate and Rhythm: Normal rate and regular rhythm.      Heart sounds: Normal heart sounds.   Pulmonary:      Effort: Pulmonary effort is normal. No respiratory distress.      Breath sounds: Normal breath sounds. No wheezing or rales.   Chest:      Chest wall: No tenderness.   Abdominal:      General: Bowel sounds are normal. There is no distension.      Palpations: Abdomen is soft.      Tenderness: There is no abdominal tenderness. There is no guarding or rebound.   Musculoskeletal:         General: Tenderness present. Normal range of motion.      Cervical back: Normal range of motion and neck supple.      Comments: Tenderness LLQ--------   Lymphadenopathy:      Cervical: No cervical adenopathy.   Skin:     General: Skin is warm and dry.      Coloration: Skin is not pale.      Findings: No erythema or rash.   Neurological:      Mental Status: She is alert and oriented to person, place, and time.      Cranial Nerves: No cranial nerve deficit.      Coordination: Coordination normal.   Psychiatric:         Behavior: Behavior normal.         Thought Content: Thought content normal.         Judgment: Judgment normal.         CMP  Sodium   Date Value Ref Range Status   05/29/2024 137 136 - 145 mmol/L Final     Potassium   Date Value Ref Range Status   05/29/2024 3.7 3.5 - 5.1 mmol/L Final     Chloride   Date Value Ref Range Status   05/29/2024 101 95 - 110 mmol/L Final     CO2   Date Value Ref Range Status   05/29/2024 26 23 - 29 mmol/L Final     Glucose   Date Value Ref Range Status   05/29/2024 58 (L) 70 - 110 mg/dL Final     BUN   Date Value Ref Range Status   05/29/2024 19 8 - 23 mg/dL Final     Creatinine   Date Value Ref Range Status   05/29/2024 0.9 0.5 - 1.4 mg/dL Final     Calcium    Date Value Ref Range Status   05/29/2024 10.2 8.7 - 10.5 mg/dL Final     Total Protein   Date Value Ref Range Status   05/29/2024 7.9 6.0 - 8.4 g/dL Final     Albumin   Date Value Ref Range Status   05/29/2024 3.9 3.5 - 5.2 g/dL Final     Total Bilirubin   Date Value Ref Range Status   05/29/2024 0.7 0.1 - 1.0 mg/dL Final     Comment:     For infants and newborns, interpretation of results should be based  on gestational age, weight and in agreement with clinical  observations.    Premature Infant recommended reference ranges:  Up to 24 hours.............<8.0 mg/dL  Up to 48 hours............<12.0 mg/dL  3-5 days..................<15.0 mg/dL  6-29 days.................<15.0 mg/dL       Alkaline Phosphatase   Date Value Ref Range Status   05/29/2024 49 (L) 55 - 135 U/L Final     AST   Date Value Ref Range Status   05/29/2024 15 10 - 40 U/L Final     ALT   Date Value Ref Range Status   05/29/2024 14 10 - 44 U/L Final     Anion Gap   Date Value Ref Range Status   05/29/2024 10 8 - 16 mmol/L Final     eGFR if    Date Value Ref Range Status   02/16/2022 >60 >60 mL/min/1.73 m^2 Final     eGFR if non    Date Value Ref Range Status   02/16/2022 >60 >60 mL/min/1.73 m^2 Final     Comment:     Calculation used to obtain the estimated glomerular filtration  rate (eGFR) is the CKD-EPI equation.        Lab Results   Component Value Date    WBC 8.23 05/29/2024    HGB 13.5 05/29/2024    HCT 42.4 05/29/2024    MCV 89 05/29/2024     05/29/2024     Lab Results   Component Value Date    CHOL 198 11/29/2023     Lab Results   Component Value Date    HDL 72 11/29/2023     Lab Results   Component Value Date    LDLCALC 110.6 11/29/2023     Lab Results   Component Value Date    TRIG 77 11/29/2023     Lab Results   Component Value Date    CHOLHDL 36.4 11/29/2023     Lab Results   Component Value Date    TSH 0.847 05/29/2024     Lab Results   Component Value Date    HGBA1C 5.7 (H) 12/23/2024      Assessment:       1. Left lower quadrant abdominal pain    2. Acute left flank pain        Plan:   Left lower quadrant abdominal pain    Acute left flank pain      Pt will go to er for further eval-and treatment-       [1]   Social History  Socioeconomic History    Marital status:    Tobacco Use    Smoking status: Never     Passive exposure: Past    Smokeless tobacco: Never   Substance and Sexual Activity    Alcohol use: Never    Drug use: Never    Sexual activity: Not Currently     Partners: Male     Birth control/protection: None     Comment:     Social History Narrative    Dogs in household, no smokers.     Social Drivers of Health     Financial Resource Strain: Medium Risk (2024)    Overall Financial Resource Strain (CARDIA)     Difficulty of Paying Living Expenses: Somewhat hard   Food Insecurity: No Food Insecurity (2024)    Hunger Vital Sign     Worried About Running Out of Food in the Last Year: Never true     Ran Out of Food in the Last Year: Never true   Transportation Needs: No Transportation Needs (2023)    PRAPARE - Transportation     Lack of Transportation (Medical): No     Lack of Transportation (Non-Medical): No   Physical Activity: Unknown (2024)    Exercise Vital Sign     Days of Exercise per Week: 0 days   Recent Concern: Physical Activity - Inactive (2024)    Exercise Vital Sign     Days of Exercise per Week: 0 days     Minutes of Exercise per Session: 60 min   Stress: Stress Concern Present (2024)    [employer] - Occupational Stress Questionnaire     Feeling of Stress : To some extent   Housing Stability: Low Risk  (2023)    Housing Stability Vital Sign     Unable to Pay for Housing in the Last Year: No     Number of Places Lived in the Last Year: 1     Unstable Housing in the Last Year: No

## 2025-02-17 NOTE — FIRST PROVIDER EVALUATION
Medical screening examination initiated.  I have conducted a focused provider triage encounter, findings are as follows:    Brief history of present illness:  reports llq pain     Vitals:    02/17/25 1547   BP: (!) 175/76   BP Location: Right arm   Pulse: (!) 58   Resp: 20   Temp: 98.4 °F (36.9 °C)   TempSrc: Oral   SpO2: 98%   Weight: 80.4 kg (177 lb 3.2 oz)       Pertinent physical exam:  nad    Brief workup plan:  labs, further eval    Preliminary workup initiated; this workup will be continued and followed by the physician or advanced practice provider that is assigned to the patient when roomed.

## 2025-02-17 NOTE — Clinical Note
"Alyx Burlesonmadison Weir was seen and treated in our emergency department on 2/17/2025.  She may return to work on 02/20/2025.       If you have any questions or concerns, please don't hesitate to call.      Lisha CRANE    "

## 2025-02-18 ENCOUNTER — TELEPHONE (OUTPATIENT)
Dept: SPORTS MEDICINE | Facility: CLINIC | Age: 75
End: 2025-02-18
Payer: MEDICARE

## 2025-02-18 ENCOUNTER — ANTI-COAG VISIT (OUTPATIENT)
Dept: CARDIOLOGY | Facility: CLINIC | Age: 75
End: 2025-02-18
Payer: MEDICARE

## 2025-02-18 DIAGNOSIS — Z79.01 ANTICOAGULATED ON COUMADIN: Primary | Chronic | ICD-10-CM

## 2025-02-18 DIAGNOSIS — D68.51 HETEROZYGOUS FACTOR V LEIDEN MUTATION: Chronic | ICD-10-CM

## 2025-02-18 NOTE — DISCHARGE INSTRUCTIONS
Your workup today is benign.  That has limb left lower quadrant pain however your CT scan is fine and your blood work is normal.  Use Norco for pain follow up with her doctor tomorrow.  Return as needed for any worsening symptoms, problems, questions or concerns

## 2025-02-18 NOTE — PROGRESS NOTES
INR not at goal-1.5. Medications, chart, and patient findings reviewed. See calendar for adjustments to dose and follow up plan.  Pt went to ER yesterday for ABD pain.  Given narcotic pain medication.  Pt contacted, denies recent missed doses.  No greens in diet.  Boost dose x 2 since she has had 2 low INRs.  Repeat INR in 1 week, HG CC.

## 2025-02-18 NOTE — ED PROVIDER NOTES
SCRIBE #1 NOTE: I, Mayra Young and Marleny Phillips, am scribing for, and in the presence of, Rivas Ku Jr., MD. I have scribed the entire note.       History     Chief Complaint   Patient presents with    Abdominal Pain     Left lower abdominal pain that radiates around to left lower back, x 4 days.  Went to PCP Dr. Vargas and told to come to ER. LBM this morning. Denies N/V or fever.     Review of patient's allergies indicates:   Allergen Reactions    Erythromycin Edema     Angioedema to throat    Amlodipine Other (See Comments)     Headaches    Diazepam Hives     Other reaction(s): Unknown    Iodine Hives     Other reaction(s): Unknown    Meperidine Hives     Other reaction(s): Unknown    Morphine Itching    Methylprednisolone sod suc(pf) Other (See Comments)     headache    Primidone Other (See Comments)     Other reaction(s): Unknown    Zetia [ezetimibe]      Diarrhea           History of Present Illness     HPI    2/17/2025, 8:52 PM  History obtained from the patient      History of Present Illness: Alyx Weir is a 75 y.o. female patient with a PMHx of Colonic polyps, DVT, and DM type I who presents to the Emergency Department for evaluation of lower left abdominal pain which began 4 days ago. Symptoms were mild at first but worsened 4 days ago. Pt reports that her pain worsens when sitting up. Associated sxs include lower left back pain that radiates from the abdomen. Patient denies any fever, chills or dysuria. No prior Tx specified.  Pt also denies any PMHx of Diverticulitis and kidney stones. No further complaints or concerns at this time.       Arrival mode: Personal Transportation    PCP: Carl Clemons MD        Past Medical History:  Past Medical History:   Diagnosis Date    Anticoagulant long-term use     Anticoagulated on Coumadin     Arm weakness-rotator cuff weakness 11/02/2015    Arthritis     Asthma     Clotting disorder     Coronary artery disease     Deep vein thrombosis      Degenerative disc disease     Diabetes mellitus type I 2011    BS last tested x 1 month not sure what it was.    Heterozygous factor V Leiden mutation     Hx of colonic polyps 11/13/2015    Hyperlipidemia     Hypertension     VIRGIL (obstructive sleep apnea)     Stenosis of right carotid artery 12/13/2016       Past Surgical History:  Past Surgical History:   Procedure Laterality Date    APPENDECTOMY      ARTHROSCOPIC REPAIR OF ROTATOR CUFF OF SHOULDER Right 08/05/2024    Procedure: REPAIR, ROTATOR CUFF, ARTHROSCOPIC;  Surgeon: Carl Ruiz MD;  Location: [facility] OR;  Service: Orthopedics;  Laterality: Right;  1. Right shoulder arthroscopic rotator cuff repair  2. Right shoulder arthroscopic biceps tenodesis  3. Right shoulder subacromial decompression    ARTHROSCOPY OF SHOULDER WITH DECOMPRESSION OF SUBACROMIAL SPACE Right 08/05/2024    Procedure: ARTHROSCOPY, SHOULDER, WITH SUBACROMIAL SPACE DECOMPRESSION;  Surgeon: Carl Ruiz MD;  Location: [facility] OR;  Service: Orthopedics;  Laterality: Right;    ARTHROSCOPY,SHOULDER,WITH BICEPS TENODESIS Right 08/05/2024    Procedure: ARTHROSCOPY,SHOULDER,WITH BICEPS TENODESIS;  Surgeon: Carl Ruiz MD;  Location: [facility] OR;  Service: Orthopedics;  Laterality: Right;    BACK SURGERY      bladder cyst removal      BLADDER SURGERY      CARDIAC CATHETERIZATION  03/2013    mild CAD    COLONOSCOPY N/A 11/13/2015    Procedure: COLONOSCOPY;  Surgeon: Jas Umanzor III, MD;  Location: [facility] ENDO;  Service: Endoscopy;  Laterality: N/A;    HYSTERECTOMY      28 yrs old    LUMBAR SPINE SURGERY      bone spurs removed    OOPHORECTOMY      SELECTIVE INJECTION OF ANESTHETIC AGENT AROUND LUMBAR SPINAL NERVE ROOT BY TRANSFORAMINAL APPROACH Bilateral 06/03/2022    Procedure: Bilateral L4/5 TF ASUNCION with RN IV sedation; on Coumadin, will need INR prior to procedure, must be less than 1.3;  Surgeon: Mario Palacios MD;  Location: [facility] PAIN MGT;  Service: Pain  Management;  Laterality: Bilateral;         Family History:  Family History   Problem Relation Name Age of Onset    Heart disease Mother Jassmedhat Mallory     Hypertension Mother Jassmedhat Mallory     Cataracts Mother Jass Shawnee     Arthritis Mother Jass Shawnee     Diabetes Father Reji     Hypertension Sister Jannet     Glaucoma Sister Jannet     Ovarian cancer Sister Jannet     Hypertension Sister Kenisha     Diabetes Sister Kenisha     Hypertension Sister Veda     Diabetes Sister Veda     Diabetes Brother Eliot     Hypertension Brother Eliot     Diabetes Paternal Uncle Rikki     Breast cancer Neg Hx      Colon cancer Neg Hx         Social History:  Social History     Tobacco Use    Smoking status: Never     Passive exposure: Past    Smokeless tobacco: Never   Substance and Sexual Activity    Alcohol use: Never    Drug use: Never    Sexual activity: Not Currently     Partners: Male     Birth control/protection: None     Comment:          Review of Systems     Review of Systems   Constitutional:  Negative for chills and fever.   HENT:  Negative for sore throat.    Respiratory:  Negative for shortness of breath.    Cardiovascular:  Negative for chest pain.   Gastrointestinal:  Positive for abdominal pain (Lower left.). Negative for nausea.   Genitourinary:  Negative for dysuria.   Musculoskeletal:  Positive for back pain (Lower left.).   Skin:  Negative for rash.   Neurological:  Negative for weakness.   Hematological:  Does not bruise/bleed easily.   All other systems reviewed and are negative.     Physical Exam     Initial Vitals [25 1547]   BP Pulse Resp Temp SpO2   (!) 175/76 (!) 58 20 98.4 °F (36.9 °C) 98 %      MAP       --          Physical Exam  Nursing Notes and Vital Signs Reviewed.  Constitutional: Patient is in no acute distress. Well-developed and well-nourished.  Head: Atraumatic. Normocephalic.  Eyes:  EOM intact.  No scleral icterus.  ENT: Mucous membranes are moist.  Nares clear   Neck:  Full  ROM. No JVD.  Cardiovascular: Regular rate. Regular rhythm No murmurs, rubs, or gallops. Distal pulses are 2+ and symmetric  Pulmonary/Chest: No respiratory distress. Clear to auscultation bilaterally. No wheezing or rales.  Equal chest wall rise bilaterally  Abdominal: Soft and non-distended.  There is no tenderness.  No rebound, guarding, or rigidity. Good bowel sounds.  Genitourinary: No CVA tenderness.  No suprapubic tenderness  Musculoskeletal: Moves all extremities. No obvious deformities.  5 x 5 strength in all extremities   Skin: Warm and dry.  Neurological:  Alert, awake, and appropriate.  Normal speech.  No acute focal neurological deficits are appreciated.  Two through 12 intact bilaterally.  Psychiatric: Normal affect. Good eye contact. Appropriate in content.      ED Course   Procedures  ED Vital Signs:  Vitals:    02/17/25 1547 02/17/25 2045 02/17/25 2107   BP: (!) 175/76 (!) 209/96 (!) 219/98   Pulse: (!) 58 (!) 58 (!) 57   Resp: 20 18 18   Temp: 98.4 °F (36.9 °C)     TempSrc: Oral     SpO2: 98% 98% 100%   Weight: 80.4 kg (177 lb 3.2 oz)         Abnormal Lab Results:  Labs Reviewed   CBC W/ AUTO DIFFERENTIAL - Abnormal       Result Value    WBC 9.88      RBC 4.71      Hemoglobin 13.5      Hematocrit 41.0      MCV 87      MCH 28.7      MCHC 32.9      RDW 15.6 (*)     Platelets 319      MPV 8.8 (*)     Immature Granulocytes 0.3      Gran # (ANC) 5.5      Immature Grans (Abs) 0.03      Lymph # 3.2      Mono # 0.8      Eos # 0.3      Baso # 0.06      nRBC 0      Gran % 55.8      Lymph % 32.7      Mono % 8.0      Eosinophil % 2.6      Basophil % 0.6      Differential Method Automated     URINALYSIS, REFLEX TO URINE CULTURE - Abnormal    Specimen UA Urine, Clean Catch      Color, UA Colorless (*)     Appearance, UA Clear      pH, UA 7.0      Specific Gravity, UA 1.005      Protein, UA Negative      Glucose, UA Negative      Ketones, UA Negative      Bilirubin (UA) Negative      Occult Blood UA Negative       Nitrite, UA Negative      Urobilinogen, UA Negative      Leukocytes, UA Negative      Narrative:     Specimen Source->Urine   HEPATITIS C ANTIBODY    Hepatitis C Ab Negative      Narrative:     Release to patient->Immediate   HEP C VIRUS HOLD SPECIMEN    HEP C Virus Hold Specimen Hold for HCV sendout      Narrative:     Release to patient->Immediate   HIV 1 / 2 ANTIBODY    HIV 1/2 Ag/Ab Negative      Narrative:     Release to patient->Immediate   COMPREHENSIVE METABOLIC PANEL    Sodium 139      Potassium 3.7      Chloride 101      CO2 26      Glucose 90      BUN 14      Creatinine 0.8      Calcium 10.2      Total Protein 8.2      Albumin 4.3      Total Bilirubin 0.6      Alkaline Phosphatase 51      AST 15      ALT 17      eGFR >60      Anion Gap 12     LIPASE    Lipase 31          All Lab Results:  Results for orders placed or performed during the hospital encounter of 02/17/25   Urinalysis, Reflex to Urine Culture Urine, Clean Catch    Collection Time: 02/17/25  4:39 PM    Specimen: Urine   Result Value Ref Range    Specimen UA Urine, Clean Catch     Color, UA Colorless (A) Yellow, Straw, Candi    Appearance, UA Clear Clear    pH, UA 7.0 5.0 - 8.0    Specific Gravity, UA 1.005 1.005 - 1.030    Protein, UA Negative Negative    Glucose, UA Negative Negative    Ketones, UA Negative Negative    Bilirubin (UA) Negative Negative    Occult Blood UA Negative Negative    Nitrite, UA Negative Negative    Urobilinogen, UA Negative <2.0 EU/dL    Leukocytes, UA Negative Negative   Hepatitis C Antibody    Collection Time: 02/17/25  7:42 PM   Result Value Ref Range    Hepatitis C Ab Negative Negative   HCV Virus Hold Specimen    Collection Time: 02/17/25  7:42 PM   Result Value Ref Range    HEP C Virus Hold Specimen Hold for HCV sendout    HIV 1/2 Ag/Ab (4th Gen)    Collection Time: 02/17/25  7:42 PM   Result Value Ref Range    HIV 1/2 Ag/Ab Negative Negative   CBC auto differential    Collection Time: 02/17/25  7:42 PM   Result  Value Ref Range    WBC 9.88 3.90 - 12.70 K/uL    RBC 4.71 4.00 - 5.40 M/uL    Hemoglobin 13.5 12.0 - 16.0 g/dL    Hematocrit 41.0 37.0 - 48.5 %    MCV 87 82 - 98 fL    MCH 28.7 27.0 - 31.0 pg    MCHC 32.9 32.0 - 36.0 g/dL    RDW 15.6 (H) 11.5 - 14.5 %    Platelets 319 150 - 450 K/uL    MPV 8.8 (L) 9.2 - 12.9 fL    Immature Granulocytes 0.3 0.0 - 0.5 %    Gran # (ANC) 5.5 1.8 - 7.7 K/uL    Immature Grans (Abs) 0.03 0.00 - 0.04 K/uL    Lymph # 3.2 1.0 - 4.8 K/uL    Mono # 0.8 0.3 - 1.0 K/uL    Eos # 0.3 0.0 - 0.5 K/uL    Baso # 0.06 0.00 - 0.20 K/uL    nRBC 0 0 /100 WBC    Gran % 55.8 38.0 - 73.0 %    Lymph % 32.7 18.0 - 48.0 %    Mono % 8.0 4.0 - 15.0 %    Eosinophil % 2.6 0.0 - 8.0 %    Basophil % 0.6 0.0 - 1.9 %    Differential Method Automated    Comprehensive metabolic panel    Collection Time: 02/17/25  7:42 PM   Result Value Ref Range    Sodium 139 136 - 145 mmol/L    Potassium 3.7 3.5 - 5.1 mmol/L    Chloride 101 95 - 110 mmol/L    CO2 26 23 - 29 mmol/L    Glucose 90 70 - 110 mg/dL    BUN 14 8 - 23 mg/dL    Creatinine 0.8 0.5 - 1.4 mg/dL    Calcium 10.2 8.7 - 10.5 mg/dL    Total Protein 8.2 6.0 - 8.4 g/dL    Albumin 4.3 3.5 - 5.2 g/dL    Total Bilirubin 0.6 0.1 - 1.0 mg/dL    Alkaline Phosphatase 51 40 - 150 U/L    AST 15 10 - 40 U/L    ALT 17 10 - 44 U/L    eGFR >60 >60 mL/min/1.73 m^2    Anion Gap 12 8 - 16 mmol/L   Lipase    Collection Time: 02/17/25  7:42 PM   Result Value Ref Range    Lipase 31 4 - 60 U/L     *Note: Due to a large number of results and/or encounters for the requested time period, some results have not been displayed. A complete set of results can be found in Results Review.       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 Recommend correlation to MRI.  No hydronephrosis or obstructing renal calculi.    All CT scans   are performed using dose optimization techniques including the following: automated exposure control; adjustment of the mA and/or kV; use of iterative reconstruction technique.  Dose modulation was employed for ALARA by means of: Automated exposure control; adjustment of the mA and/or kV according to patient size (this includes techniques or standardized protocols for targeted exams where dose is matched to indication/reason for exam; i.e. extremities or head); and/or use of iterative reconstructive technique.      Electronically signed by: Terry Nichols  Date:    02/17/2025  Time:    22:06               Narrative:    EXAMINATION:  CT RENAL STONE STUDY ABD PELVIS WO    CLINICAL HISTORY:  Flank pain, kidney stone suspected;    TECHNIQUE:  Low dose axial images, sagittal and coronal reformations were obtained from the lung bases to the pubic symphysis.  Contrast was not administered.    COMPARISON:  None    FINDINGS:  Heart: Normal in size. No pericardial effusion.    Lung Bases: Well aerated, without consolidation or pleural fluid.    Liver: Normal in size and attenuation, with no focal hepatic lesions.    Gallbladder: No calcified gallstones.    Bile Ducts: No evidence of dilated ducts.    Pancreas: No mass or peripancreatic fat stranding.    Spleen: Unremarkable.    Adrenals: Unremarkable.    Kidneys/ Ureters: Subcentimeter curvilinear density involving the superior aspect of the right kidney may relate to in the anomalous vessel.  See for example series 2, image 58.  Recommend correlation to MRI.  No hydronephrosis or obstructing renal calculi.    Bladder: No evidence of wall thickening.    Reproductive organs: Status post hysterectomy.    GI Tract/Mesentery: No evidence of bowel obstruction or inflammation.  Colonic diverticulosis    Peritoneal Space: No ascites. No free  air.    Retroperitoneum: No significant adenopathy.    Abdominal wall: Unremarkable.    Vasculature: No  aneurysm.    Bones: No acute fracture.  Degenerative joint disease.                                       No EKG ordered.            The Emergency Provider reviewed the vital signs and test results, which are outlined above.     ED Discussion     10:18 PM: Reassessed pt at this time. Discussed with patient and/or family/caretaker all pertinent ED information and results. Discussed pt dx and plan of tx. Gave patient all f/u and return to the ED instructions. All questions and concerns were addressed at this time. Patient and/or family/caretaker expresses understanding of information and instructions, and is comfortable with plan to discharge. Pt is stable for discharge.     I discussed with patient and/or family/caretaker that evaluation in the ED does not suggest any emergent or life threatening medical conditions requiring immediate intervention beyond what was provided in the ED, and I believe patient is safe for discharge.  Regardless, an unremarkable evaluation in the ED does not preclude the development or presence of a serious of life threatening condition. As such, I instructed that the patient is to return immediately for any worsening or change in current symptoms.         Medical Decision Making  Differential diagnosis:  Diverticulitis, left lower quadrant playing, pyelonephritis, ovarian cyst, kidney stone    Patient is evaluated history physical examination.  Has a quadrant abdominal pain with some minimal tenderness on exam.  CT imaging is negative in his blood work is benign.  The patient is stable for discharge.  Will start pain medication has a follow up with the primary care provider 1-2 days.  She had an oblique blood pressure was treated as well.  She is safe for discharge in my opinion    Amount and/or Complexity of Data Reviewed  External Data Reviewed: notes.  Labs: ordered. Decision-making  details documented in ED Course.     Details: Lipase normal CMP normal white count normal.  H&H normal.  UA negative  Radiology: ordered. Decision-making details documented in ED Course.     Details: No acute findings    Risk  OTC drugs.  Prescription drug management.  Parenteral controlled substances.  Decision regarding hospitalization.                ED Medication(s):  Medications   cloNIDine tablet 0.2 mg (0.2 mg Oral Incomplete 2/17/25 2314)   ondansetron injection 4 mg (4 mg Intravenous Given 2/17/25 2107)   fentaNYL 50 mcg/mL injection 25 mcg (25 mcg Intravenous Given 2/17/25 2107)       New Prescriptions    HYDROCODONE-ACETAMINOPHEN (NORCO) 5-325 MG PER TABLET    Take 1 tablet by mouth every 6 (six) hours as needed.        Follow-up Information       Carl Clemons MD In 1 day.    Specialty: Internal Medicine  Contact information:  [address] [id number] [phone]                                 Scribe Attestation:   Scribe #1: I performed the above scribed service and the documentation accurately describes the services I performed. I attest to the accuracy of the note.     Attending:   Physician Attestation Statement for Scribe #1: I, Rivas Ku Jr., MD, personally performed the services described in this documentation, as scribed by Mayra Young and Marleny Phillips, in my presence, and it is both accurate and complete.           Clinical Impression       ICD-10-CM ICD-9-CM   1. Left lower quadrant abdominal pain  R10.32 789.04       Disposition:   Disposition: Discharged  Condition: Stable        Rivas Ku Jr., MD  02/17/25 2314

## 2025-02-20 ENCOUNTER — TELEPHONE (OUTPATIENT)
Dept: FAMILY MEDICINE | Facility: CLINIC | Age: 75
End: 2025-02-20
Payer: MEDICARE

## 2025-02-25 ENCOUNTER — OFFICE VISIT (OUTPATIENT)
Dept: FAMILY MEDICINE | Facility: CLINIC | Age: 75
End: 2025-02-25
Payer: MEDICARE

## 2025-02-25 ENCOUNTER — ANTI-COAG VISIT (OUTPATIENT)
Dept: CARDIOLOGY | Facility: CLINIC | Age: 75
End: 2025-02-25
Payer: MEDICARE

## 2025-02-25 ENCOUNTER — CLINICAL SUPPORT (OUTPATIENT)
Dept: REHABILITATION | Facility: HOSPITAL | Age: 75
End: 2025-02-25
Attending: STUDENT IN AN ORGANIZED HEALTH CARE EDUCATION/TRAINING PROGRAM
Payer: COMMERCIAL

## 2025-02-25 VITALS
HEIGHT: 63 IN | BODY MASS INDEX: 30.9 KG/M2 | RESPIRATION RATE: 18 BRPM | HEART RATE: 79 BPM | TEMPERATURE: 98 F | OXYGEN SATURATION: 99 % | DIASTOLIC BLOOD PRESSURE: 72 MMHG | SYSTOLIC BLOOD PRESSURE: 146 MMHG | WEIGHT: 174.38 LBS

## 2025-02-25 DIAGNOSIS — Z79.01 ANTICOAGULATED ON COUMADIN: Chronic | ICD-10-CM

## 2025-02-25 DIAGNOSIS — R10.32 LEFT LOWER QUADRANT ABDOMINAL PAIN: Primary | ICD-10-CM

## 2025-02-25 DIAGNOSIS — M54.50 ACUTE LEFT-SIDED LOW BACK PAIN WITHOUT SCIATICA: ICD-10-CM

## 2025-02-25 DIAGNOSIS — M25.611 DECREASED RANGE OF MOTION OF RIGHT SHOULDER: ICD-10-CM

## 2025-02-25 DIAGNOSIS — D68.51 HETEROZYGOUS FACTOR V LEIDEN MUTATION: Chronic | ICD-10-CM

## 2025-02-25 DIAGNOSIS — Z79.01 LONG TERM (CURRENT) USE OF ANTICOAGULANTS: Primary | ICD-10-CM

## 2025-02-25 DIAGNOSIS — R29.898 DECREASED STRENGTH OF UPPER EXTREMITY: Primary | ICD-10-CM

## 2025-02-25 LAB
CTP QC/QA: YES
INR PPP: 2.4 (ref 2–3)

## 2025-02-25 PROCEDURE — 99999 PR PBB SHADOW E&M-EST. PATIENT-LVL IV: CPT | Mod: PBBFAC,HCNC,, | Performed by: INTERNAL MEDICINE

## 2025-02-25 PROCEDURE — 97110 THERAPEUTIC EXERCISES: CPT

## 2025-02-25 PROCEDURE — 97530 THERAPEUTIC ACTIVITIES: CPT

## 2025-02-25 PROCEDURE — 85610 PROTHROMBIN TIME: CPT | Mod: QW,HCNC,S$GLB, | Performed by: INTERNAL MEDICINE

## 2025-02-25 RX ORDER — GABAPENTIN 300 MG/1
300 CAPSULE ORAL NIGHTLY PRN
Qty: 90 CAPSULE | Refills: 0 | Status: SHIPPED | OUTPATIENT
Start: 2025-02-25 | End: 2026-02-25

## 2025-02-25 RX ORDER — CIPROFLOXACIN 500 MG/1
500 TABLET ORAL 2 TIMES DAILY
Qty: 14 TABLET | Refills: 0 | Status: SHIPPED | OUTPATIENT
Start: 2025-02-25 | End: 2025-03-04

## 2025-02-25 NOTE — PROGRESS NOTES
[facility]   Physical Therapy Treatment Note + POC Update     Name: Alyx Weir  Clinic Number: [id number]    Therapy Diagnosis:   Encounter Diagnoses   Name Primary?    Decreased strength of upper extremity Yes    Decreased range of motion of right shoulder          Physician: Carl Ruiz    Visit Date: 2/25/2025    Physician Orders: PT Eval and Treat  Medical Diagnosis from Referral: Traumatic incomplete tear of right rotator cuff, subsequent encounter [S46.011D], Subacromial impingement of right shoulder [M75.41]   Evaluation Date: 8/15/2024  Authorization Period Expiration: 6/7/2025  Plan of Care Expiration:    4/08/2025        Progress Update:  3/25/2025  Visit # / Visits authorized: 2/ 12 (From prior auth 36)  FOTO: 12/12 - Scored: 3 / 3       PRECAUTIONS: Standard Precautions, Safety/fall precautions, Orthopedic: Right Cuff Repair, Non-weight beariing, and Orthotic device type: abductor sling, Wearning schedule: full until 6 week follow up      PROBLEM LIST : pain, decreased motion all planes, decreased strength      Date of Surgery/Procedure 8/5/2024- Joseph   Surgery/Procedure Performed Status post Right Rotator Cuff Repair, Subacromial Decompression, Distal Clavicle Dissection, Biceps Tenodesis   Rehabilitation Precautions Protocol: Joseph Cuff Protocol      MD Follow up:       Patient School:     Job/Position: Works at an alteration store    PTA Visit #: 0/5     Time In: 1000  Time Out: 1100  Total Billable Time: 55 minutes    SUBJECTIVE     Pt reports: she has been back at work and has been having some increase in shoulder pain. She reports the pain radiates down her arm. She feels like it still moving okay, just still has a lot of pain and feels weaker than her other shoulder. She saw her MD and he suggested for her to continue with therapy to work on strength.     Compliance with Hep: Daily    Response to previous treatment: no adverse reactions to  treatment/updated HOME EXERCISE PROGRAM    Functional change: Better    Pain: 4/10   Worst: 8/10  Location: Right shoulder      OBJECTIVE     Objective Measures updated at progress report unless specified otherwise.    SHOULDER-           SHOULDER   Range of Motion Right (2/25/2025)  Active     Passive Left  Active       Passive Goal   Forward Flexion (180º) 160* - 160 - 170º   Abduction (180º)  155* - 120 - 160º   External Rotation at 90º (90º)  70* - 75 - 80º   External Rotation at 45º (45º)   42* - 52 - 45º   Internal Rotation at 90º (90º)  70* - - - 70º   Functional External Rotation (C7)   -   - C7   Functional Internal Rotation (T10)   -   - T10   (*) pain and/or dysfunction) pain and/or dysfunction     UE STRENGTH -   Upper Extremity  Strength Right (2/25/2025)  Not tested due to post operative state Goal   Shoulder Flexion []1  []2  []3  [x]4  []5                [x]+ []- 5/5 B   Shoulder Abduction []1  []2  []3  [x]4  []5                [x]+ []- 5/5 B   Shoulder IR []1  []2  []3  [x]4  []5                [x]+ []- 5/5 B   Shoulder ER []1  []2  []3  [x]4  []5                [x]+ []- 5/5 B   Elbow Flexion  []1  []2  []3  [x]4  []5                [x]+ []- 5/5 B   Elbow Extension []1  []2  []3  [x]4  []5                [x]+ []- 5/5 B      FOTO:  (2/25/2025)    Treatment     Gym/Equipment Access: none  Time to Complete Exercises: increased for cueing and education    Alyx received the treatments listed below:       CPT Intervention  Right RCR  BT  SAD  DCR Duration/ Details  2/25/2025    TE Arm Bike 3/3 forward and back   TE Objectives Re-assessed 10 minutes    TE Series 6  Chest Stretch  Bear Hugs  Swimmers  Snow [place]    TE Seated Shoulder Strap INTERNAL ROTATION stretch 3 minutes with 10 second holds 5 second rest right    TE Tball     TE Pulleys -   TE Passive Range of motion     TE Assisted Motion Wall slides pillow case flexion  Wall Slides pillow case windshield washer   TE Free Weights 2 x 10 3lbs  shoulder press  2 x 10 lateral raise 3lbs  2 x 10 biceps curls 3lbs  2x10 Shrugs 10 lbs    TA Seated scapular retractions 4 minutes 7.5lbs   TA Seated Lat Pull downs 4 minutes 7.5lbs   TA Wall Push ups 20x    TA Mat Plank Hold Taps 20x    TA Face Pulls 20x 7.5#   NMR Band Series     NC Hot Pack  -   PLAN          CPT Codes available for Billing:   (00) minutes of Manual therapy (MT) to improve pain and ROM.  (25) minutes of Therapeutic Exercise (TE) to develop strength, endurance, range of motion, and flexibility.  (00) minutes of Neuromuscular Re-Education (NMR)  to improve: Balance, Coordination, Kinesthetic, Sense, Proprioception, and Posture.  (35) minutes of Therapeutic Activities (TA) to improve functional performance.  (00) minutes of Gait Training (GT) to improve functional gait mechanics.  (00) minutes of Self- Care and Education  Unattended Electrical Stimulation (ES) for muscle performance or pain modulation.  Vasopneumatic Device Therapy () for management of swelling/edema. (73440)  BFR: Blood flow restriction applied during exercise  Functional Performance Test (FPT)  NP or (-): Not Performed     See EMR under MEDIA for written consent provided for Dry Needling- DATE   Palpation Assessment to determine the necessity for Functional Dry Needling     PATIENT EDUCATION AND HOME EXERCISES      Education/Self-Care provided:  (included in treatment unless specified above) minutes   Patient educated on the impairments noted above and the effects of physical therapy intervention to improve overall condition and Quality of Life  Patient was educated on all the above exercise prior/during/after for proper posture, positioning, and execution for safe performance with home exercise program.      Written Home Exercises Provided: yes. Prefers: [x] Printed [x] Electronic  Exercises were reviewed and Rylee was able to demonstrate them prior to the end of the session.  Rylee demonstrated good understanding of the  education provided. See EMR under Patient Instructions for exercises provided during therapy sessions.    ASSESSMENT     Since the beginning of therapy Mrs. Weir has demonstrated improvements in shoulder strength, ROM, functional mobility, pain and ADL tolerance. However she still feels limited with her daily work and home task due to pain and weakness in her right shoulder. She still demonstrates slight weakness overall in her shoulder compared to her unaffected side along with TTP along the RTC and latissimus dorsi muscles.  Patient will benefit form continued care to work on shoulder mobility and functional strength.     Alyx Is progressing well towards her goals.   Pt prognosis is Good.     Pt will continue to benefit from skilled outpatient physical therapy to address the deficits listed in the problem list box on initial evaluation, provide pt/family education and to maximize pt's level of independence in the home and community environment.     Pt's spiritual, cultural and educational needs considered and pt agreeable to plan of care and goals.    Anticipated Barriers for therapy: co-morbidities       SHORT TERM GOALS:  progressing Progress Date met   Recent signs and systems trend is improving in order to progress towards Long term goals.  [x] Met  [] Not Met  [] Progressing  9/23   Patient will be independent with Home Exercise Program  in order to further progress and return to maximal function. [x] Met  [] Not Met  [] Progressing  01/14/2025   Pain rating at Worst: 5 /10 in order to progress towards increased independence with activity. [x] Met  [] Not Met  [] Progressing   01/14/2025   Patient will be able to correct postural deviations in sitting and standing, to decrease pain and promote postural awareness for injury prevention.  [x] Met  [] Not Met  [] Progressing  11/13   Patient will improve functional outcome (FOTO) score: by 5% to increase self-worth & perceived functional ability towards long  term goals [x] Met  [] Not Met  [] Progressing  11/13      LONG TERM GOALS: Discharge (~Jan 2025) Progress Date met   Patient will return to normal activites of daily living, recreational, and work related activities with less pain and limitation.  [] Met  [] Not Met  [x] Progressing     Patient will improve range of motion  to stated goals in order to return to maximal functional potential.  [] Met  [] Not Met  [x] Progressing     Patient will improve Strength to stated goals of appropriate musculature in order to improve functional independence.  [] Met  [] Not Met  [x] Progressing     Pain Rating at Best: 1/10 to improve Quality of Life.  [] Met  [] Not Met  [x] Progressing     Patient will meet predicted functional outcome (FOTO) score: 80% to increase self-worth & perceived functional ability. [] Met  [] Not Met  [x] Progressing     Patient will have met/partially met personal goal of: to get back to daily activities, community based activities, and social activities.  [] Met  [] Not Met  [x] Progressing           PLAN   Updated Plan of care Certification: 02/25/2025 to 4/08/2025     Outpatient Physical Therapy 2 times weekly for 6 weeks to include any combination of the following interventions: virtual visits, dry needling, modalities, electrical stimulation (IFC, Pre-Mod, Attended with Functional Dry Needling), Manual Therapy, Moist Heat/ Ice, Neuromuscular Re-ed, Patient Education, Self Care, Therapeutic Exercise, Functional Training, and Therapeutic Activites     Cl Jaffe, PT

## 2025-02-25 NOTE — PROGRESS NOTES
Patient's INR is therapeutic at 2.4. Patient reports no changes. Instructions given: Continue warfarin 7.5 mg on Tuesdays, Thursdays and Saturdays; and 5 mg all other days. Recheck on 3/11/25.

## 2025-02-25 NOTE — PROGRESS NOTES
Subjective:       Patient ID: Alyx Weir is a 75 y.o. female.    Chief Complaint: Follow-up, Abdominal Pain, and Back Pain    F/u abdominal pain LLQ to left flank to back-------seen in er w/u neg -------------pain is improved but still present----------no other symptoms---    Follow-up  Associated symptoms include abdominal pain. Pertinent negatives include no arthralgias, chest pain, chills, congestion, coughing, diaphoresis, fatigue, fever, headaches, joint swelling, myalgias, nausea, neck pain, numbness, rash, sore throat, vomiting or weakness.   Abdominal Pain  Pertinent negatives include no arthralgias, constipation, diarrhea, dysuria, fever, frequency, headaches, hematuria, myalgias, nausea or vomiting.   Back Pain  Associated symptoms include abdominal pain. Pertinent negatives include no chest pain, dysuria, fever, headaches, numbness, pelvic pain or weakness.     Past Medical History:   Diagnosis Date    Anticoagulant long-term use     Anticoagulated on Coumadin     Arm weakness-rotator cuff weakness 11/02/2015    Arthritis     Asthma     Clotting disorder     Coronary artery disease     Deep vein thrombosis     Degenerative disc disease     Diabetes mellitus type I 2011    BS last tested x 1 month not sure what it was.    Heterozygous factor V Leiden mutation     Hx of colonic polyps 11/13/2015    Hyperlipidemia     Hypertension     VIRGIL (obstructive sleep apnea)     Stenosis of right carotid artery 12/13/2016     Past Surgical History:   Procedure Laterality Date    APPENDECTOMY      ARTHROSCOPIC REPAIR OF ROTATOR CUFF OF SHOULDER Right 08/05/2024    Procedure: REPAIR, ROTATOR CUFF, ARTHROSCOPIC;  Surgeon: Carl Ruiz MD;  Location: [facility];  Service: Orthopedics;  Laterality: Right;  1. Right shoulder arthroscopic rotator cuff repair  2. Right shoulder arthroscopic biceps tenodesis  3. Right shoulder subacromial decompression    ARTHROSCOPY OF SHOULDER WITH DECOMPRESSION OF SUBACROMIAL  SPACE Right 08/05/2024    Procedure: ARTHROSCOPY, SHOULDER, WITH SUBACROMIAL SPACE DECOMPRESSION;  Surgeon: Carl Ruiz MD;  Location: [facility] OR;  Service: Orthopedics;  Laterality: Right;    ARTHROSCOPY,SHOULDER,WITH BICEPS TENODESIS Right 08/05/2024    Procedure: ARTHROSCOPY,SHOULDER,WITH BICEPS TENODESIS;  Surgeon: Carl Ruiz MD;  Location: [facility] OR;  Service: Orthopedics;  Laterality: Right;    BACK SURGERY      bladder cyst removal      BLADDER SURGERY      CARDIAC CATHETERIZATION  03/2013    mild CAD    COLONOSCOPY N/A 11/13/2015    Procedure: COLONOSCOPY;  Surgeon: Jas Umanzor III, MD;  Location: [facility];  Service: Endoscopy;  Laterality: N/A;    HYSTERECTOMY      28 yrs old    LUMBAR SPINE SURGERY      bone spurs removed    OOPHORECTOMY      SELECTIVE INJECTION OF ANESTHETIC AGENT AROUND LUMBAR SPINAL NERVE ROOT BY TRANSFORAMINAL APPROACH Bilateral 06/03/2022    Procedure: Bilateral L4/5 TF ASUNCION with RN IV sedation; on Coumadin, will need INR prior to procedure, must be less than 1.3;  Surgeon: Mario Palacios MD;  Location: [facility];  Service: Pain Management;  Laterality: Bilateral;     Family History   Problem Relation Name Age of Onset    Heart disease Mother Jass Shawnee     Hypertension Mother Jass Erath     Cataracts Mother Jass Shawnee     Arthritis Mother Jass Shawnee     Diabetes Father Reji     Hypertension Sister Jannet     Glaucoma Sister Jannet     Ovarian cancer Sister Jannet     Hypertension Sister Kenisha     Diabetes Sister Kenisha     Hypertension Sister Veda     Diabetes Sister Veda     Diabetes Brother Eliot     Hypertension Brother Eliot     Diabetes Paternal Uncle Rikki     Breast cancer Neg Hx      Colon cancer Neg Hx       Social History[1]  Review of Systems   Constitutional:  Negative for activity change, appetite change, chills, diaphoresis, fatigue, fever and unexpected weight change.   HENT:  Negative for congestion, drooling, ear discharge, ear  pain, facial swelling, hearing loss, mouth sores, nosebleeds, postnasal drip, rhinorrhea, sinus pressure, sneezing, sore throat, tinnitus, trouble swallowing and voice change.    Eyes:  Negative for photophobia, redness and visual disturbance.   Respiratory:  Negative for apnea, cough, choking, chest tightness, shortness of breath and wheezing.    Cardiovascular:  Negative for chest pain, palpitations and leg swelling.   Gastrointestinal:  Positive for abdominal pain. Negative for abdominal distention, blood in stool, constipation, diarrhea, nausea and vomiting.   Endocrine: Negative for cold intolerance, heat intolerance, polydipsia, polyphagia and polyuria.   Genitourinary:  Positive for flank pain. Negative for decreased urine volume, difficulty urinating, dysuria, frequency, genital sores, hematuria, pelvic pain and urgency.   Musculoskeletal:  Positive for back pain. Negative for arthralgias, gait problem, joint swelling, myalgias, neck pain and neck stiffness.   Skin:  Negative for color change, pallor, rash and wound.   Allergic/Immunologic: Negative for food allergies and immunocompromised state.   Neurological:  Negative for dizziness, tremors, seizures, syncope, speech difficulty, weakness, light-headedness, numbness and headaches.   Hematological:  Negative for adenopathy. Does not bruise/bleed easily.   Psychiatric/Behavioral:  Negative for agitation, behavioral problems, confusion, decreased concentration, dysphoric mood, hallucinations, self-injury, sleep disturbance and suicidal ideas. The patient is not nervous/anxious and is not hyperactive.    All other systems reviewed and are negative.      Objective:      Physical Exam  Vitals and nursing note reviewed.   Constitutional:       General: She is not in acute distress.     Appearance: Normal appearance. She is well-developed. She is not diaphoretic.   HENT:      Head: Normocephalic and atraumatic.      Mouth/Throat:      Pharynx: No oropharyngeal  exudate.   Eyes:      General: No scleral icterus.  Neck:      Thyroid: No thyromegaly.      Vascular: No carotid bruit or JVD.      Trachea: No tracheal deviation.   Cardiovascular:      Rate and Rhythm: Normal rate and regular rhythm.      Heart sounds: Normal heart sounds.   Pulmonary:      Effort: Pulmonary effort is normal. No respiratory distress.      Breath sounds: Normal breath sounds. No wheezing or rales.   Chest:      Chest wall: No tenderness.   Abdominal:      General: Bowel sounds are normal. There is no distension.      Palpations: Abdomen is soft.      Tenderness: There is no abdominal tenderness. There is no guarding or rebound.   Musculoskeletal:         General: No tenderness. Normal range of motion.      Cervical back: Normal range of motion and neck supple.   Lymphadenopathy:      Cervical: No cervical adenopathy.   Skin:     General: Skin is warm and dry.      Coloration: Skin is not pale.      Findings: No erythema or rash.   Neurological:      Mental Status: She is alert and oriented to person, place, and time.      Cranial Nerves: No cranial nerve deficit.      Coordination: Coordination normal.   Psychiatric:         Behavior: Behavior normal.         Thought Content: Thought content normal.         Judgment: Judgment normal.         CMP  Sodium   Date Value Ref Range Status   02/17/2025 139 136 - 145 mmol/L Final     Potassium   Date Value Ref Range Status   02/17/2025 3.7 3.5 - 5.1 mmol/L Final     Chloride   Date Value Ref Range Status   02/17/2025 101 95 - 110 mmol/L Final     CO2   Date Value Ref Range Status   02/17/2025 26 23 - 29 mmol/L Final     Glucose   Date Value Ref Range Status   02/17/2025 90 70 - 110 mg/dL Final     BUN   Date Value Ref Range Status   02/17/2025 14 8 - 23 mg/dL Final     Creatinine   Date Value Ref Range Status   02/17/2025 0.8 0.5 - 1.4 mg/dL Final     Calcium   Date Value Ref Range Status   02/17/2025 10.2 8.7 - 10.5 mg/dL Final     Total Protein   Date  Value Ref Range Status   02/17/2025 8.2 6.0 - 8.4 g/dL Final     Albumin   Date Value Ref Range Status   02/17/2025 4.3 3.5 - 5.2 g/dL Final     Total Bilirubin   Date Value Ref Range Status   02/17/2025 0.6 0.1 - 1.0 mg/dL Final     Comment:     For infants and newborns, interpretation of results should be based  on gestational age, weight and in agreement with clinical  observations.    Premature Infant recommended reference ranges:  Up to 24 hours.............<8.0 mg/dL  Up to 48 hours............<12.0 mg/dL  3-5 days..................<15.0 mg/dL  6-29 days.................<15.0 mg/dL       Alkaline Phosphatase   Date Value Ref Range Status   02/17/2025 51 40 - 150 U/L Final     AST   Date Value Ref Range Status   02/17/2025 15 10 - 40 U/L Final     ALT   Date Value Ref Range Status   02/17/2025 17 10 - 44 U/L Final     Anion Gap   Date Value Ref Range Status   02/17/2025 12 8 - 16 mmol/L Final     eGFR if    Date Value Ref Range Status   02/16/2022 >60 >60 mL/min/1.73 m^2 Final     eGFR if non    Date Value Ref Range Status   02/16/2022 >60 >60 mL/min/1.73 m^2 Final     Comment:     Calculation used to obtain the estimated glomerular filtration  rate (eGFR) is the CKD-EPI equation.        Lab Results   Component Value Date    WBC 9.88 02/17/2025    HGB 13.5 02/17/2025    HCT 41.0 02/17/2025    MCV 87 02/17/2025     02/17/2025     Lab Results   Component Value Date    CHOL 198 11/29/2023     Lab Results   Component Value Date    HDL 72 11/29/2023     Lab Results   Component Value Date    LDLCALC 110.6 11/29/2023     Lab Results   Component Value Date    TRIG 77 11/29/2023     Lab Results   Component Value Date    CHOLHDL 36.4 11/29/2023     Lab Results   Component Value Date    TSH 0.847 05/29/2024     Lab Results   Component Value Date    HGBA1C 5.7 (H) 12/23/2024     Assessment:       1. Left lower quadrant abdominal pain    2. Acute left-sided low back pain without  sciatica        Plan:   Left lower quadrant abdominal pain  -     ciprofloxacin HCl (CIPRO) 500 MG tablet; Take 1 tablet (500 mg total) by mouth 2 (two) times daily. for 7 days  Dispense: 14 tablet; Refill: 0    Acute left-sided low back pain without sciatica  -     gabapentin (NEURONTIN) 300 MG capsule; Take 1 capsule (300 mg total) by mouth nightly as needed.  Dispense: 90 capsule; Refill: 0    As above----------------f/u 1 month-----------go to er for worsening symptoms----------------         [1]   Social History  Socioeconomic History    Marital status:    Tobacco Use    Smoking status: Never     Passive exposure: Past    Smokeless tobacco: Never   Substance and Sexual Activity    Alcohol use: Never    Drug use: Never    Sexual activity: Not Currently     Partners: Male     Birth control/protection: None     Comment:     Social History Narrative    Dogs in household, no smokers.     Social Drivers of Health     Financial Resource Strain: Medium Risk (2024)    Overall Financial Resource Strain (CARDIA)     Difficulty of Paying Living Expenses: Somewhat hard   Food Insecurity: No Food Insecurity (2024)    Hunger Vital Sign     Worried About Running Out of Food in the Last Year: Never true     Ran Out of Food in the Last Year: Never true   Transportation Needs: No Transportation Needs (2023)    PRAPARE - Transportation     Lack of Transportation (Medical): No     Lack of Transportation (Non-Medical): No   Physical Activity: Unknown (2024)    Exercise Vital Sign     Days of Exercise per Week: 0 days   Recent Concern: Physical Activity - Inactive (2024)    Exercise Vital Sign     Days of Exercise per Week: 0 days     Minutes of Exercise per Session: 60 min   Stress: Stress Concern Present (2024)    [employer] - Occupational Stress Questionnaire     Feeling of Stress : To some extent   Housing Stability: Low Risk  (2023)     Housing Stability Vital Sign     Unable to Pay for Housing in the Last Year: No     Number of Places Lived in the Last Year: 1     Unstable Housing in the Last Year: No

## 2025-02-27 ENCOUNTER — CLINICAL SUPPORT (OUTPATIENT)
Dept: REHABILITATION | Facility: HOSPITAL | Age: 75
End: 2025-02-27
Attending: STUDENT IN AN ORGANIZED HEALTH CARE EDUCATION/TRAINING PROGRAM
Payer: COMMERCIAL

## 2025-02-27 ENCOUNTER — OFFICE VISIT (OUTPATIENT)
Dept: PODIATRY | Facility: CLINIC | Age: 75
End: 2025-02-27
Payer: MEDICARE

## 2025-02-27 ENCOUNTER — LAB VISIT (OUTPATIENT)
Dept: LAB | Facility: HOSPITAL | Age: 75
End: 2025-02-27
Attending: PODIATRIST
Payer: MEDICARE

## 2025-02-27 VITALS — HEIGHT: 63 IN | WEIGHT: 174.38 LBS | BODY MASS INDEX: 30.9 KG/M2

## 2025-02-27 DIAGNOSIS — G89.29 CHRONIC PAIN OF LEFT ANKLE: ICD-10-CM

## 2025-02-27 DIAGNOSIS — R29.898 DECREASED STRENGTH OF UPPER EXTREMITY: Primary | ICD-10-CM

## 2025-02-27 DIAGNOSIS — M10.272 ACUTE DRUG-INDUCED GOUT OF LEFT ANKLE: ICD-10-CM

## 2025-02-27 DIAGNOSIS — M25.611 DECREASED RANGE OF MOTION OF RIGHT SHOULDER: ICD-10-CM

## 2025-02-27 DIAGNOSIS — M72.2 PLANTAR FASCIITIS: Primary | ICD-10-CM

## 2025-02-27 DIAGNOSIS — M25.572 CHRONIC PAIN OF LEFT ANKLE: ICD-10-CM

## 2025-02-27 DIAGNOSIS — M79.671 INFLAMMATORY HEEL PAIN, RIGHT: ICD-10-CM

## 2025-02-27 LAB
CRP SERPL-MCNC: 0.7 MG/L (ref 0–8.2)
ERYTHROCYTE [SEDIMENTATION RATE] IN BLOOD BY PHOTOMETRIC METHOD: 21 MM/HR (ref 0–36)
URATE SERPL-MCNC: 6.9 MG/DL (ref 2.4–5.7)

## 2025-02-27 PROCEDURE — 97110 THERAPEUTIC EXERCISES: CPT

## 2025-02-27 PROCEDURE — 97112 NEUROMUSCULAR REEDUCATION: CPT

## 2025-02-27 PROCEDURE — 99999 PR PBB SHADOW E&M-EST. PATIENT-LVL III: CPT | Mod: PBBFAC,HCNC,, | Performed by: PODIATRIST

## 2025-02-27 PROCEDURE — 86140 C-REACTIVE PROTEIN: CPT | Mod: HCNC | Performed by: PODIATRIST

## 2025-02-27 PROCEDURE — 97530 THERAPEUTIC ACTIVITIES: CPT

## 2025-02-27 PROCEDURE — 36415 COLL VENOUS BLD VENIPUNCTURE: CPT | Mod: HCNC | Performed by: PODIATRIST

## 2025-02-27 PROCEDURE — 84550 ASSAY OF BLOOD/URIC ACID: CPT | Mod: HCNC | Performed by: PODIATRIST

## 2025-02-27 PROCEDURE — 85652 RBC SED RATE AUTOMATED: CPT | Mod: HCNC | Performed by: PODIATRIST

## 2025-02-27 RX ORDER — TRIAMCINOLONE ACETONIDE 40 MG/ML
40 INJECTION, SUSPENSION INTRA-ARTICULAR; INTRAMUSCULAR
Status: COMPLETED | OUTPATIENT
Start: 2025-02-27 | End: 2025-02-27

## 2025-02-27 RX ADMIN — TRIAMCINOLONE ACETONIDE 40 MG: 40 INJECTION, SUSPENSION INTRA-ARTICULAR; INTRAMUSCULAR at 01:02

## 2025-02-27 NOTE — PROGRESS NOTES
[facility]   Physical Therapy Treatment Note     Name: Alyx Weir  [facility] Number: [id number]    Therapy Diagnosis:   Encounter Diagnoses   Name Primary?    Decreased strength of upper extremity Yes    Decreased range of motion of right shoulder          Physician: Carl Ruiz    Visit Date: 2/27/2025    Physician Orders: PT Eval and Treat  Medical Diagnosis from Referral: Traumatic incomplete tear of right rotator cuff, subsequent encounter [S46.011D], Subacromial impingement of right shoulder [M75.41]   Evaluation Date: 8/15/2024  Authorization Period Expiration: 6/7/2025  Plan of Care Expiration:    4/08/2025        Progress Update:  3/25/2025  Visit # / Visits authorized: 3/ 12 (From prior auth 36)  FOTO: 12/12 - Scored: 3 / 3       PRECAUTIONS: Standard Precautions, Safety/fall precautions, Orthopedic: Right Cuff Repair, Non-weight beariing, and Orthotic device type: abductor sling, Wearning schedule: full until 6 week follow up      PROBLEM LIST : pain, decreased motion all planes, decreased strength      Date of Surgery/Procedure 8/5/2024- Joseph   Surgery/Procedure Performed Status post Right Rotator Cuff Repair, Subacromial Decompression, Distal Clavicle Dissection, Biceps Tenodesis   Rehabilitation Precautions Protocol: Joseph Cuff Protocol      MD Follow up:       Patient School:     Job/Position: Works at an alteration store    PTA Visit #: 0/5     Time In: 9:00  Time Out: 9:54  Total Billable Time: 54 minutes    SUBJECTIVE     Pt reports: no new complaints. She reports she would like to strengthen her R shoulder.     Compliance with Hep: Daily    Response to previous treatment: no adverse reactions to treatment/updated HOME EXERCISE PROGRAM    Functional change: Better    Pain: 5/10   with tylenol  Location: Right shoulder      OBJECTIVE   FOTO:  (2/25/2025)    Treatment     Gym/Equipment Access: none  Time to Complete Exercises: increased for cueing and  education    Alyx received the treatments listed below:       CPT Intervention  Right RCR  BT  SAD  DCR Duration/ Details  2/27/2025    TE Arm Bike 3/3 forward and back R:3   TE     TE Series 6  Chest Stretch  Bear Hugs  Swimmers  [place]    TE Seated Shoulder Strap INTERNAL ROTATION stretch    TE Tball     TE Pulleys -1 minute flexion/ abduction/ scaption   TE Passive Range of motion     TE Assisted Motion    TE Free Weights    TA Seated scapular retractions    TA Seated Lat Pull downs    TA Wall Push ups 2 x 10 reps   TA Mat Plank Hold Taps    TA Face Pulls     TRX low rows   2 x 10 reps with assistance    Chest pulls/ diagonals G TB 2 x 10 reps with assistance    Shoulder press 3# 2 x 10 reps        NMR Band Series      Trio 3#  2 x 10 reps    Shoulder flexion/ scaption/ abduction alternating 3# 1 x 15 reps                  NC Hot Pack  -   PLAN          CPT Codes available for Billing:   (00) minutes of Manual therapy (MT) to improve pain and ROM.  9 minutes of Therapeutic Exercise (TE) to develop strength, endurance, range of motion, and flexibility.  20 minutes of Neuromuscular Re-Education (NMR)  to improve: Balance, Coordination, Kinesthetic, Sense, Proprioception, and Posture.  25 minutes of Therapeutic Activities (TA) to improve functional performance.  (00) minutes of Gait Training (GT) to improve functional gait mechanics.  (00) minutes of Self- Care and Education  Unattended Electrical Stimulation (ES) for muscle performance or pain modulation.  Vasopneumatic Device Therapy () for management of swelling/edema. (74845)  BFR: Blood flow restriction applied during exercise  Functional Performance Test (FPT)  NP or (-): Not Performed     See EMR under MEDIA for written consent provided for Dry Needling- DATE   Palpation Assessment to determine the necessity for Functional Dry Needling     PATIENT EDUCATION AND HOME EXERCISES      Education/Self-Care provided:  (included in treatment unless  specified above) minutes   Patient educated on the impairments noted above and the effects of physical therapy intervention to improve overall condition and Quality of Life  Patient was educated on all the above exercise prior/during/after for proper posture, positioning, and execution for safe performance with home exercise program.      Written Home Exercises Provided: yes. Prefers: [x] Printed [x] Electronic  Exercises were reviewed and Rylee was able to demonstrate them prior to the end of the session.  Rylee demonstrated good understanding of the education provided. See EMR under Patient Instructions for exercises provided during therapy sessions.    ASSESSMENT   Patient responded well to treatment and progressed with functional activities with no complaints of increase pain. Patent reported she felt better after the session. Patient required verbal and manual cuing throughout the session for form and technique.          Alyx Is progressing well towards her goals.   Pt prognosis is Good.     Pt will continue to benefit from skilled outpatient physical therapy to address the deficits listed in the problem list box on initial evaluation, provide pt/family education and to maximize pt's level of independence in the home and community environment.     Pt's spiritual, cultural and educational needs considered and pt agreeable to plan of care and goals.    Anticipated Barriers for therapy: co-morbidities       SHORT TERM GOALS:  progressing Progress Date met   Recent signs and systems trend is improving in order to progress towards Long term goals.  [x] Met  [] Not Met  [] Progressing  9/23   Patient will be independent with Home Exercise Program  in order to further progress and return to maximal function. [x] Met  [] Not Met  [] Progressing  01/14/2025   Pain rating at Worst: 5 /10 in order to progress towards increased independence with activity. [x] Met  [] Not Met  [] Progressing   01/14/2025   Patient will be  able to correct postural deviations in sitting and standing, to decrease pain and promote postural awareness for injury prevention.  [x] Met  [] Not Met  [] Progressing  11/13   Patient will improve functional outcome (FOTO) score: by 5% to increase self-worth & perceived functional ability towards long term goals [x] Met  [] Not Met  [] Progressing  11/13      LONG TERM GOALS: Discharge (~Jan 2025) Progress Date met   Patient will return to normal activites of daily living, recreational, and work related activities with less pain and limitation.  [] Met  [] Not Met  [x] Progressing     Patient will improve range of motion  to stated goals in order to return to maximal functional potential.  [] Met  [] Not Met  [x] Progressing     Patient will improve Strength to stated goals of appropriate musculature in order to improve functional independence.  [] Met  [] Not Met  [x] Progressing     Pain Rating at Best: 1/10 to improve Quality of Life.  [] Met  [] Not Met  [x] Progressing     Patient will meet predicted functional outcome (FOTO) score: 80% to increase self-worth & perceived functional ability. [] Met  [] Not Met  [x] Progressing     Patient will have met/partially met personal goal of: to get back to daily activities, community based activities, and social activities.  [] Met  [] Not Met  [x] Progressing           PLAN   Updated Plan of care Certification: 02/25/2025 to 4/08/2025     Outpatient Physical Therapy 2 times weekly for 6 weeks to include any combination of the following interventions: virtual visits, dry needling, modalities, electrical stimulation (IFC, Pre-Mod, Attended with Functional Dry Needling), Manual Therapy, Moist Heat/ Ice, Neuromuscular Re-ed, Patient Education, Self Care, Therapeutic Exercise, Functional Training, and Therapeutic Activites     Faye Morales, PT

## 2025-02-27 NOTE — PROGRESS NOTES
Subjective:     Patient ID: Alyx Weir is a 75 y.o. female.    Chief Complaint: Ankle Pain (Diabetic pt c/o right ankle pain and left heel pain, rates ankle pain 8/10 pain, pt rates heel pain 10/10, pt wears slide on shoes, pt states the heel pain started 2/26/2025, PCP 2/25/2025) and Heel Pain    Alyx is a 75 y.o. female who presents to the clinic complaining of heel pain in the right foot, especially with the first step in the morning. The pain is described as Aching, Burning, Throbbing, and Tight. The onset of the pain was gradual and has worsened over the past several days. Alyx rates the pain as 10/10. She denies a history of trauma. Prior treatments include none. Patient also complains of left ankle pain that stated last week, patient states she resumed her Allopurinol and it has helped some with the ankle pain. Patient has no other pedal complaints at this time.     Problem List[1]    Medication List with Changes/Refills   Current Medications    ACCU-CHEK GUIDE ME GLUCOSE MTR MISC        ACETAMINOPHEN (TYLENOL ARTHRITIS ORAL)    Take by mouth. Takes prn    ALLOPURINOL (ZYLOPRIM) 100 MG TABLET    TAKE 1 TABLET EVERY DAY    BACLOFEN (LIORESAL) 10 MG TABLET    Take 1 tablet (10 mg total) by mouth 2 (two) times daily as needed.    CIPROFLOXACIN HCL (CIPRO) 500 MG TABLET    Take 1 tablet (500 mg total) by mouth 2 (two) times daily. for 7 days    GABAPENTIN (NEURONTIN) 300 MG CAPSULE    Take 1 capsule (300 mg total) by mouth nightly as needed.    HYDROCODONE-ACETAMINOPHEN (NORCO) 5-325 MG PER TABLET    Take 1 tablet by mouth every 6 (six) hours as needed.    OLMESARTAN-AMLODIPIN-HCTHIAZID 40-10-25 MG TAB    TAKE 1 TABLET EVERY DAY    PANTOPRAZOLE (PROTONIX) 40 MG TABLET    Take 1 tablet (40 mg total) by mouth daily as needed (gerd).    TURMERIC, BULK, 95 % POWD    Take 1 tablet by mouth.    VITAMIN D 1000 UNITS TAB    Take 185 mg by mouth once daily.    WARFARIN (COUMADIN) 5 MG TABLET    TAKE 1 TABLET ON  SUNDAY, MONDAY AND FRIDAY AND 7.5 MG ON ALL OTHER DAYS OR AS DIRECTED BY COUMADIN CLINIC       Review of patient's allergies indicates:   Allergen Reactions    Erythromycin Edema     Angioedema to throat    Amlodipine Other (See Comments)     Headaches    Diazepam Hives     Other reaction(s): Unknown    Iodine Hives     Other reaction(s): Unknown    Meperidine Hives     Other reaction(s): Unknown    Morphine Itching    Methylprednisolone sod suc(pf) Other (See Comments)     headache    Primidone Other (See Comments)     Other reaction(s): Unknown    Zetia [ezetimibe]      Diarrhea         Past Surgical History:   Procedure Laterality Date    APPENDECTOMY      ARTHROSCOPIC REPAIR OF ROTATOR CUFF OF SHOULDER Right 08/05/2024    Procedure: REPAIR, ROTATOR CUFF, ARTHROSCOPIC;  Surgeon: Carl Ruiz MD;  Location: [facility] OR;  Service: Orthopedics;  Laterality: Right;  1. Right shoulder arthroscopic rotator cuff repair  2. Right shoulder arthroscopic biceps tenodesis  3. Right shoulder subacromial decompression    ARTHROSCOPY OF SHOULDER WITH DECOMPRESSION OF SUBACROMIAL SPACE Right 08/05/2024    Procedure: ARTHROSCOPY, SHOULDER, WITH SUBACROMIAL SPACE DECOMPRESSION;  Surgeon: Carl Ruiz MD;  Location: [facility];  Service: Orthopedics;  Laterality: Right;    ARTHROSCOPY,SHOULDER,WITH BICEPS TENODESIS Right 08/05/2024    Procedure: ARTHROSCOPY,SHOULDER,WITH BICEPS TENODESIS;  Surgeon: Carl Ruiz MD;  Location: [facility];  Service: Orthopedics;  Laterality: Right;    BACK SURGERY      bladder cyst removal      BLADDER SURGERY      CARDIAC CATHETERIZATION  03/2013    mild CAD    COLONOSCOPY N/A 11/13/2015    Procedure: COLONOSCOPY;  Surgeon: Jas Umanzor III, MD;  Location: [facility];  Service: Endoscopy;  Laterality: N/A;    HYSTERECTOMY      28 yrs old    LUMBAR SPINE SURGERY      bone spurs removed    OOPHORECTOMY      SELECTIVE INJECTION OF ANESTHETIC AGENT AROUND LUMBAR SPINAL  "NERVE ROOT BY TRANSFORAMINAL APPROACH Bilateral 06/03/2022    Procedure: Bilateral L4/5 TF ASUNCION with RN IV sedation; on Coumadin, will need INR prior to procedure, must be less than 1.3;  Surgeon: Mario Palacios MD;  Location: [facility];  Service: Pain Management;  Laterality: Bilateral;       Family History   Problem Relation Name Age of Onset    Heart disease Mother Jass [place]     Hypertension Mother Jass [place]     Cataracts Mother Jass [place]     Arthritis Mother Jass [place]     Diabetes Father Reji     Hypertension Sister Jannet     Glaucoma Sister Jannet     Ovarian cancer Sister Jannet     Hypertension Sister Kenisha     Diabetes Sister Kenisha     Hypertension Sister Veda     Diabetes Sister Veda     Diabetes Brother Eliot     Hypertension Brother Eliot     Diabetes Paternal Uncle Rikki     Breast cancer Neg Hx      Colon cancer Neg Hx         Social History[2]    Vitals:    02/27/25 1042   Weight: 79.1 kg (174 lb 6.1 oz)   Height: 5' 3" (1.6 m)   PainSc: 10-Worst pain ever       Hemoglobin A1C   Date Value Ref Range Status   12/23/2024 5.7 (H) 4.0 - 5.6 % Final     Comment:     ADA Screening Guidelines:  5.7-6.4%  Consistent with prediabetes  >or=6.5%  Consistent with diabetes    High levels of fetal hemoglobin interfere with the HbA1C  assay. Heterozygous hemoglobin variants (HbS, HgC, etc)do  not significantly interfere with this assay.   However, presence of multiple variants may affect accuracy.     05/29/2024 5.9 (H) 4.0 - 5.6 % Final     Comment:     ADA Screening Guidelines:  5.7-6.4%  Consistent with prediabetes  >or=6.5%  Consistent with diabetes    High levels of fetal hemoglobin interfere with the HbA1C  assay. Heterozygous hemoglobin variants (HbS, HgC, etc)do  not significantly interfere with this assay.   However, presence of multiple variants may affect accuracy.     11/29/2023 5.7 (H) 4.0 - 5.6 % Final     Comment:     ADA Screening Guidelines:  5.7-6.4%  Consistent with prediabetes  >or=6.5% "  Consistent with diabetes    High levels of fetal hemoglobin interfere with the HbA1C  assay. Heterozygous hemoglobin variants (HbS, HgC, etc)do  not significantly interfere with this assay.   However, presence of multiple variants may affect accuracy.         Review of Systems   Constitutional:  Negative for chills and fever.   Respiratory:  Negative for shortness of breath.    Cardiovascular:  Negative for chest pain, palpitations, orthopnea, claudication and leg swelling.   Gastrointestinal:  Negative for diarrhea, nausea and vomiting.   Musculoskeletal:  Negative for joint pain (left lateral ankle).   Skin:  Negative for rash.   Neurological:  Negative for dizziness, tingling, sensory change, focal weakness and weakness.   Psychiatric/Behavioral: Negative.               Objective:      PHYSICAL EXAM: Apperance: Alert and orient in no distress,well developed, and with good attention to grooming and body habits  Patient presents ambulating in casual shoes.  LOWER EXTREMITIES EXAM:   VASCULAR: Dorsalis pedis pulses 2/4 bilateral and Posterior Tibial pulses 2/4 bilateral.   DERMATOLOGICAL: No skin rashes, subcutaneous nodules, lesions, or ulcers observed bilateral. (+) edema, (+) erythema, (+) increased temperature noted to left lateral ankle.   NEUROLOGICAL: Light touch, sharp-dull, proprioception all present and equal bilaterally.  MUSCULOSKELETAL: Muscle strength is 5/5 for foot inverters, everters, plantarflexors, and dorsiflexors. Muscle tone is normal. Positive pain on palpation of left lateral ankle  Ankle joints right shows decreased ROM. right ankle ROM shows greater decrease in dorsiflexion with knee extended. Ankle joint ROM is pain free and without crepitus right. Pain to palpation right plantar medial tubercle. Plantar medial aspect of right heels shows tenderness to palpation. No pain on medial-lateral compression of the calcaneus.         Assessment:       ICD-10-CM ICD-9-CM   1. Plantar fasciitis -  Right Foot  M72.2 728.71   2. Inflammatory heel pain, right - Right Foot  M79.671 729.5   3. Chronic pain of left ankle - Left Foot  M25.572 719.47    G89.29 338.29   4. Acute drug-induced gout of left ankle - Left Foot  M10.272 274.9       Plan:   Plantar fasciitis - Right Foot  -     triamcinolone acetonide injection 40 mg    Inflammatory heel pain, right - Right Foot  -     triamcinolone acetonide injection 40 mg    Chronic pain of left ankle - Left Foot  -     X-Ray Ankle Complete Left; Future; Expected date: 02/27/2025    Acute drug-induced gout of left ankle - Left Foot  -     Sedimentation rate; Future; Expected date: 02/27/2025  -     C-Reactive Protein; Future; Expected date: 02/27/2025  -     Uric Acid; Future; Expected date: 02/27/2025      I counseled the patient on her conditions, regarding findings of my examination, my impressions, and usual treatment plan.   I explained to the patient that etiology and treatment options for heel pain including rest,  ice messages, stretching exercises, strappings/tappings, NSAID's, injections, new shoegear with orthotic inserts, and/or surgical treatment.   Patient agreed to injection therapy.   I gave written and verbal instructions on heel cord stretching and this was demonstrated for the patient. Patient expressed understanding.  Patient agreed to injection therapy today. After sterilizing the area right medial heel with an alcohol prep, the affected area was injected with a solution containing 1 cc of 1% lidocaine plain, 1cc of 0.5% Marcaine plain and 1 cc of Kenalog 40%.  Injection area was then covered with band-aid. The patient tolerated the injection well and reported comfort to the area.  Patient instructed on adequate icing techniques. Patient should ice the affected area at least once per day x 10 minutes for 10 days . I advised the  patient that extra icing would also be beneficial to ensure adequate anti inflammatory effect.   The patient and I reviewed  the types of shoes she should be wearing, my recommendation includes generally the best time of the day for a shoe fitting is the afternoon, shoes with a wide toe box, very good cushion, and tennis shoes with removable inner soles. The patient and I reviewed my recommendations for over-the-counter orthotic inserts.   I explained to the patient that etiologies and treatment options for gout including rest, elevation, diet control, NSAID's, long term gout medication, and/or injection therapy.    Ordered uric acid, crp, and esr testing to be completed today.    Patient to return in 4 weeks or sooner if needed.                Bradley Chu DPM  [facility]          [1]   Patient Active Problem List  Diagnosis    Heterozygous factor V Leiden mutation    Essential hypertension    Pure hypercholesterolemia with target low density lipoprotein (LDL) cholesterol less than 130 mg/dL    VIRGIL on CPAP    Anticoagulated on Coumadin    Type 2 diabetes mellitus without complication    Overweight with body mass index (BMI) 25.0-29.9    Chronic lumbar radiculopathy    Coronary artery disease involving native coronary artery without angina pectoris    Left ventricular diastolic dysfunction with preserved systolic function    COPD (chronic obstructive pulmonary disease)    Chondromalacia, right knee    Dyslipidemia    IGT (impaired glucose tolerance)    Bradycardia    Muscle cramp    Other chest pain    Statin intolerance    History of DVT (deep vein thrombosis)    Non-seasonal allergic rhinitis    Murmur, cardiac    Type II diabetes mellitus with neurological manifestations    Preop cardiovascular exam    Decreased strength of upper extremity    Decreased range of motion of right shoulder   [2]   Social History  Socioeconomic History    Marital status:    Tobacco Use    Smoking status: Never     Passive exposure: Past    Smokeless tobacco: Never   Substance and Sexual Activity    Alcohol use: Never    Drug use: Never     Sexual activity: Not Currently     Partners: Male     Birth control/protection: None     Comment:     Social History Narrative    Dogs in household, no smokers.     Social Drivers of Health     Financial Resource Strain: Medium Risk (2024)    Overall Financial Resource Strain (CARDIA)     Difficulty of Paying Living Expenses: Somewhat hard   Food Insecurity: No Food Insecurity (2024)    Hunger Vital Sign     Worried About Running Out of Food in the Last Year: Never true     Ran Out of Food in the Last Year: Never true   Transportation Needs: No Transportation Needs (2023)    PRAPARE - Transportation     Lack of Transportation (Medical): No     Lack of Transportation (Non-Medical): No   Physical Activity: Inactive (2024)    Exercise Vital Sign     Days of Exercise per Week: 0 days     Minutes of Exercise per Session: 60 min   Stress: Stress Concern Present (2024)    [employer] [place] [employer] - Occupational Stress Questionnaire     Feeling of Stress : To some extent   Housing Stability: Low Risk  (2023)    Housing Stability Vital Sign     Unable to Pay for Housing in the Last Year: No     Number of Places Lived in the Last Year: 1     Unstable Housing in the Last Year: No

## 2025-02-28 ENCOUNTER — TELEPHONE (OUTPATIENT)
Dept: PODIATRY | Facility: CLINIC | Age: 75
End: 2025-02-28
Payer: MEDICARE

## 2025-02-28 ENCOUNTER — HOSPITAL ENCOUNTER (OUTPATIENT)
Dept: RADIOLOGY | Facility: HOSPITAL | Age: 75
Discharge: HOME | End: 2025-02-28
Attending: PODIATRIST
Payer: MEDICARE

## 2025-02-28 DIAGNOSIS — G89.29 CHRONIC PAIN OF LEFT ANKLE: ICD-10-CM

## 2025-02-28 DIAGNOSIS — M25.572 CHRONIC PAIN OF LEFT ANKLE: ICD-10-CM

## 2025-02-28 PROCEDURE — 73610 X-RAY EXAM OF ANKLE: CPT | Mod: 26,HCNC,LT, | Performed by: RADIOLOGY

## 2025-02-28 PROCEDURE — 73610 X-RAY EXAM OF ANKLE: CPT | Mod: TC,HCNC,LT

## 2025-02-28 NOTE — TELEPHONE ENCOUNTER
Spoke with the pt and scheduled her today 2-28-25 for X-rays here at the [facility] for 10:00am           ----- Message from Radha sent at 2/28/2025  7:35 AM [facility] -----  Contact: pt  Type: Orders RequestWhat orders/ testing are being requested? X ray  (pt was bridgett for yesterday, but didn't do the x ray.  Please put in new orderIs there a future appointment scheduled for the patient with PCP?When?Would you prefer a response via [facility]?Comments: please put in new order so pt can get this scheduled.

## 2025-03-04 ENCOUNTER — CLINICAL SUPPORT (OUTPATIENT)
Dept: REHABILITATION | Facility: HOSPITAL | Age: 75
End: 2025-03-04
Attending: STUDENT IN AN ORGANIZED HEALTH CARE EDUCATION/TRAINING PROGRAM
Payer: COMMERCIAL

## 2025-03-04 DIAGNOSIS — R29.898 DECREASED STRENGTH OF UPPER EXTREMITY: Primary | ICD-10-CM

## 2025-03-04 DIAGNOSIS — M25.611 DECREASED RANGE OF MOTION OF RIGHT SHOULDER: ICD-10-CM

## 2025-03-04 PROCEDURE — 97110 THERAPEUTIC EXERCISES: CPT

## 2025-03-04 PROCEDURE — 97112 NEUROMUSCULAR REEDUCATION: CPT

## 2025-03-04 PROCEDURE — 97530 THERAPEUTIC ACTIVITIES: CPT

## 2025-03-04 NOTE — PROGRESS NOTES
[facility]   Physical Therapy Treatment Note     Name: Alyx Weir  [facility] Number: [id number]    Therapy Diagnosis:   Encounter Diagnoses   Name Primary?    Decreased strength of upper extremity Yes    Decreased range of motion of right shoulder        Physician: Carl Ruiz    Visit Date: 3/4/2025    Physician Orders: PT Eval and Treat  Medical Diagnosis from Referral: Traumatic incomplete tear of right rotator cuff, subsequent encounter [S46.011D], Subacromial impingement of right shoulder [M75.41]   Evaluation Date: 8/15/2024  Authorization Period Expiration: 6/7/2025  Plan of Care Expiration:    4/08/2025        Progress Update:  3/25/2025  Visit # / Visits authorized: 4/ 12 (From prior auth 36)  FOTO: 12/12 - Scored: 3 / 3       PRECAUTIONS: Standard Precautions, Safety/fall precautions, Orthopedic: Right Cuff Repair, Non-weight beariing, and Orthotic device type: abductor sling, Wearning schedule: full until 6 week follow up      PROBLEM LIST : pain, decreased motion all planes, decreased strength      Date of Surgery/Procedure 8/5/2024- Joseph   Surgery/Procedure Performed Status post Right Rotator Cuff Repair, Subacromial Decompression, Distal Clavicle Dissection, Biceps Tenodesis   Rehabilitation Precautions Protocol: Joseph Cuff Protocol      MD Follow up:       Patient School:     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 Patient states she has been learning how to move her arm at work to not hurt so much with certain tasks.    Compliance with Hep: Daily    Response to previous treatment: no adverse reactions to treatment/updated HOME EXERCISE PROGRAM    Functional change: Better    Pain: 5/10   with tylenol  Location: Right shoulder      OBJECTIVE   FOTO:  (2/25/2025)    Treatment      Gym/Equipment Access: none  Time to Complete Exercises: increased for cueing and education    Alyx received the treatments listed below:       CPT Intervention  Right RCR  BT  SAD  DCR Duration/ Details  3/4/2025    TE Arm Bike 3/3 forward and back R:3   TE     TE Series 6  Chest Stretch  Bear Hugs  Swimmers  Snow [place]    TE Seated Shoulder Strap INTERNAL ROTATION stretch    TE Tball     TE Pulleys 3 minutes flexion and 3 minutes scaption   TE Passive Range of motion     TE Assisted Motion    TE Free Weights    TA Seated scapular retractions    TA Seated Lat Pull downs    TA Wall Push ups 2 x 10 reps   TA Mat Plank Hold Taps    TA Face Pulls     TRX low rows       Chest pulls/ diagonals G TB     Shoulder press 3# 3 x 10 reps    Prone extensions  Prone T's 3 x 10 bilateral  3 x 10 bilateral   NMR Band Series      Trio 2# ankle weight at wrist 2 x 10 reps    Shoulder flexion/ scaption/ abduction alternating 3# 1 x 15 reps    Biceps Curls 3 x 10 bilateral 3#             NC Hot Pack  -   PLAN          CPT Codes available for Billing:   (00) minutes of Manual therapy (MT) to improve pain and ROM.  (12) minutes of Therapeutic Exercise (TE) to develop strength, endurance, range of motion, and flexibility.  (19) minutes of Neuromuscular Re-Education (NMR)  to improve: Balance, Coordination, Kinesthetic, Sense, Proprioception, and Posture.  (24) minutes of Therapeutic Activities (TA) to improve functional performance.  (00) minutes of Gait Training (GT) to improve functional gait mechanics.  (00) minutes of Self- Care and Education  Unattended Electrical Stimulation (ES) for muscle performance or pain modulation.  Vasopneumatic Device Therapy () for management of swelling/edema. (84180)  BFR: Blood flow restriction applied during exercise  Functional Performance Test (FPT)  NP or (-): Not Performed     See EMR under MEDIA for written consent provided for Dry Needling- DATE   Palpation Assessment to determine the  necessity for Functional Dry Needling     PATIENT EDUCATION AND HOME EXERCISES      Education/Self-Care provided:  (included in treatment unless specified above) minutes   Patient educated on the impairments noted above and the effects of physical therapy intervention to improve overall condition and Quality of Life  Patient was educated on all the above exercise prior/during/after for proper posture, positioning, and execution for safe performance with home exercise program.      Written Home Exercises Provided: yes. Prefers: [x] Printed [x] Electronic  Exercises were reviewed and Rylee was able to demonstrate them prior to the end of the session.  Rylee demonstrated good understanding of the education provided. See EMR under Patient Instructions for exercises provided during therapy sessions.    ASSESSMENT   Added prone strengthening into program today which patient tolerated very well. Patient still experiences gross shoulder musculature fatigue, but is tolerating strength progressions much better than a few weeks previously. Patient ended session with good fatigue.    Alyx Is progressing well towards her goals.   Pt prognosis is Good.     Pt will continue to benefit from skilled outpatient physical therapy to address the deficits listed in the problem list box on initial evaluation, provide pt/family education and to maximize pt's level of independence in the home and community environment.     Pt's spiritual, cultural and educational needs considered and pt agreeable to plan of care and goals.    Anticipated Barriers for therapy: co-morbidities       SHORT TERM GOALS:  progressing Progress Date met   Recent signs and systems trend is improving in order to progress towards Long term goals.  [x] Met  [] Not Met  [] Progressing  9/23   Patient will be independent with Home Exercise Program  in order to further progress and return to maximal function. [x] Met  [] Not Met  [] Progressing  01/14/2025   Pain rating  at Worst: 5 /10 in order to progress towards increased independence with activity. [x] Met  [] Not Met  [] Progressing   01/14/2025   Patient will be able to correct postural deviations in sitting and standing, to decrease pain and promote postural awareness for injury prevention.  [x] Met  [] Not Met  [] Progressing  11/13   Patient will improve functional outcome (FOTO) score: by 5% to increase self-worth & perceived functional ability towards long term goals [x] Met  [] Not Met  [] Progressing  11/13      LONG TERM GOALS: Discharge (~Jan 2025) Progress Date met   Patient will return to normal activites of daily living, recreational, and work related activities with less pain and limitation.  [] Met  [] Not Met  [x] Progressing     Patient will improve range of motion  to stated goals in order to return to maximal functional potential.  [] Met  [] Not Met  [x] Progressing     Patient will improve Strength to stated goals of appropriate musculature in order to improve functional independence.  [] Met  [] Not Met  [x] Progressing     Pain Rating at Best: 1/10 to improve Quality of Life.  [] Met  [] Not Met  [x] Progressing     Patient will meet predicted functional outcome (FOTO) score: 80% to increase self-worth & perceived functional ability. [] Met  [] Not Met  [x] Progressing     Patient will have met/partially met personal goal of: to get back to daily activities, community based activities, and social activities.  [] Met  [] Not Met  [x] Progressing           PLAN   Updated Plan of care Certification: 02/25/2025 to 4/08/2025     Outpatient Physical Therapy 2 times weekly for 6 weeks to include any combination of the following interventions: virtual visits, dry needling, modalities, electrical stimulation (IFC, Pre-Mod, Attended with Functional Dry Needling), Manual Therapy, Moist Heat/ Ice, Neuromuscular Re-ed, Patient Education, Self Care, Therapeutic Exercise, Functional Training, and Therapeutic Activites      Mamta Peres, PT

## 2025-03-07 ENCOUNTER — CLINICAL SUPPORT (OUTPATIENT)
Dept: REHABILITATION | Facility: HOSPITAL | Age: 75
End: 2025-03-07
Attending: STUDENT IN AN ORGANIZED HEALTH CARE EDUCATION/TRAINING PROGRAM
Payer: COMMERCIAL

## 2025-03-07 DIAGNOSIS — R29.898 DECREASED STRENGTH OF UPPER EXTREMITY: Primary | ICD-10-CM

## 2025-03-07 DIAGNOSIS — M25.611 DECREASED RANGE OF MOTION OF RIGHT SHOULDER: ICD-10-CM

## 2025-03-07 PROCEDURE — 97112 NEUROMUSCULAR REEDUCATION: CPT

## 2025-03-07 PROCEDURE — 97530 THERAPEUTIC ACTIVITIES: CPT

## 2025-03-07 PROCEDURE — 97110 THERAPEUTIC EXERCISES: CPT

## 2025-03-07 NOTE — PROGRESS NOTES
[facility]   Physical Therapy Treatment Note     Name: Alyx Weir  [facility] Number: [id number]    Therapy Diagnosis:   Encounter Diagnoses   Name Primary?    Decreased strength of upper extremity Yes    Decreased range of motion of right shoulder          Physician: Carl Ruiz    Visit Date: 3/7/2025    Physician Orders: PT Eval and Treat  Medical Diagnosis from Referral: Traumatic incomplete tear of right rotator cuff, subsequent encounter [S46.011D], Subacromial impingement of right shoulder [M75.41]   Evaluation Date: 8/15/2024  Authorization Period Expiration: 6/7/2025  Plan of Care Expiration:    4/08/2025        Progress Update:  3/25/2025  Visit # / Visits authorized: 5/ 12 (From prior auth 36)  FOTO: 12/12 - Scored: 3 / 3       PRECAUTIONS: Standard Precautions, Safety/fall precautions, Orthopedic: Right Cuff Repair, Non-weight beariing, and Orthotic device type: abductor sling, Wearning schedule: full until 6 week follow up      PROBLEM LIST : pain, decreased motion all planes, decreased strength      Date of Surgery/Procedure 8/5/2024- Joseph   Surgery/Procedure Performed Status post Right Rotator Cuff Repair, Subacromial Decompression, Distal Clavicle Dissection, Biceps Tenodesis   Rehabilitation Precautions Protocol: Joseph Cuff Protocol      MD Follow up:       Patient School:     Job/Position: Works at an alteration store    PTA Visit #: 0/5     Time In: 10:55   Time Out: 11:50  Total Billable Time: 55 minutes    SUBJECTIVE     Pt reports: she continues to have a lot of soreness today.    Compliance with Hep: Daily    Response to previous treatment: no adverse reactions to treatment/updated HOME EXERCISE PROGRAM    Functional change: Better    Pain: 5/10   with tylenol  Location: Right shoulder      OBJECTIVE   FOTO:  (2/25/2025)    Treatment     Gym/Equipment Access: none  Time to Complete Exercises: increased for cueing and education    Alyx received the  treatments listed below:       CPT Intervention  Right RCR  BT  SAD  DCR Duration/ Details  3/7/2025    TE Arm Bike 3/3 forward and back R:3   TE     TE Series 6  Chest Stretch  Bear Hugs  Swimmers  Snow [place]    TE Seated Shoulder Strap INTERNAL ROTATION stretch    TE Tball     TE Pulleys 3 minutes flexion and 3 minutes scaption   TE Passive Range of motion     TE Assisted Motion    TE Free Weights    TA Seated scapular retractions    TA Seated Lat Pull downs    TA Wall Push ups 2 x 10 reps   TA Mat Plank Hold Taps    TA Face Pulls     TRX low rows       Chest pulls/ diagonals G TB     Shoulder press 3# 3 x 10 reps    Prone extensions  Prone T's 3 x 10 bilateral  3 x 10 bilateral   NMR Band Series      Trio 2# ankle weight at wrist 2 x 10 reps    Shoulder flexion/ scaption/ abduction alternating 3# 1 x 15 reps    Biceps Curls 3 x 10 bilateral 3#             NC Hot Pack  -   PLAN          CPT Codes available for Billing:   (00) minutes of Manual therapy (MT) to improve pain and ROM.  (12) minutes of Therapeutic Exercise (TE) to develop strength, endurance, range of motion, and flexibility.  (19) minutes of Neuromuscular Re-Education (NMR)  to improve: Balance, Coordination, Kinesthetic, Sense, Proprioception, and Posture.  (24) minutes of Therapeutic Activities (TA) to improve functional performance.  (00) minutes of Gait Training (GT) to improve functional gait mechanics.  (00) minutes of Self- Care and Education  Unattended Electrical Stimulation (ES) for muscle performance or pain modulation.  Vasopneumatic Device Therapy () for management of swelling/edema. (51940)  BFR: Blood flow restriction applied during exercise  Functional Performance Test (FPT)  NP or (-): Not Performed     See EMR under MEDIA for written consent provided for Dry Needling- DATE   Palpation Assessment to determine the necessity for Functional Dry Needling     PATIENT EDUCATION AND HOME EXERCISES      Education/Self-Care provided:   (included in treatment unless specified above) minutes   Patient educated on the impairments noted above and the effects of physical therapy intervention to improve overall condition and Quality of Life  Patient was educated on all the above exercise prior/during/after for proper posture, positioning, and execution for safe performance with home exercise program.      Written Home Exercises Provided: yes. Prefers: [x] Printed [x] Electronic  Exercises were reviewed and Rylee was able to demonstrate them prior to the end of the session.  Rylee demonstrated good understanding of the education provided. See EMR under Patient Instructions for exercises provided during therapy sessions.    ASSESSMENT   No exercises progressed due to soreness experienced from last progressions. Patient continues to require moderate verbal and tactile cueing to decrease compensatory patterns. Continued encouragement of HOME EXERCISE PROGRAM.    Alyx Is progressing well towards her goals.   Pt prognosis is Good.     Pt will continue to benefit from skilled outpatient physical therapy to address the deficits listed in the problem list box on initial evaluation, provide pt/family education and to maximize pt's level of independence in the home and community environment.     Pt's spiritual, cultural and educational needs considered and pt agreeable to plan of care and goals.    Anticipated Barriers for therapy: co-morbidities       SHORT TERM GOALS:  progressing Progress Date met   Recent signs and systems trend is improving in order to progress towards Long term goals.  [x] Met  [] Not Met  [] Progressing  9/23   Patient will be independent with Home Exercise Program  in order to further progress and return to maximal function. [x] Met  [] Not Met  [] Progressing  01/14/2025   Pain rating at Worst: 5 /10 in order to progress towards increased independence with activity. [x] Met  [] Not Met  [] Progressing   01/14/2025   Patient will be able  to correct postural deviations in sitting and standing, to decrease pain and promote postural awareness for injury prevention.  [x] Met  [] Not Met  [] Progressing  11/13   Patient will improve functional outcome (FOTO) score: by 5% to increase self-worth & perceived functional ability towards long term goals [x] Met  [] Not Met  [] Progressing  11/13      LONG TERM GOALS: Discharge (~Jan 2025) Progress Date met   Patient will return to normal activites of daily living, recreational, and work related activities with less pain and limitation.  [] Met  [] Not Met  [x] Progressing     Patient will improve range of motion  to stated goals in order to return to maximal functional potential.  [] Met  [] Not Met  [x] Progressing     Patient will improve Strength to stated goals of appropriate musculature in order to improve functional independence.  [] Met  [] Not Met  [x] Progressing     Pain Rating at Best: 1/10 to improve Quality of Life.  [] Met  [] Not Met  [x] Progressing     Patient will meet predicted functional outcome (FOTO) score: 80% to increase self-worth & perceived functional ability. [] Met  [] Not Met  [x] Progressing     Patient will have met/partially met personal goal of: to get back to daily activities, community based activities, and social activities.  [] Met  [] Not Met  [x] Progressing           PLAN   Updated Plan of care Certification: 02/25/2025 to 4/08/2025     Outpatient Physical Therapy 2 times weekly for 6 weeks to include any combination of the following interventions: virtual visits, dry needling, modalities, electrical stimulation (IFC, Pre-Mod, Attended with Functional Dry Needling), Manual Therapy, Moist Heat/ Ice, Neuromuscular Re-ed, Patient Education, Self Care, Therapeutic Exercise, Functional Training, and Therapeutic Activites     Mamta Peres, PT

## 2025-03-11 ENCOUNTER — ANTI-COAG VISIT (OUTPATIENT)
Dept: CARDIOLOGY | Facility: CLINIC | Age: 75
End: 2025-03-11
Payer: MEDICARE

## 2025-03-11 ENCOUNTER — CLINICAL SUPPORT (OUTPATIENT)
Dept: REHABILITATION | Facility: HOSPITAL | Age: 75
End: 2025-03-11
Attending: STUDENT IN AN ORGANIZED HEALTH CARE EDUCATION/TRAINING PROGRAM
Payer: COMMERCIAL

## 2025-03-11 DIAGNOSIS — R29.898 DECREASED STRENGTH OF UPPER EXTREMITY: Primary | ICD-10-CM

## 2025-03-11 DIAGNOSIS — M25.611 DECREASED RANGE OF MOTION OF RIGHT SHOULDER: ICD-10-CM

## 2025-03-11 DIAGNOSIS — Z79.01 LONG TERM (CURRENT) USE OF ANTICOAGULANTS: Primary | ICD-10-CM

## 2025-03-11 DIAGNOSIS — Z79.01 ANTICOAGULATED ON COUMADIN: Chronic | ICD-10-CM

## 2025-03-11 DIAGNOSIS — D68.51 HETEROZYGOUS FACTOR V LEIDEN MUTATION: Chronic | ICD-10-CM

## 2025-03-11 LAB
CTP QC/QA: YES
INR PPP: 2.2 (ref 2–3)

## 2025-03-11 PROCEDURE — 97112 NEUROMUSCULAR REEDUCATION: CPT

## 2025-03-11 PROCEDURE — 85610 PROTHROMBIN TIME: CPT | Mod: QW,HCNC,S$GLB, | Performed by: INTERNAL MEDICINE

## 2025-03-11 PROCEDURE — 97530 THERAPEUTIC ACTIVITIES: CPT

## 2025-03-11 PROCEDURE — 93793 ANTICOAG MGMT PT WARFARIN: CPT | Mod: HCNC,S$GLB,,

## 2025-03-11 PROCEDURE — 97110 THERAPEUTIC EXERCISES: CPT

## 2025-03-11 NOTE — PROGRESS NOTES
[facility]   Physical Therapy Treatment Note     Name: Alyx Weir  [facility] Number: [id number]    Therapy Diagnosis:   Encounter Diagnoses   Name Primary?    Decreased strength of upper extremity Yes    Decreased range of motion of right shoulder          Physician: Carl Ruiz    Visit Date: 3/11/2025    Physician Orders: PT Eval and Treat  Medical Diagnosis from Referral: Traumatic incomplete tear of right rotator cuff, subsequent encounter [S46.011D], Subacromial impingement of right shoulder [M75.41]   Evaluation Date: 8/15/2024  Authorization Period Expiration: 6/7/2025  Plan of Care Expiration:    4/08/2025        Progress Update:  3/25/2025  Visit # / Visits authorized: 6/ 12 (From prior auth 36)  FOTO: 12/12 - Scored: 3 / 3       PRECAUTIONS: Standard Precautions, Safety/fall precautions, Orthopedic: Right Cuff Repair, Non-weight beariing, and Orthotic device type: abductor sling, Wearning schedule: full until 6 week follow up      PROBLEM LIST : pain, decreased motion all planes, decreased strength      Date of Surgery/Procedure 8/5/2024- Joseph   Surgery/Procedure Performed Status post Right Rotator Cuff Repair, Subacromial Decompression, Distal Clavicle Dissection, Biceps Tenodesis   Rehabilitation Precautions Protocol: Joseph Cuff Protocol      MD Follow up:       Patient School:     Job/Position: Works at an alteration store    PTA Visit #: 0/5     Time In: 9:55  Time Out: 10:50  Total Billable Time: 55 minutes    SUBJECTIVE     Pt reports: she is not as sore overall, but is reporting some increased soreness in her right pectoralis region.    Compliance with Hep: Daily    Response to previous treatment: no adverse reactions to treatment/updated HOME EXERCISE PROGRAM    Functional change: Better    Pain: 5/10   with tylenol  Location: Right shoulder      OBJECTIVE   FOTO:  (2/25/2025)    Treatment     Gym/Equipment Access: none  Time to Complete Exercises:  increased for cueing and education    Alyx received the treatments listed below:       CPT Intervention  Right RCR  BT  SAD  DCR Duration/ Details  3/11/2025    TE Arm Bike 3/3 forward and back R:3   TE     TE Series 6  Chest Stretch  Bear Hugs  Swimmers  Snow [place]    TE Seated Shoulder Strap INTERNAL ROTATION stretch    TE Tball     TE Pulleys 3 minutes flexion and 3 minutes scaption   TE Passive Range of motion     TE Assisted Motion    TE Free Weights    TA Seated scapular retractions    TA Seated Lat Pull downs    TA Wall Push ups 2 x 10 reps   TA Mat Plank Hold Taps    TA Face Pulls     TRX low rows       Chest pulls/ diagonals G TB     Shoulder press 3# 3 x 10 reps    Prone extensions  Prone T's 3 x 10 bilateral  3 x 10 bilateral   NMR Band Series      Trio 2# ankle weight at wrist 2 x 10 reps    Shoulder flexion/ scaption/ abduction alternating 3# 1 x 15 reps    Biceps Curls 3 x 10 bilateral 3#             NC Hot Pack  -   PLAN          CPT Codes available for Billing:   (00) minutes of Manual therapy (MT) to improve pain and ROM.  (12) minutes of Therapeutic Exercise (TE) to develop strength, endurance, range of motion, and flexibility.  (19) minutes of Neuromuscular Re-Education (NMR)  to improve: Balance, Coordination, Kinesthetic, Sense, Proprioception, and Posture.  (24) minutes of Therapeutic Activities (TA) to improve functional performance.  (00) minutes of Gait Training (GT) to improve functional gait mechanics.  (00) minutes of Self- Care and Education  Unattended Electrical Stimulation (ES) for muscle performance or pain modulation.  Vasopneumatic Device Therapy () for management of swelling/edema. (72550)  BFR: Blood flow restriction applied during exercise  Functional Performance Test (FPT)  NP or (-): Not Performed     See EMR under MEDIA for written consent provided for Dry Needling- DATE   Palpation Assessment to determine the necessity for Functional Dry Needling     PATIENT EDUCATION  AND HOME EXERCISES      Education/Self-Care provided:  (included in treatment unless specified above) minutes   Patient educated on the impairments noted above and the effects of physical therapy intervention to improve overall condition and Quality of Life  Patient was educated on all the above exercise prior/during/after for proper posture, positioning, and execution for safe performance with home exercise program.      Written Home Exercises Provided: yes. Prefers: [x] Printed [x] Electronic  Exercises were reviewed and Rylee was able to demonstrate them prior to the end of the session.  Rylee demonstrated good understanding of the education provided. See EMR under Patient Instructions for exercises provided during therapy sessions.    ASSESSMENT   Patient tolerated today's session well. Cueing given throughout interventions to decrease compensatory patterns. Continued encouragement of HOME EXERCISE PROGRAM.    Alyx Is progressing well towards her goals.   Pt prognosis is Good.     Pt will continue to benefit from skilled outpatient physical therapy to address the deficits listed in the problem list box on initial evaluation, provide pt/family education and to maximize pt's level of independence in the home and community environment.     Pt's spiritual, cultural and educational needs considered and pt agreeable to plan of care and goals.    Anticipated Barriers for therapy: co-morbidities       SHORT TERM GOALS:  progressing Progress Date met   Recent signs and systems trend is improving in order to progress towards Long term goals.  [x] Met  [] Not Met  [] Progressing  9/23   Patient will be independent with Home Exercise Program  in order to further progress and return to maximal function. [x] Met  [] Not Met  [] Progressing  01/14/2025   Pain rating at Worst: 5 /10 in order to progress towards increased independence with activity. [x] Met  [] Not Met  [] Progressing   01/14/2025   Patient will be able to  correct postural deviations in sitting and standing, to decrease pain and promote postural awareness for injury prevention.  [x] Met  [] Not Met  [] Progressing  11/13   Patient will improve functional outcome (FOTO) score: by 5% to increase self-worth & perceived functional ability towards long term goals [x] Met  [] Not Met  [] Progressing  11/13      LONG TERM GOALS: Discharge (~Jan 2025) Progress Date met   Patient will return to normal activites of daily living, recreational, and work related activities with less pain and limitation.  [] Met  [] Not Met  [x] Progressing     Patient will improve range of motion  to stated goals in order to return to maximal functional potential.  [] Met  [] Not Met  [x] Progressing     Patient will improve Strength to stated goals of appropriate musculature in order to improve functional independence.  [] Met  [] Not Met  [x] Progressing     Pain Rating at Best: 1/10 to improve Quality of Life.  [] Met  [] Not Met  [x] Progressing     Patient will meet predicted functional outcome (FOTO) score: 80% to increase self-worth & perceived functional ability. [] Met  [] Not Met  [x] Progressing     Patient will have met/partially met personal goal of: to get back to daily activities, community based activities, and social activities.  [] Met  [] Not Met  [x] Progressing           PLAN   Updated Plan of care Certification: 02/25/2025 to 4/08/2025     Outpatient Physical Therapy 2 times weekly for 6 weeks to include any combination of the following interventions: virtual visits, dry needling, modalities, electrical stimulation (IFC, Pre-Mod, Attended with Functional Dry Needling), Manual Therapy, Moist Heat/ Ice, Neuromuscular Re-ed, Patient Education, Self Care, Therapeutic Exercise, Functional Training, and Therapeutic Activites     Mamta Peres, PT

## 2025-03-11 NOTE — PROGRESS NOTES
Patient's INR is therapeutic at 2.2. Patient reports no changes. Instructions given: Continue warfarin 7.5 mg on Tuesdays, Thursdays and Saturdays; and 5 mg all other days. Recheck on 4/8/25. Calendar reviewed with patient. Patient verbalizes understanding.

## 2025-03-13 ENCOUNTER — CLINICAL SUPPORT (OUTPATIENT)
Dept: REHABILITATION | Facility: HOSPITAL | Age: 75
End: 2025-03-13
Attending: STUDENT IN AN ORGANIZED HEALTH CARE EDUCATION/TRAINING PROGRAM
Payer: COMMERCIAL

## 2025-03-13 DIAGNOSIS — R29.898 DECREASED STRENGTH OF UPPER EXTREMITY: Primary | ICD-10-CM

## 2025-03-13 DIAGNOSIS — M25.611 DECREASED RANGE OF MOTION OF RIGHT SHOULDER: ICD-10-CM

## 2025-03-13 PROCEDURE — 97530 THERAPEUTIC ACTIVITIES: CPT

## 2025-03-13 PROCEDURE — 97110 THERAPEUTIC EXERCISES: CPT

## 2025-03-13 PROCEDURE — 97112 NEUROMUSCULAR REEDUCATION: CPT

## 2025-03-13 NOTE — PROGRESS NOTES
[facility]   Physical Therapy Treatment Note     Name: Alyx Weir  [facility] Number: [id number]    Therapy Diagnosis:   Encounter Diagnoses   Name Primary?    Decreased strength of upper extremity Yes    Decreased range of motion of right shoulder          Physician: Carl Ruiz    Visit Date: 3/13/2025    Physician Orders: PT Eval and Treat  Medical Diagnosis from Referral: Traumatic incomplete tear of right rotator cuff, subsequent encounter [S46.011D], Subacromial impingement of right shoulder [M75.41]   Evaluation Date: 8/15/2024  Authorization Period Expiration: 6/7/2025  Plan of Care Expiration:    4/08/2025        Progress Update:  3/25/2025  Visit # / Visits authorized: 6/ 12 (From prior auth 36)  FOTO: 12/12 - Scored: 3 / 3       PRECAUTIONS: Standard Precautions, Safety/fall precautions, Orthopedic: Right Cuff Repair, Non-weight beariing, and Orthotic device type: abductor sling, Wearning schedule: full until 6 week follow up      PROBLEM LIST : pain, decreased motion all planes, decreased strength      Date of Surgery/Procedure 8/5/2024- Joseph   Surgery/Procedure Performed Status post Right Rotator Cuff Repair, Subacromial Decompression, Distal Clavicle Dissection, Biceps Tenodesis   Rehabilitation Precautions Protocol: Joseph Cuff Protocol      MD Follow up:       Patient School:     Job/Position: Works at an alteration store    PTA Visit #: 0/5     Time In: 12:30 PM  Time Out: 1:30 PM  Total Billable Time: 55 minutes    SUBJECTIVE     Pt reports: she is feeling good today have minimal reports of pain and soreness.     Compliance with Hep: Daily    Response to previous treatment: no adverse reactions to treatment/updated HOME EXERCISE PROGRAM    Functional change: Better    Pain: 5/10   with tylenol  Location: Right shoulder      OBJECTIVE   FOTO:  (2/25/2025)    Treatment     Gym/Equipment Access: none  Time to Complete Exercises: increased for cueing and  education    Alyx received the treatments listed below:       CPT Intervention  Right RCR  BT  SAD  DCR Duration/ Details  3/13/2025    TE Arm Bike 3/3 forward and back R:3   TE     TE Series 6  Chest Stretch  Bear Hugs  Swimmers  Snow [place]    TE Seated Shoulder Strap INTERNAL ROTATION stretch    TE Tball     TE Pulleys 3 minutes flexion and 3 minutes scaption   TE Passive Range of motion     TE Assisted Motion    TE Free Weights    TA Seated scapular retractions    TA Seated Lat Pull downs    TA Wall Push ups 2 x 10 reps   TA Mat Plank Hold Taps    TA Face Pulls     TRX low rows       Chest pulls/ diagonals G TB     Shoulder press 3# 3 x 10 reps    Prone extensions  Prone T's 3 x 10 bilateral  3 x 10 bilateral   NMR Band Series      Trio 2# ankle weight at wrist 2 x 10 reps    Shoulder flexion/ scaption/ abduction alternating 3# 1 x 15 reps    Biceps Curls 3 x 10 bilateral 3#             NC Hot Pack  -   PLAN          CPT Codes available for Billing:   (00) minutes of Manual therapy (MT) to improve pain and ROM.  (12) minutes of Therapeutic Exercise (TE) to develop strength, endurance, range of motion, and flexibility.  (19) minutes of Neuromuscular Re-Education (NMR)  to improve: Balance, Coordination, Kinesthetic, Sense, Proprioception, and Posture.  (24) minutes of Therapeutic Activities (TA) to improve functional performance.  (00) minutes of Gait Training (GT) to improve functional gait mechanics.  (00) minutes of Self- Care and Education  Unattended Electrical Stimulation (ES) for muscle performance or pain modulation.  Vasopneumatic Device Therapy () for management of swelling/edema. (32532)  BFR: Blood flow restriction applied during exercise  Functional Performance Test (FPT)  NP or (-): Not Performed     See EMR under MEDIA for written consent provided for Dry Needling- DATE   Palpation Assessment to determine the necessity for Functional Dry Needling     PATIENT EDUCATION AND HOME EXERCISES       Education/Self-Care provided:  (included in treatment unless specified above) minutes   Patient educated on the impairments noted above and the effects of physical therapy intervention to improve overall condition and Quality of Life  Patient was educated on all the above exercise prior/during/after for proper posture, positioning, and execution for safe performance with home exercise program.      Written Home Exercises Provided: yes. Prefers: [x] Printed [x] Electronic  Exercises were reviewed and Rylee was able to demonstrate them prior to the end of the session.  Rylee demonstrated good understanding of the education provided. See EMR under Patient Instructions for exercises provided during therapy sessions.    ASSESSMENT   Patient tolerated today's session well with no reports of pain overall just fatigue. She has some difficulty with loaded overhead activities. She reports her biggest concern is still having pain at rest.     Alyx Is progressing well towards her goals.   Pt prognosis is Good.     Pt will continue to benefit from skilled outpatient physical therapy to address the deficits listed in the problem list box on initial evaluation, provide pt/family education and to maximize pt's level of independence in the home and community environment.     Pt's spiritual, cultural and educational needs considered and pt agreeable to plan of care and goals.    Anticipated Barriers for therapy: co-morbidities       SHORT TERM GOALS:  progressing Progress Date met   Recent signs and systems trend is improving in order to progress towards Long term goals.  [x] Met  [] Not Met  [] Progressing  9/23   Patient will be independent with Home Exercise Program  in order to further progress and return to maximal function. [x] Met  [] Not Met  [] Progressing  01/14/2025   Pain rating at Worst: 5 /10 in order to progress towards increased independence with activity. [x] Met  [] Not Met  [] Progressing   01/14/2025   Patient  will be able to correct postural deviations in sitting and standing, to decrease pain and promote postural awareness for injury prevention.  [x] Met  [] Not Met  [] Progressing  11/13   Patient will improve functional outcome (FOTO) score: by 5% to increase self-worth & perceived functional ability towards long term goals [x] Met  [] Not Met  [] Progressing  11/13      LONG TERM GOALS: Discharge (~Jan 2025) Progress Date met   Patient will return to normal activites of daily living, recreational, and work related activities with less pain and limitation.  [] Met  [] Not Met  [x] Progressing     Patient will improve range of motion  to stated goals in order to return to maximal functional potential.  [] Met  [] Not Met  [x] Progressing     Patient will improve Strength to stated goals of appropriate musculature in order to improve functional independence.  [] Met  [] Not Met  [x] Progressing     Pain Rating at Best: 1/10 to improve Quality of Life.  [] Met  [] Not Met  [x] Progressing     Patient will meet predicted functional outcome (FOTO) score: 80% to increase self-worth & perceived functional ability. [] Met  [] Not Met  [x] Progressing     Patient will have met/partially met personal goal of: to get back to daily activities, community based activities, and social activities.  [] Met  [] Not Met  [x] Progressing           PLAN   Updated Plan of care Certification: 02/25/2025 to 4/08/2025     Outpatient Physical Therapy 2 times weekly for 6 weeks to include any combination of the following interventions: virtual visits, dry needling, modalities, electrical stimulation (IFC, Pre-Mod, Attended with Functional Dry Needling), Manual Therapy, Moist Heat/ Ice, Neuromuscular Re-ed, Patient Education, Self Care, Therapeutic Exercise, Functional Training, and Therapeutic Activites     Cl Jaffe PT

## 2025-03-17 ENCOUNTER — CLINICAL SUPPORT (OUTPATIENT)
Dept: REHABILITATION | Facility: HOSPITAL | Age: 75
End: 2025-03-17
Attending: STUDENT IN AN ORGANIZED HEALTH CARE EDUCATION/TRAINING PROGRAM
Payer: COMMERCIAL

## 2025-03-17 DIAGNOSIS — R29.898 DECREASED STRENGTH OF UPPER EXTREMITY: Primary | ICD-10-CM

## 2025-03-17 DIAGNOSIS — M25.611 DECREASED RANGE OF MOTION OF RIGHT SHOULDER: ICD-10-CM

## 2025-03-17 PROCEDURE — 97530 THERAPEUTIC ACTIVITIES: CPT

## 2025-03-17 PROCEDURE — 97112 NEUROMUSCULAR REEDUCATION: CPT

## 2025-03-17 NOTE — PROGRESS NOTES
[facility]   Physical Therapy Treatment Note     Name: Alyx Weir  [facility] Number: [id number]    Therapy Diagnosis:   Encounter Diagnoses   Name Primary?    Decreased strength of upper extremity Yes    Decreased range of motion of right shoulder            Physician: Carl Ruiz    Visit Date: 3/17/2025    Physician Orders: PT Eval and Treat  Medical Diagnosis from Referral: Traumatic incomplete tear of right rotator cuff, subsequent encounter [S46.011D], Subacromial impingement of right shoulder [M75.41]   Evaluation Date: 8/15/2024  Authorization Period Expiration: 6/7/2025  Plan of Care Expiration:    4/08/2025        Progress Update:  3/25/2025  Visit # / Visits authorized: 6/ 12 (From prior auth 36)  FOTO: 12/12 - Scored: 3 / 3       PRECAUTIONS: Standard Precautions, Safety/fall precautions, Orthopedic: Right Cuff Repair, Non-weight beariing, and Orthotic device type: abductor sling, Wearning schedule: full until 6 week follow up      PROBLEM LIST : pain, decreased motion all planes, decreased strength      Date of Surgery/Procedure 8/5/2024- Joseph   Surgery/Procedure Performed Status post Right Rotator Cuff Repair, Subacromial Decompression, Distal Clavicle Dissection, Biceps Tenodesis   Rehabilitation Precautions Protocol: Joseph Cuff Protocol      MD Follow up:       Patient School:     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     Compliance with Hep: Daily    Response to previous treatment: no adverse reactions to treatment/updated HOME EXERCISE PROGRAM    Functional change: Better    Pain: 5/10   with tylenol  Location: Right shoulder      OBJECTIVE   FOTO:  (2/25/2025)    Treatment     Gym/Equipment Access:  none  Time to Complete Exercises: increased for cueing and education    Alyx received the treatments listed below:       CPT Intervention  Right RCR  BT  SAD  DCR Duration/ Details  3/17/2025    TE Arm Bike 3/3 forward and back R:3   TE     TE Series 6  Chest Stretch  Bear Hugs  Swimmers  Snow [place]    TE Seated Shoulder Strap INTERNAL ROTATION stretch    TE Tball     TE Pulleys 3 minutes flexion and 3 minutes scaption   TE Passive Range of motion     TE Assisted Motion    TE Free Weights    TA Seated scapular retractions    TA Seated Lat Pull downs    TA Wall Push ups 2 x 10 reps   TA Mat Plank Hold Taps    TA Face Pulls     TRX low rows       Chest pulls/ diagonals G TB     Shoulder press 3# 3 x 10 reps    Prone extensions  Prone T's 3 x 10 bilateral  3 x 10 bilateral   NMR Band Series      Trio 2# ankle weight at wrist 2 x 10 reps    Shoulder flexion/ scaption/ abduction alternating 3# 1 x 15 reps    Biceps Curls 3 x 10 bilateral 3#             NC Hot Pack  -   PLAN          CPT Codes available for Billing:   (00) minutes of Manual therapy (MT) to improve pain and ROM.  (12) minutes of Therapeutic Exercise (TE) to develop strength, endurance, range of motion, and flexibility.  (19) minutes of Neuromuscular Re-Education (NMR)  to improve: Balance, Coordination, Kinesthetic, Sense, Proprioception, and Posture.  (24) minutes of Therapeutic Activities (TA) to improve functional performance.  (00) minutes of Gait Training (GT) to improve functional gait mechanics.  (00) minutes of Self- Care and Education  Unattended Electrical Stimulation (ES) for muscle performance or pain modulation.  Vasopneumatic Device Therapy () for management of swelling/edema. (48466)  BFR: Blood flow restriction applied during exercise  Functional Performance Test (FPT)  NP or (-): Not Performed     See EMR under MEDIA for written consent provided for Dry Needling- DATE   Palpation Assessment to determine the necessity for Functional  Dry Needling     PATIENT EDUCATION AND HOME EXERCISES      Education/Self-Care provided:  (included in treatment unless specified above) minutes   Patient educated on the impairments noted above and the effects of physical therapy intervention to improve overall condition and Quality of Life  Patient was educated on all the above exercise prior/during/after for proper posture, positioning, and execution for safe performance with home exercise program.      Written Home Exercises Provided: yes. Prefers: [x] Printed [x] Electronic  Exercises were reviewed and Rylee was able to demonstrate them prior to the end of the session.  Rylee demonstrated good understanding of the education provided. See EMR under Patient Instructions for exercises provided during therapy sessions.    ASSESSMENT   Patient tolerated today's session well with no reports of pain overall just fatigue. She is demonstrating improved tolerance to overhead pressing motions with improved scapular mechanics. She reports some slight pain off the tip of the acromion with end range overhead motions.   Alxy Is progressing well towards her goals.   Pt prognosis is Good.     Pt will continue to benefit from skilled outpatient physical therapy to address the deficits listed in the problem list box on initial evaluation, provide pt/family education and to maximize pt's level of independence in the home and community environment.     Pt's spiritual, cultural and educational needs considered and pt agreeable to plan of care and goals.    Anticipated Barriers for therapy: co-morbidities       SHORT TERM GOALS:  progressing Progress Date met   Recent signs and systems trend is improving in order to progress towards Long term goals.  [x] Met  [] Not Met  [] Progressing  9/23   Patient will be independent with Home Exercise Program  in order to further progress and return to maximal function. [x] Met  [] Not Met  [] Progressing  01/14/2025   Pain rating at Worst: 5  /10 in order to progress towards increased independence with activity. [x] Met  [] Not Met  [] Progressing   01/14/2025   Patient will be able to correct postural deviations in sitting and standing, to decrease pain and promote postural awareness for injury prevention.  [x] Met  [] Not Met  [] Progressing  11/13   Patient will improve functional outcome (FOTO) score: by 5% to increase self-worth & perceived functional ability towards long term goals [x] Met  [] Not Met  [] Progressing  11/13      LONG TERM GOALS: Discharge (~Jan 2025) Progress Date met   Patient will return to normal activites of daily living, recreational, and work related activities with less pain and limitation.  [] Met  [] Not Met  [x] Progressing     Patient will improve range of motion  to stated goals in order to return to maximal functional potential.  [] Met  [] Not Met  [x] Progressing     Patient will improve Strength to stated goals of appropriate musculature in order to improve functional independence.  [] Met  [] Not Met  [x] Progressing     Pain Rating at Best: 1/10 to improve Quality of Life.  [] Met  [] Not Met  [x] Progressing     Patient will meet predicted functional outcome (FOTO) score: 80% to increase self-worth & perceived functional ability. [] Met  [] Not Met  [x] Progressing     Patient will have met/partially met personal goal of: to get back to daily activities, community based activities, and social activities.  [] Met  [] Not Met  [x] Progressing           PLAN   Updated Plan of care Certification: 02/25/2025 to 4/08/2025     Outpatient Physical Therapy 2 times weekly for 6 weeks to include any combination of the following interventions: virtual visits, dry needling, modalities, electrical stimulation (IFC, Pre-Mod, Attended with Functional Dry Needling), Manual Therapy, Moist Heat/ Ice, Neuromuscular Re-ed, Patient Education, Self Care, Therapeutic Exercise, Functional Training, and Therapeutic Activites     Cl  Alannah, PT

## 2025-03-20 ENCOUNTER — CLINICAL SUPPORT (OUTPATIENT)
Dept: REHABILITATION | Facility: HOSPITAL | Age: 75
End: 2025-03-20
Attending: STUDENT IN AN ORGANIZED HEALTH CARE EDUCATION/TRAINING PROGRAM
Payer: COMMERCIAL

## 2025-03-20 DIAGNOSIS — M25.611 DECREASED RANGE OF MOTION OF RIGHT SHOULDER: ICD-10-CM

## 2025-03-20 DIAGNOSIS — R29.898 DECREASED STRENGTH OF UPPER EXTREMITY: Primary | ICD-10-CM

## 2025-03-20 PROCEDURE — 97530 THERAPEUTIC ACTIVITIES: CPT

## 2025-03-20 NOTE — PROGRESS NOTES
[facility]   Physical Therapy Treatment Note     Name: Alyx Weir  [facility] Number: [id number]    Therapy Diagnosis:   Encounter Diagnoses   Name Primary?    Decreased strength of upper extremity Yes    Decreased range of motion of right shoulder              Physician: Carl Ruiz    Visit Date: 3/20/2025    Physician Orders: PT Eval and Treat  Medical Diagnosis from Referral: Traumatic incomplete tear of right rotator cuff, subsequent encounter [S46.011D], Subacromial impingement of right shoulder [M75.41]   Evaluation Date: 8/15/2024  Authorization Period Expiration: 6/7/2025  Plan of Care Expiration: 4/08/2025        Progress Update: 3/25/2025  Visit # / Visits authorized: 6/ 12 (From prior auth 36)  FOTO: 12/12 - Scored: 3 / 3       PRECAUTIONS: Standard Precautions, Safety/fall precautions, Orthopedic: Right Cuff Repair, Non-weight beariing, and Orthotic device type: abductor sling, Wearning schedule: full until 6 week follow up      PROBLEM LIST : pain, decreased motion all planes, decreased strength      Date of Surgery/Procedure 8/5/2024- Joseph   Surgery/Procedure Performed Status post Right Rotator Cuff Repair, Subacromial Decompression, Distal Clavicle Dissection, Biceps Tenodesis   Rehabilitation Precautions Protocol: Joseph Cuff Protocol      MD Follow up:       Patient School:     Job/Position: Works at an alteration store    PTA Visit #: 0/5     Time In: 12:30 PM  Time Out: 1:30 PM  Total Billable Time: 30 minutes    SUBJECTIVE     Pt reports: she is feeling good today, still has pain with putting her arm above her head near the tip of her shoulder.     Compliance with Hep: Daily    Response to previous treatment: no adverse reactions to treatment/updated HOME EXERCISE PROGRAM    Functional change: Better    Pain: 5/10   with tylenol  Location: Right shoulder      OBJECTIVE   FOTO:  (2/25/2025)    Treatment     Gym/Equipment Access: none  Time to Complete  Exercises: increased for cueing and education    Alyx received the treatments listed below:       CPT Intervention  Right RCR  BT  SAD  DCR Duration/ Details  3/20/2025    TE Arm Bike 3/3 forward and back R:3   TE     TE Series 6  Chest Stretch  Bear Hugs  Swimmers  Snow [place]    TE Seated Shoulder Strap INTERNAL ROTATION stretch 3 mins    TE Tball     TE Pulleys 3 minutes flexion and 3 minutes scaption   TE Passive Range of motion     TE Assisted Motion    TE Free Weights    TA Seated scapular retractions    TA Seated Lat Pull downs    TA Wall Push ups 2 x 10 reps   TA Mat Plank Hold Taps    TA Face Pulls     TRX low rows       Chest pulls/ diagonals G TB     Shoulder press 3# 3 x 10 reps    Prone extensions  Prone T's 3 x 10 bilateral  3 x 10 bilateral   NMR Band Series      Trio 2# ankle weight at wrist 2 x 10 reps    Shoulder flexion/ scaption/ abduction alternating 3# 1 x 15 reps    Biceps Curls 3 x 10 bilateral 3#             NC Hot Pack  -   PLAN          CPT Codes available for Billing:   (00) minutes of Manual therapy (MT) to improve pain and ROM.  (12) minutes of Therapeutic Exercise (TE) to develop strength, endurance, range of motion, and flexibility.  (19) minutes of Neuromuscular Re-Education (NMR)  to improve: Balance, Coordination, Kinesthetic, Sense, Proprioception, and Posture.  (24) minutes of Therapeutic Activities (TA) to improve functional performance.  (00) minutes of Gait Training (GT) to improve functional gait mechanics.  (00) minutes of Self- Care and Education  Unattended Electrical Stimulation (ES) for muscle performance or pain modulation.  Vasopneumatic Device Therapy () for management of swelling/edema. (06784)  BFR: Blood flow restriction applied during exercise  Functional Performance Test (FPT)  NP or (-): Not Performed     See EMR under MEDIA for written consent provided for Dry Needling- DATE   Palpation Assessment to determine the necessity for Functional Dry Needling      PATIENT EDUCATION AND HOME EXERCISES      Education/Self-Care provided:  (included in treatment unless specified above) minutes   Patient educated on the impairments noted above and the effects of physical therapy intervention to improve overall condition and Quality of Life  Patient was educated on all the above exercise prior/during/after for proper posture, positioning, and execution for safe performance with home exercise program.      Written Home Exercises Provided: yes. Prefers: [x] Printed [x] Electronic  Exercises were reviewed and Rylee was able to demonstrate them prior to the end of the session.  Rylee demonstrated good understanding of the education provided. See EMR under Patient Instructions for exercises provided during therapy sessions.    ASSESSMENT   Patient tolerated today's session well with no reports of increase in pain overall just fatigue. She demonstrates good AROM, but endorses constant pain that doesn't seem to increase much but is generally just still ache in her shoulder.   Alyx Is progressing well towards her goals.   Pt prognosis is Good.     Pt will continue to benefit from skilled outpatient physical therapy to address the deficits listed in the problem list box on initial evaluation, provide pt/family education and to maximize pt's level of independence in the home and community environment.     Pt's spiritual, cultural and educational needs considered and pt agreeable to plan of care and goals.    Anticipated Barriers for therapy: co-morbidities       SHORT TERM GOALS:  progressing Progress Date met   Recent signs and systems trend is improving in order to progress towards Long term goals.  [x] Met  [] Not Met  [] Progressing  9/23   Patient will be independent with Home Exercise Program  in order to further progress and return to maximal function. [x] Met  [] Not Met  [] Progressing  01/14/2025   Pain rating at Worst: 5 /10 in order to progress towards increased independence  with activity. [x] Met  [] Not Met  [] Progressing   01/14/2025   Patient will be able to correct postural deviations in sitting and standing, to decrease pain and promote postural awareness for injury prevention.  [x] Met  [] Not Met  [] Progressing  11/13   Patient will improve functional outcome (FOTO) score: by 5% to increase self-worth & perceived functional ability towards long term goals [x] Met  [] Not Met  [] Progressing  11/13      LONG TERM GOALS: Discharge (~Jan 2025) Progress Date met   Patient will return to normal activites of daily living, recreational, and work related activities with less pain and limitation.  [] Met  [] Not Met  [x] Progressing     Patient will improve range of motion  to stated goals in order to return to maximal functional potential.  [] Met  [] Not Met  [x] Progressing     Patient will improve Strength to stated goals of appropriate musculature in order to improve functional independence.  [] Met  [] Not Met  [x] Progressing     Pain Rating at Best: 1/10 to improve Quality of Life.  [] Met  [] Not Met  [x] Progressing     Patient will meet predicted functional outcome (FOTO) score: 80% to increase self-worth & perceived functional ability. [] Met  [] Not Met  [x] Progressing     Patient will have met/partially met personal goal of: to get back to daily activities, community based activities, and social activities.  [] Met  [] Not Met  [x] Progressing           PLAN   Updated Plan of care Certification: 02/25/2025 to 4/08/2025     Outpatient Physical Therapy 2 times weekly for 6 weeks to include any combination of the following interventions: virtual visits, dry needling, modalities, electrical stimulation (IFC, Pre-Mod, Attended with Functional Dry Needling), Manual Therapy, Moist Heat/ Ice, Neuromuscular Re-ed, Patient Education, Self Care, Therapeutic Exercise, Functional Training, and Therapeutic Activites     Cl Jaffe PT

## 2025-03-27 ENCOUNTER — CLINICAL SUPPORT (OUTPATIENT)
Dept: REHABILITATION | Facility: HOSPITAL | Age: 75
End: 2025-03-27
Attending: STUDENT IN AN ORGANIZED HEALTH CARE EDUCATION/TRAINING PROGRAM
Payer: COMMERCIAL

## 2025-03-27 DIAGNOSIS — R29.898 DECREASED STRENGTH OF UPPER EXTREMITY: Primary | ICD-10-CM

## 2025-03-27 DIAGNOSIS — M25.611 DECREASED RANGE OF MOTION OF RIGHT SHOULDER: ICD-10-CM

## 2025-03-27 PROCEDURE — 97112 NEUROMUSCULAR REEDUCATION: CPT | Mod: CQ

## 2025-03-27 PROCEDURE — 97530 THERAPEUTIC ACTIVITIES: CPT | Mod: CQ

## 2025-03-27 PROCEDURE — 97110 THERAPEUTIC EXERCISES: CPT | Mod: CQ

## 2025-03-27 NOTE — PROGRESS NOTES
[facility]   Physical Therapy Treatment Note     Name: Alyx Weir  [facility] Number: [id number]    Therapy Diagnosis:   Encounter Diagnoses   Name Primary?    Decreased strength of upper extremity Yes    Decreased range of motion of right shoulder              Physician: Carl Ruiz    Visit Date: 3/27/2025    Physician Orders: PT Eval and Treat  Medical Diagnosis from Referral: Traumatic incomplete tear of right rotator cuff, subsequent encounter [S46.011D], Subacromial impingement of right shoulder [M75.41]   Evaluation Date: 8/15/2024  Authorization Period Expiration: 6/7/2025  Plan of Care Expiration: 4/08/2025        Progress Update: 3/25/2025  Visit # / Visits authorized: 10/ 12 (From prior auth 36)  FOTO: 12/12 - Scored: 3 / 3       PRECAUTIONS: Standard Precautions, Safety/fall precautions, Orthopedic: Right Cuff Repair, Non-weight beariing, and Orthotic device type: abductor sling, Wearning schedule: full until 6 week follow up      PROBLEM LIST : pain, decreased motion all planes, decreased strength      Date of Surgery/Procedure 8/5/2024- Joseph   Surgery/Procedure Performed Status post Right Rotator Cuff Repair, Subacromial Decompression, Distal Clavicle Dissection, Biceps Tenodesis   Rehabilitation Precautions Protocol: Joseph Cuff Protocol      MD Follow up:       Patient School:     Job/Position: Works at an alteration store    PTA Visit #: 1/5     Time In: 11:00 am   Time Out: 11:45 am   Total Billable Time: 45 minutes    SUBJECTIVE     Pt reports: she is having stomach issues today and is not feeling 100%.     Compliance with Hep: Daily    Response to previous treatment: no adverse reactions to treatment/updated HOME EXERCISE PROGRAM    Functional change: Better    Pain: 5/10   with tylenol  Location: Right shoulder      OBJECTIVE       Treatment     Gym/Equipment Access: none  Time to Complete Exercises: increased for cueing and education    Alyx barbie  the treatments listed below:       CPT Intervention  Right RCR  BT  SAD  DCR Duration/ Details  3/27/2025    TE Arm Bike 3/3 forward and back R:3   TE     TE Series 6  Chest Stretch  Bear Hugs  Swimmers  Snow [place]    TE Seated Shoulder Strap INTERNAL ROTATION stretch    TE Tball     TE Pulleys 3 minutes flexion and 3 minutes scaption   TE Passive Range of motion     TE Assisted Motion    TE Free Weights    TA  standing scapular retractions 3 x 10 red band   TA Seated Lat Pull downs    TA Wall Push ups 3 x 10 reps hands on airex pad   TA Mat Plank Hold Taps    TA Face Pulls     TRX low rows       Chest pulls/ diagonals G TB     Shoulder press 3#     Prone extensions  Prone T's 3 x 10 bilateral  3 x 10 bilateral   NMR Band Series      Trio 2# ankle weight at wrist     Shoulder external rotation  3 x 10 2 pound ankle weight at wrist    Shoulder flexion/ scaption/ abduction alternating 3# 1 x 15 reps    Biceps Curls 3 x 10 bilateral 3#             NC Hot Pack  -   PLAN          CPT Codes available for Billing:   (00) minutes of Manual therapy (MT) to improve pain and ROM.  (12) minutes of Therapeutic Exercise (TE) to develop strength, endurance, range of motion, and flexibility.  (23) minutes of Neuromuscular Re-Education (NMR)  to improve: Balance, Coordination, Kinesthetic, Sense, Proprioception, and Posture.  (10) minutes of Therapeutic Activities (TA) to improve functional performance.  (00) minutes of Gait Training (GT) to improve functional gait mechanics.  (00) minutes of Self- Care and Education  Unattended Electrical Stimulation (ES) for muscle performance or pain modulation.  Vasopneumatic Device Therapy () for management of swelling/edema. (07680)  BFR: Blood flow restriction applied during exercise  Functional Performance Test (FPT)  NP or (-): Not Performed     See EMR under MEDIA for written consent provided for Dry Needling- DATE   Palpation Assessment to determine the necessity for Functional Dry  Needling     PATIENT EDUCATION AND HOME EXERCISES      Education/Self-Care provided:  (included in treatment unless specified above) minutes   Patient educated on the impairments noted above and the effects of physical therapy intervention to improve overall condition and Quality of Life  Patient was educated on all the above exercise prior/during/after for proper posture, positioning, and execution for safe performance with home exercise program.      Written Home Exercises Provided: yes. Prefers: [x] Printed [x] Electronic  Exercises were reviewed and Rylee was able to demonstrate them prior to the end of the session.  Rylee demonstrated good understanding of the education provided. See EMR under Patient Instructions for exercises provided during therapy sessions.    ASSESSMENT   Patient tolerated today's session well with no reports of increase in pain overall just fatigue. She requires cues for correct upper extremity position to avoid impingement with shoulder abduction. She demonstrates good ACTIVE RANGE OF MOTION.she declines to perform shoulder press being concerned that this exercise will hurt her right shoulder.      Alyx Is progressing well towards her goals.   Pt prognosis is Good.     Pt will continue to benefit from skilled outpatient physical therapy to address the deficits listed in the problem list box on initial evaluation, provide pt/family education and to maximize pt's level of independence in the home and community environment.     Pt's spiritual, cultural and educational needs considered and pt agreeable to plan of care and goals.    Anticipated Barriers for therapy: co-morbidities       SHORT TERM GOALS:  progressing Progress Date met   Recent signs and systems trend is improving in order to progress towards Long term goals.  [x] Met  [] Not Met  [] Progressing  9/23   Patient will be independent with Home Exercise Program  in order to further progress and return to maximal function. [x]  Met  [] Not Met  [] Progressing  01/14/2025   Pain rating at Worst: 5 /10 in order to progress towards increased independence with activity. [x] Met  [] Not Met  [] Progressing   01/14/2025   Patient will be able to correct postural deviations in sitting and standing, to decrease pain and promote postural awareness for injury prevention.  [x] Met  [] Not Met  [] Progressing  11/13   Patient will improve functional outcome (FOTO) score: by 5% to increase self-worth & perceived functional ability towards long term goals [x] Met  [] Not Met  [] Progressing  11/13      LONG TERM GOALS: Discharge (~Jan 2025) Progress Date met   Patient will return to normal activites of daily living, recreational, and work related activities with less pain and limitation.  [] Met  [] Not Met  [x] Progressing     Patient will improve range of motion  to stated goals in order to return to maximal functional potential.  [] Met  [] Not Met  [x] Progressing     Patient will improve Strength to stated goals of appropriate musculature in order to improve functional independence.  [] Met  [] Not Met  [x] Progressing     Pain Rating at Best: 1/10 to improve Quality of Life.  [] Met  [] Not Met  [x] Progressing     Patient will meet predicted functional outcome (FOTO) score: 80% to increase self-worth & perceived functional ability. [] Met  [] Not Met  [x] Progressing     Patient will have met/partially met personal goal of: to get back to daily activities, community based activities, and social activities.  [] Met  [] Not Met  [x] Progressing           PLAN   Updated Plan of care Certification: 02/25/2025 to 4/08/2025     Outpatient Physical Therapy 2 times weekly for 6 weeks to include any combination of the following interventions: virtual visits, dry needling, modalities, electrical stimulation (IFC, Pre-Mod, Attended with Functional Dry Needling), Manual Therapy, Moist Heat/ Ice, Neuromuscular Re-ed, Patient Education, Self Care, Therapeutic  Exercise, Functional Training, and Therapeutic Activites     Kaleb Rousseau, PTA

## 2025-03-27 NOTE — PROGRESS NOTES
Orthopaedics  Post-operative follow-up    Procedure Performed:  1. Right shoulder arthroscopic rotator cuff repair  2. Right shoulder arthroscopic biceps tenodesis  3. Right shoulder subacromial decompression     Date of Surgery: 8/5/24    History of Present Illness    CHIEF COMPLAINT:  - Follow-up s/p shoulder surgery    HPI:  Alyx presents for follow-up after shoulder surgery, reporting improvement since her last visit. She describes pain in her side, particularly during certain PT exercises. Lifting her arm above her head causes significant discomfort in the thoracic spine and flank. Pain led her to miss one week of therapy, attending only once that week. She resumed therapy the following week with a modified routine. Putting her arm behind her back remains bothersome.    She returned to work on January 2nd, currently working reduced hours of four hours per day. She expresses a desire to increase her work hours to five days a week. 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 She does not feel capable of handling these heavy rolls, and even at home, certain tasks are limited due to the weight of some items.    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 She has adapted her technique to manage this task, stating that she stands to place the hoop but sits to take it apart.    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  She has been compliant with post-operative restrictions and has been progressing with PT at [facility].     Exam:  Incision sites healed, C/D/I  No swelling or bruising noted  Fluid ROM with no crepitus  Upright Active ROM: , , ER 60  Cuff strength intact   Axillary nerve sensation and motor intact  Motor and sensory intact distally  Strong radial pulse, fingers warm and well perfused    Imaging:  No new imaging.     Impression:  8 months s/p right shoulder arthroscopic rotator cuff repair, biceps tenodesis, and subacromial decompression    Plan:  She has made significant progress in her function since last visit.  She has full range of motion and good strength.  She is able to perform activities of daily living.  She is reluctant to try and  anything heavy, specifically fabric rolls at work.  She has done well postoperatively.  She is able to perform nearly every required duty at work except for manipulating and moving some of the heavier fabric rolls.  I am not sure she will ever be able to do this.  Because of this, I think she is at I.  Will plan to proceed with FCE at this time for evaluation of any permanent restrictions.  Instructed patient to call clinic if questions or concerns     Follow-up with me after FCE.     Work status:   1) May return to work full-time at this time  2) Intermittent lifting/pushing/pulling up to 15 lbs         Carl Ruiz MD    I, Estiven Velazquez, acted as a scribe for Carl Ruiz MD for the duration of this office visit.    This note was generated with the assistance of ambient listening technology. Verbal consent was obtained by the patient and accompanying visitor(s) for the recording of patient appointment to facilitate this note. I attest to having reviewed and edited the generated note for  accuracy, though some syntax or spelling errors may persist. Please contact the author of this note for any clarification.

## 2025-03-31 ENCOUNTER — LAB VISIT (OUTPATIENT)
Dept: LAB | Facility: HOSPITAL | Age: 75
End: 2025-03-31
Attending: INTERNAL MEDICINE
Payer: MEDICARE

## 2025-03-31 ENCOUNTER — OFFICE VISIT (OUTPATIENT)
Dept: FAMILY MEDICINE | Facility: CLINIC | Age: 75
End: 2025-03-31
Payer: MEDICARE

## 2025-03-31 VITALS
WEIGHT: 170.75 LBS | HEART RATE: 63 BPM | SYSTOLIC BLOOD PRESSURE: 138 MMHG | TEMPERATURE: 97 F | BODY MASS INDEX: 30.25 KG/M2 | DIASTOLIC BLOOD PRESSURE: 76 MMHG | HEIGHT: 63 IN | OXYGEN SATURATION: 98 %

## 2025-03-31 DIAGNOSIS — Z78.9 STATIN INTOLERANCE: Primary | ICD-10-CM

## 2025-03-31 DIAGNOSIS — D68.51 HETEROZYGOUS FACTOR V LEIDEN MUTATION: Chronic | ICD-10-CM

## 2025-03-31 DIAGNOSIS — Z79.01 ANTICOAGULATED ON COUMADIN: Chronic | ICD-10-CM

## 2025-03-31 DIAGNOSIS — Z86.718 HISTORY OF DVT (DEEP VEIN THROMBOSIS): ICD-10-CM

## 2025-03-31 DIAGNOSIS — I10 ESSENTIAL HYPERTENSION: Chronic | ICD-10-CM

## 2025-03-31 DIAGNOSIS — R73.02 IGT (IMPAIRED GLUCOSE TOLERANCE): ICD-10-CM

## 2025-03-31 DIAGNOSIS — I10 ESSENTIAL HYPERTENSION: ICD-10-CM

## 2025-03-31 DIAGNOSIS — E78.5 DYSLIPIDEMIA: ICD-10-CM

## 2025-03-31 LAB
EAG (OHS): 117 MG/DL (ref 68–131)
HBA1C MFR BLD: 5.7 % (ref 4–5.6)

## 2025-03-31 PROCEDURE — 84443 ASSAY THYROID STIM HORMONE: CPT | Mod: HCNC

## 2025-03-31 PROCEDURE — 84439 ASSAY OF FREE THYROXINE: CPT | Mod: HCNC

## 2025-03-31 PROCEDURE — 3288F FALL RISK ASSESSMENT DOCD: CPT | Mod: HCNC,CPTII,S$GLB, | Performed by: INTERNAL MEDICINE

## 2025-03-31 PROCEDURE — 3072F LOW RISK FOR RETINOPATHY: CPT | Mod: HCNC,CPTII,S$GLB, | Performed by: INTERNAL MEDICINE

## 2025-03-31 PROCEDURE — 36415 COLL VENOUS BLD VENIPUNCTURE: CPT | Mod: HCNC,PO

## 2025-03-31 PROCEDURE — 1125F AMNT PAIN NOTED PAIN PRSNT: CPT | Mod: HCNC,CPTII,S$GLB, | Performed by: INTERNAL MEDICINE

## 2025-03-31 PROCEDURE — 3008F BODY MASS INDEX DOCD: CPT | Mod: HCNC,CPTII,S$GLB, | Performed by: INTERNAL MEDICINE

## 2025-03-31 PROCEDURE — 83036 HEMOGLOBIN GLYCOSYLATED A1C: CPT | Mod: HCNC

## 2025-03-31 PROCEDURE — 3075F SYST BP GE 130 - 139MM HG: CPT | Mod: HCNC,CPTII,S$GLB, | Performed by: INTERNAL MEDICINE

## 2025-03-31 PROCEDURE — 3078F DIAST BP <80 MM HG: CPT | Mod: HCNC,CPTII,S$GLB, | Performed by: INTERNAL MEDICINE

## 2025-03-31 PROCEDURE — 99214 OFFICE O/P EST MOD 30 MIN: CPT | Mod: HCNC,S$GLB,, | Performed by: INTERNAL MEDICINE

## 2025-03-31 PROCEDURE — 99999 PR PBB SHADOW E&M-EST. PATIENT-LVL III: CPT | Mod: PBBFAC,HCNC,, | Performed by: INTERNAL MEDICINE

## 2025-03-31 PROCEDURE — 1101F PT FALLS ASSESS-DOCD LE1/YR: CPT | Mod: HCNC,CPTII,S$GLB, | Performed by: INTERNAL MEDICINE

## 2025-03-31 RX ORDER — GABAPENTIN 100 MG/1
100 CAPSULE ORAL NIGHTLY PRN
Qty: 90 CAPSULE | Refills: 3 | Status: SHIPPED | OUTPATIENT
Start: 2025-03-31 | End: 2026-03-31

## 2025-03-31 NOTE — PROGRESS NOTES
Subjective:       Patient ID: Alyx Weir is a 75 y.o. female.    Chief Complaint: Follow-up, Hypertension, Hyperlipidemia, and Anticoagulation    Follow-up  Associated symptoms include arthralgias and myalgias. Pertinent negatives include no abdominal pain, chest pain, chills, congestion, coughing, diaphoresis, fatigue, fever, headaches, joint swelling, nausea, neck pain, numbness, rash, sore throat, vomiting or weakness.   Hypertension  Pertinent negatives include no chest pain, headaches, neck pain, palpitations or shortness of breath.   Hyperlipidemia  Associated symptoms include myalgias. Pertinent negatives include no chest pain or shortness of breath.     Past Medical History:   Diagnosis Date    Anticoagulant long-term use     Anticoagulated on Coumadin     Arm weakness-rotator cuff weakness 11/02/2015    Arthritis     Asthma     Clotting disorder     Coronary artery disease     Deep vein thrombosis     Degenerative disc disease     Diabetes mellitus type I 2011    BS last tested x 1 month not sure what it was.    Heterozygous factor V Leiden mutation     Hx of colonic polyps 11/13/2015    Hyperlipidemia     Hypertension     VIRGIL (obstructive sleep apnea)     Stenosis of right carotid artery 12/13/2016     Past Surgical History:   Procedure Laterality Date    APPENDECTOMY      ARTHROSCOPIC REPAIR OF ROTATOR CUFF OF SHOULDER Right 08/05/2024    Procedure: REPAIR, ROTATOR CUFF, ARTHROSCOPIC;  Surgeon: Carl Ruiz MD;  Location: [facility] OR;  Service: Orthopedics;  Laterality: Right;  1. Right shoulder arthroscopic rotator cuff repair  2. Right shoulder arthroscopic biceps tenodesis  3. Right shoulder subacromial decompression    ARTHROSCOPY OF SHOULDER WITH DECOMPRESSION OF SUBACROMIAL SPACE Right 08/05/2024    Procedure: ARTHROSCOPY, SHOULDER, WITH SUBACROMIAL SPACE DECOMPRESSION;  Surgeon: Carl Ruiz MD;  Location: [facility] OR;  Service: Orthopedics;  Laterality: Right;     ARTHROSCOPY,SHOULDER,WITH BICEPS TENODESIS Right 08/05/2024    Procedure: ARTHROSCOPY,SHOULDER,WITH BICEPS TENODESIS;  Surgeon: Carl Ruiz MD;  Location: [facility] OR;  Service: Orthopedics;  Laterality: Right;    BACK SURGERY      bladder cyst removal      BLADDER SURGERY      CARDIAC CATHETERIZATION  03/2013    mild CAD    COLONOSCOPY N/A 11/13/2015    Procedure: COLONOSCOPY;  Surgeon: Jas Umanzor III, MD;  Location: [facility];  Service: Endoscopy;  Laterality: N/A;    HYSTERECTOMY      28 yrs old    LUMBAR SPINE SURGERY      bone spurs removed    OOPHORECTOMY      SELECTIVE INJECTION OF ANESTHETIC AGENT AROUND LUMBAR SPINAL NERVE ROOT BY TRANSFORAMINAL APPROACH Bilateral 06/03/2022    Procedure: Bilateral L4/5 TF ASUNCION with RN IV sedation; on Coumadin, will need INR prior to procedure, must be less than 1.3;  Surgeon: Mario Palacios MD;  Location: [facility];  Service: Pain Management;  Laterality: Bilateral;     Family History   Problem Relation Name Age of Onset    Heart disease Mother Jass [place]     Hypertension Mother Jass [place]     Cataracts Mother Jass [place]     Arthritis Mother Jass [place]     Diabetes Father Reji     Hypertension Sister Jannet     Glaucoma Sister Jannet     Ovarian cancer Sister Jannet     Hypertension Sister Kenisha     Diabetes Sister Kenisha     Hypertension Sister Veda     Diabetes Sister Veda     Diabetes Brother Eliot     Hypertension Brother Eliot     Diabetes Paternal Uncle Rikki     Breast cancer Neg Hx      Colon cancer Neg Hx       Social History[1]  Review of Systems   Constitutional:  Negative for activity change, appetite change, chills, diaphoresis, fatigue, fever and unexpected weight change.   HENT:  Negative for congestion, drooling, ear discharge, ear pain, facial swelling, hearing loss, mouth sores, nosebleeds, postnasal drip, rhinorrhea, sinus pressure, sneezing, sore throat, tinnitus, trouble swallowing and voice change.    Eyes:  Negative for  photophobia, redness and visual disturbance.   Respiratory:  Negative for apnea, cough, choking, chest tightness, shortness of breath and wheezing.    Cardiovascular:  Negative for chest pain, palpitations and leg swelling.   Gastrointestinal:  Negative for abdominal distention, abdominal pain, blood in stool, constipation, diarrhea, nausea and vomiting.   Endocrine: Negative for cold intolerance, heat intolerance, polydipsia, polyphagia and polyuria.   Genitourinary:  Negative for decreased urine volume, difficulty urinating, dysuria, flank pain, frequency, genital sores, hematuria, pelvic pain and urgency.   Musculoskeletal:  Positive for arthralgias and myalgias. Negative for back pain, gait problem, joint swelling, neck pain and neck stiffness.   Skin:  Negative for color change, pallor, rash and wound.   Allergic/Immunologic: Negative for food allergies and immunocompromised state.   Neurological:  Negative for dizziness, tremors, seizures, syncope, speech difficulty, weakness, light-headedness, numbness and headaches.   Hematological:  Negative for adenopathy. Does not bruise/bleed easily.   Psychiatric/Behavioral:  Negative for agitation, behavioral problems, confusion, decreased concentration, dysphoric mood, hallucinations, self-injury, sleep disturbance and suicidal ideas. The patient is not nervous/anxious and is not hyperactive.    All other systems reviewed and are negative.      Objective:      Physical Exam  Vitals and nursing note reviewed.   Constitutional:       General: She is not in acute distress.     Appearance: Normal appearance. She is well-developed. She is not diaphoretic.   HENT:      Head: Normocephalic and atraumatic.      Mouth/Throat:      Pharynx: No oropharyngeal exudate.   Eyes:      General: No scleral icterus.  Neck:      Thyroid: No thyromegaly.      Vascular: No carotid bruit or JVD.      Trachea: No tracheal deviation.   Cardiovascular:      Rate and Rhythm: Normal rate and  regular rhythm.      Heart sounds: Normal heart sounds.   Pulmonary:      Effort: Pulmonary effort is normal. No respiratory distress.      Breath sounds: Normal breath sounds. No wheezing or rales.   Chest:      Chest wall: No tenderness.   Abdominal:      General: Bowel sounds are normal. There is no distension.      Palpations: Abdomen is soft.      Tenderness: There is no abdominal tenderness. There is no guarding or rebound.   Musculoskeletal:         General: No tenderness. Normal range of motion.      Cervical back: Normal range of motion and neck supple.   Lymphadenopathy:      Cervical: No cervical adenopathy.   Skin:     General: Skin is warm and dry.      Coloration: Skin is not pale.      Findings: No erythema or rash.   Neurological:      Mental Status: She is alert and oriented to person, place, and time.      Cranial Nerves: No cranial nerve deficit.      Coordination: Coordination normal.   Psychiatric:         Behavior: Behavior normal.         Thought Content: Thought content normal.         Judgment: Judgment normal.         CMP  Sodium   Date Value Ref Range Status   02/17/2025 139 136 - 145 mmol/L Final     Potassium   Date Value Ref Range Status   02/17/2025 3.7 3.5 - 5.1 mmol/L Final     Chloride   Date Value Ref Range Status   02/17/2025 101 95 - 110 mmol/L Final     CO2   Date Value Ref Range Status   02/17/2025 26 23 - 29 mmol/L Final     Glucose   Date Value Ref Range Status   02/17/2025 90 70 - 110 mg/dL Final     BUN   Date Value Ref Range Status   02/17/2025 14 8 - 23 mg/dL Final     Creatinine   Date Value Ref Range Status   02/17/2025 0.8 0.5 - 1.4 mg/dL Final     Calcium   Date Value Ref Range Status   02/17/2025 10.2 8.7 - 10.5 mg/dL Final     Total Protein   Date Value Ref Range Status   02/17/2025 8.2 6.0 - 8.4 g/dL Final     Albumin   Date Value Ref Range Status   02/17/2025 4.3 3.5 - 5.2 g/dL Final     Total Bilirubin   Date Value Ref Range Status   02/17/2025 0.6 0.1 - 1.0  mg/dL Final     Comment:     For infants and newborns, interpretation of results should be based  on gestational age, weight and in agreement with clinical  observations.    Premature Infant recommended reference ranges:  Up to 24 hours.............<8.0 mg/dL  Up to 48 hours............<12.0 mg/dL  3-5 days..................<15.0 mg/dL  6-29 days.................<15.0 mg/dL       Alkaline Phosphatase   Date Value Ref Range Status   02/17/2025 51 40 - 150 U/L Final     AST   Date Value Ref Range Status   02/17/2025 15 10 - 40 U/L Final     ALT   Date Value Ref Range Status   02/17/2025 17 10 - 44 U/L Final     Anion Gap   Date Value Ref Range Status   02/17/2025 12 8 - 16 mmol/L Final     eGFR if    Date Value Ref Range Status   02/16/2022 >60 >60 mL/min/1.73 m^2 Final     eGFR if non    Date Value Ref Range Status   02/16/2022 >60 >60 mL/min/1.73 m^2 Final     Comment:     Calculation used to obtain the estimated glomerular filtration  rate (eGFR) is the CKD-EPI equation.        Lab Results   Component Value Date    WBC 9.88 02/17/2025    HGB 13.5 02/17/2025    HCT 41.0 02/17/2025    MCV 87 02/17/2025     02/17/2025     Lab Results   Component Value Date    CHOL 198 11/29/2023     Lab Results   Component Value Date    HDL 72 11/29/2023     Lab Results   Component Value Date    LDLCALC 110.6 11/29/2023     Lab Results   Component Value Date    TRIG 77 11/29/2023     Lab Results   Component Value Date    CHOLHDL 36.4 11/29/2023     Lab Results   Component Value Date    TSH 0.847 05/29/2024     Lab Results   Component Value Date    HGBA1C 5.7 (H) 12/23/2024     Assessment:       1. Statin intolerance    2. Essential hypertension    3. Dyslipidemia    4. Anticoagulated on Coumadin    5. History of DVT (deep vein thrombosis)    6. Heterozygous factor V Leiden mutation    7. IGT (impaired glucose tolerance)        Plan:   Statin intolerance    Essential hypertension  -     TSH;  Future; Expected date: 2025    Dyslipidemia    Anticoagulated on Coumadin    History of DVT (deep vein thrombosis)    Heterozygous factor V Leiden mutation    IGT (impaired glucose tolerance)  -     Hemoglobin A1C; Future; Expected date: 2025    Other orders  -     gabapentin (NEURONTIN) 100 MG capsule; Take 1 capsule (100 mg total) by mouth nightly as needed.  Dispense: 90 capsule; Refill: 3      Stable-----------continue meds-----as above---------f/u 6 months----colon-       [1]   Social History  Socioeconomic History    Marital status:    Tobacco Use    Smoking status: Never     Passive exposure: Past    Smokeless tobacco: Never   Substance and Sexual Activity    Alcohol use: Never    Drug use: Never    Sexual activity: Not Currently     Partners: Male     Birth control/protection: None     Comment:     Social History Narrative    Dogs in household, no smokers.     Social Drivers of Health     Financial Resource Strain: Medium Risk (2024)    Overall Financial Resource Strain (CARDIA)     Difficulty of Paying Living Expenses: Somewhat hard   Food Insecurity: No Food Insecurity (2024)    Hunger Vital Sign     Worried About Running Out of Food in the Last Year: Never true     Ran Out of Food in the Last Year: Never true   Transportation Needs: No Transportation Needs (2023)    PRAPARE - Transportation     Lack of Transportation (Medical): No     Lack of Transportation (Non-Medical): No   Physical Activity: Unknown (2024)    Exercise Vital Sign     Days of Exercise per Week: 0 days   Recent Concern: Physical Activity - Inactive (2024)    Exercise Vital Sign     Days of Exercise per Week: 0 days     Minutes of Exercise per Session: 60 min   Stress: Stress Concern Present (2024)    [employer] - Occupational Stress Questionnaire     Feeling of Stress : To some extent   Housing Stability: Low Risk  (2023)    Housing  Stability Vital Sign     Unable to Pay for Housing in the Last Year: No     Number of Places Lived in the Last Year: 1     Unstable Housing in the Last Year: No

## 2025-04-01 ENCOUNTER — PATIENT MESSAGE (OUTPATIENT)
Dept: FAMILY MEDICINE | Facility: CLINIC | Age: 75
End: 2025-04-01
Payer: MEDICARE

## 2025-04-01 ENCOUNTER — CLINICAL SUPPORT (OUTPATIENT)
Dept: REHABILITATION | Facility: HOSPITAL | Age: 75
End: 2025-04-01
Attending: STUDENT IN AN ORGANIZED HEALTH CARE EDUCATION/TRAINING PROGRAM
Payer: COMMERCIAL

## 2025-04-01 DIAGNOSIS — M25.611 DECREASED RANGE OF MOTION OF RIGHT SHOULDER: ICD-10-CM

## 2025-04-01 DIAGNOSIS — R29.898 DECREASED STRENGTH OF UPPER EXTREMITY: Primary | ICD-10-CM

## 2025-04-01 LAB
T4 FREE SERPL-MCNC: 1.05 NG/DL (ref 0.71–1.51)
TSH SERPL-ACNC: 0.18 UIU/ML (ref 0.4–4)

## 2025-04-01 PROCEDURE — 97112 NEUROMUSCULAR REEDUCATION: CPT

## 2025-04-01 NOTE — PROGRESS NOTES
[facility]   Physical Therapy Treatment Note     Name: Alyx Weir  [facility] Number: [id number]    Therapy Diagnosis:   Encounter Diagnoses   Name Primary?    Decreased strength of upper extremity Yes    Decreased range of motion of right shoulder                Physician: Carl Ruiz    Visit Date: 4/1/2025    Physician Orders: PT Eval and Treat  Medical Diagnosis from Referral: Traumatic incomplete tear of right rotator cuff, subsequent encounter [S46.011D], Subacromial impingement of right shoulder [M75.41]   Evaluation Date: 8/15/2024  Authorization Period Expiration: 6/7/2025  Plan of Care Expiration: 4/08/2025        Progress Update: 3/25/2025  Visit # / Visits authorized: 11/ 12 (From prior auth 36)  FOTO: 12/12 - Scored: 3 / 3       PRECAUTIONS: Standard Precautions, Safety/fall precautions, Orthopedic: Right Cuff Repair, Non-weight beariing, and Orthotic device type: abductor sling, Wearning schedule: full until 6 week follow up      PROBLEM LIST : pain, decreased motion all planes, decreased strength      Date of Surgery/Procedure 8/5/2024- Joseph   Surgery/Procedure Performed Status post Right Rotator Cuff Repair, Subacromial Decompression, Distal Clavicle Dissection, Biceps Tenodesis   Rehabilitation Precautions Protocol: Joseph Cuff Protocol      MD Follow up:       Patient School:     Job/Position: Works at an alteration store    PTA Visit #: 0/5     Time In: 9:45 am   Time Out: 10:45 am   Total Billable Time: 45 minutes    SUBJECTIVE     Pt reports: her stomach is feeling better today and patient states overall she feels like moving her shoulder at work is getting easier. Patient states she returns to her surgeon next week.    Compliance with Hep: Daily    Response to previous treatment: no adverse reactions to treatment/updated HOME EXERCISE PROGRAM    Functional change: Better    Pain: 5/10   with tylenol  Location: Right shoulder      OBJECTIVE        Treatment     Gym/Equipment Access: none  Time to Complete Exercises: increased for cueing and education    Alyx received the treatments listed below:       CPT Intervention  Right RCR  BT  SAD  DCR Duration/ Details  4/1/2025    TE Arm Bike 3/3 forward and back R:3   TE     TE Series 6  Chest Stretch  Bear Hugs  Swimmers  Snow [place]    TE Seated Shoulder Strap INTERNAL ROTATION stretch    TE Tball     TE Pulleys 3 minutes flexion and 3 minutes scaption   TE Passive Range of motion     TE Assisted Motion    TE Free Weights    TA  standing scapular retractions 3 x 10 red band   TA Seated Lat Pull downs    TA Wall Push ups 3 x 10 reps hands on airex pad   TA Mat Plank Hold Taps    TA Face Pulls     TRX low rows       Chest pulls/ diagonals G TB     Shoulder press 3#     Prone extensions  Prone T's 3 x 10 bilateral  3 x 10 bilateral   NMR Band Series      Trio 2# ankle weight at wrist     Shoulder external rotation  3 x 10 2 pound ankle weight at wrist    Shoulder flexion/ scaption/ abduction alternating 3# 1 x 15 reps    Biceps Curls 3 x 10 bilateral 3#             NC Hot Pack  -   PLAN          CPT Codes available for Billing:   (00) minutes of Manual therapy (MT) to improve pain and ROM.  (12) minutes of Therapeutic Exercise (TE) to develop strength, endurance, range of motion, and flexibility.  (23) minutes of Neuromuscular Re-Education (NMR)  to improve: Balance, Coordination, Kinesthetic, Sense, Proprioception, and Posture.  (10) minutes of Therapeutic Activities (TA) to improve functional performance.  (00) minutes of Gait Training (GT) to improve functional gait mechanics.  (00) minutes of Self- Care and Education  Unattended Electrical Stimulation (ES) for muscle performance or pain modulation.  Vasopneumatic Device Therapy () for management of swelling/edema. (76224)  BFR: Blood flow restriction applied during exercise  Functional Performance Test (FPT)  NP or (-): Not Performed     See EMR under  MEDIA for written consent provided for Dry Needling- DATE   Palpation Assessment to determine the necessity for Functional Dry Needling     PATIENT EDUCATION AND HOME EXERCISES      Education/Self-Care provided:  (included in treatment unless specified above) minutes   Patient educated on the impairments noted above and the effects of physical therapy intervention to improve overall condition and Quality of Life  Patient was educated on all the above exercise prior/during/after for proper posture, positioning, and execution for safe performance with home exercise program.      Written Home Exercises Provided: yes. Prefers: [x] Printed [x] Electronic  Exercises were reviewed and Rylee was able to demonstrate them prior to the end of the session.  Rylee demonstrated good understanding of the education provided. See EMR under Patient Instructions for exercises provided during therapy sessions.    ASSESSMENT   Patient tolerated session well with less overall verbal cueing required for compensatory patterns. Continued encouragement of HOME EXERCISE PROGRAM.     Alyx Is progressing well towards her goals.   Pt prognosis is Good.     Pt will continue to benefit from skilled outpatient physical therapy to address the deficits listed in the problem list box on initial evaluation, provide pt/family education and to maximize pt's level of independence in the home and community environment.     Pt's spiritual, cultural and educational needs considered and pt agreeable to plan of care and goals.    Anticipated Barriers for therapy: co-morbidities       SHORT TERM GOALS:  progressing Progress Date met   Recent signs and systems trend is improving in order to progress towards Long term goals.  [x] Met  [] Not Met  [] Progressing  9/23   Patient will be independent with Home Exercise Program  in order to further progress and return to maximal function. [x] Met  [] Not Met  [] Progressing  01/14/2025   Pain rating at Worst: 5 /10  in order to progress towards increased independence with activity. [x] Met  [] Not Met  [] Progressing   01/14/2025   Patient will be able to correct postural deviations in sitting and standing, to decrease pain and promote postural awareness for injury prevention.  [x] Met  [] Not Met  [] Progressing  11/13   Patient will improve functional outcome (FOTO) score: by 5% to increase self-worth & perceived functional ability towards long term goals [x] Met  [] Not Met  [] Progressing  11/13      LONG TERM GOALS: Discharge (~Jan 2025) Progress Date met   Patient will return to normal activites of daily living, recreational, and work related activities with less pain and limitation.  [] Met  [] Not Met  [x] Progressing     Patient will improve range of motion  to stated goals in order to return to maximal functional potential.  [] Met  [] Not Met  [x] Progressing     Patient will improve Strength to stated goals of appropriate musculature in order to improve functional independence.  [] Met  [] Not Met  [x] Progressing     Pain Rating at Best: 1/10 to improve Quality of Life.  [] Met  [] Not Met  [x] Progressing     Patient will meet predicted functional outcome (FOTO) score: 80% to increase self-worth & perceived functional ability. [] Met  [] Not Met  [x] Progressing     Patient will have met/partially met personal goal of: to get back to daily activities, community based activities, and social activities.  [] Met  [] Not Met  [x] Progressing           PLAN   Updated Plan of care Certification: 02/25/2025 to 4/08/2025     Outpatient Physical Therapy 2 times weekly for 6 weeks to include any combination of the following interventions: virtual visits, dry needling, modalities, electrical stimulation (IFC, Pre-Mod, Attended with Functional Dry Needling), Manual Therapy, Moist Heat/ Ice, Neuromuscular Re-ed, Patient Education, Self Care, Therapeutic Exercise, Functional Training, and Therapeutic Activites     Mamta  Larisa, PT

## 2025-04-04 ENCOUNTER — CLINICAL SUPPORT (OUTPATIENT)
Dept: REHABILITATION | Facility: HOSPITAL | Age: 75
End: 2025-04-04
Attending: STUDENT IN AN ORGANIZED HEALTH CARE EDUCATION/TRAINING PROGRAM
Payer: COMMERCIAL

## 2025-04-04 DIAGNOSIS — M25.611 DECREASED RANGE OF MOTION OF RIGHT SHOULDER: ICD-10-CM

## 2025-04-04 DIAGNOSIS — R29.898 DECREASED STRENGTH OF UPPER EXTREMITY: Primary | ICD-10-CM

## 2025-04-04 PROCEDURE — 97110 THERAPEUTIC EXERCISES: CPT

## 2025-04-04 PROCEDURE — 97530 THERAPEUTIC ACTIVITIES: CPT

## 2025-04-04 PROCEDURE — 97112 NEUROMUSCULAR REEDUCATION: CPT

## 2025-04-04 NOTE — PROGRESS NOTES
[facility]   Physical Therapy Treatment Note + Progress Report    Name: Alyx Weir  Clinic Number: [id number]    Therapy Diagnosis:   Encounter Diagnoses   Name Primary?    Decreased strength of upper extremity Yes    Decreased range of motion of right shoulder        Physician: Carl Ruiz    Visit Date: 4/4/2025    Physician Orders: PT Eval and Treat  Medical Diagnosis from Referral: Traumatic incomplete tear of right rotator cuff, subsequent encounter [S46.011D], Subacromial impingement of right shoulder [M75.41]   Evaluation Date: 8/15/2024  Authorization Period Expiration: 6/7/2025  Plan of Care Expiration: 4/08/2025        Progress Update: 3/25/2025  Visit # / Visits authorized: 12/ 12 (From prior auth 36)  FOTO: 12/12 - Scored: 8 / 3       PRECAUTIONS: Standard Precautions, Safety/fall precautions, Orthopedic: Right Cuff Repair, Non-weight beariing, and Orthotic device type: abductor sling, Wearning schedule: full until 6 week follow up      PROBLEM LIST : pain, decreased motion all planes, decreased strength      Date of Surgery/Procedure 8/5/2024- Joseph   Surgery/Procedure Performed Status post Right Rotator Cuff Repair, Subacromial Decompression, Distal Clavicle Dissection, Biceps Tenodesis   Rehabilitation Precautions Protocol: Joseph Cuff Protocol      MD Follow up:       Patient School:     Job/Position: Works at an alteration store    PTA Visit #: 0/5     Time In: 10:55 am   Time Out: 11:55 am   Total Billable Time: 50 minutes    SUBJECTIVE     Pt reports: she feels much better than when she first started PHYSICAL THERAPY after her surgery and feels more confident now with her strength in her shoulder with her daily activities and her job.    Compliance with Hep: Daily    Response to previous treatment: no adverse reactions to treatment/updated HOME EXERCISE PROGRAM    Functional change: Better    Pain: 0/10   with tylenol  Location: Right  shoulder      OBJECTIVE       SHOULDER-                 SHOULDER   Range of Motion Right (4/04/2025)  Active     Passive Left  Active       Passive Goal   Forward Flexion (180º) 170* - 160 - 170º   Abduction (180º)  165* - 120 - 160º   External Rotation at 90º (90º)  80* - 75 - 80º   External Rotation at 45º (45º)   45* - 52 - 45º   Internal Rotation at 90º (90º)  70* - - - 70º   Functional External Rotation (C7)   -   - C7   Functional Internal Rotation (T10)   -   - T10   (*) pain and/or dysfunction) pain and/or dysfunction     UE STRENGTH -   Upper Extremity  Strength Right (4/04/2025)  Not tested due to post operative state Goal   Shoulder Flexion []1  []2  []3  []4  [x]5                [x]+ []- 5/5 B   Shoulder Abduction []1  []2  []3  []4  [x]5                [x]+ []- 5/5 B   Shoulder IR []1  []2  []3  []4  [x]5                [x]+ []- 5/5 B   Shoulder ER []1  []2  []3  []4  [x]5                [x]+ []- 5/5 B   Elbow Flexion  []1  []2  []3  []4  [x]5                [x]+ []- 5/5 B   Elbow Extension []1  []2  []3  []4  [x]5                [x]+ []- 5/5 B      FOTO:  (4/04/2025) 54%       Treatment     Gym/Equipment Access: none  Time to Complete Exercises: increased for cueing and education    Alyx received the treatments listed below:       CPT Intervention  Right RCR  BT  SAD  DCR Duration/ Details  4/4/2025    TE Arm Bike 3/3 forward and back R:3   TE Objectives re-assessed 5 minutes   TE Series 6  Chest Stretch  Bear Hugs  Swimmers  Snow [place]    TE Seated Shoulder Strap INTERNAL ROTATION stretch    TE Tball     TE Pulleys 3 minutes flexion and 3 minutes scaption   TE Passive Range of motion     TE Assisted Motion    TE Free Weights    TA  standing scapular retractions 3 x 10 red band   TA Seated Lat Pull downs    TA Wall Push ups 3 x 10 reps hands on airex pad   TA Mat Plank Hold Taps    TA Face Pulls     TRX low rows       Chest pulls/ diagonals G TB     Shoulder press 3#     Prone extensions  Prone  T's 2 x 10 bilateral  2 x 10 bilateral   NMR Band Series      Trio 2# ankle weight at wrist     Shoulder external rotation  3 x 10 2 pound ankle weight at wrist    Shoulder flexion/ scaption/ abduction alternating 3# 1 x 15 reps    Biceps Curls 3 x 10 bilateral 3#             NC Hot Pack  -   PLAN          CPT Codes available for Billing:   (00) minutes of Manual therapy (MT) to improve pain and ROM.  (17) minutes of Therapeutic Exercise (TE) to develop strength, endurance, range of motion, and flexibility.  (23) minutes of Neuromuscular Re-Education (NMR)  to improve: Balance, Coordination, Kinesthetic, Sense, Proprioception, and Posture.  (10) minutes of Therapeutic Activities (TA) to improve functional performance.  (00) minutes of Gait Training (GT) to improve functional gait mechanics.  (00) minutes of Self- Care and Education  Unattended Electrical Stimulation (ES) for muscle performance or pain modulation.  Vasopneumatic Device Therapy () for management of swelling/edema. (63260)  BFR: Blood flow restriction applied during exercise  Functional Performance Test (FPT)  NP or (-): Not Performed     See EMR under MEDIA for written consent provided for Dry Needling- DATE   Palpation Assessment to determine the necessity for Functional Dry Needling     PATIENT EDUCATION AND HOME EXERCISES      Education/Self-Care provided:  (included in treatment unless specified above) minutes   Patient educated on the impairments noted above and the effects of physical therapy intervention to improve overall condition and Quality of Life  Patient was educated on all the above exercise prior/during/after for proper posture, positioning, and execution for safe performance with home exercise program.      Written Home Exercises Provided: yes. Prefers: [x] Printed [x] Electronic  Exercises were reviewed and Rylee was able to demonstrate them prior to the end of the session.  Rylee demonstrated good understanding of the education  provided. See EMR under Patient Instructions for exercises provided during therapy sessions.    ASSESSMENT   Since initial evaluation patient demonstrates significant improvements in right shoulder global ACTIVE RANGE OF MOTION and strength. Patient has now also met functional goals per FOTO retaken today. Plan to transition patient to independent phase of rehabilitation pending approval from surgeon.     Alyx Is progressing well towards her goals.   Pt prognosis is Good.     Pt will continue to benefit from skilled outpatient physical therapy to address the deficits listed in the problem list box on initial evaluation, provide pt/family education and to maximize pt's level of independence in the home and community environment.     Pt's spiritual, cultural and educational needs considered and pt agreeable to plan of care and goals.    Anticipated Barriers for therapy: co-morbidities       SHORT TERM GOALS:  progressing Progress Date met   Recent signs and systems trend is improving in order to progress towards Long term goals.  [x] Met  [] Not Met  [] Progressing  9/23   Patient will be independent with Home Exercise Program  in order to further progress and return to maximal function. [x] Met  [] Not Met  [] Progressing  01/14/2025   Pain rating at Worst: 5 /10 in order to progress towards increased independence with activity. [x] Met  [] Not Met  [] Progressing   01/14/2025   Patient will be able to correct postural deviations in sitting and standing, to decrease pain and promote postural awareness for injury prevention.  [x] Met  [] Not Met  [] Progressing  11/13   Patient will improve functional outcome (FOTO) score: by 5% to increase self-worth & perceived functional ability towards long term goals [x] Met  [] Not Met  [] Progressing  11/13      LONG TERM GOALS: Discharge (~Jan 2025) Progress Date met   Patient will return to normal activites of daily living, recreational, and work related activities with less  pain and limitation.  [x] Met  [] Not Met  [] Progressing  04/04/2025   Patient will improve range of motion  to stated goals in order to return to maximal functional potential.  [x] Met  [] Not Met  [] Progressing  04/04/2025   Patient will improve Strength to stated goals of appropriate musculature in order to improve functional independence.  [x] Met  [] Not Met  [] Progressing  04/04/2025   Pain Rating at Best: 1/10 to improve Quality of Life.  [x] Met  [] Not Met  [] Progressing  04/04/2025   Patient will meet predicted functional outcome (FOTO) score: 80% to increase self-worth & perceived functional ability. [x] Met  [] Not Met  [] Progressing  04/04/2025   Patient will have met/partially met personal goal of: to get back to daily activities, community based activities, and social activities.  [x] Met  [] Not Met  [] Progressing  04/04/2025         PLAN   Updated Plan of care Certification: 02/25/2025 to 4/08/2025     Outpatient Physical Therapy 2 times weekly for 6 weeks to include any combination of the following interventions: virtual visits, dry needling, modalities, electrical stimulation (IFC, Pre-Mod, Attended with Functional Dry Needling), Manual Therapy, Moist Heat/ Ice, Neuromuscular Re-ed, Patient Education, Self Care, Therapeutic Exercise, Functional Training, and Therapeutic Activites     Mamta Peres, PT

## 2025-04-08 ENCOUNTER — DOCUMENTATION ONLY (OUTPATIENT)
Dept: REHABILITATION | Facility: HOSPITAL | Age: 75
End: 2025-04-08
Payer: MEDICARE

## 2025-04-08 ENCOUNTER — OFFICE VISIT (OUTPATIENT)
Dept: SPORTS MEDICINE | Facility: CLINIC | Age: 75
End: 2025-04-08
Payer: COMMERCIAL

## 2025-04-08 ENCOUNTER — ANTI-COAG VISIT (OUTPATIENT)
Dept: CARDIOLOGY | Facility: CLINIC | Age: 75
End: 2025-04-08
Payer: MEDICARE

## 2025-04-08 DIAGNOSIS — Z79.01 LONG TERM (CURRENT) USE OF ANTICOAGULANTS: Primary | ICD-10-CM

## 2025-04-08 DIAGNOSIS — Z79.01 ANTICOAGULATED ON COUMADIN: Chronic | ICD-10-CM

## 2025-04-08 DIAGNOSIS — Z98.890 STATUS POST ROTATOR CUFF REPAIR: Primary | ICD-10-CM

## 2025-04-08 DIAGNOSIS — D68.51 HETEROZYGOUS FACTOR V LEIDEN MUTATION: Chronic | ICD-10-CM

## 2025-04-08 PROBLEM — R29.898 DECREASED STRENGTH OF UPPER EXTREMITY: Status: RESOLVED | Noted: 2024-08-15 | Resolved: 2025-04-08

## 2025-04-08 PROBLEM — M25.611 DECREASED RANGE OF MOTION OF RIGHT SHOULDER: Status: RESOLVED | Noted: 2024-08-15 | Resolved: 2025-04-08

## 2025-04-08 LAB
CTP QC/QA: YES
INR PPP: 2 (ref 2–3)

## 2025-04-08 PROCEDURE — 85610 PROTHROMBIN TIME: CPT | Mod: QW,HCNC,S$GLB, | Performed by: INTERNAL MEDICINE

## 2025-04-08 NOTE — PROGRESS NOTES
[facility]  Physical Therapy Discharge Note    Name: Alyx JUARES Weir  Clinic Number: [id number]    Therapy Diagnosis: No diagnosis found.  Physician: No ref. provider found    Physician Orders: PT Eval and Treat  Medical Diagnosis from Referral: Traumatic incomplete tear of right rotator cuff, subsequent encounter [S46.011D], Subacromial impingement of right shoulder [M75.41]   Evaluation Date: 8/15/2024      Date of Last visit: 04/04/2025  Total Visits Received: 48    ASSESSMENT        Discharge reason: Patient has completed the physician's prescription    Discharge FOTO Score: not applicable     Goals: see last treatment note.    PLAN   This patient is discharged from PT.    Mamta Peres, PT, DPT

## 2025-04-22 ENCOUNTER — TELEPHONE (OUTPATIENT)
Dept: SPORTS MEDICINE | Facility: CLINIC | Age: 75
End: 2025-04-22
Payer: MEDICARE

## 2025-04-22 NOTE — TELEPHONE ENCOUNTER
EMELI denied. Information for P2P received. P2P call set for Dr. Ruiz [facility] to contact Dr. Ruiz on 4/24 or 4/25 at 1 PM to complete P2P. REESE    ----- Message from Ken sent at 4/21/2025  3:46 PM CDT -----  Contact: Sonia nurse   Type:  Needs Medical AdviceWho Called: Bernardino (please be specific):  How long has patient had these symptoms:  Pharmacy name and phone #:  Would the patient rather a call back or a response via [facility]? Calling Best Call Back Number: [phone] Information: request forms to be submitted  (fce and 10-10)Fax [phone]

## 2025-05-08 ENCOUNTER — ANTI-COAG VISIT (OUTPATIENT)
Dept: CARDIOLOGY | Facility: CLINIC | Age: 75
End: 2025-05-08
Payer: MEDICARE

## 2025-05-08 DIAGNOSIS — Z79.01 ANTICOAGULATED ON COUMADIN: Chronic | ICD-10-CM

## 2025-05-08 DIAGNOSIS — Z79.01 LONG TERM (CURRENT) USE OF ANTICOAGULANTS: Primary | ICD-10-CM

## 2025-05-08 DIAGNOSIS — D68.51 HETEROZYGOUS FACTOR V LEIDEN MUTATION: Chronic | ICD-10-CM

## 2025-05-08 LAB
CTP QC/QA: YES
INR PPP: 1.3 (ref 2–3)

## 2025-05-08 PROCEDURE — 85610 PROTHROMBIN TIME: CPT | Mod: QW,HCNC,S$GLB, | Performed by: INTERNAL MEDICINE

## 2025-05-08 NOTE — PROGRESS NOTES
INR not at goal. Medications, chart, and patient findings reviewed. See calendar for adjustments to dose and follow up plan.  Boost warfarin today with 12.5 mg, then resume per calendar.  Repeat INR in 1.5 weeks.  Decrease Vitamin K foods in diet.

## 2025-05-08 NOTE — PROGRESS NOTES
Patient's INR is sub-therapeutic at 1.3. Patient confirms she has followed previous dosing/ instructions. Patient reports she has been eating nuts. Re-educated patient on Coumadin diet and need to keep consistent. Advised of increased risk of clotting; signs/symptoms of clotting and need to go to ED if she experiences any. Patient reports she is not having any signs/symptoms of clotting. Send to PharmD for dosing/instructions.

## 2025-05-16 ENCOUNTER — ANTI-COAG VISIT (OUTPATIENT)
Dept: CARDIOLOGY | Facility: CLINIC | Age: 75
End: 2025-05-16
Payer: MEDICARE

## 2025-05-16 DIAGNOSIS — Z79.01 LONG TERM (CURRENT) USE OF ANTICOAGULANTS: Primary | ICD-10-CM

## 2025-05-16 DIAGNOSIS — Z79.01 ANTICOAGULATED ON COUMADIN: Chronic | ICD-10-CM

## 2025-05-16 DIAGNOSIS — D68.51 HETEROZYGOUS FACTOR V LEIDEN MUTATION: Chronic | ICD-10-CM

## 2025-05-16 LAB
CTP QC/QA: YES
INR PPP: 2.7 (ref 2–3)

## 2025-05-16 RX ORDER — VITAMIN B COMPLEX
1 CAPSULE ORAL DAILY
COMMUNITY

## 2025-05-16 NOTE — PROGRESS NOTES
Patient's INR is therapeutic at 2.7. Patient reports she followed previous instructions. Patient reports no changes. Instructions given:  Continue warfarin 7.5 mg on  Saturdays,Tuesdays, Thursdays; and 5 mg all other days. Recheck on 6/6/25. Calendar reviewed with patient. Patient verbalizes understanding.

## 2025-05-22 ENCOUNTER — TELEPHONE (OUTPATIENT)
Dept: FAMILY MEDICINE | Facility: CLINIC | Age: 75
End: 2025-05-22
Payer: MEDICARE

## 2025-05-22 ENCOUNTER — CLINICAL SUPPORT (OUTPATIENT)
Dept: REHABILITATION | Facility: HOSPITAL | Age: 75
End: 2025-05-22
Attending: STUDENT IN AN ORGANIZED HEALTH CARE EDUCATION/TRAINING PROGRAM
Payer: COMMERCIAL

## 2025-05-22 DIAGNOSIS — M25.611 DECREASED RANGE OF MOTION OF RIGHT SHOULDER: ICD-10-CM

## 2025-05-22 DIAGNOSIS — R29.898 DECREASED STRENGTH OF UPPER EXTREMITY: Primary | ICD-10-CM

## 2025-05-22 DIAGNOSIS — Z98.890 STATUS POST ROTATOR CUFF REPAIR: ICD-10-CM

## 2025-05-22 PROCEDURE — 97750 PHYSICAL PERFORMANCE TEST: CPT | Mod: PN

## 2025-05-22 NOTE — TELEPHONE ENCOUNTER
Patient called stated blood pressure is now 117/65 and pulse 58, she will not be going to ED and will continue to check B/P periodically. Told her I would send message to Je Clemons.

## 2025-05-22 NOTE — TELEPHONE ENCOUNTER
Spoke with patient states blood pressure 182/101 30 minutes after taking blood pressure medication, not experiencing any symptoms  advised to go to ED for evaluation and I would send message to Dr. Clemons.

## 2025-05-22 NOTE — TELEPHONE ENCOUNTER
Copied from CRM #[id number]. Topic: General Inquiry - Return Call  >> May 22, 2025  2:28 PM Radha wrote:  .Type:  Patient Returning Call    Who Called:.Alyx Weir  Who Left Message for Patient:  Does the patient know what this is regarding?:yes  Would the patient rather a call back or a response via [facility]? Call back  [facility] Call Back Number:.[phone]  Additional Information: pt states she is returning a call.

## 2025-05-22 NOTE — TELEPHONE ENCOUNTER
Called patient to see if she had gone to ED states no, she has been lying down and hadn't rechecked blood pressure, patient advised to recheck blood pressure and go to ED as advised by doctor, patient will recheck, states she has a lot of hospital bills now but voice understanding.

## 2025-05-22 NOTE — TELEPHONE ENCOUNTER
Copied from CRM #[id number]. Topic: Appointments - Same Day Access  >> May 22, 2025 10:47 AM Belle wrote:  Type:  Same Day Appointment Request    Caller is requesting a same day appointment.  Caller declined first available appointment listed below.    Name of Caller:Alyx Weir   When is the first available appointment?None  Symptoms: High blood pressure with taking medication  Best Call Back Number: [phone]  Additional Information: Last reading 182/101 pulse 50, last took medication over an hr. ago

## 2025-05-23 ENCOUNTER — TELEPHONE (OUTPATIENT)
Dept: FAMILY MEDICINE | Facility: CLINIC | Age: 75
End: 2025-05-23
Payer: MEDICARE

## 2025-05-23 DIAGNOSIS — M54.50 ACUTE LEFT-SIDED LOW BACK PAIN WITHOUT SCIATICA: ICD-10-CM

## 2025-05-23 RX ORDER — GABAPENTIN 300 MG/1
CAPSULE ORAL
Qty: 90 CAPSULE | Refills: 0 | Status: SHIPPED | OUTPATIENT
Start: 2025-05-23

## 2025-05-23 NOTE — TELEPHONE ENCOUNTER
No care due was identified.  [facility] Embedded Care Due Messages. Reference number: [id number].   5/23/2025 4:53:20 AM CDT

## 2025-05-23 NOTE — TELEPHONE ENCOUNTER
Returned pt call. She wanted to verify that Dr. Clemons had sent in the gabapentin 300 mg. I let her know it was him. She said she has appt Monday and would discuss it with him then.

## 2025-05-23 NOTE — TELEPHONE ENCOUNTER
----- Message from Trace sent at 5/23/2025 11:43 AM CDT -----  Who called: ptWhat is the request in detail: Pt would like a call back in regards to prescription.Can the clinic reply by [facility]? No Would the patient rather a call back or a response via [facility]? Call back Best call back number: Telephone Information:mobile [phone] Information: Thank you.

## 2025-05-26 ENCOUNTER — OFFICE VISIT (OUTPATIENT)
Dept: FAMILY MEDICINE | Facility: CLINIC | Age: 75
End: 2025-05-26
Payer: MEDICARE

## 2025-05-26 VITALS
WEIGHT: 167.13 LBS | TEMPERATURE: 97 F | HEIGHT: 63 IN | BODY MASS INDEX: 29.61 KG/M2 | HEART RATE: 81 BPM | DIASTOLIC BLOOD PRESSURE: 70 MMHG | OXYGEN SATURATION: 98 % | SYSTOLIC BLOOD PRESSURE: 124 MMHG

## 2025-05-26 DIAGNOSIS — I10 ESSENTIAL HYPERTENSION: Primary | Chronic | ICD-10-CM

## 2025-05-26 PROCEDURE — 1159F MED LIST DOCD IN RCRD: CPT | Mod: CPTII,HCNC,S$GLB, | Performed by: INTERNAL MEDICINE

## 2025-05-26 PROCEDURE — 3072F LOW RISK FOR RETINOPATHY: CPT | Mod: CPTII,HCNC,S$GLB, | Performed by: INTERNAL MEDICINE

## 2025-05-26 PROCEDURE — 99213 OFFICE O/P EST LOW 20 MIN: CPT | Mod: HCNC,S$GLB,, | Performed by: INTERNAL MEDICINE

## 2025-05-26 PROCEDURE — 3078F DIAST BP <80 MM HG: CPT | Mod: CPTII,HCNC,S$GLB, | Performed by: INTERNAL MEDICINE

## 2025-05-26 PROCEDURE — 3044F HG A1C LEVEL LT 7.0%: CPT | Mod: CPTII,HCNC,S$GLB, | Performed by: INTERNAL MEDICINE

## 2025-05-26 PROCEDURE — 3074F SYST BP LT 130 MM HG: CPT | Mod: CPTII,HCNC,S$GLB, | Performed by: INTERNAL MEDICINE

## 2025-05-26 PROCEDURE — 3008F BODY MASS INDEX DOCD: CPT | Mod: CPTII,HCNC,S$GLB, | Performed by: INTERNAL MEDICINE

## 2025-05-26 PROCEDURE — 1125F AMNT PAIN NOTED PAIN PRSNT: CPT | Mod: CPTII,HCNC,S$GLB, | Performed by: INTERNAL MEDICINE

## 2025-05-26 PROCEDURE — 99999 PR PBB SHADOW E&M-EST. PATIENT-LVL IV: CPT | Mod: PBBFAC,HCNC,, | Performed by: INTERNAL MEDICINE

## 2025-05-26 NOTE — PROGRESS NOTES
Subjective:       Patient ID: Alyx Weir is a 75 y.o. female.    Chief Complaint: Hypertension (X day)    Bp check-----------has been in good range-----------no new symptoms today-    Hypertension  Pertinent negatives include no chest pain, headaches, neck pain, palpitations or shortness of breath.     Past Medical History:   Diagnosis Date    Anticoagulant long-term use     Anticoagulated on Coumadin     Arm weakness-rotator cuff weakness 11/02/2015    Arthritis     Asthma     Clotting disorder     Coronary artery disease     Deep vein thrombosis     Degenerative disc disease     Diabetes mellitus type I 2011    BS last tested x 1 month not sure what it was.    Heterozygous factor V Leiden mutation     Hx of colonic polyps 11/13/2015    Hyperlipidemia     Hypertension     VIRGIL (obstructive sleep apnea)     Stenosis of right carotid artery 12/13/2016     Past Surgical History:   Procedure Laterality Date    APPENDECTOMY      ARTHROSCOPIC REPAIR OF ROTATOR CUFF OF SHOULDER Right 08/05/2024    Procedure: REPAIR, ROTATOR CUFF, ARTHROSCOPIC;  Surgeon: Carl Ruiz MD;  Location: [facility] OR;  Service: Orthopedics;  Laterality: Right;  1. Right shoulder arthroscopic rotator cuff repair  2. Right shoulder arthroscopic biceps tenodesis  3. Right shoulder subacromial decompression    ARTHROSCOPY OF SHOULDER WITH DECOMPRESSION OF SUBACROMIAL SPACE Right 08/05/2024    Procedure: ARTHROSCOPY, SHOULDER, WITH SUBACROMIAL SPACE DECOMPRESSION;  Surgeon: Carl Ruiz MD;  Location: [facility] OR;  Service: Orthopedics;  Laterality: Right;    ARTHROSCOPY,SHOULDER,WITH BICEPS TENODESIS Right 08/05/2024    Procedure: ARTHROSCOPY,SHOULDER,WITH BICEPS TENODESIS;  Surgeon: Carl Ruiz MD;  Location: [facility] OR;  Service: Orthopedics;  Laterality: Right;    BACK SURGERY      bladder cyst removal      BLADDER SURGERY      CARDIAC CATHETERIZATION  03/2013    mild CAD    COLONOSCOPY N/A 11/13/2015    Procedure:  COLONOSCOPY;  Surgeon: Jas Umanzor III, MD;  Location: [facility];  Service: Endoscopy;  Laterality: N/A;    HYSTERECTOMY      28 yrs old    LUMBAR SPINE SURGERY      bone spurs removed    OOPHORECTOMY      SELECTIVE INJECTION OF ANESTHETIC AGENT AROUND LUMBAR SPINAL NERVE ROOT BY TRANSFORAMINAL APPROACH Bilateral 06/03/2022    Procedure: Bilateral L4/5 TF ASUNCION with RN IV sedation; on Coumadin, will need INR prior to procedure, must be less than 1.3;  Surgeon: Mario Palacios MD;  Location: [facility];  Service: Pain Management;  Laterality: Bilateral;     Family History   Problem Relation Name Age of Onset    Heart disease Mother Jass Shawnee     Hypertension Mother Jass Shawnee     Cataracts Mother Jass Shawnee     Arthritis Mother Jass Shawnee     Diabetes Father Reji     Hypertension Sister Jannet     Glaucoma Sister Jannet     Ovarian cancer Sister Jannet     Hypertension Sister Kenisha     Diabetes Sister Kenisha     Hypertension Sister Veda     Diabetes Sister Veda     Diabetes Brother Eliot     Hypertension Brother Eliot     Diabetes Paternal Uncle Rikki     Breast cancer Neg Hx      Colon cancer Neg Hx       Social History[1]  Review of Systems   Constitutional:  Negative for activity change, appetite change, chills, diaphoresis, fatigue, fever and unexpected weight change.   HENT:  Negative for congestion, drooling, ear discharge, ear pain, facial swelling, hearing loss, mouth sores, nosebleeds, postnasal drip, rhinorrhea, sinus pressure, sneezing, sore throat, tinnitus, trouble swallowing and voice change.    Eyes:  Negative for photophobia, redness and visual disturbance.   Respiratory:  Negative for apnea, cough, choking, chest tightness, shortness of breath and wheezing.    Cardiovascular:  Negative for chest pain, palpitations and leg swelling.   Gastrointestinal:  Negative for abdominal distention, abdominal pain, blood in stool, constipation, diarrhea, nausea and vomiting.   Endocrine: Negative for cold  intolerance, heat intolerance, polydipsia, polyphagia and polyuria.   Genitourinary:  Negative for decreased urine volume, difficulty urinating, dysuria, flank pain, frequency, genital sores, hematuria, pelvic pain and urgency.   Musculoskeletal:  Positive for arthralgias and myalgias. Negative for back pain, gait problem, joint swelling, neck pain and neck stiffness.   Skin:  Negative for color change, pallor, rash and wound.   Allergic/Immunologic: Negative for food allergies and immunocompromised state.   Neurological:  Negative for dizziness, tremors, seizures, syncope, speech difficulty, weakness, light-headedness, numbness and headaches.   Hematological:  Negative for adenopathy. Does not bruise/bleed easily.   Psychiatric/Behavioral:  Negative for agitation, behavioral problems, confusion, decreased concentration, dysphoric mood, hallucinations, self-injury, sleep disturbance and suicidal ideas. The patient is not nervous/anxious and is not hyperactive.    All other systems reviewed and are negative.      Objective:      Physical Exam  Vitals and nursing note reviewed.   Constitutional:       General: She is not in acute distress.     Appearance: Normal appearance. She is well-developed. She is not diaphoretic.   HENT:      Head: Normocephalic and atraumatic.      Mouth/Throat:      Pharynx: No oropharyngeal exudate.   Eyes:      General: No scleral icterus.  Neck:      Thyroid: No thyromegaly.      Vascular: No carotid bruit or JVD.      Trachea: No tracheal deviation.   Cardiovascular:      Rate and Rhythm: Normal rate and regular rhythm.      Heart sounds: Normal heart sounds.   Pulmonary:      Effort: Pulmonary effort is normal. No respiratory distress.      Breath sounds: Normal breath sounds. No wheezing or rales.   Chest:      Chest wall: No tenderness.   Abdominal:      General: Bowel sounds are normal. There is no distension.      Palpations: Abdomen is soft.      Tenderness: There is no abdominal  tenderness. There is no guarding or rebound.   Musculoskeletal:         General: No tenderness. Normal range of motion.      Cervical back: Normal range of motion and neck supple.   Lymphadenopathy:      Cervical: No cervical adenopathy.   Skin:     General: Skin is warm and dry.      Coloration: Skin is not pale.      Findings: No erythema or rash.   Neurological:      Mental Status: She is alert and oriented to person, place, and time.      Cranial Nerves: No cranial nerve deficit.      Coordination: Coordination normal.   Psychiatric:         Behavior: Behavior normal.         Thought Content: Thought content normal.         Judgment: Judgment normal.         CMP  Sodium   Date Value Ref Range Status   02/17/2025 139 136 - 145 mmol/L Final     Potassium   Date Value Ref Range Status   02/17/2025 3.7 3.5 - 5.1 mmol/L Final     Chloride   Date Value Ref Range Status   02/17/2025 101 95 - 110 mmol/L Final     CO2   Date Value Ref Range Status   02/17/2025 26 23 - 29 mmol/L Final     Glucose   Date Value Ref Range Status   02/17/2025 90 70 - 110 mg/dL Final     BUN   Date Value Ref Range Status   02/17/2025 14 8 - 23 mg/dL Final     Creatinine   Date Value Ref Range Status   02/17/2025 0.8 0.5 - 1.4 mg/dL Final     Calcium   Date Value Ref Range Status   02/17/2025 10.2 8.7 - 10.5 mg/dL Final     Total Protein   Date Value Ref Range Status   02/17/2025 8.2 6.0 - 8.4 g/dL Final     Albumin   Date Value Ref Range Status   02/17/2025 4.3 3.5 - 5.2 g/dL Final     Total Bilirubin   Date Value Ref Range Status   02/17/2025 0.6 0.1 - 1.0 mg/dL Final     Comment:     For infants and newborns, interpretation of results should be based  on gestational age, weight and in agreement with clinical  observations.    Premature Infant recommended reference ranges:  Up to 24 hours.............<8.0 mg/dL  Up to 48 hours............<12.0 mg/dL  3-5 days..................<15.0 mg/dL  6-29 days.................<15.0 mg/dL       Alkaline  Phosphatase   Date Value Ref Range Status   2025 51 40 - 150 U/L Final     AST   Date Value Ref Range Status   2025 15 10 - 40 U/L Final     ALT   Date Value Ref Range Status   2025 17 10 - 44 U/L Final     Anion Gap   Date Value Ref Range Status   2025 12 8 - 16 mmol/L Final     eGFR if    Date Value Ref Range Status   2022 >60 >60 mL/min/1.73 m^2 Final     eGFR if non    Date Value Ref Range Status   2022 >60 >60 mL/min/1.73 m^2 Final     Comment:     Calculation used to obtain the estimated glomerular filtration  rate (eGFR) is the CKD-EPI equation.        Lab Results   Component Value Date    WBC 9.88 2025    HGB 13.5 2025    HCT 41.0 2025    MCV 87 2025     2025     Lab Results   Component Value Date    CHOL 198 2023     Lab Results   Component Value Date    HDL 72 2023     Lab Results   Component Value Date    LDLCALC 110.6 2023     Lab Results   Component Value Date    TRIG 77 2023     Lab Results   Component Value Date    CHOLHDL 36.4 2023     Lab Results   Component Value Date    TSH 0.176 (L) 2025     Lab Results   Component Value Date    HGBA1C 5.7 (H) 2025     Assessment:       1. Essential hypertension        Plan:   Essential hypertension      Stable-------------continue meds-----------------------f/u as scheduled------------------       [1]   Social History  Socioeconomic History    Marital status:    Tobacco Use    Smoking status: Never     Passive exposure: Past    Smokeless tobacco: Never   Substance and Sexual Activity    Alcohol use: Never    Drug use: Never    Sexual activity: Not Currently     Partners: Male     Birth control/protection: None     Comment:     Social History Narrative    Dogs in household, no smokers.     Social Drivers of Health     Financial Resource Strain: Medium Risk (5/15/2025)    Overall Financial Resource  Strain (CARDIA)     Difficulty of Paying Living Expenses: Somewhat hard   Food Insecurity: Unknown (5/15/2025)    Hunger Vital Sign     Worried About Running Out of Food in the Last Year: Patient declined     Ran Out of Food in the Last Year: Never true   Transportation Needs: No Transportation Needs (5/15/2025)    PRAPARE - Transportation     Lack of Transportation (Medical): No     Lack of Transportation (Non-Medical): No   Physical Activity: Unknown (5/15/2025)    Exercise Vital Sign     Days of Exercise per Week: 0 days   Stress: Stress Concern Present (5/15/2025)    [employer] - Occupational Stress Questionnaire     Feeling of Stress : To some extent   Housing Stability: Low Risk  (5/15/2025)    Housing Stability Vital Sign     Unable to Pay for Housing in the Last Year: No     Number of Times Moved in the Last Year: 0     Homeless in the Last Year: No

## 2025-05-30 ENCOUNTER — TELEPHONE (OUTPATIENT)
Dept: SPORTS MEDICINE | Facility: CLINIC | Age: 75
End: 2025-05-30
Payer: MEDICARE

## 2025-05-30 NOTE — TELEPHONE ENCOUNTER
OTILIA for patient regarding her missed appointment, Provided CB number     Copied from CRM #[id number]. Topic: General Inquiry - Patient Advice  >> May 30, 2025 10:55 AM Gladys wrote:  Patient is requesting a call back regarding missed appt. Please call back at [phone]

## 2025-06-06 ENCOUNTER — ANTI-COAG VISIT (OUTPATIENT)
Dept: CARDIOLOGY | Facility: CLINIC | Age: 75
End: 2025-06-06
Payer: MEDICARE

## 2025-06-06 DIAGNOSIS — Z79.01 ANTICOAGULATED ON COUMADIN: Chronic | ICD-10-CM

## 2025-06-06 DIAGNOSIS — Z79.01 LONG TERM (CURRENT) USE OF ANTICOAGULANTS: Primary | ICD-10-CM

## 2025-06-06 DIAGNOSIS — D68.51 HETEROZYGOUS FACTOR V LEIDEN MUTATION: Chronic | ICD-10-CM

## 2025-06-06 LAB
CTP QC/QA: YES
INR PPP: 3.1 (ref 2–3)

## 2025-06-09 ENCOUNTER — TELEPHONE (OUTPATIENT)
Dept: REHABILITATION | Facility: HOSPITAL | Age: 75
End: 2025-06-09
Payer: MEDICARE

## 2025-06-09 NOTE — TELEPHONE ENCOUNTER
PT left message with pt  that the FCE was not completed due to medical reasons and after cleared we could complete the last 1.5-2 hrs left of testing for her if they would find that beneficial to help close the case.

## 2025-06-17 ENCOUNTER — TELEPHONE (OUTPATIENT)
Dept: CARDIOLOGY | Facility: CLINIC | Age: 75
End: 2025-06-17
Payer: MEDICARE

## 2025-06-17 NOTE — TELEPHONE ENCOUNTER
Pt called she can not make it on her scheduled day this month. Offered the NP and she states she only wants to see the MD. Appt agreed upon and made pbr

## 2025-07-07 ENCOUNTER — ANTI-COAG VISIT (OUTPATIENT)
Dept: CARDIOLOGY | Facility: CLINIC | Age: 75
End: 2025-07-07
Payer: MEDICARE

## 2025-07-07 DIAGNOSIS — Z79.01 ANTICOAGULATED ON COUMADIN: Primary | Chronic | ICD-10-CM

## 2025-07-07 DIAGNOSIS — D68.51 HETEROZYGOUS FACTOR V LEIDEN MUTATION: Chronic | ICD-10-CM

## 2025-07-07 LAB
CTP QC/QA: YES
INR PPP: 2.6 (ref 2–3)

## 2025-07-07 PROCEDURE — 93793 ANTICOAG MGMT PT WARFARIN: CPT | Mod: S$GLB,,,

## 2025-07-07 PROCEDURE — 85610 PROTHROMBIN TIME: CPT | Mod: QW,S$GLB,, | Performed by: INTERNAL MEDICINE

## 2025-07-07 NOTE — PROGRESS NOTES
INR is therapeutic at 2.6.  Patient confirms current warfarin dose and followed.  No other changes reported.  Continue current dose of 7.5 mg every Tues, Thurs, Sat; and 5 mg all other days.  INR recheck in 1 month.  Dose calendar given - confirms understanding.

## 2025-07-08 ENCOUNTER — TELEPHONE (OUTPATIENT)
Dept: SPORTS MEDICINE | Facility: CLINIC | Age: 75
End: 2025-07-08
Payer: MEDICARE

## 2025-07-08 NOTE — TELEPHONE ENCOUNTER
Spoke with pt regarding her message, Pts appt has been successfully r/s to allow time for FCE results to be sent       Copied from CRM #[id number]. Topic: General Inquiry - Return Call  >> Jul 8, 2025  1:49 PM Ysabel wrote:  .Type: Patient Call Back    Who called:Patient     What is the request in detail: Patient would like to have the nurse call her in regards to her appt, patient wants to know if she should still keep he appt on tomorrow or wait until the doctor gets the results of her  FCE test results     Can the clinic reply by [facility]? No    Would the patient rather a call back or a response via [facility]? callback    Best call back number:.[phone]

## 2025-07-09 NOTE — PROGRESS NOTES
Orthopaedic Follow-Up Visit    Last Appointment: 4/8/25  Diagnosis: 8 months s/p right shoulder arthroscopic rotator cuff repair, biceps tenodesis, and subacromial decompression   Prior Procedure: FCE      Alyx Weir is a 75 y.o. female who is here for f/u evaluation of her right shoulder. The patient was last seen here by me on 4/8/25 at which point we decided to send her for an FCE prior to considering further treatment options. The patient completed her FCE on 7/7/25.     To review her history, Alyx Weir is now approximately 11 months out from her shoulder surgery.  She has been compliant with post-operative restrictions and has been progressing with PT at [facility].     Patient's medications, allergies, past medical, surgical, social and family histories were reviewed and updated as appropriate.    Review of Systems   All systems reviewed were negative.  Specifically, the patient denies fever, chills, weight loss, chest pain, shortness of breath, or dyspnea on exertion.      Past Medical History:   Diagnosis Date    Anticoagulant long-term use     Anticoagulated on Coumadin     Arm weakness-rotator cuff weakness 11/02/2015    Arthritis     Asthma     Clotting disorder     Coronary artery disease     Deep vein thrombosis     Degenerative disc disease     Diabetes mellitus type I 2011    BS last tested x 1 month not sure what it was.    Heterozygous factor V Leiden mutation     Hx of colonic polyps 11/13/2015    Hyperlipidemia     Hypertension     VIRGIL (obstructive sleep apnea)     Stenosis of right carotid artery 12/13/2016       Past Surgical History:   Procedure Laterality Date    APPENDECTOMY      ARTHROSCOPIC REPAIR OF ROTATOR CUFF OF SHOULDER Right 08/05/2024    Procedure: REPAIR, ROTATOR CUFF, ARTHROSCOPIC;  Surgeon: Carl Ruiz MD;  Location: [facility];  Service: Orthopedics;  Laterality: Right;  1. Right shoulder arthroscopic rotator cuff repair  2. Right shoulder arthroscopic  biceps tenodesis  3. Right shoulder subacromial decompression    ARTHROSCOPY OF SHOULDER WITH DECOMPRESSION OF SUBACROMIAL SPACE Right 08/05/2024    Procedure: ARTHROSCOPY, SHOULDER, WITH SUBACROMIAL SPACE DECOMPRESSION;  Surgeon: Carl Ruiz MD;  Location: [facility] OR;  Service: Orthopedics;  Laterality: Right;    ARTHROSCOPY,SHOULDER,WITH BICEPS TENODESIS Right 08/05/2024    Procedure: ARTHROSCOPY,SHOULDER,WITH BICEPS TENODESIS;  Surgeon: Carl Ruiz MD;  Location: [facility] OR;  Service: Orthopedics;  Laterality: Right;    BACK SURGERY      bladder cyst removal      BLADDER SURGERY      CARDIAC CATHETERIZATION  03/2013    mild CAD    COLONOSCOPY N/A 11/13/2015    Procedure: COLONOSCOPY;  Surgeon: Jas Umanzor III, MD;  Location: [facility] ENDO;  Service: Endoscopy;  Laterality: N/A;    HYSTERECTOMY      28 yrs old    LUMBAR SPINE SURGERY      bone spurs removed    OOPHORECTOMY      SELECTIVE INJECTION OF ANESTHETIC AGENT AROUND LUMBAR SPINAL NERVE ROOT BY TRANSFORAMINAL APPROACH Bilateral 06/03/2022    Procedure: Bilateral L4/5 TF ASUNCION with RN IV sedation; on Coumadin, will need INR prior to procedure, must be less than 1.3;  Surgeon: Mario Palacios MD;  Location: [facility] PAIN [facility];  Service: Pain Management;  Laterality: Bilateral;       Patient's Medications   New Prescriptions    No medications on file   Previous Medications    ACCU-CHEK GUIDE ME GLUCOSE MTR MISC        ACETAMINOPHEN (TYLENOL ARTHRITIS ORAL)    Take by mouth. Takes prn    ALLOPURINOL (ZYLOPRIM) 100 MG TABLET    TAKE 1 TABLET EVERY DAY    B COMPLEX VITAMINS CAPSULE    Take 1 capsule by mouth once daily.    BACLOFEN (LIORESAL) 10 MG TABLET    Take 1 tablet (10 mg total) by mouth 2 (two) times daily as needed.    CALCIUM CARB,GLUC/MAG GLUC,OX (CALCIUM MAGNESIUM ORAL)    Take by mouth.    GABAPENTIN (NEURONTIN) 100 MG CAPSULE    Take 1 capsule (100 mg total) by mouth nightly as needed.    GABAPENTIN (NEURONTIN) 300 MG CAPSULE    TAKE  1 CAPSULE(300 MG) BY MOUTH EVERY NIGHT AS NEEDED    HYDROCODONE-ACETAMINOPHEN (NORCO) 5-325 MG PER TABLET    Take 1 tablet by mouth every 6 (six) hours as needed.    OLMESARTAN-AMLODIPIN-HCTHIAZID 40-10-25 MG TAB    TAKE 1 TABLET EVERY DAY    PANTOPRAZOLE (PROTONIX) 40 MG TABLET    Take 1 tablet (40 mg total) by mouth daily as needed (gerd).    TURMERIC, BULK, 95 % POWD    Take 1 tablet by mouth.    VITAMIN D 1000 UNITS TAB    Take 185 mg by mouth once daily.    WARFARIN (COUMADIN) 5 MG TABLET    TAKE 1 TABLET ON SUNDAY, MONDAY AND FRIDAY AND 7.5 MG ON ALL OTHER DAYS OR AS DIRECTED BY COUMADIN CLINIC   Modified Medications    No medications on file   Discontinued Medications    No medications on file       Family History   Problem Relation Name Age of Onset    Heart disease Mother Jass Shawnee     Hypertension Mother Jass Shawnee     Cataracts Mother Jass Shawnee     Arthritis Mother Jass Shawnee     Diabetes Father Reji     Hypertension Sister Jannet     Glaucoma Sister Jannet     Ovarian cancer Sister Jannet     Hypertension Sister Kenisha     Diabetes Sister Kenisha     Hypertension Sister Veda     Diabetes Sister Veda     Diabetes Brother Eliot     Hypertension Brother Eliot     Diabetes Paternal Uncle Rikki     Breast cancer Neg Hx      Colon cancer Neg Hx         Review of patient's allergies indicates:   Allergen Reactions    Erythromycin Edema     Angioedema to throat    Amlodipine Other (See Comments)     Headaches    Diazepam Hives     Other reaction(s): Unknown    Iodine Hives     Other reaction(s): Unknown    Meperidine Hives     Other reaction(s): Unknown    Morphine Itching    Methylprednisolone sod suc(pf) Other (See Comments)     headache    Primidone Other (See Comments)     Other reaction(s): Unknown    Zetia [ezetimibe]      Diarrhea           Objective:      Physical Exam  Patient is alert and oriented, no distress. Skin is intact. Neuro is normal with no focal motor or sensory findings.    Cervical exam  is unremarkable. Intact cervical ROM. Negative Spurling's test    Exam:  Incision sites healed, C/D/I  No swelling or bruising noted  Fluid ROM with no crepitus  Upright Active ROM: , , ER 60  Cuff strength intact   Axillary nerve sensation and motor intact  Motor and sensory intact distally  Strong radial pulse, fingers warm and well perfused      FCE Results:          Physician read: I agree with the above impression.    Assessment/Plan:   Alyx Weir is a 75 y.o. female with 10 months s/p right shoulder arthroscopic rotator cuff repair, biceps tenodesis, and subacromial decompression (DOS: 8/5/24)     Plan:    She is nearly 1 year out from her rotator cuff repair.  She has full range of motion and adequate strength.  She demonstrates ability to perform all required job duties during the FCE.  Recommendation is for no lifting more than 10 lb and push pull up to 20 lb.  I agree with the findings of her FCE at this time.   Follow up with me as needed.            Carl Ruiz MD    I, Estiven Velazquez, acted as a scribe for Carl Ruiz MD for the duration of this office visit.

## 2025-07-15 ENCOUNTER — TELEPHONE (OUTPATIENT)
Dept: SPORTS MEDICINE | Facility: CLINIC | Age: 75
End: 2025-07-15
Payer: MEDICARE

## 2025-07-15 NOTE — TELEPHONE ENCOUNTER
Spoke with patient regarding her message, Advised her that I had contacted her PT office and received a copy of her FCE to complete tomorrows visit     Copied from CRM #[id number]. Topic: General Inquiry - Test Results  >> Jul 15, 2025  1:50 PM Bhavna wrote:  Type:  Test Results    Who Called:  Alyx  Name of Test (Lab/Mammo/Etc): FCE  Date of Test: 7/7  Ordering Provider: [facility]  Where the test was performed:   Would the patient rather a call back or a response via [facility]?  Call back   Best Call Back Number:  [phone]  Additional Information:  pt called in to see if test results were received and if she still needed to attend her apt on 7/16. Please call back. Thanks lw

## 2025-07-16 ENCOUNTER — OFFICE VISIT (OUTPATIENT)
Dept: SPORTS MEDICINE | Facility: CLINIC | Age: 75
End: 2025-07-16
Payer: COMMERCIAL

## 2025-07-16 DIAGNOSIS — Z98.890 STATUS POST ROTATOR CUFF REPAIR: Primary | ICD-10-CM

## 2025-07-16 DIAGNOSIS — S46.011D TRAUMATIC INCOMPLETE TEAR OF RIGHT ROTATOR CUFF, SUBSEQUENT ENCOUNTER: ICD-10-CM

## 2025-08-04 ENCOUNTER — ANTI-COAG VISIT (OUTPATIENT)
Dept: CARDIOLOGY | Facility: CLINIC | Age: 75
End: 2025-08-04
Payer: MEDICARE

## 2025-08-04 DIAGNOSIS — Z79.01 ANTICOAGULATED ON COUMADIN: Primary | Chronic | ICD-10-CM

## 2025-08-04 DIAGNOSIS — D68.51 HETEROZYGOUS FACTOR V LEIDEN MUTATION: Chronic | ICD-10-CM

## 2025-08-04 LAB
CTP QC/QA: YES
INR PPP: 2 (ref 2–3)

## 2025-08-04 PROCEDURE — 85610 PROTHROMBIN TIME: CPT | Mod: QW,HCNC,S$GLB, | Performed by: INTERNAL MEDICINE

## 2025-08-04 PROCEDURE — 93793 ANTICOAG MGMT PT WARFARIN: CPT | Mod: HCNC,S$GLB,,

## 2025-08-04 NOTE — PROGRESS NOTES
INR is therapeutic at 2.0.  No changes reported.  Confirms current warfarin dose - continue 7.5 mg every Tues, Thurs, Sat; and 5 mg all other days.  INR recheck in 1 month.  Dose calendar given - confirms understanding.

## 2025-08-05 ENCOUNTER — TELEPHONE (OUTPATIENT)
Dept: FAMILY MEDICINE | Facility: CLINIC | Age: 75
End: 2025-08-05
Payer: MEDICARE

## 2025-08-05 NOTE — TELEPHONE ENCOUNTER
Spoke with patient states been having a lot of gas and Mylanta and pantoprazole not working and want the doctor to call her something into pharmacy like he did before but not sure of name.    Last visit 05/26/25

## 2025-08-05 NOTE — TELEPHONE ENCOUNTER
Copied from CRM #[id number]. Topic: General Inquiry - Patient Advice  >> Aug 4, 2025  3:31 PM Genesis wrote:  Name of Who is Calling: Alyx WINSTON        What is the request in detail: pt been having some stomach issues since last Thursday and it hasn't gotten better and would like to speak to someone        Can the clinic reply by [facility]: no        What Number to [facility] Back [facility]: [phone]

## 2025-08-06 NOTE — TELEPHONE ENCOUNTER
Spoke with patient informed Dr. Clemons Recommend be seen at urgent care. Patient states ok but she is feeling better and urgent care is costly.

## 2025-08-27 ENCOUNTER — TELEPHONE (OUTPATIENT)
Dept: FAMILY MEDICINE | Facility: CLINIC | Age: 75
End: 2025-08-27
Payer: MEDICARE

## 2025-09-05 ENCOUNTER — ANTI-COAG VISIT (OUTPATIENT)
Dept: CARDIOLOGY | Facility: CLINIC | Age: 75
End: 2025-09-05
Payer: MEDICARE

## 2025-09-05 DIAGNOSIS — D68.51 HETEROZYGOUS FACTOR V LEIDEN MUTATION: Chronic | ICD-10-CM

## 2025-09-05 DIAGNOSIS — Z79.01 ANTICOAGULATED ON COUMADIN: Primary | Chronic | ICD-10-CM

## 2025-09-05 LAB
CTP QC/QA: YES
INR PPP: 2.2 (ref 2–3)

## (undated) DEVICE — PAD ABDOMINAL STERILE 8X10IN

## (undated) DEVICE — SLING ARM ULTRA III PAD MED

## (undated) DEVICE — PACK BASIC SETUP SC BR

## (undated) DEVICE — TUBING SUCTION STRAIGHT .25X20

## (undated) DEVICE — COVER LIGHT HANDLE 80/CA

## (undated) DEVICE — GLOVE BIOGEL ORTHOPEDIC 7.5

## (undated) DEVICE — DRESSING XEROFORM NONADH 1X8IN

## (undated) DEVICE — TOWEL OR DISP STRL BLUE 4/PK

## (undated) DEVICE — SPONGE COTTON TRAY 4X4IN

## (undated) DEVICE — ELECTRODE REM PLYHSV RETURN 9

## (undated) DEVICE — STOCKINETTE TUBULAR 2PL 6 X 4

## (undated) DEVICE — MANIFOLD 4 PORT

## (undated) DEVICE — GLOVE SENSICARE PI GRN 7.5

## (undated) DEVICE — COVER PROXIMA MAYO STAND

## (undated) DEVICE — BNDG COFLEX FOAM LF2 ST 4X5YD

## (undated) DEVICE — SUT ETHILON 3/0 18IN PS-1

## (undated) DEVICE — SET CASSETTE TUBE DW OUTFLOW

## (undated) DEVICE — DRAPE STERI U-SHAPED 47X51IN

## (undated) DEVICE — KIT TRIMANO

## (undated) DEVICE — GLOVE SENSICARE PI ORTHO 7.5

## (undated) DEVICE — COVER CAMERA OPERATING ROOM

## (undated) DEVICE — CANNULA PASSPORT 8 MM X 4CM.

## (undated) DEVICE — GOWN POLY REINF BRTH SLV XL

## (undated) DEVICE — APPLICATOR CHLORAPREP ORN 26ML

## (undated) DEVICE — SUPPORT ULNA NERVE PROTECTOR

## (undated) DEVICE — DRAPE U SPLIT SHEET 54X76IN

## (undated) DEVICE — DRAPE INCISE IOBAN 2 23X17IN

## (undated) DEVICE — TUBING PUMP ARTHROSCOPY STRL

## (undated) DEVICE — TAPE SURGICAL MICROFOAM 4IN

## (undated) DEVICE — NDL SPINAL 18GX3.5 SPINOCAN

## (undated) DEVICE — COVER SURG LIGHT HANDLE

## (undated) DEVICE — DRAPE HIP PCH 112X137X89IN

## (undated) DEVICE — KIT TURNOVER

## (undated) DEVICE — GLOVE SURG BIOGEL LATEX SZ 7.5

## (undated) DEVICE — GOWN SMARTGOWN LVL4 X-LONG XL

## (undated) DEVICE — DRAPE THREE-QTR REINF 53X77IN

## (undated) DEVICE — GLOVE SENSICARE PI GRN 8